# Patient Record
Sex: FEMALE | Race: WHITE | NOT HISPANIC OR LATINO | Employment: OTHER | URBAN - METROPOLITAN AREA
[De-identification: names, ages, dates, MRNs, and addresses within clinical notes are randomized per-mention and may not be internally consistent; named-entity substitution may affect disease eponyms.]

---

## 2017-01-11 ENCOUNTER — HOSPITAL ENCOUNTER (OUTPATIENT)
Dept: PREADMISSION TESTING | Facility: HOSPITAL | Age: 52
Discharge: HOME | End: 2017-01-11

## 2017-11-28 ENCOUNTER — HOSPITAL ENCOUNTER (OUTPATIENT)
Dept: OTHER | Facility: HOSPITAL | Age: 52
Discharge: HOME | End: 2017-11-28
Attending: INTERNAL MEDICINE | Admitting: INTERNAL MEDICINE

## 2017-11-28 LAB — CREAT BLDA-MCNC: 1 MG/DL (ref 0.6–1.3)

## 2020-07-20 ENCOUNTER — TELEPHONE (OUTPATIENT)
Dept: ONCOLOGY | Facility: CLINIC | Age: 55
End: 2020-07-20

## 2020-07-20 NOTE — TELEPHONE ENCOUNTER
CALLED PT WITH HER APPT ON 8/10/20 AT THE [place] OFFICE - SENDING MESS TO DR GRIFFIN ABOUT HER MEDICAL RECORDS.

## 2020-07-20 NOTE — TELEPHONE ENCOUNTER
----- Message from Mathew Collins Jr., MD sent at 7/17/2020  4:57 PM EDT -----  Regarding: New Patient  Spoke to patient's aunt who is my patient. She would like to see me for a second opinion visit. Having CT guided biopsy lung in [place] on 7/31. Please schedule new oncology visit Dr Collins week of 8/10. Patient's number is [phone]

## 2020-08-10 ENCOUNTER — LAB (OUTPATIENT)
Dept: OTHER | Facility: HOSPITAL | Age: 55
End: 2020-08-10

## 2020-08-10 ENCOUNTER — CONSULT (OUTPATIENT)
Dept: ONCOLOGY | Facility: CLINIC | Age: 55
End: 2020-08-10

## 2020-08-10 VITALS
HEIGHT: 66 IN | BODY MASS INDEX: 41.06 KG/M2 | TEMPERATURE: 98 F | DIASTOLIC BLOOD PRESSURE: 85 MMHG | HEART RATE: 85 BPM | OXYGEN SATURATION: 96 % | RESPIRATION RATE: 12 BRPM | WEIGHT: 255.5 LBS | SYSTOLIC BLOOD PRESSURE: 150 MMHG

## 2020-08-10 DIAGNOSIS — E66.01 MORBIDLY OBESE (HCC): ICD-10-CM

## 2020-08-10 DIAGNOSIS — C34.91 ADENOCARCINOMA OF RIGHT LUNG (HCC): Primary | ICD-10-CM

## 2020-08-10 DIAGNOSIS — C34.90 MALIGNANT NEOPLASM OF LUNG, UNSPECIFIED LATERALITY, UNSPECIFIED PART OF LUNG (HCC): Primary | ICD-10-CM

## 2020-08-10 PROBLEM — I10 ESSENTIAL HYPERTENSION: Status: ACTIVE | Noted: 2020-08-10

## 2020-08-10 PROBLEM — Z15.02 BRCA2 GENE MUTATION POSITIVE IN FEMALE: Status: ACTIVE | Noted: 2020-08-10

## 2020-08-10 PROBLEM — C34.92 ADENOCARCINOMA OF LEFT LUNG: Status: ACTIVE | Noted: 2020-08-10

## 2020-08-10 PROBLEM — D3A.090 CARCINOID TUMOR OF LEFT LUNG: Status: ACTIVE | Noted: 2020-08-10

## 2020-08-10 PROBLEM — C73 PAPILLARY THYROID CARCINOMA (HCC): Status: ACTIVE | Noted: 2020-08-10

## 2020-08-10 PROBLEM — Z86.000 HISTORY OF DUCTAL CARCINOMA IN SITU (DCIS) OF BREAST: Status: ACTIVE | Noted: 2020-08-10

## 2020-08-10 PROBLEM — Z15.01 BRCA2 GENE MUTATION POSITIVE IN FEMALE: Status: ACTIVE | Noted: 2020-08-10

## 2020-08-10 PROBLEM — C64.2 RENAL CELL CARCINOMA OF LEFT KIDNEY: Status: ACTIVE | Noted: 2020-08-10

## 2020-08-10 PROBLEM — E89.0 POSTOPERATIVE HYPOTHYROIDISM: Status: ACTIVE | Noted: 2020-08-10

## 2020-08-10 PROBLEM — Z15.09 BRCA2 GENE MUTATION POSITIVE IN FEMALE: Status: ACTIVE | Noted: 2020-08-10

## 2020-08-10 LAB
BASOPHILS # BLD AUTO: 0.04 10*3/MM3 (ref 0–0.2)
BASOPHILS NFR BLD AUTO: 0.4 % (ref 0–1.5)
DEPRECATED RDW RBC AUTO: 45.3 FL (ref 37–54)
EOSINOPHIL # BLD AUTO: 0.17 10*3/MM3 (ref 0–0.4)
EOSINOPHIL NFR BLD AUTO: 1.7 % (ref 0.3–6.2)
ERYTHROCYTE [DISTWIDTH] IN BLOOD BY AUTOMATED COUNT: 15.7 % (ref 12.3–15.4)
HCT VFR BLD AUTO: 36 % (ref 34–46.6)
HGB BLD-MCNC: 11.6 G/DL (ref 12–15.9)
IMM GRANULOCYTES # BLD AUTO: 0.05 10*3/MM3 (ref 0–0.05)
IMM GRANULOCYTES NFR BLD AUTO: 0.5 % (ref 0–0.5)
LYMPHOCYTES # BLD AUTO: 2.61 10*3/MM3 (ref 0.7–3.1)
LYMPHOCYTES NFR BLD AUTO: 25.7 % (ref 19.6–45.3)
MCH RBC QN AUTO: 25.8 PG (ref 26.6–33)
MCHC RBC AUTO-ENTMCNC: 32.2 G/DL (ref 31.5–35.7)
MCV RBC AUTO: 80.2 FL (ref 79–97)
MONOCYTES # BLD AUTO: 0.67 10*3/MM3 (ref 0.1–0.9)
MONOCYTES NFR BLD AUTO: 6.6 % (ref 5–12)
NEUTROPHILS NFR BLD AUTO: 6.6 10*3/MM3 (ref 1.7–7)
NEUTROPHILS NFR BLD AUTO: 65.1 % (ref 42.7–76)
NRBC BLD AUTO-RTO: 0 /100 WBC (ref 0–0.2)
PLATELET # BLD AUTO: 260 10*3/MM3 (ref 140–450)
PMV BLD AUTO: 9.9 FL (ref 6–12)
RBC # BLD AUTO: 4.49 10*6/MM3 (ref 3.77–5.28)
WBC # BLD AUTO: 10.14 10*3/MM3 (ref 3.4–10.8)

## 2020-08-10 PROCEDURE — 36415 COLL VENOUS BLD VENIPUNCTURE: CPT

## 2020-08-10 PROCEDURE — 85025 COMPLETE CBC W/AUTO DIFF WBC: CPT | Performed by: INTERNAL MEDICINE

## 2020-08-10 PROCEDURE — 99245 OFF/OP CONSLTJ NEW/EST HI 55: CPT | Performed by: INTERNAL MEDICINE

## 2020-08-10 RX ORDER — VALACYCLOVIR HYDROCHLORIDE 1 G/1
2000 TABLET, FILM COATED ORAL AS NEEDED
COMMUNITY
End: 2021-05-04 | Stop reason: SDUPTHER

## 2020-08-10 RX ORDER — HYDROCHLOROTHIAZIDE 12.5 MG/1
12.5 CAPSULE, GELATIN COATED ORAL AS NEEDED
COMMUNITY
End: 2021-08-03

## 2020-08-10 RX ORDER — LEVOTHYROXINE SODIUM 0.15 MG/1
150 TABLET ORAL DAILY
COMMUNITY

## 2020-08-10 RX ORDER — MELOXICAM 7.5 MG/1
7.5 TABLET ORAL DAILY PRN
COMMUNITY
End: 2021-02-18

## 2020-08-10 RX ORDER — KRILL/OM-3/DHA/EPA/PHOSPHO/AST 500-110 MG
500 CAPSULE ORAL DAILY
Status: ON HOLD | COMMUNITY
End: 2022-11-23

## 2020-08-10 RX ORDER — LISINOPRIL 20 MG/1
20 TABLET ORAL DAILY
COMMUNITY
End: 2021-05-04 | Stop reason: SDUPTHER

## 2020-08-10 RX ORDER — ATORVASTATIN CALCIUM 40 MG/1
40 TABLET, FILM COATED ORAL NIGHTLY
COMMUNITY
End: 2021-05-04 | Stop reason: SDUPTHER
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
## 2020-08-13 ENCOUNTER — TELEPHONE (OUTPATIENT)
Dept: ONCOLOGY | Facility: CLINIC | Age: 55
End: 2020-08-13

## 2020-08-13 NOTE — TELEPHONE ENCOUNTER
CSW left message for patient with contact information following up on distress score of 4.  Letter of introduction sent to patient's home as well.

## 2020-08-20 ENCOUNTER — LAB (OUTPATIENT)
Dept: LAB | Facility: HOSPITAL | Age: 55
End: 2020-08-20

## 2020-08-20 ENCOUNTER — OFFICE VISIT (OUTPATIENT)
Dept: ONCOLOGY | Facility: CLINIC | Age: 55
End: 2020-08-20

## 2020-08-20 VITALS
RESPIRATION RATE: 16 BRPM | HEIGHT: 66 IN | DIASTOLIC BLOOD PRESSURE: 88 MMHG | WEIGHT: 256.1 LBS | SYSTOLIC BLOOD PRESSURE: 155 MMHG | OXYGEN SATURATION: 96 % | HEART RATE: 79 BPM | TEMPERATURE: 97.8 F | BODY MASS INDEX: 41.16 KG/M2

## 2020-08-20 DIAGNOSIS — Z86.000 HISTORY OF DUCTAL CARCINOMA IN SITU (DCIS) OF BREAST: ICD-10-CM

## 2020-08-20 DIAGNOSIS — C34.91 ADENOCARCINOMA OF RIGHT LUNG (HCC): ICD-10-CM

## 2020-08-20 DIAGNOSIS — E89.0 POSTOPERATIVE HYPOTHYROIDISM: ICD-10-CM

## 2020-08-20 DIAGNOSIS — E66.01 MORBIDLY OBESE (HCC): ICD-10-CM

## 2020-08-20 DIAGNOSIS — Z15.09 BRCA2 GENE MUTATION POSITIVE IN FEMALE: ICD-10-CM

## 2020-08-20 DIAGNOSIS — C64.2 RENAL CELL CARCINOMA OF LEFT KIDNEY (HCC): ICD-10-CM

## 2020-08-20 DIAGNOSIS — Z15.01 BRCA2 GENE MUTATION POSITIVE IN FEMALE: ICD-10-CM

## 2020-08-20 DIAGNOSIS — D3A.090 CARCINOID TUMOR OF LEFT LUNG: ICD-10-CM

## 2020-08-20 DIAGNOSIS — Z15.02 BRCA2 GENE MUTATION POSITIVE IN FEMALE: ICD-10-CM

## 2020-08-20 DIAGNOSIS — C34.91 ADENOCARCINOMA OF RIGHT LUNG (HCC): Primary | ICD-10-CM

## 2020-08-20 DIAGNOSIS — C73 PAPILLARY THYROID CARCINOMA (HCC): ICD-10-CM

## 2020-08-20 DIAGNOSIS — C34.92 ADENOCARCINOMA OF LEFT LUNG (HCC): ICD-10-CM

## 2020-08-20 DIAGNOSIS — D64.9 NORMOCYTIC ANEMIA: ICD-10-CM

## 2020-08-20 LAB
ALBUMIN SERPL-MCNC: 4.3 G/DL (ref 3.5–5.2)
ALBUMIN/GLOB SERPL: 1.5 G/DL (ref 1.1–2.4)
ALP SERPL-CCNC: 126 U/L (ref 38–116)
ALT SERPL W P-5'-P-CCNC: 25 U/L (ref 0–33)
ANION GAP SERPL CALCULATED.3IONS-SCNC: 13.5 MMOL/L (ref 5–15)
AST SERPL-CCNC: 17 U/L (ref 0–32)
BASOPHILS # BLD AUTO: 0.05 10*3/MM3 (ref 0–0.2)
BASOPHILS NFR BLD AUTO: 0.5 % (ref 0–1.5)
BILIRUB SERPL-MCNC: 0.5 MG/DL (ref 0.2–1.2)
BUN SERPL-MCNC: 11 MG/DL (ref 6–20)
BUN/CREAT SERPL: 14.5 (ref 7.3–30)
CALCIUM SPEC-SCNC: 9.5 MG/DL (ref 8.5–10.2)
CHLORIDE SERPL-SCNC: 101 MMOL/L (ref 98–107)
CO2 SERPL-SCNC: 25.5 MMOL/L (ref 22–29)
CREAT SERPL-MCNC: 0.76 MG/DL (ref 0.6–1.1)
DEPRECATED RDW RBC AUTO: 47.8 FL (ref 37–54)
EOSINOPHIL # BLD AUTO: 0.18 10*3/MM3 (ref 0–0.4)
EOSINOPHIL NFR BLD AUTO: 2 % (ref 0.3–6.2)
ERYTHROCYTE [DISTWIDTH] IN BLOOD BY AUTOMATED COUNT: 16 % (ref 12.3–15.4)
FERRITIN SERPL-MCNC: 119.3 NG/ML (ref 11–207)
FOLATE SERPL-MCNC: 12.2 NG/ML (ref 4.78–24.2)
GFR SERPL CREATININE-BSD FRML MDRD: 79 ML/MIN/1.73
GLOBULIN UR ELPH-MCNC: 2.8 GM/DL (ref 1.8–3.5)
GLUCOSE SERPL-MCNC: 171 MG/DL (ref 74–124)
HCT VFR BLD AUTO: 33.8 % (ref 34–46.6)
HGB BLD-MCNC: 10.7 G/DL (ref 12–15.9)
IMM GRANULOCYTES # BLD AUTO: 0.07 10*3/MM3 (ref 0–0.05)
IMM GRANULOCYTES NFR BLD AUTO: 0.8 % (ref 0–0.5)
IRON 24H UR-MRATE: 44 MCG/DL (ref 37–145)
IRON SATN MFR SERPL: 13 % (ref 14–48)
LYMPHOCYTES # BLD AUTO: 2.55 10*3/MM3 (ref 0.7–3.1)
LYMPHOCYTES NFR BLD AUTO: 27.8 % (ref 19.6–45.3)
MCH RBC QN AUTO: 26.2 PG (ref 26.6–33)
MCHC RBC AUTO-ENTMCNC: 31.7 G/DL (ref 31.5–35.7)
MCV RBC AUTO: 82.6 FL (ref 79–97)
MONOCYTES # BLD AUTO: 0.69 10*3/MM3 (ref 0.1–0.9)
MONOCYTES NFR BLD AUTO: 7.5 % (ref 5–12)
NEUTROPHILS NFR BLD AUTO: 5.62 10*3/MM3 (ref 1.7–7)
NEUTROPHILS NFR BLD AUTO: 61.4 % (ref 42.7–76)
NRBC BLD AUTO-RTO: 0 /100 WBC (ref 0–0.2)
PLATELET # BLD AUTO: 245 10*3/MM3 (ref 140–450)
PMV BLD AUTO: 9 FL (ref 6–12)
POTASSIUM SERPL-SCNC: 4.2 MMOL/L (ref 3.5–4.7)
PROT SERPL-MCNC: 7.1 G/DL (ref 6.3–8)
RBC # BLD AUTO: 4.09 10*6/MM3 (ref 3.77–5.28)
SODIUM SERPL-SCNC: 140 MMOL/L (ref 134–145)
TIBC SERPL-MCNC: 351 MCG/DL (ref 249–505)
TRANSFERRIN SERPL-MCNC: 251 MG/DL (ref 200–360)
VIT B12 BLD-MCNC: 392 PG/ML (ref 211–946)
WBC # BLD AUTO: 9.16 10*3/MM3 (ref 3.4–10.8)

## 2020-08-20 PROCEDURE — 84466 ASSAY OF TRANSFERRIN: CPT | Performed by: INTERNAL MEDICINE

## 2020-08-20 PROCEDURE — 82607 VITAMIN B-12: CPT | Performed by: INTERNAL MEDICINE

## 2020-08-20 PROCEDURE — 83540 ASSAY OF IRON: CPT | Performed by: INTERNAL MEDICINE

## 2020-08-20 PROCEDURE — 82746 ASSAY OF FOLIC ACID SERUM: CPT | Performed by: INTERNAL MEDICINE

## 2020-08-20 PROCEDURE — 99215 OFFICE O/P EST HI 40 MIN: CPT | Performed by: INTERNAL MEDICINE

## 2020-08-20 PROCEDURE — 85025 COMPLETE CBC W/AUTO DIFF WBC: CPT

## 2020-08-20 PROCEDURE — 36415 COLL VENOUS BLD VENIPUNCTURE: CPT

## 2020-08-20 PROCEDURE — 82728 ASSAY OF FERRITIN: CPT | Performed by: INTERNAL MEDICINE

## 2020-08-20 PROCEDURE — 80053 COMPREHEN METABOLIC PANEL: CPT

## 2020-08-20 NOTE — PROGRESS NOTES
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  Renal function is normal.  Consider additional evaluation pending hemoglobin when she returns.    Plan:  1. Obtain results from additional molecular evaluation of biopsy 7/31/2020 including ALK and ROS1 rearrangement as well as BRAF V600 mutation.  2. I will present the patient and thoracic oncology conference next week  3. Obtain pulmonary function studies in 2 weeks  4. Referral to thoracic surgery to consider resection of new primary right lower lobe non-small cell lung cancer.  Patient currently undecided as to whether she will pursue surgery locally or in [place].  5. MD visit in 3 weeks with CBC, CMP

## 2020-08-28 ENCOUNTER — TRANSCRIBE ORDERS (OUTPATIENT)
Dept: ADMINISTRATIVE | Facility: HOSPITAL | Age: 55
End: 2020-08-28

## 2020-09-02 ENCOUNTER — OFFICE VISIT (OUTPATIENT)
Dept: OTHER | Facility: HOSPITAL | Age: 55
End: 2020-09-02

## 2020-09-02 VITALS
OXYGEN SATURATION: 98 % | BODY MASS INDEX: 41.09 KG/M2 | RESPIRATION RATE: 16 BRPM | SYSTOLIC BLOOD PRESSURE: 159 MMHG | HEART RATE: 84 BPM | HEIGHT: 66 IN | DIASTOLIC BLOOD PRESSURE: 93 MMHG | WEIGHT: 255.7 LBS

## 2020-09-02 DIAGNOSIS — Z15.09 BRCA2 GENE MUTATION POSITIVE IN FEMALE: ICD-10-CM

## 2020-09-02 DIAGNOSIS — C34.92 ADENOCARCINOMA OF LEFT LUNG (HCC): ICD-10-CM

## 2020-09-02 DIAGNOSIS — C34.91 ADENOCARCINOMA OF RIGHT LUNG (HCC): Primary | ICD-10-CM

## 2020-09-02 DIAGNOSIS — Z15.01 BRCA2 GENE MUTATION POSITIVE IN FEMALE: ICD-10-CM

## 2020-09-02 DIAGNOSIS — Z86.000 HISTORY OF DUCTAL CARCINOMA IN SITU (DCIS) OF BREAST: ICD-10-CM

## 2020-09-02 DIAGNOSIS — D3A.090 CARCINOID TUMOR OF LEFT LUNG: ICD-10-CM

## 2020-09-02 DIAGNOSIS — Z15.02 BRCA2 GENE MUTATION POSITIVE IN FEMALE: ICD-10-CM

## 2020-09-02 PROCEDURE — G0463 HOSPITAL OUTPT CLINIC VISIT: HCPCS

## 2020-09-02 PROCEDURE — 99204 OFFICE O/P NEW MOD 45 MIN: CPT | Performed by: THORACIC SURGERY (CARDIOTHORACIC VASCULAR SURGERY)

## 2020-09-02 RX ORDER — LETROZOLE 2.5 MG/1
2.5 TABLET, FILM COATED ORAL
COMMUNITY
Start: 2020-08-03 | End: 2020-10-02

## 2020-09-02 NOTE — PROGRESS NOTES
Subjective   Patient ID: Christie Avendaño is a 55 y.o. female is being seen for consultation today at the request of Mathew Collins Jr., MD    History of Present Illness  Dear Colleague,  Christie Avendaño was seen in our multidisciplinary thoracic oncology clinic today in consultation for newly diagnosed right lower lobe adenocarcinoma.  Ms. Avendaño is a very pleasant 55-year-old lady with a BRCA2 mutation who presents with a new right lower lobe lung nodule that is been biopsied and is proven to be an adenocarcinoma.  She has a complex cancer history including a left lower lobe adenocarcinoma in 2017 status post left lower lobectomy via thoracotomy.  She also had a carcinoid in her left lower lobe at that point which was a typical carcinoid.  She has had bilateral mastectomies with reconstruction in 2017, she has undergone a partial nephrectomy secondary to renal cell carcinoma in 2019.    From a pulmonary perspective, Ms. Avendaño is able to walk greater than a block and up a flight of stairs without any shortness of breath.  She continues to have significant pain in her left chest after her thoracotomy 2017.  She states that her pain is daily.    Ms. Avendaño is a never smoker.  She has a personal history of multiple cancers as above.  She also has a family history of multiple cancers including breast, colon, melanoma as well as hepatobiliary cancer.  The following portions of the patient's history were reviewed and updated as appropriate: allergies, current medications, past family history, past medical history, past social history, past surgical history and problem list.  Review of Systems   Constitution: Negative.   HENT: Negative.    Eyes: Negative.    Cardiovascular: Negative.    Respiratory: Negative.    Endocrine: Negative.    Hematologic/Lymphatic: Negative.    Skin: Negative.    Musculoskeletal: Positive for arthritis.   Gastrointestinal: Negative.    Genitourinary: Negative.    Neurological: Negative.     Psychiatric/Behavioral: Negative.    Allergic/Immunologic: Positive for environmental allergies.   All other systems reviewed and are negative.    Patient Active Problem List   Diagnosis   • Adenocarcinoma of right lung (CMS/HCC)   • Adenocarcinoma of left lung (CMS/HCC)   • Carcinoid tumor of left lung   • History of ductal carcinoma in situ (DCIS) of breast   • BRCA2 gene mutation positive in female   • Papillary thyroid carcinoma (CMS/HCC)   • Renal cell carcinoma of left kidney (CMS/HCC)   • Essential hypertension   • Postoperative hypothyroidism   • Morbidly obese (CMS/HCC)   • Normocytic anemia     Past Medical History:   Diagnosis Date   • Arthritis    • Asthma    • BRCA2 positive    • Breast cancer (CMS/HCC)     DCIS   • Diabetes mellitus (CMS/HCC)    • H/O Liver masses 2017    x3   • H/O Lung masses 2017    1 in right lower lobe and 1 in the left infrahilar location   • H/O Ovarian cyst    • H/O Right adrenal mass (CMS/HCC) 2017   • Lung cancer (CMS/HCC)    • Thyroid disease      Past Surgical History:   Procedure Laterality Date   • APPENDECTOMY     • BREAST IMPLANT SURGERY Bilateral    • CHOLECYSTECTOMY     • HYSTERECTOMY     • MASTECTOMY Bilateral    • OVARIAN CYST SURGERY     • TONSILLECTOMY       Family History   Problem Relation Age of Onset   • Breast cancer Mother    • Thyroid cancer Mother 73   • Hypertension Mother    • Cancer Father         cholangiocarcinoma   • Liver cancer Father    • Ovarian cancer Maternal Grandmother 80   • Leukemia Maternal Grandfather         60-80, unknown type   • Leukemia Paternal Grandfather         60-80, unknown type   • Colon cancer Maternal Aunt         over age of 50   • Breast cancer Maternal Aunt         80's   • Diabetes Other      Social History     Socioeconomic History   • Marital status:      Spouse name: Jayesh   • Number of children: 2   • Years of education: High School   • Highest education level: Not on file   Occupational History     Employer:  UNEMPLOYED   Tobacco Use   • Smoking status: Never Smoker   • Smokeless tobacco: Never Used   Substance and Sexual Activity   • Alcohol use: Never     Frequency: Never   • Drug use: Never       Current Outpatient Medications:   •  atorvastatin (LIPITOR) 40 MG tablet, Take 40 mg by mouth Every Night., Disp: , Rfl:   •  bimatoprost (LUMIGAN) 0.01 % ophthalmic drops, Administer 1 drop to both eyes Every Night., Disp: , Rfl:   •  CINNAMON PO, Take 1,800 mg by mouth Daily., Disp: , Rfl:   •  hydroCHLOROthiazide (MICROZIDE) 12.5 MG capsule, Take 12.5 mg by mouth As Needed., Disp: , Rfl:   •  Krill Oil (OMEGA-3) 500 MG capsule, Take 500 mg by mouth Daily., Disp: , Rfl:   •  letrozole (FEMARA) 2.5 MG tablet, , Disp: , Rfl:   •  levothyroxine (SYNTHROID, LEVOTHROID) 150 MCG tablet, Take 150 mcg by mouth Daily., Disp: , Rfl:   •  lisinopril (PRINIVIL,ZESTRIL) 20 MG tablet, Take 20 mg by mouth Daily., Disp: , Rfl:   •  meloxicam (MOBIC) 7.5 MG tablet, Take 7.5 mg by mouth Daily As Needed., Disp: , Rfl:   •  metFORMIN (GLUCOPHAGE) 500 MG tablet, Take 500 mg by mouth 2 (Two) Times a Day With Meals. Extended release, Disp: , Rfl:   •  TURMERIC CURCUMIN PO, Take 1,510 mg by mouth Daily. Turmeric, Curcumin w/black pepper, Disp: , Rfl:   •  valACYclovir (VALTREX) 1000 MG tablet, Take 2,000 mg by mouth 2 (Two) Times a Day. 2-1000 mg twice a day, Disp: , Rfl:   Allergies   Allergen Reactions   • Morphine Hives     Hives and throat swelling   • Penicillins Hemorrhagic stroke     Hives and swelling of throat        Objective   Vitals:    09/02/20 1123   BP: 159/93   Pulse: 84   Resp: 16   SpO2: 98%     Physical Exam   Constitutional: She is oriented to person, place, and time. She appears well-developed and well-nourished.   HENT:   Head: Normocephalic and atraumatic.   Nose: Nose normal.   Mouth/Throat: Oropharynx is clear and moist.   Eyes: Pupils are equal, round, and reactive to light. Conjunctivae and EOM are normal.   Neck: Normal  range of motion. Neck supple.   Cardiovascular: Normal rate, regular rhythm, normal heart sounds and intact distal pulses.   Pulmonary/Chest: Effort normal and breath sounds normal.   Well-healed left thoracotomy incisions, well-healed mastectomy incisions   Abdominal: Soft. Bowel sounds are normal.   Well-healed nephrectomy incision   Musculoskeletal: Normal range of motion.   Neurological: She is alert and oriented to person, place, and time.   Skin: Skin is warm and dry. Capillary refill takes less than 2 seconds.   Psychiatric: She has a normal mood and affect. Her behavior is normal. Judgment and thought content normal.   Nursing note and vitals reviewed.    Independent Review of Radiographic Studies:    I have independently reviewed the PET CT scan performed on 8/12/2020 which demonstrates a 1.2 cm right lower lobe lung mass in the superior segment.  There is no other areas of hypermetabolism.  There is no mediastinal or hilar lymphadenopathy.  There is no distant metastases.  Assessment/Plan   Ms. Avendaño is a very pleasant 55-year-old lady with a 1.2 cm superior segment of the right lower lobe adenocarcinoma.  This appears to be a second primary given the lack of other evidence of disease as well as the associated mutations.  This would be a T1b N0 M0.  I would recommend a surgical resection with superior segmentectomy via VATS.  This was discussed at length with Ms. Avendaño today.  She will need to obtain pulmonary function studies to better risk stratify her.  I will plan to have a telephone visit with her after she obtains her pulmonary function studies and we will plan a resection after.  She would like to wait until after September 16 for these studies as she is in the process of moving.  She will also be presented at tumor board.    Diagnoses and all orders for this visit:    Adenocarcinoma of right lung (CMS/HCC)    Adenocarcinoma of left lung (CMS/HCC)    Carcinoid tumor of left lung    History of ductal  carcinoma in situ (DCIS) of breast    BRCA2 gene mutation positive in female    Other orders  -     letrozole (FEMARA) 2.5 MG tablet

## 2020-09-03 ENCOUNTER — APPOINTMENT (OUTPATIENT)
Dept: RESPIRATORY THERAPY | Facility: HOSPITAL | Age: 55
End: 2020-09-03

## 2020-09-08 NOTE — PROGRESS NOTES
Subjective .     REASONS FOR FOLLOWUP:    1. BRCA2 mutation ([id number].[id number]):  · Strong family history of malignancy with a mother who had thyroid cancer and also had breast cancer at age 73 with subsequent liver metastases.  She was found to be BRCA2 positive and prompted the patient's own testing.  The patient's maternal grandmother had ovarian cancer in her mid 80s, maternal grandfather and paternal grandfather both had leukemia of unknown type at an older age.  Her maternal aunt had colon cancer over the age of 50, maternal aunt had breast cancer in her 80s, and her father had cholangiocarcinoma.    · The patient underwent testing on 7/27/2016 showing positive BRCA2 mutation ([id number].[id number])   · The patient did undergo KAVYA/BSO in 1992 secondary to hemorrhage.  · The patient did undergo bilateral mastectomies 1/26/2017 with findings of bilateral DCIS (discussed below).  · Patient has undergone previous colonoscopy on 11/28/2017.  · Given the unusual nature of the patient's malignancies, referral to genetics clinic for panel testing with possibility of additional underlying mutation.  2. Stage I ([id number]) papillary thyroid cancer:  · Status post total thyroidectomy with Dr. Maher in [place] on 10/15/2010.  Pathology showed papillary thyroid cancer involving the left thyroid and isthmus, multifocal with 2 areas measuring 0.9 and 1.2 cm.  No evidence of capsular invasion, no extrathyroidal extension, no lymphovascular invasion, negative margins.  · Postsurgical scan showed uptake in the thyroid bed and the patient underwent treatment with I-131.    · She has had no evidence of recurrent disease.    · Her subsequent hypothyroidism has been managed by endocrinology (Dr. Subhash Michael) in [place], currently receiving levothyroxine 150 mcg daily.  3. Bilateral DCIS  · As above, patient has underlying BRCA2 mutation  · 8/5/2016 was found to have a right breast intraductal papilloma with fibrocystic change and  microcalcifications with usual ductal hyperplasia and columnar cell change in 3 separate locations on biopsy.  · On 8/24/2016 she underwent excisional biopsy of an intraductal papilloma from the right breast.    · She subsequently underwent on 1/26/2017 bilateral mastectomy.  On the right there was a large intraductal papilloma with usual ductal hyperplasia, atypical hyperplasia, DCIS measuring 3 mm which was low-grade, ER positive (98%), [place] positive (85%).  On the left there were multiple intraductal papillomas with atypical ductal hyperplasia.  There was DCIS measuring 6 mm, low-grade, ER positive (99%), [place] positive (98%).  4. Stage I ([id number]) clear-cell renal cell carcinoma:  · Incidental finding on CT scan 1/15/2020 of enhancing left renal lesions x2, largest 2.4 cm  · Resection with Dr. Pool ([facility]) on 5/15/2020 with left partial nephrectomy.  Pathology showed clear cell renal cell carcinoma, Jeanne grade 2, 2 foci measuring 1.5 and 2.1 cm.  The renal parenchymal margin was focally positive.  · Patient reports that Dr. Pool felt that he required additional margin tissue in the area of focal positivity and did not recommend re-resection.  · Most recent CT 7/1/2020 showed evidence of resection of left renal lesion.  5. 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    · CT-guided biopsy on 1/9/2018 revealed a left lower lobe carcinoid with spindle cell features, no necrosis, no mitoses.  · Notation of normal chromogranin A 1/23/2018 of 35  · Follow-up CT on 1/24/2018 showed multiple bilateral pulmonary nodules including a 2.1 cm left lower lobe nodule (previously 2.6), 7 mm left lower lobe nodule, 1 cm right lower lobe nodule, right adrenal 1.5 cm nodule consistent with adenoma, and hepatic cysts.    · On 2/28/2018, the patient underwent a left lower lobectomy.  Pathology revealed a 2.3 cm left  upper lobe (hilar) carcinoid, typical with spindle cell features, Ki-67 of 3.68%.  There were 2 of 8 peribronchial lymph nodes involved with carcinoid with lymphatic invasion, stage IIB ([id number]).  There was also evidence of adenocarcinoma (see below).  6. Stage IA1 ([id number])  left lower lobe non-small cell lung cancer (adenocarcinoma with lipidic features):  · The patient is a never smoker  · PET scan 12/13/2017 with hypermetabolic 2.5 cm left lower lobe nodule with SUV 5.7.    · CT-guided biopsy on 1/9/2018 revealed a left lower lobe carcinoid with spindle cell features, no necrosis, no mitoses.    · Follow-up CT on 1/24/2018 showed multiple bilateral pulmonary nodules including a 2.1 cm left lower lobe nodule (previously 2.6), 7 mm left lower lobe nodule, 1 cm right lower lobe nodule, right adrenal 1.5 cm nodule consistent with adenoma, and hepatic cysts.    · On 2/28/2018, the patient underwent a left lower lobectomy.  Pathology revealed a 2.3 cm left upper lobe (hilar) carcinoid, typical with spindle cell features, Ki-67 of 3.68%.  There were 2 of 8 peribronchial lymph nodes involved with carcinoid with lymphatic invasion, stage IIB ([id number]).  There was a 0.9 cm adenocarcinoma, well differentiated with lipidic features, no visceral pleural invasion, bronchoalveolar atypical adenomatous hyperplasia at the parenchymal margin and 1 microscopic focus (less than 1 mm).  0/8 lymph nodes involved with adenocarcinoma. PDL1 <1% TPS, positive EGF receptor mutation (no details available), negative ALK/ROS1 rearrangement, negative BRAF, ER negative (2%), [place] 0, HER-2/laura 1+, Ki-67 3%. Stage IA1 ([id number]).  · Patient was placed on Femara following surgery by Dr. Juana Pelayo.  Femara subsequently discontinued in early August 2020.  · Subsequent follow-up scans with stable findings until scan on 1/15/2020 noted 2 enhancing left renal lesions, largest 2.4 cm and there was also an enlarging right lower lobe nodule up to  1.0 x 1.2 cm.  In the interval, patient did undergo resection of renal cell carcinoma (discussed above).    · CT scan on 7/1/2020 showed slow increase in right lower lobe nodule up to 1.2 cm.  Felt to represent new primary lung cancer (see below).   7. Clinical stage IA2 ([id number]) right lower lobe non-small cell lung cancer (adenocarcinoma with lipidic growth pattern):   · CT scan on 7/1/2020 showed slow increase in right lower lobe nodule up to 1.2 cm.  · CT-guided biopsy of the right lower lobe nodule on 7/31/2020 with adenocarcinoma with bland lipidic growth pattern of pulmonary origin (TTF-1 and CK7 positive). PDL1 TPS <1%, EGFR mutated, exon 20 insertion (possibly indicating lack of response to TKI's). ALK/ROS1 rearrangement negative and BRAF V600 mutation negative.  · Staging MRI brain 8/12/2020 with no evidence of metastatic disease  · Staging PET scan 8/12/2020 with no significant activity in the biopsied lesion 1.2 cm right lower lobe (SUV 1.5), no evidence of hilar, mediastinal uptake nor distant metastatic disease.  · Given the difference in EGFR mutation between the patient's 2 lung cancers, it is felt that she has 2 separate primary lesions.  Recommended to pursue pulmonary function testing and subsequent resection.  Patient seen by Dr. Crisostomo and thoracic surgery with plans for segmentectomy via VATS in early October 2020.  7. Anemia:  · Hemoglobin in high 10 to low 11 range with low normal MCV  · Anemia evaluation 8/20/2020 with iron 44, ferritin 119.3, iron saturation 13%, TIBC 351, folate 12.2, B12 392.  · Unclear whether patient may have anemia secondary to chronic disease.  · With low iron saturation, initiated oral iron daily on 9/15/2020    HISTORY OF PRESENT ILLNESS:  The patient is a 55 y.o. year old female who is here for follow-up with the above-mentioned history.    History of Present Illness the patient returns today in follow-up to further discuss management of her non-small cell lung  cancer.  I did present the patient at thoracic oncology conference on 9/8/2020 and the patient has seen Dr. Crisostomo here at [facility] in regards to surgical resection of her disease.  Dr. Crisostomo has recommended pulmonary function testing which is scheduled for tomorrow and potential VATS with segmentectomy and lymph node sampling.  Patient would like to delay this until the first week and over 2020.  She has just moved and is recovering from that currently.  She has multiple stressors with her mother who has metastatic breast cancer and is not doing well overall.  She has been only local caregiver.  She is quite stressed with work, family issues, and her recent move.  She is tearful in the office today.  She has no significant physical complaints.  She would like to try to lose weight and is requesting information from a nutrition standpoint.    Past Medical History:   Diagnosis Date   • Arthritis    • Asthma    • BRCA2 positive    • Breast cancer (CMS/HCC)     DCIS   • Diabetes mellitus (CMS/HCC)    • H/O Liver masses 2017    x3   • H/O Lung masses 2017    1 in right lower lobe and 1 in the left infrahilar location   • H/O Ovarian cyst    • H/O Right adrenal mass (CMS/HCC) 2017   • Lung cancer (CMS/HCC)    • Thyroid disease      Past Surgical History:   Procedure Laterality Date   • APPENDECTOMY     • BREAST IMPLANT SURGERY Bilateral    • CHOLECYSTECTOMY     • HYSTERECTOMY     • MASTECTOMY Bilateral    • OVARIAN CYST SURGERY     • TONSILLECTOMY           ONCOLOGIC HISTORY:  (History from previous dates can be found in the separate document.)    Current Outpatient Medications on File Prior to Visit   Medication Sig Dispense Refill   • atorvastatin (LIPITOR) 40 MG tablet Take 40 mg by mouth Every Night.     • bimatoprost (LUMIGAN) 0.01 % ophthalmic drops Administer 1 drop to both eyes Every Night.     • CINNAMON PO Take 1,800 mg by mouth Daily.     • hydroCHLOROthiazide (MICROZIDE) 12.5 MG capsule Take 12.5 mg by mouth As  Needed.     • Krill Oil (OMEGA-3) 500 MG capsule Take 500 mg by mouth Daily.     • letrozole (FEMARA) 2.5 MG tablet      • levothyroxine (SYNTHROID, LEVOTHROID) 150 MCG tablet Take 150 mcg by mouth Daily.     • lisinopril (PRINIVIL,ZESTRIL) 20 MG tablet Take 20 mg by mouth Daily.     • meloxicam (MOBIC) 7.5 MG tablet Take 7.5 mg by mouth Daily As Needed.     • metFORMIN (GLUCOPHAGE) 500 MG tablet Take 500 mg by mouth 2 (Two) Times a Day With Meals. Extended release     • TURMERIC CURCUMIN PO Take 1,510 mg by mouth Daily. Turmeric, Curcumin w/black pepper     • valACYclovir (VALTREX) 1000 MG tablet Take 2,000 mg by mouth 2 (Two) Times a Day. 2-1000 mg twice a day       No current facility-administered medications on file prior to visit.        ALLERGIES:     Allergies   Allergen Reactions   • Morphine Hives     Hives and throat swelling   • Penicillins Hemorrhagic stroke     Hives and swelling of throat       Social History     Socioeconomic History   • Marital status:      Spouse name: Jayesh   • Number of children: 2   • Years of education: High School   • Highest education level: Not on file   Occupational History     Employer: UNEMPLOYED   Tobacco Use   • Smoking status: Never Smoker   • Smokeless tobacco: Never Used   Substance and Sexual Activity   • Alcohol use: Never     Frequency: Never   • Drug use: Never     Family History   Problem Relation Age of Onset   • Breast cancer Mother    • Thyroid cancer Mother 73   • Hypertension Mother    • Cancer Father         cholangiocarcinoma   • Liver cancer Father    • Ovarian cancer Maternal Grandmother 80   • Leukemia Maternal Grandfather         60-80, unknown type   • Leukemia Paternal Grandfather         60-80, unknown type   • Colon cancer Maternal Aunt         over age of 50   • Breast cancer Maternal Aunt         80's   • Diabetes Other               Review of Systems   Constitutional: Positive for fatigue (seems to be getting better). Negative for activity  change, appetite change, fever and unexpected weight change.   HENT: Negative for congestion, mouth sores, nosebleeds, sore throat and voice change.    Respiratory: Negative for cough, shortness of breath and wheezing.    Cardiovascular: Negative for chest pain, palpitations and leg swelling.   Gastrointestinal: Negative for abdominal distention, abdominal pain, blood in stool, constipation, diarrhea, nausea and vomiting.   Endocrine: Negative for cold intolerance and heat intolerance.   Genitourinary: Negative for difficulty urinating, dysuria, frequency and hematuria.   Musculoskeletal: Positive for arthralgias (seems to be getting better) and back pain. Negative for joint swelling and myalgias.   Skin: Negative for rash.   Neurological: Negative for dizziness, syncope, weakness, light-headedness, numbness and headaches.   Hematological: Negative for adenopathy. Does not bruise/bleed easily.   Psychiatric/Behavioral: Negative for confusion and sleep disturbance. The patient is not nervous/anxious.          Objective      Vitals:    09/15/20 0926   BP: 154/85   Pulse: 83   Temp: 98 °F (36.7 °C)   SpO2: 97%      Current Status 9/15/2020   ECOG score 1   Pain 0/10    Physical Exam   Constitutional: She is oriented to person, place, and time. She appears well-developed and well-nourished.   HENT:   Mouth/Throat: Oropharynx is clear and moist.   Eyes: Conjunctivae are normal.   Neck: No thyromegaly present.   Cardiovascular: Normal rate and regular rhythm. Exam reveals no gallop and no friction rub.   No murmur heard.  Pulmonary/Chest: Breath sounds normal. No respiratory distress.   Abdominal: Soft. Bowel sounds are normal. She exhibits no distension. There is no tenderness.   Musculoskeletal: She exhibits no edema.   Lymphadenopathy:        Head (right side): No submandibular adenopathy present.     She has no cervical adenopathy.     She has no axillary adenopathy.        Right: No inguinal and no supraclavicular  adenopathy present.        Left: No inguinal and no supraclavicular adenopathy present.   Neurological: She is alert and oriented to person, place, and time. She displays normal reflexes. No cranial nerve deficit. She exhibits normal muscle tone.   Skin: Skin is warm and dry. No rash noted.   Psychiatric: She has a normal mood and affect. Her behavior is normal.   The patient was examined today, unchanged from above.    RECENT LABS:  Hematology WBC   Date Value Ref Range Status   09/15/2020 9.08 3.40 - 10.80 10*3/mm3 Final   11/01/2018 3.17 (L) 4.5 - 11.0 10*3/uL Final     RBC   Date Value Ref Range Status   09/15/2020 4.36 3.77 - 5.28 10*6/mm3 Final   11/01/2018 3.99 (L) 4.0 - 5.2 10*6/uL Final     Hemoglobin   Date Value Ref Range Status   09/15/2020 11.2 (L) 12.0 - 15.9 g/dL Final   11/01/2018 10.0 (L) 12.0 - 16.0 g/dL Final     Hematocrit   Date Value Ref Range Status   09/15/2020 36.4 34.0 - 46.6 % Final   11/01/2018 30.9 (L) 36.0 - 46.0 % Final     Platelets   Date Value Ref Range Status   09/15/2020 249 140 - 450 10*3/mm3 Final   11/01/2018 98 (L) 140 - 440 10*3/uL Final        Lab Results   Component Value Date    GLUCOSE 210 (H) 09/15/2020    BUN 14 09/15/2020    CREATININE 0.77 09/15/2020    EGFRIFNONA 78 09/15/2020    EGFRIFAFRI >60 11/28/2017    BCR 18.2 09/15/2020    K 4.4 09/15/2020    CO2 25.2 09/15/2020    CALCIUM 9.2 09/15/2020    ALBUMIN 4.40 09/15/2020    LABIL2 1.7 07/10/2020    AST 12 09/15/2020    ALT 16 09/15/2020     Reviewed records from recent thoracic surgery evaluation as outlined above and below.  Reviewed additional path likely findings as outlined above and below.    Assessment/Plan     1. BRCA2 mutation ([id number].[id number]):  · Strong family history of malignancy with a mother who had thyroid cancer and also had breast cancer at age 73 with subsequent liver metastases.  She was found to be BRCA2 positive and prompted the patient's own testing.  The patient's maternal grandmother had ovarian  cancer in her mid 80s, maternal grandfather and paternal grandfather both had leukemia of unknown type at an older age.  Her maternal aunt had colon cancer over the age of 50, maternal aunt had breast cancer in her 80s, and her father had cholangiocarcinoma.    · The patient underwent testing on 7/27/2016 showing positive BRCA2 mutation ([id number].[id number])   · The patient did undergo KAVYA/BSO in 1992 secondary to hemorrhage.  · The patient did undergo bilateral mastectomies 1/26/2017 with findings of bilateral DCIS (discussed below).  · Patient has undergone previous colonoscopy on 11/28/2017.  · Given the unusual nature of the patient's malignancies, referral to genetics clinic for panel testing with possibility of additional underlying mutation.  · As above, we did discuss the unusual nature of her malignancy which are not necessarily traditional BRCA associated diseases.  We discussed referral to the genetics clinic to pursue extended genetic panel testing with the possibility she may harbor another mutation.  She was in agreement.  This can be certainly delayed until possibly November after she recovers from her upcoming surgery.  2. Stage I ([id number]) papillary thyroid cancer:  · Status post total thyroidectomy with Dr. Maher in [place] on 10/15/2010.  Pathology showed papillary thyroid cancer involving the left thyroid and isthmus, multifocal with 2 areas measuring 0.9 and 1.2 cm.  No evidence of capsular invasion, no extrathyroidal extension, no lymphovascular invasion, negative margins.  · Postsurgical scan showed uptake in the thyroid bed and the patient underwent treatment with I-131.    · She has had no evidence of recurrent disease.    · Her subsequent hypothyroidism has been managed by endocrinology (Dr. Subhash Michael) in [place], currently receiving levothyroxine 150 mcg daily.  3. Bilateral DCIS  · As above, patient has underlying BRCA2 mutation  · 8/5/2016 was found to have a right breast  intraductal papilloma with fibrocystic change and microcalcifications with usual ductal hyperplasia and columnar cell change in 3 separate locations on biopsy.  · On 8/24/2016 she underwent excisional biopsy of an intraductal papilloma from the right breast.    · She subsequently underwent on 1/26/2017 bilateral mastectomy.  On the right there was a large intraductal papilloma with usual ductal hyperplasia, atypical hyperplasia, DCIS measuring 3 mm which was low-grade, ER positive (98%), MI positive (85%).  On the left there were multiple intraductal papillomas with atypical ductal hyperplasia.  There was DCIS measuring 6 mm, low-grade, ER positive (99%), MI positive (98%).  4. Stage I ([id number]) clear-cell renal cell carcinoma:  · Incidental finding on CT scan 1/15/2020 of enhancing left renal lesions x2, largest 2.4 cm  · Resection with Dr. Pool ([facility] [place]) on 5/15/2020 with left partial nephrectomy.  Pathology showed clear cell renal cell carcinoma, Jeanne grade 2, 2 foci measuring 1.5 and 2.1 cm.  The renal parenchymal margin was focally positive.  · Patient reports that Dr. Pool felt that he required additional margin tissue in the area of focal positivity and did not recommend re-resection.  · Most recent CT 7/1/2020 showed evidence of resection of left renal lesion.  · We will plan ongoing surveillance for renal cell carcinoma with serial 6-month interval scans following resection of patient's lung cancer.  5. 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    · CT-guided biopsy on 1/9/2018 revealed a left lower lobe carcinoid with spindle cell features, no necrosis, no mitoses.  · Notation of normal chromogranin A 1/23/2018 of 35  · Follow-up CT on 1/24/2018 showed multiple bilateral pulmonary nodules including a 2.1 cm left lower lobe nodule (previously 2.6), 7 mm left lower lobe nodule, 1 cm  right lower lobe nodule, right adrenal 1.5 cm nodule consistent with adenoma, and hepatic cysts.    · On 2/28/2018, the patient underwent a left lower lobectomy.  Pathology revealed a 2.3 cm left upper lobe (hilar) carcinoid, typical with spindle cell features, Ki-67 of 3.68%.  There were 2 of 8 peribronchial lymph nodes involved with carcinoid with lymphatic invasion, stage IIB ([id number]).  There was also evidence of adenocarcinoma (see below).  6. Stage IA1 ([id number])  left lower lobe non-small cell lung cancer (adenocarcinoma with lipidic features):  · The patient is a never smoker  · PET scan 12/13/2017 with hypermetabolic 2.5 cm left lower lobe nodule with SUV 5.7.    · CT-guided biopsy on 1/9/2018 revealed a left lower lobe carcinoid with spindle cell features, no necrosis, no mitoses.    · Follow-up CT on 1/24/2018 showed multiple bilateral pulmonary nodules including a 2.1 cm left lower lobe nodule (previously 2.6), 7 mm left lower lobe nodule, 1 cm right lower lobe nodule, right adrenal 1.5 cm nodule consistent with adenoma, and hepatic cysts.    · On 2/28/2018, the patient underwent a left lower lobectomy.  Pathology revealed a 2.3 cm left upper lobe (hilar) carcinoid, typical with spindle cell features, Ki-67 of 3.68%.  There were 2 of 8 peribronchial lymph nodes involved with carcinoid with lymphatic invasion, stage IIB ([id number]).  There was a 0.9 cm adenocarcinoma, well differentiated with lipidic features, no visceral pleural invasion, bronchoalveolar atypical adenomatous hyperplasia at the parenchymal margin and 1 microscopic focus (less than 1 mm).  0/8 lymph nodes involved with adenocarcinoma. PDL1 <1% TPS, positive EGF receptor mutation (exon 19 deletion, [id number]), negative ALK/ROS1 rearrangement, negative BRAF, ER negative (2%), [place] 0, HER-2/laura 1+, Ki-67 3%. Stage IA1 ([id number]).  · Patient was placed on Femara following surgery by Dr. Juana Pelayo.  Femara subsequently discontinued in  early August 2020.  · Subsequent follow-up scans with stable findings until scan on 1/15/2020 noted 2 enhancing left renal lesions, largest 2.4 cm and there was also an enlarging right lower lobe nodule up to 1.0 x 1.2 cm.  In the interval, patient did undergo resection of renal cell carcinoma (discussed above).    · CT scan on 7/1/2020 showed slow increase in right lower lobe nodule up to 1.2 cm.  Felt to represent new primary lung cancer (see below).   7. Clinical stage IA2 ([id number]) right lower lobe non-small cell lung cancer (adenocarcinoma with lipidic growth pattern):   · CT scan on 7/1/2020 showed slow increase in right lower lobe nodule up to 1.2 cm.  · CT-guided biopsy of the right lower lobe nodule on 7/31/2020 with adenocarcinoma with bland lipidic growth pattern of pulmonary origin (TTF-1 and CK7 positive). PDL1 TPS <1%, EGFR mutated, exon 20 insertion (possibly indicating lack of response to TKI's). ALK/ROS1 rearrangement negative and BRAF V600 mutation negative.  · Staging MRI brain 8/12/2020 with no evidence of metastatic disease  · Staging PET scan 8/12/2020 with no significant activity in the biopsied lesion 1.2 cm right lower lobe (SUV 1.5), no evidence of hilar, mediastinal uptake nor distant metastatic disease.  · Given the difference in EGFR mutation between the patient's 2 lung cancers, it is felt that she has 2 separate primary lesions.  Recommended to pursue pulmonary function testing and subsequent resection.  Patient seen by Dr. Crisostomo and thoracic surgery with plans for segmentectomy via VATS in early October 2020.  · We had a lengthy discussion today regarding patient's plans for definitive treatment of her lung cancer.  She has decided to proceed with surgery at [facility] with Dr. Crisostomo.  She has pulmonary function study scheduled for tomorrow and was seen her back on 9/25/2020.  The patient would like to proceed with surgery in early October.  She has multiple ongoing issues.  Her mother  with metastatic breast cancer and that they are recently.  I discussed that we should move back around 3 weeks postoperatively noted to review her pathologic results and ensure that she does not require any adjuvant therapy.  Patient is in agreement.  8. Anemia:  · Hemoglobin in high 10 to low 11 range with low normal MCV  · Anemia evaluation 8/20/2020 with iron 44, ferritin 119.3, iron saturation 13%, TIBC 351, folate 12.2, B12 392.  · Unclear whether patient may have anemia secondary to chronic disease.  · With low iron saturation, initiated oral iron daily on 9/15/2020  · Hemoglobin today slightly improved at 11.2.  9. Depression  · Patient has had difficulty recently coping with her multiple stressors.  She has recently moved and is the primary caregiver for her mother with metastatic breast cancer not doing well.  We feels overwhelmed and stressed and tearful in the office today.  We discussed referral to the supportive oncology clinic and she would like to proceed.  10. Obesity  · The patient brought up the issue of her weight today.  She is motivated to lose weight but is unsure of how to go about this.  She is aware that this has some impact on her risk for malignancy in the future.  I have referred her to oncology nutrition.    Plan:  1. Pulmonary function studies scheduled for 9/16/2020  2. Anticipate that the patient will be proceeding with surgery the week of 10/5/2020 with Dr. Crisostomo with right VATS and segmentectomy with lymph node sampling.  3. Referral to oncology nutrition  4. Referral to supportive oncology clinic  5. Referral to genetics clinic for extended panel testing (could be delayed until November)  6. Begin oral iron sulfate 325 mg once daily.  Patient cautioned regarding constipation  7. MD visit in 5 weeks with CBC, CMP, iron panel, ferritin.  We will review her operative pathology and discuss the potential need for adjuvant therapy.

## 2020-09-08 NOTE — PATIENT INSTRUCTIONS
"Pt seen by Dr. Crisostomo and will be presented to thoracic conference. Pt given VATS brochure, \"What to Expect\" VATS/Thoracotomy handout with discharge instructions along with ERAS pamphlet.  Pt instructed to call nurse navigator with any questions or concerns. Pt given contact cards for Dr. Crisostomo and nurse navigator.  "

## 2020-09-14 ENCOUNTER — LAB (OUTPATIENT)
Dept: LAB | Facility: HOSPITAL | Age: 55
End: 2020-09-14

## 2020-09-14 DIAGNOSIS — Z01.818 PREOP TESTING: Primary | ICD-10-CM

## 2020-09-14 PROCEDURE — C9803 HOPD COVID-19 SPEC COLLECT: HCPCS

## 2020-09-14 PROCEDURE — U0004 COV-19 TEST NON-CDC HGH THRU: HCPCS

## 2020-09-15 ENCOUNTER — OFFICE VISIT (OUTPATIENT)
Dept: ONCOLOGY | Facility: CLINIC | Age: 55
End: 2020-09-15

## 2020-09-15 ENCOUNTER — LAB (OUTPATIENT)
Dept: LAB | Facility: HOSPITAL | Age: 55
End: 2020-09-15

## 2020-09-15 VITALS
BODY MASS INDEX: 41.15 KG/M2 | WEIGHT: 254.5 LBS | SYSTOLIC BLOOD PRESSURE: 154 MMHG | DIASTOLIC BLOOD PRESSURE: 85 MMHG | HEART RATE: 83 BPM | TEMPERATURE: 98 F | OXYGEN SATURATION: 97 %

## 2020-09-15 DIAGNOSIS — C73 PAPILLARY THYROID CARCINOMA (HCC): ICD-10-CM

## 2020-09-15 DIAGNOSIS — C34.92 ADENOCARCINOMA OF LEFT LUNG (HCC): ICD-10-CM

## 2020-09-15 DIAGNOSIS — C34.91 ADENOCARCINOMA OF RIGHT LUNG (HCC): Primary | ICD-10-CM

## 2020-09-15 DIAGNOSIS — Z15.01 BRCA2 GENE MUTATION POSITIVE IN FEMALE: ICD-10-CM

## 2020-09-15 DIAGNOSIS — C64.2 RENAL CELL CARCINOMA OF LEFT KIDNEY (HCC): ICD-10-CM

## 2020-09-15 DIAGNOSIS — Z15.09 BRCA2 GENE MUTATION POSITIVE IN FEMALE: ICD-10-CM

## 2020-09-15 DIAGNOSIS — D3A.090 CARCINOID TUMOR OF LEFT LUNG: ICD-10-CM

## 2020-09-15 DIAGNOSIS — Z15.02 BRCA2 GENE MUTATION POSITIVE IN FEMALE: ICD-10-CM

## 2020-09-15 DIAGNOSIS — Z86.000 HISTORY OF DUCTAL CARCINOMA IN SITU (DCIS) OF BREAST: ICD-10-CM

## 2020-09-15 DIAGNOSIS — E66.01 MORBIDLY OBESE (HCC): ICD-10-CM

## 2020-09-15 DIAGNOSIS — C34.91 ADENOCARCINOMA OF RIGHT LUNG (HCC): ICD-10-CM

## 2020-09-15 DIAGNOSIS — D64.9 NORMOCYTIC ANEMIA: Primary | ICD-10-CM

## 2020-09-15 LAB
ALBUMIN SERPL-MCNC: 4.4 G/DL (ref 3.5–5.2)
ALBUMIN/GLOB SERPL: 1.6 G/DL (ref 1.1–2.4)
ALP SERPL-CCNC: 151 U/L (ref 38–116)
ALT SERPL W P-5'-P-CCNC: 16 U/L (ref 0–33)
ANION GAP SERPL CALCULATED.3IONS-SCNC: 10.8 MMOL/L (ref 5–15)
AST SERPL-CCNC: 12 U/L (ref 0–32)
BASOPHILS # BLD AUTO: 0.06 10*3/MM3 (ref 0–0.2)
BASOPHILS NFR BLD AUTO: 0.7 % (ref 0–1.5)
BILIRUB SERPL-MCNC: 0.5 MG/DL (ref 0.2–1.2)
BUN SERPL-MCNC: 14 MG/DL (ref 6–20)
BUN/CREAT SERPL: 18.2 (ref 7.3–30)
CALCIUM SPEC-SCNC: 9.2 MG/DL (ref 8.5–10.2)
CHLORIDE SERPL-SCNC: 101 MMOL/L (ref 98–107)
CO2 SERPL-SCNC: 25.2 MMOL/L (ref 22–29)
CREAT SERPL-MCNC: 0.77 MG/DL (ref 0.6–1.1)
DEPRECATED RDW RBC AUTO: 48.9 FL (ref 37–54)
EOSINOPHIL # BLD AUTO: 0.14 10*3/MM3 (ref 0–0.4)
EOSINOPHIL NFR BLD AUTO: 1.5 % (ref 0.3–6.2)
ERYTHROCYTE [DISTWIDTH] IN BLOOD BY AUTOMATED COUNT: 16.1 % (ref 12.3–15.4)
GFR SERPL CREATININE-BSD FRML MDRD: 78 ML/MIN/1.73
GLOBULIN UR ELPH-MCNC: 2.7 GM/DL (ref 1.8–3.5)
GLUCOSE SERPL-MCNC: 210 MG/DL (ref 74–124)
HCT VFR BLD AUTO: 36.4 % (ref 34–46.6)
HGB BLD-MCNC: 11.2 G/DL (ref 12–15.9)
IMM GRANULOCYTES # BLD AUTO: 0.05 10*3/MM3 (ref 0–0.05)
IMM GRANULOCYTES NFR BLD AUTO: 0.6 % (ref 0–0.5)
LYMPHOCYTES # BLD AUTO: 2.72 10*3/MM3 (ref 0.7–3.1)
LYMPHOCYTES NFR BLD AUTO: 30 % (ref 19.6–45.3)
MCH RBC QN AUTO: 25.7 PG (ref 26.6–33)
MCHC RBC AUTO-ENTMCNC: 30.8 G/DL (ref 31.5–35.7)
MCV RBC AUTO: 83.5 FL (ref 79–97)
MONOCYTES # BLD AUTO: 0.59 10*3/MM3 (ref 0.1–0.9)
MONOCYTES NFR BLD AUTO: 6.5 % (ref 5–12)
NEUTROPHILS NFR BLD AUTO: 5.52 10*3/MM3 (ref 1.7–7)
NEUTROPHILS NFR BLD AUTO: 60.7 % (ref 42.7–76)
NRBC BLD AUTO-RTO: 0 /100 WBC (ref 0–0.2)
PLATELET # BLD AUTO: 249 10*3/MM3 (ref 140–450)
PMV BLD AUTO: 9.4 FL (ref 6–12)
POTASSIUM SERPL-SCNC: 4.4 MMOL/L (ref 3.5–4.7)
PROT SERPL-MCNC: 7.1 G/DL (ref 6.3–8)
RBC # BLD AUTO: 4.36 10*6/MM3 (ref 3.77–5.28)
SARS-COV-2 RNA NOSE QL NAA+PROBE: NOT DETECTED
SODIUM SERPL-SCNC: 137 MMOL/L (ref 134–145)
WBC # BLD AUTO: 9.08 10*3/MM3 (ref 3.4–10.8)

## 2020-09-15 PROCEDURE — 85025 COMPLETE CBC W/AUTO DIFF WBC: CPT

## 2020-09-15 PROCEDURE — 80053 COMPREHEN METABOLIC PANEL: CPT

## 2020-09-15 PROCEDURE — 99215 OFFICE O/P EST HI 40 MIN: CPT | Performed by: INTERNAL MEDICINE

## 2020-09-15 PROCEDURE — 36415 COLL VENOUS BLD VENIPUNCTURE: CPT

## 2020-09-16 ENCOUNTER — HOSPITAL ENCOUNTER (OUTPATIENT)
Dept: RESPIRATORY THERAPY | Facility: HOSPITAL | Age: 55
Discharge: HOME | End: 2020-09-16
Admitting: INTERNAL MEDICINE

## 2020-09-16 DIAGNOSIS — E89.0 POSTOPERATIVE HYPOTHYROIDISM: ICD-10-CM

## 2020-09-16 DIAGNOSIS — D64.9 NORMOCYTIC ANEMIA: ICD-10-CM

## 2020-09-16 DIAGNOSIS — C64.2 RENAL CELL CARCINOMA OF LEFT KIDNEY (HCC): ICD-10-CM

## 2020-09-16 DIAGNOSIS — Z15.01 BRCA2 GENE MUTATION POSITIVE IN FEMALE: ICD-10-CM

## 2020-09-16 DIAGNOSIS — E66.01 MORBIDLY OBESE (HCC): ICD-10-CM

## 2020-09-16 DIAGNOSIS — C73 PAPILLARY THYROID CARCINOMA (HCC): ICD-10-CM

## 2020-09-16 DIAGNOSIS — Z15.02 BRCA2 GENE MUTATION POSITIVE IN FEMALE: ICD-10-CM

## 2020-09-16 DIAGNOSIS — Z15.09 BRCA2 GENE MUTATION POSITIVE IN FEMALE: ICD-10-CM

## 2020-09-16 DIAGNOSIS — C34.91 ADENOCARCINOMA OF RIGHT LUNG (HCC): ICD-10-CM

## 2020-09-16 DIAGNOSIS — C34.92 ADENOCARCINOMA OF LEFT LUNG (HCC): ICD-10-CM

## 2020-09-16 DIAGNOSIS — D3A.090 CARCINOID TUMOR OF LEFT LUNG: ICD-10-CM

## 2020-09-16 DIAGNOSIS — Z86.000 HISTORY OF DUCTAL CARCINOMA IN SITU (DCIS) OF BREAST: ICD-10-CM

## 2020-09-16 PROCEDURE — 94726 PLETHYSMOGRAPHY LUNG VOLUMES: CPT

## 2020-09-16 PROCEDURE — 94729 DIFFUSING CAPACITY: CPT

## 2020-09-16 PROCEDURE — 94010 BREATHING CAPACITY TEST: CPT

## 2020-09-25 ENCOUNTER — PREP FOR SURGERY (OUTPATIENT)
Dept: OTHER | Facility: HOSPITAL | Age: 55
End: 2020-09-25

## 2020-09-25 ENCOUNTER — OFFICE VISIT (OUTPATIENT)
Dept: SURGERY | Facility: CLINIC | Age: 55
End: 2020-09-25

## 2020-09-25 DIAGNOSIS — C34.91 ADENOCARCINOMA OF RIGHT LUNG (HCC): Primary | ICD-10-CM

## 2020-09-25 DIAGNOSIS — R79.1 ABNORMAL COAGULATION PROFILE: ICD-10-CM

## 2020-09-25 PROBLEM — C34.90 LUNG CANCER: Status: ACTIVE | Noted: 2020-09-25

## 2020-09-25 PROCEDURE — 99442 PR PHYS/QHP TELEPHONE EVALUATION 11-20 MIN: CPT | Performed by: THORACIC SURGERY (CARDIOTHORACIC VASCULAR SURGERY)

## 2020-09-25 RX ORDER — GABAPENTIN 300 MG/1
600 CAPSULE ORAL ONCE
Status: CANCELLED | OUTPATIENT
Start: 2020-10-09 | End: 2020-09-25

## 2020-09-25 RX ORDER — HEPARIN SODIUM 5000 [USP'U]/ML
5000 INJECTION, SOLUTION INTRAVENOUS; SUBCUTANEOUS ONCE
Status: CANCELLED | OUTPATIENT
Start: 2020-10-09 | End: 2020-09-25

## 2020-09-25 RX ORDER — CELECOXIB 200 MG/1
200 CAPSULE ORAL ONCE
Status: CANCELLED | OUTPATIENT
Start: 2020-10-09 | End: 2020-09-25

## 2020-09-25 RX ORDER — SODIUM CHLORIDE 0.9 % (FLUSH) 0.9 %
3-10 SYRINGE (ML) INJECTION AS NEEDED
Status: CANCELLED | OUTPATIENT
Start: 2020-10-09

## 2020-09-25 RX ORDER — CEFAZOLIN SODIUM 2 G/100ML
2 INJECTION, SOLUTION INTRAVENOUS ONCE
Status: CANCELLED | OUTPATIENT
Start: 2020-10-09 | End: 2020-09-25

## 2020-09-25 RX ORDER — SODIUM CHLORIDE 0.9 % (FLUSH) 0.9 %
3 SYRINGE (ML) INJECTION EVERY 12 HOURS SCHEDULED
Status: CANCELLED | OUTPATIENT
Start: 2020-10-09

## 2020-09-25 RX ORDER — ACETAMINOPHEN 500 MG
1000 TABLET ORAL ONCE
Status: CANCELLED | OUTPATIENT
Start: 2020-10-09 | End: 2020-09-25

## 2020-09-25 NOTE — PROGRESS NOTES
Subjective   Patient ID: Christie Avendaño is a 55 y.o. female is here today for follow-up.    History of Present Illness  Dear Colleague,  Christie Avendaño was evaluated via telephone today today for continued follow up  for a newly diagnosed right lower lobe non-small cell lung cancer.   Ms. Avendaño is a very pleasant 55-year-old lady with a BRCA2 mutation who presents with a new right lower lobe lung nodule that is been biopsied and is proven to be an adenocarcinoma.  She has a complex cancer history including a left lower lobe adenocarcinoma in 2017 status post left lower lobectomy via thoracotomy.  She also had a carcinoid in her left lower lobe at that point which was a typical carcinoid.  She has had bilateral mastectomies with reconstruction in 2017, she has undergone a partial nephrectomy secondary to renal cell carcinoma in 2019.     From a pulmonary perspective, Ms. Avendaño is able to walk greater than a block and up a flight of stairs without any shortness of breath.  She continues to have significant pain in her left chest after her thoracotomy 2017.  She states that her pain is daily.     Ms. Avendaño is a never smoker.  She has a personal history of multiple cancers as above.  She also has a family history of multiple cancers including breast, colon, melanoma as well as hepatobiliary cancer.    Ms. Avendaño has no new complaints today.  Her pulmonary function studies were performed in we are discussing them today.  The following portions of the patient's history were reviewed and updated as appropriate: allergies, current medications, past family history, past medical history, past social history, past surgical history and problem list.  Review of Systems   Constitution: Negative.   HENT: Negative.    Eyes: Negative.    Cardiovascular: Positive for chest pain.   Respiratory: Positive for shortness of breath.    Endocrine: Negative.    Hematologic/Lymphatic: Negative.    Skin: Negative.    Musculoskeletal: Negative.     Gastrointestinal: Negative.    Genitourinary: Negative.    Neurological: Negative.    Psychiatric/Behavioral: Negative.    Allergic/Immunologic: Negative.      Patient Active Problem List   Diagnosis   • Adenocarcinoma of right lung (CMS/HCC)   • Adenocarcinoma of left lung (CMS/HCC)   • Carcinoid tumor of left lung   • History of ductal carcinoma in situ (DCIS) of breast   • BRCA2 gene mutation positive in female   • Papillary thyroid carcinoma (CMS/HCC)   • Renal cell carcinoma of left kidney (CMS/HCC)   • Essential hypertension   • Postoperative hypothyroidism   • Morbidly obese (CMS/HCC)   • Normocytic anemia   • Lung cancer (CMS/HCC)     Past Medical History:   Diagnosis Date   • Arthritis    • Asthma    • BRCA2 positive    • Breast cancer (CMS/HCC)     DCIS   • Diabetes mellitus (CMS/HCC)    • H/O Liver masses 2017    x3   • H/O Lung masses 2017    1 in right lower lobe and 1 in the left infrahilar location   • H/O Ovarian cyst    • H/O Right adrenal mass (CMS/HCC) 2017   • Lung cancer (CMS/HCC)    • Thyroid disease      Past Surgical History:   Procedure Laterality Date   • APPENDECTOMY     • BREAST IMPLANT SURGERY Bilateral    • CHOLECYSTECTOMY     • HYSTERECTOMY     • MASTECTOMY Bilateral    • OVARIAN CYST SURGERY     • TONSILLECTOMY       Family History   Problem Relation Age of Onset   • Breast cancer Mother    • Thyroid cancer Mother 73   • Hypertension Mother    • Cancer Father         cholangiocarcinoma   • Liver cancer Father    • Ovarian cancer Maternal Grandmother 80   • Leukemia Maternal Grandfather         60-80, unknown type   • Leukemia Paternal Grandfather         60-80, unknown type   • Colon cancer Maternal Aunt         over age of 50   • Breast cancer Maternal Aunt         80's   • Diabetes Other      Social History     Socioeconomic History   • Marital status:      Spouse name: Jayesh   • Number of children: 2   • Years of education: High School   • Highest education level: Not on  file   Occupational History     Employer: UNEMPLOYED   Tobacco Use   • Smoking status: Never Smoker   • Smokeless tobacco: Never Used   Substance and Sexual Activity   • Alcohol use: Never     Frequency: Never   • Drug use: Never       Current Outpatient Medications:   •  atorvastatin (LIPITOR) 40 MG tablet, Take 40 mg by mouth Every Night., Disp: , Rfl:   •  bimatoprost (LUMIGAN) 0.01 % ophthalmic drops, Administer 1 drop to both eyes Every Night., Disp: , Rfl:   •  CINNAMON PO, Take 1,800 mg by mouth Daily., Disp: , Rfl:   •  hydroCHLOROthiazide (MICROZIDE) 12.5 MG capsule, Take 12.5 mg by mouth As Needed., Disp: , Rfl:   •  Krill Oil (OMEGA-3) 500 MG capsule, Take 500 mg by mouth Daily., Disp: , Rfl:   •  letrozole (FEMARA) 2.5 MG tablet, , Disp: , Rfl:   •  levothyroxine (SYNTHROID, LEVOTHROID) 150 MCG tablet, Take 150 mcg by mouth Daily., Disp: , Rfl:   •  lisinopril (PRINIVIL,ZESTRIL) 20 MG tablet, Take 20 mg by mouth Daily., Disp: , Rfl:   •  meloxicam (MOBIC) 7.5 MG tablet, Take 7.5 mg by mouth Daily As Needed., Disp: , Rfl:   •  metFORMIN (GLUCOPHAGE) 500 MG tablet, Take 500 mg by mouth 2 (Two) Times a Day With Meals. Extended release, Disp: , Rfl:   •  TURMERIC CURCUMIN PO, Take 1,510 mg by mouth Daily. Turmeric, Curcumin w/black pepper, Disp: , Rfl:   •  valACYclovir (VALTREX) 1000 MG tablet, Take 2,000 mg by mouth 2 (Two) Times a Day. 2-1000 mg twice a day, Disp: , Rfl:   Allergies   Allergen Reactions   • Morphine Hives     Hives and throat swelling   • Penicillins Hemorrhagic stroke     Hives and swelling of throat        Objective   There were no vitals filed for this visit.  Physical Exam   No physical exam secondary to telephone visit  Independent Review of Radiographic Studies:    No new imaging  Pulmonary function studies: FEV1 2.3 or 85% predicted, DLCO 19.1 or 90% predicted  Assessment/Plan   Ms. Avendaño is a very pleasant 55-year-old lady with a right superior segment non-small cell lung cancer,  T1b N0 M0.  She will be a candidate for a right video-assisted superior segmentectomy with lymph node dissection.  This was discussed at length today with Ms. Avendaño and her .  Risks and benefits of this procedure were discussed with the patient today including pneumonia, prolonged air leak and atrial fibrillaiton.  We will plan to proceed with a VATS superior segmentectomy on 10/9/2020.  All of the patient's questions were answered today.      Spent approximate 12 minutes on the phone with Ms. Avendaño today discussing her pulmonary function studies as well as our plan for a surgical resection.    Diagnoses and all orders for this visit:    Adenocarcinoma of right lung (CMS/HCC)

## 2020-09-29 ENCOUNTER — TRANSCRIBE ORDERS (OUTPATIENT)
Dept: PREADMISSION TESTING | Facility: HOSPITAL | Age: 55
End: 2020-09-29

## 2020-09-29 ENCOUNTER — APPOINTMENT (OUTPATIENT)
Dept: LAB | Facility: HOSPITAL | Age: 55
End: 2020-09-29

## 2020-09-29 DIAGNOSIS — Z01.818 OTHER SPECIFIED PRE-OPERATIVE EXAMINATION: Primary | ICD-10-CM

## 2020-10-02 ENCOUNTER — APPOINTMENT (OUTPATIENT)
Dept: PREADMISSION TESTING | Facility: HOSPITAL | Age: 55
End: 2020-10-02

## 2020-10-02 DIAGNOSIS — R79.1 ABNORMAL COAGULATION PROFILE: ICD-10-CM

## 2020-10-02 DIAGNOSIS — C34.91 ADENOCARCINOMA OF RIGHT LUNG (HCC): ICD-10-CM

## 2020-10-02 LAB
ABO GROUP BLD: NORMAL
APTT PPP: 27.9 SECONDS (ref 22.7–35.4)
BLD GP AB SCN SERPL QL: NEGATIVE
INR PPP: 0.89 (ref 0.9–1.1)
PROTHROMBIN TIME: 12 SECONDS (ref 11.7–14.2)
RH BLD: POSITIVE
T&S EXPIRATION DATE: NORMAL

## 2020-10-02 PROCEDURE — 85610 PROTHROMBIN TIME: CPT | Performed by: THORACIC SURGERY (CARDIOTHORACIC VASCULAR SURGERY)

## 2020-10-02 PROCEDURE — 86901 BLOOD TYPING SEROLOGIC RH(D): CPT | Performed by: THORACIC SURGERY (CARDIOTHORACIC VASCULAR SURGERY)

## 2020-10-02 PROCEDURE — 86850 RBC ANTIBODY SCREEN: CPT | Performed by: THORACIC SURGERY (CARDIOTHORACIC VASCULAR SURGERY)

## 2020-10-02 PROCEDURE — 86900 BLOOD TYPING SEROLOGIC ABO: CPT | Performed by: THORACIC SURGERY (CARDIOTHORACIC VASCULAR SURGERY)

## 2020-10-02 PROCEDURE — 93005 ELECTROCARDIOGRAM TRACING: CPT

## 2020-10-02 PROCEDURE — 36415 COLL VENOUS BLD VENIPUNCTURE: CPT

## 2020-10-02 PROCEDURE — 93010 ELECTROCARDIOGRAM REPORT: CPT | Performed by: INTERNAL MEDICINE

## 2020-10-02 PROCEDURE — 85730 THROMBOPLASTIN TIME PARTIAL: CPT | Performed by: THORACIC SURGERY (CARDIOTHORACIC VASCULAR SURGERY)

## 2020-10-02 NOTE — DISCHARGE INSTRUCTIONS
Take the following medications the morning of surgery:  LEVOTHYROXINE    PLEASE ARRIVE AT THE HOSPITAL AT 7:30 AM ON October 9, 2020    General Instructions:  • Do not eat solid food after midnight the night before surgery.  • You may drink clear liquids day of surgery but must stop at least one hour before your hospital arrival time.  • NOTHING TO DRINK AFTER 6:30 AM  • It is beneficial for you to have a clear drink that contains carbohydrates the day of surgery.  We suggest a 12 to 20 ounce bottle of Gatorade or Powerade for non-diabetic patients or a 12 to 20 ounce bottle of G2 or Powerade Zero for diabetic patients. (Pediatric patients, are not advised to drink a 12 to 20 ounce carbohydrate drink)    Clear liquids are liquids you can see through.  Nothing red in color.     Plain water                               Sports drinks  Sodas                                   Gelatin (Jell-O)  Fruit juices without pulp such as white grape juice and apple juice  Popsicles that contain no fruit or yogurt  Tea or coffee (no cream or milk added)  Gatorade / Powerade  G2 / Powerade Zero    • Infants may have breast milk up to four hours before surgery.  • Infants drinking formula may drink formula up to six hours before surgery.   • Patients who avoid smoking, chewing tobacco and alcohol for 4 weeks prior to surgery have a reduced risk of post-operative complications.  Quit smoking as many days before surgery as you can.  • Do not smoke, use chewing tobacco or drink alcohol the day of surgery.   • If applicable bring your C-PAP/ BI-PAP machine.  • Bring any papers given to you in the doctor’s office.  • Wear clean comfortable clothes.  • Do not wear contact lenses, false eyelashes or make-up.  Bring a case for your glasses.   • Bring crutches or walker if applicable.  • Remove all piercings.  Leave jewelry and any other valuables at home.  • Hair extensions with metal clips must be removed prior to surgery.  • The  Pre-Admission Testing nurse will instruct you to bring medications if unable to obtain an accurate list in Pre-Admission Testing.        If you were given a blood bank ID arm band remember to bring it with you the day of surgery.    Preventing a Surgical Site Infection:  • For 2 to 3 days before surgery, avoid shaving with a razor because the razor can irritate skin and make it easier to develop an infection.    • Any areas of open skin can increase the risk of a post-operative wound infection by allowing bacteria to enter and travel throughout the body.  Notify your surgeon if you have any skin wounds / rashes even if it is not near the expected surgical site.  The area will need assessed to determine if surgery should be delayed until it is healed.  • The night prior to surgery shower using a fresh bar of anti-bacterial soap (such as Dial) and clean washcloth.  Sleep in a clean bed with clean clothing.  Do not allow pets to sleep with you.  • Ask your surgeon if you will be receiving antibiotics prior to surgery.  • Make sure you, your family, and all healthcare providers clean their hands with soap and water or an alcohol based hand  before caring for you or your wound.    CHLORHEXIDINE CLOTH INSTRUCTIONS  The morning of surgery follow these instructions using the Chlorhexidine cloths you've been given.  These steps reduce bacteria on the body.  Do not use the cloths near your eyes, ears mouth, genitalia or on open wounds.  Throw the cloths away after use but do not try to flush them down a toilet.      • Open and remove one cloth at a time from the package.    • Leave the cloth unfolded and begin the bathing.  • Massage the skin with the cloths using gentle pressure to remove bacteria.  Do not scrub harshly.   • Follow the steps below with one 2% CHG cloth per area (6 total cloths).  • One cloth for neck, shoulders and chest.  • One cloth for both arms, hands, fingers and underarms (do underarms  last).  • One cloth for the abdomen followed by groin.  • One cloth for right leg and foot including between the toes.  • One cloth for left leg and foot including between the toes.  • The last cloth is to be used for the back of the neck, back and buttocks.    Allow the CHG to air dry 3 minutes on the skin which will give it time to work and decrease the chance of irritation.  The skin may feel sticky until it is dry.  Do not rinse with water or any other liquid or you will lose the beneficial effects of the CHG.  If mild skin irritation occurs, do rinse the skin to remove the CHG.  Report this to the nurse at time of admission.  Do not apply lotions, creams, ointments, deodorants or perfumes after using the clothes. Dress in clean clothes before coming to the hospital.    Day of surgery:  Your arrival time is approximately two hours before your scheduled surgery time.  Upon arrival, a Pre-op nurse and Anesthesiologist will review your health history, obtain vital signs, and answer questions you may have.  The only belongings needed at this time will be a list of your home medications and if applicable your C-PAP/BI-PAP machine.  If you are staying overnight your family can leave the rest of your belongings in the car and bring them to your room later.  A Pre-op nurse will start an IV and you may receive medication in preparation for surgery, including something to help you relax.  Your family will be able to see you in the Pre-op area.  Two visitors at a time will be allowed in the Pre-op room.  While you are in surgery your family should notify the waiting room  if they leave the waiting room area and provide a contact phone number.    Please be aware that surgery does come with discomfort.  We want to make every effort to control your discomfort so please discuss any uncontrolled symptoms with your nurse.   Your doctor will most likely have prescribed pain medications.      If you are going home  after surgery you will receive individualized written care instructions before being discharged.  A responsible adult must drive you to and from the hospital on the day of your surgery and stay with you for 24 hours.    If you are staying overnight following surgery, you will be transported to your hospital room following the recovery period.   has all private rooms.    If you have any questions please call Pre-Admission Testing at [phone].  Deductibles and co-payments are collected on the day of service. Please be prepared to pay the required co-pay, deductible or deposit on the day of service as defined by your plan.    Patient Education for Self-Quarantine Process    Following your COVID testing, we strongly recommend that you do not leave your home after you have been tested for COVID except to get medical care. This includes not going to work, school or to public areas.  If this is not possible for you to do please limit your activities to only required outings.  Be sure to wear a mask when you are with other people, practice social distancing and wash your hands frequently.      The following items provide additional details to keep you safe.  • Wash your hands with soap and water frequently for at least 20 seconds.   • Avoid touching your eyes, nose and mouth with unwashed hands.  • Do not share anything - utensils, towels, food from the same bowl.   • Have your own utensils, drinking glass, dishes, towels and bedding.   • Do not have visitors.   • Do use FaceTime to stay in touch with family and friends.  • You should stay in a specific room away from others if possible.   • Stay at least 6 feet away from others in the home if you cannot have a dedicated room to yourself.   • Do not snuggle with your pet. While the CDC says there is no evidence that pets can spread COVID-19 or be infected from humans, it is probably best to avoid “petting, snuggling, being kissed or licked and  sharing food (during self-quarantine)”, according to the CDC.   • Sanitize household surfaces daily. Include all high touch areas (door handles, light switches, phones, countertops, etc.)  • Do not share a bathroom with others, if possible.   • Wear a mask around others in your home if you are unable to stay in a separate room or 6 feet apart. If  you are unable to wear a mask, have your family member wear a mask if they must be within 6 feet of you.   Call your surgeon immediately if you experience any of the following symptoms:  • Sore Throat  • Shortness of Breath or difficulty breathing  • Cough  • Chills  • Body soreness or muscle pain  • Headache  • Fever  • New loss of taste or smell  • Do not arrive for your surgery ill.  Your procedure will need to be rescheduled to another time.  You will need to call your physician before the day of surgery to avoid any unnecessary exposure to hospital staff as well as other patients.

## 2020-10-07 ENCOUNTER — LAB (OUTPATIENT)
Dept: LAB | Facility: HOSPITAL | Age: 55
End: 2020-10-07

## 2020-10-07 DIAGNOSIS — Z01.818 OTHER SPECIFIED PRE-OPERATIVE EXAMINATION: ICD-10-CM

## 2020-10-07 PROCEDURE — U0004 COV-19 TEST NON-CDC HGH THRU: HCPCS

## 2020-10-07 PROCEDURE — C9803 HOPD COVID-19 SPEC COLLECT: HCPCS

## 2020-10-08 LAB — SARS-COV-2 RNA RESP QL NAA+PROBE: NOT DETECTED

## 2020-10-09 ENCOUNTER — ANESTHESIA EVENT (OUTPATIENT)
Dept: PERIOP | Facility: HOSPITAL | Age: 55
End: 2020-10-09

## 2020-10-09 ENCOUNTER — APPOINTMENT (OUTPATIENT)
Dept: GENERAL RADIOLOGY | Facility: HOSPITAL | Age: 55
End: 2020-10-09

## 2020-10-09 ENCOUNTER — HOSPITAL ENCOUNTER (INPATIENT)
Facility: HOSPITAL | Age: 55
LOS: 3 days | Discharge: HOME | End: 2020-10-12
Attending: THORACIC SURGERY (CARDIOTHORACIC VASCULAR SURGERY) | Admitting: THORACIC SURGERY (CARDIOTHORACIC VASCULAR SURGERY)

## 2020-10-09 ENCOUNTER — ANESTHESIA (OUTPATIENT)
Dept: PERIOP | Facility: HOSPITAL | Age: 55
End: 2020-10-09

## 2020-10-09 DIAGNOSIS — C34.91 ADENOCARCINOMA OF RIGHT LUNG (HCC): ICD-10-CM

## 2020-10-09 LAB
GLUCOSE BLDC GLUCOMTR-MCNC: 186 MG/DL (ref 70–130)
GLUCOSE BLDC GLUCOMTR-MCNC: 186 MG/DL (ref 70–130)

## 2020-10-09 PROCEDURE — 25010000002 ONDANSETRON PER 1 MG: Performed by: NURSE ANESTHETIST, CERTIFIED REGISTERED

## 2020-10-09 PROCEDURE — 25010000002 HYDROMORPHONE PER 4 MG: Performed by: THORACIC SURGERY (CARDIOTHORACIC VASCULAR SURGERY)

## 2020-10-09 PROCEDURE — 0BTF4ZZ RESECTION OF RIGHT LOWER LUNG LOBE, PERCUTANEOUS ENDOSCOPIC APPROACH: ICD-10-PCS | Performed by: THORACIC SURGERY (CARDIOTHORACIC VASCULAR SURGERY)

## 2020-10-09 PROCEDURE — 25010000002 FENTANYL CITRATE (PF) 100 MCG/2ML SOLUTION: Performed by: ANESTHESIOLOGY

## 2020-10-09 PROCEDURE — C9290 INJ, BUPIVACAINE LIPOSOME: HCPCS | Performed by: THORACIC SURGERY (CARDIOTHORACIC VASCULAR SURGERY)

## 2020-10-09 PROCEDURE — 25010000003 CEFAZOLIN IN DEXTROSE 2-4 GM/100ML-% SOLUTION: Performed by: THORACIC SURGERY (CARDIOTHORACIC VASCULAR SURGERY)

## 2020-10-09 PROCEDURE — C9290 INJ, BUPIVACAINE LIPOSOME: HCPCS | Performed by: ANESTHESIOLOGY

## 2020-10-09 PROCEDURE — 32674 THORACOSCOPY LYMPH NODE EXC: CPT | Performed by: THORACIC SURGERY (CARDIOTHORACIC VASCULAR SURGERY)

## 2020-10-09 PROCEDURE — 25010000002 HYDROMORPHONE PER 4 MG: Performed by: ANESTHESIOLOGY

## 2020-10-09 PROCEDURE — 88309 TISSUE EXAM BY PATHOLOGIST: CPT | Performed by: THORACIC SURGERY (CARDIOTHORACIC VASCULAR SURGERY)

## 2020-10-09 PROCEDURE — 88305 TISSUE EXAM BY PATHOLOGIST: CPT | Performed by: THORACIC SURGERY (CARDIOTHORACIC VASCULAR SURGERY)

## 2020-10-09 PROCEDURE — 07B74ZX EXCISION OF THORAX LYMPHATIC, PERCUTANEOUS ENDOSCOPIC APPROACH, DIAGNOSTIC: ICD-10-PCS | Performed by: THORACIC SURGERY (CARDIOTHORACIC VASCULAR SURGERY)

## 2020-10-09 PROCEDURE — 82962 GLUCOSE BLOOD TEST: CPT

## 2020-10-09 PROCEDURE — 25010000002 DEXAMETHASONE PER 1 MG: Performed by: NURSE ANESTHETIST, CERTIFIED REGISTERED

## 2020-10-09 PROCEDURE — 76942 ECHO GUIDE FOR BIOPSY: CPT | Performed by: THORACIC SURGERY (CARDIOTHORACIC VASCULAR SURGERY)

## 2020-10-09 PROCEDURE — 25010000003 BUPIVACAINE LIPOSOME 1.3 % SUSPENSION: Performed by: ANESTHESIOLOGY

## 2020-10-09 PROCEDURE — 3E0T3BZ INTRODUCTION OF ANESTHETIC AGENT INTO PERIPHERAL NERVES AND PLEXI, PERCUTANEOUS APPROACH: ICD-10-PCS | Performed by: THORACIC SURGERY (CARDIOTHORACIC VASCULAR SURGERY)

## 2020-10-09 PROCEDURE — 71045 X-RAY EXAM CHEST 1 VIEW: CPT

## 2020-10-09 PROCEDURE — 25010000002 PROPOFOL 10 MG/ML EMULSION: Performed by: NURSE ANESTHETIST, CERTIFIED REGISTERED

## 2020-10-09 PROCEDURE — 25010000002 MIDAZOLAM PER 1 MG: Performed by: ANESTHESIOLOGY

## 2020-10-09 PROCEDURE — 32669 THORACOSCOPY REMOVE SEGMENT: CPT | Performed by: THORACIC SURGERY (CARDIOTHORACIC VASCULAR SURGERY)

## 2020-10-09 PROCEDURE — 25010000002 NEOSTIGMINE PER 0.5 MG: Performed by: NURSE ANESTHETIST, CERTIFIED REGISTERED

## 2020-10-09 PROCEDURE — 88331 PATH CONSLTJ SURG 1 BLK 1SPC: CPT | Performed by: THORACIC SURGERY (CARDIOTHORACIC VASCULAR SURGERY)

## 2020-10-09 PROCEDURE — 25010000002 HEPARIN (PORCINE) PER 1000 UNITS: Performed by: THORACIC SURGERY (CARDIOTHORACIC VASCULAR SURGERY)

## 2020-10-09 PROCEDURE — 25010000002 FENTANYL CITRATE (PF) 100 MCG/2ML SOLUTION: Performed by: NURSE ANESTHETIST, CERTIFIED REGISTERED

## 2020-10-09 PROCEDURE — 25010000003 BUPIVACAINE LIPOSOME 1.3 % SUSPENSION 20 ML VIAL: Performed by: THORACIC SURGERY (CARDIOTHORACIC VASCULAR SURGERY)

## 2020-10-09 PROCEDURE — 0BJ08ZZ INSPECTION OF TRACHEOBRONCHIAL TREE, VIA NATURAL OR ARTIFICIAL OPENING ENDOSCOPIC: ICD-10-PCS | Performed by: THORACIC SURGERY (CARDIOTHORACIC VASCULAR SURGERY)

## 2020-10-09 DEVICE — ARTICULATION RELOAD WITH TRI-STAPLE TECHNOLOGY
Type: IMPLANTABLE DEVICE | Site: CHEST | Status: FUNCTIONAL
Brand: ENDO GIA

## 2020-10-09 DEVICE — CURVED TIP INTELLIGENT RELOAD AND INTRODUCER
Type: IMPLANTABLE DEVICE | Site: CHEST | Status: FUNCTIONAL
Brand: TRI-STAPLE 2.0

## 2020-10-09 RX ORDER — METOCLOPRAMIDE HYDROCHLORIDE 5 MG/ML
10 INJECTION INTRAMUSCULAR; INTRAVENOUS ONCE AS NEEDED
Status: DISCONTINUED | OUTPATIENT
Start: 2020-10-09 | End: 2020-10-09 | Stop reason: HOSPADM

## 2020-10-09 RX ORDER — ACETAMINOPHEN 500 MG
1000 TABLET ORAL 3 TIMES DAILY
Status: DISCONTINUED | OUTPATIENT
Start: 2020-10-09 | End: 2020-10-12 | Stop reason: HOSPADM

## 2020-10-09 RX ORDER — OXYCODONE HYDROCHLORIDE 5 MG/1
5 TABLET ORAL EVERY 4 HOURS PRN
Status: DISCONTINUED | OUTPATIENT
Start: 2020-10-09 | End: 2020-10-12 | Stop reason: HOSPADM

## 2020-10-09 RX ORDER — NALOXONE HCL 0.4 MG/ML
0.4 VIAL (ML) INJECTION
Status: DISCONTINUED | OUTPATIENT
Start: 2020-10-09 | End: 2020-10-09 | Stop reason: HOSPADM

## 2020-10-09 RX ORDER — GABAPENTIN 300 MG/1
600 CAPSULE ORAL ONCE
Status: COMPLETED | OUTPATIENT
Start: 2020-10-09 | End: 2020-10-09

## 2020-10-09 RX ORDER — AMOXICILLIN 250 MG
2 CAPSULE ORAL NIGHTLY
Status: DISCONTINUED | OUTPATIENT
Start: 2020-10-09 | End: 2020-10-12 | Stop reason: HOSPADM

## 2020-10-09 RX ORDER — CHLORHEXIDINE GLUCONATE 500 MG/1
CLOTH TOPICAL TAKE AS DIRECTED
COMMUNITY
End: 2020-11-12

## 2020-10-09 RX ORDER — HYDROMORPHONE HYDROCHLORIDE 1 MG/ML
0.5 INJECTION, SOLUTION INTRAMUSCULAR; INTRAVENOUS; SUBCUTANEOUS
Status: DISCONTINUED | OUTPATIENT
Start: 2020-10-09 | End: 2020-10-12 | Stop reason: HOSPADM

## 2020-10-09 RX ORDER — LIDOCAINE HYDROCHLORIDE 10 MG/ML
0.5 INJECTION, SOLUTION EPIDURAL; INFILTRATION; INTRACAUDAL; PERINEURAL ONCE AS NEEDED
Status: DISCONTINUED | OUTPATIENT
Start: 2020-10-09 | End: 2020-10-09 | Stop reason: HOSPADM

## 2020-10-09 RX ORDER — ONDANSETRON 2 MG/ML
4 INJECTION INTRAMUSCULAR; INTRAVENOUS ONCE AS NEEDED
Status: DISCONTINUED | OUTPATIENT
Start: 2020-10-09 | End: 2020-10-09 | Stop reason: HOSPADM

## 2020-10-09 RX ORDER — NITROGLYCERIN 0.4 MG/1
0.4 TABLET SUBLINGUAL
Status: DISCONTINUED | OUTPATIENT
Start: 2020-10-09 | End: 2020-10-12 | Stop reason: HOSPADM

## 2020-10-09 RX ORDER — CEFAZOLIN SODIUM 2 G/100ML
2 INJECTION, SOLUTION INTRAVENOUS ONCE
Status: COMPLETED | OUTPATIENT
Start: 2020-10-09 | End: 2020-10-09

## 2020-10-09 RX ORDER — SODIUM CHLORIDE 0.9 % (FLUSH) 0.9 %
3 SYRINGE (ML) INJECTION EVERY 12 HOURS SCHEDULED
Status: DISCONTINUED | OUTPATIENT
Start: 2020-10-09 | End: 2020-10-09 | Stop reason: HOSPADM

## 2020-10-09 RX ORDER — HYDROCHLOROTHIAZIDE 12.5 MG/1
12.5 CAPSULE, GELATIN COATED ORAL DAILY
Status: DISCONTINUED | OUTPATIENT
Start: 2020-10-10 | End: 2020-10-12 | Stop reason: HOSPADM

## 2020-10-09 RX ORDER — BISACODYL 10 MG
10 SUPPOSITORY, RECTAL RECTAL DAILY PRN
Status: DISCONTINUED | OUTPATIENT
Start: 2020-10-09 | End: 2020-10-12 | Stop reason: HOSPADM

## 2020-10-09 RX ORDER — LEVOTHYROXINE SODIUM 0.15 MG/1
150 TABLET ORAL
Status: DISCONTINUED | OUTPATIENT
Start: 2020-10-10 | End: 2020-10-12 | Stop reason: HOSPADM

## 2020-10-09 RX ORDER — FAMOTIDINE 10 MG/ML
20 INJECTION, SOLUTION INTRAVENOUS ONCE
Status: COMPLETED | OUTPATIENT
Start: 2020-10-09 | End: 2020-10-09

## 2020-10-09 RX ORDER — GLYCOPYRROLATE 0.2 MG/ML
INJECTION INTRAMUSCULAR; INTRAVENOUS AS NEEDED
Status: DISCONTINUED | OUTPATIENT
Start: 2020-10-09 | End: 2020-10-09 | Stop reason: SURG

## 2020-10-09 RX ORDER — ROCURONIUM BROMIDE 10 MG/ML
INJECTION, SOLUTION INTRAVENOUS AS NEEDED
Status: DISCONTINUED | OUTPATIENT
Start: 2020-10-09 | End: 2020-10-09 | Stop reason: SURG

## 2020-10-09 RX ORDER — MAGNESIUM HYDROXIDE 1200 MG/15ML
LIQUID ORAL AS NEEDED
Status: DISCONTINUED | OUTPATIENT
Start: 2020-10-09 | End: 2020-10-09 | Stop reason: HOSPADM

## 2020-10-09 RX ORDER — HEPARIN SODIUM 5000 [USP'U]/ML
5000 INJECTION, SOLUTION INTRAVENOUS; SUBCUTANEOUS ONCE
Status: COMPLETED | OUTPATIENT
Start: 2020-10-09 | End: 2020-10-09

## 2020-10-09 RX ORDER — IPRATROPIUM BROMIDE AND ALBUTEROL SULFATE 2.5; .5 MG/3ML; MG/3ML
3 SOLUTION RESPIRATORY (INHALATION)
Status: DISPENSED | OUTPATIENT
Start: 2020-10-09 | End: 2020-10-11

## 2020-10-09 RX ORDER — HEPARIN SODIUM 5000 [USP'U]/ML
5000 INJECTION, SOLUTION INTRAVENOUS; SUBCUTANEOUS EVERY 8 HOURS SCHEDULED
Status: DISCONTINUED | OUTPATIENT
Start: 2020-10-10 | End: 2020-10-12 | Stop reason: HOSPADM

## 2020-10-09 RX ORDER — DEXTROSE, SODIUM CHLORIDE, AND POTASSIUM CHLORIDE 5; .45; .15 G/100ML; G/100ML; G/100ML
75 INJECTION INTRAVENOUS CONTINUOUS
Status: DISCONTINUED | OUTPATIENT
Start: 2020-10-09 | End: 2020-10-10

## 2020-10-09 RX ORDER — SODIUM CHLORIDE 0.9 % (FLUSH) 0.9 %
3-10 SYRINGE (ML) INJECTION AS NEEDED
Status: DISCONTINUED | OUTPATIENT
Start: 2020-10-09 | End: 2020-10-09 | Stop reason: HOSPADM

## 2020-10-09 RX ORDER — HYDROMORPHONE HYDROCHLORIDE 1 MG/ML
0.5 INJECTION, SOLUTION INTRAMUSCULAR; INTRAVENOUS; SUBCUTANEOUS
Status: DISCONTINUED | OUTPATIENT
Start: 2020-10-09 | End: 2020-10-09 | Stop reason: HOSPADM

## 2020-10-09 RX ORDER — PROPOFOL 10 MG/ML
VIAL (ML) INTRAVENOUS AS NEEDED
Status: DISCONTINUED | OUTPATIENT
Start: 2020-10-09 | End: 2020-10-09 | Stop reason: SURG

## 2020-10-09 RX ORDER — BUPIVACAINE HYDROCHLORIDE 2.5 MG/ML
INJECTION, SOLUTION EPIDURAL; INFILTRATION; INTRACAUDAL
Status: COMPLETED | OUTPATIENT
Start: 2020-10-09 | End: 2020-10-09

## 2020-10-09 RX ORDER — DEXAMETHASONE SODIUM PHOSPHATE 10 MG/ML
INJECTION INTRAMUSCULAR; INTRAVENOUS AS NEEDED
Status: DISCONTINUED | OUTPATIENT
Start: 2020-10-09 | End: 2020-10-09 | Stop reason: SURG

## 2020-10-09 RX ORDER — CELECOXIB 200 MG/1
200 CAPSULE ORAL ONCE
Status: COMPLETED | OUTPATIENT
Start: 2020-10-09 | End: 2020-10-09

## 2020-10-09 RX ORDER — KETAMINE HYDROCHLORIDE 10 MG/ML
INJECTION INTRAMUSCULAR; INTRAVENOUS AS NEEDED
Status: DISCONTINUED | OUTPATIENT
Start: 2020-10-09 | End: 2020-10-09 | Stop reason: SURG

## 2020-10-09 RX ORDER — GABAPENTIN 300 MG/1
300 CAPSULE ORAL EVERY 8 HOURS SCHEDULED
Status: DISCONTINUED | OUTPATIENT
Start: 2020-10-09 | End: 2020-10-12 | Stop reason: HOSPADM

## 2020-10-09 RX ORDER — LIDOCAINE HYDROCHLORIDE 20 MG/ML
INJECTION, SOLUTION INFILTRATION; PERINEURAL AS NEEDED
Status: DISCONTINUED | OUTPATIENT
Start: 2020-10-09 | End: 2020-10-09 | Stop reason: SURG

## 2020-10-09 RX ORDER — ACETAMINOPHEN 500 MG
1000 TABLET ORAL ONCE
Status: COMPLETED | OUTPATIENT
Start: 2020-10-09 | End: 2020-10-09

## 2020-10-09 RX ORDER — MIDAZOLAM HYDROCHLORIDE 1 MG/ML
1 INJECTION INTRAMUSCULAR; INTRAVENOUS
Status: DISCONTINUED | OUTPATIENT
Start: 2020-10-09 | End: 2020-10-09 | Stop reason: HOSPADM

## 2020-10-09 RX ORDER — CELECOXIB 100 MG/1
100 CAPSULE ORAL EVERY 12 HOURS SCHEDULED
Status: DISCONTINUED | OUTPATIENT
Start: 2020-10-09 | End: 2020-10-12 | Stop reason: HOSPADM

## 2020-10-09 RX ORDER — FENTANYL CITRATE 50 UG/ML
50 INJECTION, SOLUTION INTRAMUSCULAR; INTRAVENOUS
Status: DISCONTINUED | OUTPATIENT
Start: 2020-10-09 | End: 2020-10-09 | Stop reason: HOSPADM

## 2020-10-09 RX ORDER — SODIUM CHLORIDE, SODIUM LACTATE, POTASSIUM CHLORIDE, CALCIUM CHLORIDE 600; 310; 30; 20 MG/100ML; MG/100ML; MG/100ML; MG/100ML
9 INJECTION, SOLUTION INTRAVENOUS CONTINUOUS
Status: DISCONTINUED | OUTPATIENT
Start: 2020-10-09 | End: 2020-10-09

## 2020-10-09 RX ORDER — ONDANSETRON 2 MG/ML
INJECTION INTRAMUSCULAR; INTRAVENOUS AS NEEDED
Status: DISCONTINUED | OUTPATIENT
Start: 2020-10-09 | End: 2020-10-09 | Stop reason: SURG

## 2020-10-09 RX ORDER — EPHEDRINE SULFATE 50 MG/ML
INJECTION, SOLUTION INTRAVENOUS AS NEEDED
Status: DISCONTINUED | OUTPATIENT
Start: 2020-10-09 | End: 2020-10-09 | Stop reason: SURG

## 2020-10-09 RX ADMIN — FENTANYL CITRATE 50 MCG: 50 INJECTION, SOLUTION INTRAMUSCULAR; INTRAVENOUS at 18:24

## 2020-10-09 RX ADMIN — CEFAZOLIN SODIUM 2 G: 2 INJECTION, SOLUTION INTRAVENOUS at 13:34

## 2020-10-09 RX ADMIN — PROPOFOL 200 MG: 10 INJECTION, EMULSION INTRAVENOUS at 13:15

## 2020-10-09 RX ADMIN — FENTANYL CITRATE 50 MCG: 50 INJECTION, SOLUTION INTRAMUSCULAR; INTRAVENOUS at 17:58

## 2020-10-09 RX ADMIN — HYDROMORPHONE HYDROCHLORIDE 0.5 MG: 1 INJECTION, SOLUTION INTRAMUSCULAR; INTRAVENOUS; SUBCUTANEOUS at 20:31

## 2020-10-09 RX ADMIN — HYDROMORPHONE HYDROCHLORIDE 0.5 MG: 1 INJECTION, SOLUTION INTRAMUSCULAR; INTRAVENOUS; SUBCUTANEOUS at 18:43

## 2020-10-09 RX ADMIN — POTASSIUM CHLORIDE, DEXTROSE MONOHYDRATE AND SODIUM CHLORIDE 75 ML/HR: 150; 5; 450 INJECTION, SOLUTION INTRAVENOUS at 20:31

## 2020-10-09 RX ADMIN — FAMOTIDINE 20 MG: 10 INJECTION INTRAVENOUS at 09:00

## 2020-10-09 RX ADMIN — FENTANYL CITRATE 50 MCG: 50 INJECTION INTRAMUSCULAR; INTRAVENOUS at 09:42

## 2020-10-09 RX ADMIN — FENTANYL CITRATE 100 MCG: 50 INJECTION INTRAMUSCULAR; INTRAVENOUS at 13:13

## 2020-10-09 RX ADMIN — FENTANYL CITRATE 50 MCG: 50 INJECTION INTRAMUSCULAR; INTRAVENOUS at 09:50

## 2020-10-09 RX ADMIN — BUPIVACAINE 10 ML: 13.3 INJECTION, SUSPENSION, LIPOSOMAL INFILTRATION at 10:04

## 2020-10-09 RX ADMIN — OXYCODONE 5 MG: 5 TABLET ORAL at 21:23

## 2020-10-09 RX ADMIN — GABAPENTIN 300 MG: 300 CAPSULE ORAL at 21:23

## 2020-10-09 RX ADMIN — ONDANSETRON HYDROCHLORIDE 4 MG: 2 SOLUTION INTRAMUSCULAR; INTRAVENOUS at 14:17

## 2020-10-09 RX ADMIN — CELECOXIB 100 MG: 100 CAPSULE ORAL at 21:23

## 2020-10-09 RX ADMIN — SODIUM CHLORIDE, POTASSIUM CHLORIDE, SODIUM LACTATE AND CALCIUM CHLORIDE 9 ML/HR: 600; 310; 30; 20 INJECTION, SOLUTION INTRAVENOUS at 08:33

## 2020-10-09 RX ADMIN — ACETAMINOPHEN 1000 MG: 500 TABLET, FILM COATED ORAL at 21:23

## 2020-10-09 RX ADMIN — HEPARIN SODIUM 5000 UNITS: 5000 INJECTION INTRAVENOUS; SUBCUTANEOUS at 08:30

## 2020-10-09 RX ADMIN — ACETAMINOPHEN 1000 MG: 500 TABLET ORAL at 08:27

## 2020-10-09 RX ADMIN — DEXAMETHASONE SODIUM PHOSPHATE 12 MG: 10 INJECTION INTRAMUSCULAR; INTRAVENOUS at 14:17

## 2020-10-09 RX ADMIN — KETAMINE HYDROCHLORIDE 10 MG: 10 INJECTION INTRAMUSCULAR; INTRAVENOUS at 14:59

## 2020-10-09 RX ADMIN — NEOSTIGMINE METHYLSULFATE 3 MG: 1 INJECTION INTRAMUSCULAR; INTRAVENOUS; SUBCUTANEOUS at 17:17

## 2020-10-09 RX ADMIN — KETAMINE HYDROCHLORIDE 5 MG: 10 INJECTION INTRAMUSCULAR; INTRAVENOUS at 16:01

## 2020-10-09 RX ADMIN — ROCURONIUM BROMIDE 50 MG: 10 INJECTION INTRAVENOUS at 13:15

## 2020-10-09 RX ADMIN — GABAPENTIN 600 MG: 300 CAPSULE ORAL at 08:27

## 2020-10-09 RX ADMIN — LIDOCAINE HYDROCHLORIDE 60 MG: 20 INJECTION, SOLUTION INFILTRATION; PERINEURAL at 13:15

## 2020-10-09 RX ADMIN — MIDAZOLAM 2 MG: 1 INJECTION INTRAMUSCULAR; INTRAVENOUS at 09:42

## 2020-10-09 RX ADMIN — CELECOXIB 200 MG: 200 CAPSULE ORAL at 08:27

## 2020-10-09 RX ADMIN — EPHEDRINE SULFATE 10 MG: 50 INJECTION INTRAVENOUS at 14:19

## 2020-10-09 RX ADMIN — POTASSIUM CHLORIDE, DEXTROSE MONOHYDRATE AND SODIUM CHLORIDE: 150; 5; 450 INJECTION, SOLUTION INTRAVENOUS at 21:27

## 2020-10-09 RX ADMIN — BUPIVACAINE HYDROCHLORIDE 25 ML: 2.5 INJECTION, SOLUTION EPIDURAL; INFILTRATION; INTRACAUDAL; PERINEURAL at 10:04

## 2020-10-09 RX ADMIN — SODIUM CHLORIDE, POTASSIUM CHLORIDE, SODIUM LACTATE AND CALCIUM CHLORIDE: 600; 310; 30; 20 INJECTION, SOLUTION INTRAVENOUS at 17:39

## 2020-10-09 RX ADMIN — KETAMINE HYDROCHLORIDE 35 MG: 10 INJECTION INTRAMUSCULAR; INTRAVENOUS at 13:33

## 2020-10-09 RX ADMIN — GLYCOPYRROLATE 0.4 MG: 0.2 INJECTION INTRAMUSCULAR; INTRAVENOUS at 17:17

## 2020-10-09 NOTE — ANESTHESIA PROCEDURE NOTES
Arterial Line      Patient location during procedure: holding area  Start time: 10/9/2020 9:54 AM  Stop Time:10/9/2020 10:04 AM       Line placed for hemodynamic monitoring.  Performed By   Anesthesiologist: Radha Amaro MD  Preanesthetic Checklist  Completed: patient identified, site marked, pre-op evaluation, IV checked, risks and benefits discussed and monitors and equipment checked  Arterial Line Prep   Sterile Tech: gloves  Prep: ChloraPrep  Arterial Line Procedure   Laterality:left  Location:  radial artery  Catheter size: 20 G   Guidance: ultrasound guided  PROCEDURE NOTE/ULTRASOUND INTERPRETATION.  Using ultrasound guidance the potential vascular sites for insertion of the catheter were visualized to determine the patency of the vessel to be used for vascular access.  After selecting the appropriate site for insertion, the needle was visualized under ultrasound being inserted into the radial artery, followed by ultrasound confirmation of wire and catheter placement. There were no abnormalities seen on ultrasound; an image was taken; and the patient tolerated the procedure with no complications.   Number of attempts: 1  Successful placement: yes  Post Assessment   Dressing Type: occlusive dressing applied.   Complications no  Circ/Move/Sens Assessment: normal.   Patient Tolerance: patient tolerated the procedure well with no apparent complications

## 2020-10-09 NOTE — ANESTHESIA PROCEDURE NOTES
Airway  Urgency: elective    Date/Time: 10/9/2020 1:30 PM  Difficult airway (CMAC, difficult because of double lumen)    General Information and Staff    Patient location during procedure: OR  CRNA: Alycia Tang CRNA    Indications and Patient Condition  Indications for airway management: airway protection    Preoxygenated: yes  Mask difficulty assessment: 1 - vent by mask    Final Airway Details  Final airway type: endotracheal airway      Successful airway: ETT - double lumen left  Cuffed: yes   Successful intubation technique: direct laryngoscopy and video laryngoscopy  Facilitating devices/methods: cricoid pressure and Bougie  Blade: CMAC  Blade size: D  Cormack-Lehane Classification: grade IIa - partial view of glottis  Placement verified by: chest auscultation, bronchoscopy and capnometry   Measured from: lips  ETT/EBT  to lips (cm): 30  Number of attempts at approach: 2  Assessment: lips, teeth, and gum same as pre-op and atraumatic intubation    Additional Comments  Smooth IV induction. Easy mask. DL MAC 3, double lumen passed, esoph, removed pt ventilated, second look with CMAC, not enough room in mouth for CMAC and double lumen. Pt ventilated, pt intubated with single lumen ETT over bronchoscope with surgeon, pt ventilated, tube exchanger passed thru single lumen, ETT removed, double lumen passed over tube exchanger, visualized with CMAC, double lumen thru VC, cuff up, +ETCO2. ETT secured, dentition intact without trauma.

## 2020-10-09 NOTE — ANESTHESIA PREPROCEDURE EVALUATION
Anesthesia Evaluation     history of anesthetic complications: PONV               Airway   Mallampati: I  TM distance: >3 FB  Neck ROM: full  No difficulty expected  Dental - normal exam     Pulmonary - normal exam   (+) lung cancer,   Cardiovascular - normal exam    (+) hypertension,       Neuro/Psych  GI/Hepatic/Renal/Endo    (+) obesity, morbid obesity,  renal disease, diabetes mellitus,     Musculoskeletal     Abdominal    Substance History      OB/GYN          Other   arthritis,                      Anesthesia Plan    ASA 3     general with block     intravenous induction     Anesthetic plan, all risks, benefits, and alternatives have been provided, discussed and informed consent has been obtained with: patient.

## 2020-10-09 NOTE — ANESTHESIA POSTPROCEDURE EVALUATION
"Patient: Christie Avendaño    Procedure Summary     Date: 10/09/20 Room / Location: [facility] OR  /  [facility] OR    Anesthesia Start: 1308 Anesthesia Stop: 1741    Procedure: BRONCHOSCOPY, THORACOSCOPY VIDEO ASSISTED WITH ANTERIOR BASAL SEGMENTECTOMY, INTERCOSTAL NERVE BLOCK (Right Chest) Diagnosis:       Adenocarcinoma of right lung (CMS/HCC)      (Adenocarcinoma of right lung (CMS/HCC) [C34.91])    Surgeon: Flaca Crisostomo MD Provider: Portia Bhandari MD    Anesthesia Type: general with block ASA Status: 3          Anesthesia Type: general with block    Vitals  Vitals Value Taken Time   /73 10/09/20 1915   Temp 37.3 °C (99.2 °F) 10/09/20 1738   Pulse 82 10/09/20 1921   Resp 16 10/09/20 1900   SpO2 95 % 10/09/20 1921   Vitals shown include unvalidated device data.        Post Anesthesia Care and Evaluation    Pain management: adequate  Airway patency: patent  Anesthetic complications: No anesthetic complications    Cardiovascular status: acceptable  Respiratory status: acceptable  Hydration status: acceptable    Comments: /78 (BP Location: Right arm, Patient Position: Lying)   Pulse 88   Temp 37.3 °C (99.2 °F) (Oral)   Resp 16   Ht 165.1 cm (65\")   Wt 116 kg (256 lb 9.6 oz)   SpO2 93%   BMI 42.70 kg/m²         "

## 2020-10-09 NOTE — ANESTHESIA PROCEDURE NOTES
Peripheral Block      Patient location during procedure: holding area  Start time: 10/9/2020 9:35 AM  Stop time: 10/9/2020 9:45 AM  Reason for block: at surgeon's request and post-op pain management  Performed by  Anesthesiologist: Radha Amaro MD  Preanesthetic Checklist  Completed: patient identified, site marked, surgical consent, pre-op evaluation, timeout performed, IV checked, risks and benefits discussed and monitors and equipment checked  Prep:  Sterile barriers:gloves  Prep: ChloraPrep  Patient monitoring: continuous pulse oximetry, blood pressure monitoring and EKG  Procedure  Sedation:yes  Performed under: PNB  Laterality:right  Block Type:erector spinae block  Needle Gauge:20 G      Medications Used: bupivacaine liposome (EXPAREL) 1.3 % injection, 10 mL  bupivacaine PF (MARCAINE) 0.25 % injection, 25 mL      Post Assessment  Injection Assessment: negative aspiration for heme, no paresthesia on injection and incremental injection  Patient Tolerance:comfortable throughout block  Complications:no

## 2020-10-10 ENCOUNTER — APPOINTMENT (OUTPATIENT)
Dept: GENERAL RADIOLOGY | Facility: HOSPITAL | Age: 55
End: 2020-10-10

## 2020-10-10 LAB
ANION GAP SERPL CALCULATED.3IONS-SCNC: 11.3 MMOL/L (ref 5–15)
BASOPHILS # BLD AUTO: 0.03 10*3/MM3 (ref 0–0.2)
BASOPHILS NFR BLD AUTO: 0.2 % (ref 0–1.5)
BUN SERPL-MCNC: 12 MG/DL (ref 6–20)
BUN/CREAT SERPL: 15.2 (ref 7–25)
CALCIUM SPEC-SCNC: 8.4 MG/DL (ref 8.6–10.5)
CHLORIDE SERPL-SCNC: 101 MMOL/L (ref 98–107)
CO2 SERPL-SCNC: 23.7 MMOL/L (ref 22–29)
CREAT SERPL-MCNC: 0.79 MG/DL (ref 0.57–1)
DEPRECATED RDW RBC AUTO: 44.8 FL (ref 37–54)
EOSINOPHIL # BLD AUTO: 0 10*3/MM3 (ref 0–0.4)
EOSINOPHIL NFR BLD AUTO: 0 % (ref 0.3–6.2)
ERYTHROCYTE [DISTWIDTH] IN BLOOD BY AUTOMATED COUNT: 15.7 % (ref 12.3–15.4)
GFR SERPL CREATININE-BSD FRML MDRD: 75 ML/MIN/1.73
GLUCOSE BLDC GLUCOMTR-MCNC: 197 MG/DL (ref 70–130)
GLUCOSE BLDC GLUCOMTR-MCNC: 231 MG/DL (ref 70–130)
GLUCOSE BLDC GLUCOMTR-MCNC: 247 MG/DL (ref 70–130)
GLUCOSE SERPL-MCNC: 221 MG/DL (ref 65–99)
HCT VFR BLD AUTO: 30.5 % (ref 34–46.6)
HGB BLD-MCNC: 10.2 G/DL (ref 12–15.9)
IMM GRANULOCYTES # BLD AUTO: 0.09 10*3/MM3 (ref 0–0.05)
IMM GRANULOCYTES NFR BLD AUTO: 0.6 % (ref 0–0.5)
LYMPHOCYTES # BLD AUTO: 1.3 10*3/MM3 (ref 0.7–3.1)
LYMPHOCYTES NFR BLD AUTO: 9.2 % (ref 19.6–45.3)
MCH RBC QN AUTO: 26.5 PG (ref 26.6–33)
MCHC RBC AUTO-ENTMCNC: 33.4 G/DL (ref 31.5–35.7)
MCV RBC AUTO: 79.2 FL (ref 79–97)
MONOCYTES # BLD AUTO: 1.22 10*3/MM3 (ref 0.1–0.9)
MONOCYTES NFR BLD AUTO: 8.6 % (ref 5–12)
NEUTROPHILS NFR BLD AUTO: 11.48 10*3/MM3 (ref 1.7–7)
NEUTROPHILS NFR BLD AUTO: 81.4 % (ref 42.7–76)
NRBC BLD AUTO-RTO: 0 /100 WBC (ref 0–0.2)
PLATELET # BLD AUTO: 279 10*3/MM3 (ref 140–450)
PMV BLD AUTO: 9.8 FL (ref 6–12)
POTASSIUM SERPL-SCNC: 4.5 MMOL/L (ref 3.5–5.2)
RBC # BLD AUTO: 3.85 10*6/MM3 (ref 3.77–5.28)
SODIUM SERPL-SCNC: 136 MMOL/L (ref 136–145)
WBC # BLD AUTO: 14.12 10*3/MM3 (ref 3.4–10.8)

## 2020-10-10 PROCEDURE — 80048 BASIC METABOLIC PNL TOTAL CA: CPT | Performed by: THORACIC SURGERY (CARDIOTHORACIC VASCULAR SURGERY)

## 2020-10-10 PROCEDURE — 71045 X-RAY EXAM CHEST 1 VIEW: CPT

## 2020-10-10 PROCEDURE — 99024 POSTOP FOLLOW-UP VISIT: CPT | Performed by: THORACIC SURGERY (CARDIOTHORACIC VASCULAR SURGERY)

## 2020-10-10 PROCEDURE — 63710000001 INSULIN LISPRO (HUMAN) PER 5 UNITS: Performed by: THORACIC SURGERY (CARDIOTHORACIC VASCULAR SURGERY)

## 2020-10-10 PROCEDURE — 25010000002 HYDROMORPHONE PER 4 MG: Performed by: THORACIC SURGERY (CARDIOTHORACIC VASCULAR SURGERY)

## 2020-10-10 PROCEDURE — 25010000002 HEPARIN (PORCINE) PER 1000 UNITS: Performed by: THORACIC SURGERY (CARDIOTHORACIC VASCULAR SURGERY)

## 2020-10-10 PROCEDURE — 94799 UNLISTED PULMONARY SVC/PX: CPT

## 2020-10-10 PROCEDURE — 85025 COMPLETE CBC W/AUTO DIFF WBC: CPT | Performed by: THORACIC SURGERY (CARDIOTHORACIC VASCULAR SURGERY)

## 2020-10-10 PROCEDURE — 82962 GLUCOSE BLOOD TEST: CPT

## 2020-10-10 PROCEDURE — 94640 AIRWAY INHALATION TREATMENT: CPT

## 2020-10-10 RX ORDER — DEXTROSE MONOHYDRATE 25 G/50ML
25 INJECTION, SOLUTION INTRAVENOUS
Status: DISCONTINUED | OUTPATIENT
Start: 2020-10-10 | End: 2020-10-12 | Stop reason: HOSPADM

## 2020-10-10 RX ORDER — BISACODYL 5 MG/1
10 TABLET, DELAYED RELEASE ORAL DAILY PRN
Status: DISCONTINUED | OUTPATIENT
Start: 2020-10-10 | End: 2020-10-12 | Stop reason: HOSPADM

## 2020-10-10 RX ORDER — NICOTINE POLACRILEX 4 MG
15 LOZENGE BUCCAL
Status: DISCONTINUED | OUTPATIENT
Start: 2020-10-10 | End: 2020-10-12 | Stop reason: HOSPADM

## 2020-10-10 RX ADMIN — LEVOTHYROXINE SODIUM 150 MCG: 150 TABLET ORAL at 06:45

## 2020-10-10 RX ADMIN — ACETAMINOPHEN 1000 MG: 500 TABLET, FILM COATED ORAL at 08:48

## 2020-10-10 RX ADMIN — HEPARIN SODIUM 5000 UNITS: 5000 INJECTION INTRAVENOUS; SUBCUTANEOUS at 21:45

## 2020-10-10 RX ADMIN — IPRATROPIUM BROMIDE AND ALBUTEROL SULFATE 3 ML: 2.5; .5 SOLUTION RESPIRATORY (INHALATION) at 16:11

## 2020-10-10 RX ADMIN — GABAPENTIN 300 MG: 300 CAPSULE ORAL at 14:32

## 2020-10-10 RX ADMIN — ACETAMINOPHEN 1000 MG: 500 TABLET, FILM COATED ORAL at 21:44

## 2020-10-10 RX ADMIN — INSULIN LISPRO 5 UNITS: 100 INJECTION, SOLUTION INTRAVENOUS; SUBCUTANEOUS at 17:22

## 2020-10-10 RX ADMIN — OXYCODONE 5 MG: 5 TABLET ORAL at 04:51

## 2020-10-10 RX ADMIN — HEPARIN SODIUM 5000 UNITS: 5000 INJECTION INTRAVENOUS; SUBCUTANEOUS at 14:31

## 2020-10-10 RX ADMIN — INSULIN LISPRO 5 UNITS: 100 INJECTION, SOLUTION INTRAVENOUS; SUBCUTANEOUS at 12:22

## 2020-10-10 RX ADMIN — IPRATROPIUM BROMIDE AND ALBUTEROL SULFATE 3 ML: 2.5; .5 SOLUTION RESPIRATORY (INHALATION) at 07:11

## 2020-10-10 RX ADMIN — OXYCODONE 5 MG: 5 TABLET ORAL at 12:26

## 2020-10-10 RX ADMIN — IPRATROPIUM BROMIDE AND ALBUTEROL SULFATE 3 ML: 2.5; .5 SOLUTION RESPIRATORY (INHALATION) at 23:18

## 2020-10-10 RX ADMIN — OXYCODONE 5 MG: 5 TABLET ORAL at 21:44

## 2020-10-10 RX ADMIN — HYDROMORPHONE HYDROCHLORIDE 0.5 MG: 1 INJECTION, SOLUTION INTRAMUSCULAR; INTRAVENOUS; SUBCUTANEOUS at 14:31

## 2020-10-10 RX ADMIN — ACETAMINOPHEN 1000 MG: 500 TABLET, FILM COATED ORAL at 16:26

## 2020-10-10 RX ADMIN — HYDROMORPHONE HYDROCHLORIDE 0.5 MG: 1 INJECTION, SOLUTION INTRAMUSCULAR; INTRAVENOUS; SUBCUTANEOUS at 02:32

## 2020-10-10 RX ADMIN — HYDROMORPHONE HYDROCHLORIDE 0.5 MG: 1 INJECTION, SOLUTION INTRAMUSCULAR; INTRAVENOUS; SUBCUTANEOUS at 08:57

## 2020-10-10 RX ADMIN — POTASSIUM CHLORIDE, DEXTROSE MONOHYDRATE AND SODIUM CHLORIDE 75 ML/HR: 150; 5; 450 INJECTION, SOLUTION INTRAVENOUS at 08:57

## 2020-10-10 RX ADMIN — HEPARIN SODIUM 5000 UNITS: 5000 INJECTION INTRAVENOUS; SUBCUTANEOUS at 06:37

## 2020-10-10 RX ADMIN — CELECOXIB 100 MG: 100 CAPSULE ORAL at 08:48

## 2020-10-10 RX ADMIN — DOCUSATE SODIUM 50MG AND SENNOSIDES 8.6MG 2 TABLET: 8.6; 5 TABLET, FILM COATED ORAL at 21:44

## 2020-10-10 RX ADMIN — HYDROCHLOROTHIAZIDE 12.5 MG: 12.5 CAPSULE ORAL at 08:48

## 2020-10-10 RX ADMIN — GABAPENTIN 300 MG: 300 CAPSULE ORAL at 21:48

## 2020-10-10 RX ADMIN — HYDROMORPHONE HYDROCHLORIDE 0.5 MG: 1 INJECTION, SOLUTION INTRAMUSCULAR; INTRAVENOUS; SUBCUTANEOUS at 04:54

## 2020-10-10 RX ADMIN — GABAPENTIN 300 MG: 300 CAPSULE ORAL at 06:37

## 2020-10-10 RX ADMIN — CELECOXIB 100 MG: 100 CAPSULE ORAL at 21:44

## 2020-10-10 RX ADMIN — OXYCODONE 5 MG: 5 TABLET ORAL at 17:22

## 2020-10-11 ENCOUNTER — APPOINTMENT (OUTPATIENT)
Dept: GENERAL RADIOLOGY | Facility: HOSPITAL | Age: 55
End: 2020-10-11

## 2020-10-11 LAB
GLUCOSE BLDC GLUCOMTR-MCNC: 183 MG/DL (ref 70–130)
GLUCOSE BLDC GLUCOMTR-MCNC: 190 MG/DL (ref 70–130)
GLUCOSE BLDC GLUCOMTR-MCNC: 218 MG/DL (ref 70–130)

## 2020-10-11 PROCEDURE — 25010000002 HEPARIN (PORCINE) PER 1000 UNITS: Performed by: THORACIC SURGERY (CARDIOTHORACIC VASCULAR SURGERY)

## 2020-10-11 PROCEDURE — 94799 UNLISTED PULMONARY SVC/PX: CPT

## 2020-10-11 PROCEDURE — 63710000001 INSULIN LISPRO (HUMAN) PER 5 UNITS: Performed by: THORACIC SURGERY (CARDIOTHORACIC VASCULAR SURGERY)

## 2020-10-11 PROCEDURE — 25010000002 FUROSEMIDE PER 20 MG: Performed by: THORACIC SURGERY (CARDIOTHORACIC VASCULAR SURGERY)

## 2020-10-11 PROCEDURE — 71045 X-RAY EXAM CHEST 1 VIEW: CPT

## 2020-10-11 PROCEDURE — 99024 POSTOP FOLLOW-UP VISIT: CPT | Performed by: THORACIC SURGERY (CARDIOTHORACIC VASCULAR SURGERY)

## 2020-10-11 PROCEDURE — 82962 GLUCOSE BLOOD TEST: CPT

## 2020-10-11 RX ORDER — CELECOXIB 100 MG/1
100 CAPSULE ORAL EVERY 12 HOURS SCHEDULED
Qty: 60 CAPSULE | Refills: 0 | Status: SHIPPED | OUTPATIENT
Start: 2020-10-11 | End: 2020-11-23 | Stop reason: ALTCHOICE

## 2020-10-11 RX ORDER — ACETAMINOPHEN 500 MG
1000 TABLET ORAL 3 TIMES DAILY
Qty: 80 TABLET | Refills: 0 | Status: SHIPPED | OUTPATIENT
Start: 2020-10-11 | End: 2020-10-25

## 2020-10-11 RX ORDER — FUROSEMIDE 10 MG/ML
20 INJECTION INTRAMUSCULAR; INTRAVENOUS ONCE
Status: COMPLETED | OUTPATIENT
Start: 2020-10-11 | End: 2020-10-11

## 2020-10-11 RX ORDER — OXYCODONE HYDROCHLORIDE 5 MG/1
5 TABLET ORAL EVERY 4 HOURS PRN
Qty: 45 TABLET | Refills: 0 | Status: SHIPPED | OUTPATIENT
Start: 2020-10-11 | End: 2020-10-16

## 2020-10-11 RX ORDER — GABAPENTIN 300 MG/1
300 CAPSULE ORAL EVERY 8 HOURS SCHEDULED
Qty: 90 CAPSULE | Refills: 0 | Status: SHIPPED | OUTPATIENT
Start: 2020-10-11 | End: 2020-11-23 | Stop reason: ALTCHOICE

## 2020-10-11 RX ADMIN — GABAPENTIN 300 MG: 300 CAPSULE ORAL at 22:52

## 2020-10-11 RX ADMIN — IPRATROPIUM BROMIDE AND ALBUTEROL SULFATE 3 ML: 2.5; .5 SOLUTION RESPIRATORY (INHALATION) at 08:16

## 2020-10-11 RX ADMIN — OXYCODONE 5 MG: 5 TABLET ORAL at 18:27

## 2020-10-11 RX ADMIN — HYDROCHLOROTHIAZIDE 12.5 MG: 12.5 CAPSULE ORAL at 08:28

## 2020-10-11 RX ADMIN — INSULIN LISPRO 2 UNITS: 100 INJECTION, SOLUTION INTRAVENOUS; SUBCUTANEOUS at 11:35

## 2020-10-11 RX ADMIN — ACETAMINOPHEN 1000 MG: 500 TABLET, FILM COATED ORAL at 22:52

## 2020-10-11 RX ADMIN — GABAPENTIN 300 MG: 300 CAPSULE ORAL at 05:54

## 2020-10-11 RX ADMIN — GABAPENTIN 300 MG: 300 CAPSULE ORAL at 13:06

## 2020-10-11 RX ADMIN — HEPARIN SODIUM 5000 UNITS: 5000 INJECTION INTRAVENOUS; SUBCUTANEOUS at 05:54

## 2020-10-11 RX ADMIN — LEVOTHYROXINE SODIUM 150 MCG: 150 TABLET ORAL at 05:54

## 2020-10-11 RX ADMIN — INSULIN LISPRO 3 UNITS: 100 INJECTION, SOLUTION INTRAVENOUS; SUBCUTANEOUS at 17:00

## 2020-10-11 RX ADMIN — BISACODYL 10 MG: 5 TABLET ORAL at 11:38

## 2020-10-11 RX ADMIN — IPRATROPIUM BROMIDE AND ALBUTEROL SULFATE 3 ML: 2.5; .5 SOLUTION RESPIRATORY (INHALATION) at 16:33

## 2020-10-11 RX ADMIN — OXYCODONE 5 MG: 5 TABLET ORAL at 08:28

## 2020-10-11 RX ADMIN — FUROSEMIDE 20 MG: 20 INJECTION, SOLUTION INTRAMUSCULAR; INTRAVENOUS at 15:04

## 2020-10-11 RX ADMIN — OXYCODONE 5 MG: 5 TABLET ORAL at 13:06

## 2020-10-11 RX ADMIN — DOCUSATE SODIUM 50MG AND SENNOSIDES 8.6MG 2 TABLET: 8.6; 5 TABLET, FILM COATED ORAL at 22:51

## 2020-10-11 RX ADMIN — INSULIN LISPRO 2 UNITS: 100 INJECTION, SOLUTION INTRAVENOUS; SUBCUTANEOUS at 08:29

## 2020-10-11 RX ADMIN — CELECOXIB 100 MG: 100 CAPSULE ORAL at 22:52

## 2020-10-11 RX ADMIN — ACETAMINOPHEN 1000 MG: 500 TABLET, FILM COATED ORAL at 15:10

## 2020-10-11 RX ADMIN — HEPARIN SODIUM 5000 UNITS: 5000 INJECTION INTRAVENOUS; SUBCUTANEOUS at 13:06

## 2020-10-11 RX ADMIN — CELECOXIB 100 MG: 100 CAPSULE ORAL at 08:28

## 2020-10-11 RX ADMIN — ACETAMINOPHEN 1000 MG: 500 TABLET, FILM COATED ORAL at 08:28

## 2020-10-11 RX ADMIN — HEPARIN SODIUM 5000 UNITS: 5000 INJECTION INTRAVENOUS; SUBCUTANEOUS at 22:52

## 2020-10-12 ENCOUNTER — APPOINTMENT (OUTPATIENT)
Dept: GENERAL RADIOLOGY | Facility: HOSPITAL | Age: 55
End: 2020-10-12

## 2020-10-12 VITALS
BODY MASS INDEX: 42.75 KG/M2 | RESPIRATION RATE: 16 BRPM | HEART RATE: 87 BPM | WEIGHT: 256.6 LBS | TEMPERATURE: 98.5 F | SYSTOLIC BLOOD PRESSURE: 154 MMHG | DIASTOLIC BLOOD PRESSURE: 84 MMHG | HEIGHT: 65 IN | OXYGEN SATURATION: 94 %

## 2020-10-12 LAB
GLUCOSE BLDC GLUCOMTR-MCNC: 130 MG/DL (ref 70–130)
GLUCOSE BLDC GLUCOMTR-MCNC: 160 MG/DL (ref 70–130)
GLUCOSE BLDC GLUCOMTR-MCNC: 178 MG/DL (ref 70–130)

## 2020-10-12 PROCEDURE — 71045 X-RAY EXAM CHEST 1 VIEW: CPT

## 2020-10-12 PROCEDURE — 25010000002 HEPARIN (PORCINE) PER 1000 UNITS: Performed by: THORACIC SURGERY (CARDIOTHORACIC VASCULAR SURGERY)

## 2020-10-12 PROCEDURE — 25010000002 FUROSEMIDE PER 20 MG: Performed by: NURSE PRACTITIONER

## 2020-10-12 PROCEDURE — 99024 POSTOP FOLLOW-UP VISIT: CPT | Performed by: NURSE PRACTITIONER

## 2020-10-12 PROCEDURE — 63710000001 INSULIN LISPRO (HUMAN) PER 5 UNITS: Performed by: THORACIC SURGERY (CARDIOTHORACIC VASCULAR SURGERY)

## 2020-10-12 PROCEDURE — 94618 PULMONARY STRESS TESTING: CPT

## 2020-10-12 PROCEDURE — 82962 GLUCOSE BLOOD TEST: CPT

## 2020-10-12 RX ORDER — FUROSEMIDE 10 MG/ML
20 INJECTION INTRAMUSCULAR; INTRAVENOUS ONCE
Status: COMPLETED | OUTPATIENT
Start: 2020-10-12 | End: 2020-10-12

## 2020-10-12 RX ADMIN — INSULIN LISPRO 2 UNITS: 100 INJECTION, SOLUTION INTRAVENOUS; SUBCUTANEOUS at 12:13

## 2020-10-12 RX ADMIN — HEPARIN SODIUM 5000 UNITS: 5000 INJECTION INTRAVENOUS; SUBCUTANEOUS at 16:29

## 2020-10-12 RX ADMIN — HEPARIN SODIUM 5000 UNITS: 5000 INJECTION INTRAVENOUS; SUBCUTANEOUS at 09:04

## 2020-10-12 RX ADMIN — ACETAMINOPHEN 1000 MG: 500 TABLET, FILM COATED ORAL at 09:05

## 2020-10-12 RX ADMIN — LEVOTHYROXINE SODIUM 150 MCG: 150 TABLET ORAL at 09:08

## 2020-10-12 RX ADMIN — GABAPENTIN 300 MG: 300 CAPSULE ORAL at 09:05

## 2020-10-12 RX ADMIN — GABAPENTIN 300 MG: 300 CAPSULE ORAL at 16:29

## 2020-10-12 RX ADMIN — INSULIN LISPRO 2 UNITS: 100 INJECTION, SOLUTION INTRAVENOUS; SUBCUTANEOUS at 09:05

## 2020-10-12 RX ADMIN — FUROSEMIDE 20 MG: 20 INJECTION, SOLUTION INTRAMUSCULAR; INTRAVENOUS at 09:17

## 2020-10-12 RX ADMIN — ACETAMINOPHEN 1000 MG: 500 TABLET, FILM COATED ORAL at 16:29

## 2020-10-12 RX ADMIN — HYDROCHLOROTHIAZIDE 12.5 MG: 12.5 CAPSULE ORAL at 09:08

## 2020-10-12 RX ADMIN — CELECOXIB 100 MG: 100 CAPSULE ORAL at 09:05

## 2020-10-13 ENCOUNTER — READMISSION MANAGEMENT (OUTPATIENT)
Dept: CALL CENTER | Facility: HOSPITAL | Age: 55
End: 2020-10-13

## 2020-10-13 NOTE — OUTREACH NOTE
Prep Survey      Responses   [facility] patient discharged from?  [place]   Is LACE score < 7 ?  No   Eligibility  Readm Mgmt   Discharge diagnosis  Adenocarcinoma of the lung   Does the patient have one of the following disease processes/diagnoses(primary or secondary)?  Cardiothoracic surgery   Does the patient have Home health ordered?  No   Is there a DME ordered?  No   Prep survey completed?  Yes          Jodi Leung RN

## 2020-10-15 ENCOUNTER — READMISSION MANAGEMENT (OUTPATIENT)
Dept: CALL CENTER | Facility: HOSPITAL | Age: 55
End: 2020-10-15

## 2020-10-15 NOTE — OUTREACH NOTE
CT Surgery Week 1 Survey      Responses   [facility] patient discharged from?  [place]   Does the patient have one of the following disease processes/diagnoses(primary or secondary)?  Cardiothoracic surgery   Week 1 attempt successful?  Yes   Call start time  1749   Call end time  1753   Medication alerts for this patient  has gotten medications   Meds reviewed with patient/caregiver?  Yes   Is the patient having any side effects they believe may be caused by any medication additions or changes?  No   Does the patient have all medications related to this admission filled (includes all antibiotics, pain medications, cardiac medications, etc.)  Yes   Comments regarding appointments  aoppointments are scheduled   Does the patient have a primary care provider?   Yes   Has the patient kept scheduled appointments due by today?  N/A   Has home health visited the patient within 72 hours of discharge?  N/A   Did the patient receive a copy of their discharge instructions?  Yes   Nursing interventions  Reviewed instructions with patient   What is the patient's perception of their health status since discharge?  Improving [feels she is doing well]   Is the patient /caregiver able to teach back basic post-op care?  Continue use of incentive spirometry at least 1 week post discharge, Practice 'cough and deep breath', Take showers only when approved by MD-sponge bathe until then, No tub bath, swimming, or hot tub until instructed by MD, Keep incision areas clean, dry and protected, Lifting as instructed by MD in discharge instructions [reviewed with the patient]   Is the patient/caregiver able to teach back signs and symptoms of incisional infection?  Increased redness, swelling or pain at the incisonal site, Increased drainage or bleeding, Incisional warmth, Pus or odor from incision, Fever [reviewed  with the patient]   Week 1 call completed?  Yes   Wrap up additional comments  improving            Key Jackson RN

## 2020-10-26 ENCOUNTER — OFFICE VISIT (OUTPATIENT)
Dept: SURGERY | Facility: CLINIC | Age: 55
End: 2020-10-26

## 2020-10-26 ENCOUNTER — HOSPITAL ENCOUNTER (OUTPATIENT)
Dept: GENERAL RADIOLOGY | Facility: HOSPITAL | Age: 55
Discharge: HOME | End: 2020-10-26
Admitting: NURSE PRACTITIONER

## 2020-10-26 VITALS
WEIGHT: 256 LBS | HEIGHT: 65 IN | HEART RATE: 79 BPM | BODY MASS INDEX: 42.65 KG/M2 | DIASTOLIC BLOOD PRESSURE: 88 MMHG | OXYGEN SATURATION: 99 % | SYSTOLIC BLOOD PRESSURE: 152 MMHG

## 2020-10-26 DIAGNOSIS — C34.91 ADENOCARCINOMA OF RIGHT LUNG (HCC): Primary | ICD-10-CM

## 2020-10-26 DIAGNOSIS — C34.91 ADENOCARCINOMA OF RIGHT LUNG (HCC): ICD-10-CM

## 2020-10-26 PROCEDURE — 99024 POSTOP FOLLOW-UP VISIT: CPT | Performed by: THORACIC SURGERY (CARDIOTHORACIC VASCULAR SURGERY)

## 2020-10-26 PROCEDURE — 71046 X-RAY EXAM CHEST 2 VIEWS: CPT

## 2020-10-26 NOTE — PROGRESS NOTES
Subjective   Patient ID: Christie Avendaño is a 55 y.o. female is here today for follow-up.    History of Present Illness  Dear Colleagues,   Christie Avendaño is here today for their first postoperative visit after their surgery for right lower lobe T1BN0 adenocarcinoma.  She denies any complaints of fever, chills, cough, hemoptysis, pleuritic chest pain, shortness of air, dyspnea with exertion, night sweats, hoarseness, or unintentional weight loss.  She has no other somatic complaints.    The following portions of the patient's history were reviewed and updated as appropriate: allergies, current medications, past family history, past medical history, past social history, past surgical history and problem list.  Review of Systems   Constitution: Negative.   HENT: Negative.    Eyes: Negative.    Cardiovascular: Negative.    Respiratory: Positive for cough, shortness of breath and sputum production.    Endocrine: Negative.    Hematologic/Lymphatic: Negative.    Skin: Negative.    Musculoskeletal: Negative.    Gastrointestinal: Negative.    Genitourinary: Negative.    Neurological: Negative.    Psychiatric/Behavioral: Negative.    Allergic/Immunologic: Negative.         Objective   Physical Exam  Vitals signs and nursing note reviewed.   Constitutional:       Appearance: She is well-developed.   HENT:      Head: Normocephalic and atraumatic.      Nose: Nose normal.   Eyes:      Conjunctiva/sclera: Conjunctivae normal.      Pupils: Pupils are equal, round, and reactive to light.   Neck:      Musculoskeletal: Normal range of motion and neck supple.   Cardiovascular:      Rate and Rhythm: Normal rate and regular rhythm.      Heart sounds: Normal heart sounds.   Pulmonary:      Effort: Pulmonary effort is normal.      Breath sounds: Normal breath sounds.   Abdominal:      General: Bowel sounds are normal.      Palpations: Abdomen is soft.   Musculoskeletal: Normal range of motion.   Skin:     General: Skin is warm and dry.       Capillary Refill: Capillary refill takes less than 2 seconds.   Neurological:      Mental Status: She is alert and oriented to person, place, and time.   Psychiatric:         Behavior: Behavior normal.         Thought Content: Thought content normal.         Judgment: Judgment normal.         Assessment/Plan   Independent Review of Radiographic Studies:    I have independently reviewed the chest x-ray performed today which demonstrates good expansion bilateral lungs with some atelectasis in both lung bases, improved since her hospitalization.  There is a density in the right lower lobe consistent with her surgical procedure.  Assessment/Plan:  Ms. 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  She is recovering very well.  We will plan to see her in 4 months with a CT of the chest to begin her lung cancer surveillance.  Diagnoses and all orders for this visit:    Adenocarcinoma of right lung (CMS/HCC)  -     CT Chest Without Contrast; Future

## 2020-11-10 ENCOUNTER — CLINICAL SUPPORT (OUTPATIENT)
Dept: OTHER | Facility: HOSPITAL | Age: 55
End: 2020-11-10

## 2020-11-10 ENCOUNTER — OFFICE VISIT (OUTPATIENT)
Dept: PSYCHIATRY | Facility: HOSPITAL | Age: 55
End: 2020-11-10

## 2020-11-10 VITALS — BODY MASS INDEX: 42.65 KG/M2 | HEIGHT: 65 IN | WEIGHT: 256 LBS

## 2020-11-10 DIAGNOSIS — C64.2 RENAL CELL CARCINOMA OF LEFT KIDNEY (HCC): ICD-10-CM

## 2020-11-10 DIAGNOSIS — Z15.09 BRCA2 GENE MUTATION POSITIVE: ICD-10-CM

## 2020-11-10 DIAGNOSIS — C34.30 MALIGNANT NEOPLASM OF LOWER LOBE OF LUNG, UNSPECIFIED LATERALITY (HCC): ICD-10-CM

## 2020-11-10 DIAGNOSIS — Z15.01 BRCA2 GENE MUTATION POSITIVE: ICD-10-CM

## 2020-11-10 DIAGNOSIS — C43.9 MALIGNANT MELANOMA, UNSPECIFIED SITE (HCC): ICD-10-CM

## 2020-11-10 DIAGNOSIS — F43.23 ADJUSTMENT DISORDER WITH MIXED ANXIETY AND DEPRESSED MOOD: Primary | ICD-10-CM

## 2020-11-10 DIAGNOSIS — C50.911 BILATERAL MALIGNANT NEOPLASM OF BREAST IN FEMALE, ESTROGEN RECEPTOR POSITIVE, UNSPECIFIED SITE OF BREAST (HCC): Primary | ICD-10-CM

## 2020-11-10 DIAGNOSIS — Z17.0 BILATERAL MALIGNANT NEOPLASM OF BREAST IN FEMALE, ESTROGEN RECEPTOR POSITIVE, UNSPECIFIED SITE OF BREAST (HCC): Primary | ICD-10-CM

## 2020-11-10 DIAGNOSIS — C50.912 BILATERAL MALIGNANT NEOPLASM OF BREAST IN FEMALE, ESTROGEN RECEPTOR POSITIVE, UNSPECIFIED SITE OF BREAST (HCC): Primary | ICD-10-CM

## 2020-11-10 PROCEDURE — 99204 OFFICE O/P NEW MOD 45 MIN: CPT | Performed by: MEDICAL GENETICS

## 2020-11-10 PROCEDURE — 90792 PSYCH DIAG EVAL W/MED SRVCS: CPT | Performed by: NURSE PRACTITIONER
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
## 2020-11-10 NOTE — PATIENT INSTRUCTIONS
Pt seen by Dr. Lovelace for genetic counseling and testing. Labs will be drawn in [facility] office on 11/11. Sent to [employer]. Pt informed she would receive a phone call when test results.

## 2020-11-11 ENCOUNTER — OFFICE VISIT (OUTPATIENT)
Dept: ONCOLOGY | Facility: CLINIC | Age: 55
End: 2020-11-11

## 2020-11-11 ENCOUNTER — LAB (OUTPATIENT)
Dept: LAB | Facility: HOSPITAL | Age: 55
End: 2020-11-11

## 2020-11-11 VITALS
SYSTOLIC BLOOD PRESSURE: 162 MMHG | WEIGHT: 254.9 LBS | TEMPERATURE: 97.5 F | DIASTOLIC BLOOD PRESSURE: 92 MMHG | BODY MASS INDEX: 42.42 KG/M2 | HEART RATE: 80 BPM | OXYGEN SATURATION: 99 % | RESPIRATION RATE: 16 BRPM

## 2020-11-11 DIAGNOSIS — Z15.09 BRCA2 GENE MUTATION POSITIVE IN FEMALE: ICD-10-CM

## 2020-11-11 DIAGNOSIS — D3A.090 CARCINOID TUMOR OF LEFT LUNG: ICD-10-CM

## 2020-11-11 DIAGNOSIS — I10 ESSENTIAL HYPERTENSION: ICD-10-CM

## 2020-11-11 DIAGNOSIS — C64.2 RENAL CELL CARCINOMA OF LEFT KIDNEY (HCC): ICD-10-CM

## 2020-11-11 DIAGNOSIS — C34.92 ADENOCARCINOMA OF LEFT LUNG (HCC): ICD-10-CM

## 2020-11-11 DIAGNOSIS — D64.9 NORMOCYTIC ANEMIA: ICD-10-CM

## 2020-11-11 DIAGNOSIS — C34.91 ADENOCARCINOMA OF RIGHT LUNG (HCC): Primary | ICD-10-CM

## 2020-11-11 DIAGNOSIS — Z15.02 BRCA2 GENE MUTATION POSITIVE IN FEMALE: ICD-10-CM

## 2020-11-11 DIAGNOSIS — C73 PAPILLARY THYROID CARCINOMA (HCC): ICD-10-CM

## 2020-11-11 DIAGNOSIS — E66.01 MORBIDLY OBESE (HCC): ICD-10-CM

## 2020-11-11 DIAGNOSIS — C34.91 ADENOCARCINOMA OF RIGHT LUNG (HCC): ICD-10-CM

## 2020-11-11 DIAGNOSIS — Z15.01 BRCA2 GENE MUTATION POSITIVE IN FEMALE: ICD-10-CM

## 2020-11-11 DIAGNOSIS — Z86.000 HISTORY OF DUCTAL CARCINOMA IN SITU (DCIS) OF BREAST: ICD-10-CM

## 2020-11-11 LAB
ALBUMIN SERPL-MCNC: 4.7 G/DL (ref 3.5–5.2)
ALBUMIN/GLOB SERPL: 1.7 G/DL (ref 1.1–2.4)
ALP SERPL-CCNC: 141 U/L (ref 38–116)
ALT SERPL W P-5'-P-CCNC: 33 U/L (ref 0–33)
ANION GAP SERPL CALCULATED.3IONS-SCNC: 13.1 MMOL/L (ref 5–15)
AST SERPL-CCNC: 23 U/L (ref 0–32)
BASOPHILS # BLD AUTO: 0.04 10*3/MM3 (ref 0–0.2)
BASOPHILS NFR BLD AUTO: 0.5 % (ref 0–1.5)
BILIRUB SERPL-MCNC: 0.6 MG/DL (ref 0.2–1.2)
BUN SERPL-MCNC: 11 MG/DL (ref 6–20)
BUN/CREAT SERPL: 14.3 (ref 7.3–30)
CALCIUM SPEC-SCNC: 9.4 MG/DL (ref 8.5–10.2)
CHLORIDE SERPL-SCNC: 101 MMOL/L (ref 98–107)
CO2 SERPL-SCNC: 25.9 MMOL/L (ref 22–29)
CREAT SERPL-MCNC: 0.77 MG/DL (ref 0.6–1.1)
DEPRECATED RDW RBC AUTO: 50.5 FL (ref 37–54)
EOSINOPHIL # BLD AUTO: 0.26 10*3/MM3 (ref 0–0.4)
EOSINOPHIL NFR BLD AUTO: 3.3 % (ref 0.3–6.2)
ERYTHROCYTE [DISTWIDTH] IN BLOOD BY AUTOMATED COUNT: 16.2 % (ref 12.3–15.4)
FERRITIN SERPL-MCNC: 112.1 NG/ML (ref 11–207)
GFR SERPL CREATININE-BSD FRML MDRD: 78 ML/MIN/1.73
GLOBULIN UR ELPH-MCNC: 2.7 GM/DL (ref 1.8–3.5)
GLUCOSE SERPL-MCNC: 139 MG/DL (ref 74–124)
HCT VFR BLD AUTO: 36.9 % (ref 34–46.6)
HGB BLD-MCNC: 11.4 G/DL (ref 12–15.9)
IMM GRANULOCYTES # BLD AUTO: 0.02 10*3/MM3 (ref 0–0.05)
IMM GRANULOCYTES NFR BLD AUTO: 0.3 % (ref 0–0.5)
IRON 24H UR-MRATE: 54 MCG/DL (ref 37–145)
IRON SATN MFR SERPL: 14 % (ref 14–48)
LYMPHOCYTES # BLD AUTO: 2.64 10*3/MM3 (ref 0.7–3.1)
LYMPHOCYTES NFR BLD AUTO: 33 % (ref 19.6–45.3)
MCH RBC QN AUTO: 26.3 PG (ref 26.6–33)
MCHC RBC AUTO-ENTMCNC: 30.9 G/DL (ref 31.5–35.7)
MCV RBC AUTO: 85.2 FL (ref 79–97)
MONOCYTES # BLD AUTO: 0.56 10*3/MM3 (ref 0.1–0.9)
MONOCYTES NFR BLD AUTO: 7 % (ref 5–12)
NEUTROPHILS NFR BLD AUTO: 4.48 10*3/MM3 (ref 1.7–7)
NEUTROPHILS NFR BLD AUTO: 55.9 % (ref 42.7–76)
NRBC BLD AUTO-RTO: 0 /100 WBC (ref 0–0.2)
PLATELET # BLD AUTO: 199 10*3/MM3 (ref 140–450)
PMV BLD AUTO: 9.4 FL (ref 6–12)
POTASSIUM SERPL-SCNC: 4.4 MMOL/L (ref 3.5–4.7)
PROT SERPL-MCNC: 7.4 G/DL (ref 6.3–8)
RBC # BLD AUTO: 4.33 10*6/MM3 (ref 3.77–5.28)
SODIUM SERPL-SCNC: 140 MMOL/L (ref 134–145)
TIBC SERPL-MCNC: 395 MCG/DL (ref 249–505)
TRANSFERRIN SERPL-MCNC: 282 MG/DL (ref 200–360)
WBC # BLD AUTO: 8 10*3/MM3 (ref 3.4–10.8)

## 2020-11-11 PROCEDURE — 80053 COMPREHEN METABOLIC PANEL: CPT

## 2020-11-11 PROCEDURE — 99214 OFFICE O/P EST MOD 30 MIN: CPT | Performed by: INTERNAL MEDICINE

## 2020-11-11 PROCEDURE — 83540 ASSAY OF IRON: CPT

## 2020-11-11 PROCEDURE — 84466 ASSAY OF TRANSFERRIN: CPT

## 2020-11-11 PROCEDURE — 82728 ASSAY OF FERRITIN: CPT

## 2020-11-11 PROCEDURE — 85025 COMPLETE CBC W/AUTO DIFF WBC: CPT

## 2020-11-11 PROCEDURE — 36415 COLL VENOUS BLD VENIPUNCTURE: CPT

## 2020-11-11 NOTE — PROGRESS NOTES
Subjective .     REASONS FOR FOLLOWUP:    1. BRCA2 mutation ([id number]):  · Strong family history of malignancy with a mother who had thyroid cancer and also had breast cancer at age 73 with subsequent liver metastases.  She was found to be BRCA2 positive and prompted the patient's own testing.  The patient's maternal grandmother had ovarian cancer in her mid 80s, maternal grandfather and paternal grandfather both had leukemia of unknown type at an older age.  Her maternal aunt had colon cancer over the age of 50, maternal aunt had breast cancer in her 80s, and her father had cholangiocarcinoma.    · The patient underwent testing on 7/27/2016 showing positive BRCA2 mutation ([id number])   · The patient did undergo KAVYA/BSO in 1992 secondary to hemorrhage.  · The patient did undergo bilateral mastectomies 1/26/2017 with findings of bilateral DCIS (discussed below).  · Patient has undergone previous colonoscopy on 11/28/2017.  · Given the unusual nature of the patient's malignancies, referral to genetics clinic for panel testing with possibility of additional underlying mutation.  2. Stage I ([id number]) papillary thyroid cancer:  · Status post total thyroidectomy with Dr. Maher in [place] on 10/15/2010.  Pathology showed papillary thyroid cancer involving the left thyroid and isthmus, multifocal with 2 areas measuring 0.9 and 1.2 cm.  No evidence of capsular invasion, no extrathyroidal extension, no lymphovascular invasion, negative margins.  · Postsurgical scan showed uptake in the thyroid bed and the patient underwent treatment with I-131.    · She has had no evidence of recurrent disease.    · Her subsequent hypothyroidism has been managed by endocrinology (Dr. Subhash Michael) in [place], currently receiving levothyroxine 150 mcg daily.  3. Bilateral DCIS  · As above, patient has underlying BRCA2 mutation  · 8/5/2016 was found to have a right breast intraductal papilloma with fibrocystic change and  microcalcifications with usual ductal hyperplasia and columnar cell change in 3 separate locations on biopsy.  · On 8/24/2016 she underwent excisional biopsy of an intraductal papilloma from the right breast.    · She subsequently underwent on 1/26/2017 bilateral mastectomy.  On the right there was a large intraductal papilloma with usual ductal hyperplasia, atypical hyperplasia, DCIS measuring 3 mm which was low-grade, ER positive (98%), MN positive (85%).  On the left there were multiple intraductal papillomas with atypical ductal hyperplasia.  There was DCIS measuring 6 mm, low-grade, ER positive (99%), MN positive (98%).  4. Stage I ([id number]) clear-cell renal cell carcinoma:  · Incidental finding on CT scan 1/15/2020 of enhancing left renal lesions x2, largest 2.4 cm  · Resection with Dr. Pool ([facility]) on 5/15/2020 with left partial nephrectomy.  Pathology showed clear cell renal cell carcinoma, Jeanne grade 2, 2 foci measuring 1.5 and 2.1 cm.  The renal parenchymal margin was focally positive.  · Patient reports that Dr. Pool felt that he required additional margin tissue in the area of focal positivity and did not recommend re-resection.  · Most recent CT 7/1/2020 showed evidence of resection of left renal lesion.  5. 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    · CT-guided biopsy on 1/9/2018 revealed a left lower lobe carcinoid with spindle cell features, no necrosis, no mitoses.  · Notation of normal chromogranin A 1/23/2018 of 35  · Follow-up CT on 1/24/2018 showed multiple bilateral pulmonary nodules including a 2.1 cm left lower lobe nodule (previously 2.6), 7 mm left lower lobe nodule, 1 cm right lower lobe nodule, right adrenal 1.5 cm nodule consistent with adenoma, and hepatic cysts.    · On 2/28/2018, the patient underwent a left lower lobectomy.  Pathology revealed a 2.3 cm left  upper lobe (hilar) carcinoid, typical with spindle cell features, Ki-67 of 3.68%.  There were 2 of 8 peribronchial lymph nodes involved with carcinoid with lymphatic invasion, stage IIB ([id number]).  There was also evidence of adenocarcinoma (see below).  6. Stage IA1 ([id number])  left lower lobe non-small cell lung cancer (adenocarcinoma with lipidic features):  · The patient is a never smoker  · PET scan 12/13/2017 with hypermetabolic 2.5 cm left lower lobe nodule with SUV 5.7.    · CT-guided biopsy on 1/9/2018 revealed a left lower lobe carcinoid with spindle cell features, no necrosis, no mitoses.    · Follow-up CT on 1/24/2018 showed multiple bilateral pulmonary nodules including a 2.1 cm left lower lobe nodule (previously 2.6), 7 mm left lower lobe nodule, 1 cm right lower lobe nodule, right adrenal 1.5 cm nodule consistent with adenoma, and hepatic cysts.    · On 2/28/2018, the patient underwent a left lower lobectomy.  Pathology revealed a 2.3 cm left upper lobe (hilar) carcinoid, typical with spindle cell features, Ki-67 of 3.68%.  There were 2 of 8 peribronchial lymph nodes involved with carcinoid with lymphatic invasion, stage IIB ([id number]).  There was a 0.9 cm adenocarcinoma, well differentiated with lipidic features, no visceral pleural invasion, bronchoalveolar atypical adenomatous hyperplasia at the parenchymal margin and 1 microscopic focus (less than 1 mm).  0/8 lymph nodes involved with adenocarcinoma. PDL1 <1% TPS, positive EGF receptor mutation (no details available), negative ALK/ROS1 rearrangement, negative BRAF, ER negative (2%), MO 0, HER-2/laura 1+, Ki-67 3%. Stage IA1 ([id number]).  · Patient was placed on Femara following surgery by Dr. Juana Pelayo.  Femara subsequently discontinued in early August 2020.  · Subsequent follow-up scans with stable findings until scan on 1/15/2020 noted 2 enhancing left renal lesions, largest 2.4 cm and there was also an enlarging right lower lobe nodule up to  1.0 x 1.2 cm.  In the interval, patient did undergo resection of renal cell carcinoma (discussed above).    · CT scan on 7/1/2020 showed slow increase in right lower lobe nodule up to 1.2 cm.  Felt to represent new primary lung cancer (see below).   7. Stage IA2 ([id number]) right lower lobe non-small cell lung cancer (adenocarcinoma with lipidic growth pattern):   · CT scan on 7/1/2020 showed slow increase in right lower lobe nodule up to 1.2 cm.  · CT-guided biopsy of the right lower lobe nodule on 7/31/2020 with adenocarcinoma with bland lipidic growth pattern of pulmonary origin (TTF-1 and CK7 positive). PDL1 TPS <1%, EGFR mutated, exon 20 insertion (possibly indicating lack of response to TKI's). ALK/ROS1 rearrangement negative and BRAF V600 mutation negative.  · Staging MRI brain 8/12/2020 with no evidence of metastatic disease  · Staging PET scan 8/12/2020 with no significant activity in the biopsied lesion 1.2 cm right lower lobe (SUV 1.5), no evidence of hilar, mediastinal uptake nor distant metastatic disease.  · Given the difference in EGFR mutation between the patient's 2 lung cancers, it is felt that she has 2 separate primary lesions.   · Right VATS with anterior basal segmentectomy on 10/9/2020 with pathology showing invasive well to moderately differentiated adenocarcinoma with acinar predominant growth measuring 1.4 cm, negative margins, no pleural or lymphovascular invasion, negative lymph nodes x5.  7. Anemia:  · Hemoglobin in high 10 to low 11 range with low normal MCV  · Anemia evaluation 8/20/2020 with iron 44, ferritin 119.3, iron saturation 13%, TIBC 351, folate 12.2, B12 392.  · Unclear whether patient may have anemia secondary to chronic disease.  · With low iron saturation, initiated oral iron daily on 9/15/2020 (patient reports she did not begin oral iron until 11/11/2020).    HISTORY OF PRESENT ILLNESS:  The patient is a 55 y.o. year old female who is here for follow-up with the  above-mentioned history.    History of Present Illness patient returns today in follow-up following right VATS with segmentectomy for non-small cell lung cancer on 10/9/2020.  She has done very well postoperatively with minimal pain.  She has not experienced any significant respiratory issues.  She is getting back to a fairly normal level of activity.  Yesterday she had multiple visits in the multidisciplinary clinic having seen supportive oncology, oncology nutrition, and genetics.  Apparently genetic panel testing was sent and results will be available presumably in a few weeks.  Patient notes that she has not actually started taking oral iron due to the proximity to her surgery and has now started oral iron as of today.  Patient is requesting appointment with new primary care physician in the [facility] system today.    Past Medical History:   Diagnosis Date   • Arthritis    • Asthma    • BRCA2 positive    • Breast cancer (CMS/HCC)     DCIS   • Diabetes mellitus (CMS/HCC)    • H/O Liver masses 2017    x3   • H/O Lung masses 2017    1 in right lower lobe and 1 in the left infrahilar location   • H/O Ovarian cyst    • H/O Right adrenal mass (CMS/HCC) 2017   • Lung cancer (CMS/HCC)    • PONV (postoperative nausea and vomiting)    • Thyroid disease      Past Surgical History:   Procedure Laterality Date   • APPENDECTOMY     • BREAST IMPLANT SURGERY Bilateral    • CHOLECYSTECTOMY     • HYSTERECTOMY     • MASTECTOMY Bilateral    • OVARIAN CYST SURGERY     • THORACOSCOPY VIDEO ASSISTED WITH LOBECTOMY Right 10/9/2020    Procedure: BRONCHOSCOPY, THORACOSCOPY VIDEO ASSISTED WITH ANTERIOR BASAL SEGMENTECTOMY, INTERCOSTAL NERVE BLOCK;  Surgeon: Flaca Crisostomo MD;  Location: [facility];  Service: Thoracic;  Laterality: Right;   • TONSILLECTOMY           ONCOLOGIC HISTORY:  (History from previous dates can be found in the separate document.)    Current Outpatient Medications on File Prior to Visit   Medication Sig Dispense  Refill   • atorvastatin (LIPITOR) 40 MG tablet Take 40 mg by mouth Every Night.     • bimatoprost (LUMIGAN) 0.01 % ophthalmic drops Administer 1 drop to both eyes Every Night.     • CINNAMON PO Take 1,800 mg by mouth Daily. HOLD FOR SURGERY     • hydroCHLOROthiazide (MICROZIDE) 12.5 MG capsule Take 12.5 mg by mouth As Needed.     • Krill Oil (OMEGA-3) 500 MG capsule Take 500 mg by mouth Daily. HOLDS FOR SURGERY     • levothyroxine (SYNTHROID, LEVOTHROID) 150 MCG tablet Take 150 mcg by mouth Daily.     • TURMERIC CURCUMIN PO Take 3 tablets by mouth Daily. HOLD FOR SURGERY     • valACYclovir (VALTREX) 1000 MG tablet Take 2,000 mg by mouth As Needed. 2-1000 mg twice a day      • celecoxib (CeleBREX) 100 MG capsule Take 1 capsule by mouth Every 12 (Twelve) Hours. 60 capsule 0   • Chlorhexidine Gluconate Cloth 2 % pads Apply  topically Take As Directed.     • gabapentin (NEURONTIN) 300 MG capsule Take 1 capsule by mouth Every 8 (Eight) Hours. 90 capsule 0   • lisinopril (PRINIVIL,ZESTRIL) 20 MG tablet Take 20 mg by mouth Daily.     • meloxicam (MOBIC) 7.5 MG tablet Take 7.5 mg by mouth Daily As Needed. HOLD FOR SURGERY     • metFORMIN (GLUCOPHAGE) 500 MG tablet Take 500 mg by mouth Daily With Breakfast. Extended release        No current facility-administered medications on file prior to visit.        ALLERGIES:     Allergies   Allergen Reactions   • Morphine Anaphylaxis     Hives and throat swelling   • Peach [Prunus Persica] Anaphylaxis   • Penicillins Anaphylaxis       Social History     Socioeconomic History   • Marital status:      Spouse name: Jayesh   • Number of children: 2   • Years of education: High School   • Highest education level: Not on file   Occupational History     Employer: UNEMPLOYED   Tobacco Use   • Smoking status: Never Smoker   • Smokeless tobacco: Never Used   Substance and Sexual Activity   • Alcohol use: Never     Frequency: Never   • Drug use: Never   • Sexual activity: Defer     Family  History   Problem Relation Age of Onset   • Breast cancer Mother    • Thyroid cancer Mother 73   • Hypertension Mother    • Cancer Father         cholangiocarcinoma   • Liver cancer Father    • Ovarian cancer Maternal Grandmother 80   • Leukemia Maternal Grandfather         60-80, unknown type   • Leukemia Paternal Grandfather         60-80, unknown type   • Colon cancer Maternal Aunt         over age of 50   • Breast cancer Maternal Aunt         80's   • Diabetes Other    • Malig Hyperthermia Neg Hx               Review of Systems   Constitutional: Positive for fatigue. Negative for activity change, appetite change, fever and unexpected weight change.   HENT: Negative for congestion, mouth sores, nosebleeds, sore throat and voice change.    Respiratory: Negative for cough, shortness of breath and wheezing.    Cardiovascular: Negative for chest pain, palpitations and leg swelling.   Gastrointestinal: Negative for abdominal distention, abdominal pain, blood in stool, constipation, diarrhea, nausea and vomiting.   Endocrine: Negative for cold intolerance and heat intolerance.   Genitourinary: Negative for difficulty urinating, dysuria, frequency and hematuria.   Musculoskeletal: Positive for arthralgias and back pain. Negative for joint swelling and myalgias.   Skin: Negative for rash.   Neurological: Negative for dizziness, syncope, weakness, light-headedness, numbness and headaches.   Hematological: Negative for adenopathy. Does not bruise/bleed easily.   Psychiatric/Behavioral: Negative for confusion and sleep disturbance. The patient is not nervous/anxious.          Objective      Vitals:    11/11/20 1130   BP: 162/92   Pulse: 80   Resp: 16   Temp: 97.5 °F (36.4 °C)   SpO2: 99%      Current Status 11/11/2020   ECOG score 1   Pain 0/10    Physical Exam   Constitutional: She is oriented to person, place, and time. She appears well-developed.   Eyes: Conjunctivae are normal.   Neck: No thyromegaly present.    Cardiovascular: Normal rate and regular rhythm. Exam reveals no gallop and no friction rub.   No murmur heard.  Pulmonary/Chest: Breath sounds normal. No respiratory distress.   Right VATS incisions healed   Abdominal: Soft. Bowel sounds are normal. She exhibits no distension. There is no abdominal tenderness.   Lymphadenopathy:        Head (right side): No submandibular adenopathy present.     She has no cervical adenopathy. No inguinal adenopathy noted on the right or left side.        Right: No supraclavicular adenopathy present.        Left: No supraclavicular adenopathy present.   Neurological: She is alert and oriented to person, place, and time. She displays normal reflexes. No cranial nerve deficit. She exhibits normal muscle tone.   Skin: Skin is warm and dry. No rash noted.   Psychiatric: Her behavior is normal.   Patient was examined today, unchanged from above    RECENT LABS:  Hematology WBC   Date Value Ref Range Status   11/11/2020 8.00 3.40 - 10.80 10*3/mm3 Final   11/01/2018 3.17 (L) 4.5 - 11.0 10*3/uL Final     RBC   Date Value Ref Range Status   11/11/2020 4.33 3.77 - 5.28 10*6/mm3 Final   11/01/2018 3.99 (L) 4.0 - 5.2 10*6/uL Final     Hemoglobin   Date Value Ref Range Status   11/11/2020 11.4 (L) 12.0 - 15.9 g/dL Final   11/01/2018 10.0 (L) 12.0 - 16.0 g/dL Final     Hematocrit   Date Value Ref Range Status   11/11/2020 36.9 34.0 - 46.6 % Final   11/01/2018 30.9 (L) 36.0 - 46.0 % Final     Platelets   Date Value Ref Range Status   11/11/2020 199 140 - 450 10*3/mm3 Final   11/01/2018 98 (L) 140 - 440 10*3/uL Final        Lab Results   Component Value Date    GLUCOSE 139 (H) 11/11/2020    BUN 11 11/11/2020    CREATININE 0.77 11/11/2020    EGFRIFNONA 78 11/11/2020    EGFRIFAFRI >60 11/28/2017    BCR 14.3 11/11/2020    K 4.4 11/11/2020    CO2 25.9 11/11/2020    CALCIUM 9.4 11/11/2020    ALBUMIN 4.70 11/11/2020    LABIL2 1.7 07/10/2020    AST 23 11/11/2020    ALT 33 11/11/2020     Reviewed records  from recent hospitalization including progress notes, operative report, discharge summary, pathologic results.  Reviewed notes from oncology nutrition and supportive oncology clinics.    Assessment/Plan     1. BRCA2 mutation ([id number].[id number]):  · Strong family history of malignancy with a mother who had thyroid cancer and also had breast cancer at age 73 with subsequent liver metastases.  She was found to be BRCA2 positive and prompted the patient's own testing.  The patient's maternal grandmother had ovarian cancer in her mid 80s, maternal grandfather and paternal grandfather both had leukemia of unknown type at an older age.  Her maternal aunt had colon cancer over the age of 50, maternal aunt had breast cancer in her 80s, and her father had cholangiocarcinoma.    · The patient underwent testing on 7/27/2016 showing positive BRCA2 mutation ([id number].[id number])   · The patient did undergo KAVYA/BSO in 1992 secondary to hemorrhage.  · The patient did undergo bilateral mastectomies 1/26/2017 with findings of bilateral DCIS (discussed below).  · Patient has undergone previous colonoscopy on 11/28/2017.  · Given the unusual nature of the patient's malignancies, referral to genetics clinic for panel testing with possibility of additional underlying mutation.  Patient seen on 11/10/2020 with test results pending.  2. Stage I ([id number]) papillary thyroid cancer:  · Status post total thyroidectomy with Dr. Maher in [place] on 10/15/2010.  Pathology showed papillary thyroid cancer involving the left thyroid and isthmus, multifocal with 2 areas measuring 0.9 and 1.2 cm.  No evidence of capsular invasion, no extrathyroidal extension, no lymphovascular invasion, negative margins.  · Postsurgical scan showed uptake in the thyroid bed and the patient underwent treatment with I-131.    · She has had no evidence of recurrent disease.    · Her subsequent hypothyroidism has been managed by endocrinology (Dr. Subhash Michael) in [place],  currently receiving levothyroxine 150 mcg daily.  3. Bilateral DCIS  · As above, patient has underlying BRCA2 mutation  · 8/5/2016 was found to have a right breast intraductal papilloma with fibrocystic change and microcalcifications with usual ductal hyperplasia and columnar cell change in 3 separate locations on biopsy.  · On 8/24/2016 she underwent excisional biopsy of an intraductal papilloma from the right breast.    · She subsequently underwent on 1/26/2017 bilateral mastectomy.  On the right there was a large intraductal papilloma with usual ductal hyperplasia, atypical hyperplasia, DCIS measuring 3 mm which was low-grade, ER positive (98%), KS positive (85%).  On the left there were multiple intraductal papillomas with atypical ductal hyperplasia.  There was DCIS measuring 6 mm, low-grade, ER positive (99%), KS positive (98%).  4. Stage I ([id number]) clear-cell renal cell carcinoma:  · Incidental finding on CT scan 1/15/2020 of enhancing left renal lesions x2, largest 2.4 cm  · Resection with Dr. Pool ([facility] [place]) on 5/15/2020 with left partial nephrectomy.  Pathology showed clear cell renal cell carcinoma, Jeanne grade 2, 2 foci measuring 1.5 and 2.1 cm.  The renal parenchymal margin was focally positive.  · Patient reports that Dr. Pool felt that he required additional margin tissue in the area of focal positivity and did not recommend re-resection.  · Most recent CT 7/1/2020 showed evidence of resection of left renal lesion.  · We will plan ongoing surveillance for renal cell carcinoma.  We will schedule follow-up CT abdomen and pelvis prior to patient's return visit in 2 months to be compared with outside scans.  5. 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    · CT-guided biopsy on 1/9/2018 revealed a left lower lobe carcinoid with spindle cell features, no necrosis, no mitoses.  · Notation  of normal chromogranin A 1/23/2018 of 35  · Follow-up CT on 1/24/2018 showed multiple bilateral pulmonary nodules including a 2.1 cm left lower lobe nodule (previously 2.6), 7 mm left lower lobe nodule, 1 cm right lower lobe nodule, right adrenal 1.5 cm nodule consistent with adenoma, and hepatic cysts.    · On 2/28/2018, the patient underwent a left lower lobectomy.  Pathology revealed a 2.3 cm left upper lobe (hilar) carcinoid, typical with spindle cell features, Ki-67 of 3.68%.  There were 2 of 8 peribronchial lymph nodes involved with carcinoid with lymphatic invasion, stage IIB ([id number]).  There was also evidence of adenocarcinoma (see below).  6. Stage IA1 ([id number])  left lower lobe non-small cell lung cancer (adenocarcinoma with lipidic features):  · The patient is a never smoker  · PET scan 12/13/2017 with hypermetabolic 2.5 cm left lower lobe nodule with SUV 5.7.    · CT-guided biopsy on 1/9/2018 revealed a left lower lobe carcinoid with spindle cell features, no necrosis, no mitoses.    · Follow-up CT on 1/24/2018 showed multiple bilateral pulmonary nodules including a 2.1 cm left lower lobe nodule (previously 2.6), 7 mm left lower lobe nodule, 1 cm right lower lobe nodule, right adrenal 1.5 cm nodule consistent with adenoma, and hepatic cysts.    · On 2/28/2018, the patient underwent a left lower lobectomy.  Pathology revealed a 2.3 cm left upper lobe (hilar) carcinoid, typical with spindle cell features, Ki-67 of 3.68%.  There were 2 of 8 peribronchial lymph nodes involved with carcinoid with lymphatic invasion, stage IIB ([id number]).  There was a 0.9 cm adenocarcinoma, well differentiated with lipidic features, no visceral pleural invasion, bronchoalveolar atypical adenomatous hyperplasia at the parenchymal margin and 1 microscopic focus (less than 1 mm).  0/8 lymph nodes involved with adenocarcinoma. PDL1 <1% TPS, positive EGF receptor mutation (exon 19 deletion, [id number]), negative ALK/ROS1  rearrangement, negative BRAF, ER negative (2%), TX 0, HER-2/laura 1+, Ki-67 3%. Stage IA1 ([id number]).  · Patient was placed on Femara following surgery by Dr. Juana Pelayo.  Femara subsequently discontinued in early August 2020.  · Subsequent follow-up scans with stable findings until scan on 1/15/2020 noted 2 enhancing left renal lesions, largest 2.4 cm and there was also an enlarging right lower lobe nodule up to 1.0 x 1.2 cm.  In the interval, patient did undergo resection of renal cell carcinoma (discussed above).    · CT scan on 7/1/2020 showed slow increase in right lower lobe nodule up to 1.2 cm.  Felt to represent new primary lung cancer (see below).   7. Stage IA2 ([id number]) right lower lobe non-small cell lung cancer (adenocarcinoma with lipidic growth pattern):   · CT scan on 7/1/2020 showed slow increase in right lower lobe nodule up to 1.2 cm.  · CT-guided biopsy of the right lower lobe nodule on 7/31/2020 with adenocarcinoma with bland lipidic growth pattern of pulmonary origin (TTF-1 and CK7 positive). PDL1 TPS <1%, EGFR mutated, exon 20 insertion (possibly indicating lack of response to TKI's). ALK/ROS1 rearrangement negative and BRAF V600 mutation negative.  · Staging MRI brain 8/12/2020 with no evidence of metastatic disease  · Staging PET scan 8/12/2020 with no significant activity in the biopsied lesion 1.2 cm right lower lobe (SUV 1.5), no evidence of hilar, mediastinal uptake nor distant metastatic disease.  · Given the difference in EGFR mutation between the patient's 2 lung cancers, it is felt that she has 2 separate primary lesions.   · Right VATS with anterior basal segmentectomy on 10/9/2020 with pathology showing invasive well to moderately differentiated adenocarcinoma with acinar predominant growth measuring 1.4 cm, negative margins, no pleural or lymphovascular invasion, negative lymph nodes x5.  · Patient returns today in follow-up to review pathologic results from her recent lung  resection 10/9/2020.  Fortunately, this did not show any surprising findings with a 1.4 cm lesion which was a moderately differentiated adenocarcinoma with acinar predominant growth pattern, negative margins and negative nodes.  There is no indication for adjuvant therapy.  We discussed the need to continue on a course of observation.  She is being scheduled for follow-up CT chest at a 4-month interval by thoracic surgery.  She is recovering well from surgery at this point.  8. Normocytic anemia:  · Hemoglobin in high 10 to low 11 range with low normal MCV  · Anemia evaluation 8/20/2020 with iron 44, ferritin 119.3, iron saturation 13%, TIBC 351, folate 12.2, B12 392.  · Unclear whether patient may have anemia secondary to chronic disease.  · With low iron saturation, initiated oral iron daily on 9/15/2020.  Patient however did not begin oral iron until 11/11/2020.  · Today, hemoglobin 11.4 with iron studies showing iron 54, ferritin 112, iron saturation 14%, TIBC 395.  As noted above, patient is beginning oral iron supplementation today.  We will monitor her tolerance.  We will recheck iron studies when she returns in a few months.  9. Depression  · Patient has had difficulty coping with multiple stressors.  She has recently moved and is the primary caregiver for her mother with metastatic breast cancer who was not doing well.    · Patient seen in supportive oncology clinic 11/10/2020  10. Obesity  · Patient motivated to lose weight, was referred to oncology nutrition and was seen on 11/10/2020    Plan:  1. Patient initiated oral iron sulfate daily today  2. Await results from genetic panel test sent on 11/10/2020  3. Patient will have additional follow-up in supportive oncology clinic  4. Patient request referral to new primary care physician in the [facility] which will facilitate today.  5. There is no indication for adjuvant therapy for her recently resected lung cancer and she will continue on a course of  observation/surveillance.  CT chest planned by thoracic surgery at 4-month interval.  6. MD visit in 2 months with CBC, CMP, iron panel, ferritin.  1 week prior she will undergo surveillance CT abdomen and pelvis (in relation to previous renal cell carcinoma) which will need to be compared to previous outside study from 7/1/2020.

## 2020-11-12 ENCOUNTER — TELEPHONE (OUTPATIENT)
Dept: INTERNAL MEDICINE | Facility: CLINIC | Age: 55
End: 2020-11-12

## 2020-11-12 NOTE — TELEPHONE ENCOUNTER
PER DR. GRIFFIN CALLED [facility] TO GET PT APPT.  THE OFFICE STATED BOOKING OUT UNTIL NEXT SUMMER - DR GRIFFIN ASKED THAT THEY CHECK WITH THEIR DOCTORS TO SEE IF THEY WOULD BE WILLING TO SEE PT SOONER - THEY WILL CALL ME BACK

## 2020-11-13 ENCOUNTER — TELEPHONE (OUTPATIENT)
Dept: ONCOLOGY | Facility: CLINIC | Age: 55
End: 2020-11-13

## 2020-11-14 NOTE — PROGRESS NOTES
Lateral salpingo-oophorectomy:Christie Avendaño            YOB: 1965    MRN:[id number]    DATE SEEN:11/10/2020    COUNSELOR:    : Ridge Lovelace M.D.      Christie Avendaño was seen for genetic counseling at the [facility].  She referral was initiated by Mathew Collins Jr., MD because she had a lateral ductal carcinoma in situ and she underwent bilateral mastectomies in 2017.  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 Avendaño had a total hysterectomy and bilateral salpingo-oophorectomy for endometriosis in .  As mentioned above she has had thyroid cancer, melanoma on the left hand, bilateral lung cancer and bilateral breast cancer.  She also has a pathogenic variant in BRCA2.    Review of the family history and pedigree revealed that Ms. Avendaño is 55 years of age.  She has 2 daughters 32 and 29.  Genetic testing has been performed on her younger daughter with and she to carries the BRCA2 pathogenic variant.  Her mother was diagnosed with breast cancer at 73 and she has the BRCA2 pathogenic variant.  Ms. Avendaño's maternal grandmother had ovarian cancer.  She had 2 deliveries by a different male partner.  1 carries the BRCA2 pathogenic variant of colon cancer Ms. Avendaño's father had breast cancer and he  in 63.  Both her grandfather and maternal grandmother leukemia.  There are also 2 maternal great aunts who had  breast cancer several maternal cousins as well as an aunt with breast cancer there was no [employer]azi [employer] descent..    The rest of the genetic counseling session focused on the possibility of a hereditary basis for her breast cancer diagnosis.  She was told that approximately 70% of breast cancer is sporadic in nature, usually occurring later in life and without a significant family history of breast cancer.  In about 20% to 25% of cases, familial clustering occurs suggesting an increased genetic predisposition, although a specific abnormality within a gene is not detected.  In about 5% to 10% of cases, a hereditary basis represents the cause for the cancer and a single gene is found to have a pathogenic variant that significantly increases the risk for breast cancer, may increase the risk for a second breast cancer and other malignancies.  In addition, the pathogenic variant places first degree relatives at 50% risk for inheriting the hereditary condition and, if not inherited, the risk for cancer would be the same as the risk for that cancer in the general population.    The most common cause of hereditary breast cancer is hereditary breast and ovarian cancer syndrome.  About 50% of hereditary breast cancer either results from mutation in the BRCA1 gene (a tumor suppressor gene on chromosome 17) or the BRCA2 gene(a tumor suppressor gene on chromosome 13). The presence of a mutation either of these genes increases the lifetime risk for breast cancer from 12% to 13% in the general population potentially to greater than 80%.  It also increases the risk for a second primary breast cancer to as high as 40% with a BRCA1 pathogenic variant in 26% with a BRCA2 pathogenic variant. There is also an increased risk for ovarian cancer. In the case of BRCA1, the ovarian cancer risk can be as high as 44% and in the case of BRCA2 as high as 27%, compared to the general population risk of 1.6%.  An increased risk for prostate  cancer (at least 20% with a BRCA2 pathogenic), male breast cancer (7 to 8% with a BRCA2 pathogenic variant), melanoma (3 to 5% with a BRCA2 pathogenic) and pancreatic cancer (7% with a BRCA2 pathogenic variant) also exists.I am not aware of an association with renal, thyroid and lung cancer with a BRCA2 mutation.  Characteristics of hereditary breast and ovarian cancer syndrome frequently include but are not limited to an early age of diagnosis of breast cancer, multiple breast cancer primaries, the presence of both breast and ovarian cancer in the same woman or ovarian cancer alone, male breast cancer, family history of breast and ovarian cancer, ovarian cancer or multiple cases of breast cancer,triple negative breast cancer and Ashkenazi Christian descent.    Since the original discovery of the BRCA1 and BRCA2 genes in the mid 1990s several moderate risk genes have been identified that may increase the risk fro breast cancer in the range of 20% to as high as 60%.  These moderate breast risk genes in general are not associated with an increased risk for ovarian cancer but may be associated with an increased risk for other malignancies.    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  In view of the parent unrelated malignancies,  further genetic testing will be pursued..  A blood sample will be sent today to [facility] to perform there 84 gene multi cancer panel.  In addition, additional genes on the multi cancer panel that are on the breast and gynecologic cancer panel, urinary tract and kidney cancer panel melanoma panel, colorectal cancer panel, and myelodysplastic syndrome and anemia panel will be pursued.  Should the out-of-pocket cost to her not exceed $100 [facility] will proceed with genetic testing and results will be communicated to her in 2 to 3 weeks.  Should insurance coverage be denied the maximum out-of-pocket cost to proceed with genetic testing would be  $250.  Results are reported either as negative in which no pathogenic variant has been found, positive him which a pathogenic variant has been detected, and/or identification of a variant of uncertain significance, in which a change in the gene has been identified but currently data is insufficient to classified either is benign or pathogenic.  However in most instances eventually when a variant of uncertain significance is reclassified it is considered to be benign although this is not always the case and pathogenicity cannot be ruled out at this time.  In the interim period of time if Christie Avendaño has any additional questions I can be reached either at [phone] or [phone].      Total time of the encounter was 40 minutes and 40 minutes was spent counseling.      Ridge Lovelace MD  11/10/2020  Clinical     Addendum: [facility] analyzed a total of 120 genes including their 84 gene multi cancer panel and additional genes not on that panel including the breast and gynecologic cancers panel, colorectal cancer panel, renal/urinary tract cancers panel, melanoma panel, and myelodysplastic syndrome/leukemia panel. Two pathogenic variants were found, one again in a BRCA2 gene(c.5073dup) and another likely pathogenic variant in a single GEORGES gene(c.4236+1del). There were five variants of uncertain significance identified including one ALK gene(c.4573_4575del), one BLM gene(c.3044C>T), one CTC1 gene(c.3568G>A), one POT1 gene(c.76T>C), and a single XRCC2 gene(c.283A>G).    A single BRCA2 pathogenic variant again was detected which results in hereditary breast and ovarian cancer syndrome, which is associated with a lifetime risk for breast cancer as high as 85%, ovarian cancer as high as 27%, prostate cancer as high as 20 to 30%, male breast cancer of 7 to 8%, and a 3 to 5% risk for both pancreatic cancer and melanoma..  Although not the case in Ms. Avendaño, a BRCA2 mutation in both the maternally and paternally  derived BRCA2 genes results in Fanconi anemia.  The likely pathogenic variant in a single GEORGES gene is associated with at least a 17 to 33% lifetime risk for breast cancer, and reports as high as a 52% risk have been reported in the literature.  There is also an increased risk for pancreatic and prostate cancer and possibly an increased risk for other malignancies including ovarian cancer, bladder cancer and colon cancer, although available evidence is insufficient to make a definitive determination regarding these three relationships.  A pathogenic variant in both GEORGES genes results in ataxia-telangiectasia.  A single ALK pathogenic variant is associated with a predisposition to neuroblastoma.  BLM is associated with autosomal recessive Bloom syndrome. Preliminary evidence suggests a possible predisposition to Colorectal Cancer with a single BLM nutation although data is insufficient to make a definitive determination regarding this relationship.  CTC1 is associated with autosomal recessive cerebral retinal microangiopathy calcifications and cysts type I also known as coats plus syndrome when both the maternally and paternally derived genes have been mutated.  The presence of a pathogenic variant one POT1 gene,is associated with melanoma and also possibly glioma.  Finally, XRCC2 has no well-established disease association although there is preliminary evidence supporting a correlation with susceptibility to breast cancer with a mutation in one XRCC2 gene, and autosomal recessive Fanconi mutation with a mutation in both XRCC2 genes.  In most instances, eventually when a variant of uncertain significance is reclassified, it is considered to be benign, although this is not always the case, and pathogenicity cannot be ruled out at this time.  However the likelihood is greatest that the variant of uncertain significance in the 5 genes ultimately will be reclassified as benign although this cannot be said with certainty at  this time.  [facility] will contact us when reclassification is established.  Regarding the pathogenic variant in BRCA2 and the likely pathogenic in GEORGES, the latter that was not tested for, previously, testing in the family to look for both pathogenic variants would be available.  First-degree relatives face a 50% risk for carrying both the BRCA2 pathogenic variant and GEORGES likely pathogenic variant.  We are available to facilitate testing and contact can be made and [phone].

## 2020-11-23 ENCOUNTER — OFFICE VISIT (OUTPATIENT)
Dept: INTERNAL MEDICINE | Facility: CLINIC | Age: 55
End: 2020-11-23

## 2020-11-23 VITALS — BODY MASS INDEX: 42.82 KG/M2 | WEIGHT: 257 LBS | TEMPERATURE: 97.2 F | HEIGHT: 65 IN

## 2020-11-23 DIAGNOSIS — E11.9 DIABETES MELLITUS TYPE 2, DIET-CONTROLLED (HCC): Primary | ICD-10-CM

## 2020-11-23 DIAGNOSIS — I10 ESSENTIAL HYPERTENSION: ICD-10-CM

## 2020-11-23 DIAGNOSIS — E78.49 OTHER HYPERLIPIDEMIA: ICD-10-CM

## 2020-11-23 PROBLEM — N28.89 RENAL MASS, RIGHT: Status: ACTIVE | Noted: 2020-05-15

## 2020-11-23 PROBLEM — R65.10 SIRS (SYSTEMIC INFLAMMATORY RESPONSE SYNDROME): Status: ACTIVE | Noted: 2018-11-01

## 2020-11-23 PROBLEM — D05.11 NEOPLASM OF RIGHT BREAST, PRIMARY TUMOR STAGING CATEGORY TIS: DUCTAL CARCINOMA IN SITU (DCIS): Status: ACTIVE | Noted: 2017-02-02

## 2020-11-23 PROBLEM — E78.5 HYPERLIPIDEMIA: Status: ACTIVE | Noted: 2017-08-03

## 2020-11-23 PROBLEM — C34.91 NON-SMALL CELL LUNG CANCER, RIGHT (HCC): Status: ACTIVE | Noted: 2020-08-04

## 2020-11-23 PROCEDURE — 99214 OFFICE O/P EST MOD 30 MIN: CPT | Performed by: INTERNAL MEDICINE

## 2020-11-23 NOTE — PROGRESS NOTES
Assessment and Plan  Diagnoses and all orders for this visit:    1. Diabetes mellitus type 2, diet-controlled (CMS/HCC) (Primary)  Comments:  recent improvement in A1C, will recheck at f/u and maybe wean off the metformin due to SE.    Orders:  -     Comprehensive Metabolic Panel; Future  -     Hemoglobin A1c; Future  -     Lipid Panel With / Chol / HDL Ratio; Future  -     Microalbumin / Creatinine Urine Ratio - Urine, Clean Catch; Future    2. Essential hypertension  Comments:  a little elevated today.  Encouraged her to start checking at home again with goal < 130/80    3. Other hyperlipidemia  Comments:  check labs at [facility]    chart reviewed, extensive oncology history managed by Dr. Collins- we reviewed a lot, including family history.  May see new GI for [facility] connection and because she would like more than q5 years.   Refuses influenza vacc today- understands risk.  #2 minute visit today with ove 50% in counseling about the above and revewing history,  F/U and Patient Instructions    Return in about 2 months (around 1/23/2021) for Annual physical, Lab Before FUP.  There are no Patient Instructions on file for this visit.    Subjective    Christie Avendaño is a 55 y.o. female being seen in our office today for Hypertension and Diabetes     History of the Present Illness  HPI  Very pleasant 56 yo woman with a complicated PMH  Has had diabetes for   Years.  Went on metformin years ago, able to make changes and came off.  Weight back, stress, etc so she went back 1 year ago.  Causes multiple loose stools/day.  She is meeting with a nutritionist and getting things under control. She is walking a lot more, eating better.  Checking BS in am 140, was often over 200 before the above changes.   Took Femara but didn't meet criteria and cause significant arthritis issues.        Patient History        Significant Past History  The following portions of the patient's history were reviewed and updated as appropriate:PMHroutine:  Social history , Allergies, Current Medications, Active Problem List and Health Maintenance              Social History  She  reports that she has never smoked. She has never used smokeless tobacco. She reports that she does not drink alcohol or use drugs.                         Review of Symptoms  Review of Systems   Constitution: Negative for weight loss.   HENT: Negative.    Cardiovascular: Negative.    Respiratory: Negative.    Endocrine: Negative.    Gastrointestinal: Negative.    Genitourinary: Negative.    Neurological: Negative.    Psychiatric/Behavioral: Negative.      Objective  Vital Signs         BP Readings from Last 1 Encounters:   11/11/20 162/92     Wt Readings from Last 3 Encounters:   11/23/20 117 kg (257 lb)   11/11/20 116 kg (254 lb 14.4 oz)   11/10/20 116 kg (256 lb)   Body mass index is 42.77 kg/m².          Physical Exam   Physical Exam  Constitutional:       Appearance: Normal appearance.   Cardiovascular:      Rate and Rhythm: Normal rate.       Data Reviewed    Recent Results (from the past 2016 hour(s))   COVID-19,[facility], NP SWAB IN [facility] VIRAL TRANSPORT MEDIA 24-30 HR TAT - Swab, Nasopharynx    Collection Time: 09/14/20 12:04 PM    Specimen: Nasopharynx; Swab   Result Value Ref Range    SARS-CoV-2 YAMILA Not Detected Not Detected   Comprehensive Metabolic Panel    Collection Time: 09/15/20  9:13 AM    Specimen: Blood   Result Value Ref Range    Glucose 210 (H) 74 - 124 mg/dL    BUN 14 6 - 20 mg/dL    Creatinine 0.77 0.60 - 1.10 mg/dL    Sodium 137 134 - 145 mmol/L    Potassium 4.4 3.5 - 4.7 mmol/L    Chloride 101 98 - 107 mmol/L    CO2 25.2 22.0 - 29.0 mmol/L    Calcium 9.2 8.5 - 10.2 mg/dL    Total Protein 7.1 6.3 - 8.0 g/dL    Albumin 4.40 3.50 - 5.20 g/dL    ALT (SGPT) 16 0 - 33 U/L    AST (SGOT) 12 0 - 32 U/L    Alkaline Phosphatase 151 (H) 38 - 116 U/L    Total Bilirubin 0.5 0.2 - 1.2 mg/dL    eGFR Non African Amer 78 >60 mL/min/1.73    Globulin 2.7 1.8 - 3.5 gm/dL    A/G Ratio 1.6  1.1 - 2.4 g/dL    BUN/Creatinine Ratio 18.2 7.3 - 30.0    Anion Gap 10.8 5.0 - 15.0 mmol/L   CBC Auto Differential    Collection Time: 09/15/20  9:13 AM    Specimen: Blood   Result Value Ref Range    WBC 9.08 3.40 - 10.80 10*3/mm3    RBC 4.36 3.77 - 5.28 10*6/mm3    Hemoglobin 11.2 (L) 12.0 - 15.9 g/dL    Hematocrit 36.4 34.0 - 46.6 %    MCV 83.5 79.0 - 97.0 fL    MCH 25.7 (L) 26.6 - 33.0 pg    MCHC 30.8 (L) 31.5 - 35.7 g/dL    RDW 16.1 (H) 12.3 - 15.4 %    RDW-SD 48.9 37.0 - 54.0 fl    MPV 9.4 6.0 - 12.0 fL    Platelets 249 140 - 450 10*3/mm3    Neutrophil % 60.7 42.7 - 76.0 %    Lymphocyte % 30.0 19.6 - 45.3 %    Monocyte % 6.5 5.0 - 12.0 %    Eosinophil % 1.5 0.3 - 6.2 %    Basophil % 0.7 0.0 - 1.5 %    Immature Grans % 0.6 (H) 0.0 - 0.5 %    Neutrophils, Absolute 5.52 1.70 - 7.00 10*3/mm3    Lymphocytes, Absolute 2.72 0.70 - 3.10 10*3/mm3    Monocytes, Absolute 0.59 0.10 - 0.90 10*3/mm3    Eosinophils, Absolute 0.14 0.00 - 0.40 10*3/mm3    Basophils, Absolute 0.06 0.00 - 0.20 10*3/mm3    Immature Grans, Absolute 0.05 0.00 - 0.05 10*3/mm3    nRBC 0.0 0.0 - 0.2 /100 WBC   APTT    Collection Time: 10/02/20  9:49 AM    Specimen: Blood   Result Value Ref Range    PTT 27.9 22.7 - 35.4 seconds   Protime-INR    Collection Time: 10/02/20  9:49 AM    Specimen: Blood   Result Value Ref Range    Protime 12.0 11.7 - 14.2 Seconds    INR 0.89 (L) 0.90 - 1.10   Type and screen    Collection Time: 10/02/20  9:49 AM    Specimen: Blood   Result Value Ref Range    ABO Type O     RH type Positive     Antibody Screen Negative     T&S Expiration Date 10/16/2020 11:59:00 PM    COVID-19,[facility], NP/OP SWAB IN TRANSPORT MEDIA OR [facility] 24-36 HR TAT - Swab, Nasopharynx    Collection Time: 10/07/20 12:54 PM    Specimen: Nasopharynx; Swab   Result Value Ref Range    SARS-CoV-2 PCR Not Detected Not Detected   POC Glucose Once    Collection Time: 10/09/20  7:52 AM    Specimen: Blood   Result Value Ref Range    Glucose 186 (H) 70 - 130 mg/dL    POC Glucose Once    Collection Time: 10/09/20  8:49 AM    Specimen: Blood   Result Value Ref Range    Glucose 186 (A) 70 - 130 mg/dL   Tissue Pathology Exam    Collection Time: 10/09/20  3:02 PM    Specimen: A: Lymph Node    B: Lymph Node    C: Lymph Node    D: Lymph Node    E: Lung, Right Lower Lobe; Tissue    F: Lymph Node    G: Lymph Node   Result Value Ref Range    Case Report       Surgical Pathology Report                         Case: [id number]                                  Authorizing Provider:  Flaca Crisostomo MD        Collected:           10/09/2020 04:30 PM          Ordering Location:     [facility]  Received:            10/09/2020 04:42 PM                                 MAIN OR                                                                      Pathologist:           Jemma Biggs MD                                                    Specimens:   1) - Lung, Right Lower Lobe, RIGHT LOWER LOBE 8TH SEGMENT; PLEASE CALL OR 12 WITH                   RESULTS [id number]; SHORT STITCH PARENCHYMEL MARGIN, LONG STITCH VASCULAR MARGIN. Received                fresh for frozen diagnosis. Placed in formalin after @1659 by .                                   2) - Lymph Node, lymph node level 10 #1 fresh for permanent. Received and placed in                 formalin @ 1711 by .                                                                               3) - Lymph Node, lymph node level 10 #2 fresh for permanent. Received and placed in                 formalin @ 1711 by .                                                                              4) - Lymph Node, lymph node level 10 #3 fresh for permanent. Received and placed in                 formalin @ 1711 by .                                                                              5) - Lymph Node, lymph node level 11 #1 fresh for permanent. Received and placed in                 formalin @ 1711 by .                                                                               6) - Lymph Node, LEVEL 7;  fresh for permanent. Received and placed in formalin @                   1711 by .                                                                                         7) - Lymph Node, RIGHT LEVEL 4;  fresh for permanent. Received and placed in formalin               @ 1711 by .                                                                              Final Diagnosis       1.  Lung, Right Lower Lobe 8th Segment, Segmentectomy (23 grams): INVASIVE WELL TO MODERATELY DIFFERENTIATED ADENOCARCINOMA, ACINAR PREDOMINANT.              A. All margins clear.              B. No pleural nor lymphovascular invasion identified.              C. Pathologic Stage: pT1b, N0.    2.  Lymph Node, Level 10, #1, Excision:    A.  Scant fragment of benign lymphoid tissue (0/1).     3.  Lymph Node Level 10, #2, Excision:     A.  Benign lymph node with hyalinized granuloma and dystrophic calcification (0/1).    4.  Lymph Node, Level 10, #3, Excision:    A.  Benign alveolated and congested lung parenchyma.     B.  No lymph node identified.     5.  Lymph Node, Level 11, #1, Excision:    A.  Scant fragment of benign alveolated lung parenchyma.     B.  No lymph node identified.    6.  Lymph Node, Level 7, Excision:    A.  Benign lung parenchyma with hyalinized scar.   B.  No lymph node identified.    7.  Lymph Node, Right Level 4, Excision:    A.  Three benign anthracotic lymph nodes (0/3).          swm/brb      Preliminary Diagnosis       1.  Lung, Right Lower Lobe 8th Segment, Segmentectomy (23 grams): At least adenocarcinoma in situ with predominant lepidic growth pattern, pending expert consultation.  A. Margins clear.    2.  Lymph Node, Level 10, #1, Excision:    A.  Scant fragment of benign lymphoid tissue (0/1).     3.  Lymph Node Level 10, #2, Excision:     A.  Benign lymph node with hyalinized granuloma and dystrophic calcification (0/1).    4.   Lymph Node, Level 10, #3, Excision:    A.  Benign alveolated and congested lung parenchyma.     B.  No lymph node identified.     5.  Lymph Node, Level 11, #1, Excision:    A.  Scant fragment of benign alveolated lung parenchyma.     B.  No lymph node identified.    6.  Lymph Node, Level 7, Excision:    A.  Benign lung parenchyma with hyalinized scar.   B.  No lymph node identified.    7.  Lymph Node, Right Level 4, Excision:    A.  Three benign anthracotic lymph nodes (0/3).            Synoptic Checklist       LUNG  (LUNG: RESECTION - All Specimens)      8th Edition - Protocol posted: 2/26/2020      SPECIMEN      Procedure:    Segmentectomy       Specimen Laterality:    Right       TUMOR      Tumor Site:    Lower lobe of lung       Histologic Type:    Invasive adenocarcinoma, acinar predominant       Histologic Grade:    G2: Moderately differentiated       Tumor Size:            Total Tumor Size (size of entire tumor):    Greatest Dimension (Centimeters): 1.4 cm      Tumor Focality:    Single focus       Visceral Pleura Invasion:    Not identified       Direct Invasion of Adjacent Structures:    No adjacent structures present       Treatment Effect:    No known presurgical therapy       Lymphovascular Invasion:    Not identified       MARGINS      Margins:    All margins are uninvolved by tumor         Margins Examined:    Vascular         Margins Examined:    Parenchymal         Distance of Invasive Carcinoma from Closest Margin (Centimeters):    0.4 cm          Closest Margin:    Parenchymal       LYMPH NODES      Number of Lymph Nodes Involved:    0       Number of Lymph Nodes Examined:    5         Som Stations Examined:    4R: Lower paratracheal         Som Stations Examined:    10R: Hilar         Som Stations Examined:    11R: Interlobar         Som Stations Examined:    7: Subcarinal       PATHOLOGIC STAGE CLASSIFICATION (pTNM, AJCC 8th Edition)      Primary Tumor (pT):    pT1b       Regional Lymph  "Nodes (pN):    pN0       Comment       Multiple representative slides from this case are shared internally with Dr. Ashton, who concurs with the preliminary.  Due to the lepidic growth pattern, expert consultation was requested then final diagnosis rendered (see attached report [id number]).    swm/brb       Intraoperative Consultation       #1FS Dx: Predominant lepidic pattern, at least adenocarcinoma in situ (AIS).  Margin free by at least 4.0 mm.   Results are called to Dr. Crisostomo by Dr. Biggs at 4:59 p.m. on 10/9/20.   anthony/skylar         Gross Description       1. Received fresh for frozen section labeled \"right lower lobe 8th segment; please call OR 12 with results [id number]; short stitch parenchymal margin, long stitch vascular margin\" is a 23 gram wedge of lung measuring 6.5 x 4.0 x 3.0 cm.  The pleural surface is purple-pink and predominantly smooth.  The specimen is oriented with a short suture designating the parenchymal margin and a long suture designating the vascular margin.  The parenchymal margin is inked blue, and the vascular margin is inked green.  Upon sectioning, a poorly circumscribed tan-gray, homogeneous solid nodule is identified paracentrally measuring 1.4 x 1.0 x 1.0 cm.  The nodule comes to within 0.5 cm from the closest blue inked parenchymal margin and is 1.7 cm from the green inked vascular margin.  The pleural surface overlying the nodule is inked black and the nodule comes to within 0.6 cm from the closest pleural surface.  The remaining cut surfaces reveal pink-red spongy lung parenchyma.  No additional discrete lesions are identified.  A representative section of the nodule is submitted for frozen section and resubmitted as received in cassette FS1A.  Additional representative sections are submitted in cassettes 1B-D as follows:  1B-C - remainder of the nodule (1B includes the closest blue inked parenchymal margin and pleural margin, 1C includes the vascular margin)  1D - remainder of the " "vascular margin and representative sections of unremarkable lung parenchyma    2. Received fresh and subsequently placed into formalin labeled \"lymph node level 10 #1, fresh for permanent\" is a 0.4 cm gray-black lymph node.  Entirely submitted in cassette 2A.    3. Received fresh and subsequently placed into formalin labeled \"lymph node level 10 #2, fresh for permanent\" and consist of a pink-gray lymph node with attached adipose tissue measuring 0.8 x 0.6 x 0.5 cm.  The specimen is bisected and entirely submitted in cassette 3A.    4. Received fresh and subsequently placed into formalin labeled \"lymph node level 10 #3, fresh for permanent\" and consists of a single 0.5 cm gray-black lymph node.  Entirely submitted in cassette 4A.    5. Received fresh and subsequently placed into formalin labeled \"lymph node level 11 #1, fresh for permanent\" is a 0.3 cm tan-gray soft tissue fragment.  Entirely submitted in cassette 5A.    6. Received fresh and subsequently placed into formalin labeled \"level 7; fresh for permanent\" are two tan-gray possible lymph nodes measuring 0.5 and 0.7 cm in greatest dimension.  Entirely submitted in cassette 6A.    7. Received fresh and subsequently placed into formalin labeled \"right level 4; fresh for permanent\" and consists of multiple pink-gray possible lymph nodes with attached adipose tissue ranging in size from 0.4 cm up to 0.6 cm in greatest dimension.  Entirely submitted in cassette 7A.    milton/uso/swm/skylar      Basic Metabolic Panel    Collection Time: 10/10/20  8:16 AM    Specimen: Blood   Result Value Ref Range    Glucose 221 (H) 65 - 99 mg/dL    BUN 12 6 - 20 mg/dL    Creatinine 0.79 0.57 - 1.00 mg/dL    Sodium 136 136 - 145 mmol/L    Potassium 4.5 3.5 - 5.2 mmol/L    Chloride 101 98 - 107 mmol/L    CO2 23.7 22.0 - 29.0 mmol/L    Calcium 8.4 (L) 8.6 - 10.5 mg/dL    eGFR Non African Amer 75 >60 mL/min/1.73    BUN/Creatinine Ratio 15.2 7.0 - 25.0    Anion Gap 11.3 5.0 - 15.0 mmol/L   CBC " Auto Differential    Collection Time: 10/10/20  8:17 AM    Specimen: Blood   Result Value Ref Range    WBC 14.12 (H) 3.40 - 10.80 10*3/mm3    RBC 3.85 3.77 - 5.28 10*6/mm3    Hemoglobin 10.2 (L) 12.0 - 15.9 g/dL    Hematocrit 30.5 (L) 34.0 - 46.6 %    MCV 79.2 79.0 - 97.0 fL    MCH 26.5 (L) 26.6 - 33.0 pg    MCHC 33.4 31.5 - 35.7 g/dL    RDW 15.7 (H) 12.3 - 15.4 %    RDW-SD 44.8 37.0 - 54.0 fl    MPV 9.8 6.0 - 12.0 fL    Platelets 279 140 - 450 10*3/mm3    Neutrophil % 81.4 (H) 42.7 - 76.0 %    Lymphocyte % 9.2 (L) 19.6 - 45.3 %    Monocyte % 8.6 5.0 - 12.0 %    Eosinophil % 0.0 (L) 0.3 - 6.2 %    Basophil % 0.2 0.0 - 1.5 %    Immature Grans % 0.6 (H) 0.0 - 0.5 %    Neutrophils, Absolute 11.48 (H) 1.70 - 7.00 10*3/mm3    Lymphocytes, Absolute 1.30 0.70 - 3.10 10*3/mm3    Monocytes, Absolute 1.22 (H) 0.10 - 0.90 10*3/mm3    Eosinophils, Absolute 0.00 0.00 - 0.40 10*3/mm3    Basophils, Absolute 0.03 0.00 - 0.20 10*3/mm3    Immature Grans, Absolute 0.09 (H) 0.00 - 0.05 10*3/mm3    nRBC 0.0 0.0 - 0.2 /100 WBC   POC Glucose Once    Collection Time: 10/10/20 11:44 AM    Specimen: Blood   Result Value Ref Range    Glucose 247 (H) 70 - 130 mg/dL   POC Glucose Once    Collection Time: 10/10/20  4:30 PM    Specimen: Blood   Result Value Ref Range    Glucose 197 (H) 70 - 130 mg/dL   POC Glucose Once    Collection Time: 10/10/20  8:40 PM    Specimen: Blood   Result Value Ref Range    Glucose 231 (H) 70 - 130 mg/dL   POC Glucose Once    Collection Time: 10/11/20  6:47 AM    Specimen: Blood   Result Value Ref Range    Glucose 183 (H) 70 - 130 mg/dL   POC Glucose Once    Collection Time: 10/11/20 11:24 AM    Specimen: Blood   Result Value Ref Range    Glucose 190 (H) 70 - 130 mg/dL   POC Glucose Once    Collection Time: 10/11/20  4:45 PM    Specimen: Blood   Result Value Ref Range    Glucose 218 (H) 70 - 130 mg/dL   POC Glucose Once    Collection Time: 10/12/20  7:52 AM    Specimen: Blood   Result Value Ref Range    Glucose 160  (H) 70 - 130 mg/dL   POC Glucose Once    Collection Time: 10/12/20 11:36 AM    Specimen: Blood   Result Value Ref Range    Glucose 178 (H) 70 - 130 mg/dL   POC Glucose Once    Collection Time: 10/12/20  4:22 PM    Specimen: Blood   Result Value Ref Range    Glucose 130 70 - 130 mg/dL   Comprehensive Metabolic Panel    Collection Time: 11/11/20 10:50 AM    Specimen: Blood   Result Value Ref Range    Glucose 139 (H) 74 - 124 mg/dL    BUN 11 6 - 20 mg/dL    Creatinine 0.77 0.60 - 1.10 mg/dL    Sodium 140 134 - 145 mmol/L    Potassium 4.4 3.5 - 4.7 mmol/L    Chloride 101 98 - 107 mmol/L    CO2 25.9 22.0 - 29.0 mmol/L    Calcium 9.4 8.5 - 10.2 mg/dL    Total Protein 7.4 6.3 - 8.0 g/dL    Albumin 4.70 3.50 - 5.20 g/dL    ALT (SGPT) 33 0 - 33 U/L    AST (SGOT) 23 0 - 32 U/L    Alkaline Phosphatase 141 (H) 38 - 116 U/L    Total Bilirubin 0.6 0.2 - 1.2 mg/dL    eGFR Non African Amer 78 >60 mL/min/1.73    Globulin 2.7 1.8 - 3.5 gm/dL    A/G Ratio 1.7 1.1 - 2.4 g/dL    BUN/Creatinine Ratio 14.3 7.3 - 30.0    Anion Gap 13.1 5.0 - 15.0 mmol/L   Ferritin    Collection Time: 11/11/20 10:50 AM    Specimen: Blood   Result Value Ref Range    Ferritin 112.10 11.00 - 207.00 ng/mL   Iron Profile    Collection Time: 11/11/20 10:50 AM    Specimen: Blood   Result Value Ref Range    Iron 54 37 - 145 mcg/dL    Iron Saturation 14 14 - 48 %    Transferrin 282 200 - 360 mg/dL    TIBC 395 249 - 505 mcg/dL   CBC Auto Differential    Collection Time: 11/11/20 10:50 AM    Specimen: Blood   Result Value Ref Range    WBC 8.00 3.40 - 10.80 10*3/mm3    RBC 4.33 3.77 - 5.28 10*6/mm3    Hemoglobin 11.4 (L) 12.0 - 15.9 g/dL    Hematocrit 36.9 34.0 - 46.6 %    MCV 85.2 79.0 - 97.0 fL    MCH 26.3 (L) 26.6 - 33.0 pg    MCHC 30.9 (L) 31.5 - 35.7 g/dL    RDW 16.2 (H) 12.3 - 15.4 %    RDW-SD 50.5 37.0 - 54.0 fl    MPV 9.4 6.0 - 12.0 fL    Platelets 199 140 - 450 10*3/mm3    Neutrophil % 55.9 42.7 - 76.0 %    Lymphocyte % 33.0 19.6 - 45.3 %    Monocyte % 7.0 5.0  - 12.0 %    Eosinophil % 3.3 0.3 - 6.2 %    Basophil % 0.5 0.0 - 1.5 %    Immature Grans % 0.3 0.0 - 0.5 %    Neutrophils, Absolute 4.48 1.70 - 7.00 10*3/mm3    Lymphocytes, Absolute 2.64 0.70 - 3.10 10*3/mm3    Monocytes, Absolute 0.56 0.10 - 0.90 10*3/mm3    Eosinophils, Absolute 0.26 0.00 - 0.40 10*3/mm3    Basophils, Absolute 0.04 0.00 - 0.20 10*3/mm3    Immature Grans, Absolute 0.02 0.00 - 0.05 10*3/mm3    nRBC 0.0 0.0 - 0.2 /100 WBC

## 2020-11-24 ENCOUNTER — OFFICE VISIT (OUTPATIENT)
Dept: PSYCHIATRY | Facility: HOSPITAL | Age: 55
End: 2020-11-24

## 2020-11-24 DIAGNOSIS — F33.1 MAJOR DEPRESSIVE DISORDER, RECURRENT EPISODE, MODERATE (HCC): Primary | ICD-10-CM

## 2020-11-24 PROCEDURE — 99214 OFFICE O/P EST MOD 30 MIN: CPT | Performed by: NURSE PRACTITIONER

## 2020-11-24 RX ORDER — GABAPENTIN 300 MG/1
300 CAPSULE ORAL 3 TIMES DAILY PRN
Qty: 90 CAPSULE | Refills: 2 | Status: SHIPPED | OUTPATIENT
Start: 2020-11-24 | End: 2021-02-18

## 2020-11-24 RX ORDER — ESCITALOPRAM OXALATE 10 MG/1
10 TABLET ORAL DAILY
Qty: 30 TABLET | Refills: 2 | Status: SHIPPED | OUTPATIENT
Start: 2020-11-24 | End: 2021-02-08 | Stop reason: SDUPTHER

## 2020-11-24 NOTE — PROGRESS NOTES
[facility]  In Person Session    Subjective  Patient ID: Christie Avendaño is a 55 y.o. female is seen face to face in the [facility].    Pt noted to be alert, oriented, and engaged in conversation. Affect and mood demoralized, flat, tearful.  Patient reviews recent appointment with Dr. Collins, appreciating lack of need for additional treatment.  Despite this, denies significant feeling of resolution related to mood and anxiety symptoms. Anxiety described as remaining somewhat variable; mood remains greatly impacted by relationships with others.  Sleep described as being difficult to initiate appx once weekly. Finds she occasionally will not sleep at all for a night, contributing to need to sleep later into the day. Pt does report impact of restless legs being intrusive from time to time.  Reviewed with patient past medication; has been on gabapentin recently alongside surgery; no longer taking.  Patient does feel this was helpful to restless legs, as she did not find this to be intrusive during course of treatment.  Sleep apnea again assessed; patient does report snoring at times, although denies interest in sleep medicine referral today.  Mental health history revisited; pt reviews hx of anxiety and panic, assisted by Lexapro. Pt states she always had to take this at night, due to experience of sedation.  Other than this, patient felt it was helpful and eventually discontinued due to complete response. Pt is agreeable to restarting this medication.  PHQ 9 symptoms remain subthreshold, although patient agrees reduced interest and pleasure, as well as feelings of depression, are impacting quality of life.    Medications Reviewed:  No psychotropic medications    Diagnoses and all orders for this visit:    1. Major depressive disorder, recurrent episode, moderate (CMS/HCC) (Primary)  -     gabapentin (Neurontin) 300 MG capsule; Take 1 capsule by mouth 3 (Three) Times a Day  As Needed (anxiety, ruminative worry).  Dispense: 90 capsule; Refill: 2    Other orders  -     escitalopram (Lexapro) 10 MG tablet; Take 1 tablet by mouth Daily.  Dispense: 30 tablet; Refill: 2    Plan of Care  Patient with notable symptoms of depression, underreported in PHQ-9.  Explored history of benefit to Lexapro, and will restart.  Explored risks and benefits of treatment including expectations and potential side effects.  Patient advised to initiate lexapro 5 mg daily, followed by increase to 10 mg daily if well-tolerated.  Additionally explored symptoms of insomnia, ruminative worry.  History of response to gabapentin reviewed; will add gabapentin 300 mg up to 3 times daily with weighted at bedtime dosing.  Finally discussed with patient benefits of peer support, specifically given anxiety related to chronic cancer history and fears of recurrence/new diagnosis.  Follow-up arranged via Zoom group in 4 weeks.    Total time spent  face to face with patient: 34 minutes.   28 minutes spent in supportive counseling surrounding issues of balancing avoidance with approach , discussion of sleep apnea, benefits of peer support , benefits of exercise, behavioral activation, medication education, anticipatory anxiety, fears of recurrence, risks of controlled substances, specifically regarding risk management and safe use, as well as expectations of prescribing, refills, storage, and diversion. , exploration of QOL goals, effective communication strategies and positive and negative coping strategies.

## 2021-01-07 ENCOUNTER — APPOINTMENT (OUTPATIENT)
Dept: OTHER | Facility: HOSPITAL | Age: 56
End: 2021-01-07

## 2021-01-07 ENCOUNTER — LAB (OUTPATIENT)
Dept: LAB | Facility: HOSPITAL | Age: 56
End: 2021-01-07

## 2021-01-07 ENCOUNTER — HOSPITAL ENCOUNTER (OUTPATIENT)
Dept: PET IMAGING | Facility: HOSPITAL | Age: 56
Discharge: HOME | End: 2021-01-07

## 2021-01-07 DIAGNOSIS — Z09 FOLLOW UP: ICD-10-CM

## 2021-01-07 DIAGNOSIS — C64.2 RENAL CELL CARCINOMA OF LEFT KIDNEY (HCC): ICD-10-CM

## 2021-01-07 DIAGNOSIS — D64.9 NORMOCYTIC ANEMIA: ICD-10-CM

## 2021-01-07 DIAGNOSIS — C34.91 ADENOCARCINOMA OF RIGHT LUNG (HCC): ICD-10-CM

## 2021-01-07 LAB
ALBUMIN SERPL-MCNC: 4.6 G/DL (ref 3.5–5.2)
ALBUMIN/GLOB SERPL: 1.5 G/DL (ref 1.1–2.4)
ALP SERPL-CCNC: 163 U/L (ref 38–116)
ALT SERPL W P-5'-P-CCNC: 20 U/L (ref 0–33)
ANION GAP SERPL CALCULATED.3IONS-SCNC: 13.3 MMOL/L (ref 5–15)
AST SERPL-CCNC: 17 U/L (ref 0–32)
BASOPHILS # BLD AUTO: 0.05 10*3/MM3 (ref 0–0.2)
BASOPHILS NFR BLD AUTO: 0.5 % (ref 0–1.5)
BILIRUB SERPL-MCNC: 0.5 MG/DL (ref 0.2–1.2)
BUN SERPL-MCNC: 12 MG/DL (ref 6–20)
BUN/CREAT SERPL: 17.1 (ref 7.3–30)
CALCIUM SPEC-SCNC: 9.6 MG/DL (ref 8.5–10.2)
CHLORIDE SERPL-SCNC: 99 MMOL/L (ref 98–107)
CO2 SERPL-SCNC: 26.7 MMOL/L (ref 22–29)
CREAT BLDA-MCNC: 0.8 MG/DL (ref 0.6–1.3)
CREAT SERPL-MCNC: 0.7 MG/DL (ref 0.6–1.1)
DEPRECATED RDW RBC AUTO: 46.8 FL (ref 37–54)
EOSINOPHIL # BLD AUTO: 0.16 10*3/MM3 (ref 0–0.4)
EOSINOPHIL NFR BLD AUTO: 1.7 % (ref 0.3–6.2)
ERYTHROCYTE [DISTWIDTH] IN BLOOD BY AUTOMATED COUNT: 15.1 % (ref 12.3–15.4)
FERRITIN SERPL-MCNC: 109.5 NG/ML (ref 11–207)
GFR SERPL CREATININE-BSD FRML MDRD: 87 ML/MIN/1.73
GLOBULIN UR ELPH-MCNC: 3 GM/DL (ref 1.8–3.5)
GLUCOSE SERPL-MCNC: 250 MG/DL (ref 74–124)
HCT VFR BLD AUTO: 38 % (ref 34–46.6)
HGB BLD-MCNC: 11.9 G/DL (ref 12–15.9)
IMM GRANULOCYTES # BLD AUTO: 0.07 10*3/MM3 (ref 0–0.05)
IMM GRANULOCYTES NFR BLD AUTO: 0.7 % (ref 0–0.5)
IRON 24H UR-MRATE: 50 MCG/DL (ref 37–145)
IRON SATN MFR SERPL: 12 % (ref 14–48)
LYMPHOCYTES # BLD AUTO: 2.68 10*3/MM3 (ref 0.7–3.1)
LYMPHOCYTES NFR BLD AUTO: 28.3 % (ref 19.6–45.3)
MCH RBC QN AUTO: 26.6 PG (ref 26.6–33)
MCHC RBC AUTO-ENTMCNC: 31.3 G/DL (ref 31.5–35.7)
MCV RBC AUTO: 85 FL (ref 79–97)
MONOCYTES # BLD AUTO: 0.65 10*3/MM3 (ref 0.1–0.9)
MONOCYTES NFR BLD AUTO: 6.9 % (ref 5–12)
NEUTROPHILS NFR BLD AUTO: 5.87 10*3/MM3 (ref 1.7–7)
NEUTROPHILS NFR BLD AUTO: 61.9 % (ref 42.7–76)
NRBC BLD AUTO-RTO: 0 /100 WBC (ref 0–0.2)
PLATELET # BLD AUTO: 277 10*3/MM3 (ref 140–450)
PMV BLD AUTO: 10.4 FL (ref 6–12)
POTASSIUM SERPL-SCNC: 3.8 MMOL/L (ref 3.5–4.7)
PROT SERPL-MCNC: 7.6 G/DL (ref 6.3–8)
RBC # BLD AUTO: 4.47 10*6/MM3 (ref 3.77–5.28)
SODIUM SERPL-SCNC: 139 MMOL/L (ref 134–145)
TIBC SERPL-MCNC: 420 MCG/DL (ref 249–505)
TRANSFERRIN SERPL-MCNC: 300 MG/DL (ref 200–360)
WBC # BLD AUTO: 9.48 10*3/MM3 (ref 3.4–10.8)

## 2021-01-07 PROCEDURE — 85025 COMPLETE CBC W/AUTO DIFF WBC: CPT

## 2021-01-07 PROCEDURE — 36415 COLL VENOUS BLD VENIPUNCTURE: CPT

## 2021-01-07 PROCEDURE — 82728 ASSAY OF FERRITIN: CPT

## 2021-01-07 PROCEDURE — 83540 ASSAY OF IRON: CPT

## 2021-01-07 PROCEDURE — 74177 CT ABD & PELVIS W/CONTRAST: CPT

## 2021-01-07 PROCEDURE — 82565 ASSAY OF CREATININE: CPT

## 2021-01-07 PROCEDURE — 0 DIATRIZOATE MEGLUMINE & SODIUM PER 1 ML: Performed by: INTERNAL MEDICINE

## 2021-01-07 PROCEDURE — 80053 COMPREHEN METABOLIC PANEL: CPT

## 2021-01-07 PROCEDURE — 84466 ASSAY OF TRANSFERRIN: CPT

## 2021-01-07 PROCEDURE — 25010000002 IOPAMIDOL 61 % SOLUTION: Performed by: INTERNAL MEDICINE

## 2021-01-07 RX ADMIN — IOPAMIDOL 85 ML: 612 INJECTION, SOLUTION INTRAVENOUS at 14:02

## 2021-01-07 RX ADMIN — DIATRIZOATE MEGLUMINE AND DIATRIZOATE SODIUM 30 ML: 660; 100 LIQUID ORAL; RECTAL at 12:45

## 2021-01-11 NOTE — PROGRESS NOTES
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  In the interval she has been continuing on oral iron daily however developed diarrhea and has been off the iron now for approximately 2 to 3 weeks with resolution of her symptoms.  She does have some mild sinus congestion and an occasional cough in the morning.  Otherwise she has been doing fairly well.  She does continue follow-up in the supportive oncology clinic, last seen on 11/24/2020 and is continuing on Neurontin 300 mg 3 times daily as well as Lexapro 10 mg daily.      Objective   Vital Signs:   /89   Pulse 85   Temp 97.5 °F (36.4 °C) (Temporal)   Resp 20   Ht 165.1 cm (65\")   Wt 116 kg (255 lb 8 oz)   SpO2 96%   BMI 42.52 kg/m²     Physical Exam  Constitutional:       Appearance: She is well-developed.   Eyes:      Conjunctiva/sclera: Conjunctivae normal.   Neck:      Thyroid: No thyromegaly.   Cardiovascular:      Rate and Rhythm: Normal rate and regular rhythm.      Heart sounds: No murmur. No friction rub. No gallop.    Pulmonary:      Effort: No respiratory distress.      Breath sounds: Normal breath sounds.      Comments: Status post bilateral mastectomies with reconstruction.  No masses or abnormalities palpated bilaterally  Abdominal:      General: Bowel sounds are normal. There is no distension.      Palpations: Abdomen is soft.      Tenderness: There is no abdominal " tenderness.   Lymphadenopathy:      Head:      Right side of head: No submandibular adenopathy.      Cervical: No cervical adenopathy.      Upper Body:      Right upper body: No supraclavicular adenopathy.      Left upper body: No supraclavicular adenopathy.   Skin:     General: Skin is warm and dry.      Findings: No rash.   Neurological:      Mental Status: She is alert and oriented to person, place, and time.      Cranial Nerves: No cranial nerve deficit.      Motor: No abnormal muscle tone.      Deep Tendon Reflexes: Reflexes normal.   Psychiatric:         Behavior: Behavior normal.        Result Review : Reviewed CBC, CMP, iron panel, ferritin from 1/7/2021.  Reviewed CT abdomen and pelvis from 1/7/2021.  I did personally review CT images today with interpretation as outlined below in the assessment.  Reviewed office notes from genetics visit, supportive oncology visit, PCP visit, oncology nutrition.       Assessment and Plan  1.  BRCA2 mutation (c.5073dupA) and GEORGES mutation (c.5073dup):  · Strong family history of malignancy with a mother who had thyroid cancer and also had breast cancer at age 73 with subsequent liver metastases.  She was found to be BRCA2 positive and prompted the patient's own testing.  The patient's maternal grandmother had ovarian cancer in her mid 80s, maternal grandfather and paternal grandfather both had leukemia of unknown type at an older age.  Her maternal aunt had colon cancer over the age of 50, maternal aunt had breast cancer in her 80s, and her father had cholangiocarcinoma.    · The patient underwent testing on 7/27/2016 showing positive BRCA2 mutation (c.5073dupA)   · The patient did undergo KAVYA/BSO in 1992 secondary to hemorrhage.  · The patient did undergo bilateral mastectomies 1/26/2017 with findings of bilateral DCIS (discussed below).  · Patient has undergone previous colonoscopy on 11/28/2017.  · Given the unusual nature of the patient's malignancies, referral to  genetics clinic for panel testing performed 11/10/2020 with re-identification of BRCA2 mutation (c.5073dupA) and new identification of GEORGES mutation (c.5073dup).  2. Stage I (rA1sVeVy) papillary thyroid cancer:  · Status post total thyroidectomy with Dr. Maher in [place] on 10/15/2010.  Pathology showed papillary thyroid cancer involving the left thyroid and isthmus, multifocal with 2 areas measuring 0.9 and 1.2 cm.  No evidence of capsular invasion, no extrathyroidal extension, no lymphovascular invasion, negative margins.  · Postsurgical scan showed uptake in the thyroid bed and the patient underwent treatment with I-131.    · She has had no evidence of recurrent disease.    · Her subsequent hypothyroidism has been managed by endocrinology (Dr. Subhash Michael) in [place], currently receiving levothyroxine 150 mcg daily.  3. Bilateral DCIS  · As above, patient has underlying BRCA2 mutation  · 8/5/2016 was found to have a right breast intraductal papilloma with fibrocystic change and microcalcifications with usual ductal hyperplasia and columnar cell change in 3 separate locations on biopsy.  · On 8/24/2016 she underwent excisional biopsy of an intraductal papilloma from the right breast.    · She subsequently underwent on 1/26/2017 bilateral mastectomy.  On the right there was a large intraductal papilloma with usual ductal hyperplasia, atypical hyperplasia, DCIS measuring 3 mm which was low-grade, ER positive (98%), AR positive (85%).  On the left there were multiple intraductal papillomas with atypical ductal hyperplasia.  There was DCIS measuring 6 mm, low-grade, ER positive (99%), AR positive (98%).  · Exam today negative  4. Stage I (tT4eOqP9) clear-cell renal cell carcinoma:  · Incidental finding on CT scan 1/15/2020 of enhancing left renal lesions x2, largest 2.4 cm  · Resection with Dr. Pool ([facility]) on 5/15/2020 with left partial nephrectomy.  Pathology showed clear cell renal cell  carcinoma, Jeanne grade 2, 2 foci measuring 1.5 and 2.1 cm.  The renal parenchymal margin was focally positive.  · Patient reports that Dr. Pool felt that he required additional margin tissue in the area of focal positivity and did not recommend re-resection.  · Most recent CT 7/1/2020 showed evidence of resection of left renal lesion.  · We are continuing with every 6-month monitoring of CT scans for surveillance currently.  We reviewed CT scan from 1/7/2021 which showed no evidence of recurrent malignancy.  With the next scan we may try to synchronize this with ongoing every 6-month chest CT monitoring for lung cancer.  5. 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    · CT-guided biopsy on 1/9/2018 revealed a left lower lobe carcinoid with spindle cell features, no necrosis, no mitoses.  · Notation of normal chromogranin A 1/23/2018 of 35  · Follow-up CT on 1/24/2018 showed multiple bilateral pulmonary nodules including a 2.1 cm left lower lobe nodule (previously 2.6), 7 mm left lower lobe nodule, 1 cm right lower lobe nodule, right adrenal 1.5 cm nodule consistent with adenoma, and hepatic cysts.    · On 2/28/2018, the patient underwent a left lower lobectomy.  Pathology revealed a 2.3 cm left upper lobe (hilar) carcinoid, typical with spindle cell features, Ki-67 of 3.68%.  There were 2 of 8 peribronchial lymph nodes involved with carcinoid with lymphatic invasion, stage IIB ([id number]).  There was also evidence of adenocarcinoma (see below).  6. Stage IA1 (xL3hG6Tb)  left lower lobe non-small cell lung cancer (adenocarcinoma with lipidic features):  · The patient is a never smoker  · PET scan 12/13/2017 with hypermetabolic 2.5 cm left lower lobe nodule with SUV 5.7.    · CT-guided biopsy on 1/9/2018 revealed a left lower lobe carcinoid with spindle cell features, no necrosis, no mitoses.    · Follow-up CT on  1/24/2018 showed multiple bilateral pulmonary nodules including a 2.1 cm left lower lobe nodule (previously 2.6), 7 mm left lower lobe nodule, 1 cm right lower lobe nodule, right adrenal 1.5 cm nodule consistent with adenoma, and hepatic cysts.    · On 2/28/2018, the patient underwent a left lower lobectomy.  Pathology revealed a 2.3 cm left upper lobe (hilar) carcinoid, typical with spindle cell features, Ki-67 of 3.68%.  There were 2 of 8 peribronchial lymph nodes involved with carcinoid with lymphatic invasion, stage IIB ([id number]).  There was a 0.9 cm adenocarcinoma, well differentiated with lipidic features, no visceral pleural invasion, bronchoalveolar atypical adenomatous hyperplasia at the parenchymal margin and 1 microscopic focus (less than 1 mm).  0/8 lymph nodes involved with adenocarcinoma. PDL1 <1% TPS, positive EGF receptor mutation (exon 19 deletion, [id number]), negative ALK/ROS1 rearrangement, negative BRAF, ER negative (2%), ME 0, HER-2/laura 1+, Ki-67 3%. Stage IA1 ([id number]).  · Patient was placed on Femara following surgery by Dr. Juana Pelayo.  Femara subsequently discontinued in early August 2020.  · Subsequent follow-up scans with stable findings until scan on 1/15/2020 noted 2 enhancing left renal lesions, largest 2.4 cm and there was also an enlarging right lower lobe nodule up to 1.0 x 1.2 cm.  In the interval, patient did undergo resection of renal cell carcinoma (discussed above).    · CT scan on 7/1/2020 showed slow increase in right lower lobe nodule up to 1.2 cm.  Felt to represent new primary lung cancer (see below).   7. Stage IA2 (yH6xiA9F8) right lower lobe non-small cell lung cancer (adenocarcinoma with lipidic growth pattern):   · CT scan on 7/1/2020 showed slow increase in right lower lobe nodule up to 1.2 cm.  · CT-guided biopsy of the right lower lobe nodule on 7/31/2020 with adenocarcinoma with bland lipidic growth pattern of pulmonary origin (TTF-1 and CK7 positive).  PDL1 TPS <1%, EGFR mutated, exon 20 insertion (possibly indicating lack of response to TKI's). ALK/ROS1 rearrangement negative and BRAF V600 mutation negative.  · Staging MRI brain 8/12/2020 with no evidence of metastatic disease  · Staging PET scan 8/12/2020 with no significant activity in the biopsied lesion 1.2 cm right lower lobe (SUV 1.5), no evidence of hilar, mediastinal uptake nor distant metastatic disease.  · Given the difference in EGFR mutation between the patient's 2 lung cancers, it is felt that she has 2 separate primary lesions.    · Right VATS with anterior basal segmentectomy on 10/9/2020 with pathology showing invasive well to moderately differentiated adenocarcinoma with acinar predominant growth measuring 1.4 cm, negative margins, no pleural or lymphovascular invasion, negative lymph nodes x5.  · The patient has recovered well from her lung surgery.  Current CT abdomen and pelvis shows postoperative change in the right lower lobe region.  She is scheduled for follow-up CT chest on 3/8/2020 and I will see her shortly thereafter.  8. Anemia:  · Hemoglobin in high 10 to low 11 range with low normal MCV  · Anemia evaluation 8/20/2020 with iron 44, ferritin 119.3, iron saturation 13%, TIBC 351, folate 12.2, B12 392.  · Unclear whether patient may have anemia secondary to chronic disease.  · With low iron saturation, initiated oral iron daily on 9/15/2020.  Patient however did not begin oral iron until 11/11/2020.  · Labs on 11/11/2020 with hemoglobin 11.4, iron studies showing iron 54, ferritin 112, iron saturation 14%, TIBC 395.    · Patient returns today in follow-up noting that she was intolerant of oral iron due to diarrhea and stopped the oral iron 2 to 3 weeks ago with resolution of her diarrhea.  Labs today with hemoglobin improved to 11.9, iron studies with iron 50, ferritin 109.5, iron saturation 12%, TIBC 420.  We will plan for now to remain off of oral iron.  Recheck iron studies in 4 to 6  months pending hemoglobin.  Recheck hemoglobin in 2 months at return visit.  9. Depression  · Patient has had difficulty coping with multiple stressors.  She has recently moved and is the primary caregiver for her mother with metastatic breast cancer who was not doing well.    · Patient seen in supportive oncology clinic 11/10/2020  · Patient is currently continuing on gabapentin 300 mg 3 times daily and Lexapro 10 mg daily.  10. Obesity  · Patient motivated to lose weight, was referred to oncology nutrition and was seen on 11/10/2020     Plan:  1. The patient will remain off of oral iron, intolerant due to diarrhea  2. Patient will follow up in supportive oncology clinic  3. CT chest as scheduled 3/8/2020 by thoracic surgery  4. MD visit in 2 months with CBC, CMP

## 2021-01-12 ENCOUNTER — OFFICE VISIT (OUTPATIENT)
Dept: ONCOLOGY | Facility: CLINIC | Age: 56
End: 2021-01-12

## 2021-01-12 ENCOUNTER — APPOINTMENT (OUTPATIENT)
Dept: LAB | Facility: HOSPITAL | Age: 56
End: 2021-01-12

## 2021-01-12 VITALS
BODY MASS INDEX: 42.57 KG/M2 | HEIGHT: 65 IN | SYSTOLIC BLOOD PRESSURE: 152 MMHG | OXYGEN SATURATION: 96 % | RESPIRATION RATE: 20 BRPM | HEART RATE: 85 BPM | DIASTOLIC BLOOD PRESSURE: 89 MMHG | TEMPERATURE: 97.5 F | WEIGHT: 255.5 LBS

## 2021-01-12 DIAGNOSIS — C64.2 RENAL CELL CARCINOMA OF LEFT KIDNEY (HCC): ICD-10-CM

## 2021-01-12 DIAGNOSIS — Z15.09 BRCA2 GENE MUTATION POSITIVE IN FEMALE: ICD-10-CM

## 2021-01-12 DIAGNOSIS — C34.91 ADENOCARCINOMA OF RIGHT LUNG (HCC): Primary | ICD-10-CM

## 2021-01-12 DIAGNOSIS — C34.91 NON-SMALL CELL LUNG CANCER, RIGHT (HCC): ICD-10-CM

## 2021-01-12 DIAGNOSIS — Z15.02 BRCA2 GENE MUTATION POSITIVE IN FEMALE: ICD-10-CM

## 2021-01-12 DIAGNOSIS — Z15.01 BRCA2 GENE MUTATION POSITIVE IN FEMALE: ICD-10-CM

## 2021-01-12 PROCEDURE — 99214 OFFICE O/P EST MOD 30 MIN: CPT | Performed by: INTERNAL MEDICINE

## 2021-01-27 ENCOUNTER — OFFICE VISIT (OUTPATIENT)
Dept: INTERNAL MEDICINE | Facility: CLINIC | Age: 56
End: 2021-01-27

## 2021-01-27 VITALS
BODY MASS INDEX: 42.15 KG/M2 | HEIGHT: 65 IN | TEMPERATURE: 97.5 F | WEIGHT: 253 LBS | DIASTOLIC BLOOD PRESSURE: 86 MMHG | HEART RATE: 88 BPM | SYSTOLIC BLOOD PRESSURE: 128 MMHG

## 2021-01-27 DIAGNOSIS — Z01.411 ENCOUNTER FOR GYNECOLOGICAL EXAMINATION WITH ABNORMAL FINDING: ICD-10-CM

## 2021-01-27 DIAGNOSIS — N84.2 VAGINAL POLYP: ICD-10-CM

## 2021-01-27 DIAGNOSIS — I10 ESSENTIAL HYPERTENSION: ICD-10-CM

## 2021-01-27 DIAGNOSIS — Z00.00 PHYSICAL EXAM, ANNUAL: ICD-10-CM

## 2021-01-27 DIAGNOSIS — E11.9 DIABETES MELLITUS TYPE 2, DIET-CONTROLLED (HCC): Primary | ICD-10-CM

## 2021-01-27 PROCEDURE — 99396 PREV VISIT EST AGE 40-64: CPT | Performed by: INTERNAL MEDICINE

## 2021-01-27 NOTE — PROGRESS NOTES
Subjective   Christie Avendaño is a 55 y.o. female and is here for a comprehensive physical exam. The patient reports no problems.  Hasn't been doing as well with her diet- says she goes through spirts of not being able to do well.   Only taking metformin on occ, causes diarrhea.  Did get a treadmill so she can walk inside.    Pt is UTD with annual gyn exam and mammo           Social History     Socioeconomic History   • Marital status:      Spouse name: Jayesh   • Number of children: 2   • Years of education: High School   • Highest education level: Not on file   Occupational History     Employer: UNEMPLOYED   Tobacco Use   • Smoking status: Never Smoker   • Smokeless tobacco: Never Used   Substance and Sexual Activity   • Alcohol use: Never     Frequency: Never   • Drug use: Never   • Sexual activity: Yes     Partners: Male       Family History   Problem Relation Age of Onset   • Breast cancer Mother    • Thyroid cancer Mother 73   • Hypertension Mother    • Cancer Father         cholangiocarcinoma   • Liver cancer Father    • Ovarian cancer Maternal Grandmother 80   • Leukemia Maternal Grandfather         60-80, unknown type   • Leukemia Paternal Grandfather         60-80, unknown type   • Colon cancer Maternal Aunt         over age of 50   • Breast cancer Maternal Aunt         80's   • Colon cancer Maternal Aunt         colon   • Diabetes Other    • Malig Hyperthermia Neg Hx           Past Medical History:   Diagnosis Date   • Arthritis    • Asthma    • BRCA2 positive    • Breast cancer (CMS/HCC)     DCIS   • Diabetes mellitus (CMS/HCC)    • H/O Liver masses 2017    x3   • H/O Lung masses 2017    1 in right lower lobe and 1 in the left infrahilar location   • H/O Ovarian cyst    • H/O Right adrenal mass (CMS/HCC) 2017   • Lung cancer (CMS/HCC)    • PONV (postoperative nausea and vomiting)    • Thyroid disease           Current Outpatient Medications:   •  atorvastatin (LIPITOR) 40 MG tablet, Take 40 mg by mouth  "Every Night., Disp: , Rfl:   •  bimatoprost (LUMIGAN) 0.01 % ophthalmic drops, Administer 1 drop to both eyes Every Night., Disp: , Rfl:   •  CINNAMON PO, Take 1,800 mg by mouth Daily. HOLD FOR SURGERY, Disp: , Rfl:   •  escitalopram (Lexapro) 10 MG tablet, Take 1 tablet by mouth Daily., Disp: 30 tablet, Rfl: 2  •  hydroCHLOROthiazide (MICROZIDE) 12.5 MG capsule, Take 12.5 mg by mouth As Needed., Disp: , Rfl:   •  Krill Oil (OMEGA-3) 500 MG capsule, Take 500 mg by mouth Daily. HOLDS FOR SURGERY, Disp: , Rfl:   •  levothyroxine (SYNTHROID, LEVOTHROID) 150 MCG tablet, Take 150 mcg by mouth Daily., Disp: , Rfl:   •  lisinopril (PRINIVIL,ZESTRIL) 20 MG tablet, Take 20 mg by mouth Daily., Disp: , Rfl:   •  meloxicam (MOBIC) 7.5 MG tablet, Take 7.5 mg by mouth Daily As Needed. HOLD FOR SURGERY, Disp: , Rfl:   •  TURMERIC CURCUMIN PO, Take 3 tablets by mouth Daily. HOLD FOR SURGERY, Disp: , Rfl:   •  valACYclovir (VALTREX) 1000 MG tablet, Take 2,000 mg by mouth As Needed. 2-1000 mg twice a day , Disp: , Rfl:   •  gabapentin (Neurontin) 300 MG capsule, Take 1 capsule by mouth 3 (Three) Times a Day As Needed (anxiety, ruminative worry)., Disp: 90 capsule, Rfl: 2    Review of Systems    Review of Systems     There were no vitals filed for this visit.    Vitals:    01/27/21 1121   BP: 128/86   Pulse: 88   Temp: 97.5 °F (36.4 °C)   Weight: 115 kg (253 lb)   Height: 165.1 cm (65\")     Body mass index is 42.1 kg/m².  Wt Readings from Last 3 Encounters:   01/27/21 115 kg (253 lb)   01/12/21 116 kg (255 lb 8 oz)   11/23/20 117 kg (257 lb)      Objective     Physical Exam  Constitutional:       Appearance: Normal appearance. She is obese.   HENT:      Mouth/Throat:      Mouth: Mucous membranes are moist.   Eyes:      Conjunctiva/sclera: Conjunctivae normal.      Pupils: Pupils are equal, round, and reactive to light.   Cardiovascular:      Rate and Rhythm: Normal rate and regular rhythm.      Pulses: Normal pulses.   Pulmonary:      " Effort: Pulmonary effort is normal.      Breath sounds: Normal breath sounds.   Chest:      Chest wall: No mass or tenderness.      Breasts:         Right: No skin change.         Left: No skin change.      Comments: B saline implants  Genitourinary:     Comments: Large pedunculated lesion in vaginal canal, no bleeding, vaginal atrophy  Lymphadenopathy:      Upper Body:      Right upper body: No supraclavicular, axillary or pectoral adenopathy.      Left upper body: No supraclavicular, axillary or pectoral adenopathy.         Procedures       Assessment/Plan         Diagnoses and all orders for this visit:    1. Diabetes mellitus type 2, diet-controlled (CMS/[facility]) (Primary)  Comments:  hold metformin- wants to see how she does with treadmill, diet, again..  Recheck in 3 months.  Orders:  -     Hemoglobin A1c; Future  -     Comprehensive Metabolic Panel; Future    2. Vaginal polyp  Comments:  needs further eval given her BRCA gene mutation- referral made to gyn  Orders:  -     Ambulatory Referral to Gynecology    3. Essential hypertension  Comments:  controlled    4. Physical exam, annual  Comments:  The above addressed- discussed need for weight reduction- refuses further immunizations, discussed need.  Recheck 3 months.         Return in about 3 months (around 4/27/2021) for Recheck, Lab Before FUP.

## 2021-02-01 LAB
CONV .: ABNORMAL
CYTOLOGIST CVX/VAG CYTO: ABNORMAL
CYTOLOGY CVX/VAG DOC CYTO: ABNORMAL
CYTOLOGY CVX/VAG DOC THIN PREP: ABNORMAL
DX ICD CODE: ABNORMAL
DX ICD CODE: ABNORMAL
HIV 1 & 2 AB SER-IMP: ABNORMAL
HPV I/H RISK 4 DNA CVX QL PROBE+SIG AMP: NEGATIVE
OTHER STN SPEC: ABNORMAL
PATHOLOGIST CVX/VAG CYTO: ABNORMAL
RECOM F/U CVX/VAG CYTO: ABNORMAL
STAT OF ADQ CVX/VAG CYTO-IMP: ABNORMAL

## 2021-02-08 RX ORDER — ESCITALOPRAM OXALATE 10 MG/1
10 TABLET ORAL DAILY
Qty: 30 TABLET | Refills: 2 | Status: SHIPPED | OUTPATIENT
Start: 2021-02-08 | End: 2021-02-23 | Stop reason: SDUPTHER

## 2021-02-15 ENCOUNTER — TELEPHONE (OUTPATIENT)
Dept: PSYCHIATRY | Facility: HOSPITAL | Age: 56
End: 2021-02-15

## 2021-02-15 NOTE — TELEPHONE ENCOUNTER
Supportive Oncology Services    Call placed to pt regarding missed apt in clinic; provided number to  to reschedule.

## 2021-02-18 ENCOUNTER — OFFICE VISIT (OUTPATIENT)
Dept: OBSTETRICS AND GYNECOLOGY | Age: 56
End: 2021-02-18

## 2021-02-18 VITALS
HEIGHT: 65 IN | BODY MASS INDEX: 41.82 KG/M2 | DIASTOLIC BLOOD PRESSURE: 74 MMHG | SYSTOLIC BLOOD PRESSURE: 128 MMHG | WEIGHT: 251 LBS

## 2021-02-18 DIAGNOSIS — Z15.01 BRCA2 GENE MUTATION POSITIVE IN FEMALE: ICD-10-CM

## 2021-02-18 DIAGNOSIS — Z15.09 BRCA2 GENE MUTATION POSITIVE IN FEMALE: ICD-10-CM

## 2021-02-18 DIAGNOSIS — N84.2 VAGINAL POLYP: Primary | ICD-10-CM

## 2021-02-18 DIAGNOSIS — R87.610 ASCUS OF CERVIX WITH NEGATIVE HIGH RISK HPV: ICD-10-CM

## 2021-02-18 DIAGNOSIS — Z15.02 BRCA2 GENE MUTATION POSITIVE IN FEMALE: ICD-10-CM

## 2021-02-18 PROCEDURE — 57135 EXCISION VAGINAL CYST/TUMOR: CPT | Performed by: PHYSICIAN ASSISTANT

## 2021-02-18 PROCEDURE — 99203 OFFICE O/P NEW LOW 30 MIN: CPT | Performed by: PHYSICIAN ASSISTANT

## 2021-02-18 NOTE — PROGRESS NOTES
"Subjective     Chief Complaint   Patient presents with   • Gynecologic Exam     new pt referred for vaginal polyp       Christie Avendaño is a 55 y.o.  whose LMP is No LMP recorded. Patient has had a hysterectomy. presents for f/u of vaginal polyp found by PCP    Previous pt of Dr Chelsey Khanna and BSO d/t fibroids, done 28 ya    Had not had a vaginal exam since that surgery  She recently established with Dr Holloway and a vaginal exam was done d/t pt's extensive history  A vaginal polyp was noted during that exam and she was sent her for f/u  Of note, a pap was done as well  Yeast noted but pt not sxatic.   ASCUS finding, neg hpv    She is BRCA +  Had B mastectomy     She is   Is SA and notes some discomfort with IC  Denies Vag bleeding  No Additional Complaints Reported    The following portions of the patient's history were reviewed and updated as appropriate:vital signs, allergies, current medications, past family history, past medical history, past social history, past surgical history and problem list      Review of Systems   Genitourinary:positive for vaginal polyp     Objective      /74   Ht 165.1 cm (65\")   Wt 114 kg (251 lb)   Breastfeeding No   BMI 41.77 kg/m²     Physical Exam    General:   alert, comfortable and no distress   Heart: Not performed today   Lungs: Not performed today.   Breast: Not performed today   Neck: na   Abdomen: {Not performed today   CVA: Not performed today   Pelvis: External genitalia: normal general appearance  Vaginal: polyp noted on right side of vaginal wall, around 10 o'clock, removed using ring forceps, scant bleeding noted and pt tolerated well  Cervix: absent   Extremities: Not performed today   Neurologic: negative   Psychiatric: Normal affect, judgement, and mood       Lab Review   Labs: No data reviewed     Imaging   No data reviewed    Assessment/Plan     ASSESSMENT  1. Vaginal polyp    2. BRCA2 gene mutation positive in female    3. ASCUS of cervix " with negative high risk HPV        PLAN  1. Vaginal polyp removed and sent to pathology. Suspect benign finding. Will call with results.    2. Enc rpt pap in 1 year, can be done with PCP or f/u here depending on pt's comfort level.    Call for any problems    Follow up: 1 year(s)    MICK Tan  2/18/2021

## 2021-02-22 LAB
DX ICD CODE: NORMAL
PATH REPORT.FINAL DX SPEC: NORMAL
PATH REPORT.GROSS SPEC: NORMAL
PATH REPORT.RELEVANT HX SPEC: NORMAL
PATH REPORT.SITE OF ORIGIN SPEC: NORMAL
PATHOLOGIST NAME: NORMAL
PAYMENT PROCEDURE: NORMAL

## 2021-02-23 ENCOUNTER — OFFICE VISIT (OUTPATIENT)
Dept: PSYCHIATRY | Facility: HOSPITAL | Age: 56
End: 2021-02-23

## 2021-02-23 DIAGNOSIS — F33.1 MAJOR DEPRESSIVE DISORDER, RECURRENT EPISODE, MODERATE (HCC): Primary | ICD-10-CM

## 2021-02-23 PROCEDURE — 99213 OFFICE O/P EST LOW 20 MIN: CPT | Performed by: NURSE PRACTITIONER

## 2021-02-23 RX ORDER — ESCITALOPRAM OXALATE 10 MG/1
10 TABLET ORAL DAILY
Qty: 90 TABLET | Refills: 0 | Status: SHIPPED | OUTPATIENT
Start: 2021-02-23 | End: 2021-05-04 | Stop reason: SDUPTHER

## 2021-02-23 NOTE — PROGRESS NOTES
Supportive Oncology Services  In Person Session    Subjective  Patient ID: Christie Avendaño is a 55 y.o. female is seen face to face in the [facility] ([facility]) Clinic.    CC:  Management of anxiety and depressive symptoms    HPI:  Patient reports decrease in anxiety and depressive symptoms since starting Lexapro.  She acknowledges intermittent irritability; however, feels there has been an overall improvement in her mood.  She feels less overwhelmed and more even-keeled.  She is still taking 5mg of the Lexapro because she was fearful the 10mg dose would be too much and she would be overly sedated.  She reports being very sensitive to medications; therefore, slightly worried about increasing the dose as instructed.  Although she feels the medication has been beneficial for her mood, she still feels there is room for improvement.      Sleep is reportedly stable with good nighttime sleep and an occasional daytime nap.    She discusses her excitement over her new grandson, who she has not had the opportunity to meet yet due to the pandemic.  She is looking forward to warmer weather so she can get outside and walk.          Review of Systems   Constitutional: Positive for fatigue.   Psychiatric/Behavioral: Negative for sleep disturbance. The patient is nervous/anxious.        Medications Reviewed:  Lexapro 5mg daily  Gabapentin- patient discontinued due to GI upset and a reported increase in restless leg symptoms      Diagnoses and all orders for this visit:    1. Major depressive disorder, recurrent episode, moderate (CMS/HCC) (Primary)    Other orders  -     escitalopram (Lexapro) 10 MG tablet; Take 1 tablet by mouth Daily.  Dispense: 90 tablet; Refill: 0        Plan of Care  Patient with improvement in symptoms of major depressive disorder after starting 5mg of Lexapro.  Discussed the possibility of following up in a group setting; however, the patient prefers individual follow-up due to being able to come  to the clinic.  Explored potential benefits of increasing Lexapro to 10mg to target her irritability and occasional anxiety and sadness.  Patient agrees with plan and is instructed to reduce back down to 5mg if she is unable to tolerate the increased dose.  Follow-up scheduled in 6 weeks.    Total time spent in patient encounter: 25 minutes

## 2021-03-08 ENCOUNTER — HOSPITAL ENCOUNTER (OUTPATIENT)
Dept: CT IMAGING | Facility: HOSPITAL | Age: 56
Discharge: HOME | End: 2021-03-08
Admitting: THORACIC SURGERY (CARDIOTHORACIC VASCULAR SURGERY)

## 2021-03-08 DIAGNOSIS — C34.91 ADENOCARCINOMA OF RIGHT LUNG (HCC): ICD-10-CM

## 2021-03-08 PROCEDURE — 71250 CT THORAX DX C-: CPT

## 2021-03-11 NOTE — PROGRESS NOTES
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  She has remained off of oral iron and reports normal bowel function.  She has done quite well since her last visit.  She has continued on Lexapro, did increase her dose after recent visit to supportive oncology clinic up to 10 mg daily however she developed profound fatigue and decreased back to 5 mg daily with improvement.  She feels that her depression is well controlled currently.      Objective   Vital Signs:   /83   Pulse 84   Temp 97.7 °F (36.5 °C) (Temporal)   Resp 18   Ht 165.1 cm (65\")   Wt 113 kg (250 lb 1.6 oz)   SpO2 98%   BMI 41.62 kg/m²     Physical Exam  Constitutional:       Appearance: She is well-developed.   Eyes:      Conjunctiva/sclera: Conjunctivae normal.   Neck:      Thyroid: No thyromegaly.   Cardiovascular:      Rate and Rhythm: Normal rate and regular rhythm.      Heart sounds: No murmur. No friction rub. No gallop.    Pulmonary:      Effort: No respiratory distress.      Breath sounds: Normal breath sounds.   Abdominal:      General: Bowel sounds are normal. There is no distension.      Palpations: Abdomen is soft.      Tenderness: There is no abdominal tenderness.   Lymphadenopathy:      Head:      Right side of head: No submandibular adenopathy.      Cervical: No cervical adenopathy.      Upper Body:      Right upper body: No supraclavicular adenopathy.      Left upper body: No supraclavicular adenopathy.   Skin:   "   General: Skin is warm and dry.      Findings: No rash.   Neurological:      Mental Status: She is alert and oriented to person, place, and time.      Cranial Nerves: No cranial nerve deficit.      Motor: No abnormal muscle tone.      Deep Tendon Reflexes: Reflexes normal.   Psychiatric:         Behavior: Behavior normal.        Patient was examined today, unchanged from above    Result Review : Reviewed CBC, CMP from today.  Reviewed CT chest 3/8/2021.  I did personally review CT images with interpretation as outlined below.       Assessment and Plan  1.  BRCA2 mutation (c.5073dupA) and GEORGES mutation (c.5073dup):  · Strong family history of malignancy with a mother who had thyroid cancer and also had breast cancer at age 73 with subsequent liver metastases.  She was found to be BRCA2 positive and prompted the patient's own testing.  The patient's maternal grandmother had ovarian cancer in her mid 80s, maternal grandfather and paternal grandfather both had leukemia of unknown type at an older age.  Her maternal aunt had colon cancer over the age of 50, maternal aunt had breast cancer in her 80s, and her father had cholangiocarcinoma.    · The patient underwent testing on 7/27/2016 showing positive BRCA2 mutation (c.5073dupA)   · The patient did undergo KAVYA/BSO in 1992 secondary to hemorrhage.  · The patient did undergo bilateral mastectomies 1/26/2017 with findings of bilateral DCIS (discussed below).  · Patient has undergone previous colonoscopy on 11/28/2017.  · Given the unusual nature of the patient's malignancies, referral to genetics clinic for panel testing performed 11/10/2020 with re-identification of BRCA2 mutation (c.5073dupA) and new identification of GEORGES mutation (c.5073dup).  2. Stage I (qM3bWoXt) papillary thyroid cancer:  · Status post total thyroidectomy with Dr. Maher in [place] on 10/15/2010.  Pathology showed papillary thyroid cancer involving the left thyroid and isthmus, multifocal with 2  areas measuring 0.9 and 1.2 cm.  No evidence of capsular invasion, no extrathyroidal extension, no lymphovascular invasion, negative margins.  · Postsurgical scan showed uptake in the thyroid bed and the patient underwent treatment with I-131.    · She has had no evidence of recurrent disease.    · Her subsequent hypothyroidism has been managed by endocrinology (Dr. Subhash Michael) in [place], currently receiving levothyroxine 150 mcg daily.  3. Bilateral DCIS  · As above, patient has underlying BRCA2 mutation  · 8/5/2016 was found to have a right breast intraductal papilloma with fibrocystic change and microcalcifications with usual ductal hyperplasia and columnar cell change in 3 separate locations on biopsy.  · On 8/24/2016 she underwent excisional biopsy of an intraductal papilloma from the right breast.    · She subsequently underwent on 1/26/2017 bilateral mastectomy.  On the right there was a large intraductal papilloma with usual ductal hyperplasia, atypical hyperplasia, DCIS measuring 3 mm which was low-grade, ER positive (98%), UT positive (85%).  On the left there were multiple intraductal papillomas with atypical ductal hyperplasia.  There was DCIS measuring 6 mm, low-grade, ER positive (99%), UT positive (98%).  · Exam on 1/12/2021 was negative  4. Stage I (uR5wBeA8) clear-cell renal cell carcinoma:  · Incidental finding on CT scan 1/15/2020 of enhancing left renal lesions x2, largest 2.4 cm  · Resection with Dr. Pool ([facility]) on 5/15/2020 with left partial nephrectomy.  Pathology showed clear cell renal cell carcinoma, Jeanne grade 2, 2 foci measuring 1.5 and 2.1 cm.  The renal parenchymal margin was focally positive.  · Patient reports that Dr. Pool felt that he required additional margin tissue in the area of focal positivity and did not recommend re-resection.  · Most recent CT 7/1/2020 showed evidence of resection of left renal lesion.  · We are continuing with every  6-month monitoring of CT scans for surveillance currently.  Most recent CT scan abdomen and pelvis 1/7/2021 showed no evidence of recurrent malignancy.    · We did discuss delaying her next scan by 2 months in order to coincide with her 6-month interval CT chest patient was in agreement.  5. 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    · CT-guided biopsy on 1/9/2018 revealed a left lower lobe carcinoid with spindle cell features, no necrosis, no mitoses.  · Notation of normal chromogranin A 1/23/2018 of 35  · Follow-up CT on 1/24/2018 showed multiple bilateral pulmonary nodules including a 2.1 cm left lower lobe nodule (previously 2.6), 7 mm left lower lobe nodule, 1 cm right lower lobe nodule, right adrenal 1.5 cm nodule consistent with adenoma, and hepatic cysts.    · On 2/28/2018, the patient underwent a left lower lobectomy.  Pathology revealed a 2.3 cm left upper lobe (hilar) carcinoid, typical with spindle cell features, Ki-67 of 3.68%.  There were 2 of 8 peribronchial lymph nodes involved with carcinoid with lymphatic invasion, stage IIB ([id number]).  There was also evidence of adenocarcinoma (see below).  6. Stage IA1 (jM1qF6Ps)  left lower lobe non-small cell lung cancer (adenocarcinoma with lipidic features):  · The patient is a never smoker  · PET scan 12/13/2017 with hypermetabolic 2.5 cm left lower lobe nodule with SUV 5.7.    · CT-guided biopsy on 1/9/2018 revealed a left lower lobe carcinoid with spindle cell features, no necrosis, no mitoses.    · Follow-up CT on 1/24/2018 showed multiple bilateral pulmonary nodules including a 2.1 cm left lower lobe nodule (previously 2.6), 7 mm left lower lobe nodule, 1 cm right lower lobe nodule, right adrenal 1.5 cm nodule consistent with adenoma, and hepatic cysts.    · On 2/28/2018, the patient underwent a left lower lobectomy.  Pathology revealed a 2.3 cm left  upper lobe (hilar) carcinoid, typical with spindle cell features, Ki-67 of 3.68%.  There were 2 of 8 peribronchial lymph nodes involved with carcinoid with lymphatic invasion, stage IIB ([id number]).  There was a 0.9 cm adenocarcinoma, well differentiated with lipidic features, no visceral pleural invasion, bronchoalveolar atypical adenomatous hyperplasia at the parenchymal margin and 1 microscopic focus (less than 1 mm).  0/8 lymph nodes involved with adenocarcinoma. PDL1 <1% TPS, positive EGF receptor mutation (exon 19 deletion, [id number]), negative ALK/ROS1 rearrangement, negative BRAF, ER negative (2%), NM 0, HER-2/laura 1+, Ki-67 3%. Stage IA1 ([id number]).  · Patient was placed on Femara following surgery by Dr. Juana Pelayo.  Femara subsequently discontinued in early August 2020.  · Subsequent follow-up scans with stable findings until scan on 1/15/2020 noted 2 enhancing left renal lesions, largest 2.4 cm and there was also an enlarging right lower lobe nodule up to 1.0 x 1.2 cm.  In the interval, patient did undergo resection of renal cell carcinoma (discussed above).    · CT scan on 7/1/2020 showed slow increase in right lower lobe nodule up to 1.2 cm.  Felt to represent new primary lung cancer (see below).   7. Stage IA2 (gV4ayG4B5) right lower lobe non-small cell lung cancer (adenocarcinoma with lipidic growth pattern):   · CT scan on 7/1/2020 showed slow increase in right lower lobe nodule up to 1.2 cm.  · CT-guided biopsy of the right lower lobe nodule on 7/31/2020 with adenocarcinoma with bland lipidic growth pattern of pulmonary origin (TTF-1 and CK7 positive). PDL1 TPS <1%, EGFR mutated, exon 20 insertion (possibly indicating lack of response to TKI's). ALK/ROS1 rearrangement negative and BRAF V600 mutation negative.  · Staging MRI brain 8/12/2020 with no evidence of metastatic disease  · Staging PET scan 8/12/2020 with no significant activity in the biopsied lesion 1.2 cm right lower lobe (SUV  1.5), no evidence of hilar, mediastinal uptake nor distant metastatic disease.  · Given the difference in EGFR mutation between the patient's 2 lung cancers, it is felt that she has 2 separate primary lesions.    · Right VATS with anterior basal segmentectomy on 10/9/2020 with pathology showing invasive well to moderately differentiated adenocarcinoma with acinar predominant growth measuring 1.4 cm, negative margins, no pleural or lymphovascular invasion, negative lymph nodes x5.  · Follow-up CT chest 3/8/2021 showed postoperative change, no evidence of disease recurrence.  As above, we discussed obtaining a 6-month interval follow-up CT chest and we will go ahead and order this so it coincides with her abdominal and pelvic surveillance CT in regards to her history of renal cell carcinoma.  8. Anemia:  · Hemoglobin in high 10 to low 11 range with low normal MCV  · Anemia evaluation 8/20/2020 with iron 44, ferritin 119.3, iron saturation 13%, TIBC 351, folate 12.2, B12 392.  · Unclear whether patient may have anemia secondary to chronic disease.  · With low iron saturation, initiated oral iron daily on 9/15/2020.  Patient however did not begin oral iron until 11/11/2020.  · Labs on 11/11/2020 with hemoglobin 11.4, iron studies showing iron 54, ferritin 112, iron saturation 14%, TIBC 395.    · Labs on 1/7/2021 with hemoglobin 11.9, iron studies with iron 50, ferritin 109.5, iron saturation 12%, TIBC 420.  Patient with significant GI side effects from oral iron with diarrhea, iron discontinued.  · Patient returns today with hemoglobin 12.6.  We will plan to repeat CBC as well as iron panel and ferritin when patient returns in 6 months.  9. Depression  · Patient has had difficulty coping with multiple stressors.  She has recently moved and is the primary caregiver for her mother with metastatic breast cancer who was not doing well.    · Patient seen in supportive oncology clinic 11/10/2020  · Patient was previously  treated with gabapentin 300 mg 3 times daily and Lexapro with dose titrated from 5 up to 10 mg daily.  She developed significant fatigue and has been off of gabapentin and has decreased Lexapro back to 5 mg daily with improvement in symptoms.  She feels that depression is under good control currently.  10. Obesity  · Patient motivated to lose weight, was referred to oncology nutrition and was seen on 11/10/2020     Plan:  1. MD visit in 6 months with CBC, CMP, iron panel, ferritin.  1 week prior she will undergo CT chest abdomen and pelvis.

## 2021-03-12 ENCOUNTER — OFFICE VISIT (OUTPATIENT)
Dept: ONCOLOGY | Facility: CLINIC | Age: 56
End: 2021-03-12

## 2021-03-12 ENCOUNTER — LAB (OUTPATIENT)
Dept: LAB | Facility: HOSPITAL | Age: 56
End: 2021-03-12

## 2021-03-12 VITALS
BODY MASS INDEX: 41.67 KG/M2 | WEIGHT: 250.1 LBS | HEIGHT: 65 IN | DIASTOLIC BLOOD PRESSURE: 83 MMHG | TEMPERATURE: 97.7 F | HEART RATE: 84 BPM | RESPIRATION RATE: 18 BRPM | SYSTOLIC BLOOD PRESSURE: 129 MMHG | OXYGEN SATURATION: 98 %

## 2021-03-12 DIAGNOSIS — D05.11 NEOPLASM OF RIGHT BREAST, PRIMARY TUMOR STAGING CATEGORY TIS: DUCTAL CARCINOMA IN SITU (DCIS): ICD-10-CM

## 2021-03-12 DIAGNOSIS — C34.91 NON-SMALL CELL LUNG CANCER, RIGHT (HCC): ICD-10-CM

## 2021-03-12 DIAGNOSIS — C34.91 ADENOCARCINOMA OF RIGHT LUNG (HCC): ICD-10-CM

## 2021-03-12 DIAGNOSIS — Z15.09 BRCA2 GENE MUTATION POSITIVE IN FEMALE: ICD-10-CM

## 2021-03-12 DIAGNOSIS — Z15.02 BRCA2 GENE MUTATION POSITIVE IN FEMALE: ICD-10-CM

## 2021-03-12 DIAGNOSIS — Z15.01 BRCA2 GENE MUTATION POSITIVE IN FEMALE: ICD-10-CM

## 2021-03-12 DIAGNOSIS — C73 PAPILLARY THYROID CARCINOMA (HCC): Primary | ICD-10-CM

## 2021-03-12 DIAGNOSIS — C64.2 RENAL CELL CARCINOMA OF LEFT KIDNEY (HCC): ICD-10-CM

## 2021-03-12 DIAGNOSIS — D3A.090 CARCINOID TUMOR OF LEFT LUNG: ICD-10-CM

## 2021-03-12 LAB
ALBUMIN SERPL-MCNC: 4.5 G/DL (ref 3.5–5.2)
ALBUMIN/GLOB SERPL: 1.6 G/DL (ref 1.1–2.4)
ALP SERPL-CCNC: 163 U/L (ref 38–116)
ALT SERPL W P-5'-P-CCNC: 23 U/L (ref 0–33)
ANION GAP SERPL CALCULATED.3IONS-SCNC: 12.1 MMOL/L (ref 5–15)
AST SERPL-CCNC: 20 U/L (ref 0–32)
BASOPHILS # BLD AUTO: 0.04 10*3/MM3 (ref 0–0.2)
BASOPHILS NFR BLD AUTO: 0.4 % (ref 0–1.5)
BILIRUB SERPL-MCNC: 0.5 MG/DL (ref 0.2–1.2)
BUN SERPL-MCNC: 13 MG/DL (ref 6–20)
BUN/CREAT SERPL: 16.3 (ref 7.3–30)
CALCIUM SPEC-SCNC: 9.8 MG/DL (ref 8.5–10.2)
CHLORIDE SERPL-SCNC: 100 MMOL/L (ref 98–107)
CO2 SERPL-SCNC: 26.9 MMOL/L (ref 22–29)
CREAT SERPL-MCNC: 0.8 MG/DL (ref 0.6–1.1)
DEPRECATED RDW RBC AUTO: 45.9 FL (ref 37–54)
EOSINOPHIL # BLD AUTO: 0.16 10*3/MM3 (ref 0–0.4)
EOSINOPHIL NFR BLD AUTO: 1.6 % (ref 0.3–6.2)
ERYTHROCYTE [DISTWIDTH] IN BLOOD BY AUTOMATED COUNT: 15.1 % (ref 12.3–15.4)
GFR SERPL CREATININE-BSD FRML MDRD: 74 ML/MIN/1.73
GLOBULIN UR ELPH-MCNC: 2.8 GM/DL (ref 1.8–3.5)
GLUCOSE SERPL-MCNC: 225 MG/DL (ref 74–124)
HCT VFR BLD AUTO: 38.8 % (ref 34–46.6)
HGB BLD-MCNC: 12.6 G/DL (ref 12–15.9)
IMM GRANULOCYTES # BLD AUTO: 0.03 10*3/MM3 (ref 0–0.05)
IMM GRANULOCYTES NFR BLD AUTO: 0.3 % (ref 0–0.5)
LYMPHOCYTES # BLD AUTO: 2.75 10*3/MM3 (ref 0.7–3.1)
LYMPHOCYTES NFR BLD AUTO: 28 % (ref 19.6–45.3)
MCH RBC QN AUTO: 27.3 PG (ref 26.6–33)
MCHC RBC AUTO-ENTMCNC: 32.5 G/DL (ref 31.5–35.7)
MCV RBC AUTO: 84 FL (ref 79–97)
MONOCYTES # BLD AUTO: 0.61 10*3/MM3 (ref 0.1–0.9)
MONOCYTES NFR BLD AUTO: 6.2 % (ref 5–12)
NEUTROPHILS NFR BLD AUTO: 6.24 10*3/MM3 (ref 1.7–7)
NEUTROPHILS NFR BLD AUTO: 63.5 % (ref 42.7–76)
NRBC BLD AUTO-RTO: 0 /100 WBC (ref 0–0.2)
PLATELET # BLD AUTO: 266 10*3/MM3 (ref 140–450)
PMV BLD AUTO: 9.5 FL (ref 6–12)
POTASSIUM SERPL-SCNC: 4.7 MMOL/L (ref 3.5–4.7)
PROT SERPL-MCNC: 7.3 G/DL (ref 6.3–8)
RBC # BLD AUTO: 4.62 10*6/MM3 (ref 3.77–5.28)
SODIUM SERPL-SCNC: 139 MMOL/L (ref 134–145)
WBC # BLD AUTO: 9.83 10*3/MM3 (ref 3.4–10.8)

## 2021-03-12 PROCEDURE — 99214 OFFICE O/P EST MOD 30 MIN: CPT | Performed by: INTERNAL MEDICINE

## 2021-03-12 PROCEDURE — 80053 COMPREHEN METABOLIC PANEL: CPT

## 2021-03-12 PROCEDURE — 36415 COLL VENOUS BLD VENIPUNCTURE: CPT

## 2021-03-12 PROCEDURE — 85025 COMPLETE CBC W/AUTO DIFF WBC: CPT

## 2021-03-15 ENCOUNTER — OFFICE VISIT (OUTPATIENT)
Dept: SURGERY | Facility: CLINIC | Age: 56
End: 2021-03-15

## 2021-03-15 VITALS
WEIGHT: 252 LBS | BODY MASS INDEX: 41.99 KG/M2 | OXYGEN SATURATION: 99 % | HEART RATE: 72 BPM | DIASTOLIC BLOOD PRESSURE: 82 MMHG | SYSTOLIC BLOOD PRESSURE: 138 MMHG | HEIGHT: 65 IN

## 2021-03-15 DIAGNOSIS — C34.91 ADENOCARCINOMA OF RIGHT LUNG (HCC): Primary | ICD-10-CM

## 2021-03-15 PROCEDURE — 99213 OFFICE O/P EST LOW 20 MIN: CPT | Performed by: THORACIC SURGERY (CARDIOTHORACIC VASCULAR SURGERY)

## 2021-03-15 NOTE — PROGRESS NOTES
Chief Complaint  Non-small cell lung cancer  Subjective          Christie Avendaño presents to [facility] in follow-up for non-small cell lung cancer.  History of Present Illness  Ms. Avendaño is a very pleasant 55-year-old lady with BRCA2 and multiple cancers.  Most recently she underwent a right lower lobe segmentectomy on 10/9/2020 for a T1BN0 non-small cell lung cancer.  She has recovered very well from this procedure and has no complaints today.  She presents to discuss her recent CT scan.  Objective   Vital Signs:   There were no vitals taken for this visit.    Physical Exam  Vitals and nursing note reviewed.   Constitutional:       Appearance: She is well-developed.   HENT:      Head: Normocephalic and atraumatic.      Nose: Nose normal.   Eyes:      Conjunctiva/sclera: Conjunctivae normal.      Pupils: Pupils are equal, round, and reactive to light.   Cardiovascular:      Rate and Rhythm: Normal rate and regular rhythm.      Heart sounds: Normal heart sounds.   Pulmonary:      Effort: Pulmonary effort is normal.      Breath sounds: Normal breath sounds.   Abdominal:      General: Bowel sounds are normal.      Palpations: Abdomen is soft.   Musculoskeletal:         General: Normal range of motion.      Cervical back: Normal range of motion and neck supple.   Skin:     General: Skin is warm and dry.      Capillary Refill: Capillary refill takes less than 2 seconds.   Neurological:      Mental Status: She is alert and oriented to person, place, and time.   Psychiatric:         Behavior: Behavior normal.         Thought Content: Thought content normal.         Judgment: Judgment normal.        Result Review :       Data reviewed: Radiologic studies :     I have independently reviewed the CT of the chest performed on 3/8/2021 which demonstrates scarring consistent with a left lower lobectomy as well as a right lower lobe segmentectomy.  There is a stable 2 mm nodule in the right lower  lobe, stable 3 mm nodule in the left lower lobe.  There is calcification of the right hilar lymph nodes.  There is no new nodules.  There are no mediastinal or hilar lymphadenopathy.  There is no pleural pericardial effusion.     Assessment and Plan    Ms. Avendaño is a very pleasant 55-year-old lady status post VATS segmentectomy in October 2020 for a T1BN0, stage I adenocarcinoma.  We will plan to see her again in 6 months with a CT of the chest to coincide with the abdomen pelvis that was ordered by Dr. Collins.  This was discussed with her at length and I will plan to see her after her CT of the chest.  There are no diagnoses linked to this encounter.    Follow Up   No follow-ups on file.  Patient was given instructions and counseling regarding her condition or for health maintenance advice. Please see specific information pulled into the AVS if appropriate.

## 2021-04-06 ENCOUNTER — OFFICE VISIT (OUTPATIENT)
Dept: PSYCHIATRY | Facility: HOSPITAL | Age: 56
End: 2021-04-06

## 2021-04-06 DIAGNOSIS — F43.23 ADJUSTMENT DISORDER WITH MIXED ANXIETY AND DEPRESSED MOOD: Primary | ICD-10-CM

## 2021-04-06 PROCEDURE — 99214 OFFICE O/P EST MOD 30 MIN: CPT | Performed by: NURSE PRACTITIONER

## 2021-04-06 NOTE — PROGRESS NOTES
Supportive Oncology Services  In Person Session    Subjective  Patient ID: Christie Avendaño is a 55 y.o. female is seen face to face in the [facility] ([facility]) Clinic.    CC: Doing well; radical acceptance of challenging situations    HPI: Pt noted to be alert, oriented, and engaged in conversation. Affect and mood euthymic.  PHQ 9 and TYESHA 7 remain low and stable.  Pt continues to review benefits of asserted personal boundaries and limitations.  Explores limitations placed on relationship with mother, which continues to serve her well.  Reviews upcoming celebration for [place], although with hesitancy.  Continues to work toward radical acceptance of relationships and personal preferences in family.  Patient continues to endorse fatigue as most intrusive symptom.  Recognizes she generally feels better when active, although sometimes having to urge self to get going.  At present, remains engaged in remodeling home and enjoys this. Patient does report poor adherence to Lexapro; states increase to 10 mg was too sedating, disabling her participation in daily activities.  Currently takes 5 mg on as needed basis at bedtime for sleep, which she believes is very helpful.  Medications reviewed; patient has tried gabapetnin in the past with limited benefit. Has not taken melatonin due to fears related to how it will effect her.  Sleep and appetite described as being appropriate.     Review of Systems   Constitutional: Positive for fatigue. Negative for activity change.   Psychiatric/Behavioral: Negative for dysphoric mood and sleep disturbance. The patient is not nervous/anxious.      Medications Reviewed:  Lexapro 5 mg; patient states she is only taking this on a as needed basis at bedtime for sleep (appx twice monthly)    Diagnoses and all orders for this visit:    1. Adjustment disorder with mixed anxiety and depressed mood (Primary)    Plan of Care  Patient with generally well managed symptoms of mood and  anxiety disorders, off psychotropic medications.  PHQ 9 and TYESHA 7 scores remained subtherapeutic.  Explored with patient data to suggest need for continued adherence to Lexapro for any benefit.  Explored potential benefit of melatonin to assist with sleep, and have encouraged patient to use 1 to 3 mg in place of Lexapro for difficult to initiate sleep.  Continued counseling provided regarding patient quality of life goals, self care strategies, and dedication to personal boundaries and limitations.  Follow-up arranged in clinic in 4 weeks in group setting.    Total time spent in patient encounter: 31 minutes

## 2021-04-28 ENCOUNTER — OFFICE VISIT (OUTPATIENT)
Dept: INTERNAL MEDICINE | Facility: CLINIC | Age: 56
End: 2021-04-28

## 2021-04-28 VITALS
SYSTOLIC BLOOD PRESSURE: 134 MMHG | TEMPERATURE: 97.1 F | WEIGHT: 254 LBS | DIASTOLIC BLOOD PRESSURE: 76 MMHG | HEART RATE: 80 BPM | BODY MASS INDEX: 42.32 KG/M2 | HEIGHT: 65 IN

## 2021-04-28 DIAGNOSIS — I10 ESSENTIAL HYPERTENSION: ICD-10-CM

## 2021-04-28 DIAGNOSIS — E11.9 DIABETES MELLITUS TYPE 2, DIET-CONTROLLED (HCC): ICD-10-CM

## 2021-04-28 DIAGNOSIS — L81.9 CHANGE IN PIGMENTED SKIN LESION: Primary | ICD-10-CM

## 2021-04-28 PROCEDURE — 99214 OFFICE O/P EST MOD 30 MIN: CPT | Performed by: INTERNAL MEDICINE

## 2021-04-28 NOTE — PROGRESS NOTES
"Chief Complaint  Diabetes    Subjective          Christie Avendaño presents to [facility]  History of Present Illness  Here for reg diabetes f/u- saw her labs last week and has been very anxious. Since then, she has really been working on exercise, dietary changes.  She has been watching more carbs than actual sugar.  Started checking her BS again more regularly over the last week- readings have been 200-300.  She and her  go out a lot to eat, huber bar food.    She took metformin in the past for diabetes but caused diarrhea.    Has a mole on her R heel that she says is changing but has been there for several years.     Objective   Vital Signs:   /76   Pulse 80   Temp 97.1 °F (36.2 °C)   Ht 165.1 cm (65\")   Wt 115 kg (254 lb)   BMI 42.27 kg/m²     Physical Exam  Constitutional:       Appearance: Normal appearance. She is obese.   Cardiovascular:      Rate and Rhythm: Normal rate and regular rhythm.   Musculoskeletal:      Right lower leg: No edema.      Left lower leg: No edema.   Skin:     Comments: 2 inch long hyperpigmented  Lesion on L heel, varying darkness, central area of ulceration   Psychiatric:         Mood and Affect: Mood normal.        Result Review :   The following data was reviewed by: Tiffany Holloway MD on 04/28/2021:  CMP    CMP 1/20/21 3/12/21 4/21/21   Glucose  225 (A)    Glucose 178 (A)  328 (A)   BUN 18 13 16   Creatinine 0.80 0.80 0.82   eGFR Non  Am 74 74 72   eGFR African Am 90  87   Sodium 139 139 134 (A)   Potassium 4.7 4.7 5.0   Chloride 101 100 96 (A)   Calcium 9.3 9.8 10.0   Total Protein 7.0  6.9   Albumin 4.70 4.50 4.60   Globulin 2.3  2.3   Total Bilirubin 0.5 0.5 0.5   Alkaline Phosphatase 156 (A) 163 (A) 168 (A)   AST (SGOT) 13 20 11   ALT (SGPT) 19 23 18   (A) Abnormal value       Comments are available for some flowsheets but are not being displayed.           A1C Last 3 Results    HGBA1C Last 3 Results 7/10/20 1/20/21 4/21/21 "   Hemoglobin A1C 7.3 (A) 8.20 (A) 9.20 (A)   (A) Abnormal value       Comments are available for some flowsheets but are not being displayed.                     Assessment and Plan    Diagnoses and all orders for this visit:    1. Change in pigmented skin lesion (Primary)  Comments:  urgent referral sent to derm of her choice  Orders:  -     Ambulatory Referral to Dermatology    2. Diabetes mellitus type 2, diet-controlled (CMS/[facility])  Comments:  discussed dietary changes and will add Trulicity 0.75 weekly x 4 doses- will send message with BS readings and will titrate up as tolerated educated how to admi  Orders:  -     Dulaglutide 0.75 MG/0.5ML solution pen-injector; Inject 0.75 mg under the skin into the appropriate area as directed 1 (One) Time Per Week.  Dispense: 4 pen; Refill: 1  -     Comprehensive Metabolic Panel  -     Hemoglobin A1c    3. Essential hypertension  Comments:  controlled, no change        Follow Up   Return in about 3 months (around 7/28/2021) for Recheck, Lab Before FUP.  Patient was given instructions and counseling regarding her condition or for health maintenance advice. Please see specific information pulled into the AVS if appropriate.

## 2021-05-04 ENCOUNTER — OFFICE VISIT (OUTPATIENT)
Dept: PSYCHIATRY | Facility: HOSPITAL | Age: 56
End: 2021-05-04

## 2021-05-04 DIAGNOSIS — F33.1 MAJOR DEPRESSIVE DISORDER, RECURRENT EPISODE, MODERATE (HCC): Primary | ICD-10-CM

## 2021-05-04 PROCEDURE — 99212 OFFICE O/P EST SF 10 MIN: CPT | Performed by: NURSE PRACTITIONER

## 2021-05-04 PROCEDURE — 90853 GROUP PSYCHOTHERAPY: CPT | Performed by: NURSE PRACTITIONER

## 2021-05-04 RX ORDER — VALACYCLOVIR HYDROCHLORIDE 1 G/1
2000 TABLET, FILM COATED ORAL 2 TIMES DAILY
Qty: 180 TABLET | Refills: 1 | Status: SHIPPED | OUTPATIENT
Start: 2021-05-04 | End: 2022-02-04

## 2021-05-04 RX ORDER — LISINOPRIL 20 MG/1
20 TABLET ORAL DAILY
Qty: 90 TABLET | Refills: 1 | Status: SHIPPED | OUTPATIENT
Start: 2021-05-04 | End: 2021-11-01

## 2021-05-04 RX ORDER — ESCITALOPRAM OXALATE 10 MG/1
10 TABLET ORAL DAILY
Qty: 90 TABLET | Refills: 0 | Status: SHIPPED | OUTPATIENT
Start: 2021-05-04 | End: 2021-08-18

## 2021-05-04 RX ORDER — ESCITALOPRAM OXALATE 10 MG/1
10 TABLET ORAL DAILY
Qty: 90 TABLET | Refills: 0 | Status: SHIPPED | OUTPATIENT
Start: 2021-05-04 | End: 2021-05-04 | Stop reason: SDUPTHER

## 2021-05-04 RX ORDER — ATORVASTATIN CALCIUM 40 MG/1
40 TABLET, FILM COATED ORAL NIGHTLY
Qty: 90 TABLET | Refills: 1 | Status: SHIPPED | OUTPATIENT
Start: 2021-05-04 | End: 2021-10-26

## 2021-05-04 NOTE — PROGRESS NOTES
Subjective  Patient ID: Christie Avendaño is a 55 y.o.. female seen in regularly scheduled group session.  Group participants have consented to group conducted via video through Zoom.    Total Group Time, Face to Face via Zoom: 50 minutes  Total Group Participants: 6, plus facilitation per MERRY Jesus    Group Therapy  Group topics explored including continued cancer experience; patients share openly regarding physical as well as emotional symptoms.  Explored sense of grief and loss related to new normal development, and lack of understanding of others regarding continued impacts of cancer.  Review hormonal therapies, persistence of pain, disrupted quality of life, impact of fatigue on ability to participate in desired tasks, and feelings of needing to support everyone as they have always done.  Group participants supported each other in their ability to allow themselves debbie and self-care.     Counseling provided regarding various pharmacological strategies for symptom management, and explored nonpharmacological interventions such as physical activity, rest, fatigue management, and effective communication with medical team.  Reviewed medical decision making, psychological and total impacts of pain, and development of realistic goals regarding current symptoms.  Discussed anticipatory anxiety regarding scans, fears of recurrence.  Explored current strengths, reward and mastery in various areas, and continued benefits of group for support of each other.    Patient response to group: Pt shares openly and empowers others with experience and questions to ask their providers.    Medications Management  MERRY Jesus present for medication discussion and management.  Pt continues in survivorship of BRCA 2 positivity, with prophylactic bilateral mastectomies; pt is s/p various cancer dx including lung and kidney cancers.    Constitutional Exam: Pt is alert, oriented, and engaged in conversation; affect and  mood euthymic.  CC: Improved anxiety on Lexapro  HPI: Patient continues to report improvement in symptoms of mood and anxiety disruption with Lexapro, 10 mg daily.  Pt reports feeling better off femara, and has appreciated being able to remain off this agent. Finds joint pain persists, although significantly improved.  Remains appreciative of current care.  Psychological ROS: negative for - anxiety or depression  Current medication regimen, inclusive of Lexapro reviewed and renewed as appropriate.     Diagnoses and all orders for this visit:    1. Major depressive disorder, recurrent episode, moderate (CMS/HCC) (Primary)    Other orders  -     escitalopram (Lexapro) 10 MG tablet; Take 1 tablet by mouth Daily.  Dispense: 90 tablet; Refill: 0

## 2021-05-10 RX ORDER — DOXYCYCLINE HYCLATE 100 MG
100 TABLET ORAL 2 TIMES DAILY
Qty: 20 TABLET | Refills: 0 | Status: SHIPPED | OUTPATIENT
Start: 2021-05-10 | End: 2021-08-03

## 2021-05-24 DIAGNOSIS — E11.9 DIABETES MELLITUS TYPE 2, DIET-CONTROLLED (HCC): ICD-10-CM

## 2021-05-24 RX ORDER — DULAGLUTIDE 1.5 MG/.5ML
1.5 INJECTION, SOLUTION SUBCUTANEOUS WEEKLY
Qty: 4 PEN | Refills: 5 | Status: SHIPPED | OUTPATIENT
Start: 2021-05-24 | End: 2021-06-14 | Stop reason: DRUGHIGH

## 2021-06-14 RX ORDER — DULAGLUTIDE 3 MG/.5ML
3 INJECTION, SOLUTION SUBCUTANEOUS WEEKLY
Qty: 4 PEN | Refills: 1 | Status: SHIPPED | OUTPATIENT
Start: 2021-06-14 | End: 2021-08-03

## 2021-07-29 DIAGNOSIS — E11.9 DIABETES MELLITUS TYPE 2, DIET-CONTROLLED (HCC): ICD-10-CM

## 2021-07-29 LAB
ALBUMIN SERPL-MCNC: 4.7 G/DL (ref 3.5–5.2)
ALBUMIN/GLOB SERPL: 2 G/DL
ALP SERPL-CCNC: 131 U/L (ref 39–117)
ALT SERPL-CCNC: 47 U/L (ref 1–33)
AST SERPL-CCNC: 28 U/L (ref 1–32)
BILIRUB SERPL-MCNC: 0.5 MG/DL (ref 0–1.2)
BUN SERPL-MCNC: 14 MG/DL (ref 6–20)
BUN/CREAT SERPL: 17.5 (ref 7–25)
CALCIUM SERPL-MCNC: 9.5 MG/DL (ref 8.6–10.5)
CHLORIDE SERPL-SCNC: 102 MMOL/L (ref 98–107)
CO2 SERPL-SCNC: 22.7 MMOL/L (ref 22–29)
CREAT SERPL-MCNC: 0.8 MG/DL (ref 0.57–1)
GLOBULIN SER CALC-MCNC: 2.4 GM/DL
GLUCOSE SERPL-MCNC: 108 MG/DL (ref 65–99)
HBA1C MFR BLD: 7.1 % (ref 4.8–5.6)
POTASSIUM SERPL-SCNC: 4.5 MMOL/L (ref 3.5–5.2)
PROT SERPL-MCNC: 7.1 G/DL (ref 6–8.5)
SODIUM SERPL-SCNC: 142 MMOL/L (ref 136–145)

## 2021-08-03 ENCOUNTER — OFFICE VISIT (OUTPATIENT)
Dept: INTERNAL MEDICINE | Facility: CLINIC | Age: 56
End: 2021-08-03

## 2021-08-03 VITALS
WEIGHT: 245 LBS | DIASTOLIC BLOOD PRESSURE: 84 MMHG | HEART RATE: 88 BPM | BODY MASS INDEX: 40.82 KG/M2 | HEIGHT: 65 IN | TEMPERATURE: 97.3 F | SYSTOLIC BLOOD PRESSURE: 132 MMHG

## 2021-08-03 DIAGNOSIS — I10 ESSENTIAL HYPERTENSION: ICD-10-CM

## 2021-08-03 DIAGNOSIS — C43.71 MALIGNANT MELANOMA OF RIGHT LOWER EXTREMITY INCLUDING HIP (HCC): ICD-10-CM

## 2021-08-03 DIAGNOSIS — E11.9 DIABETES MELLITUS TYPE 2, DIET-CONTROLLED (HCC): Primary | ICD-10-CM

## 2021-08-03 PROCEDURE — 99213 OFFICE O/P EST LOW 20 MIN: CPT | Performed by: INTERNAL MEDICINE

## 2021-08-03 RX ORDER — DULAGLUTIDE 3 MG/.5ML
INJECTION, SOLUTION SUBCUTANEOUS
Qty: 2 ML | Refills: 5 | Status: SHIPPED | OUTPATIENT
Start: 2021-08-03 | End: 2021-09-01 | Stop reason: SDUPTHER

## 2021-08-03 NOTE — PROGRESS NOTES
"Chief Complaint  Diabetes    Subjective          Christie Avendaño presents to [facility]  History of Present Illness  Had extensive surgery R heel, removal of melanoma and 3 grafts she is finally feeling better.  No weight bearing for at least another week.  She has really been working on her diabetes- doing great with diet, veggies, etc.  AM blood sugar logs reviewed- no side effects from Trulicity.        Objective   Vital Signs:   /84   Pulse 88   Temp 97.3 °F (36.3 °C)   Ht 165.1 cm (65\")   Wt 111 kg (245 lb) Comment: unable to stand  BMI 40.77 kg/m²     Physical Exam  Constitutional:       Appearance: Normal appearance.   Cardiovascular:      Rate and Rhythm: Normal rate and regular rhythm.   Musculoskeletal:      Right lower leg: No edema.      Left lower leg: No edema.        Result Review :   The following data was reviewed by: Tiffany Holloway MD on 08/03/2021:  CMP    CMP 3/12/21 4/21/21 7/29/21   Glucose 225 (A)     Glucose  328 (A) 108 (A)   BUN 13 16 14   Creatinine 0.80 0.82 0.80   eGFR Non  Am 74 72 74   eGFR African Am  87 90   Sodium 139 134 (A) 142   Potassium 4.7 5.0 4.5   Chloride 100 96 (A) 102   Calcium 9.8 10.0 9.5   Total Protein  6.9 7.1   Albumin 4.50 4.60 4.70   Globulin  2.3 2.4   Total Bilirubin 0.5 0.5 0.5   Alkaline Phosphatase 163 (A) 168 (A) 131 (A)   AST (SGOT) 20 11 28   ALT (SGPT) 23 18 47 (A)   (A) Abnormal value       Comments are available for some flowsheets but are not being displayed.           A1C Last 3 Results    HGBA1C Last 3 Results 1/20/21 4/21/21 7/29/21   Hemoglobin A1C 8.20 (A) 9.20 (A) 7.10 (A)   (A) Abnormal value       Comments are available for some flowsheets but are not being displayed.           Data reviewed: Consultant notes derm          Assessment and Plan    Diagnoses and all orders for this visit:    1. Diabetes mellitus type 2, diet-controlled (CMS/[facility]) (Primary)  Comments:  much better control with dietary " changes and Trulicity.  No change for now.     2. Essential hypertension  Comments:  controlled.     3. Malignant melanoma of right lower extremity including hip (CMS/HCC)  Comments:  doing well with surgery- good results so far.  Will follow.         Follow Up   Return in about 4 months (around 12/3/2021) for Recheck, Lab Before FUP.  Patient was given instructions and counseling regarding her condition or for health maintenance advice. Please see specific information pulled into the AVS if appropriate.

## 2021-08-18 RX ORDER — ESCITALOPRAM OXALATE 10 MG/1
TABLET ORAL
Qty: 90 TABLET | Refills: 3 | Status: SHIPPED | OUTPATIENT
Start: 2021-08-18 | End: 2022-09-07 | Stop reason: SDUPTHER

## 2021-09-01 RX ORDER — DULAGLUTIDE 3 MG/.5ML
3 INJECTION, SOLUTION SUBCUTANEOUS WEEKLY
Qty: 12 PEN | Refills: 1 | Status: SHIPPED | OUTPATIENT
Start: 2021-09-01 | End: 2022-01-18 | Stop reason: SDUPTHER

## 2021-09-10 ENCOUNTER — HOSPITAL ENCOUNTER (OUTPATIENT)
Dept: PET IMAGING | Facility: HOSPITAL | Age: 56
Discharge: HOME | End: 2021-09-10
Admitting: INTERNAL MEDICINE

## 2021-09-10 DIAGNOSIS — C73 PAPILLARY THYROID CARCINOMA (HCC): ICD-10-CM

## 2021-09-10 DIAGNOSIS — C64.2 RENAL CELL CARCINOMA OF LEFT KIDNEY (HCC): ICD-10-CM

## 2021-09-10 DIAGNOSIS — D3A.090 CARCINOID TUMOR OF LEFT LUNG: ICD-10-CM

## 2021-09-10 DIAGNOSIS — C34.91 NON-SMALL CELL LUNG CANCER, RIGHT (HCC): ICD-10-CM

## 2021-09-10 LAB — CREAT BLDA-MCNC: 0.8 MG/DL (ref 0.6–1.3)

## 2021-09-10 PROCEDURE — 71260 CT THORAX DX C+: CPT

## 2021-09-10 PROCEDURE — 74177 CT ABD & PELVIS W/CONTRAST: CPT

## 2021-09-10 PROCEDURE — 82565 ASSAY OF CREATININE: CPT

## 2021-09-10 PROCEDURE — 0 DIATRIZOATE MEGLUMINE & SODIUM PER 1 ML: Performed by: INTERNAL MEDICINE

## 2021-09-10 PROCEDURE — 25010000002 IOPAMIDOL 61 % SOLUTION: Performed by: INTERNAL MEDICINE

## 2021-09-10 RX ADMIN — DIATRIZOATE MEGLUMINE AND DIATRIZOATE SODIUM 30 ML: 660; 100 LIQUID ORAL; RECTAL at 09:30

## 2021-09-10 RX ADMIN — IOPAMIDOL 85 ML: 612 INJECTION, SOLUTION INTRAVENOUS at 10:07

## 2021-09-13 NOTE — PROGRESS NOTES
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  In the interval, she has been diagnosed with melanoma involving her right heel.  She had initial biopsy on 6/4/2021 showing a 0.6 mm superficial spreading melanoma.  There was melanoma in situ noted at the margin.  She underwent wide local excision on 6/30/2021 with no residual melanoma identified.  She has required multiple skin grafts, having received her third skin graft around 4 weeks ago now with some healing of the wound.  She continues close follow-up with dermatology.  She reports that there is a area on her right leg that may require biopsy in the near future.  She otherwise has felt well physically.  She does have some chronic intermittent constipation which is unchanged.  Her appetite is normal.  She is coping with situation with her mother who is on hospice care for metastatic breast cancer at home.  She feels that she is managing things fairly well.  She has not been seen back in the supportive oncology clinic since May.      Objective   Vital Signs:   /86   Pulse 92   Temp 98.7 °F (37.1 °C)   Resp 19   Wt 113 kg (248 lb 1.6 oz)   SpO2 96%   BMI 41.29 kg/m²     Physical Exam  Constitutional:       Appearance: She is well-developed.   Eyes:      Conjunctiva/sclera: Conjunctivae normal.   Neck:      Thyroid: No thyromegaly.   Cardiovascular:      Rate and Rhythm: Normal rate and regular rhythm.      Heart sounds: No  murmur heard.   No friction rub. No gallop.    Pulmonary:      Effort: No respiratory distress.      Breath sounds: Normal breath sounds.   Abdominal:      General: Bowel sounds are normal. There is no distension.      Palpations: Abdomen is soft.      Tenderness: There is no abdominal tenderness.   Lymphadenopathy:      Head:      Right side of head: No submandibular adenopathy.      Cervical: No cervical adenopathy.      Upper Body:      Right upper body: No supraclavicular adenopathy.      Left upper body: No supraclavicular adenopathy.   Skin:     General: Skin is warm and dry.      Findings: No rash.   Neurological:      Mental Status: She is alert and oriented to person, place, and time.      Cranial Nerves: No cranial nerve deficit.      Motor: No abnormal muscle tone.      Deep Tendon Reflexes: Reflexes normal.   Psychiatric:         Behavior: Behavior normal.        Patient was examined today, unchanged from above    Result Review : Reviewed CBC, CMP, iron panel, ferritin from today.  Reviewed CT chest abdomen pelvis 9/10/2021.       Assessment and Plan  1.  BRCA2 mutation (c.5073dupA) and GEORGES mutation (c.5073dup):  · Strong family history of malignancy with a mother who had thyroid cancer and also had breast cancer at age 73 with subsequent liver metastases.  She was found to be BRCA2 positive and prompted the patient's own testing.  The patient's maternal grandmother had ovarian cancer in her mid 80s, maternal grandfather and paternal grandfather both had leukemia of unknown type at an older age.  Her maternal aunt had colon cancer over the age of 50, maternal aunt had breast cancer in her 80s, and her father had cholangiocarcinoma.    · The patient underwent testing on 7/27/2016 showing positive BRCA2 mutation (c.5073dupA)   · The patient did undergo KAVYA/BSO in 1992 secondary to hemorrhage.  · The patient did undergo bilateral mastectomies 1/26/2017 with findings of bilateral DCIS (discussed  below).  · Patient has undergone previous colonoscopy on 11/28/2017.  · Given the unusual nature of the patient's malignancies, referral to genetics clinic for panel testing performed 11/10/2020 with re-identification of BRCA2 mutation (c.5073dupA) and new identification of GEORGES mutation (c.5073dup).  2. Stage I ([id number]) papillary thyroid cancer:  · Status post total thyroidectomy with Dr. Maher in [place] on 10/15/2010.  Pathology showed papillary thyroid cancer involving the left thyroid and isthmus, multifocal with 2 areas measuring 0.9 and 1.2 cm.  No evidence of capsular invasion, no extrathyroidal extension, no lymphovascular invasion, negative margins.  · Postsurgical scan showed uptake in the thyroid bed and the patient underwent treatment with I-131.    · She has had no evidence of recurrent disease.    · Her subsequent hypothyroidism has been managed by endocrinology (Dr. Subhash Michael) in [place], currently receiving levothyroxine 150 mcg daily.  3. Bilateral DCIS  · As above, patient has underlying BRCA2 mutation  · 8/5/2016 was found to have a right breast intraductal papilloma with fibrocystic change and microcalcifications with usual ductal hyperplasia and columnar cell change in 3 separate locations on biopsy.  · On 8/24/2016 she underwent excisional biopsy of an intraductal papilloma from the right breast.    · She subsequently underwent on 1/26/2017 bilateral mastectomy.  On the right there was a large intraductal papilloma with usual ductal hyperplasia, atypical hyperplasia, DCIS measuring 3 mm which was low-grade, ER positive (98%), ID positive (85%).  On the left there were multiple intraductal papillomas with atypical ductal hyperplasia.  There was DCIS measuring 6 mm, low-grade, ER positive (99%), ID positive (98%).  · Exam on 1/12/2021 was negative  4. Stage I ([id number]) clear-cell renal cell carcinoma:  · Incidental finding on CT scan 1/15/2020 of enhancing left renal lesions x2,  largest 2.4 cm  · Resection with Dr. Pool ([facility] [place]) on 5/15/2020 with left partial nephrectomy.  Pathology showed clear cell renal cell carcinoma, Jeanne grade 2, 2 foci measuring 1.5 and 2.1 cm.  The renal parenchymal margin was focally positive.  · Patient reports that Dr. Pool felt that he required additional margin tissue in the area of focal positivity and did not recommend re-resection.  · Most recent CT 7/1/2020 showed evidence of resection of left renal lesion.  · We are continuing with every 6-month monitoring of CT scans for surveillance currently.  Most recent CT scan abdomen and pelvis 9/10/2021 showed no evidence of recurrent malignancy.  We will repeat CT prior to return visit in 6 months.  5. 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    · CT-guided biopsy on 1/9/2018 revealed a left lower lobe carcinoid with spindle cell features, no necrosis, no mitoses.  · Notation of normal chromogranin A 1/23/2018 of 35  · Follow-up CT on 1/24/2018 showed multiple bilateral pulmonary nodules including a 2.1 cm left lower lobe nodule (previously 2.6), 7 mm left lower lobe nodule, 1 cm right lower lobe nodule, right adrenal 1.5 cm nodule consistent with adenoma, and hepatic cysts.    · On 2/28/2018, the patient underwent a left lower lobectomy.  Pathology revealed a 2.3 cm left upper lobe (hilar) carcinoid, typical with spindle cell features, Ki-67 of 3.68%.  There were 2 of 8 peribronchial lymph nodes involved with carcinoid with lymphatic invasion, stage IIB ([id number]).  There was also evidence of adenocarcinoma (see below).  6. Stage IA1 (yZ9nP7We)  left lower lobe non-small cell lung cancer (adenocarcinoma with lipidic features):  · The patient is a never smoker  · PET scan 12/13/2017 with hypermetabolic 2.5 cm left lower lobe nodule with SUV 5.7.    · CT-guided biopsy on 1/9/2018 revealed a  left lower lobe carcinoid with spindle cell features, no necrosis, no mitoses.    · Follow-up CT on 1/24/2018 showed multiple bilateral pulmonary nodules including a 2.1 cm left lower lobe nodule (previously 2.6), 7 mm left lower lobe nodule, 1 cm right lower lobe nodule, right adrenal 1.5 cm nodule consistent with adenoma, and hepatic cysts.    · On 2/28/2018, the patient underwent a left lower lobectomy.  Pathology revealed a 2.3 cm left upper lobe (hilar) carcinoid, typical with spindle cell features, Ki-67 of 3.68%.  There were 2 of 8 peribronchial lymph nodes involved with carcinoid with lymphatic invasion, stage IIB ([id number]).  There was a 0.9 cm adenocarcinoma, well differentiated with lipidic features, no visceral pleural invasion, bronchoalveolar atypical adenomatous hyperplasia at the parenchymal margin and 1 microscopic focus (less than 1 mm).  0/8 lymph nodes involved with adenocarcinoma. PDL1 <1% TPS, positive EGF receptor mutation (exon 19 deletion, [id number]), negative ALK/ROS1 rearrangement, negative BRAF, ER negative (2%), IN 0, HER-2/laura 1+, Ki-67 3%. Stage IA1 (sH3kyM2X0).  · Patient was placed on Femara following surgery by Dr. Juana Pelayo.  Femara subsequently discontinued in early August 2020.  · Subsequent follow-up scans with stable findings until scan on 1/15/2020 noted 2 enhancing left renal lesions, largest 2.4 cm and there was also an enlarging right lower lobe nodule up to 1.0 x 1.2 cm.  In the interval, patient did undergo resection of renal cell carcinoma (discussed above).    · CT scan on 7/1/2020 showed slow increase in right lower lobe nodule up to 1.2 cm.  Felt to represent new primary lung cancer (see below).   7. Stage IA2 (gI8hiK1F9) right lower lobe non-small cell lung cancer (adenocarcinoma with lipidic growth pattern):   · CT scan on 7/1/2020 showed slow increase in right lower lobe nodule up to 1.2 cm.  · CT-guided biopsy of the right lower lobe nodule on 7/31/2020  with adenocarcinoma with bland lipidic growth pattern of pulmonary origin (TTF-1 and CK7 positive). PDL1 TPS <1%, EGFR mutated, exon 20 insertion (possibly indicating lack of response to TKI's). ALK/ROS1 rearrangement negative and BRAF V600 mutation negative.  · Staging MRI brain 8/12/2020 with no evidence of metastatic disease  · Staging PET scan 8/12/2020 with no significant activity in the biopsied lesion 1.2 cm right lower lobe (SUV 1.5), no evidence of hilar, mediastinal uptake nor distant metastatic disease.  · Given the difference in EGFR mutation between the patient's 2 lung cancers, it is felt that she has 2 separate primary lesions.    · Right VATS with anterior basal segmentectomy on 10/9/2020 with pathology showing invasive well to moderately differentiated adenocarcinoma with acinar predominant growth measuring 1.4 cm, negative margins, no pleural or lymphovascular invasion, negative lymph nodes x5.  · Follow-up CT chest 3/8/2021 showed postoperative change, no evidence of disease recurrence.    · Patient returns today in follow-up with CT chest abdomen pelvis from 9/10/2021 that shows no evidence of recurrent disease.  We will continue with every 6-month monitoring with CT scan prior to her return visit.  8. Melanoma right heel stage IA (sX8vCyE2)  · Patient underwent excision of right heel melanoma 6/4/2021, 0.6 mm superficial spreading melanoma with margin positive for melanoma in situ.  · Wide local excision 6/30/2021 with no residual melanoma identified  · Patient has had difficulty with healing of the wound and has required 3 separate skin grafts, the most recent performed 4 weeks ago with ongoing slow healing.  · Patient continues close interval follow-up with dermatology.  There is apparently an additional lesion in her lower leg that may require biopsy in the near future.  9. Anemia:  · Hemoglobin in high 10 to low 11 range with low normal MCV  · Anemia evaluation 8/20/2020 with iron 44,  ferritin 119.3, iron saturation 13%, TIBC 351, folate 12.2, B12 392.  · Unclear whether patient may have anemia secondary to chronic disease.  · With low iron saturation, initiated oral iron daily on 9/15/2020.  Patient however did not begin oral iron until 11/11/2020.  · Labs on 11/11/2020 with hemoglobin 11.4, iron studies showing iron 54, ferritin 112, iron saturation 14%, TIBC 395.    · Labs on 1/7/2021 with hemoglobin 11.9, iron studies with iron 50, ferritin 109.5, iron saturation 12%, TIBC 420.  Patient with significant GI side effects from oral iron with diarrhea, iron discontinued.  · Patient today with hemoglobin slightly lower than before at 11.4 of unclear etiology.  We did check additional labs today with iron 44, ferritin 137.6, iron saturation 13 %, TIBC 346, folate 11.3, B12 394.  Recheck hemoglobin in 3 months with RN review and again in 6 months at MD visit and consider more extensive evaluation if hemoglobin declines further.  9. Depression  · Patient has had difficulty coping with multiple stressors.  She has recently moved and is the primary caregiver for her mother with metastatic breast cancer who was not doing well.    · Patient seen in supportive oncology clinic 11/10/2020  · Patient was previously treated with gabapentin 300 mg 3 times daily and Lexapro with dose titrated from 5 up to 10 mg daily.  She developed significant fatigue and discontinued gabapentin  · Patient is continuing on Lexapro 10 mg daily.  She has been seen previously in supportive oncology clinic, last on 5/4/2021.  She is currently coping with her mother situation with metastatic breast cancer on hospice care, currently living with the patient.  She is having some difficulty coping with things and I did encourage her to reconnect with supportive oncology clinic and she agreed.  10. Obesity  · Patient motivated to lose weight, was referred to oncology nutrition and was seen on 11/10/2020     Plan:  1. Patient will  continue close follow-up with dermatology regarding healing of the right heel wound and potential biopsy of an additional site on the right leg.  She will notify us if any additional malignancy is identified.  2. In 3 months CBC with RN review  3. MD visit in 6 months with CBC, CMP, iron panel, ferritin.  1 week prior she will undergo CT chest abdomen and pelvis.    I did spend 40 min in total time caring for the patient today, time spent in review of records, face-to-face consultation, coordination of care, placement of orders, completion of documentation.

## 2021-09-14 ENCOUNTER — LAB (OUTPATIENT)
Dept: LAB | Facility: HOSPITAL | Age: 56
End: 2021-09-14

## 2021-09-14 ENCOUNTER — OFFICE VISIT (OUTPATIENT)
Dept: ONCOLOGY | Facility: CLINIC | Age: 56
End: 2021-09-14

## 2021-09-14 VITALS
HEART RATE: 92 BPM | OXYGEN SATURATION: 96 % | WEIGHT: 248.1 LBS | DIASTOLIC BLOOD PRESSURE: 86 MMHG | SYSTOLIC BLOOD PRESSURE: 144 MMHG | BODY MASS INDEX: 41.29 KG/M2 | TEMPERATURE: 98.7 F | RESPIRATION RATE: 19 BRPM

## 2021-09-14 DIAGNOSIS — C34.91 NON-SMALL CELL LUNG CANCER, RIGHT (HCC): ICD-10-CM

## 2021-09-14 DIAGNOSIS — C64.2 RENAL CELL CARCINOMA OF LEFT KIDNEY (HCC): Primary | ICD-10-CM

## 2021-09-14 DIAGNOSIS — D64.9 NORMOCYTIC ANEMIA: ICD-10-CM

## 2021-09-14 DIAGNOSIS — D3A.090 CARCINOID TUMOR OF LEFT LUNG: ICD-10-CM

## 2021-09-14 DIAGNOSIS — C64.2 RENAL CELL CARCINOMA OF LEFT KIDNEY (HCC): ICD-10-CM

## 2021-09-14 DIAGNOSIS — C73 PAPILLARY THYROID CARCINOMA (HCC): ICD-10-CM

## 2021-09-14 LAB
ALBUMIN SERPL-MCNC: 4.3 G/DL (ref 3.5–5.2)
ALBUMIN/GLOB SERPL: 1.7 G/DL (ref 1.1–2.4)
ALP SERPL-CCNC: 127 U/L (ref 38–116)
ALT SERPL W P-5'-P-CCNC: 18 U/L (ref 0–33)
ANION GAP SERPL CALCULATED.3IONS-SCNC: 9.3 MMOL/L (ref 5–15)
AST SERPL-CCNC: 13 U/L (ref 0–32)
BASOPHILS # BLD AUTO: 0.04 10*3/MM3 (ref 0–0.2)
BASOPHILS NFR BLD AUTO: 0.5 % (ref 0–1.5)
BILIRUB SERPL-MCNC: 0.5 MG/DL (ref 0.2–1.2)
BUN SERPL-MCNC: 15 MG/DL (ref 6–20)
BUN/CREAT SERPL: 20 (ref 7.3–30)
CALCIUM SPEC-SCNC: 9.7 MG/DL (ref 8.5–10.2)
CHLORIDE SERPL-SCNC: 106 MMOL/L (ref 98–107)
CO2 SERPL-SCNC: 26.7 MMOL/L (ref 22–29)
CREAT SERPL-MCNC: 0.75 MG/DL (ref 0.6–1.1)
DEPRECATED RDW RBC AUTO: 47.5 FL (ref 37–54)
EOSINOPHIL # BLD AUTO: 0.11 10*3/MM3 (ref 0–0.4)
EOSINOPHIL NFR BLD AUTO: 1.3 % (ref 0.3–6.2)
ERYTHROCYTE [DISTWIDTH] IN BLOOD BY AUTOMATED COUNT: 15.4 % (ref 12.3–15.4)
FERRITIN SERPL-MCNC: 137.6 NG/ML (ref 11–207)
FOLATE SERPL-MCNC: 11.3 NG/ML (ref 4.78–24.2)
GFR SERPL CREATININE-BSD FRML MDRD: 80 ML/MIN/1.73
GLOBULIN UR ELPH-MCNC: 2.6 GM/DL (ref 1.8–3.5)
GLUCOSE SERPL-MCNC: 124 MG/DL (ref 74–124)
HCT VFR BLD AUTO: 36.9 % (ref 34–46.6)
HGB BLD-MCNC: 11.4 G/DL (ref 12–15.9)
IMM GRANULOCYTES # BLD AUTO: 0.03 10*3/MM3 (ref 0–0.05)
IMM GRANULOCYTES NFR BLD AUTO: 0.4 % (ref 0–0.5)
IRON 24H UR-MRATE: 44 MCG/DL (ref 37–145)
IRON SATN MFR SERPL: 13 % (ref 14–48)
LYMPHOCYTES # BLD AUTO: 2.74 10*3/MM3 (ref 0.7–3.1)
LYMPHOCYTES NFR BLD AUTO: 32.3 % (ref 19.6–45.3)
MCH RBC QN AUTO: 26.1 PG (ref 26.6–33)
MCHC RBC AUTO-ENTMCNC: 30.9 G/DL (ref 31.5–35.7)
MCV RBC AUTO: 84.6 FL (ref 79–97)
MONOCYTES # BLD AUTO: 0.72 10*3/MM3 (ref 0.1–0.9)
MONOCYTES NFR BLD AUTO: 8.5 % (ref 5–12)
NEUTROPHILS NFR BLD AUTO: 4.83 10*3/MM3 (ref 1.7–7)
NEUTROPHILS NFR BLD AUTO: 57 % (ref 42.7–76)
NRBC BLD AUTO-RTO: 0 /100 WBC (ref 0–0.2)
PLATELET # BLD AUTO: 255 10*3/MM3 (ref 140–450)
PMV BLD AUTO: 9.1 FL (ref 6–12)
POTASSIUM SERPL-SCNC: 4.8 MMOL/L (ref 3.5–4.7)
PROT SERPL-MCNC: 6.9 G/DL (ref 6.3–8)
RBC # BLD AUTO: 4.36 10*6/MM3 (ref 3.77–5.28)
SODIUM SERPL-SCNC: 142 MMOL/L (ref 134–145)
TIBC SERPL-MCNC: 346 MCG/DL (ref 249–505)
TRANSFERRIN SERPL-MCNC: 247 MG/DL (ref 200–360)
VIT B12 BLD-MCNC: 394 PG/ML (ref 211–946)
WBC # BLD AUTO: 8.47 10*3/MM3 (ref 3.4–10.8)

## 2021-09-14 PROCEDURE — 80053 COMPREHEN METABOLIC PANEL: CPT

## 2021-09-14 PROCEDURE — 84466 ASSAY OF TRANSFERRIN: CPT

## 2021-09-14 PROCEDURE — 82746 ASSAY OF FOLIC ACID SERUM: CPT | Performed by: INTERNAL MEDICINE

## 2021-09-14 PROCEDURE — 99215 OFFICE O/P EST HI 40 MIN: CPT | Performed by: INTERNAL MEDICINE

## 2021-09-14 PROCEDURE — 82728 ASSAY OF FERRITIN: CPT

## 2021-09-14 PROCEDURE — 83540 ASSAY OF IRON: CPT

## 2021-09-14 PROCEDURE — 85025 COMPLETE CBC W/AUTO DIFF WBC: CPT

## 2021-09-14 PROCEDURE — 82607 VITAMIN B-12: CPT | Performed by: INTERNAL MEDICINE

## 2021-09-14 PROCEDURE — 36415 COLL VENOUS BLD VENIPUNCTURE: CPT

## 2021-09-20 ENCOUNTER — OFFICE VISIT (OUTPATIENT)
Dept: SURGERY | Facility: CLINIC | Age: 56
End: 2021-09-20

## 2021-09-20 VITALS
OXYGEN SATURATION: 97 % | RESPIRATION RATE: 16 BRPM | DIASTOLIC BLOOD PRESSURE: 72 MMHG | HEART RATE: 81 BPM | SYSTOLIC BLOOD PRESSURE: 133 MMHG | TEMPERATURE: 97.9 F

## 2021-09-20 DIAGNOSIS — C34.91 ADENOCARCINOMA OF RIGHT LUNG (HCC): Primary | ICD-10-CM

## 2021-09-20 PROCEDURE — 99214 OFFICE O/P EST MOD 30 MIN: CPT | Performed by: THORACIC SURGERY (CARDIOTHORACIC VASCULAR SURGERY)

## 2021-09-20 NOTE — PROGRESS NOTES
Chief Complaint  Non-small cell lung cancer  Subjective          Christie THAD Avendaño presents to [facility] in follow-up.  History of Present Illness  Ms. Avendaño is a very pleasant 56-year-old lady status post right lower lobe anterior basal segmentectomy with mediastinal lymph node dissection in October 2020 for a stage I non-small cell lung cancer.  She has the BRCA gene and has had multiple cancers.  She is doing reasonably well.  She was recently diagnosed with malignant melanoma of the heel and has undergone resection.    She has not gotten her Covid vaccination and we had a long discussion today regarding vaccination.  Objective   Vital Signs:   /72 (BP Location: Right arm, Patient Position: Sitting, Cuff Size: Adult)   Pulse 81   Temp 97.9 °F (36.6 °C) (Temporal)   Resp 16   SpO2 97%     Physical Exam  Vitals and nursing note reviewed.   Constitutional:       Appearance: She is well-developed.   HENT:      Head: Normocephalic and atraumatic.      Nose: Nose normal.   Eyes:      Conjunctiva/sclera: Conjunctivae normal.   Cardiovascular:      Rate and Rhythm: Normal rate.   Pulmonary:      Effort: Pulmonary effort is normal.   Abdominal:      Palpations: Abdomen is soft.   Musculoskeletal:      Cervical back: Neck supple.   Skin:     General: Skin is warm and dry.   Neurological:      Mental Status: She is alert and oriented to person, place, and time.   Psychiatric:         Behavior: Behavior normal.         Thought Content: Thought content normal.         Judgment: Judgment normal.        Result Review :       Data reviewed: Radiologic studies :     I have independently reviewed the CT of the chest performed on 9/10/2021 which demonstrates scarring associated with the staple line in the right lower lobe.  There are no new nodules bilaterally.  There is no mediastinal or hilar lymphadenopathy.  There is no pleural pericardial effusion.     Assessment and Plan      Ms.  Jarocho is a very pleasant 56-year-old lady status post right lower lobe segmentectomy for stage I non-small cell lung cancer.  She has no evidence of recurrent disease on her most recent CT of the chest.  She will need continued surveillance with a CT of the chest in 4 months.  We will plan to have her follow-up in the [place] office.    We had a long discussion regarding vaccination today and I have encouraged her to get her COVID-19 vaccination as she is high risk given her multiple lung surgeries, obesity, diabetes and BRCA gene.  Diagnoses and all orders for this visit:    1. Adenocarcinoma of right lung (CMS/HCC) (Primary)      I spent 30 minutes caring for Christie on this date of service. This time includes time spent by me in the following activities:preparing for the visit, reviewing tests, obtaining and/or reviewing a separately obtained history, performing a medically appropriate examination and/or evaluation , counseling and educating the patient/family/caregiver, ordering medications, tests, or procedures, referring and communicating with other health care professionals , documenting information in the medical record and independently interpreting results and communicating that information with the patient/family/caregiver  Follow Up   No follow-ups on file.  Patient was given instructions and counseling regarding her condition or for health maintenance advice. Please see specific information pulled into the AVS if appropriate.

## 2021-10-04 RX ORDER — DOXYCYCLINE HYCLATE 100 MG/1
100 CAPSULE ORAL 2 TIMES DAILY
Qty: 20 CAPSULE | Refills: 0 | Status: SHIPPED | OUTPATIENT
Start: 2021-10-04 | End: 2022-04-13

## 2021-10-07 ENCOUNTER — CLINICAL SUPPORT (OUTPATIENT)
Dept: INTERNAL MEDICINE | Facility: CLINIC | Age: 56
End: 2021-10-07

## 2021-10-07 DIAGNOSIS — J06.9 UPPER RESPIRATORY TRACT INFECTION, UNSPECIFIED TYPE: Primary | ICD-10-CM

## 2021-10-08 ENCOUNTER — TELEPHONE (OUTPATIENT)
Dept: SURGERY | Facility: CLINIC | Age: 56
End: 2021-10-08

## 2021-10-08 LAB
LABCORP SARS-COV-2, NAA 2 DAY TAT: NORMAL
SARS-COV-2 RNA RESP QL NAA+PROBE: NOT DETECTED

## 2021-10-08 NOTE — TELEPHONE ENCOUNTER
----- Message from Christie Avendaño sent at 10/7/2021  4:19 PM EDT -----  Regarding: Non-Urgent Medical Question  Contact: [phone]  Dr Crisostomo,   I took your advice and started the vaccine process. I had my first Maderna shot on  (day after I seen you).   Once I have the second Maderna shot on , I'll send attachment of my vaccine card for my chart.     I do currently have some allergy/sinus infection going on with some coughing, low grade fever the first couple of days but no fever now. A couple days ago I lost my taste and can't smell anything, I lay claim to stuffy sinuses and lots of cough drops)   Dr Tiffany Holloway has prescribed an antibiotic - Doxycycline HYC 100mg caps to help clear this up.   Also I've been taking mucinex sinus to help with congestion (chest and nasal). I also had a Covid test done today to be on the safe side since I was around a few hundred people at my mothers  this last weekend.     Just wanted to keep you posted on what's happening. I'm sure you can see it in my chart but also wanted to let you know I'm in the process of getting the vaccine completed. My  started the vaccine process yesterday.     Thank you for your advice and everything you do!!   Your  Awesome!!

## 2021-10-26 RX ORDER — ATORVASTATIN CALCIUM 40 MG/1
TABLET, FILM COATED ORAL
Qty: 90 TABLET | Refills: 3 | Status: SHIPPED | OUTPATIENT
Start: 2021-10-26 | End: 2022-08-26 | Stop reason: SDUPTHER

## 2021-11-01 RX ORDER — LISINOPRIL 20 MG/1
TABLET ORAL
Qty: 90 TABLET | Refills: 3 | Status: SHIPPED | OUTPATIENT
Start: 2021-11-01 | End: 2022-08-22

## 2021-11-12 ENCOUNTER — OFFICE VISIT (OUTPATIENT)
Dept: PSYCHIATRY | Facility: HOSPITAL | Age: 56
End: 2021-11-12

## 2021-11-12 DIAGNOSIS — F41.1 GENERALIZED ANXIETY DISORDER: ICD-10-CM

## 2021-11-12 DIAGNOSIS — F33.1 MAJOR DEPRESSIVE DISORDER, RECURRENT EPISODE, MODERATE (HCC): Primary | ICD-10-CM

## 2021-11-12 PROCEDURE — 99214 OFFICE O/P EST MOD 30 MIN: CPT | Performed by: NURSE PRACTITIONER

## 2021-11-12 NOTE — PROGRESS NOTES
Supportive Oncology Services  In Person Session    Subjective  Patient ID: Christie Avendaño is a 56 y.o. female is seen face to face in the [facility] ([facility].    CC: Significant grief, sleeping all the    HPI: Pt noted to be alert, oriented, and engaged in conversation. Full range of affect noted throughout conversation; mood reportedly down over recent months, exacerbated by loss of mom.  Notable increase in PHQ 9 and TYESHA 7.  Interval history reviewed;  Mother passed away in September, patient describes feeling this was her reason for living.  Has had difficulty getting up and going, spending significant amount of time in a reclining chair during the day.  Patient reviews complicated relationship with mother, wishing she could have done more, and not really knowing how to move forward. Pt has increased lexapro from 5 to 10 mg, with questionable experience of over relaxation. Is taking at bedtime. Usually going to bed appx 11 PM; difficult to initiate, wakes at 7, back to sleep until appx 10 AM. Up for restroom, taking medications, etc. Appetite is significantly diminished with lack of interest, although patient continues to go out to eat approximately twice daily with .  Does report getting out with sister yesterday, and enjoyed this. Pt continues to brittany, picking this up a little more although significantly reduced since mother's passing.  Reviews general disinterest in this, which is upsetting.  Pt reviews goal of walking, pending improvement in healing from removal of melanoma on foot.  Patient continues to identify demoralization in pandemic specifically surrounding various cancer diagnoses; did decide to get vaccinated, and continues to strive to remain healthy.    Review of Systems   Constitutional: Positive for activity change, appetite change and fatigue.   Psychiatric/Behavioral: Positive for dysphoric mood and sleep disturbance. The patient is nervous/anxious.       Medications Reviewed:  Lexapro 5 to 10 mg daily    Diagnoses and all orders for this visit:    1. Major depressive disorder, recurrent episode, moderate (HCC) (Primary)    2. Generalized anxiety disorder    Plan of Care  Patient with worsening symptoms of depression and anxiety, complicated grief alongside loss of mother.  Explored current antidepressant therapy, contributing to more relaxation and sedation.  Advised patient reduce lexapro to 1/2 tab. Considered wellbutrin, although patient maintains negative attributions toward medications and prefers to focus on nonpharmacological strategies at this time.  Patient has set goals as follows:   Goal of getting up at 9. 30 minutes on treadmill a day with foot as able. Cook one meal a day at home.   Counseling provided surrounding complicated grief and loss, reflection upon the love she showed on her mother throughout her life, current goals for personal health and wellness.  Follow-up arranged in 4 weeks to assess progress toward goals.    I spent 36 minutes caring for Christie on this date of service. This time includes time spent by me in the following activities: preparing for the visit, obtaining and/or reviewing a separately obtained history, performing a medically appropriate examination and/or evaluation, counseling and educating the patient/family/caregiver, ordering medications, tests, or procedures and documenting information in the medical record.

## 2021-12-01 DIAGNOSIS — E78.49 OTHER HYPERLIPIDEMIA: ICD-10-CM

## 2021-12-01 DIAGNOSIS — I10 ESSENTIAL HYPERTENSION: ICD-10-CM

## 2021-12-01 DIAGNOSIS — E11.9 DIABETES MELLITUS TYPE 2, DIET-CONTROLLED (HCC): Primary | ICD-10-CM

## 2021-12-02 LAB
ALBUMIN SERPL-MCNC: 4.3 G/DL (ref 3.8–4.9)
ALBUMIN/GLOB SERPL: 1.8 {RATIO} (ref 1.2–2.2)
ALP SERPL-CCNC: 150 IU/L (ref 44–121)
ALT SERPL-CCNC: 22 IU/L (ref 0–32)
AST SERPL-CCNC: 11 IU/L (ref 0–40)
BILIRUB SERPL-MCNC: 0.4 MG/DL (ref 0–1.2)
BUN SERPL-MCNC: 11 MG/DL (ref 6–24)
BUN/CREAT SERPL: 15 (ref 9–23)
CALCIUM SERPL-MCNC: 9.2 MG/DL (ref 8.7–10.2)
CHLORIDE SERPL-SCNC: 103 MMOL/L (ref 96–106)
CHOLEST SERPL-MCNC: 112 MG/DL (ref 100–199)
CO2 SERPL-SCNC: 22 MMOL/L (ref 20–29)
CREAT SERPL-MCNC: 0.73 MG/DL (ref 0.57–1)
GLOBULIN SER CALC-MCNC: 2.4 G/DL (ref 1.5–4.5)
GLUCOSE SERPL-MCNC: 146 MG/DL (ref 65–99)
HBA1C MFR BLD: 6.3 % (ref 4.8–5.6)
HDLC SERPL-MCNC: 30 MG/DL
LDLC SERPL CALC-MCNC: 55 MG/DL (ref 0–99)
POTASSIUM SERPL-SCNC: 4.5 MMOL/L (ref 3.5–5.2)
PROT SERPL-MCNC: 6.7 G/DL (ref 6–8.5)
SODIUM SERPL-SCNC: 140 MMOL/L (ref 134–144)
TRIGL SERPL-MCNC: 158 MG/DL (ref 0–149)
VLDLC SERPL CALC-MCNC: 27 MG/DL (ref 5–40)

## 2021-12-07 ENCOUNTER — CLINICAL SUPPORT (OUTPATIENT)
Dept: ONCOLOGY | Facility: HOSPITAL | Age: 56
End: 2021-12-07

## 2021-12-07 ENCOUNTER — LAB (OUTPATIENT)
Dept: LAB | Facility: HOSPITAL | Age: 56
End: 2021-12-07

## 2021-12-07 DIAGNOSIS — D64.9 NORMOCYTIC ANEMIA: ICD-10-CM

## 2021-12-07 DIAGNOSIS — C64.2 RENAL CELL CARCINOMA OF LEFT KIDNEY (HCC): ICD-10-CM

## 2021-12-07 DIAGNOSIS — C34.91 NON-SMALL CELL LUNG CANCER, RIGHT (HCC): ICD-10-CM

## 2021-12-07 LAB
BASOPHILS # BLD AUTO: 0.05 10*3/MM3 (ref 0–0.2)
BASOPHILS NFR BLD AUTO: 0.5 % (ref 0–1.5)
DEPRECATED RDW RBC AUTO: 43.8 FL (ref 37–54)
EOSINOPHIL # BLD AUTO: 0.25 10*3/MM3 (ref 0–0.4)
EOSINOPHIL NFR BLD AUTO: 2.4 % (ref 0.3–6.2)
ERYTHROCYTE [DISTWIDTH] IN BLOOD BY AUTOMATED COUNT: 15.4 % (ref 12.3–15.4)
HCT VFR BLD AUTO: 38.3 % (ref 34–46.6)
HGB BLD-MCNC: 12.2 G/DL (ref 12–15.9)
IMM GRANULOCYTES # BLD AUTO: 0.03 10*3/MM3 (ref 0–0.05)
IMM GRANULOCYTES NFR BLD AUTO: 0.3 % (ref 0–0.5)
LYMPHOCYTES # BLD AUTO: 3.46 10*3/MM3 (ref 0.7–3.1)
LYMPHOCYTES NFR BLD AUTO: 32.9 % (ref 19.6–45.3)
MCH RBC QN AUTO: 25.2 PG (ref 26.6–33)
MCHC RBC AUTO-ENTMCNC: 31.9 G/DL (ref 31.5–35.7)
MCV RBC AUTO: 79.1 FL (ref 79–97)
MONOCYTES # BLD AUTO: 0.68 10*3/MM3 (ref 0.1–0.9)
MONOCYTES NFR BLD AUTO: 6.5 % (ref 5–12)
NEUTROPHILS NFR BLD AUTO: 57.4 % (ref 42.7–76)
NEUTROPHILS NFR BLD AUTO: 6.06 10*3/MM3 (ref 1.7–7)
NRBC BLD AUTO-RTO: 0 /100 WBC (ref 0–0.2)
PLATELET # BLD AUTO: 228 10*3/MM3 (ref 140–450)
PMV BLD AUTO: 9.7 FL (ref 6–12)
RBC # BLD AUTO: 4.84 10*6/MM3 (ref 3.77–5.28)
WBC NRBC COR # BLD: 10.53 10*3/MM3 (ref 3.4–10.8)

## 2021-12-07 PROCEDURE — 36415 COLL VENOUS BLD VENIPUNCTURE: CPT

## 2021-12-07 PROCEDURE — 85025 COMPLETE CBC W/AUTO DIFF WBC: CPT

## 2021-12-07 PROCEDURE — G0463 HOSPITAL OUTPT CLINIC VISIT: HCPCS

## 2021-12-07 NOTE — NURSING NOTE
Pt here for CBC and RN review. CBC reviewed with pt. Counts are stable for this pt. Pt has no c/o at this time. Copy of labs given to pt and f/u appt reviewed. Pt v/u to call with any new concerns.       Lab Results   Component Value Date    WBC 10.53 12/07/2021    HGB 12.2 12/07/2021    HCT 38.3 12/07/2021    MCV 79.1 12/07/2021     12/07/2021

## 2021-12-08 ENCOUNTER — OFFICE VISIT (OUTPATIENT)
Dept: INTERNAL MEDICINE | Facility: CLINIC | Age: 56
End: 2021-12-08

## 2021-12-08 VITALS — TEMPERATURE: 97.1 F | WEIGHT: 236 LBS | HEIGHT: 65 IN | BODY MASS INDEX: 39.32 KG/M2

## 2021-12-08 DIAGNOSIS — I10 ESSENTIAL HYPERTENSION: ICD-10-CM

## 2021-12-08 DIAGNOSIS — E78.49 OTHER HYPERLIPIDEMIA: ICD-10-CM

## 2021-12-08 DIAGNOSIS — E11.9 TYPE 2 DIABETES MELLITUS WITHOUT COMPLICATION, WITHOUT LONG-TERM CURRENT USE OF INSULIN (HCC): Primary | ICD-10-CM

## 2021-12-08 PROCEDURE — 99213 OFFICE O/P EST LOW 20 MIN: CPT | Performed by: INTERNAL MEDICINE

## 2021-12-08 NOTE — PROGRESS NOTES
"Chief Complaint  Diabetes    Subjective          Christie Avendaño presents to [facility]  History of Present Illness  Here for diabetes f/u- she has been doing great, weight down 12 more lbs.  She is having no trouble with Trulicity.   She is feeling much better.   Good reports about her foot, healing well.     Objective   Vital Signs:   Temp 97.1 °F (36.2 °C)   Ht 165.1 cm (65\")   Wt 107 kg (236 lb)   BMI 39.27 kg/m²     Physical Exam  Constitutional:       Appearance: Normal appearance.   Cardiovascular:      Rate and Rhythm: Normal rate and regular rhythm.      Pulses:           Dorsalis pedis pulses are 1+ on the right side and 1+ on the left side.        Posterior tibial pulses are 1+ on the right side and 1+ on the left side.   Pulmonary:      Effort: Pulmonary effort is normal.   Feet:      Right foot:      Skin integrity: Skin integrity normal.      Left foot:      Skin integrity: Skin integrity normal.      Comments: Monofilament Test Performed  Diabetic Foot Exam Performed         Result Review :                 Assessment and Plan    Diagnoses and all orders for this visit:    1. Type 2 diabetes mellitus without complication, without long-term current use of insulin (HCC) (Primary)  Comments:  so much better!  No changes made today, continue same.     2. Essential hypertension  Comments:  well controlled, no change.     3. Other hyperlipidemia  Comments:  at goal        Follow Up   Return in about 4 months (around 4/8/2022) for Annual physical, Lab Before FUP.  Patient was given instructions and counseling regarding her condition or for health maintenance advice. Please see specific information pulled into the AVS if appropriate.       "

## 2021-12-13 ENCOUNTER — OFFICE VISIT (OUTPATIENT)
Dept: PSYCHIATRY | Facility: HOSPITAL | Age: 56
End: 2021-12-13

## 2021-12-13 DIAGNOSIS — F41.1 GENERALIZED ANXIETY DISORDER: ICD-10-CM

## 2021-12-13 DIAGNOSIS — F33.1 MAJOR DEPRESSIVE DISORDER, RECURRENT EPISODE, MODERATE (HCC): Primary | ICD-10-CM

## 2021-12-13 PROCEDURE — 99214 OFFICE O/P EST MOD 30 MIN: CPT | Performed by: NURSE PRACTITIONER

## 2022-01-06 DIAGNOSIS — E11.9 TYPE 2 DIABETES MELLITUS WITHOUT COMPLICATION, WITHOUT LONG-TERM CURRENT USE OF INSULIN: Primary | ICD-10-CM

## 2022-01-06 RX ORDER — BLOOD-GLUCOSE METER
1 EACH MISCELLANEOUS DAILY
Qty: 1 KIT | Refills: 0 | Status: SHIPPED | OUTPATIENT
Start: 2022-01-06

## 2022-01-06 RX ORDER — BLOOD SUGAR DIAGNOSTIC
STRIP MISCELLANEOUS
Qty: 200 EACH | Refills: 12 | Status: SHIPPED | OUTPATIENT
Start: 2022-01-06

## 2022-01-14 ENCOUNTER — CLINICAL SUPPORT (OUTPATIENT)
Dept: INTERNAL MEDICINE | Facility: CLINIC | Age: 57
End: 2022-01-14

## 2022-01-14 DIAGNOSIS — R05.9 COUGH: Primary | ICD-10-CM

## 2022-01-18 LAB — SARS-COV-2 RNA RESP QL NAA+PROBE: DETECTED

## 2022-01-18 RX ORDER — DULAGLUTIDE 3 MG/.5ML
3 INJECTION, SOLUTION SUBCUTANEOUS WEEKLY
Qty: 12 PEN | Refills: 1 | Status: SHIPPED | OUTPATIENT
Start: 2022-01-18 | End: 2022-01-25

## 2022-01-25 RX ORDER — DULAGLUTIDE 3 MG/.5ML
INJECTION, SOLUTION SUBCUTANEOUS
Qty: 6 ML | Refills: 3 | Status: SHIPPED | OUTPATIENT
Start: 2022-01-25 | End: 2022-05-02

## 2022-02-04 RX ORDER — VALACYCLOVIR HYDROCHLORIDE 1 G/1
TABLET, FILM COATED ORAL
Qty: 90 TABLET | Refills: 1 | Status: SHIPPED | OUTPATIENT
Start: 2022-02-04 | End: 2022-09-16

## 2022-02-07 ENCOUNTER — OFFICE VISIT (OUTPATIENT)
Dept: PSYCHIATRY | Facility: HOSPITAL | Age: 57
End: 2022-02-07

## 2022-02-07 DIAGNOSIS — F43.21 GRIEF: Primary | ICD-10-CM

## 2022-02-07 DIAGNOSIS — F33.1 MAJOR DEPRESSIVE DISORDER, RECURRENT EPISODE, MODERATE: ICD-10-CM

## 2022-02-07 DIAGNOSIS — F41.1 GENERALIZED ANXIETY DISORDER: ICD-10-CM

## 2022-02-07 PROCEDURE — 99214 OFFICE O/P EST MOD 30 MIN: CPT | Performed by: NURSE PRACTITIONER

## 2022-02-07 NOTE — PROGRESS NOTES
[facility] Oncology Services  In Person Session    Subjective  Patient ID: Christie Avendaño is a 56 y.o. female is seen face to face in the [facility] ([facility].    PHQ 9 = 1  TYESHA 7 = 1    CC: Grief, fatigue, recent Covid infection    HPI: Pt noted to be alert, oriented, and engaged in conversation. Full range of affect noted; mood reportedly stable to improved.   Interval history reviewed; continues to report stability of mood and anxiety symptoms on current regimen of Lexapro 5 mg daily. Endorses residual fatigue with recent Covid infection; has not yet been boosted, although plans to when able. Patient does report hair loss, and feels this is related to Covid infection.  Pt reports continued dedication to weight loss with stabilization of blood sugars since recovering from virus. Pt continues to brittany, excitedly showing me some of her completed work.Sleep remains disrupted at times; describes this as being fragmented, continually impacted by complicated grief in mother's death. Pt identifies continued fragmentation at times with ruminative worry. Despite this, pt reports getting up most mornings and engaging in day. Identifies routine of having coffee, crocheting or watching TV with contemplation of goals of day.  continues to work third shift, and patient reviews appreciation of ability to adjust plans if needed. Is cooking more with dedication to healthy meals and meal planning. Continues to deny physical activity; does have treadmill. Pt reports enjoyment of walking on treadmill, and endorses feelings of accomplishment once completed. Finds getting on treadmill is the most difficult step, and acknowledges some ambivalence toward integrating this into daily routine.    Fatigue Severity Scale Conducted with score of 19, with scores less than 36 considered subthreshhold.    Review of Systems   Constitutional: Positive for fatigue.   Psychiatric/Behavioral: Positive for sleep  disturbance. Negative for dysphoric mood. The patient is nervous/anxious.        Medications Reviewed:  Lexapro 5 mg daily    Diagnoses and all orders for this visit:    1. Grief (Primary)    2. Major depressive disorder, recurrent episode, moderate (HCC)    3. Generalized anxiety disorder    Plan of Care  Patient with stable to improved symptoms of mood disorder on current regimen of Lexapro 5 mg daily. PHQ 9 and TYESHA 7 both total scores of 1. Supported patient in continuing as written. Reviewed symptoms of grief, with allowance of feelings, connection to available support, and continued progress toward meaningful, joyful, and productive activities. Supported patient in current progress with motivational interviewing used is toward desire to increasing physical activity. Supported patient in getting booster to vaccine when able. Follow-up arranged in clinic in 3 months.    I spent 36 minutes caring for Christie on this date of service. This time includes time spent by me in the following activities: preparing for the visit, obtaining and/or reviewing a separately obtained history, performing a medically appropriate examination and/or evaluation, counseling and educating the patient/family/caregiver, referring and communicating with other health care professionals and documenting information in the medical record.

## 2022-02-22 ENCOUNTER — HOSPITAL ENCOUNTER (OUTPATIENT)
Dept: PET IMAGING | Facility: HOSPITAL | Age: 57
Discharge: HOME | End: 2022-02-22
Admitting: INTERNAL MEDICINE

## 2022-02-22 DIAGNOSIS — C64.2 RENAL CELL CARCINOMA OF LEFT KIDNEY: ICD-10-CM

## 2022-02-22 DIAGNOSIS — C34.91 NON-SMALL CELL LUNG CANCER, RIGHT: ICD-10-CM

## 2022-02-22 DIAGNOSIS — D64.9 NORMOCYTIC ANEMIA: ICD-10-CM

## 2022-02-22 LAB — CREAT BLDA-MCNC: 0.7 MG/DL (ref 0.6–1.3)

## 2022-02-22 PROCEDURE — 82565 ASSAY OF CREATININE: CPT

## 2022-02-22 PROCEDURE — 25010000002 IOPAMIDOL 61 % SOLUTION: Performed by: INTERNAL MEDICINE

## 2022-02-22 PROCEDURE — 74177 CT ABD & PELVIS W/CONTRAST: CPT

## 2022-02-22 PROCEDURE — 0 DIATRIZOATE MEGLUMINE & SODIUM PER 1 ML: Performed by: INTERNAL MEDICINE

## 2022-02-22 PROCEDURE — 71260 CT THORAX DX C+: CPT

## 2022-02-22 RX ADMIN — IOPAMIDOL 85 ML: 612 INJECTION, SOLUTION INTRAVENOUS at 10:55

## 2022-02-22 RX ADMIN — DIATRIZOATE MEGLUMINE AND DIATRIZOATE SODIUM 30 ML: 660; 100 LIQUID ORAL; RECTAL at 09:55

## 2022-02-28 DIAGNOSIS — C73 PAPILLARY THYROID CARCINOMA: Primary | ICD-10-CM

## 2022-02-28 DIAGNOSIS — C64.2 RENAL CELL CARCINOMA OF LEFT KIDNEY: ICD-10-CM

## 2022-02-28 DIAGNOSIS — C34.91 NON-SMALL CELL LUNG CANCER, RIGHT: ICD-10-CM

## 2022-03-01 ENCOUNTER — LAB (OUTPATIENT)
Dept: LAB | Facility: HOSPITAL | Age: 57
End: 2022-03-01

## 2022-03-01 ENCOUNTER — OFFICE VISIT (OUTPATIENT)
Dept: ONCOLOGY | Facility: CLINIC | Age: 57
End: 2022-03-01

## 2022-03-01 VITALS
DIASTOLIC BLOOD PRESSURE: 84 MMHG | HEART RATE: 101 BPM | RESPIRATION RATE: 16 BRPM | OXYGEN SATURATION: 95 % | WEIGHT: 235.2 LBS | SYSTOLIC BLOOD PRESSURE: 119 MMHG | HEIGHT: 65 IN | BODY MASS INDEX: 39.18 KG/M2 | TEMPERATURE: 97.3 F

## 2022-03-01 DIAGNOSIS — Z15.01 BRCA2 GENE MUTATION POSITIVE IN FEMALE: ICD-10-CM

## 2022-03-01 DIAGNOSIS — C73 PAPILLARY THYROID CARCINOMA: ICD-10-CM

## 2022-03-01 DIAGNOSIS — D64.9 NORMOCYTIC ANEMIA: ICD-10-CM

## 2022-03-01 DIAGNOSIS — Z15.09 BRCA2 GENE MUTATION POSITIVE IN FEMALE: ICD-10-CM

## 2022-03-01 DIAGNOSIS — C34.91 NON-SMALL CELL LUNG CANCER, RIGHT: ICD-10-CM

## 2022-03-01 DIAGNOSIS — C73 PAPILLARY THYROID CARCINOMA: Primary | ICD-10-CM

## 2022-03-01 DIAGNOSIS — C64.2 RENAL CELL CARCINOMA OF LEFT KIDNEY: ICD-10-CM

## 2022-03-01 DIAGNOSIS — D3A.090 CARCINOID TUMOR OF LEFT LUNG: ICD-10-CM

## 2022-03-01 DIAGNOSIS — C43.71 MALIGNANT MELANOMA OF RIGHT LOWER EXTREMITY INCLUDING HIP: ICD-10-CM

## 2022-03-01 DIAGNOSIS — Z15.02 BRCA2 GENE MUTATION POSITIVE IN FEMALE: ICD-10-CM

## 2022-03-01 LAB
ALBUMIN SERPL-MCNC: 4.3 G/DL (ref 3.5–5.2)
ALBUMIN/GLOB SERPL: 1.5 G/DL (ref 1.1–2.4)
ALP SERPL-CCNC: 116 U/L (ref 38–116)
ALT SERPL W P-5'-P-CCNC: 26 U/L (ref 0–33)
ANION GAP SERPL CALCULATED.3IONS-SCNC: 10 MMOL/L (ref 5–15)
AST SERPL-CCNC: 15 U/L (ref 0–32)
BASOPHILS # BLD AUTO: 0.07 10*3/MM3 (ref 0–0.2)
BASOPHILS NFR BLD AUTO: 0.6 % (ref 0–1.5)
BILIRUB SERPL-MCNC: 0.8 MG/DL (ref 0.2–1.2)
BUN SERPL-MCNC: 10 MG/DL (ref 6–20)
BUN/CREAT SERPL: 13 (ref 7.3–30)
CALCIUM SPEC-SCNC: 8.9 MG/DL (ref 8.5–10.2)
CHLORIDE SERPL-SCNC: 101 MMOL/L (ref 98–107)
CO2 SERPL-SCNC: 28 MMOL/L (ref 22–29)
CREAT SERPL-MCNC: 0.77 MG/DL (ref 0.6–1.1)
DEPRECATED RDW RBC AUTO: 48.7 FL (ref 37–54)
EGFRCR SERPLBLD CKD-EPI 2021: 90.1 ML/MIN/1.73
EOSINOPHIL # BLD AUTO: 0.09 10*3/MM3 (ref 0–0.4)
EOSINOPHIL NFR BLD AUTO: 0.8 % (ref 0.3–6.2)
ERYTHROCYTE [DISTWIDTH] IN BLOOD BY AUTOMATED COUNT: 16.5 % (ref 12.3–15.4)
FERRITIN SERPL-MCNC: 251.9 NG/ML (ref 11–207)
GLOBULIN UR ELPH-MCNC: 2.9 GM/DL (ref 1.8–3.5)
GLUCOSE SERPL-MCNC: 141 MG/DL (ref 74–124)
HCT VFR BLD AUTO: 41 % (ref 34–46.6)
HGB BLD-MCNC: 12.9 G/DL (ref 12–15.9)
IMM GRANULOCYTES # BLD AUTO: 0.02 10*3/MM3 (ref 0–0.05)
IMM GRANULOCYTES NFR BLD AUTO: 0.2 % (ref 0–0.5)
IRON 24H UR-MRATE: 25 MCG/DL (ref 37–145)
IRON SATN MFR SERPL: 8 % (ref 14–48)
LYMPHOCYTES # BLD AUTO: 2.09 10*3/MM3 (ref 0.7–3.1)
LYMPHOCYTES NFR BLD AUTO: 18.6 % (ref 19.6–45.3)
MCH RBC QN AUTO: 25.5 PG (ref 26.6–33)
MCHC RBC AUTO-ENTMCNC: 31.5 G/DL (ref 31.5–35.7)
MCV RBC AUTO: 81 FL (ref 79–97)
MONOCYTES # BLD AUTO: 0.97 10*3/MM3 (ref 0.1–0.9)
MONOCYTES NFR BLD AUTO: 8.6 % (ref 5–12)
NEUTROPHILS NFR BLD AUTO: 71.2 % (ref 42.7–76)
NEUTROPHILS NFR BLD AUTO: 8.01 10*3/MM3 (ref 1.7–7)
NRBC BLD AUTO-RTO: 0 /100 WBC (ref 0–0.2)
PLATELET # BLD AUTO: 249 10*3/MM3 (ref 140–450)
PMV BLD AUTO: 9.2 FL (ref 6–12)
POTASSIUM SERPL-SCNC: 3.7 MMOL/L (ref 3.5–4.7)
PROT SERPL-MCNC: 7.2 G/DL (ref 6.3–8)
RBC # BLD AUTO: 5.06 10*6/MM3 (ref 3.77–5.28)
SODIUM SERPL-SCNC: 139 MMOL/L (ref 134–145)
TIBC SERPL-MCNC: 330 MCG/DL (ref 249–505)
TRANSFERRIN SERPL-MCNC: 236 MG/DL (ref 200–360)
WBC NRBC COR # BLD: 11.25 10*3/MM3 (ref 3.4–10.8)

## 2022-03-01 PROCEDURE — 82728 ASSAY OF FERRITIN: CPT

## 2022-03-01 PROCEDURE — 99214 OFFICE O/P EST MOD 30 MIN: CPT | Performed by: INTERNAL MEDICINE

## 2022-03-01 PROCEDURE — 84466 ASSAY OF TRANSFERRIN: CPT

## 2022-03-01 PROCEDURE — 80053 COMPREHEN METABOLIC PANEL: CPT

## 2022-03-01 PROCEDURE — 85025 COMPLETE CBC W/AUTO DIFF WBC: CPT

## 2022-03-01 PROCEDURE — 83540 ASSAY OF IRON: CPT

## 2022-03-01 PROCEDURE — 36415 COLL VENOUS BLD VENIPUNCTURE: CPT

## 2022-03-03 RX ORDER — DOXYCYCLINE HYCLATE 100 MG/1
100 CAPSULE ORAL 2 TIMES DAILY
Qty: 14 CAPSULE | Refills: 0 | Status: SHIPPED | OUTPATIENT
Start: 2022-03-03 | End: 2022-04-13

## 2022-04-13 ENCOUNTER — OFFICE VISIT (OUTPATIENT)
Dept: INTERNAL MEDICINE | Facility: CLINIC | Age: 57
End: 2022-04-13

## 2022-04-13 VITALS
HEART RATE: 78 BPM | SYSTOLIC BLOOD PRESSURE: 122 MMHG | TEMPERATURE: 97.3 F | HEIGHT: 65 IN | WEIGHT: 242 LBS | DIASTOLIC BLOOD PRESSURE: 68 MMHG | BODY MASS INDEX: 40.32 KG/M2

## 2022-04-13 DIAGNOSIS — Z01.411 ENCOUNTER FOR GYNECOLOGICAL EXAMINATION WITH ABNORMAL FINDING: Primary | ICD-10-CM

## 2022-04-13 DIAGNOSIS — L98.9 SKIN LESION OF RIGHT LOWER EXTREMITY: ICD-10-CM

## 2022-04-13 DIAGNOSIS — E89.0 POSTOPERATIVE HYPOTHYROIDISM: ICD-10-CM

## 2022-04-13 DIAGNOSIS — Z23 NEED FOR PNEUMOCOCCAL VACCINATION: ICD-10-CM

## 2022-04-13 DIAGNOSIS — E11.9 TYPE 2 DIABETES MELLITUS WITHOUT COMPLICATION, WITHOUT LONG-TERM CURRENT USE OF INSULIN: ICD-10-CM

## 2022-04-13 DIAGNOSIS — Z00.00 PHYSICAL EXAM, ANNUAL: ICD-10-CM

## 2022-04-13 DIAGNOSIS — I10 ESSENTIAL HYPERTENSION: ICD-10-CM

## 2022-04-13 PROCEDURE — 90732 PPSV23 VACC 2 YRS+ SUBQ/IM: CPT | Performed by: INTERNAL MEDICINE

## 2022-04-13 PROCEDURE — G0009 ADMIN PNEUMOCOCCAL VACCINE: HCPCS | Performed by: INTERNAL MEDICINE

## 2022-04-13 PROCEDURE — 99396 PREV VISIT EST AGE 40-64: CPT | Performed by: INTERNAL MEDICINE

## 2022-04-13 NOTE — PROGRESS NOTES
Subjective   Christie Avendaño is a 56 y.o. female and is here for a comprehensive physical exam. The patient reports no problems.  Continuing to work on weight loss.   Pt is here for annual gyn exam and mammo   Blood sugars continue to do better- AM @ 120.            Social History     Socioeconomic History   • Marital status:      Spouse name: Jayesh   • Number of children: 2   • Years of education: High School   Tobacco Use   • Smoking status: Never Smoker   • Smokeless tobacco: Never Used   Vaping Use   • Vaping Use: Never used   Substance and Sexual Activity   • Alcohol use: Never   • Drug use: Never   • Sexual activity: Yes     Partners: Male       Family History   Problem Relation Age of Onset   • Breast cancer Mother    • Thyroid cancer Mother 73   • Hypertension Mother    • Cancer Father         cholangiocarcinoma   • Liver cancer Father    • Ovarian cancer Maternal Grandmother 80   • Leukemia Maternal Grandfather         60-80, unknown type   • Leukemia Paternal Grandfather         60-80, unknown type   • Colon cancer Maternal Aunt         over age of 50   • Breast cancer Maternal Aunt         80's   • Colon cancer Maternal Aunt         colon   • Diabetes Other    • Breast cancer Paternal Cousin    • Malig Hyperthermia Neg Hx           Past Medical History:   Diagnosis Date   • Arthritis    • Asthma    • BRCA2 positive    • Breast cancer (HCC)     DCIS   • Diabetes mellitus (HCC)    • H/O Liver masses 2017    x3   • H/O Lung masses 2017    1 in right lower lobe and 1 in the left infrahilar location   • H/O Ovarian cyst    • H/O Right adrenal mass (CMS/HCC) 2017   • Lung cancer (HCC)    • PONV (postoperative nausea and vomiting)    • Thyroid disease           Current Outpatient Medications:   •  atorvastatin (LIPITOR) 40 MG tablet, TAKE 1 TABLET EVERY NIGHT, Disp: 90 tablet, Rfl: 3  •  bimatoprost (LUMIGAN) 0.01 % ophthalmic drops, Administer 1 drop to both eyes Every Night., Disp: , Rfl:   •  Blood Glucose  "Monitoring Suppl (Accu-Chek Guide Me) w/Device kit, 1 Device Daily., Disp: 1 kit, Rfl: 0  •  escitalopram (LEXAPRO) 10 MG tablet, TAKE 1 TABLET DAILY, Disp: 90 tablet, Rfl: 3  •  glucose blood (Accu-Chek Guide) test strip, Use 1-2 daily to check blood sugars Dx diabetes E11.9, Disp: 200 each, Rfl: 12  •  glucose blood test strip, Use as instructed, Disp: 100 each, Rfl: 12  •  Krill Oil (OMEGA-3) 500 MG capsule, Take 500 mg by mouth Daily. HOLDS FOR SURGERY, Disp: , Rfl:   •  levothyroxine (SYNTHROID, LEVOTHROID) 150 MCG tablet, Take 150 mcg by mouth Daily., Disp: , Rfl:   •  lisinopril (PRINIVIL,ZESTRIL) 20 MG tablet, TAKE 1 TABLET DAILY, Disp: 90 tablet, Rfl: 3  •  Trulicity 3 MG/0.5ML solution pen-injector, INJECT 3 MG UNDER THE SKIN INTO THE APPROPRIATE AREA AS DIRECTED ONCE A WEEK, Disp: 6 mL, Rfl: 3  •  valACYclovir (VALTREX) 1000 MG tablet, TAKE 2 TABLETS BY MOUTH  TWICE A DAY, Disp: 90 tablet, Rfl: 1    Review of Systems    Review of Systems   Constitutional: Negative for fatigue and unexpected weight loss.   HENT: Negative for dental problem and trouble swallowing.    Eyes: Negative for visual disturbance.   Respiratory: Negative for shortness of breath.    Cardiovascular: Negative for chest pain and palpitations.   Gastrointestinal: Negative for abdominal pain and blood in stool.   Endocrine: Negative for cold intolerance.   Genitourinary: Negative for decreased libido.   Musculoskeletal: Negative for arthralgias and gait problem.   Neurological: Negative for memory problem.   Psychiatric/Behavioral: Negative for depressed mood.        There were no vitals filed for this visit.    Vitals:    04/13/22 1323   BP: 122/68   Pulse: 78   Temp: 97.3 °F (36.3 °C)   Weight: 110 kg (242 lb)   Height: 165.1 cm (65\")     Body mass index is 40.27 kg/m².  Wt Readings from Last 3 Encounters:   04/13/22 110 kg (242 lb)   03/01/22 107 kg (235 lb 3.2 oz)   12/08/21 107 kg (236 lb)      Objective     Physical " Exam  Constitutional:       General: She is not in acute distress.  Eyes:      Conjunctiva/sclera: Conjunctivae normal.   Neck:      Thyroid: No thyromegaly.   Cardiovascular:      Rate and Rhythm: Normal rate and regular rhythm.      Heart sounds: Normal heart sounds.   Pulmonary:      Effort: Pulmonary effort is normal.      Breath sounds: Normal breath sounds.   Chest:      Comments: S/p B mastectomy  B breast implants, no new masses or changes  Abdominal:      General: Bowel sounds are normal.      Palpations: Abdomen is soft.   Genitourinary:     General: Normal vulva.      Vagina: Normal.   Musculoskeletal:      Right lower leg: No edema.      Left lower leg: No edema.   Lymphadenopathy:      Cervical: No cervical adenopathy.   Skin:     General: Skin is warm and dry.      Findings: Lesion: R posterior thigh, scaly, brown lesion.   Neurological:      Mental Status: She is alert and oriented to person, place, and time.   Psychiatric:         Behavior: Behavior normal.         Thought Content: Thought content normal.         Judgment: Judgment normal.         Procedures       Assessment/Plan         Diagnoses and all orders for this visit:    1. Encounter for gynecological examination with abnormal finding (Primary)  Comments:  exam benign- pap smear taken of vaginal cuff  Orders:  -     IGP,rfx Aptima HPV All Pth    2. Need for pneumococcal vaccination  -     Pneumococcal Polysaccharide Vaccine 23-Valent (PPSV23) Greater Than or Equal To 3yo Subcutaneous / IM    3. Type 2 diabetes mellitus without complication, without long-term current use of insulin (HCC)  Comments:  A1C up just a bit but still < 7%- continue efforts at watching diet and increasing physical activity.     4. Postoperative hypothyroidism  Comments:  follows with endo in [place]    5. Essential hypertension  Comments:  Well controlled    6. Skin lesion of right lower extremity  Comments:  suspcious huber given her history- she is going to contact   Jean-[facility].     7. Physical exam, annual  Comments:  Pneumovax given today, to have eye exam.  Needs Shingrix- Continue working on getting weight down.         Return in about 4 months (around 8/13/2022) for Recheck, Lab Before FUP.

## 2022-04-27 ENCOUNTER — TELEPHONE (OUTPATIENT)
Dept: INTERNAL MEDICINE | Facility: CLINIC | Age: 57
End: 2022-04-27

## 2022-04-27 DIAGNOSIS — R87.610 ASCUS OF CERVIX WITH NEGATIVE HIGH RISK HPV: Primary | ICD-10-CM

## 2022-04-27 NOTE — TELEPHONE ENCOUNTER
Hub staff attempted to follow warm transfer process and was unsuccessful     Caller: Christie Avendaño    Relationship to patient: Self    Best call back number: [phone]      Patient is needing: PATIENT STATES SHE WAS TRYING TO RETURN A CALL TO [facility].

## 2022-05-02 RX ORDER — DULAGLUTIDE 3 MG/.5ML
INJECTION, SOLUTION SUBCUTANEOUS
Qty: 6 ML | Refills: 3 | Status: ON HOLD | OUTPATIENT
Start: 2022-05-02 | End: 2022-12-12

## 2022-05-09 ENCOUNTER — OFFICE VISIT (OUTPATIENT)
Dept: PSYCHIATRY | Facility: HOSPITAL | Age: 57
End: 2022-05-09

## 2022-05-09 DIAGNOSIS — F33.1 MAJOR DEPRESSIVE DISORDER, RECURRENT EPISODE, MODERATE: ICD-10-CM

## 2022-05-09 DIAGNOSIS — F41.1 GENERALIZED ANXIETY DISORDER: ICD-10-CM

## 2022-05-09 DIAGNOSIS — F43.21 GRIEF: Primary | ICD-10-CM

## 2022-05-09 PROCEDURE — 99214 OFFICE O/P EST MOD 30 MIN: CPT | Performed by: NURSE PRACTITIONER

## 2022-05-09 NOTE — PROGRESS NOTES
[facility]ive Oncology Services  In Person Session    Subjective  Patient ID: Christie Avendaño is a 56 y.o. female is seen face to face in the [facility].    CC: Grief    HPI:   Pt shares persistence of complicated grief, exacerbated by mother's day and father eleting to move, and current feelings of not wanting to part with her belongings. Continues to support father although acknolwedges tremendous challenges grieving her own loss.  Pt continues to find mood, anxiety, and sleep to be at her baseline. Continues to deny motivation toward desired behavior change.  Is not taking lexapro daily, but rather on as needed basis.   General wellbeing is reportedly improving over time, although fatigue remains apparent, specifically regarding emotional impact of current situation.    Objective   Mental Status Exam  Appearance:  clean and casually dressed, appropriate  Attitude toward clinician:  cooperative and agreeable   Speech:    Rate:  regular rate and rhythm   Volume:  normal  Motor:  no abnormal movements present  Mood: Sad, grieving  Affect:  mood congruent  Thought Processes:  linear, logical, and goal directed  Thought Content:  normal  Suicidal Thoughts:  absent  Homicidal Thoughts:  absent  Perceptual Disturbance: no perceptual disturbance  Attention and Concentration:  good  Insight and Judgement:  good  Memory:  memory appears to be intact    Review of Systems   Constitutional: Positive for fatigue.   Psychiatric/Behavioral: Negative for sleep disturbance. The patient is nervous/anxious.      Medications Reviewed:  Lexapro 5 mg daily; not taking consistently    Diagnoses and all orders for this visit:    1. Grief (Primary)    2. Major depressive disorder, recurrent episode, moderate (HCC)    3. Generalized anxiety disorder    Plan of Care  Restart lexapro 5 mg daily; education provided regarding importance of daily adherence to this medication vs complete discontinuation.  Encouraged  patient to take with meds she is already taking.  Counseling continued regarding grief and loss, permission to grieve, exploration of complicated relationship, complicated grieving process.  Follow up in three months; patient aware group follow-up is available on more regular basis.    I spent 36 minutes caring for Christie on this date of service. This time includes time spent by me in the following activities: preparing for the visit, obtaining and/or reviewing a separately obtained history, performing a medically appropriate examination and/or evaluation, counseling and educating the patient/family/caregiver, ordering medications, tests, or procedures and documenting information in the medical record.

## 2022-05-11 ENCOUNTER — OFFICE VISIT (OUTPATIENT)
Dept: OBSTETRICS AND GYNECOLOGY | Age: 57
End: 2022-05-11

## 2022-05-11 VITALS
DIASTOLIC BLOOD PRESSURE: 80 MMHG | SYSTOLIC BLOOD PRESSURE: 132 MMHG | BODY MASS INDEX: 40.05 KG/M2 | HEIGHT: 65 IN | WEIGHT: 240.4 LBS

## 2022-05-11 DIAGNOSIS — R87.610 ASCUS OF CERVIX WITH NEGATIVE HIGH RISK HPV: ICD-10-CM

## 2022-05-11 DIAGNOSIS — Z12.11 SCREENING FOR COLON CANCER: ICD-10-CM

## 2022-05-11 DIAGNOSIS — Z01.419 WELL WOMAN EXAM WITH ROUTINE GYNECOLOGICAL EXAM: Primary | ICD-10-CM

## 2022-05-11 PROCEDURE — 99396 PREV VISIT EST AGE 40-64: CPT | Performed by: STUDENT IN AN ORGANIZED HEALTH CARE EDUCATION/TRAINING PROGRAM

## 2022-05-11 NOTE — PROGRESS NOTES
[facility]   Obstetrics and Gynecology   Routine Annual Visit    2022    Patient: Christie Avendaño          MR#:[id number]    History of Present Illness    Chief Complaint   Patient presents with   • Follow-up     New GYN, Referral from Tiffany Holloway for (ASC-US) of cervix and negative high risk HPV, Pt has no complaints today        56 y.o. female  who presents for annual exam.  S/p hyst-BSO for benign indications.  H/o vaginal polyp that was removed in office in .  Was seeing PCP for gyn annual but PCP prefers she see gyn now due to strong personal and fhx cancers.    Unclear why patient was getting paps.  Denies h/o HSIL prior to hyst.  Regardless has had two ASCUS/HPV neg paps in last two years.    -BRCA positive, s/p bilateral mastectomy with implants so does not get mammos, strong fhx breast/ovarian cancer  -Personal h/o lung, kidney, and thyroid cancer  -Sees Dr. Mathew Collins (Oncologist) for monitoring, gets q6mo full body CT scan for cancer screening    Studies reviewed:  IGP,rfx Aptima HPV All Pth (2022 13:51)  HPV DNA Probe, Direct - , (2022 13:51) - ASCUS, HPV neg, same result last year  Colonoscopy 5yrs ago with benign polyps,   Obstetric History:  OB History        3    Para   2    Term   2            AB   1    Living   2       SAB   1    IAB        Ectopic        Molar        Multiple        Live Births   2               Menstrual History:     No LMP recorded (lmp unknown). Patient has had a hysterectomy.       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)   • Non-small cell lung cancer, right (HCC)   • Renal mass, right   • SIRS (systemic inflammatory response syndrome) (HCC)   • Hyperlipidemia   • Malignant melanoma of right lower extremity including hip (HCC)     Past Medical History:   Diagnosis Date   • Arthritis    • Asthma    • BRCA2 positive    • Breast cancer (HCC)     DCIS   • Diabetes mellitus (HCC)    • H/O Liver masses 2017    x3   • H/O Lung masses 2017    1 in right lower lobe and 1 in the left infrahilar location   • H/O Ovarian cyst    • H/O Right adrenal mass (CMS/HCC) 2017   • Lung cancer (HCC)    • PONV (postoperative nausea and vomiting)    • Thyroid disease      Past Surgical History:   Procedure Laterality Date   • APPENDECTOMY     • BREAST IMPLANT SURGERY Bilateral    • CHOLECYSTECTOMY     • HYSTERECTOMY     • MASTECTOMY Bilateral    • OVARIAN CYST SURGERY     • THORACOSCOPY VIDEO ASSISTED WITH LOBECTOMY Right 10/9/2020    Procedure: BRONCHOSCOPY, THORACOSCOPY VIDEO ASSISTED WITH ANTERIOR BASAL SEGMENTECTOMY, INTERCOSTAL NERVE BLOCK;  Surgeon: Flaca Crisostomo MD;  Location: [facility];  Service: Thoracic;  Laterality: Right;   • TONSILLECTOMY       Social History     Tobacco Use   Smoking Status Never Smoker   Smokeless Tobacco Never Used     Family History   Problem Relation Age of Onset   • Cancer Father         cholangiocarcinoma   • Liver cancer Father    • Breast cancer Mother    • Thyroid cancer Mother 73   • Hypertension Mother    • Leukemia Paternal Grandfather         60-80, unknown type   • Uterine cancer Maternal Grandmother    • Ovarian cancer Maternal Grandmother 80   • Leukemia Maternal Grandfather         60-80, unknown type   • Colon cancer Maternal Aunt         over age of 50   • Breast cancer Maternal Aunt         80's   • Colon cancer Maternal Aunt         colon   • Breast cancer  Paternal Cousin    • Diabetes Other    • Malig Hyperthermia Neg Hx      Prior to Admission medications    Medication Sig Start Date End Date Taking? Authorizing Provider   atorvastatin (LIPITOR) 40 MG tablet TAKE 1 TABLET EVERY NIGHT 10/26/21  Yes Tiffany Holloway MD   bimatoprost (LUMIGAN) 0.01 % ophthalmic drops Administer 1 drop to both eyes Every Night.   Yes Shonna De Jesus MD   Blood Glucose Monitoring Suppl (Accu-Chek Guide Me) w/Device kit 1 Device Daily. 1/6/22  Yes Tiffany Holloway MD   escitalopram (LEXAPRO) 10 MG tablet TAKE 1 TABLET DAILY 8/18/21  Yes Honey Nesbitt APRN   glucose blood (Accu-Chek Guide) test strip Use 1-2 daily to check blood sugars Dx diabetes E11.9 1/6/22  Yes Tiffany Holloway MD   glucose blood test strip Use as instructed 1/11/22  Yes Tiffany Holloway MD   levothyroxine (SYNTHROID, LEVOTHROID) 150 MCG tablet Take 150 mcg by mouth Daily.   Yes Shonna De Jesus MD   lisinopril (PRINIVIL,ZESTRIL) 20 MG tablet TAKE 1 TABLET DAILY 11/1/21  Yes Tiffany Holloway MD   Trulicity 3 MG/0.5ML solution pen-injector INJECT THE CONTENTS OF ONE  PEN SUBCUTANEOUSLY WEEKLY  AS DIRECTED 5/2/22  Yes Tiffany Holloway MD   valACYclovir (VALTREX) 1000 MG tablet TAKE 2 TABLETS BY MOUTH  TWICE A DAY 2/4/22  Yes Tiffany Holloway MD   Krill Oil (OMEGA-3) 500 MG capsule Take 500 mg by mouth Daily. HOLDS FOR SURGERY    Shonna De Jesus MD     ________________________________________    Current contraception: status post hysterectomy  History of abnormal Pap smear: yes - see above  Family history of uterine or ovarian cancer: yes - see above  Family History of colon cancer/colon polyps: yes - see above  History of abnormal mammogram: no    The following portions of the patient's history were reviewed and updated as appropriate: allergies, current medications, past family history, past medical history, past social history, past surgical history and problem list.    Review of Systems   All other  "systems reviewed and are negative.           Objective     /80   Ht 165.1 cm (65\")   Wt 109 kg (240 lb 6.4 oz)   LMP  (LMP Unknown)   Breastfeeding No   BMI 40.00 kg/m²    BP Readings from Last 3 Encounters:   05/11/22 132/80   04/13/22 122/68   03/01/22 119/84      Wt Readings from Last 3 Encounters:   05/11/22 109 kg (240 lb 6.4 oz)   04/13/22 110 kg (242 lb)   03/01/22 107 kg (235 lb 3.2 oz)        BMI: Estimated body mass index is 40 kg/m² as calculated from the following:    Height as of this encounter: 165.1 cm (65\").    Weight as of this encounter: 109 kg (240 lb 6.4 oz).    Physical Exam  Vitals and nursing note reviewed.   Constitutional:       General: She is not in acute distress.     Appearance: Normal appearance.   HENT:      Head: Normocephalic and atraumatic.   Eyes:      Extraocular Movements: Extraocular movements intact.   Cardiovascular:      Rate and Rhythm: Normal rate and regular rhythm.      Pulses: Normal pulses.      Heart sounds: No murmur heard.  Pulmonary:      Effort: Pulmonary effort is normal. No respiratory distress.      Breath sounds: Normal breath sounds.   Chest:   Breasts:      Right: Normal. No mass, nipple discharge, skin change, tenderness or axillary adenopathy.      Left: Normal. No mass, nipple discharge, skin change, tenderness or axillary adenopathy.       Abdominal:      General: There is no distension.      Palpations: Abdomen is soft. There is no mass.      Tenderness: There is no abdominal tenderness.   Genitourinary:     General: Normal vulva.      Labia:         Right: No rash or lesion.         Left: No rash or lesion.       Urethra: No prolapse, urethral swelling or urethral lesion.      Vagina: Normal.      Comments: Bladder: no masses or tenderness  Perineum/Anus: no masses, lesions, or skin changes    Surgically absent cervix/uterus/oophorectomy  Musculoskeletal:         General: No swelling. Normal range of motion.      Cervical back: Normal range " of motion.   Lymphadenopathy:      Upper Body:      Right upper body: No axillary adenopathy.      Left upper body: No axillary adenopathy.   Skin:     General: Skin is warm and dry.   Neurological:      General: No focal deficit present.      Mental Status: She is alert and oriented to person, place, and time.   Psychiatric:         Mood and Affect: Mood normal.         Behavior: Behavior normal.         As part of wellness and prevention, the following topics were discussed with the patient:  Encouraged self breast exam  Physical activity and regular exercised encouraged.   Injury prevention discussed.  Healthy weight discussed.  Nutrition discussed.  Substance abuse/misuse discussed.  Sexual behavior/safe practices discussed.   Sexual transmitted disease prevention   Mental health discussed.           Assessment:  Diagnoses and all orders for this visit:    1. Well woman exam with routine gynecological exam (Primary)  -     Ambulatory Referral For Screening Colonoscopy    2. Screening for colon cancer  -     Ambulatory Referral For Screening Colonoscopy    3. ASCUS of cervix with negative high risk HPV    -Breast and pelvic exam normal  - Declined STD screen  - Discussed we do not need to do further Pap smears considering this low-grade abnormality and no prior history of high-grade abnormalities.  We should however do annual gynecologic exams.  - Referred for colonoscopy  - Discussed that she could still get mammograms but patient prefers to do every 6 month CT with her oncologist instead      Plan:  Return in about 1 year (around 5/11/2023) for Annual.      Hali Rolle MD  5/11/2022 12:30 EDT

## 2022-05-12 ENCOUNTER — PATIENT ROUNDING (BHMG ONLY) (OUTPATIENT)
Dept: OBSTETRICS AND GYNECOLOGY | Age: 57
End: 2022-05-12

## 2022-05-12 NOTE — PROGRESS NOTES
A MY CHART MESSAGE HAS BEEN SENT TO THE PATIENT FOR [facility] [place] [facility].

## 2022-08-10 DIAGNOSIS — I10 ESSENTIAL HYPERTENSION: ICD-10-CM

## 2022-08-10 DIAGNOSIS — E78.49 OTHER HYPERLIPIDEMIA: ICD-10-CM

## 2022-08-10 DIAGNOSIS — E11.9 TYPE 2 DIABETES MELLITUS WITHOUT COMPLICATION, WITHOUT LONG-TERM CURRENT USE OF INSULIN: Primary | ICD-10-CM

## 2022-08-11 LAB
ALBUMIN SERPL-MCNC: 4.4 G/DL (ref 3.8–4.9)
ALBUMIN/GLOB SERPL: 2 {RATIO} (ref 1.2–2.2)
ALP SERPL-CCNC: 142 IU/L (ref 44–121)
ALT SERPL-CCNC: 25 IU/L (ref 0–32)
AST SERPL-CCNC: 14 IU/L (ref 0–40)
BILIRUB SERPL-MCNC: 0.5 MG/DL (ref 0–1.2)
BUN SERPL-MCNC: 15 MG/DL (ref 6–24)
BUN/CREAT SERPL: 17 (ref 9–23)
CALCIUM SERPL-MCNC: 9 MG/DL (ref 8.7–10.2)
CHLORIDE SERPL-SCNC: 103 MMOL/L (ref 96–106)
CHOLEST SERPL-MCNC: 109 MG/DL (ref 100–199)
CO2 SERPL-SCNC: 25 MMOL/L (ref 20–29)
CREAT SERPL-MCNC: 0.88 MG/DL (ref 0.57–1)
EGFRCR SERPLBLD CKD-EPI 2021: 76 ML/MIN/1.73
GLOBULIN SER CALC-MCNC: 2.2 G/DL (ref 1.5–4.5)
GLUCOSE SERPL-MCNC: 135 MG/DL (ref 65–99)
HBA1C MFR BLD: 6.9 % (ref 4.8–5.6)
HDLC SERPL-MCNC: 31 MG/DL
LDLC SERPL CALC-MCNC: 53 MG/DL (ref 0–99)
POTASSIUM SERPL-SCNC: 4.4 MMOL/L (ref 3.5–5.2)
PROT SERPL-MCNC: 6.6 G/DL (ref 6–8.5)
SODIUM SERPL-SCNC: 141 MMOL/L (ref 134–144)
TRIGL SERPL-MCNC: 146 MG/DL (ref 0–149)
VLDLC SERPL CALC-MCNC: 25 MG/DL (ref 5–40)

## 2022-08-17 ENCOUNTER — OFFICE VISIT (OUTPATIENT)
Dept: INTERNAL MEDICINE | Facility: CLINIC | Age: 57
End: 2022-08-17

## 2022-08-17 ENCOUNTER — HOSPITAL ENCOUNTER (OUTPATIENT)
Dept: CT IMAGING | Facility: HOSPITAL | Age: 57
Discharge: HOME | End: 2022-08-17
Admitting: INTERNAL MEDICINE

## 2022-08-17 ENCOUNTER — APPOINTMENT (OUTPATIENT)
Dept: PET IMAGING | Facility: HOSPITAL | Age: 57
End: 2022-08-17

## 2022-08-17 VITALS
WEIGHT: 241 LBS | SYSTOLIC BLOOD PRESSURE: 122 MMHG | HEIGHT: 65 IN | HEART RATE: 80 BPM | BODY MASS INDEX: 40.15 KG/M2 | TEMPERATURE: 97.3 F | DIASTOLIC BLOOD PRESSURE: 74 MMHG

## 2022-08-17 DIAGNOSIS — C34.91 NON-SMALL CELL LUNG CANCER, RIGHT: ICD-10-CM

## 2022-08-17 DIAGNOSIS — Z15.09 BRCA2 GENE MUTATION POSITIVE IN FEMALE: ICD-10-CM

## 2022-08-17 DIAGNOSIS — E11.9 TYPE 2 DIABETES MELLITUS WITHOUT COMPLICATION, WITHOUT LONG-TERM CURRENT USE OF INSULIN: Primary | ICD-10-CM

## 2022-08-17 DIAGNOSIS — N94.19 DYSPAREUNIA DUE TO MEDICAL CONDITION IN FEMALE: ICD-10-CM

## 2022-08-17 DIAGNOSIS — E78.49 OTHER HYPERLIPIDEMIA: ICD-10-CM

## 2022-08-17 DIAGNOSIS — C64.2 RENAL CELL CARCINOMA OF LEFT KIDNEY: ICD-10-CM

## 2022-08-17 DIAGNOSIS — C43.71 MALIGNANT MELANOMA OF RIGHT LOWER EXTREMITY INCLUDING HIP: ICD-10-CM

## 2022-08-17 DIAGNOSIS — Z15.02 BRCA2 GENE MUTATION POSITIVE IN FEMALE: ICD-10-CM

## 2022-08-17 DIAGNOSIS — Z15.01 BRCA2 GENE MUTATION POSITIVE IN FEMALE: ICD-10-CM

## 2022-08-17 DIAGNOSIS — D3A.090 CARCINOID TUMOR OF LEFT LUNG: ICD-10-CM

## 2022-08-17 DIAGNOSIS — C73 PAPILLARY THYROID CARCINOMA: ICD-10-CM

## 2022-08-17 PROCEDURE — 74177 CT ABD & PELVIS W/CONTRAST: CPT

## 2022-08-17 PROCEDURE — 99213 OFFICE O/P EST LOW 20 MIN: CPT | Performed by: INTERNAL MEDICINE

## 2022-08-17 PROCEDURE — 71260 CT THORAX DX C+: CPT

## 2022-08-17 PROCEDURE — 25010000002 IOPAMIDOL 61 % SOLUTION: Performed by: INTERNAL MEDICINE

## 2022-08-17 PROCEDURE — 0 DIATRIZOATE MEGLUMINE & SODIUM PER 1 ML: Performed by: INTERNAL MEDICINE

## 2022-08-17 RX ADMIN — DIATRIZOATE MEGLUMINE AND DIATRIZOATE SODIUM 30 ML: 660; 100 LIQUID ORAL; RECTAL at 14:14

## 2022-08-17 RX ADMIN — IOPAMIDOL 95 ML: 612 INJECTION, SOLUTION INTRAVENOUS at 15:26

## 2022-08-17 NOTE — PROGRESS NOTES
"Chief Complaint  Diabetes and Hypertension    Subjective        Christie Avendaño presents to [facility]  History of Present Illness doing well, labs look stable. She has been watching diet, etc.  Up to date with gyn f/u and Dr. Collins.   She is getting a little exercise but not enough.   Some OA, stiffness in her hands, loves to brittany but sometimes she can't do it.    Painful intercourse, not a dryness problem, feels tight.      Objective   Vital Signs:  /74   Pulse 80   Temp 97.3 °F (36.3 °C)   Ht 165.1 cm (65\")   Wt 109 kg (241 lb)   BMI 40.10 kg/m²   Estimated body mass index is 40.1 kg/m² as calculated from the following:    Height as of this encounter: 165.1 cm (65\").    Weight as of this encounter: 109 kg (241 lb).          Physical Exam  Constitutional:       Appearance: Normal appearance.   Cardiovascular:      Rate and Rhythm: Normal rate and regular rhythm.   Musculoskeletal:      Right lower leg: No edema.      Left lower leg: No edema.        Result Review :  The following data was reviewed by: Tiffany Holloway MD on 08/17/2022:  Common labs    Common Labsle 3/1/22 3/1/22 4/6/22 4/6/22 8/10/22 8/10/22 8/10/22    1035 1035 1033 1033 0942 0942 0942   Glucose  141 (A) 116 (A)  135 (A)     BUN  10 13  15     Creatinine  0.77 0.80  0.88     Sodium  139 139  141     Potassium  3.7 4.5  4.4     Chloride  101 100  103     Calcium  8.9 9.5  9.0     Total Protein   6.8  6.6     Albumin  4.30 4.5  4.4     Total Bilirubin  0.8 0.6  0.5     Alkaline Phosphatase  116 123 (A)  142 (A)     AST (SGOT)  15 15  14     ALT (SGPT)  26 32  25     WBC 11.25 (A)         Hemoglobin 12.9         Hematocrit 41.0         Platelets 249         Total Cholesterol       109   Triglycerides       146   HDL Cholesterol       31 (A)   LDL Cholesterol        53   Hemoglobin A1C    6.7 (A)  6.9 (A)    (A) Abnormal value       Comments are available for some flowsheets but are not being displayed.       "               Assessment and Plan   Diagnoses and all orders for this visit:    1. Type 2 diabetes mellitus without complication, without long-term current use of insulin (HCC) (Primary)  Comments:  stable, continue to work on diet.   Orders:  -     Comprehensive Metabolic Panel; Future  -     Hemoglobin A1c; Future  -     Lipid Panel With / Chol / HDL Ratio; Future  -     Microalbumin / Creatinine Urine Ratio - Urine, Clean Catch; Future    2. Other hyperlipidemia  Comments:  at goal on atorvastatin    3. Dyspareunia due to medical condition in female  Comments:  offered PT, discussed how to work on things herself, will call if wants more help.             Follow Up   Return in about 6 months (around 2/17/2023) for Medicare Wellness, Lab Before FUP.  Patient was given instructions and counseling regarding her condition or for health maintenance advice. Please see specific information pulled into the AVS if appropriate.

## 2022-08-22 RX ORDER — LISINOPRIL 20 MG/1
TABLET ORAL
Qty: 90 TABLET | Refills: 3 | Status: ON HOLD | OUTPATIENT
Start: 2022-08-22 | End: 2022-12-12

## 2022-08-23 NOTE — PROGRESS NOTES
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  In the interval since her last visit, the patient has continued follow-up with multiple other physicians on a routine basis.  She was seen by gynecology on 5/11/2022 and continues routine follow-up with Dr. Holloway her PCP.  She reports continuing follow-up with dermatology every 3 months and is due back in September.  She has routine follow-up with endocrinology, last seen on 8/8/2022.  She has felt relatively well over the past 6 months, has no significant new complaints today.  She fortunately has not had any recent medical issues.  She also continues follow-up in the supportive oncology clinic in continues on Lexapro 10 mg daily, depression adequately controlled      Objective   Vital Signs:   /85   Pulse 90   Temp 98 °F (36.7 °C) (Temporal)   Resp 18   Ht 165.1 cm (65\")   Wt 110 kg (243 lb 8 oz)   SpO2 96%   BMI 40.52 kg/m²     Physical Exam  Constitutional:       Appearance: She is well-developed.   Eyes:      Conjunctiva/sclera: Conjunctivae normal.   Neck:      Thyroid: No thyromegaly.   Cardiovascular:      Rate and Rhythm: Normal rate and regular rhythm.      Heart sounds: No murmur heard.    No friction rub. No gallop.   Pulmonary:      Effort: No respiratory distress.      Breath sounds: Normal breath sounds.   Chest:   Breasts:      Right: No supraclavicular adenopathy.    "   Left: No supraclavicular adenopathy.       Abdominal:      General: Bowel sounds are normal. There is no distension.      Palpations: Abdomen is soft.      Tenderness: There is no abdominal tenderness.   Lymphadenopathy:      Head:      Right side of head: No submandibular adenopathy.      Cervical: No cervical adenopathy.      Upper Body:      Right upper body: No supraclavicular adenopathy.      Left upper body: No supraclavicular adenopathy.   Skin:     General: Skin is warm and dry.      Findings: No rash.   Neurological:      Mental Status: She is alert and oriented to person, place, and time.      Cranial Nerves: No cranial nerve deficit.      Motor: No abnormal muscle tone.      Deep Tendon Reflexes: Reflexes normal.   Psychiatric:         Behavior: Behavior normal.        Patient was examined today, unchanged from above    Result Review : Reviewed CBC, CMP, iron panel, ferritin from today.  Reviewed CT chest abdomen pelvis from 8/17/2022.  Reviewed notes from gynecology, PCP, supportive oncology       Assessment and Plan  1.  BRCA2 mutation (c.5073dupA) and GEORGES mutation (c.5073dup):  · Strong family history of malignancy with a mother who had thyroid cancer and also had breast cancer at age 73 with subsequent liver metastases.  She was found to be BRCA2 positive and prompted the patient's own testing.  The patient's maternal grandmother had ovarian cancer in her mid 80s, maternal grandfather and paternal grandfather both had leukemia of unknown type at an older age.  Her maternal aunt had colon cancer over the age of 50, maternal aunt had breast cancer in her 80s, and her father had cholangiocarcinoma.    · The patient underwent testing on 7/27/2016 showing positive BRCA2 mutation (c.5073dupA)   · The patient did undergo KAVYA/BSO in 1992 secondary to hemorrhage.  · The patient did undergo bilateral mastectomies 1/26/2017 with findings of bilateral DCIS (discussed below).  · Patient has undergone previous  colonoscopy on 11/28/2017.  · Given the unusual nature of the patient's malignancies, referral to genetics clinic for panel testing performed 11/10/2020 with re-identification of BRCA2 mutation (c.5073dupA) and new identification of GEORGES mutation (c.5073dup).  · We will continue surveillance with repeat colonoscopy at a 5-year interval due next in late 2022 and we will refer the patient back to her gastroenterologist Dr. Sabillon.  2. Stage I (sK3xZaXc) papillary thyroid cancer:  · Status post total thyroidectomy with Dr. Maher in [place] on 10/15/2010.  Pathology showed papillary thyroid cancer involving the left thyroid and isthmus, multifocal with 2 areas measuring 0.9 and 1.2 cm.  No evidence of capsular invasion, no extrathyroidal extension, no lymphovascular invasion, negative margins.  · Postsurgical scan showed uptake in the thyroid bed and the patient underwent treatment with I-131.    · She has had no evidence of recurrent disease.    · Her subsequent hypothyroidism has been managed by endocrinology (Dr. Subhash Michael) in [place], currently receiving levothyroxine 150 mcg daily.  · The patient continues routine follow-up with endocrinology, was seen last on 8/8/2022  3. Bilateral DCIS  · As above, patient has underlying BRCA2 mutation  · 8/5/2016 was found to have a right breast intraductal papilloma with fibrocystic change and microcalcifications with usual ductal hyperplasia and columnar cell change in 3 separate locations on biopsy.  · On 8/24/2016 she underwent excisional biopsy of an intraductal papilloma from the right breast.    · She subsequently underwent on 1/26/2017 bilateral mastectomy.  On the right there was a large intraductal papilloma with usual ductal hyperplasia, atypical hyperplasia, DCIS measuring 3 mm which was low-grade, ER positive (98%), CO positive (85%).  On the left there were multiple intraductal papillomas with atypical ductal hyperplasia.  There was DCIS measuring 6  mm, low-grade, ER positive (99%), VA positive (98%).  4. Stage I (hM5qAmF2) clear-cell renal cell carcinoma:  · Incidental finding on CT scan 1/15/2020 of enhancing left renal lesions x2, largest 2.4 cm  · Resection with [facility]. [facility] ([facility]) on 5/15/2020 with left partial nephrectomy.  Pathology showed clear cell renal cell carcinoma, Jeanne grade 2, 2 foci measuring 1.5 and 2.1 cm.  The renal parenchymal margin was focally positive.  · Patient reports that Dr. Pool felt that he required additional margin tissue in the area of focal positivity and did not recommend re-resection.  · Most recent CT 8/17/2022 showed no evidence of recurrent disease  · We are continuing with every 6-month monitoring of CT scans for surveillance currently.  We reviewed today her most recent CT scan from 8/17/2022 which shows no evidence of disease recurrence.  Repeat CT in 6 months prior to her return visit.  5. 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    · CT-guided biopsy on 1/9/2018 revealed a left lower lobe carcinoid with spindle cell features, no necrosis, no mitoses.  · Notation of normal chromogranin A 1/23/2018 of 35  · Follow-up CT on 1/24/2018 showed multiple bilateral pulmonary nodules including a 2.1 cm left lower lobe nodule (previously 2.6), 7 mm left lower lobe nodule, 1 cm right lower lobe nodule, right adrenal 1.5 cm nodule consistent with adenoma, and hepatic cysts.    · On 2/28/2018, the patient underwent a left lower lobectomy.  Pathology revealed a 2.3 cm left upper lobe (hilar) carcinoid, typical with spindle cell features, Ki-67 of 3.68%.  There were 2 of 8 peribronchial lymph nodes involved with carcinoid with lymphatic invasion, stage IIB ([id number]).  There was also evidence of adenocarcinoma (see below).  6. Stage IA1 (dJ3sA3Tr)  left lower lobe non-small cell lung cancer (adenocarcinoma with  lipidic features):  · The patient is a never smoker  · PET scan 12/13/2017 with hypermetabolic 2.5 cm left lower lobe nodule with SUV 5.7.    · CT-guided biopsy on 1/9/2018 revealed a left lower lobe carcinoid with spindle cell features, no necrosis, no mitoses.    · Follow-up CT on 1/24/2018 showed multiple bilateral pulmonary nodules including a 2.1 cm left lower lobe nodule (previously 2.6), 7 mm left lower lobe nodule, 1 cm right lower lobe nodule, right adrenal 1.5 cm nodule consistent with adenoma, and hepatic cysts.    · On 2/28/2018, the patient underwent a left lower lobectomy.  Pathology revealed a 2.3 cm left upper lobe (hilar) carcinoid, typical with spindle cell features, Ki-67 of 3.68%.  There were 2 of 8 peribronchial lymph nodes involved with carcinoid with lymphatic invasion, stage IIB (fW1qrY7X6).  There was a 0.9 cm adenocarcinoma, well differentiated with lipidic features, no visceral pleural invasion, bronchoalveolar atypical adenomatous hyperplasia at the parenchymal margin and 1 microscopic focus (less than 1 mm).  0/8 lymph nodes involved with adenocarcinoma. PDL1 <1% TPS, positive EGF receptor mutation (exon 19 deletion, [id number]), negative ALK/ROS1 rearrangement, negative BRAF, ER negative (2%), AL 0, HER-2/laura 1+, Ki-67 3%. Stage IA1 (cX4ncH2Y8).  · Patient was placed on Femara following surgery by Dr. Juana Pelayo.  Femara subsequently discontinued in early August 2020.  · Subsequent follow-up scans with stable findings until scan on 1/15/2020 noted 2 enhancing left renal lesions, largest 2.4 cm and there was also an enlarging right lower lobe nodule up to 1.0 x 1.2 cm.  In the interval, patient did undergo resection of renal cell carcinoma (discussed above).    · CT scan on 7/1/2020 showed slow increase in right lower lobe nodule up to 1.2 cm.  Felt to represent new primary lung cancer (see below).   7. Stage IA2 (oC9nwV2R2) right lower lobe non-small cell lung cancer  (adenocarcinoma with lipidic growth pattern):   · CT scan on 7/1/2020 showed slow increase in right lower lobe nodule up to 1.2 cm.  · CT-guided biopsy of the right lower lobe nodule on 7/31/2020 with adenocarcinoma with bland lipidic growth pattern of pulmonary origin (TTF-1 and CK7 positive). PDL1 TPS <1%, EGFR mutated, exon 20 insertion (possibly indicating lack of response to TKI's). ALK/ROS1 rearrangement negative and BRAF V600 mutation negative.  · Staging MRI brain 8/12/2020 with no evidence of metastatic disease  · Staging PET scan 8/12/2020 with no significant activity in the biopsied lesion 1.2 cm right lower lobe (SUV 1.5), no evidence of hilar, mediastinal uptake nor distant metastatic disease.  · Given the difference in EGFR mutation between the patient's 2 lung cancers, it is felt that she has 2 separate primary lesions.    · Right VATS with anterior basal segmentectomy on 10/9/2020 with pathology showing invasive well to moderately differentiated adenocarcinoma with acinar predominant growth measuring 1.4 cm, negative margins, no pleural or lymphovascular invasion, negative lymph nodes x5.  · Follow-up CT chest 3/8/2021 showed postoperative change, no evidence of disease recurrence.    · We are continuing with every 6-month monitoring with CT chest (in addition to CT abdomen and pelvis in regards to her renal cell carcinoma).  We reviewed her most recent CT scan from 8/17/2022 which continues to show no evidence of recurrent disease.  We did discuss today that after 5 years of every 6-month CT scans we will at least consider whether we could substitute a scan that has less radiation exposure such as a low-dose CT in combination with abdominal MRI.  For now we will continue on 6-month CT scans with neck study prior to return visit.  8. Melanoma right heel stage IA (cD4eXaA0)  · Patient underwent excision of right heel melanoma 6/4/2021, 0.6 mm superficial spreading melanoma with margin positive for  melanoma in situ.  · Wide local excision 6/30/2021 with no residual melanoma identified  · Patient has had difficulty with healing of the wound and has required 3 separate skin grafts, the most recent performed 4 weeks ago with ongoing slow healing.  · The patient's wound in the right heel did finally heal in late 2021.    · Patient continues routine follow-up with dermatology every 3 months, due to be seen in September 2022.  9. Incidental CT findings  · Notation of a number of incidental CT findings on scan from 8/17/2022 including 1.6 cm right adrenal nodule, C7 focal osteolysis, both unchanged compared to 3/8/2021 and apparent benign findings.  We will continue to monitor on subsequent scans.  · Notation on CT scan 8/17/2022 of short segment narrowing of the proximal left internal carotid artery resulting in moderate stenosis.  Findings were not well evaluated.  We will therefore proceed with carotid ultrasound and I will refer the patient to vascular surgery for further evaluation  10. Anemia:  · Patient has undergone previous colonoscopy on 11/28/2017.  · Hemoglobin in high 10 to low 11 range with low normal MCV  · Anemia evaluation 8/20/2020 with iron 44, ferritin 119.3, iron saturation 13%, TIBC 351, folate 12.2, B12 392.  · Unclear whether patient may have anemia secondary to chronic disease.  · With low iron saturation, initiated oral iron daily on 9/15/2020.  Patient however did not begin oral iron until 11/11/2020.  · Labs on 11/11/2020 with hemoglobin 11.4, iron studies showing iron 54, ferritin 112, iron saturation 14%, TIBC 395.  · Labs on 1/7/2021 with hemoglobin 11.9, iron studies with iron 50, ferritin 109.5, iron saturation 12%, TIBC 420.  Patient with significant GI side effects from oral iron with diarrhea, iron discontinued.  · Labs on 9/14/2021 showed hemoglobin that declined to 11.4 of unclear etiology.  Additional labs showed iron 44, ferritin 137.6, iron saturation 13 %, TIBC 346, folate  11.3, B12 394.   · Today, hemoglobin remains normal at 12.0.  Labs show iron 46, ferritin 106, iron saturation 13%, TIBC 351.  We will recheck again in 6 months.  9. Depression  · The patient was experiencing significant exacerbation of depressive symptoms in fall 2021 as she was coping with her mother's illness with metastatic breast cancer.  Patient's mother was living with her on hospice care for a period of time and did pass away in late 2021.  · Patient was referred back to supportive oncology clinic.  She reports that this has been very helpful for her.  She is continuing currently on Lexapro 10 mg daily.  10. Obesity and diabetes mellitus  · Patient motivated to lose weight, was referred to oncology nutrition and was seen on 11/10/2020  · Patient has been successful in weight loss with alteration in her diet and improvement in her activity level.  Improvement in hemoglobin A1c down to 6.3 on 12/1/2022.  · Slight increase in hemoglobin A1c on last check 8/10/2022 at 6.9.  11. COVID-19 vaccination and infection  · Patient did finally agreed to COVID-19 vaccination, received Moderna vaccine in September and October 2021.  · Patient did contract COVID-19 infection testing positive on 1/14/2022.  Fortunately symptoms were minimal and patient recovered without difficulty.  · We did discuss pursuit of COVID-19 booster injection     Plan:  1. We will order bilateral carotid Doppler in the next 1 to 2 weeks  2. Referral to vascular surgery regarding apparent right carotid stenosis seen on recent CT 8/17/2022  3. Referral back to Dr. Sabillon and GI at [facility] for colonoscopy (last performed November 2017)  4. Patient continues every 3-month follow-up with dermatology  5. Patient continues routine follow-up with endocrinology, last seen on 8/8/2022 (Dr. Michael in the [facility] system).  6. Patient continues follow-up in supportive oncology clinic  7. Patient continues routine follow-up with gynecology, last seen on  5/11/2022 (Dr. Rolle)  8. Return visit in 6 months with CBC, CMP, stat iron panel/ferritin.  1 week prior CT chest abdomen pelvis.    I did spend 40 minutes in total time caring for the patient today, time spent in review of records, face-to-face consultation, coordination of care, placement of orders, completion of documentation.

## 2022-08-24 ENCOUNTER — LAB (OUTPATIENT)
Dept: LAB | Facility: HOSPITAL | Age: 57
End: 2022-08-24

## 2022-08-24 ENCOUNTER — OFFICE VISIT (OUTPATIENT)
Dept: ONCOLOGY | Facility: CLINIC | Age: 57
End: 2022-08-24

## 2022-08-24 VITALS
TEMPERATURE: 98 F | BODY MASS INDEX: 40.57 KG/M2 | DIASTOLIC BLOOD PRESSURE: 85 MMHG | HEIGHT: 65 IN | WEIGHT: 243.5 LBS | HEART RATE: 90 BPM | SYSTOLIC BLOOD PRESSURE: 153 MMHG | RESPIRATION RATE: 18 BRPM | OXYGEN SATURATION: 96 %

## 2022-08-24 DIAGNOSIS — D64.9 NORMOCYTIC ANEMIA: ICD-10-CM

## 2022-08-24 DIAGNOSIS — Z15.02 BRCA2 GENE MUTATION POSITIVE IN FEMALE: ICD-10-CM

## 2022-08-24 DIAGNOSIS — C64.2 RENAL CELL CARCINOMA OF LEFT KIDNEY: ICD-10-CM

## 2022-08-24 DIAGNOSIS — D3A.090 CARCINOID TUMOR OF LEFT LUNG: ICD-10-CM

## 2022-08-24 DIAGNOSIS — C34.91 NON-SMALL CELL LUNG CANCER, RIGHT: Primary | ICD-10-CM

## 2022-08-24 DIAGNOSIS — Z15.01 BRCA2 GENE MUTATION POSITIVE IN FEMALE: ICD-10-CM

## 2022-08-24 DIAGNOSIS — Z15.09 BRCA2 GENE MUTATION POSITIVE IN FEMALE: ICD-10-CM

## 2022-08-24 DIAGNOSIS — C34.91 NON-SMALL CELL LUNG CANCER, RIGHT: ICD-10-CM

## 2022-08-24 DIAGNOSIS — C43.71 MALIGNANT MELANOMA OF RIGHT LOWER EXTREMITY INCLUDING HIP: ICD-10-CM

## 2022-08-24 DIAGNOSIS — C73 PAPILLARY THYROID CARCINOMA: ICD-10-CM

## 2022-08-24 DIAGNOSIS — I79.8 OTHER DISORDERS OF ARTERIES, ARTERIOLES AND CAPILLARIES IN DISEASES CLASSIFIED ELSEWHERE: ICD-10-CM

## 2022-08-24 LAB
ALBUMIN SERPL-MCNC: 4.3 G/DL (ref 3.5–5.2)
ALBUMIN/GLOB SERPL: 1.7 G/DL (ref 1.1–2.4)
ALP SERPL-CCNC: 143 U/L (ref 38–116)
ALT SERPL W P-5'-P-CCNC: 33 U/L (ref 0–33)
ANION GAP SERPL CALCULATED.3IONS-SCNC: 13.7 MMOL/L (ref 5–15)
AST SERPL-CCNC: 18 U/L (ref 0–32)
BASOPHILS # BLD AUTO: 0.07 10*3/MM3 (ref 0–0.2)
BASOPHILS NFR BLD AUTO: 0.8 % (ref 0–1.5)
BILIRUB SERPL-MCNC: 0.4 MG/DL (ref 0.2–1.2)
BUN SERPL-MCNC: 15 MG/DL (ref 6–20)
BUN/CREAT SERPL: 18.5 (ref 7.3–30)
CALCIUM SPEC-SCNC: 9.2 MG/DL (ref 8.5–10.2)
CHLORIDE SERPL-SCNC: 103 MMOL/L (ref 98–107)
CO2 SERPL-SCNC: 24.3 MMOL/L (ref 22–29)
CREAT SERPL-MCNC: 0.81 MG/DL (ref 0.6–1.1)
DEPRECATED RDW RBC AUTO: 44.6 FL (ref 37–54)
EGFRCR SERPLBLD CKD-EPI 2021: 84.3 ML/MIN/1.73
EOSINOPHIL # BLD AUTO: 0.12 10*3/MM3 (ref 0–0.4)
EOSINOPHIL NFR BLD AUTO: 1.4 % (ref 0.3–6.2)
ERYTHROCYTE [DISTWIDTH] IN BLOOD BY AUTOMATED COUNT: 14.7 % (ref 12.3–15.4)
FERRITIN SERPL-MCNC: 106.3 NG/ML (ref 11–207)
GLOBULIN UR ELPH-MCNC: 2.6 GM/DL (ref 1.8–3.5)
GLUCOSE SERPL-MCNC: 257 MG/DL (ref 74–124)
HCT VFR BLD AUTO: 37.5 % (ref 34–46.6)
HGB BLD-MCNC: 12 G/DL (ref 12–15.9)
IMM GRANULOCYTES # BLD AUTO: 0.03 10*3/MM3 (ref 0–0.05)
IMM GRANULOCYTES NFR BLD AUTO: 0.4 % (ref 0–0.5)
IRON 24H UR-MRATE: 46 MCG/DL (ref 37–145)
IRON SATN MFR SERPL: 13 % (ref 14–48)
LYMPHOCYTES # BLD AUTO: 2.5 10*3/MM3 (ref 0.7–3.1)
LYMPHOCYTES NFR BLD AUTO: 29.7 % (ref 19.6–45.3)
MCH RBC QN AUTO: 26.7 PG (ref 26.6–33)
MCHC RBC AUTO-ENTMCNC: 32 G/DL (ref 31.5–35.7)
MCV RBC AUTO: 83.5 FL (ref 79–97)
MONOCYTES # BLD AUTO: 0.55 10*3/MM3 (ref 0.1–0.9)
MONOCYTES NFR BLD AUTO: 6.5 % (ref 5–12)
NEUTROPHILS NFR BLD AUTO: 5.14 10*3/MM3 (ref 1.7–7)
NEUTROPHILS NFR BLD AUTO: 61.2 % (ref 42.7–76)
NRBC BLD AUTO-RTO: 0 /100 WBC (ref 0–0.2)
PLATELET # BLD AUTO: 229 10*3/MM3 (ref 140–450)
PMV BLD AUTO: 9.2 FL (ref 6–12)
POTASSIUM SERPL-SCNC: 4.4 MMOL/L (ref 3.5–4.7)
PROT SERPL-MCNC: 6.9 G/DL (ref 6.3–8)
RBC # BLD AUTO: 4.49 10*6/MM3 (ref 3.77–5.28)
SODIUM SERPL-SCNC: 141 MMOL/L (ref 134–145)
TIBC SERPL-MCNC: 351 MCG/DL (ref 249–505)
TRANSFERRIN SERPL-MCNC: 251 MG/DL (ref 200–360)
WBC NRBC COR # BLD: 8.41 10*3/MM3 (ref 3.4–10.8)

## 2022-08-24 PROCEDURE — 85025 COMPLETE CBC W/AUTO DIFF WBC: CPT

## 2022-08-24 PROCEDURE — 82728 ASSAY OF FERRITIN: CPT

## 2022-08-24 PROCEDURE — 83540 ASSAY OF IRON: CPT

## 2022-08-24 PROCEDURE — 84466 ASSAY OF TRANSFERRIN: CPT

## 2022-08-24 PROCEDURE — 36415 COLL VENOUS BLD VENIPUNCTURE: CPT

## 2022-08-24 PROCEDURE — 99215 OFFICE O/P EST HI 40 MIN: CPT | Performed by: INTERNAL MEDICINE

## 2022-08-24 PROCEDURE — 80053 COMPREHEN METABOLIC PANEL: CPT

## 2022-08-26 RX ORDER — ATORVASTATIN CALCIUM 40 MG/1
40 TABLET, FILM COATED ORAL NIGHTLY
Qty: 30 TABLET | Refills: 0 | Status: SHIPPED | OUTPATIENT
Start: 2022-08-26 | End: 2022-09-23

## 2022-08-30 ENCOUNTER — OFFICE VISIT (OUTPATIENT)
Dept: PSYCHIATRY | Facility: HOSPITAL | Age: 57
End: 2022-08-30

## 2022-08-30 DIAGNOSIS — F41.1 GENERALIZED ANXIETY DISORDER: ICD-10-CM

## 2022-08-30 DIAGNOSIS — F43.21 GRIEF: Primary | ICD-10-CM

## 2022-08-30 DIAGNOSIS — F33.1 MAJOR DEPRESSIVE DISORDER, RECURRENT EPISODE, MODERATE: ICD-10-CM

## 2022-08-30 PROCEDURE — 99214 OFFICE O/P EST MOD 30 MIN: CPT | Performed by: NURSE PRACTITIONER

## 2022-09-01 ENCOUNTER — HOSPITAL ENCOUNTER (OUTPATIENT)
Dept: CARDIOLOGY | Facility: HOSPITAL | Age: 57
Discharge: HOME | End: 2022-09-01
Admitting: INTERNAL MEDICINE

## 2022-09-01 DIAGNOSIS — I79.8 OTHER DISORDERS OF ARTERIES, ARTERIOLES AND CAPILLARIES IN DISEASES CLASSIFIED ELSEWHERE: ICD-10-CM

## 2022-09-01 DIAGNOSIS — Z15.01 BRCA2 GENE MUTATION POSITIVE IN FEMALE: ICD-10-CM

## 2022-09-01 DIAGNOSIS — Z15.09 BRCA2 GENE MUTATION POSITIVE IN FEMALE: ICD-10-CM

## 2022-09-01 DIAGNOSIS — Z15.02 BRCA2 GENE MUTATION POSITIVE IN FEMALE: ICD-10-CM

## 2022-09-01 DIAGNOSIS — D64.9 NORMOCYTIC ANEMIA: ICD-10-CM

## 2022-09-01 DIAGNOSIS — C34.91 NON-SMALL CELL LUNG CANCER, RIGHT: ICD-10-CM

## 2022-09-01 LAB
BH CV XLRA MEAS LEFT DIST CCA EDV: -28 CM/SEC
BH CV XLRA MEAS LEFT DIST CCA PSV: -96.9 CM/SEC
BH CV XLRA MEAS LEFT DIST ICA EDV: -43.4 CM/SEC
BH CV XLRA MEAS LEFT DIST ICA PSV: -103 CM/SEC
BH CV XLRA MEAS LEFT ICA/CCA RATIO: 0.77
BH CV XLRA MEAS LEFT PROX CCA EDV: 46.3 CM/SEC
BH CV XLRA MEAS LEFT PROX CCA PSV: 133 CM/SEC
BH CV XLRA MEAS LEFT PROX ECA PSV: -116 CM/SEC
BH CV XLRA MEAS LEFT PROX ICA EDV: -30.8 CM/SEC
BH CV XLRA MEAS LEFT PROX ICA PSV: -87.6 CM/SEC
BH CV XLRA MEAS LEFT PROX SCLA PSV: 148 CM/SEC
BH CV XLRA MEAS LEFT VERTEBRAL A PSV: -61.4 CM/SEC
BH CV XLRA MEAS RIGHT DIST CCA EDV: -31.5 CM/SEC
BH CV XLRA MEAS RIGHT DIST CCA PSV: -96.7 CM/SEC
BH CV XLRA MEAS RIGHT DIST ICA EDV: -34.5 CM/SEC
BH CV XLRA MEAS RIGHT DIST ICA PSV: -75.3 CM/SEC
BH CV XLRA MEAS RIGHT ICA/CCA RATIO: -1.02
BH CV XLRA MEAS RIGHT PROX CCA EDV: 32.9 CM/SEC
BH CV XLRA MEAS RIGHT PROX CCA PSV: 120 CM/SEC
BH CV XLRA MEAS RIGHT PROX ECA EDV: -35 CM/SEC
BH CV XLRA MEAS RIGHT PROX ECA PSV: -123 CM/SEC
BH CV XLRA MEAS RIGHT PROX ICA EDV: -37.3 CM/SEC
BH CV XLRA MEAS RIGHT PROX ICA PSV: -122 CM/SEC
BH CV XLRA MEAS RIGHT PROX SCLA PSV: 177 CM/SEC
BH CV XLRA MEAS RIGHT VERTEBRAL A PSV: 49.9 CM/SEC
MAXIMAL PREDICTED HEART RATE: 162 BPM
STRESS TARGET HR: 138 BPM

## 2022-09-01 PROCEDURE — 93880 EXTRACRANIAL BILAT STUDY: CPT

## 2022-09-02 ENCOUNTER — TELEPHONE (OUTPATIENT)
Dept: ONCOLOGY | Facility: CLINIC | Age: 57
End: 2022-09-02

## 2022-09-07 RX ORDER — ESCITALOPRAM OXALATE 10 MG/1
10 TABLET ORAL DAILY
Qty: 90 TABLET | Refills: 1 | Status: SHIPPED | OUTPATIENT
Start: 2022-09-07 | End: 2022-12-23 | Stop reason: HOSPADM

## 2022-09-16 RX ORDER — VALACYCLOVIR HYDROCHLORIDE 1 G/1
TABLET, FILM COATED ORAL
Qty: 180 TABLET | Refills: 6 | Status: SHIPPED | OUTPATIENT
Start: 2022-09-16 | End: 2022-09-16

## 2022-09-16 RX ORDER — VALACYCLOVIR HYDROCHLORIDE 1 G/1
TABLET, FILM COATED ORAL
Qty: 180 TABLET | Refills: 6 | Status: ON HOLD | OUTPATIENT
Start: 2022-09-16 | End: 2022-12-12

## 2022-09-23 RX ORDER — ATORVASTATIN CALCIUM 40 MG/1
40 TABLET, FILM COATED ORAL NIGHTLY
Qty: 30 TABLET | Refills: 0 | Status: SHIPPED | OUTPATIENT
Start: 2022-09-23 | End: 2022-10-17

## 2022-10-17 RX ORDER — ATORVASTATIN CALCIUM 40 MG/1
40 TABLET, FILM COATED ORAL NIGHTLY
Qty: 90 TABLET | Refills: 3 | Status: SHIPPED | OUTPATIENT
Start: 2022-10-17 | End: 2022-12-23 | Stop reason: HOSPADM

## 2022-11-15 ENCOUNTER — OFFICE VISIT (OUTPATIENT)
Dept: INTERNAL MEDICINE | Facility: CLINIC | Age: 57
End: 2022-11-15

## 2022-11-15 VITALS — HEIGHT: 65 IN | TEMPERATURE: 98.2 F | BODY MASS INDEX: 40.48 KG/M2 | WEIGHT: 243 LBS

## 2022-11-15 DIAGNOSIS — U07.1 COVID-19 VIRUS INFECTION: Primary | ICD-10-CM

## 2022-11-15 LAB
EXPIRATION DATE: ABNORMAL
FLUAV AG UPPER RESP QL IA.RAPID: NOT DETECTED
FLUBV AG UPPER RESP QL IA.RAPID: NOT DETECTED
INTERNAL CONTROL: ABNORMAL
Lab: ABNORMAL
SARS-COV-2 AG UPPER RESP QL IA.RAPID: DETECTED

## 2022-11-15 PROCEDURE — 99214 OFFICE O/P EST MOD 30 MIN: CPT | Performed by: INTERNAL MEDICINE

## 2022-11-15 PROCEDURE — 87428 SARSCOV & INF VIR A&B AG IA: CPT | Performed by: INTERNAL MEDICINE

## 2022-11-15 NOTE — PROGRESS NOTES
"Chief Complaint  Cough, Fever, and Earache    Subjective        Christie Avendaño presents to [facility]  History of Present Illness3-4 days of fever, cough and congestion.  with similar but he is feeling better.  She denies any CP or SOB.  Cough is not productive.  No diarrhea.  She is eating and drinking normally.   Covid test + today here.  Has not tested prior  She is vaccinated but not boosted.     Objective   Vital Signs:  Temp 98.2 °F (36.8 °C)   Ht 165.1 cm (65\")   Wt 110 kg (243 lb)   BMI 40.44 kg/m²   Estimated body mass index is 40.44 kg/m² as calculated from the following:    Height as of this encounter: 165.1 cm (65\").    Weight as of this encounter: 110 kg (243 lb).          Physical Exam  Constitutional:       General: She is not in acute distress.     Appearance: She is ill-appearing.   HENT:      Right Ear: Tympanic membrane and ear canal normal.      Left Ear: Tympanic membrane and ear canal normal.      Mouth/Throat:      Mouth: Mucous membranes are moist.   Cardiovascular:      Rate and Rhythm: Normal rate and regular rhythm.   Pulmonary:      Effort: Pulmonary effort is normal.      Breath sounds: Normal breath sounds. No wheezing.   Musculoskeletal:      Right lower leg: No edema.      Left lower leg: No edema.   Skin:     General: Skin is warm and dry.        Result Review :                Assessment and Plan   Diagnoses and all orders for this visit:    1. COVID-19 virus infection (Primary)  Comments:  pt is at high risk for progression of disease and with ongoing sx including fever, opted to treat with Paxlovid.  Understands how to take/risk of rebound.  Call  Orders:  -     POCT SARS-CoV-2 Antigen GRICEL + Flu    hold Lipitor for 10 days  Discussed isolation        Follow Up   No follow-ups on file.  Patient was given instructions and counseling regarding her condition or for health maintenance advice. Please see specific information pulled into the AVS if " appropriate.

## 2022-11-22 ENCOUNTER — HOSPITAL ENCOUNTER (OUTPATIENT)
Dept: MRI IMAGING | Facility: HOSPITAL | Age: 57
Discharge: HOME | End: 2022-11-22
Admitting: INTERNAL MEDICINE

## 2022-11-22 ENCOUNTER — HOSPITAL ENCOUNTER (INPATIENT)
Facility: HOSPITAL | Age: 57
LOS: 15 days | End: 2022-12-07
Attending: EMERGENCY MEDICINE

## 2022-11-22 ENCOUNTER — APPOINTMENT (OUTPATIENT)
Dept: GENERAL RADIOLOGY | Facility: HOSPITAL | Age: 57
End: 2022-11-22

## 2022-11-22 DIAGNOSIS — G93.89 BRAIN MASS: Primary | ICD-10-CM

## 2022-11-22 DIAGNOSIS — Z15.01 BRCA2 GENE MUTATION POSITIVE IN FEMALE: ICD-10-CM

## 2022-11-22 DIAGNOSIS — R41.0 CONFUSION: ICD-10-CM
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
## 2022-11-22 DIAGNOSIS — C34.91 NON-SMALL CELL LUNG CANCER, RIGHT: ICD-10-CM

## 2022-11-22 DIAGNOSIS — C73 PAPILLARY THYROID CARCINOMA: ICD-10-CM

## 2022-11-22 DIAGNOSIS — C64.2 RENAL CELL CARCINOMA OF LEFT KIDNEY: ICD-10-CM

## 2022-11-22 DIAGNOSIS — E11.9 TYPE 2 DIABETES MELLITUS WITHOUT COMPLICATION, WITHOUT LONG-TERM CURRENT USE OF INSULIN: ICD-10-CM
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
## 2022-11-22 DIAGNOSIS — Z15.02 BRCA2 GENE MUTATION POSITIVE IN FEMALE: ICD-10-CM

## 2022-11-22 DIAGNOSIS — Z15.09 BRCA2 GENE MUTATION POSITIVE IN FEMALE: ICD-10-CM

## 2022-11-22 DIAGNOSIS — R00.0 SINUS TACHYCARDIA: ICD-10-CM

## 2022-11-22 LAB
ALBUMIN SERPL-MCNC: 4.4 G/DL (ref 3.5–5.2)
ALBUMIN/GLOB SERPL: 1.5 G/DL
ALP SERPL-CCNC: 142 U/L (ref 39–117)
ALT SERPL W P-5'-P-CCNC: 39 U/L (ref 1–33)
ANION GAP SERPL CALCULATED.3IONS-SCNC: 16 MMOL/L (ref 5–15)
AST SERPL-CCNC: 16 U/L (ref 1–32)
BASOPHILS # BLD AUTO: 0.06 10*3/MM3 (ref 0–0.2)
BASOPHILS NFR BLD AUTO: 0.6 % (ref 0–1.5)
BILIRUB SERPL-MCNC: 0.3 MG/DL (ref 0–1.2)
BUN SERPL-MCNC: 10 MG/DL (ref 6–20)
BUN/CREAT SERPL: 11.8 (ref 7–25)
CALCIUM SPEC-SCNC: 9.5 MG/DL (ref 8.6–10.5)
CHLORIDE SERPL-SCNC: 101 MMOL/L (ref 98–107)
CO2 SERPL-SCNC: 23 MMOL/L (ref 22–29)
CREAT BLDA-MCNC: 0.8 MG/DL (ref 0.6–1.3)
CREAT SERPL-MCNC: 0.85 MG/DL (ref 0.57–1)
DEPRECATED RDW RBC AUTO: 41.9 FL (ref 37–54)
EGFRCR SERPLBLD CKD-EPI 2021: 79.5 ML/MIN/1.73
EGFRCR SERPLBLD CKD-EPI 2021: 85.5 ML/MIN/1.73
EOSINOPHIL # BLD AUTO: 0.11 10*3/MM3 (ref 0–0.4)
EOSINOPHIL NFR BLD AUTO: 1.1 % (ref 0.3–6.2)
ERYTHROCYTE [DISTWIDTH] IN BLOOD BY AUTOMATED COUNT: 14.3 % (ref 12.3–15.4)
GLOBULIN UR ELPH-MCNC: 3 GM/DL
GLUCOSE SERPL-MCNC: 224 MG/DL (ref 65–99)
HCT VFR BLD AUTO: 41.1 % (ref 34–46.6)
HGB BLD-MCNC: 13.4 G/DL (ref 12–15.9)
IMM GRANULOCYTES # BLD AUTO: 0.04 10*3/MM3 (ref 0–0.05)
IMM GRANULOCYTES NFR BLD AUTO: 0.4 % (ref 0–0.5)
INR PPP: 0.89 (ref 0.9–1.1)
LYMPHOCYTES # BLD AUTO: 2.55 10*3/MM3 (ref 0.7–3.1)
LYMPHOCYTES NFR BLD AUTO: 25.1 % (ref 19.6–45.3)
MAGNESIUM SERPL-MCNC: 1.8 MG/DL (ref 1.6–2.6)
MCH RBC QN AUTO: 26.8 PG (ref 26.6–33)
MCHC RBC AUTO-ENTMCNC: 32.6 G/DL (ref 31.5–35.7)
MCV RBC AUTO: 82.2 FL (ref 79–97)
MONOCYTES # BLD AUTO: 0.7 10*3/MM3 (ref 0.1–0.9)
MONOCYTES NFR BLD AUTO: 6.9 % (ref 5–12)
NEUTROPHILS NFR BLD AUTO: 6.68 10*3/MM3 (ref 1.7–7)
NEUTROPHILS NFR BLD AUTO: 65.9 % (ref 42.7–76)
NRBC BLD AUTO-RTO: 0 /100 WBC (ref 0–0.2)
PLATELET # BLD AUTO: 263 10*3/MM3 (ref 140–450)
PMV BLD AUTO: 9.4 FL (ref 6–12)
POTASSIUM SERPL-SCNC: 3.8 MMOL/L (ref 3.5–5.2)
PROT SERPL-MCNC: 7.4 G/DL (ref 6–8.5)
PROTHROMBIN TIME: 12.1 SECONDS (ref 11.7–14.2)
RBC # BLD AUTO: 5 10*6/MM3 (ref 3.77–5.28)
SARS-COV-2 RNA RESP QL NAA+PROBE: NOT DETECTED
SODIUM SERPL-SCNC: 140 MMOL/L (ref 136–145)
T4 FREE SERPL-MCNC: 1.31 NG/DL (ref 0.93–1.7)
TROPONIN T SERPL-MCNC: <0.01 NG/ML (ref 0–0.03)
TSH SERPL DL<=0.05 MIU/L-ACNC: 3.54 UIU/ML (ref 0.27–4.2)
WBC NRBC COR # BLD: 10.14 10*3/MM3 (ref 3.4–10.8)

## 2022-11-22 PROCEDURE — 85610 PROTHROMBIN TIME: CPT | Performed by: EMERGENCY MEDICINE

## 2022-11-22 PROCEDURE — A9579 GAD-BASE MR CONTRAST NOS,1ML: HCPCS | Performed by: INTERNAL MEDICINE

## 2022-11-22 PROCEDURE — 84484 ASSAY OF TROPONIN QUANT: CPT | Performed by: EMERGENCY MEDICINE

## 2022-11-22 PROCEDURE — 93010 ELECTROCARDIOGRAM REPORT: CPT | Performed by: INTERNAL MEDICINE

## 2022-11-22 PROCEDURE — 83735 ASSAY OF MAGNESIUM: CPT | Performed by: EMERGENCY MEDICINE

## 2022-11-22 PROCEDURE — U0003 INFECTIOUS AGENT DETECTION BY NUCLEIC ACID (DNA OR RNA); SEVERE ACUTE RESPIRATORY SYNDROME CORONAVIRUS 2 (SARS-COV-2) (CORONAVIRUS DISEASE [COVID-19]), AMPLIFIED PROBE TECHNIQUE, MAKING USE OF HIGH THROUGHPUT TECHNOLOGIES AS DESCRIBED BY CMS-2020-01-R: HCPCS | Performed by: EMERGENCY MEDICINE

## 2022-11-22 PROCEDURE — 25010000002 LEVETRIRACETAM PER 10 MG: Performed by: EMERGENCY MEDICINE

## 2022-11-22 PROCEDURE — 25010000002 DEXAMETHASONE PER 1 MG: Performed by: EMERGENCY MEDICINE

## 2022-11-22 PROCEDURE — 84439 ASSAY OF FREE THYROXINE: CPT | Performed by: EMERGENCY MEDICINE

## 2022-11-22 PROCEDURE — 70553 MRI BRAIN STEM W/O & W/DYE: CPT

## 2022-11-22 PROCEDURE — 82565 ASSAY OF CREATININE: CPT

## 2022-11-22 PROCEDURE — 93005 ELECTROCARDIOGRAM TRACING: CPT | Performed by: EMERGENCY MEDICINE

## 2022-11-22 PROCEDURE — 80050 GENERAL HEALTH PANEL: CPT | Performed by: EMERGENCY MEDICINE

## 2022-11-22 PROCEDURE — 99284 EMERGENCY DEPT VISIT MOD MDM: CPT

## 2022-11-22 PROCEDURE — 25010000002 GADOTERIDOL PER 1 ML: Performed by: INTERNAL MEDICINE

## 2022-11-22 PROCEDURE — 71045 X-RAY EXAM CHEST 1 VIEW: CPT

## 2022-11-22 RX ORDER — ONDANSETRON 4 MG/1
4 TABLET, FILM COATED ORAL EVERY 6 HOURS PRN
Status: DISCONTINUED | OUTPATIENT
Start: 2022-11-22 | End: 2022-12-07 | Stop reason: HOSPADM

## 2022-11-22 RX ORDER — LEVETIRACETAM 500 MG/5ML
1000 INJECTION, SOLUTION, CONCENTRATE INTRAVENOUS ONCE
Status: COMPLETED | OUTPATIENT
Start: 2022-11-22 | End: 2022-11-22

## 2022-11-22 RX ORDER — ACETAMINOPHEN 650 MG/1
650 SUPPOSITORY RECTAL EVERY 4 HOURS PRN
Status: DISCONTINUED | OUTPATIENT
Start: 2022-11-22 | End: 2022-12-02

## 2022-11-22 RX ORDER — SODIUM CHLORIDE 0.9 % (FLUSH) 0.9 %
10 SYRINGE (ML) INJECTION EVERY 12 HOURS SCHEDULED
Status: DISCONTINUED | OUTPATIENT
Start: 2022-11-22 | End: 2022-12-07 | Stop reason: HOSPADM

## 2022-11-22 RX ORDER — LEVETIRACETAM 500 MG/5ML
500 INJECTION, SOLUTION, CONCENTRATE INTRAVENOUS EVERY 12 HOURS SCHEDULED
Status: DISCONTINUED | OUTPATIENT
Start: 2022-11-23 | End: 2022-11-29

## 2022-11-22 RX ORDER — SODIUM CHLORIDE 0.9 % (FLUSH) 0.9 %
10 SYRINGE (ML) INJECTION AS NEEDED
Status: DISCONTINUED | OUTPATIENT
Start: 2022-11-22 | End: 2022-11-23

## 2022-11-22 RX ORDER — ONDANSETRON 2 MG/ML
4 INJECTION INTRAMUSCULAR; INTRAVENOUS EVERY 6 HOURS PRN
Status: DISCONTINUED | OUTPATIENT
Start: 2022-11-22 | End: 2022-12-07 | Stop reason: HOSPADM

## 2022-11-22 RX ORDER — ACETAMINOPHEN 160 MG/5ML
650 SOLUTION ORAL EVERY 4 HOURS PRN
Status: DISCONTINUED | OUTPATIENT
Start: 2022-11-22 | End: 2022-12-02

## 2022-11-22 RX ORDER — ACETAMINOPHEN 325 MG/1
650 TABLET ORAL EVERY 4 HOURS PRN
Status: DISCONTINUED | OUTPATIENT
Start: 2022-11-22 | End: 2022-12-02

## 2022-11-22 RX ORDER — NITROGLYCERIN 0.4 MG/1
0.4 TABLET SUBLINGUAL
Status: DISCONTINUED | OUTPATIENT
Start: 2022-11-22 | End: 2022-12-07 | Stop reason: HOSPADM

## 2022-11-22 RX ORDER — DEXAMETHASONE SODIUM PHOSPHATE 10 MG/ML
8 INJECTION INTRAMUSCULAR; INTRAVENOUS ONCE
Status: COMPLETED | OUTPATIENT
Start: 2022-11-22 | End: 2022-11-22

## 2022-11-22 RX ORDER — ALUMINA, MAGNESIA, AND SIMETHICONE 2400; 2400; 240 MG/30ML; MG/30ML; MG/30ML
15 SUSPENSION ORAL EVERY 6 HOURS PRN
Status: DISCONTINUED | OUTPATIENT
Start: 2022-11-22 | End: 2022-11-25

## 2022-11-22 RX ORDER — DEXAMETHASONE SODIUM PHOSPHATE 10 MG/ML
6 INJECTION INTRAMUSCULAR; INTRAVENOUS EVERY 6 HOURS
Status: DISCONTINUED | OUTPATIENT
Start: 2022-11-23 | End: 2022-11-23

## 2022-11-22 RX ADMIN — LEVETIRACETAM 1000 MG: 100 INJECTION INTRAVENOUS at 21:20

## 2022-11-22 RX ADMIN — DEXAMETHASONE SODIUM PHOSPHATE 8 MG: 10 INJECTION INTRAMUSCULAR; INTRAVENOUS at 21:20

## 2022-11-22 RX ADMIN — GADOTERIDOL 20 ML: 279.3 INJECTION, SOLUTION INTRAVENOUS at 17:25

## 2022-11-23 ENCOUNTER — APPOINTMENT (OUTPATIENT)
Dept: LAB | Facility: HOSPITAL | Age: 57
End: 2022-11-23

## 2022-11-23 ENCOUNTER — PREP FOR SURGERY (OUTPATIENT)
Dept: OTHER | Facility: HOSPITAL | Age: 57
End: 2022-11-23

## 2022-11-23 ENCOUNTER — TELEPHONE (OUTPATIENT)
Dept: ONCOLOGY | Facility: CLINIC | Age: 57
End: 2022-11-23

## 2022-11-23 DIAGNOSIS — G93.89 BRAIN MASS: Primary | ICD-10-CM

## 2022-11-23 PROBLEM — C79.31 METASTASIS TO BRAIN: Status: ACTIVE | Noted: 2022-11-23

## 2022-11-23 PROBLEM — E11.65 TYPE 2 DIABETES MELLITUS WITH HYPERGLYCEMIA, WITHOUT LONG-TERM CURRENT USE OF INSULIN: Status: ACTIVE | Noted: 2019-08-25

## 2022-11-23 PROBLEM — G93.5 MIDLINE SHIFT OF BRAIN WITH BRAIN COMPRESSION: Status: ACTIVE | Noted: 2022-11-23

## 2022-11-23 LAB
ALBUMIN SERPL-MCNC: 4.5 G/DL (ref 3.5–5.2)
ALBUMIN/GLOB SERPL: 1.7 G/DL
ALP SERPL-CCNC: 120 U/L (ref 39–117)
ALT SERPL W P-5'-P-CCNC: 36 U/L (ref 1–33)
ANION GAP SERPL CALCULATED.3IONS-SCNC: 15.1 MMOL/L (ref 5–15)
AST SERPL-CCNC: 19 U/L (ref 1–32)
BILIRUB SERPL-MCNC: 0.3 MG/DL (ref 0–1.2)
BUN SERPL-MCNC: 13 MG/DL (ref 6–20)
BUN/CREAT SERPL: 14.6 (ref 7–25)
CALCIUM SPEC-SCNC: 10 MG/DL (ref 8.6–10.5)
CHLORIDE SERPL-SCNC: 101 MMOL/L (ref 98–107)
CO2 SERPL-SCNC: 21.9 MMOL/L (ref 22–29)
CREAT SERPL-MCNC: 0.89 MG/DL (ref 0.57–1)
DEPRECATED RDW RBC AUTO: 41.9 FL (ref 37–54)
EGFRCR SERPLBLD CKD-EPI 2021: 75.3 ML/MIN/1.73
ERYTHROCYTE [DISTWIDTH] IN BLOOD BY AUTOMATED COUNT: 14.4 % (ref 12.3–15.4)
GLOBULIN UR ELPH-MCNC: 2.7 GM/DL
GLUCOSE BLDC GLUCOMTR-MCNC: 246 MG/DL (ref 70–130)
GLUCOSE BLDC GLUCOMTR-MCNC: 287 MG/DL (ref 70–130)
GLUCOSE BLDC GLUCOMTR-MCNC: 345 MG/DL (ref 70–130)
GLUCOSE SERPL-MCNC: 222 MG/DL (ref 65–99)
HCT VFR BLD AUTO: 38.9 % (ref 34–46.6)
HGB BLD-MCNC: 12.6 G/DL (ref 12–15.9)
MCH RBC QN AUTO: 26.2 PG (ref 26.6–33)
MCHC RBC AUTO-ENTMCNC: 32.4 G/DL (ref 31.5–35.7)
MCV RBC AUTO: 80.9 FL (ref 79–97)
PLATELET # BLD AUTO: 290 10*3/MM3 (ref 140–450)
PMV BLD AUTO: 9.9 FL (ref 6–12)
POTASSIUM SERPL-SCNC: 4.3 MMOL/L (ref 3.5–5.2)
PROT SERPL-MCNC: 7.2 G/DL (ref 6–8.5)
QT INTERVAL: 347 MS
RBC # BLD AUTO: 4.81 10*6/MM3 (ref 3.77–5.28)
SODIUM SERPL-SCNC: 138 MMOL/L (ref 136–145)
WBC NRBC COR # BLD: 8.62 10*3/MM3 (ref 3.4–10.8)

## 2022-11-23 PROCEDURE — 80053 COMPREHEN METABOLIC PANEL: CPT | Performed by: NURSE PRACTITIONER

## 2022-11-23 PROCEDURE — 99222 1ST HOSP IP/OBS MODERATE 55: CPT | Performed by: RADIOLOGY

## 2022-11-23 PROCEDURE — 99223 1ST HOSP IP/OBS HIGH 75: CPT | Performed by: NEUROLOGICAL SURGERY

## 2022-11-23 PROCEDURE — 85027 COMPLETE CBC AUTOMATED: CPT | Performed by: NURSE PRACTITIONER

## 2022-11-23 PROCEDURE — 63710000001 INSULIN LISPRO (HUMAN) PER 5 UNITS: Performed by: HOSPITALIST

## 2022-11-23 PROCEDURE — 97116 GAIT TRAINING THERAPY: CPT

## 2022-11-23 PROCEDURE — 25010000002 DEXAMETHASONE PER 1 MG: Performed by: INTERNAL MEDICINE

## 2022-11-23 PROCEDURE — 82962 GLUCOSE BLOOD TEST: CPT

## 2022-11-23 PROCEDURE — 25010000002 DEXAMETHASONE PER 1 MG: Performed by: NURSE PRACTITIONER

## 2022-11-23 PROCEDURE — 25010000002 LEVETRIRACETAM PER 10 MG: Performed by: NURSE PRACTITIONER

## 2022-11-23 PROCEDURE — 97161 PT EVAL LOW COMPLEX 20 MIN: CPT

## 2022-11-23 PROCEDURE — 99223 1ST HOSP IP/OBS HIGH 75: CPT | Performed by: INTERNAL MEDICINE

## 2022-11-23 RX ORDER — VALACYCLOVIR HYDROCHLORIDE 500 MG/1
2000 TABLET, FILM COATED ORAL 2 TIMES DAILY
Status: DISCONTINUED | OUTPATIENT
Start: 2022-11-23 | End: 2022-12-07 | Stop reason: HOSPADM

## 2022-11-23 RX ORDER — POLYETHYLENE GLYCOL 3350 17 G/17G
17 POWDER, FOR SOLUTION ORAL DAILY
Status: DISCONTINUED | OUTPATIENT
Start: 2022-11-23 | End: 2022-12-07 | Stop reason: HOSPADM

## 2022-11-23 RX ORDER — LEVOTHYROXINE SODIUM 0.15 MG/1
150 TABLET ORAL DAILY
Status: DISCONTINUED | OUTPATIENT
Start: 2022-11-23 | End: 2022-12-07 | Stop reason: HOSPADM

## 2022-11-23 RX ORDER — ESCITALOPRAM OXALATE 10 MG/1
10 TABLET ORAL DAILY
Status: DISCONTINUED | OUTPATIENT
Start: 2022-11-23 | End: 2022-12-07 | Stop reason: HOSPADM

## 2022-11-23 RX ORDER — ATORVASTATIN CALCIUM 20 MG/1
40 TABLET, FILM COATED ORAL NIGHTLY
Status: DISCONTINUED | OUTPATIENT
Start: 2022-11-23 | End: 2022-12-07 | Stop reason: HOSPADM

## 2022-11-23 RX ORDER — LISINOPRIL 20 MG/1
20 TABLET ORAL DAILY
Status: DISCONTINUED | OUTPATIENT
Start: 2022-11-23 | End: 2022-12-07 | Stop reason: HOSPADM

## 2022-11-23 RX ORDER — LATANOPROST 50 UG/ML
1 SOLUTION/ DROPS OPHTHALMIC NIGHTLY
Status: DISCONTINUED | OUTPATIENT
Start: 2022-11-23 | End: 2022-12-07 | Stop reason: HOSPADM

## 2022-11-23 RX ORDER — INSULIN LISPRO 100 [IU]/ML
0-14 INJECTION, SOLUTION INTRAVENOUS; SUBCUTANEOUS
Status: DISCONTINUED | OUTPATIENT
Start: 2022-11-23 | End: 2022-11-24

## 2022-11-23 RX ORDER — DEXAMETHASONE SODIUM PHOSPHATE 4 MG/ML
4 INJECTION, SOLUTION INTRA-ARTICULAR; INTRALESIONAL; INTRAMUSCULAR; INTRAVENOUS; SOFT TISSUE EVERY 6 HOURS
Status: DISCONTINUED | OUTPATIENT
Start: 2022-11-23 | End: 2022-11-26

## 2022-11-23 RX ORDER — DEXTROSE MONOHYDRATE 25 G/50ML
25 INJECTION, SOLUTION INTRAVENOUS
Status: DISCONTINUED | OUTPATIENT
Start: 2022-11-23 | End: 2022-11-26

## 2022-11-23 RX ORDER — NICOTINE POLACRILEX 4 MG
15 LOZENGE BUCCAL
Status: DISCONTINUED | OUTPATIENT
Start: 2022-11-23 | End: 2022-11-26

## 2022-11-23 RX ADMIN — VALACYCLOVIR HYDROCHLORIDE 2000 MG: 500 TABLET, FILM COATED ORAL at 12:27

## 2022-11-23 RX ADMIN — POLYETHYLENE GLYCOL 3350 17 G: 17 POWDER, FOR SOLUTION ORAL at 18:38

## 2022-11-23 RX ADMIN — Medication 10 ML: at 22:00

## 2022-11-23 RX ADMIN — LISINOPRIL 20 MG: 20 TABLET ORAL at 12:28

## 2022-11-23 RX ADMIN — INSULIN LISPRO 10 UNITS: 100 INJECTION, SOLUTION INTRAVENOUS; SUBCUTANEOUS at 17:37

## 2022-11-23 RX ADMIN — Medication 10 ML: at 00:15

## 2022-11-23 RX ADMIN — DEXAMETHASONE SODIUM PHOSPHATE 4 MG: 4 INJECTION, SOLUTION INTRA-ARTICULAR; INTRALESIONAL; INTRAMUSCULAR; INTRAVENOUS; SOFT TISSUE at 16:11

## 2022-11-23 RX ADMIN — DEXAMETHASONE SODIUM PHOSPHATE 6 MG: 10 INJECTION INTRAMUSCULAR; INTRAVENOUS at 05:09

## 2022-11-23 RX ADMIN — DEXAMETHASONE SODIUM PHOSPHATE 4 MG: 4 INJECTION, SOLUTION INTRA-ARTICULAR; INTRALESIONAL; INTRAMUSCULAR; INTRAVENOUS; SOFT TISSUE at 21:53

## 2022-11-23 RX ADMIN — LEVETIRACETAM 500 MG: 100 INJECTION, SOLUTION INTRAVENOUS at 21:59

## 2022-11-23 RX ADMIN — ACETAMINOPHEN 650 MG: 325 TABLET, FILM COATED ORAL at 17:21

## 2022-11-23 RX ADMIN — ATORVASTATIN CALCIUM 40 MG: 20 TABLET, FILM COATED ORAL at 21:52

## 2022-11-23 RX ADMIN — DEXAMETHASONE SODIUM PHOSPHATE 6 MG: 10 INJECTION INTRAMUSCULAR; INTRAVENOUS at 08:08

## 2022-11-23 RX ADMIN — VALACYCLOVIR HYDROCHLORIDE 2000 MG: 500 TABLET, FILM COATED ORAL at 21:52

## 2022-11-23 RX ADMIN — LEVOTHYROXINE SODIUM 150 MCG: 0.15 TABLET ORAL at 12:28

## 2022-11-23 RX ADMIN — LEVETIRACETAM 500 MG: 100 INJECTION, SOLUTION INTRAVENOUS at 08:08

## 2022-11-23 RX ADMIN — Medication 10 ML: at 08:33

## 2022-11-23 RX ADMIN — LATANOPROST 1 DROP: 50 SOLUTION OPHTHALMIC at 21:52

## 2022-11-23 RX ADMIN — ACETAMINOPHEN 650 MG: 325 TABLET, FILM COATED ORAL at 08:08

## 2022-11-23 RX ADMIN — INSULIN LISPRO 8 UNITS: 100 INJECTION, SOLUTION INTRAVENOUS; SUBCUTANEOUS at 21:52

## 2022-11-23 RX ADMIN — ACETAMINOPHEN 325 MG: 325 TABLET, FILM COATED ORAL at 00:14

## 2022-11-23 RX ADMIN — ESCITALOPRAM 10 MG: 10 TABLET, FILM COATED ORAL at 12:28

## 2022-11-23 RX ADMIN — INSULIN LISPRO 5 UNITS: 100 INJECTION, SOLUTION INTRAVENOUS; SUBCUTANEOUS at 12:33

## 2022-11-23 NOTE — TELEPHONE ENCOUNTER
Returned call to patient, who is currently hospitalized. Informed patient that Dr. Romo would be seeing her in the hospital this week, and that he and Dr. Collins have discussed her case. Explained that her hospital course and subsequent plan would be closely coordinated between rounding physician and Dr. Collins. Patient v/u.

## 2022-11-23 NOTE — TELEPHONE ENCOUNTER
Caller: Christie Avendaño    Relationship: Self    Best call back number: [phone]    What is the best time to reach you: ASAP    Who are you requesting to speak with (clinical staff, provider,  specific staff member): DR. GRIFFIN'S NURSE    Do you know the name of the person who called: CHRISTIE    What was the call regarding: PATIENT HAS HAD A LOT GOING ON THE LAST TWO DAYS & IS HOPING THAT SHE WILL SEE DR. GRIFFIN TODAY AT THE \[facility]". TO DISCUSS HER PROGNOSIS, SHE SAW THE NEUROLOGIST ALREADY & WANTS TO SEE WHAT DR. GRIFFIN THINKS.   CAN YOU LET HER KNOW IF HE WILL BE IN TO SEE HER.    Do you require a callback:  YES, PLEASE

## 2022-11-24 LAB
ANION GAP SERPL CALCULATED.3IONS-SCNC: 12.8 MMOL/L (ref 5–15)
BUN SERPL-MCNC: 16 MG/DL (ref 6–20)
BUN/CREAT SERPL: 18.6 (ref 7–25)
CALCIUM SPEC-SCNC: 10 MG/DL (ref 8.6–10.5)
CHLORIDE SERPL-SCNC: 100 MMOL/L (ref 98–107)
CO2 SERPL-SCNC: 21.2 MMOL/L (ref 22–29)
CREAT SERPL-MCNC: 0.86 MG/DL (ref 0.57–1)
EGFRCR SERPLBLD CKD-EPI 2021: 78.4 ML/MIN/1.73
GLUCOSE BLDC GLUCOMTR-MCNC: 234 MG/DL (ref 70–130)
GLUCOSE BLDC GLUCOMTR-MCNC: 264 MG/DL (ref 70–130)
GLUCOSE BLDC GLUCOMTR-MCNC: 362 MG/DL (ref 70–130)
GLUCOSE BLDC GLUCOMTR-MCNC: 363 MG/DL (ref 70–130)
GLUCOSE BLDC GLUCOMTR-MCNC: 408 MG/DL (ref 70–130)
GLUCOSE BLDC GLUCOMTR-MCNC: 426 MG/DL (ref 70–130)
GLUCOSE SERPL-MCNC: 266 MG/DL (ref 65–99)
HBA1C MFR BLD: 6.7 % (ref 4.8–5.6)
POTASSIUM SERPL-SCNC: 4.3 MMOL/L (ref 3.5–5.2)
SODIUM SERPL-SCNC: 134 MMOL/L (ref 136–145)

## 2022-11-24 PROCEDURE — 63710000001 INSULIN LISPRO (HUMAN) PER 5 UNITS: Performed by: HOSPITALIST

## 2022-11-24 PROCEDURE — 80048 BASIC METABOLIC PNL TOTAL CA: CPT | Performed by: HOSPITALIST

## 2022-11-24 PROCEDURE — 82962 GLUCOSE BLOOD TEST: CPT

## 2022-11-24 PROCEDURE — 25010000002 DEXAMETHASONE PER 1 MG: Performed by: INTERNAL MEDICINE

## 2022-11-24 PROCEDURE — 83036 HEMOGLOBIN GLYCOSYLATED A1C: CPT | Performed by: HOSPITALIST

## 2022-11-24 PROCEDURE — 25010000002 LEVETRIRACETAM PER 10 MG: Performed by: NURSE PRACTITIONER

## 2022-11-24 PROCEDURE — 63710000001 ONDANSETRON PER 8 MG: Performed by: NURSE PRACTITIONER

## 2022-11-24 PROCEDURE — 99232 SBSQ HOSP IP/OBS MODERATE 35: CPT | Performed by: INTERNAL MEDICINE

## 2022-11-24 RX ORDER — INSULIN LISPRO 100 [IU]/ML
0-14 INJECTION, SOLUTION INTRAVENOUS; SUBCUTANEOUS
Status: DISCONTINUED | OUTPATIENT
Start: 2022-11-25 | End: 2022-11-26

## 2022-11-24 RX ADMIN — POLYETHYLENE GLYCOL 3350 17 G: 17 POWDER, FOR SOLUTION ORAL at 11:10

## 2022-11-24 RX ADMIN — INSULIN LISPRO 12 UNITS: 100 INJECTION, SOLUTION INTRAVENOUS; SUBCUTANEOUS at 11:11

## 2022-11-24 RX ADMIN — LEVOTHYROXINE SODIUM 150 MCG: 0.15 TABLET ORAL at 11:10

## 2022-11-24 RX ADMIN — ESCITALOPRAM 10 MG: 10 TABLET, FILM COATED ORAL at 11:10

## 2022-11-24 RX ADMIN — ACETAMINOPHEN 325 MG: 325 TABLET, FILM COATED ORAL at 11:10

## 2022-11-24 RX ADMIN — DEXAMETHASONE SODIUM PHOSPHATE 4 MG: 4 INJECTION, SOLUTION INTRA-ARTICULAR; INTRALESIONAL; INTRAMUSCULAR; INTRAVENOUS; SOFT TISSUE at 04:21

## 2022-11-24 RX ADMIN — LEVETIRACETAM 500 MG: 100 INJECTION, SOLUTION INTRAVENOUS at 11:10

## 2022-11-24 RX ADMIN — LISINOPRIL 20 MG: 20 TABLET ORAL at 11:10

## 2022-11-24 RX ADMIN — LATANOPROST 1 DROP: 50 SOLUTION OPHTHALMIC at 21:36

## 2022-11-24 RX ADMIN — ACETAMINOPHEN 325 MG: 325 TABLET, FILM COATED ORAL at 16:30

## 2022-11-24 RX ADMIN — ONDANSETRON HYDROCHLORIDE 4 MG: 4 TABLET, FILM COATED ORAL at 11:10

## 2022-11-24 RX ADMIN — VALACYCLOVIR HYDROCHLORIDE 2000 MG: 500 TABLET, FILM COATED ORAL at 21:36

## 2022-11-24 RX ADMIN — Medication 10 ML: at 11:21

## 2022-11-24 RX ADMIN — DEXAMETHASONE SODIUM PHOSPHATE 4 MG: 4 INJECTION, SOLUTION INTRA-ARTICULAR; INTRALESIONAL; INTRAMUSCULAR; INTRAVENOUS; SOFT TISSUE at 19:13

## 2022-11-24 RX ADMIN — INSULIN LISPRO 12 UNITS: 100 INJECTION, SOLUTION INTRAVENOUS; SUBCUTANEOUS at 19:13

## 2022-11-24 RX ADMIN — LEVETIRACETAM 500 MG: 100 INJECTION, SOLUTION INTRAVENOUS at 21:36

## 2022-11-24 RX ADMIN — ATORVASTATIN CALCIUM 40 MG: 20 TABLET, FILM COATED ORAL at 21:36

## 2022-11-24 RX ADMIN — DEXAMETHASONE SODIUM PHOSPHATE 4 MG: 4 INJECTION, SOLUTION INTRA-ARTICULAR; INTRALESIONAL; INTRAMUSCULAR; INTRAVENOUS; SOFT TISSUE at 11:13

## 2022-11-25 ENCOUNTER — APPOINTMENT (OUTPATIENT)
Dept: CT IMAGING | Facility: HOSPITAL | Age: 57
End: 2022-11-25

## 2022-11-25 LAB
ANION GAP SERPL CALCULATED.3IONS-SCNC: 13.1 MMOL/L (ref 5–15)
BUN SERPL-MCNC: 17 MG/DL (ref 6–20)
BUN/CREAT SERPL: 20.2 (ref 7–25)
CALCIUM SPEC-SCNC: 9.4 MG/DL (ref 8.6–10.5)
CHLORIDE SERPL-SCNC: 103 MMOL/L (ref 98–107)
CO2 SERPL-SCNC: 23.9 MMOL/L (ref 22–29)
CREAT SERPL-MCNC: 0.84 MG/DL (ref 0.57–1)
EGFRCR SERPLBLD CKD-EPI 2021: 80.7 ML/MIN/1.73
GLUCOSE BLDC GLUCOMTR-MCNC: 177 MG/DL (ref 70–130)
GLUCOSE BLDC GLUCOMTR-MCNC: 213 MG/DL (ref 70–130)
GLUCOSE BLDC GLUCOMTR-MCNC: 244 MG/DL (ref 70–130)
GLUCOSE BLDC GLUCOMTR-MCNC: 332 MG/DL (ref 70–130)
GLUCOSE SERPL-MCNC: 215 MG/DL (ref 65–99)
POTASSIUM SERPL-SCNC: 4.2 MMOL/L (ref 3.5–5.2)
SODIUM SERPL-SCNC: 140 MMOL/L (ref 136–145)

## 2022-11-25 PROCEDURE — 25010000002 IOPAMIDOL 61 % SOLUTION: Performed by: INTERNAL MEDICINE

## 2022-11-25 PROCEDURE — 99231 SBSQ HOSP IP/OBS SF/LOW 25: CPT | Performed by: NEUROLOGICAL SURGERY

## 2022-11-25 PROCEDURE — 25010000002 DEXAMETHASONE PER 1 MG: Performed by: INTERNAL MEDICINE

## 2022-11-25 PROCEDURE — 0 DIATRIZOATE MEGLUMINE & SODIUM PER 1 ML: Performed by: INTERNAL MEDICINE

## 2022-11-25 PROCEDURE — 25010000002 LEVETRIRACETAM PER 10 MG: Performed by: NURSE PRACTITIONER

## 2022-11-25 PROCEDURE — 71260 CT THORAX DX C+: CPT

## 2022-11-25 PROCEDURE — 82962 GLUCOSE BLOOD TEST: CPT

## 2022-11-25 PROCEDURE — 74177 CT ABD & PELVIS W/CONTRAST: CPT

## 2022-11-25 PROCEDURE — 63710000001 INSULIN LISPRO (HUMAN) PER 5 UNITS: Performed by: NURSE PRACTITIONER

## 2022-11-25 PROCEDURE — 80048 BASIC METABOLIC PNL TOTAL CA: CPT | Performed by: INTERNAL MEDICINE

## 2022-11-25 RX ORDER — CALCIUM CARBONATE 200(500)MG
2 TABLET,CHEWABLE ORAL 3 TIMES DAILY PRN
Status: DISCONTINUED | OUTPATIENT
Start: 2022-11-25 | End: 2022-12-07 | Stop reason: HOSPADM

## 2022-11-25 RX ADMIN — Medication 10 ML: at 01:28

## 2022-11-25 RX ADMIN — Medication 10 ML: at 21:52

## 2022-11-25 RX ADMIN — Medication 10 ML: at 09:31

## 2022-11-25 RX ADMIN — DEXAMETHASONE SODIUM PHOSPHATE 4 MG: 4 INJECTION, SOLUTION INTRA-ARTICULAR; INTRALESIONAL; INTRAMUSCULAR; INTRAVENOUS; SOFT TISSUE at 12:55

## 2022-11-25 RX ADMIN — INSULIN GLARGINE-YFGN 10 UNITS: 100 INJECTION, SOLUTION SUBCUTANEOUS at 09:28

## 2022-11-25 RX ADMIN — IOPAMIDOL 85 ML: 612 INJECTION, SOLUTION INTRAVENOUS at 12:05

## 2022-11-25 RX ADMIN — INSULIN LISPRO 14 UNITS: 100 INJECTION, SOLUTION INTRAVENOUS; SUBCUTANEOUS at 00:16

## 2022-11-25 RX ADMIN — ATORVASTATIN CALCIUM 40 MG: 20 TABLET, FILM COATED ORAL at 21:45

## 2022-11-25 RX ADMIN — DIATRIZOATE MEGLUMINE AND DIATRIZOATE SODIUM 30 ML: 660; 100 LIQUID ORAL; RECTAL at 09:27

## 2022-11-25 RX ADMIN — INSULIN LISPRO 3 UNITS: 100 INJECTION, SOLUTION INTRAVENOUS; SUBCUTANEOUS at 17:56

## 2022-11-25 RX ADMIN — POLYETHYLENE GLYCOL 3350 17 G: 17 POWDER, FOR SOLUTION ORAL at 09:27

## 2022-11-25 RX ADMIN — ACETAMINOPHEN 325 MG: 325 TABLET, FILM COATED ORAL at 17:51

## 2022-11-25 RX ADMIN — ESCITALOPRAM 5 MG: 10 TABLET, FILM COATED ORAL at 09:30

## 2022-11-25 RX ADMIN — LISINOPRIL 20 MG: 20 TABLET ORAL at 09:25

## 2022-11-25 RX ADMIN — LEVETIRACETAM 500 MG: 100 INJECTION, SOLUTION INTRAVENOUS at 09:27

## 2022-11-25 RX ADMIN — INSULIN LISPRO 5 UNITS: 100 INJECTION, SOLUTION INTRAVENOUS; SUBCUTANEOUS at 09:29

## 2022-11-25 RX ADMIN — ACETAMINOPHEN 325 MG: 325 TABLET, FILM COATED ORAL at 09:24

## 2022-11-25 RX ADMIN — INSULIN LISPRO 5 UNITS: 100 INJECTION, SOLUTION INTRAVENOUS; SUBCUTANEOUS at 22:23

## 2022-11-25 RX ADMIN — ANTACID TABLETS 2 TABLET: 500 TABLET, CHEWABLE ORAL at 14:16

## 2022-11-25 RX ADMIN — LATANOPROST 1 DROP: 50 SOLUTION OPHTHALMIC at 21:44

## 2022-11-25 RX ADMIN — INSULIN LISPRO 10 UNITS: 100 INJECTION, SOLUTION INTRAVENOUS; SUBCUTANEOUS at 12:55

## 2022-11-25 RX ADMIN — DEXAMETHASONE SODIUM PHOSPHATE 4 MG: 4 INJECTION, SOLUTION INTRA-ARTICULAR; INTRALESIONAL; INTRAMUSCULAR; INTRAVENOUS; SOFT TISSUE at 17:53

## 2022-11-25 RX ADMIN — DEXAMETHASONE SODIUM PHOSPHATE 4 MG: 4 INJECTION, SOLUTION INTRA-ARTICULAR; INTRALESIONAL; INTRAMUSCULAR; INTRAVENOUS; SOFT TISSUE at 06:31

## 2022-11-25 RX ADMIN — DEXAMETHASONE SODIUM PHOSPHATE 4 MG: 4 INJECTION, SOLUTION INTRA-ARTICULAR; INTRALESIONAL; INTRAMUSCULAR; INTRAVENOUS; SOFT TISSUE at 01:16

## 2022-11-25 RX ADMIN — LEVOTHYROXINE SODIUM 150 MCG: 0.15 TABLET ORAL at 09:26

## 2022-11-25 RX ADMIN — LEVETIRACETAM 500 MG: 100 INJECTION, SOLUTION INTRAVENOUS at 21:44

## 2022-11-26 LAB
GLUCOSE BLDC GLUCOMTR-MCNC: 202 MG/DL (ref 70–130)
GLUCOSE BLDC GLUCOMTR-MCNC: 226 MG/DL (ref 70–130)
GLUCOSE BLDC GLUCOMTR-MCNC: 247 MG/DL (ref 70–130)
GLUCOSE BLDC GLUCOMTR-MCNC: 261 MG/DL (ref 70–130)

## 2022-11-26 PROCEDURE — 63710000001 INSULIN LISPRO (HUMAN) PER 5 UNITS: Performed by: NURSE PRACTITIONER

## 2022-11-26 PROCEDURE — 25010000002 LEVETRIRACETAM PER 10 MG: Performed by: NURSE PRACTITIONER

## 2022-11-26 PROCEDURE — 99232 SBSQ HOSP IP/OBS MODERATE 35: CPT | Performed by: INTERNAL MEDICINE

## 2022-11-26 PROCEDURE — 25010000002 DEXAMETHASONE PER 1 MG: Performed by: INTERNAL MEDICINE

## 2022-11-26 PROCEDURE — 63710000001 DEXAMETHASONE PER 0.25 MG: Performed by: INTERNAL MEDICINE

## 2022-11-26 PROCEDURE — 99232 SBSQ HOSP IP/OBS MODERATE 35: CPT | Performed by: NEUROLOGICAL SURGERY

## 2022-11-26 PROCEDURE — 82962 GLUCOSE BLOOD TEST: CPT

## 2022-11-26 PROCEDURE — 63710000001 INSULIN LISPRO (HUMAN) PER 5 UNITS: Performed by: STUDENT IN AN ORGANIZED HEALTH CARE EDUCATION/TRAINING PROGRAM

## 2022-11-26 RX ORDER — PANTOPRAZOLE SODIUM 40 MG/1
40 TABLET, DELAYED RELEASE ORAL
Status: DISCONTINUED | OUTPATIENT
Start: 2022-11-27 | End: 2022-12-07 | Stop reason: HOSPADM

## 2022-11-26 RX ORDER — NICOTINE POLACRILEX 4 MG
15 LOZENGE BUCCAL
Status: DISCONTINUED | OUTPATIENT
Start: 2022-11-26 | End: 2022-12-07 | Stop reason: HOSPADM

## 2022-11-26 RX ORDER — INSULIN LISPRO 100 [IU]/ML
0-24 INJECTION, SOLUTION INTRAVENOUS; SUBCUTANEOUS
Status: DISCONTINUED | OUTPATIENT
Start: 2022-11-26 | End: 2022-11-28

## 2022-11-26 RX ORDER — FAMOTIDINE 20 MG/1
20 TABLET, FILM COATED ORAL 2 TIMES DAILY PRN
Status: DISCONTINUED | OUTPATIENT
Start: 2022-11-26 | End: 2022-12-07 | Stop reason: HOSPADM

## 2022-11-26 RX ORDER — DEXTROSE MONOHYDRATE 25 G/50ML
25 INJECTION, SOLUTION INTRAVENOUS
Status: DISCONTINUED | OUTPATIENT
Start: 2022-11-26 | End: 2022-12-07 | Stop reason: HOSPADM

## 2022-11-26 RX ORDER — DEXAMETHASONE 4 MG/1
4 TABLET ORAL EVERY 8 HOURS SCHEDULED
Status: DISCONTINUED | OUTPATIENT
Start: 2022-11-26 | End: 2022-12-07 | Stop reason: HOSPADM

## 2022-11-26 RX ORDER — PANTOPRAZOLE SODIUM 40 MG/10ML
40 INJECTION, POWDER, LYOPHILIZED, FOR SOLUTION INTRAVENOUS ONCE
Status: COMPLETED | OUTPATIENT
Start: 2022-11-26 | End: 2022-11-26

## 2022-11-26 RX ADMIN — POLYETHYLENE GLYCOL 3350 17 G: 17 POWDER, FOR SOLUTION ORAL at 10:18

## 2022-11-26 RX ADMIN — LATANOPROST 1 DROP: 50 SOLUTION OPHTHALMIC at 20:28

## 2022-11-26 RX ADMIN — ANTACID TABLETS 2 TABLET: 500 TABLET, CHEWABLE ORAL at 20:19

## 2022-11-26 RX ADMIN — DEXAMETHASONE 4 MG: 4 TABLET ORAL at 14:17

## 2022-11-26 RX ADMIN — ACETAMINOPHEN 325 MG: 325 TABLET, FILM COATED ORAL at 14:16

## 2022-11-26 RX ADMIN — INSULIN LISPRO 8 UNITS: 100 INJECTION, SOLUTION INTRAVENOUS; SUBCUTANEOUS at 22:05

## 2022-11-26 RX ADMIN — DEXAMETHASONE SODIUM PHOSPHATE 4 MG: 4 INJECTION, SOLUTION INTRA-ARTICULAR; INTRALESIONAL; INTRAMUSCULAR; INTRAVENOUS; SOFT TISSUE at 06:39

## 2022-11-26 RX ADMIN — FAMOTIDINE 20 MG: 20 TABLET ORAL at 20:20

## 2022-11-26 RX ADMIN — ESCITALOPRAM 10 MG: 10 TABLET, FILM COATED ORAL at 10:14

## 2022-11-26 RX ADMIN — LEVOTHYROXINE SODIUM 150 MCG: 0.15 TABLET ORAL at 10:14

## 2022-11-26 RX ADMIN — VALACYCLOVIR HYDROCHLORIDE 2000 MG: 500 TABLET, FILM COATED ORAL at 20:20

## 2022-11-26 RX ADMIN — INSULIN LISPRO 8 UNITS: 100 INJECTION, SOLUTION INTRAVENOUS; SUBCUTANEOUS at 14:17

## 2022-11-26 RX ADMIN — DEXAMETHASONE SODIUM PHOSPHATE 4 MG: 4 INJECTION, SOLUTION INTRA-ARTICULAR; INTRALESIONAL; INTRAMUSCULAR; INTRAVENOUS; SOFT TISSUE at 01:44

## 2022-11-26 RX ADMIN — PANTOPRAZOLE SODIUM 40 MG: 40 INJECTION, POWDER, FOR SOLUTION INTRAVENOUS at 20:20

## 2022-11-26 RX ADMIN — INSULIN LISPRO 5 UNITS: 100 INJECTION, SOLUTION INTRAVENOUS; SUBCUTANEOUS at 10:19

## 2022-11-26 RX ADMIN — DEXAMETHASONE 4 MG: 4 TABLET ORAL at 22:05

## 2022-11-26 RX ADMIN — Medication 10 ML: at 20:21

## 2022-11-26 RX ADMIN — Medication 10 ML: at 10:22

## 2022-11-26 RX ADMIN — LEVETIRACETAM 500 MG: 100 INJECTION, SOLUTION INTRAVENOUS at 20:28

## 2022-11-26 RX ADMIN — ATORVASTATIN CALCIUM 40 MG: 20 TABLET, FILM COATED ORAL at 20:20

## 2022-11-26 RX ADMIN — LISINOPRIL 20 MG: 20 TABLET ORAL at 10:14

## 2022-11-26 RX ADMIN — INSULIN LISPRO 8 UNITS: 100 INJECTION, SOLUTION INTRAVENOUS; SUBCUTANEOUS at 18:33

## 2022-11-26 RX ADMIN — LEVETIRACETAM 500 MG: 100 INJECTION, SOLUTION INTRAVENOUS at 10:12

## 2022-11-26 RX ADMIN — ANTACID TABLETS 2 TABLET: 500 TABLET, CHEWABLE ORAL at 14:35

## 2022-11-26 RX ADMIN — ACETAMINOPHEN 325 MG: 325 TABLET, FILM COATED ORAL at 10:19

## 2022-11-26 RX ADMIN — INSULIN GLARGINE-YFGN 10 UNITS: 100 INJECTION, SOLUTION SUBCUTANEOUS at 10:15

## 2022-11-27 ENCOUNTER — APPOINTMENT (OUTPATIENT)
Dept: CT IMAGING | Facility: HOSPITAL | Age: 57
End: 2022-11-27

## 2022-11-27 ENCOUNTER — ANESTHESIA EVENT (OUTPATIENT)
Dept: PERIOP | Facility: HOSPITAL | Age: 57
End: 2022-11-27

## 2022-11-27 LAB
ANION GAP SERPL CALCULATED.3IONS-SCNC: 14 MMOL/L (ref 5–15)
BUN SERPL-MCNC: 21 MG/DL (ref 6–20)
BUN/CREAT SERPL: 26.3 (ref 7–25)
CALCIUM SPEC-SCNC: 9.3 MG/DL (ref 8.6–10.5)
CHLORIDE SERPL-SCNC: 96 MMOL/L (ref 98–107)
CO2 SERPL-SCNC: 25 MMOL/L (ref 22–29)
CREAT SERPL-MCNC: 0.8 MG/DL (ref 0.57–1)
EGFRCR SERPLBLD CKD-EPI 2021: 85.5 ML/MIN/1.73
GLUCOSE BLDC GLUCOMTR-MCNC: 191 MG/DL (ref 70–130)
GLUCOSE BLDC GLUCOMTR-MCNC: 193 MG/DL (ref 70–130)
GLUCOSE BLDC GLUCOMTR-MCNC: 227 MG/DL (ref 70–130)
GLUCOSE BLDC GLUCOMTR-MCNC: 235 MG/DL (ref 70–130)
GLUCOSE SERPL-MCNC: 263 MG/DL (ref 65–99)
POTASSIUM SERPL-SCNC: 4.3 MMOL/L (ref 3.5–5.2)
SODIUM SERPL-SCNC: 135 MMOL/L (ref 136–145)

## 2022-11-27 PROCEDURE — 80048 BASIC METABOLIC PNL TOTAL CA: CPT | Performed by: STUDENT IN AN ORGANIZED HEALTH CARE EDUCATION/TRAINING PROGRAM

## 2022-11-27 PROCEDURE — 70450 CT HEAD/BRAIN W/O DYE: CPT

## 2022-11-27 PROCEDURE — 63710000001 DEXAMETHASONE PER 0.25 MG: Performed by: INTERNAL MEDICINE

## 2022-11-27 PROCEDURE — 82962 GLUCOSE BLOOD TEST: CPT

## 2022-11-27 PROCEDURE — 25010000002 LEVETRIRACETAM PER 10 MG: Performed by: NURSE PRACTITIONER

## 2022-11-27 PROCEDURE — S0260 H&P FOR SURGERY: HCPCS | Performed by: NEUROLOGICAL SURGERY

## 2022-11-27 PROCEDURE — 63710000001 INSULIN LISPRO (HUMAN) PER 5 UNITS: Performed by: STUDENT IN AN ORGANIZED HEALTH CARE EDUCATION/TRAINING PROGRAM

## 2022-11-27 RX ADMIN — ATORVASTATIN CALCIUM 40 MG: 20 TABLET, FILM COATED ORAL at 21:01

## 2022-11-27 RX ADMIN — DEXAMETHASONE 4 MG: 4 TABLET ORAL at 06:03

## 2022-11-27 RX ADMIN — Medication 10 ML: at 08:49

## 2022-11-27 RX ADMIN — ANTACID TABLETS 2 TABLET: 500 TABLET, CHEWABLE ORAL at 21:02

## 2022-11-27 RX ADMIN — VALACYCLOVIR HYDROCHLORIDE 2000 MG: 500 TABLET, FILM COATED ORAL at 21:01

## 2022-11-27 RX ADMIN — POLYETHYLENE GLYCOL 3350 17 G: 17 POWDER, FOR SOLUTION ORAL at 08:36

## 2022-11-27 RX ADMIN — INSULIN LISPRO 4 UNITS: 100 INJECTION, SOLUTION INTRAVENOUS; SUBCUTANEOUS at 17:53

## 2022-11-27 RX ADMIN — LEVETIRACETAM 500 MG: 100 INJECTION, SOLUTION INTRAVENOUS at 21:01

## 2022-11-27 RX ADMIN — VALACYCLOVIR HYDROCHLORIDE 2000 MG: 500 TABLET, FILM COATED ORAL at 08:47

## 2022-11-27 RX ADMIN — INSULIN LISPRO 8 UNITS: 100 INJECTION, SOLUTION INTRAVENOUS; SUBCUTANEOUS at 08:35

## 2022-11-27 RX ADMIN — PANTOPRAZOLE SODIUM 40 MG: 40 TABLET, DELAYED RELEASE ORAL at 06:03

## 2022-11-27 RX ADMIN — ESCITALOPRAM 5 MG: 10 TABLET, FILM COATED ORAL at 08:47

## 2022-11-27 RX ADMIN — ACETAMINOPHEN 650 MG: 325 TABLET, FILM COATED ORAL at 12:17

## 2022-11-27 RX ADMIN — ACETAMINOPHEN 650 MG: 325 TABLET, FILM COATED ORAL at 16:10

## 2022-11-27 RX ADMIN — LEVOTHYROXINE SODIUM 150 MCG: 0.15 TABLET ORAL at 08:47

## 2022-11-27 RX ADMIN — Medication 10 ML: at 21:02

## 2022-11-27 RX ADMIN — INSULIN GLARGINE-YFGN 10 UNITS: 100 INJECTION, SOLUTION SUBCUTANEOUS at 06:03

## 2022-11-27 RX ADMIN — LATANOPROST 1 DROP: 50 SOLUTION OPHTHALMIC at 21:03

## 2022-11-27 RX ADMIN — DEXAMETHASONE 4 MG: 4 TABLET ORAL at 15:26

## 2022-11-27 RX ADMIN — FAMOTIDINE 20 MG: 20 TABLET ORAL at 21:03

## 2022-11-27 RX ADMIN — LISINOPRIL 20 MG: 20 TABLET ORAL at 08:47

## 2022-11-27 RX ADMIN — DEXAMETHASONE 4 MG: 4 TABLET ORAL at 21:01

## 2022-11-27 RX ADMIN — ACETAMINOPHEN 650 MG: 325 TABLET, FILM COATED ORAL at 07:35

## 2022-11-27 RX ADMIN — LEVETIRACETAM 500 MG: 100 INJECTION, SOLUTION INTRAVENOUS at 09:26

## 2022-11-27 RX ADMIN — INSULIN LISPRO 8 UNITS: 100 INJECTION, SOLUTION INTRAVENOUS; SUBCUTANEOUS at 12:17

## 2022-11-28 ENCOUNTER — ANESTHESIA (OUTPATIENT)
Dept: PERIOP | Facility: HOSPITAL | Age: 57
End: 2022-11-28

## 2022-11-28 LAB
ALBUMIN SERPL-MCNC: 3.8 G/DL (ref 3.5–5.2)
ALBUMIN/GLOB SERPL: 1.5 G/DL
ALP SERPL-CCNC: 96 U/L (ref 39–117)
ALT SERPL W P-5'-P-CCNC: 143 U/L (ref 1–33)
ANION GAP SERPL CALCULATED.3IONS-SCNC: 12 MMOL/L (ref 5–15)
AST SERPL-CCNC: 71 U/L (ref 1–32)
BASOPHILS # BLD AUTO: 0.01 10*3/MM3 (ref 0–0.2)
BASOPHILS NFR BLD AUTO: 0.1 % (ref 0–1.5)
BILIRUB SERPL-MCNC: 0.5 MG/DL (ref 0–1.2)
BUN SERPL-MCNC: 19 MG/DL (ref 6–20)
BUN/CREAT SERPL: 24.4 (ref 7–25)
CALCIUM SPEC-SCNC: 8.6 MG/DL (ref 8.6–10.5)
CHLORIDE SERPL-SCNC: 99 MMOL/L (ref 98–107)
CO2 SERPL-SCNC: 25 MMOL/L (ref 22–29)
CREAT SERPL-MCNC: 0.78 MG/DL (ref 0.57–1)
DEPRECATED RDW RBC AUTO: 39.1 FL (ref 37–54)
EGFRCR SERPLBLD CKD-EPI 2021: 88.2 ML/MIN/1.73
EOSINOPHIL # BLD AUTO: 0 10*3/MM3 (ref 0–0.4)
EOSINOPHIL NFR BLD AUTO: 0 % (ref 0.3–6.2)
ERYTHROCYTE [DISTWIDTH] IN BLOOD BY AUTOMATED COUNT: 14.3 % (ref 12.3–15.4)
GLOBULIN UR ELPH-MCNC: 2.5 GM/DL
GLUCOSE BLDC GLUCOMTR-MCNC: 123 MG/DL (ref 70–130)
GLUCOSE BLDC GLUCOMTR-MCNC: 161 MG/DL (ref 70–130)
GLUCOSE BLDC GLUCOMTR-MCNC: 163 MG/DL (ref 70–130)
GLUCOSE BLDC GLUCOMTR-MCNC: 183 MG/DL (ref 70–130)
GLUCOSE BLDC GLUCOMTR-MCNC: 187 MG/DL (ref 70–130)
GLUCOSE BLDC GLUCOMTR-MCNC: 195 MG/DL (ref 70–130)
GLUCOSE SERPL-MCNC: 202 MG/DL (ref 65–99)
HCT VFR BLD AUTO: 40.4 % (ref 34–46.6)
HGB BLD-MCNC: 14 G/DL (ref 12–15.9)
IMM GRANULOCYTES # BLD AUTO: 0.11 10*3/MM3 (ref 0–0.05)
IMM GRANULOCYTES NFR BLD AUTO: 0.9 % (ref 0–0.5)
LYMPHOCYTES # BLD AUTO: 1.66 10*3/MM3 (ref 0.7–3.1)
LYMPHOCYTES NFR BLD AUTO: 13 % (ref 19.6–45.3)
MCH RBC QN AUTO: 26.8 PG (ref 26.6–33)
MCHC RBC AUTO-ENTMCNC: 34.7 G/DL (ref 31.5–35.7)
MCV RBC AUTO: 77.4 FL (ref 79–97)
MONOCYTES # BLD AUTO: 0.75 10*3/MM3 (ref 0.1–0.9)
MONOCYTES NFR BLD AUTO: 5.9 % (ref 5–12)
NEUTROPHILS NFR BLD AUTO: 10.22 10*3/MM3 (ref 1.7–7)
NEUTROPHILS NFR BLD AUTO: 80.1 % (ref 42.7–76)
NRBC BLD AUTO-RTO: 0 /100 WBC (ref 0–0.2)
PLATELET # BLD AUTO: 299 10*3/MM3 (ref 140–450)
PMV BLD AUTO: 10 FL (ref 6–12)
POTASSIUM SERPL-SCNC: 4.5 MMOL/L (ref 3.5–5.2)
PROT SERPL-MCNC: 6.3 G/DL (ref 6–8.5)
RBC # BLD AUTO: 5.22 10*6/MM3 (ref 3.77–5.28)
SODIUM SERPL-SCNC: 136 MMOL/L (ref 136–145)
WBC NRBC COR # BLD: 12.75 10*3/MM3 (ref 3.4–10.8)

## 2022-11-28 PROCEDURE — 25010000002 FENTANYL CITRATE (PF) 50 MCG/ML SOLUTION

## 2022-11-28 PROCEDURE — 88342 IMHCHEM/IMCYTCHM 1ST ANTB: CPT | Performed by: NEUROLOGICAL SURGERY

## 2022-11-28 PROCEDURE — 82962 GLUCOSE BLOOD TEST: CPT

## 2022-11-28 PROCEDURE — 99232 SBSQ HOSP IP/OBS MODERATE 35: CPT | Performed by: INTERNAL MEDICINE

## 2022-11-28 PROCEDURE — 88360 TUMOR IMMUNOHISTOCHEM/MANUAL: CPT | Performed by: NEUROLOGICAL SURGERY

## 2022-11-28 PROCEDURE — 25010000002 ONDANSETRON PER 1 MG

## 2022-11-28 PROCEDURE — 25010000002 MIDAZOLAM PER 1 MG: Performed by: ANESTHESIOLOGY

## 2022-11-28 PROCEDURE — 88307 TISSUE EXAM BY PATHOLOGIST: CPT | Performed by: NEUROLOGICAL SURGERY

## 2022-11-28 PROCEDURE — 85025 COMPLETE CBC W/AUTO DIFF WBC: CPT | Performed by: INTERNAL MEDICINE

## 2022-11-28 PROCEDURE — 63710000001 INSULIN LISPRO (HUMAN) PER 5 UNITS: Performed by: INTERNAL MEDICINE

## 2022-11-28 PROCEDURE — 0 POTASSIUM CHLORIDE PER 2 MEQ: Performed by: NEUROLOGICAL SURGERY

## 2022-11-28 PROCEDURE — C1713 ANCHOR/SCREW BN/BN,TIS/BN: HCPCS | Performed by: NEUROLOGICAL SURGERY

## 2022-11-28 PROCEDURE — 63710000001 DEXAMETHASONE PER 0.25 MG: Performed by: NEUROLOGICAL SURGERY

## 2022-11-28 PROCEDURE — 00B93ZX EXCISION OF THALAMUS, PERCUTANEOUS APPROACH, DIAGNOSTIC: ICD-10-PCS | Performed by: NEUROLOGICAL SURGERY

## 2022-11-28 PROCEDURE — 25010000002 HYDROMORPHONE 1 MG/ML SOLUTION

## 2022-11-28 PROCEDURE — 88331 PATH CONSLTJ SURG 1 BLK 1SPC: CPT | Performed by: NEUROLOGICAL SURGERY

## 2022-11-28 PROCEDURE — 25010000002 DEXAMETHASONE SODIUM PHOSPHATE 20 MG/5ML SOLUTION

## 2022-11-28 PROCEDURE — 25010000002 LEVETRIRACETAM PER 10 MG: Performed by: NEUROLOGICAL SURGERY

## 2022-11-28 PROCEDURE — 25010000002 VANCOMYCIN 10 G RECONSTITUTED SOLUTION: Performed by: NEUROLOGICAL SURGERY

## 2022-11-28 PROCEDURE — 25010000002 FENTANYL CITRATE (PF) 50 MCG/ML SOLUTION: Performed by: ANESTHESIOLOGY

## 2022-11-28 PROCEDURE — 61751 BRAIN BIOPSY W/CT/MR GUIDE: CPT | Performed by: NEUROLOGICAL SURGERY

## 2022-11-28 PROCEDURE — 88341 IMHCHEM/IMCYTCHM EA ADD ANTB: CPT | Performed by: NEUROLOGICAL SURGERY

## 2022-11-28 PROCEDURE — 25010000002 KETOROLAC TROMETHAMINE PER 15 MG: Performed by: NEUROLOGICAL SURGERY

## 2022-11-28 PROCEDURE — 80053 COMPREHEN METABOLIC PANEL: CPT | Performed by: NEUROLOGICAL SURGERY

## 2022-11-28 PROCEDURE — 25010000002 PROPOFOL 10 MG/ML EMULSION

## 2022-11-28 DEVICE — FLOSEAL HEMOSTATIC MATRIX, 10ML
Type: IMPLANTABLE DEVICE | Site: CRANIAL | Status: FUNCTIONAL
Brand: FLOSEAL HEMOSTATIC MATRIX

## 2022-11-28 DEVICE — BONE WAX
Type: IMPLANTABLE DEVICE | Site: CRANIAL | Status: FUNCTIONAL
Brand: ETHICON

## 2022-11-28 RX ORDER — FLUMAZENIL 0.1 MG/ML
0.2 INJECTION INTRAVENOUS AS NEEDED
Status: DISCONTINUED | OUTPATIENT
Start: 2022-11-28 | End: 2022-11-28 | Stop reason: HOSPADM

## 2022-11-28 RX ORDER — BUPIVACAINE HYDROCHLORIDE AND EPINEPHRINE 5; 5 MG/ML; UG/ML
INJECTION, SOLUTION PERINEURAL AS NEEDED
Status: DISCONTINUED | OUTPATIENT
Start: 2022-11-28 | End: 2022-11-28 | Stop reason: HOSPADM

## 2022-11-28 RX ORDER — SODIUM CHLORIDE, SODIUM LACTATE, POTASSIUM CHLORIDE, CALCIUM CHLORIDE 600; 310; 30; 20 MG/100ML; MG/100ML; MG/100ML; MG/100ML
9 INJECTION, SOLUTION INTRAVENOUS CONTINUOUS
Status: DISCONTINUED | OUTPATIENT
Start: 2022-11-28 | End: 2022-12-02

## 2022-11-28 RX ORDER — FENTANYL CITRATE 50 UG/ML
50 INJECTION, SOLUTION INTRAMUSCULAR; INTRAVENOUS
Status: DISCONTINUED | OUTPATIENT
Start: 2022-11-28 | End: 2022-11-28

## 2022-11-28 RX ORDER — NALOXONE HCL 0.4 MG/ML
0.2 VIAL (ML) INJECTION AS NEEDED
Status: DISCONTINUED | OUTPATIENT
Start: 2022-11-28 | End: 2022-11-28 | Stop reason: HOSPADM

## 2022-11-28 RX ORDER — MAGNESIUM HYDROXIDE 1200 MG/15ML
LIQUID ORAL AS NEEDED
Status: DISCONTINUED | OUTPATIENT
Start: 2022-11-28 | End: 2022-11-28 | Stop reason: HOSPADM

## 2022-11-28 RX ORDER — DIPHENHYDRAMINE HYDROCHLORIDE 50 MG/ML
12.5 INJECTION INTRAMUSCULAR; INTRAVENOUS
Status: DISCONTINUED | OUTPATIENT
Start: 2022-11-28 | End: 2022-11-28

## 2022-11-28 RX ORDER — AMOXICILLIN 250 MG
1 CAPSULE ORAL NIGHTLY PRN
Status: DISCONTINUED | OUTPATIENT
Start: 2022-11-28 | End: 2022-12-07 | Stop reason: HOSPADM

## 2022-11-28 RX ORDER — SODIUM CHLORIDE 0.9 % (FLUSH) 0.9 %
3 SYRINGE (ML) INJECTION EVERY 12 HOURS SCHEDULED
Status: DISCONTINUED | OUTPATIENT
Start: 2022-11-28 | End: 2022-11-28 | Stop reason: HOSPADM

## 2022-11-28 RX ORDER — LIDOCAINE HYDROCHLORIDE 10 MG/ML
0.5 INJECTION, SOLUTION EPIDURAL; INFILTRATION; INTRACAUDAL; PERINEURAL ONCE AS NEEDED
Status: DISCONTINUED | OUTPATIENT
Start: 2022-11-28 | End: 2022-11-28 | Stop reason: HOSPADM

## 2022-11-28 RX ORDER — INSULIN LISPRO 100 [IU]/ML
0-24 INJECTION, SOLUTION INTRAVENOUS; SUBCUTANEOUS EVERY 6 HOURS
Status: DISCONTINUED | OUTPATIENT
Start: 2022-11-28 | End: 2022-12-07

## 2022-11-28 RX ORDER — SODIUM CHLORIDE, SODIUM ACETATE ANHYDROUS, SODIUM GLUCONATE, POTASSIUM CHLORIDE, AND MAGNESIUM CHLORIDE 526; 222; 502; 37; 30 MG/100ML; MG/100ML; MG/100ML; MG/100ML; MG/100ML
IRRIGANT IRRIGATION AS NEEDED
Status: DISCONTINUED | OUTPATIENT
Start: 2022-11-28 | End: 2022-11-28 | Stop reason: HOSPADM

## 2022-11-28 RX ORDER — ACETAMINOPHEN 650 MG/1
650 SUPPOSITORY RECTAL EVERY 4 HOURS PRN
Status: DISCONTINUED | OUTPATIENT
Start: 2022-11-28 | End: 2022-12-07 | Stop reason: HOSPADM

## 2022-11-28 RX ORDER — PROMETHAZINE HYDROCHLORIDE 25 MG/1
25 SUPPOSITORY RECTAL ONCE AS NEEDED
Status: DISCONTINUED | OUTPATIENT
Start: 2022-11-28 | End: 2022-11-28 | Stop reason: HOSPADM

## 2022-11-28 RX ORDER — FAMOTIDINE 10 MG/ML
20 INJECTION, SOLUTION INTRAVENOUS ONCE
Status: COMPLETED | OUTPATIENT
Start: 2022-11-28 | End: 2022-11-28

## 2022-11-28 RX ORDER — FENTANYL CITRATE 50 UG/ML
50 INJECTION, SOLUTION INTRAMUSCULAR; INTRAVENOUS
Status: DISCONTINUED | OUTPATIENT
Start: 2022-11-28 | End: 2022-11-28 | Stop reason: HOSPADM

## 2022-11-28 RX ORDER — ONDANSETRON 2 MG/ML
INJECTION INTRAMUSCULAR; INTRAVENOUS AS NEEDED
Status: DISCONTINUED | OUTPATIENT
Start: 2022-11-28 | End: 2022-11-28 | Stop reason: SURG

## 2022-11-28 RX ORDER — FENTANYL CITRATE 50 UG/ML
INJECTION, SOLUTION INTRAMUSCULAR; INTRAVENOUS AS NEEDED
Status: DISCONTINUED | OUTPATIENT
Start: 2022-11-28 | End: 2022-11-28 | Stop reason: SURG

## 2022-11-28 RX ORDER — ONDANSETRON 2 MG/ML
4 INJECTION INTRAMUSCULAR; INTRAVENOUS EVERY 6 HOURS PRN
Status: DISCONTINUED | OUTPATIENT
Start: 2022-11-28 | End: 2022-12-02

## 2022-11-28 RX ORDER — LABETALOL HYDROCHLORIDE 5 MG/ML
5 INJECTION, SOLUTION INTRAVENOUS
Status: DISCONTINUED | OUTPATIENT
Start: 2022-11-28 | End: 2022-11-28

## 2022-11-28 RX ORDER — LIDOCAINE HYDROCHLORIDE 20 MG/ML
INJECTION, SOLUTION INTRAVENOUS AS NEEDED
Status: DISCONTINUED | OUTPATIENT
Start: 2022-11-28 | End: 2022-11-28 | Stop reason: SURG

## 2022-11-28 RX ORDER — MIDAZOLAM HYDROCHLORIDE 1 MG/ML
1 INJECTION INTRAMUSCULAR; INTRAVENOUS
Status: DISCONTINUED | OUTPATIENT
Start: 2022-11-28 | End: 2022-11-28 | Stop reason: HOSPADM

## 2022-11-28 RX ORDER — ONDANSETRON 4 MG/1
4 TABLET, FILM COATED ORAL EVERY 6 HOURS PRN
Status: DISCONTINUED | OUTPATIENT
Start: 2022-11-28 | End: 2022-12-02

## 2022-11-28 RX ORDER — KETOROLAC TROMETHAMINE 30 MG/ML
15 INJECTION, SOLUTION INTRAMUSCULAR; INTRAVENOUS EVERY 6 HOURS PRN
Status: DISPENSED | OUTPATIENT
Start: 2022-11-28 | End: 2022-11-30

## 2022-11-28 RX ORDER — HYDRALAZINE HYDROCHLORIDE 20 MG/ML
5 INJECTION INTRAMUSCULAR; INTRAVENOUS
Status: DISCONTINUED | OUTPATIENT
Start: 2022-11-28 | End: 2022-11-28

## 2022-11-28 RX ORDER — PROPOFOL 10 MG/ML
VIAL (ML) INTRAVENOUS AS NEEDED
Status: DISCONTINUED | OUTPATIENT
Start: 2022-11-28 | End: 2022-11-28 | Stop reason: SURG

## 2022-11-28 RX ORDER — PROMETHAZINE HYDROCHLORIDE 25 MG/1
25 TABLET ORAL ONCE AS NEEDED
Status: DISCONTINUED | OUTPATIENT
Start: 2022-11-28 | End: 2022-11-28 | Stop reason: HOSPADM

## 2022-11-28 RX ORDER — ACETAMINOPHEN 325 MG/1
650 TABLET ORAL EVERY 4 HOURS PRN
Status: DISCONTINUED | OUTPATIENT
Start: 2022-11-28 | End: 2022-12-07 | Stop reason: HOSPADM

## 2022-11-28 RX ORDER — HYDROMORPHONE HYDROCHLORIDE 1 MG/ML
0.5 INJECTION, SOLUTION INTRAMUSCULAR; INTRAVENOUS; SUBCUTANEOUS
Status: DISCONTINUED | OUTPATIENT
Start: 2022-11-28 | End: 2022-11-28

## 2022-11-28 RX ORDER — SODIUM CHLORIDE 0.9 % (FLUSH) 0.9 %
3-10 SYRINGE (ML) INJECTION AS NEEDED
Status: DISCONTINUED | OUTPATIENT
Start: 2022-11-28 | End: 2022-11-28 | Stop reason: HOSPADM

## 2022-11-28 RX ORDER — EPHEDRINE SULFATE 50 MG/ML
5 INJECTION, SOLUTION INTRAVENOUS ONCE AS NEEDED
Status: DISCONTINUED | OUTPATIENT
Start: 2022-11-28 | End: 2022-11-28

## 2022-11-28 RX ORDER — ONDANSETRON 2 MG/ML
4 INJECTION INTRAMUSCULAR; INTRAVENOUS ONCE AS NEEDED
Status: DISCONTINUED | OUTPATIENT
Start: 2022-11-28 | End: 2022-11-28 | Stop reason: HOSPADM

## 2022-11-28 RX ORDER — DEXAMETHASONE SODIUM PHOSPHATE 4 MG/ML
INJECTION, SOLUTION INTRA-ARTICULAR; INTRALESIONAL; INTRAMUSCULAR; INTRAVENOUS; SOFT TISSUE AS NEEDED
Status: DISCONTINUED | OUTPATIENT
Start: 2022-11-28 | End: 2022-11-28 | Stop reason: SURG

## 2022-11-28 RX ORDER — SODIUM CHLORIDE AND POTASSIUM CHLORIDE 150; 450 MG/100ML; MG/100ML
75 INJECTION, SOLUTION INTRAVENOUS CONTINUOUS
Status: DISCONTINUED | OUTPATIENT
Start: 2022-11-28 | End: 2022-11-30

## 2022-11-28 RX ORDER — DIPHENHYDRAMINE HCL 25 MG
25 CAPSULE ORAL
Status: DISCONTINUED | OUTPATIENT
Start: 2022-11-28 | End: 2022-11-28 | Stop reason: HOSPADM

## 2022-11-28 RX ORDER — ROCURONIUM BROMIDE 10 MG/ML
INJECTION, SOLUTION INTRAVENOUS AS NEEDED
Status: DISCONTINUED | OUTPATIENT
Start: 2022-11-28 | End: 2022-11-28 | Stop reason: SURG

## 2022-11-28 RX ADMIN — POTASSIUM CHLORIDE AND SODIUM CHLORIDE 75 ML/HR: 450; 150 INJECTION, SOLUTION INTRAVENOUS at 13:46

## 2022-11-28 RX ADMIN — ONDANSETRON 4 MG: 2 INJECTION INTRAMUSCULAR; INTRAVENOUS at 09:53

## 2022-11-28 RX ADMIN — LEVETIRACETAM 500 MG: 100 INJECTION, SOLUTION INTRAVENOUS at 20:14

## 2022-11-28 RX ADMIN — KETOROLAC TROMETHAMINE 15 MG: 30 INJECTION, SOLUTION INTRAMUSCULAR; INTRAVENOUS at 19:48

## 2022-11-28 RX ADMIN — PROPOFOL 200 MG: 10 INJECTION, EMULSION INTRAVENOUS at 07:45

## 2022-11-28 RX ADMIN — SODIUM CHLORIDE, POTASSIUM CHLORIDE, SODIUM LACTATE AND CALCIUM CHLORIDE 9 ML/HR: 600; 310; 30; 20 INJECTION, SOLUTION INTRAVENOUS at 07:15

## 2022-11-28 RX ADMIN — ROCURONIUM BROMIDE 50 MG: 50 INJECTION INTRAVENOUS at 07:45

## 2022-11-28 RX ADMIN — ROCURONIUM BROMIDE 10 MG: 50 INJECTION INTRAVENOUS at 08:22

## 2022-11-28 RX ADMIN — VALACYCLOVIR HYDROCHLORIDE 2000 MG: 500 TABLET, FILM COATED ORAL at 20:14

## 2022-11-28 RX ADMIN — INSULIN GLARGINE-YFGN 15 UNITS: 100 INJECTION, SOLUTION SUBCUTANEOUS at 06:04

## 2022-11-28 RX ADMIN — INSULIN LISPRO 4 UNITS: 100 INJECTION, SOLUTION INTRAVENOUS; SUBCUTANEOUS at 18:09

## 2022-11-28 RX ADMIN — ACETAMINOPHEN 650 MG: 325 TABLET, FILM COATED ORAL at 12:44

## 2022-11-28 RX ADMIN — DEXAMETHASONE SODIUM PHOSPHATE 10 MG: 4 INJECTION, SOLUTION INTRAMUSCULAR; INTRAVENOUS at 07:49

## 2022-11-28 RX ADMIN — FAMOTIDINE 20 MG: 10 INJECTION INTRAVENOUS at 07:25

## 2022-11-28 RX ADMIN — SUGAMMADEX 200 MG: 100 INJECTION, SOLUTION INTRAVENOUS at 10:09

## 2022-11-28 RX ADMIN — LEVETIRACETAM 500 MG: 100 INJECTION, SOLUTION INTRAVENOUS at 12:54

## 2022-11-28 RX ADMIN — SUGAMMADEX 400 MG: 100 INJECTION, SOLUTION INTRAVENOUS at 10:03

## 2022-11-28 RX ADMIN — ROCURONIUM BROMIDE 10 MG: 50 INJECTION INTRAVENOUS at 08:59

## 2022-11-28 RX ADMIN — ROCURONIUM BROMIDE 10 MG: 50 INJECTION INTRAVENOUS at 09:28

## 2022-11-28 RX ADMIN — DEXAMETHASONE 4 MG: 4 TABLET ORAL at 14:17

## 2022-11-28 RX ADMIN — VANCOMYCIN HYDROCHLORIDE 1750 MG: 10 INJECTION, POWDER, LYOPHILIZED, FOR SOLUTION INTRAVENOUS at 07:28

## 2022-11-28 RX ADMIN — FENTANYL CITRATE 50 MCG: 0.05 INJECTION, SOLUTION INTRAMUSCULAR; INTRAVENOUS at 11:02

## 2022-11-28 RX ADMIN — LISINOPRIL 20 MG: 20 TABLET ORAL at 12:47

## 2022-11-28 RX ADMIN — DEXAMETHASONE 4 MG: 4 TABLET ORAL at 22:59

## 2022-11-28 RX ADMIN — Medication 10 ML: at 20:15

## 2022-11-28 RX ADMIN — HYDROMORPHONE HYDROCHLORIDE 1 MG: 1 INJECTION, SOLUTION INTRAMUSCULAR; INTRAVENOUS; SUBCUTANEOUS at 09:54

## 2022-11-28 RX ADMIN — PROPOFOL 25 MCG/KG/MIN: 10 INJECTION, EMULSION INTRAVENOUS at 07:55

## 2022-11-28 RX ADMIN — SODIUM CHLORIDE, POTASSIUM CHLORIDE, SODIUM LACTATE AND CALCIUM CHLORIDE 9 ML/HR: 600; 310; 30; 20 INJECTION, SOLUTION INTRAVENOUS at 11:02

## 2022-11-28 RX ADMIN — FENTANYL CITRATE 50 MCG: 50 INJECTION, SOLUTION INTRAMUSCULAR; INTRAVENOUS at 09:19

## 2022-11-28 RX ADMIN — PROPOFOL 50 MG: 10 INJECTION, EMULSION INTRAVENOUS at 08:10

## 2022-11-28 RX ADMIN — PROPOFOL 50 MG: 10 INJECTION, EMULSION INTRAVENOUS at 08:09

## 2022-11-28 RX ADMIN — ATORVASTATIN CALCIUM 40 MG: 20 TABLET, FILM COATED ORAL at 20:15

## 2022-11-28 RX ADMIN — SODIUM CHLORIDE 5 MG/HR: 9 INJECTION, SOLUTION INTRAVENOUS at 11:04

## 2022-11-28 RX ADMIN — FENTANYL CITRATE 100 MCG: 50 INJECTION, SOLUTION INTRAMUSCULAR; INTRAVENOUS at 07:45

## 2022-11-28 RX ADMIN — LIDOCAINE HYDROCHLORIDE 60 MG: 20 INJECTION, SOLUTION INTRAVENOUS at 07:45

## 2022-11-28 NOTE — ANESTHESIA PROCEDURE NOTES
Arterial Line      Patient reassessed immediately prior to procedure    Start time: 11/28/2022 7:21 AM  Stop Time:11/28/2022 7:21 AM       Line placed for hemodynamic monitoring and ABGs/Labs/ISTAT.  Performed By   Anesthesiologist: Shari Kendall MD   Preanesthetic Checklist  Completed: patient identified, IV checked, site marked, risks and benefits discussed, surgical consent, monitors and equipment checked, pre-op evaluation and timeout performed  Arterial Line Prep    Sterile Tech: gloves, mask and cap  Prep: ChloraPrep  Patient monitoring: blood pressure monitoring, continuous pulse oximetry and EKG  Arterial Line Procedure   Laterality:right  Location:  radial artery  Catheter size: 20 G   Guidance: palpation technique  Number of attempts: 1  Successful placement: yes   Post Assessment   Dressing Type: secured with tape and occlusive dressing applied.   Complications no  Circ/Move/Sens Assessment: normal and unchanged.   Patient Tolerance: patient tolerated the procedure well with no apparent complications

## 2022-11-28 NOTE — ANESTHESIA POSTPROCEDURE EVALUATION
Patient: Christie Avendaño    Procedure Summary     Date: 11/28/22 Room / Location: [facility] OR [address] [place] [address] MAIN OR    Anesthesia Start: 0731 Anesthesia Stop: 1027    Procedure: Right frontal stereotactic needle brain biopsy (Right: Head) Diagnosis:       Brain mass      (Brain mass [G93.89])    Surgeons: Manuel Thompson MD Provider: Shari Kendall MD    Anesthesia Type: generalBeth ASA Status: 3          Anesthesia Type: general, Beth    Vitals  Vitals Value Taken Time   /86 11/28/22 1226   Temp 36.4 °C (97.5 °F) 11/28/22 1025   Pulse 78 11/28/22 1242   Resp 16 11/28/22 1225   SpO2 93 % 11/28/22 1242   Vitals shown include unvalidated device data.        Post Anesthesia Care and Evaluation    Patient location during evaluation: bedside  Pain management: adequate    Airway patency: patent  Anesthetic complications: No anesthetic complications    Cardiovascular status: acceptable  Respiratory status: acceptable  Hydration status: acceptable

## 2022-11-28 NOTE — ANESTHESIA PREPROCEDURE EVALUATION
Anesthesia Evaluation     history of anesthetic complications (hx difficult JESUSITA intubation, appears to have had other intubations without difficulty in the past):  NPO Solid Status: > 8 hours  NPO Liquid Status: > 2 hours           Airway   Dental      Pulmonary    (+) lung cancer, asthma,  Cardiovascular   Exercise tolerance: poor (<4 METS)    (+) hypertension 2 medications or greater, hyperlipidemia,       Neuro/Psych    ROS Comment: Thalamic mass for biopsy, to go to ICU for observation after   GI/Hepatic/Renal/Endo    (+)   renal disease, diabetes mellitus type 2, thyroid problem     Musculoskeletal     Abdominal    Substance History      OB/GYN          Other   arthritis,    history of cancer                    Anesthesia Plan    ASA 3     general and [place]     intravenous induction     Anesthetic plan, risks, benefits, and alternatives have been provided, discussed and informed consent has been obtained with: patient.        CODE STATUS:    Code Status (Patient has no pulse and is not breathing): CPR (Attempt to Resuscitate)  Medical Interventions (Patient has pulse or is breathing): Full Support

## 2022-11-28 NOTE — ANESTHESIA PROCEDURE NOTES
Airway  Urgency: elective    Date/Time: 11/28/2022 7:47 AM  Airway not difficult    General Information and Staff    Patient location during procedure: OR  CRNA/CAA: Jordan Lira CRNA    Indications and Patient Condition  Indications for airway management: airway protection    Preoxygenated: yes  Mask difficulty assessment: 2 - vent by mask + OA or adjuvant +/- NMBA    Final Airway Details  Final airway type: endotracheal airway      Successful airway: ETT  Cuffed: yes   Successful intubation technique: direct laryngoscopy  Facilitating devices/methods: intubating stylet and cricoid pressure  Endotracheal tube insertion site: oral  Blade: Adalberto  Blade size: 3  ETT size (mm): 7.0  Cormack-Lehane Classification: grade IIa - partial view of glottis  Placement verified by: chest auscultation and capnometry   Measured from: teeth  ETT/EBT  to teeth (cm): 20  Number of attempts at approach: 1  Assessment: lips, teeth, and gum same as pre-op and atraumatic intubation

## 2022-11-29 ENCOUNTER — APPOINTMENT (OUTPATIENT)
Dept: CT IMAGING | Facility: HOSPITAL | Age: 57
End: 2022-11-29

## 2022-11-29 LAB
ANION GAP SERPL CALCULATED.3IONS-SCNC: 14 MMOL/L (ref 5–15)
APTT PPP: 25.4 SECONDS (ref 22.7–35.4)
BASOPHILS # BLD AUTO: 0.02 10*3/MM3 (ref 0–0.2)
BASOPHILS NFR BLD AUTO: 0.1 % (ref 0–1.5)
BUN SERPL-MCNC: 21 MG/DL (ref 6–20)
BUN/CREAT SERPL: 26.9 (ref 7–25)
CALCIUM SPEC-SCNC: 8.3 MG/DL (ref 8.6–10.5)
CHLORIDE SERPL-SCNC: 100 MMOL/L (ref 98–107)
CO2 SERPL-SCNC: 23 MMOL/L (ref 22–29)
CREAT SERPL-MCNC: 0.78 MG/DL (ref 0.57–1)
DEPRECATED RDW RBC AUTO: 42.1 FL (ref 37–54)
EGFRCR SERPLBLD CKD-EPI 2021: 88.2 ML/MIN/1.73
EOSINOPHIL # BLD AUTO: 0 10*3/MM3 (ref 0–0.4)
EOSINOPHIL NFR BLD AUTO: 0 % (ref 0.3–6.2)
ERYTHROCYTE [DISTWIDTH] IN BLOOD BY AUTOMATED COUNT: 14.5 % (ref 12.3–15.4)
GLUCOSE BLDC GLUCOMTR-MCNC: 160 MG/DL (ref 70–130)
GLUCOSE BLDC GLUCOMTR-MCNC: 210 MG/DL (ref 70–130)
GLUCOSE BLDC GLUCOMTR-MCNC: 212 MG/DL (ref 70–130)
GLUCOSE SERPL-MCNC: 136 MG/DL (ref 65–99)
HCT VFR BLD AUTO: 36.6 % (ref 34–46.6)
HGB BLD-MCNC: 12 G/DL (ref 12–15.9)
IMM GRANULOCYTES # BLD AUTO: 0.12 10*3/MM3 (ref 0–0.05)
IMM GRANULOCYTES NFR BLD AUTO: 0.8 % (ref 0–0.5)
INR PPP: 1.01 (ref 0.9–1.1)
LYMPHOCYTES # BLD AUTO: 1.62 10*3/MM3 (ref 0.7–3.1)
LYMPHOCYTES NFR BLD AUTO: 10.6 % (ref 19.6–45.3)
MCH RBC QN AUTO: 26.6 PG (ref 26.6–33)
MCHC RBC AUTO-ENTMCNC: 32.8 G/DL (ref 31.5–35.7)
MCV RBC AUTO: 81.2 FL (ref 79–97)
MONOCYTES # BLD AUTO: 1.25 10*3/MM3 (ref 0.1–0.9)
MONOCYTES NFR BLD AUTO: 8.2 % (ref 5–12)
NEUTROPHILS NFR BLD AUTO: 12.24 10*3/MM3 (ref 1.7–7)
NEUTROPHILS NFR BLD AUTO: 80.3 % (ref 42.7–76)
NRBC BLD AUTO-RTO: 0.1 /100 WBC (ref 0–0.2)
PLATELET # BLD AUTO: 262 10*3/MM3 (ref 140–450)
PMV BLD AUTO: 9.5 FL (ref 6–12)
POTASSIUM SERPL-SCNC: 4.4 MMOL/L (ref 3.5–5.2)
PROTHROMBIN TIME: 13.4 SECONDS (ref 11.7–14.2)
RBC # BLD AUTO: 4.51 10*6/MM3 (ref 3.77–5.28)
SODIUM SERPL-SCNC: 137 MMOL/L (ref 136–145)
WBC NRBC COR # BLD: 15.25 10*3/MM3 (ref 3.4–10.8)

## 2022-11-29 PROCEDURE — 82962 GLUCOSE BLOOD TEST: CPT

## 2022-11-29 PROCEDURE — 80048 BASIC METABOLIC PNL TOTAL CA: CPT | Performed by: NEUROLOGICAL SURGERY

## 2022-11-29 PROCEDURE — 85610 PROTHROMBIN TIME: CPT | Performed by: NEUROLOGICAL SURGERY

## 2022-11-29 PROCEDURE — 0 POTASSIUM CHLORIDE PER 2 MEQ: Performed by: NEUROLOGICAL SURGERY

## 2022-11-29 PROCEDURE — 25010000002 KETOROLAC TROMETHAMINE PER 15 MG: Performed by: NEUROLOGICAL SURGERY

## 2022-11-29 PROCEDURE — 85025 COMPLETE CBC W/AUTO DIFF WBC: CPT | Performed by: NEUROLOGICAL SURGERY

## 2022-11-29 PROCEDURE — 99232 SBSQ HOSP IP/OBS MODERATE 35: CPT | Performed by: INTERNAL MEDICINE

## 2022-11-29 PROCEDURE — 25010000002 LEVETRIRACETAM PER 10 MG: Performed by: NEUROLOGICAL SURGERY

## 2022-11-29 PROCEDURE — 63710000001 DEXAMETHASONE PER 0.25 MG: Performed by: NEUROLOGICAL SURGERY

## 2022-11-29 PROCEDURE — 99024 POSTOP FOLLOW-UP VISIT: CPT | Performed by: NURSE PRACTITIONER

## 2022-11-29 PROCEDURE — 70450 CT HEAD/BRAIN W/O DYE: CPT

## 2022-11-29 PROCEDURE — 85730 THROMBOPLASTIN TIME PARTIAL: CPT | Performed by: NEUROLOGICAL SURGERY

## 2022-11-29 PROCEDURE — 63710000001 INSULIN LISPRO (HUMAN) PER 5 UNITS: Performed by: INTERNAL MEDICINE

## 2022-11-29 RX ORDER — LEVETIRACETAM 500 MG/1
500 TABLET ORAL 2 TIMES DAILY
Status: DISCONTINUED | OUTPATIENT
Start: 2022-11-29 | End: 2022-12-07 | Stop reason: HOSPADM

## 2022-11-29 RX ADMIN — PANTOPRAZOLE SODIUM 40 MG: 40 TABLET, DELAYED RELEASE ORAL at 06:44

## 2022-11-29 RX ADMIN — DEXAMETHASONE 4 MG: 4 TABLET ORAL at 20:56

## 2022-11-29 RX ADMIN — DEXAMETHASONE 4 MG: 4 TABLET ORAL at 14:11

## 2022-11-29 RX ADMIN — ACETAMINOPHEN 650 MG: 325 TABLET, FILM COATED ORAL at 10:21

## 2022-11-29 RX ADMIN — ACETAMINOPHEN 650 MG: 325 TABLET, FILM COATED ORAL at 19:06

## 2022-11-29 RX ADMIN — INSULIN LISPRO 8 UNITS: 100 INJECTION, SOLUTION INTRAVENOUS; SUBCUTANEOUS at 12:09

## 2022-11-29 RX ADMIN — POTASSIUM CHLORIDE AND SODIUM CHLORIDE 75 ML/HR: 450; 150 INJECTION, SOLUTION INTRAVENOUS at 05:15

## 2022-11-29 RX ADMIN — LEVETIRACETAM 500 MG: 500 TABLET, FILM COATED ORAL at 23:29

## 2022-11-29 RX ADMIN — LATANOPROST 1 DROP: 50 SOLUTION OPHTHALMIC at 23:30

## 2022-11-29 RX ADMIN — KETOROLAC TROMETHAMINE 15 MG: 30 INJECTION, SOLUTION INTRAMUSCULAR; INTRAVENOUS at 04:45

## 2022-11-29 RX ADMIN — LEVOTHYROXINE SODIUM 150 MCG: 0.15 TABLET ORAL at 08:27

## 2022-11-29 RX ADMIN — DEXAMETHASONE 4 MG: 4 TABLET ORAL at 06:43

## 2022-11-29 RX ADMIN — POLYETHYLENE GLYCOL 3350 17 G: 17 POWDER, FOR SOLUTION ORAL at 08:27

## 2022-11-29 RX ADMIN — VALACYCLOVIR HYDROCHLORIDE 2000 MG: 500 TABLET, FILM COATED ORAL at 23:29

## 2022-11-29 RX ADMIN — ATORVASTATIN CALCIUM 40 MG: 20 TABLET, FILM COATED ORAL at 20:56

## 2022-11-29 RX ADMIN — INSULIN LISPRO 8 UNITS: 100 INJECTION, SOLUTION INTRAVENOUS; SUBCUTANEOUS at 17:16

## 2022-11-29 RX ADMIN — LISINOPRIL 20 MG: 20 TABLET ORAL at 08:27

## 2022-11-29 RX ADMIN — LEVETIRACETAM 500 MG: 100 INJECTION, SOLUTION INTRAVENOUS at 08:27

## 2022-11-29 RX ADMIN — ESCITALOPRAM 10 MG: 10 TABLET, FILM COATED ORAL at 08:27

## 2022-11-29 RX ADMIN — Medication 10 ML: at 08:28

## 2022-11-30 LAB
ANION GAP SERPL CALCULATED.3IONS-SCNC: 8 MMOL/L (ref 5–15)
BASOPHILS # BLD AUTO: 0.01 10*3/MM3 (ref 0–0.2)
BASOPHILS NFR BLD AUTO: 0.1 % (ref 0–1.5)
BUN SERPL-MCNC: 18 MG/DL (ref 6–20)
BUN/CREAT SERPL: 23.1 (ref 7–25)
CALCIUM SPEC-SCNC: 8.2 MG/DL (ref 8.6–10.5)
CHLORIDE SERPL-SCNC: 102 MMOL/L (ref 98–107)
CO2 SERPL-SCNC: 26 MMOL/L (ref 22–29)
CREAT SERPL-MCNC: 0.78 MG/DL (ref 0.57–1)
DEPRECATED RDW RBC AUTO: 43.3 FL (ref 37–54)
EGFRCR SERPLBLD CKD-EPI 2021: 88.2 ML/MIN/1.73
EOSINOPHIL # BLD AUTO: 0 10*3/MM3 (ref 0–0.4)
EOSINOPHIL NFR BLD AUTO: 0 % (ref 0.3–6.2)
ERYTHROCYTE [DISTWIDTH] IN BLOOD BY AUTOMATED COUNT: 14.6 % (ref 12.3–15.4)
GLUCOSE BLDC GLUCOMTR-MCNC: 138 MG/DL (ref 70–130)
GLUCOSE BLDC GLUCOMTR-MCNC: 158 MG/DL (ref 70–130)
GLUCOSE BLDC GLUCOMTR-MCNC: 177 MG/DL (ref 70–130)
GLUCOSE BLDC GLUCOMTR-MCNC: 235 MG/DL (ref 70–130)
GLUCOSE BLDC GLUCOMTR-MCNC: 241 MG/DL (ref 70–130)
GLUCOSE SERPL-MCNC: 153 MG/DL (ref 65–99)
HCT VFR BLD AUTO: 36.2 % (ref 34–46.6)
HGB BLD-MCNC: 11.9 G/DL (ref 12–15.9)
IMM GRANULOCYTES # BLD AUTO: 0.12 10*3/MM3 (ref 0–0.05)
IMM GRANULOCYTES NFR BLD AUTO: 0.9 % (ref 0–0.5)
LYMPHOCYTES # BLD AUTO: 1.57 10*3/MM3 (ref 0.7–3.1)
LYMPHOCYTES NFR BLD AUTO: 11.2 % (ref 19.6–45.3)
MCH RBC QN AUTO: 27 PG (ref 26.6–33)
MCHC RBC AUTO-ENTMCNC: 32.9 G/DL (ref 31.5–35.7)
MCV RBC AUTO: 82.3 FL (ref 79–97)
MONOCYTES # BLD AUTO: 1 10*3/MM3 (ref 0.1–0.9)
MONOCYTES NFR BLD AUTO: 7.2 % (ref 5–12)
NEUTROPHILS NFR BLD AUTO: 11.27 10*3/MM3 (ref 1.7–7)
NEUTROPHILS NFR BLD AUTO: 80.6 % (ref 42.7–76)
NRBC BLD AUTO-RTO: 0 /100 WBC (ref 0–0.2)
PLATELET # BLD AUTO: 248 10*3/MM3 (ref 140–450)
PMV BLD AUTO: 9.6 FL (ref 6–12)
POTASSIUM SERPL-SCNC: 4.3 MMOL/L (ref 3.5–5.2)
RBC # BLD AUTO: 4.4 10*6/MM3 (ref 3.77–5.28)
SODIUM SERPL-SCNC: 136 MMOL/L (ref 136–145)
WBC NRBC COR # BLD: 13.97 10*3/MM3 (ref 3.4–10.8)

## 2022-11-30 PROCEDURE — 63710000001 DEXAMETHASONE PER 0.25 MG: Performed by: NEUROLOGICAL SURGERY

## 2022-11-30 PROCEDURE — 25010000002 KETOROLAC TROMETHAMINE PER 15 MG: Performed by: NEUROLOGICAL SURGERY

## 2022-11-30 PROCEDURE — 99024 POSTOP FOLLOW-UP VISIT: CPT | Performed by: NURSE PRACTITIONER

## 2022-11-30 PROCEDURE — 85025 COMPLETE CBC W/AUTO DIFF WBC: CPT | Performed by: STUDENT IN AN ORGANIZED HEALTH CARE EDUCATION/TRAINING PROGRAM

## 2022-11-30 PROCEDURE — 82962 GLUCOSE BLOOD TEST: CPT

## 2022-11-30 PROCEDURE — 80048 BASIC METABOLIC PNL TOTAL CA: CPT | Performed by: STUDENT IN AN ORGANIZED HEALTH CARE EDUCATION/TRAINING PROGRAM

## 2022-11-30 PROCEDURE — 63710000001 INSULIN LISPRO (HUMAN) PER 5 UNITS: Performed by: INTERNAL MEDICINE

## 2022-11-30 PROCEDURE — 99232 SBSQ HOSP IP/OBS MODERATE 35: CPT | Performed by: INTERNAL MEDICINE

## 2022-11-30 RX ADMIN — DEXAMETHASONE 4 MG: 4 TABLET ORAL at 06:26

## 2022-11-30 RX ADMIN — POLYETHYLENE GLYCOL 3350 17 G: 17 POWDER, FOR SOLUTION ORAL at 09:32

## 2022-11-30 RX ADMIN — KETOROLAC TROMETHAMINE 15 MG: 30 INJECTION, SOLUTION INTRAMUSCULAR; INTRAVENOUS at 21:24

## 2022-11-30 RX ADMIN — Medication 10 ML: at 21:44

## 2022-11-30 RX ADMIN — ESCITALOPRAM 10 MG: 10 TABLET, FILM COATED ORAL at 09:30

## 2022-11-30 RX ADMIN — LISINOPRIL 20 MG: 20 TABLET ORAL at 09:30

## 2022-11-30 RX ADMIN — VALACYCLOVIR HYDROCHLORIDE 2000 MG: 500 TABLET, FILM COATED ORAL at 09:30

## 2022-11-30 RX ADMIN — Medication 10 ML: at 09:33

## 2022-11-30 RX ADMIN — DEXAMETHASONE 4 MG: 4 TABLET ORAL at 21:23

## 2022-11-30 RX ADMIN — ATORVASTATIN CALCIUM 40 MG: 20 TABLET, FILM COATED ORAL at 21:23

## 2022-11-30 RX ADMIN — VALACYCLOVIR HYDROCHLORIDE 2000 MG: 500 TABLET, FILM COATED ORAL at 21:23

## 2022-11-30 RX ADMIN — INSULIN LISPRO 8 UNITS: 100 INJECTION, SOLUTION INTRAVENOUS; SUBCUTANEOUS at 00:39

## 2022-11-30 RX ADMIN — INSULIN LISPRO 8 UNITS: 100 INJECTION, SOLUTION INTRAVENOUS; SUBCUTANEOUS at 12:30

## 2022-11-30 RX ADMIN — ACETAMINOPHEN 650 MG: 325 TABLET, FILM COATED ORAL at 21:24

## 2022-11-30 RX ADMIN — INSULIN GLARGINE-YFGN 15 UNITS: 100 INJECTION, SOLUTION SUBCUTANEOUS at 09:49

## 2022-11-30 RX ADMIN — DEXAMETHASONE 4 MG: 4 TABLET ORAL at 14:33

## 2022-11-30 RX ADMIN — ACETAMINOPHEN 650 MG: 325 TABLET, FILM COATED ORAL at 09:59

## 2022-11-30 RX ADMIN — FAMOTIDINE 20 MG: 20 TABLET ORAL at 21:23

## 2022-11-30 RX ADMIN — LATANOPROST 1 DROP: 50 SOLUTION OPHTHALMIC at 21:24

## 2022-11-30 RX ADMIN — LEVETIRACETAM 500 MG: 500 TABLET, FILM COATED ORAL at 21:23

## 2022-11-30 RX ADMIN — LEVOTHYROXINE SODIUM 150 MCG: 0.15 TABLET ORAL at 09:30

## 2022-11-30 RX ADMIN — PANTOPRAZOLE SODIUM 40 MG: 40 TABLET, DELAYED RELEASE ORAL at 06:26

## 2022-11-30 RX ADMIN — ACETAMINOPHEN 650 MG: 325 TABLET, FILM COATED ORAL at 17:25

## 2022-11-30 RX ADMIN — LEVETIRACETAM 500 MG: 500 TABLET, FILM COATED ORAL at 09:30

## 2022-12-01 ENCOUNTER — TELEPHONE (OUTPATIENT)
Dept: NEUROSURGERY | Facility: CLINIC | Age: 57
End: 2022-12-01

## 2022-12-01 LAB
ANION GAP SERPL CALCULATED.3IONS-SCNC: 11 MMOL/L (ref 5–15)
BUN SERPL-MCNC: 21 MG/DL (ref 6–20)
BUN/CREAT SERPL: 27.3 (ref 7–25)
CALCIUM SPEC-SCNC: 8.4 MG/DL (ref 8.6–10.5)
CHLORIDE SERPL-SCNC: 97 MMOL/L (ref 98–107)
CO2 SERPL-SCNC: 23 MMOL/L (ref 22–29)
CREAT SERPL-MCNC: 0.77 MG/DL (ref 0.57–1)
DEPRECATED RDW RBC AUTO: 39.9 FL (ref 37–54)
EGFRCR SERPLBLD CKD-EPI 2021: 89.5 ML/MIN/1.73
ERYTHROCYTE [DISTWIDTH] IN BLOOD BY AUTOMATED COUNT: 14.1 % (ref 12.3–15.4)
GLUCOSE BLDC GLUCOMTR-MCNC: 118 MG/DL (ref 70–130)
GLUCOSE BLDC GLUCOMTR-MCNC: 142 MG/DL (ref 70–130)
GLUCOSE BLDC GLUCOMTR-MCNC: 157 MG/DL (ref 70–130)
GLUCOSE BLDC GLUCOMTR-MCNC: 233 MG/DL (ref 70–130)
GLUCOSE BLDC GLUCOMTR-MCNC: 275 MG/DL (ref 70–130)
GLUCOSE SERPL-MCNC: 288 MG/DL (ref 65–99)
HCT VFR BLD AUTO: 35.1 % (ref 34–46.6)
HGB BLD-MCNC: 11.9 G/DL (ref 12–15.9)
MCH RBC QN AUTO: 26.6 PG (ref 26.6–33)
MCHC RBC AUTO-ENTMCNC: 33.9 G/DL (ref 31.5–35.7)
MCV RBC AUTO: 78.5 FL (ref 79–97)
PLATELET # BLD AUTO: 263 10*3/MM3 (ref 140–450)
PMV BLD AUTO: 9.8 FL (ref 6–12)
POTASSIUM SERPL-SCNC: 4.3 MMOL/L (ref 3.5–5.2)
RBC # BLD AUTO: 4.47 10*6/MM3 (ref 3.77–5.28)
SODIUM SERPL-SCNC: 131 MMOL/L (ref 136–145)
WBC NRBC COR # BLD: 14.32 10*3/MM3 (ref 3.4–10.8)

## 2022-12-01 PROCEDURE — 97164 PT RE-EVAL EST PLAN CARE: CPT

## 2022-12-01 PROCEDURE — 82962 GLUCOSE BLOOD TEST: CPT

## 2022-12-01 PROCEDURE — 97110 THERAPEUTIC EXERCISES: CPT

## 2022-12-01 PROCEDURE — 63710000001 INSULIN LISPRO (HUMAN) PER 5 UNITS: Performed by: INTERNAL MEDICINE

## 2022-12-01 PROCEDURE — 97535 SELF CARE MNGMENT TRAINING: CPT

## 2022-12-01 PROCEDURE — 85027 COMPLETE CBC AUTOMATED: CPT | Performed by: STUDENT IN AN ORGANIZED HEALTH CARE EDUCATION/TRAINING PROGRAM

## 2022-12-01 PROCEDURE — 63710000001 DEXAMETHASONE PER 0.25 MG: Performed by: NEUROLOGICAL SURGERY

## 2022-12-01 PROCEDURE — 99232 SBSQ HOSP IP/OBS MODERATE 35: CPT | Performed by: INTERNAL MEDICINE

## 2022-12-01 PROCEDURE — 97166 OT EVAL MOD COMPLEX 45 MIN: CPT

## 2022-12-01 PROCEDURE — 80048 BASIC METABOLIC PNL TOTAL CA: CPT | Performed by: STUDENT IN AN ORGANIZED HEALTH CARE EDUCATION/TRAINING PROGRAM

## 2022-12-01 PROCEDURE — 99232 SBSQ HOSP IP/OBS MODERATE 35: CPT | Performed by: RADIOLOGY

## 2022-12-01 RX ADMIN — LEVOTHYROXINE SODIUM 150 MCG: 0.15 TABLET ORAL at 09:03

## 2022-12-01 RX ADMIN — LEVETIRACETAM 500 MG: 500 TABLET, FILM COATED ORAL at 09:03

## 2022-12-01 RX ADMIN — POLYETHYLENE GLYCOL 3350 17 G: 17 POWDER, FOR SOLUTION ORAL at 09:08

## 2022-12-01 RX ADMIN — INSULIN LISPRO 4 UNITS: 100 INJECTION, SOLUTION INTRAVENOUS; SUBCUTANEOUS at 00:30

## 2022-12-01 RX ADMIN — INSULIN LISPRO 4 UNITS: 100 INJECTION, SOLUTION INTRAVENOUS; SUBCUTANEOUS at 06:09

## 2022-12-01 RX ADMIN — VALACYCLOVIR HYDROCHLORIDE 2000 MG: 500 TABLET, FILM COATED ORAL at 20:24

## 2022-12-01 RX ADMIN — INSULIN GLARGINE-YFGN 15 UNITS: 100 INJECTION, SOLUTION SUBCUTANEOUS at 06:09

## 2022-12-01 RX ADMIN — DEXAMETHASONE 4 MG: 4 TABLET ORAL at 06:10

## 2022-12-01 RX ADMIN — LATANOPROST 1 DROP: 50 SOLUTION OPHTHALMIC at 20:24

## 2022-12-01 RX ADMIN — INSULIN LISPRO 12 UNITS: 100 INJECTION, SOLUTION INTRAVENOUS; SUBCUTANEOUS at 12:24

## 2022-12-01 RX ADMIN — ATORVASTATIN CALCIUM 40 MG: 20 TABLET, FILM COATED ORAL at 20:24

## 2022-12-01 RX ADMIN — Medication 10 ML: at 20:24

## 2022-12-01 RX ADMIN — ACETAMINOPHEN 650 MG: 325 TABLET, FILM COATED ORAL at 20:24

## 2022-12-01 RX ADMIN — ESCITALOPRAM 10 MG: 10 TABLET, FILM COATED ORAL at 09:03

## 2022-12-01 RX ADMIN — Medication 10 ML: at 09:08

## 2022-12-01 RX ADMIN — ACETAMINOPHEN 650 MG: 325 TABLET, FILM COATED ORAL at 06:10

## 2022-12-01 RX ADMIN — PANTOPRAZOLE SODIUM 40 MG: 40 TABLET, DELAYED RELEASE ORAL at 06:09

## 2022-12-01 RX ADMIN — VALACYCLOVIR HYDROCHLORIDE 2000 MG: 500 TABLET, FILM COATED ORAL at 09:03

## 2022-12-01 RX ADMIN — DEXAMETHASONE 4 MG: 4 TABLET ORAL at 16:18

## 2022-12-01 RX ADMIN — ACETAMINOPHEN 650 MG: 325 TABLET, FILM COATED ORAL at 12:23

## 2022-12-01 RX ADMIN — LISINOPRIL 20 MG: 20 TABLET ORAL at 09:03

## 2022-12-01 RX ADMIN — LEVETIRACETAM 500 MG: 500 TABLET, FILM COATED ORAL at 20:24

## 2022-12-01 NOTE — PROGRESS NOTES
Continued Stay Note  [facility]     Patient Name: Christie Avendaño  MRN: [id number]  Today's Date: 12/1/2022    Admit Date: 11/22/2022    Plan: [facility] acute rehab eval pending   Discharge Plan     Row Name 12/01/22 1456       Plan    Plan [facility] acute rehab eval pending    Patient/Family in Agreement with Plan yes    Plan Comments [facility] acute rehab eval pending per PT/OT recommendations, CCP to follow for eval. Trinidad Romero LCSW               Discharge Codes    No documentation.               Expected Discharge Date and Time     Expected Discharge Date Expected Discharge Time    Dec 2, 2022             Keily Romero LCSW

## 2022-12-01 NOTE — PROGRESS NOTES
Name: Christie Avendaño ADMIT: 2022   : 1965  PCP: Tiffany Holloway MD    MRN: [id number] LOS: 9 days   AGE/SEX: 57 y.o. female  ROOM: [facility]     Subjective   Subjective     Patient sitting comfortably in bed.  States that she has a really good appetite.  Currently eating breakfast.  Discussed possible need for rehab which patient and family are open to if needed.    ROS: Patient denies any chest pain, dyspnea, fevers, chills, nausea, vomiting.     Objective   Objective   Vital Signs  Temp:  [97.8 °F (36.6 °C)-98.6 °F (37 °C)] 98 °F (36.7 °C)  Heart Rate:  [59-64] 61  Resp:  [16-18] 18  BP: (123-161)/(69-94) 161/94  SpO2:  [94 %-96 %] 96 %  on   ;   Device (Oxygen Therapy): room air  Body mass index is 41.93 kg/m².  Physical Exam  Constitutional:       General: She is not in acute distress.     Appearance: She is not toxic-appearing.   HENT:      Head:      Comments: Biopsy site right forehead, cdi  Cardiovascular:      Rate and Rhythm: Normal rate and regular rhythm.      Heart sounds: Normal heart sounds.   Pulmonary:      Effort: Pulmonary effort is normal.      Breath sounds: Normal breath sounds.   Abdominal:      General: Bowel sounds are normal.      Palpations: Abdomen is soft.   Musculoskeletal:         General: No tenderness.      Right lower leg: No edema.      Left lower leg: No edema.   Neurological:      Mental Status: She is alert.   Psychiatric:         Mood and Affect: Mood normal.         Behavior: Behavior normal.         Results Review     I reviewed the patient's new clinical results.  Results from last 7 days   Lab Units 22  0705 22  0401 22  0612   WBC 10*3/mm3 13.97* 15.25* 12.75*   HEMOGLOBIN g/dL 11.9* 12.0 14.0   PLATELETS 10*3/mm3 248 262 299     Results from last 7 days   Lab Units 22  0705 22  0401 22  1208 22  0558   SODIUM mmol/L 136 137 136 135*   POTASSIUM mmol/L 4.3 4.4 4.5 4.3   CHLORIDE mmol/L 102 100 99 96*   CO2 mmol/L 26.0  23.0 25.0 25.0   BUN mg/dL 18 21* 19 21*   CREATININE mg/dL 0.78 0.78 0.78 0.80   GLUCOSE mg/dL 153* 136* 202* 263*   Estimated Creatinine Clearance: 99 mL/min (by C-G formula based on SCr of 0.78 mg/dL).  Results from last 7 days   Lab Units 11/28/22  1208   ALBUMIN g/dL 3.80   BILIRUBIN mg/dL 0.5   ALK PHOS U/L 96   AST (SGOT) U/L 71*   ALT (SGPT) U/L 143*     Results from last 7 days   Lab Units 11/30/22  0705 11/29/22  0401 11/28/22  1208 11/27/22  0558   CALCIUM mg/dL 8.2* 8.3* 8.6 9.3   ALBUMIN g/dL  --   --  3.80  --        COVID19   Date Value Ref Range Status   11/22/2022 Not Detected Not Detected - Ref. Range Final     SARS-CoV-2, YAMILA   Date Value Ref Range Status   01/14/2022 Detected (A) Not Detected Final     Comment:     Patients who have a positive COVID-19 test result may now have  treatment options. Treatment options are available for patients  with mild to moderate symptoms and for hospitalized patients.  Visit our website at [email or web address] for  resources and information.  This nucleic acid amplification test was developed and its performance  characteristics determined by [employer]. Nucleic acid  amplification tests include RT-PCR and TMA. This test has not been  FDA cleared or approved. This test has been authorized by FDA under  an Emergency Use Authorization (EUA). This test is only authorized  for the duration of time the declaration that circumstances exist  justifying the authorization of the emergency use of in vitro  diagnostic tests for detection of SARS-CoV-2 virus and/or diagnosis  of COVID-19 infection under section 564(b)(1) of the Act, 21 U.S.C.  360bbb-3(b) (1), unless the authorization is terminated or revoked  sooner.  When diagnostic testing is negative, the possibility of a false  negative result should be considered in the context of a patient's  recent exposures and the presence of clinical signs and symptoms  consistent with COVID-19. An individual  without symptoms of COVID-19  and who is not shedding SARS-CoV-2 virus would expect to have a  negative (not detected) result in this assay.     10/07/2021 Not Detected Not Detected Final     Comment:     This nucleic acid amplification test was developed and its performance  characteristics determined by [employer]. Nucleic acid  amplification tests include RT-PCR and TMA. This test has not been  FDA cleared or approved. This test has been authorized by FDA under  an Emergency Use Authorization (EUA). This test is only authorized  for the duration of time the declaration that circumstances exist  justifying the authorization of the emergency use of in vitro  diagnostic tests for detection of SARS-CoV-2 virus and/or diagnosis  of COVID-19 infection under section 564(b)(1) of the Act, 21 U.S.C.  360bbb-3(b) (1), unless the authorization is terminated or revoked  sooner.  When diagnostic testing is negative, the possibility of a false  negative result should be considered in the context of a patient's  recent exposures and the presence of clinical signs and symptoms  consistent with COVID-19. An individual without symptoms of COVID-19  and who is not shedding SARS-CoV-2 virus would expect to have a  negative (not detected) result in this assay.       SARS-CoV-2 PCR   Date Value Ref Range Status   10/07/2020 Not Detected Not Detected Final     Comment:     Nucleic acid specific to SARS-CoV-2 (COVID-19) virus was not detected inthis sample by the TaqPath (TM) COVID-19 Combo Kit.SARS-CoV-2 (COVID-19) nucleic acid testing performed using [employer] Aptima (R) SARS-CoV-2 Assay or Your Image by [employer] TaqPath   (TM)COVID-19 Combo Kit as indicated above under Emergency Use Authorization (EUA) from the FDA. Aptima (R) and TaqPath (TM) test performancecharacteristics verified by [employer] in accordance with the FDAs Guidance Document (Policy for Diagnostic   Tests for Coronavirus Disease-2019during the Public  Health Emergency) issued on March 16, 2020. The laboratory is regulated under CLIA as qualified to perform high-complexity testingUnless otherwise noted, all testing was performed at [facility],   CLIA No. [id number], [place] State Licensee No. [id number]     SARS-CoV-2 YAMILA   Date Value Ref Range Status   09/14/2020 Not Detected Not Detected Final     Glucose   Date/Time Value Ref Range Status   12/01/2022 1125 275 (H) 70 - 130 mg/dL Final     Comment:     Meter: [id number] : [id number] Moshe Montez CNA   12/01/2022 0748 118 70 - 130 mg/dL Final     Comment:     Meter: [id number] : [id number] Moshe Montez CNA   12/01/2022 0546 157 (H) 70 - 130 mg/dL Final     Comment:     Meter: [id number] : [id number] Tumey Huy NA   11/30/2022 2353 177 (H) 70 - 130 mg/dL Final     Comment:     Meter: [id number] : [id number] Tumey Huy NA   11/30/2022 1621 138 (H) 70 - 130 mg/dL Final     Comment:     Meter: [id number] : [id number] José Miguel Quintero NA   11/30/2022 1106 241 (H) 70 - 130 mg/dL Final     Comment:     Meter: [id number] : [id number] Hatchett Sherrish NA   11/30/2022 0713 158 (H) 70 - 130 mg/dL Final     Comment:     Meter: [id number] : [id number] Tumey Huy NA       CT Head Without Contrast  Narrative: CT HEAD WITHOUT CONTRAST     HISTORY: Brain mass     COMPARISON: 11/27/2022     TECHNIQUE: Axial CT imaging was obtained through the brain. No IV  contrast was administered.     FINDINGS:  The patient is status post right frontal tyrone hole with biopsy. There is  some decreased attenuation which is seen along the anterior margin of  the mass, which likely represents the biopsy site. Trace amount of  hemorrhage is noted within this area, although no large intraparenchymal  hematoma is seen. Mass itself is stable in size. Tiny focus of  pneumocephalus is seen adjacent to the tract of the tyrone hole. The mass  itself is resulting in mass effect on the right lateral ventricle, as  well as midline  shift to the left of 9 mm. This is stable when compared  to the preoperative study.     Impression: Postoperative findings, as noted above.     Radiation dose reduction techniques were utilized, including automated  exposure control and exposure modulation based on body size.     This report was finalized on 11/29/2022 4:39 AM by Dr. Melissa Hickey M.D.       Scheduled Medications  atorvastatin, 40 mg, Oral, Nightly  dexamethasone, 4 mg, Oral, Q8H  escitalopram, 10 mg, Oral, Daily  insulin glargine, 15 Units, Subcutaneous, QAM  insulin lispro, 0-24 Units, Subcutaneous, Q6H  latanoprost, 1 drop, Both Eyes, Nightly  levETIRAcetam, 500 mg, Oral, BID  levothyroxine, 150 mcg, Oral, Daily  lisinopril, 20 mg, Oral, Daily  pantoprazole, 40 mg, Oral, Q AM  polyethylene glycol, 17 g, Oral, Daily  sodium chloride, 10 mL, Intravenous, Q12H  valACYclovir, 2,000 mg, Oral, BID    Infusions  lactated ringers, 9 mL/hr, Last Rate: 9 mL/hr (11/28/22 1102)    Diet  Diet: Regular/House Diet; Texture: Regular Texture (IDDSI 7); Fluid Consistency: Thin (IDDSI 0)       Assessment/Plan     Active Hospital Problems    Diagnosis  POA   • **Brain mass, rim-enhancing [G93.89]  Yes   • 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) [G93.5]  Yes   • Metastasis to brain (HCC) [C79.31]  Yes   • Malignant melanoma of right lower extremity including hip (HCC) [C43.71]  Yes   • BRCA2 gene mutation positive in female [Z15.01, Z15.02, Z15.09]  Yes   • Essential hypertension [I10]  Yes   • Renal cell carcinoma of left kidney (HCC) [C64.2]  Yes   • Papillary thyroid carcinoma (HCC) [C73]  Yes   • Non-small cell lung cancer, right (HCC) [C34.91]  Yes   • Type 2 diabetes mellitus with hyperglycemia, without long-term current use of insulin (HCC) [E11.65]  Yes      Resolved Hospital Problems   No resolved problems to display.       57 y.o. female admitted with Brain mass.    · New brain mass status post biopsy-on keppra and dexamethasone. CT  chest/abd/pelvis reviewed. No new metastatic disease. S/p brain biopsy on 11/28/22. 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 Official pathology pending.  Oncology consulted appreciate recommendations.  Will likely need radiation as well.  Neurosurgery consulted and plan for follow-up in clinic in 10-14 days for staple removal. Steroid induced heartburn-daily ppi and prn famotidine  · Type 2 diabetes with hyperglycemia-a1c is 6.7. hyperglycemia is being caused by steroids. She is on lantus and sliding scale. 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  · Full code.  · Discussed with patient and family.  · Anticipate discharge home with  vs SNU facility when cleared by consultants.  PT consult pending    Yassine Jay MD  [facility]  12/01/22  12:39 EST

## 2022-12-01 NOTE — PROGRESS NOTES
"Patient Name: Christie Avendaño Date: 2022       : 1965        MRN #: [id number] Diagnosis: Brain malignancy, High grade Glioma          RADIATION ONCOLOGY INPATIENT PROGRESS NOTE  - BRAIN      SOAP: Patient reports today that she feels she is improving in terms of her mobility.  She was able to work with physical therapy, awaiting evaluation to determine whether she would be a candidate for acute rehab at [facility].  She denies any headache.  She continues with left-sided weakness which is affecting her balance.  She is also having difficulty with coordination left hand.        General:           Review of Systems      [] No new complaints [] Headache   []AM [] PM [] Seizure activity  [] Memory loss [] Gait disturbance [] Nausea  [] Vomiting [] Speech changes [] Visual changes  [x] Weakness [] Skin itching [] Hair loss  [] Skin soreness with pain  [x] Fatigue,  severity: 0 None  [x] Pain,  severity: 0, location:   Steroid regimen: Decadron as below.      Comments/Notes:  Was going to attempt CT simulation today for Brain chemo/XRT planning but Radiation Oncology CT scanner is down.    KPS: 90%       Vital Signs: /91 (BP Location: Left arm, Patient Position: Sitting)   Pulse 74   Temp 98 °F (36.7 °C) (Oral)   Resp 18   Ht 165.1 cm (65\")   Wt 114 kg (251 lb 15.8 oz)   LMP  (LMP Unknown)   SpO2 97%   BMI 41.93 kg/m²     Weight:   Wt Readings from Last 3 Encounters:   22 114 kg (251 lb 15.8 oz)   11/15/22 110 kg (243 lb)   22 110 kg (243 lb 8 oz)       Medication:   Current Facility-Administered Medications:   •  acetaminophen (TYLENOL) tablet 650 mg, 650 mg, Oral, Q4H PRN, 650 mg at 22 0610 **OR** acetaminophen (TYLENOL) 160 MG/5ML solution 650 mg, 650 mg, Oral, Q4H PRN **OR** acetaminophen (TYLENOL) suppository 650 mg, 650 mg, Rectal, Q4H PRN, Manuel Thompson MD  •  acetaminophen (TYLENOL) tablet 650 mg, 650 mg, Oral, Q4H PRN, 650 mg at 22 1223 **OR** " acetaminophen (TYLENOL) suppository 650 mg, 650 mg, Rectal, Q4H PRN, Manuel Thompson MD  •  atorvastatin (LIPITOR) tablet 40 mg, 40 mg, Oral, Nightly, Manuel Thompson MD, 40 mg at 11/30/22 2123  •  calcium carbonate (TUMS) chewable tablet 500 mg (200 mg elemental), 2 tablet, Oral, TID PRN, Manuel Thompson MD, 2 tablet at 11/27/22 2102  •  dexamethasone (DECADRON) tablet 4 mg, 4 mg, Oral, Q8H, Manuel Thompson MD, 4 mg at 12/01/22 0610  •  dextrose (D50W) (25 g/50 mL) IV injection 25 g, 25 g, Intravenous, Q15 Min PRN, Manuel Thompson MD  •  dextrose (GLUTOSE) oral gel 15 g, 15 g, Oral, Q15 Min PRN, Manuel Thompson MD  •  escitalopram (LEXAPRO) tablet 10 mg, 10 mg, Oral, Daily, Manuel Thompson MD, 10 mg at 12/01/22 0903  •  famotidine (PEPCID) tablet 20 mg, 20 mg, Oral, BID PRN, Manuel Thompson MD, 20 mg at 11/30/22 2123  •  glucagon (human recombinant) (GLUCAGEN DIAGNOSTIC) injection 1 mg, 1 mg, Intramuscular, Q15 Min PRN, Manuel Thompson MD  •  insulin glargine (LANTUS, SEMGLEE) injection 15 Units, 15 Units, Subcutaneous, QAM, Manuel Thompson MD, 15 Units at 12/01/22 0609  •  insulin lispro (ADMELOG) injection 0-24 Units, 0-24 Units, Subcutaneous, Q6H, Lamine Juan MD, 12 Units at 12/01/22 1224  •  lactated ringers infusion, 9 mL/hr, Intravenous, Continuous, Manuel Thompson MD, Last Rate: 9 mL/hr at 11/28/22 1102, 9 mL/hr at 11/28/22 1102  •  latanoprost (XALATAN) 0.005 % ophthalmic solution 1 drop, 1 drop, Both Eyes, Nightly, Manuel Thompson MD, 1 drop at 11/30/22 2124  •  levETIRAcetam (KEPPRA) tablet 500 mg, 500 mg, Oral, BID, Manuel Thompson MD, 500 mg at 12/01/22 0903  •  levothyroxine (SYNTHROID, LEVOTHROID) tablet 150 mcg, 150 mcg, Oral, Daily, Manuel Thompson MD, 150 mcg at 12/01/22 0903  •  lisinopril (PRINIVIL,ZESTRIL) tablet 20 mg, 20 mg, Oral, Daily, Manuel Thompson,  MD, 20 mg at 12/01/22 0903  •  nitroglycerin (NITROSTAT) SL tablet 0.4 mg, 0.4 mg, Sublingual, Q5 Min PRN, Manuel Thompson MD  •  ondansetron (ZOFRAN) tablet 4 mg, 4 mg, Oral, Q6H PRN, 4 mg at 11/24/22 1110 **OR** ondansetron (ZOFRAN) injection 4 mg, 4 mg, Intravenous, Q6H PRN, Manuel Thompson MD  •  ondansetron (ZOFRAN) tablet 4 mg, 4 mg, Oral, Q6H PRN **OR** ondansetron (ZOFRAN) injection 4 mg, 4 mg, Intravenous, Q6H PRN, Manuel Thompson MD  •  [COMPLETED] pantoprazole (PROTONIX) injection 40 mg, 40 mg, Intravenous, Once, 40 mg at 11/26/22 2020 **FOLLOWED BY** pantoprazole (PROTONIX) EC tablet 40 mg, 40 mg, Oral, Q AM, Manuel Thompson MD, 40 mg at 12/01/22 0609  •  polyethylene glycol (MIRALAX) packet 17 g, 17 g, Oral, Daily, Manuel Thompson MD, 17 g at 12/01/22 0908  •  sennosides-docusate (PERICOLACE) 8.6-50 MG per tablet 1 tablet, 1 tablet, Oral, Nightly PRN, Manuel Thompson MD  •  sodium chloride 0.9 % flush 10 mL, 10 mL, Intravenous, Q12H, Manuel Thompson MD, 10 mL at 12/01/22 0908  •  valACYclovir (VALTREX) tablet 2,000 mg, 2,000 mg, Oral, BID, Manuel Thompson MD, 2,000 mg at 12/01/22 0903       LABS (Reviewed):  Hematology WBC   Date Value Ref Range Status   12/01/2022 14.32 (H) 3.40 - 10.80 10*3/mm3 Final     RBC   Date Value Ref Range Status   12/01/2022 4.47 3.77 - 5.28 10*6/mm3 Final     Hemoglobin   Date Value Ref Range Status   12/01/2022 11.9 (L) 12.0 - 15.9 g/dL Final     Hematocrit   Date Value Ref Range Status   12/01/2022 35.1 34.0 - 46.6 % Final     Platelets   Date Value Ref Range Status   12/01/2022 263 140 - 450 10*3/mm3 Final      Chemistry   Lab Results   Component Value Date    GLUCOSE 288 (H) 12/01/2022    BUN 21 (H) 12/01/2022    CREATININE 0.77 12/01/2022    EGFRIFNONA 92 12/01/2021    EGFRIFAFRI 106 12/01/2021    BCR 27.3 (H) 12/01/2022    K 4.3 12/01/2022    CO2 23.0 12/01/2022    CALCIUM 8.4 (L) 12/01/2022     PROTENTOTREF 6.6 08/10/2022    ALBUMIN 3.80 11/28/2022    LABIL2 2.0 08/10/2022    AST 71 (H) 11/28/2022     (H) 11/28/2022         Physical Exam:         Neurology: [x] CNII-XII grossly intact    [] Strength: Decreased L    [] Reflexes:     [] Encephalopathy:     [] Memory impairment:     [] Neuropathy: motor/sensory:   Auditory/Ear: [] Otitis, external ear (non-infectious):   Ocular/Visual: [] PERRLA/EOMI:   Skin:  stGstrstastdstest:st st1st Comments/Notes:     [x] Review of labs, images, dosimetry, dose delivered, & treatment parameters.    Comments:     [x] Patient treatment setup reviewed.    Comments:     Recommendations: Will schedule CT sim for 12/2/2022  Informed consent obtained  Awaiting final pathology  Coordination of care with med/onc.    [] Continue RT  [] Change RT Plan [] Hold RT, length:        Approved by:     Mathew Sam MD

## 2022-12-01 NOTE — TELEPHONE ENCOUNTER
I called and spoke with Ms. Avendaño's nurse Paulette and advised her of scheduled post op appointment. She voiced understand.

## 2022-12-01 NOTE — PLAN OF CARE
Goal Outcome Evaluation:  Plan of Care Reviewed With: patient        Progress: improving  Outcome Evaluation: Pt seen for PT re-eval this am. She is now s/p brain biopsy for R thalamic mass. She has complex hx including lung cancer, renal cell cancer, and thyroid. SHe has new brain mass consistent w glioma. At baseline, she lives at home w her  and uses a walker. She is normally independent w mobility and ADLs.  Today, pt doing well. She is requiring CGA for bed mobility and to stand w walker. She ambualted approx 20 ft in room using RWx w CGA/min A. She does exhibit left lean at times, usually when distracted with another task. Also has some decreased coordination noted w JANNIEE. No overt LOB noted. Pt up in chair at end of session. She is interested in acute rehab at [facility] prior to returning home w her . She will continue to benefit from skilled PT to maximize safety, strength, balance, and independence w mobility.

## 2022-12-01 NOTE — PLAN OF CARE
The pt was admitted to [facility] 2/2 to weakness and impaired balance. She was found to have a brain mass and a biopsy is pending. Pmhx very complex with multiple cancers and BRCA +. She lives with her  and is independent with ADL's and transfers. Today, the pt completed bed mobility with SBA. Once sitting at the EOB, Min A provided for LBD. CGA to stand with a walker and pivot to the BSC. Mod A toileting - Min A for standing balance due to L lateral lean. She mobilized to the bathroom with CGA/Min A, L lateral lean with dynamic activity. She has impaired coordination in the LUE. She is highly motivated. Acute rehab recommended.     Patient was wearing a face mask during this therapy encounter. Therapist used appropriate personal protective equipment including mask and gloves. Mask used was standard procedure mask. Appropriate PPE was worn during the entire therapy session. Hand hygiene was completed before and after therapy session. Patient is not in enhanced droplet precautions.

## 2022-12-01 NOTE — TELEPHONE ENCOUNTER
----- Message from MERRY Julien sent at 11/30/2022  3:09 PM EST -----  Regarding: Post-op  Please schedule patient an appointment for staple removal in 10-14 days.  Can be with [facility].  Patient underwent right frontal stereotactic needle brain biopsy for right thalamic brain mass on 11/28 with Dr. Thompson

## 2022-12-01 NOTE — PLAN OF CARE
Problem: Diabetes Comorbidity  Goal: Blood Glucose Level Within Targeted Range  12/1/2022 0352 by Ingrid Hernandez RN  Outcome: Ongoing, Progressing  12/1/2022 0350 by Ingrid Hernandez RN  Outcome: Ongoing, Progressing  Intervention: Monitor and Manage Glycemia  Recent Flowsheet Documentation  Taken 11/30/2022 1952 by Ingrid Hernandez RN  Glycemic Management: blood glucose monitored     Problem: Fall Injury Risk  Goal: Absence of Fall and Fall-Related Injury  12/1/2022 0352 by Ingrid Hernandez RN  Outcome: Ongoing, Progressing  12/1/2022 0350 by Ingrid Hernandez RN  Outcome: Ongoing, Progressing  Intervention: Identify and Manage Contributors  Recent Flowsheet Documentation  Taken 11/30/2022 1952 by Ingrid Hernandez RN  Medication Review/Management: medications reviewed  Intervention: Promote Injury-Free Environment  Recent Flowsheet Documentation  Taken 11/30/2022 1952 by Ingrid Hernandez RN  Safety Promotion/Fall Prevention:   activity supervised   safety round/check completed   room organization consistent   clutter free environment maintained   assistive device/personal items within reach     Problem: Fall Injury Risk  Goal: Absence of Fall and Fall-Related Injury  Intervention: Identify and Manage Contributors  Recent Flowsheet Documentation  Taken 11/30/2022 1952 by Ingrid Hernandez RN  Medication Review/Management: medications reviewed     Problem: Fall Injury Risk  Goal: Absence of Fall and Fall-Related Injury  Intervention: Identify and Manage Contributors  Recent Flowsheet Documentation  Taken 11/30/2022 1952 by Ingrid Hernandez RN  Medication Review/Management: medications reviewed     Problem: Fall Injury Risk  Goal: Absence of Fall and Fall-Related Injury  Intervention: Promote Injury-Free Environment  Recent Flowsheet Documentation  Taken 11/30/2022 1952 by Ingrid Hernandez RN  Safety Promotion/Fall Prevention:   activity supervised   safety round/check completed   room organization  consistent   clutter free environment maintained   assistive device/personal items within reach     Problem: Skin Injury Risk Increased  Goal: Skin Health and Integrity  Intervention: Optimize Skin Protection  Recent Flowsheet Documentation  Taken 11/30/2022 1952 by Ingrid Hernandez RN  Skin Protection:   adhesive use limited   tubing/devices free from skin contact   transparent dressing maintained   incontinence pads utilized     Problem: Adult Inpatient Plan of Care  Goal: Absence of Hospital-Acquired Illness or Injury  Intervention: Identify and Manage Fall Risk  Recent Flowsheet Documentation  Taken 11/30/2022 1952 by Ingrid Hernandez, RN  Safety Promotion/Fall Prevention:   activity supervised   safety round/check completed   room organization consistent   clutter free environment maintained   assistive device/personal items within reach   Goal Outcome Evaluation:

## 2022-12-01 NOTE — THERAPY RE-EVALUATION
Patient Name: Christie Avendaño  : 1965    MRN: [id number]                              Today's Date: 2022       Admit Date: 2022    Visit Dx:     ICD-10-CM ICD-9-CM   1. Brain mass  G93.89 348.89   2. BRCA2 gene mutation positive in female  Z15.01 V84.01    Z15.02 V84.02    Z15.09 V84.09   3. Type 2 diabetes mellitus without complication, without long-term current use of insulin (HCC)  E11.9 250.00   4. 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)   • Non-small cell lung cancer, right (HCC)   • Renal mass, right   • SIRS (systemic inflammatory response syndrome) (HCC)   • Hyperlipidemia   • Malignant melanoma of right lower extremity including hip (HCC)   • Brain mass, rim-enhancing   • 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)   • Metastasis to brain (HCC)     Past Medical History:   Diagnosis Date   • Arthritis    • Asthma    • BRCA2 positive    • Diabetes mellitus (HCC)    • H/O Liver masses 2017    x3   • H/O Lung masses 2017    1 in right lower lobe and 1 in the left infrahilar location   • H/O Ovarian cyst    • H/O Right adrenal mass (CMS/HCC) 2017   • Lung cancer (HCC)    • Melanoma (HCC)     right foot   • PONV (postoperative nausea and vomiting)    • Thyroid disease      Past Surgical History:   Procedure Laterality Date   • APPENDECTOMY     • BRAIN BIOPSY Right 2022    Procedure: Right frontal stereotactic needle brain biopsy;  Surgeon: Manuel Thompson MD;  Location: [facility];  Service: Neurosurgery;   Laterality: Right;   • BREAST IMPLANT SURGERY Bilateral    • CHOLECYSTECTOMY     • HYSTERECTOMY     • MASTECTOMY Bilateral    • OVARIAN CYST SURGERY     • THORACOSCOPY VIDEO ASSISTED WITH LOBECTOMY Right 10/9/2020    Procedure: BRONCHOSCOPY, THORACOSCOPY VIDEO ASSISTED WITH ANTERIOR BASAL SEGMENTECTOMY, INTERCOSTAL NERVE BLOCK;  Surgeon: Flaca Crisostomo MD;  Location: [facility] OR;  Service: Thoracic;  Laterality: Right;   • TONSILLECTOMY        General Information     Row Name 12/01/22 0959          Physical Therapy Time and Intention    Document Type re-evaluation  -EJ     Mode of Treatment co-treatment;physical therapy;occupational therapy  -EJ     Row Name 12/01/22 0959          General Information    Existing Precautions/Restrictions fall  -EJ     Row Name 12/01/22 0959          Cognition    Orientation Status (Cognition) oriented x 4  -EJ     Row Name 12/01/22 0959          Safety Issues, Functional Mobility    Impairments Affecting Function (Mobility) balance;strength;coordination;endurance/activity tolerance  -EJ           User Key  (r) = Recorded By, (t) = Taken By, (c) = Cosigned By    Initials Name Provider Type    EJ Yarely Hull, PT Physical Therapist               Mobility     Row Name 12/01/22 1000          Bed Mobility    Supine-Sit [place] (Bed Mobility) contact guard  -EJ     Assistive Device (Bed Mobility) head of bed elevated  -EJ     Row Name 12/01/22 1000          Sit-Stand Transfer    Sit-Stand [place] (Transfers) contact guard  -EJ     Assistive Device (Sit-Stand Transfers) walker, front-wheeled  -EJ     Comment, (Sit-Stand Transfer) some cues for proper  w L hand on walker  -EJ     Row Name 12/01/22 1000          Gait/Stairs (Locomotion)    [place] Level (Gait) verbal cues;contact guard;minimum assist (75% patient effort)  -EJ     Assistive Device (Gait) walker, front-wheeled  -EJ     Distance in Feet (Gait) 20  -EJ     Deviations/Abnormal Patterns (Gait) fabrizio  decreased;stride length decreased  -EJ     Comment, (Gait/Stairs) left lean at times, cues to correct, some decreased attention to task when distracted  -EJ           User Key  (r) = Recorded By, (t) = Taken By, (c) = Cosigned By    Initials Name Provider Type    Yarely Troy, PT Physical Therapist               Obj/Interventions     Row Name 12/01/22 1248          Range of Motion Comprehensive    General Range of Motion no range of motion deficits identified  -[facility] Name 12/01/22 1248          Strength Comprehensive (MMT)    Comment, General Manual Muscle Testing (MMT) Assessment generalized weakness  -     Row Name 12/01/22 1248          Balance    Balance Assessment sitting static balance;standing static balance;standing dynamic balance  -EJ     Static Sitting Balance independent  -EJ     Static Standing Balance contact guard  -EJ     Dynamic Standing Balance minimal assist  -EJ     Position/Device Used, Standing Balance walker, rolling  -EJ     Comment, Balance left lean at times in standing  -EJ           User Key  (r) = Recorded By, (t) = Taken By, (c) = Cosigned By    Initials Name Provider Type    Yarely Troy PT Physical Therapist               Goals/Plan     Row Name 12/01/22 1307          Bed Mobility Goal 1 (PT)    Activity/Assistive Device (Bed Mobility Goal 1, PT) bed mobility activities, all  -EJ     [place] Level/Cues Needed (Bed Mobility Goal 1, PT) standby assist  -EJ     Time Frame (Bed Mobility Goal 1, PT) 1 week  -     Row Name 12/01/22 1307          Transfer Goal 1 (PT)    Activity/Assistive Device (Transfer Goal 1, PT) transfers, all;walker, rolling  -EJ     [place] Level/Cues Needed (Transfer Goal 1, PT) standby assist  -EJ     Time Frame (Transfer Goal 1, PT) 1 week  -     Row Name 12/01/22 1307          Gait Training Goal 1 (PT)    Activity/Assistive Device (Gait Training Goal 1, PT) gait (walking locomotion);walker, rolling  -EJ     [place]  Level (Gait Training Goal 1, PT) contact guard required  -EJ     Distance (Gait Training Goal 1, PT) 100  -EJ     Time Frame (Gait Training Goal 1, PT) 1 week  -EJ     Row Name 12/01/22 1307          Therapy Assessment/Plan (PT)    Planned Therapy Interventions (PT) balance training;bed mobility training;gait training;home exercise program;patient/family education;stretching;strengthening;stair training;ROM (range of motion);transfer training  -EJ           User Key  (r) = Recorded By, (t) = Taken By, (c) = Cosigned By    Initials Name Provider Type    EJ Yarely Hull, PT Physical Therapist               Clinical Impression     Row Name 12/01/22 1249          Pain    Pretreatment Pain Rating 0/10 - no pain  -EJ     Row Name 12/01/22 1249          Plan of Care Review    Plan of Care Reviewed With patient  -EJ     Progress improving  -EJ     Outcome Evaluation Pt seen for PT re-eval this am. She is now s/p brain biopsy for R thalamic mass. She has complex hx including lung cancer, renal cell cancer, and thyroid. SHe has new brain mass consistent w glioma. At baseline, she lives at home w her  and uses a walker. She is normally independent w mobility and ADLs.  Today, pt doing well. She is requiring CGA for bed mobility and to stand w walker. She ambualted approx 20 ft in room using RWx w CGA/min A. She does exhibit left lean at times, usually when distracted with another task. Also has some decreased coordination noted w LUE. No overt LOB noted. Pt up in chair at end of session. She is interested in acute rehab at [facility] prior to returning home w her . She will continue to benefit from skilled PT to maximize safety, strength, balance, and independence w mobility.  -EJ     Row Name 12/01/22 1249          Therapy Assessment/Plan (PT)    Rehab Potential (PT) good, to achieve stated therapy goals  -EJ     Criteria for Skilled Interventions Met (PT) yes  -EJ     Therapy Frequency (PT) 6 times/wk  -EJ      Row Name 12/01/22 1249          Positioning and Restraints    Pre-Treatment Position in bed  -EJ     Post Treatment Position chair  -EJ     In Chair notified nsg;reclined;call light within reach;encouraged to call for assist;exit alarm on;with family/caregiver  -PHYLLIS           User Key  (r) = Recorded By, (t) = Taken By, (c) = Cosigned By    Initials Name Provider Type    Yarely Troy, PT Physical Therapist               Outcome Measures     Row Name 12/01/22 1308 12/01/22 1100       How much help from another person do you currently need...    Turning from your back to your side while in flat bed without using bedrails? 4  -EJ 4  -MS    Moving from lying on back to sitting on the side of a flat bed without bedrails? 3  -EJ 3  -MS    Moving to and from a bed to a chair (including a wheelchair)? 3  -EJ 3  -MS    Standing up from a chair using your arms (e.g., wheelchair, bedside chair)? 3  -EJ 3  -MS    Climbing 3-5 steps with a railing? 2  -EJ 2  -MS    To walk in hospital room? 3  -EJ 3  -MS    AM-PAC 6 Clicks Score (PT) 18  -EJ 18  -MS    Highest level of mobility 6 --> Walked 10 steps or more  -EJ 6 --> Walked 10 steps or more  -MS          User Key  (r) = Recorded By, (t) = Taken By, (c) = Cosigned By    Initials Name Provider Type    Yarely Troy, PT Physical Therapist    Odalys Valdez, RN Registered Nurse                             Physical Therapy Education     Title: PT OT SLP Therapies (Done)     Topic: Physical Therapy (Done)     Point: Mobility training (Done)     Learning Progress Summary           Patient Acceptance, E,TB,D, VU,NR by PHYLLIS at 12/1/2022 1308    Acceptance, E,TB, VU,NR by AE at 11/23/2022 1422   Family Acceptance, E,TB, VU,NR by AE at 11/23/2022 1422   Significant Other Acceptance, E,TB, VU,NR by AE at 11/23/2022 1422                   Point: Home exercise program (Done)     Learning Progress Summary           Patient Acceptance, E,TB, VU,NR by AE at 11/23/2022 1422    Family Acceptance, E,TB, VU,NR by AE at 11/23/2022 1422   Significant Other Acceptance, E,TB, VU,NR by AE at 11/23/2022 1422                   Point: Body mechanics (Done)     Learning Progress Summary           Patient Acceptance, E,TB, VU,NR by AE at 11/23/2022 1422   Family Acceptance, E,TB, VU,NR by AE at 11/23/2022 1422   Significant Other Acceptance, E,TB, VU,NR by AE at 11/23/2022 1422                   Point: Precautions (Done)     Learning Progress Summary           Patient Acceptance, E,TB, VU,NR by AE at 11/23/2022 1422   Family Acceptance, E,TB, VU,NR by AE at 11/23/2022 1422   Significant Other Acceptance, E,TB, VU,NR by AE at 11/23/2022 1422                               User Key     Initials Effective Dates Name Provider Type Discipline    EJ 06/16/21 -  Yarely Hull PT Physical Therapist PT    AE 06/16/21 -  Meredith Jones PT Physical Therapist PT              PT Recommendation and Plan  Planned Therapy Interventions (PT): balance training, bed mobility training, gait training, home exercise program, patient/family education, stretching, strengthening, stair training, ROM (range of motion), transfer training  Plan of Care Reviewed With: patient  Progress: improving  Outcome Evaluation: Pt seen for PT re-eval this am. She is now s/p brain biopsy for R thalamic mass. She has complex hx including lung cancer, renal cell cancer, and thyroid. SHe has new brain mass consistent w glioma. At baseline, she lives at home w her  and uses a walker. She is normally independent w mobility and ADLs.  Today, pt doing well. She is requiring CGA for bed mobility and to stand w walker. She ambualted approx 20 ft in room using RWx w CGA/min A. She does exhibit left lean at times, usually when distracted with another task. Also has some decreased coordination noted w LUE. No overt LOB noted. Pt up in chair at end of session. She is interested in acute rehab at [facility] prior to returning home w her .  She will continue to benefit from skilled PT to maximize safety, strength, balance, and independence w mobility.     Time Calculation:    PT Charges     Row Name 12/01/22 1309             Time Calculation    Start Time 0847  -EJ      Stop Time 0908  -EJ      Time Calculation (min) 21 min  -EJ      PT Received On 12/01/22  -EJ      PT - Next Appointment 12/02/22  -EJ      PT Goal Re-Cert Due Date 12/08/22  -EJ         Time Calculation- PT    Total Timed Code Minutes- PT 15 minute(s)  -EJ            User Key  (r) = Recorded By, (t) = Taken By, (c) = Cosigned By    Initials Name Provider Type    Yarely Troy, PT Physical Therapist              Therapy Charges for Today     Code Description Service Date Service Provider Modifiers Qty    31237298615 HC PT RE-EVAL ESTABLISHED PLAN 2 12/1/2022 Yarely Hull, PT GP 1    61735635980 HC PT THER PROC EA 15 MIN 12/1/2022 Yarely Hull, PT GP 1          PT G-Codes  Outcome Measure Options: AM-PAC 6 Clicks Basic Mobility (PT)  AM-PAC 6 Clicks Score (PT): 18  PT Discharge Summary  Anticipated Discharge Disposition (PT): inpatient rehabilitation facility    Yarely Hull PT  12/1/2022

## 2022-12-01 NOTE — PROGRESS NOTES
[facility] Acute Rehab  Referral received. Therapies noted. Will see pt tomorrow to discuss dc plan and also to follow for chemo/ radiation plan of start.   If appropriate, will need precert with Frances Malcolm RN  Acute Rehab Admission Nurse

## 2022-12-01 NOTE — PLAN OF CARE
Problem: Adult Inpatient Plan of Care  Goal: Patient-Specific Goal (Individualized)  Outcome: Ongoing, Progressing     Problem: Adult Inpatient Plan of Care  Goal: Absence of Hospital-Acquired Illness or Injury  Intervention: Identify and Manage Fall Risk  Recent Flowsheet Documentation  Taken 11/30/2022 1952 by Ingrid Hernandez RN  Safety Promotion/Fall Prevention:   activity supervised   safety round/check completed   room organization consistent   clutter free environment maintained   assistive device/personal items within reach     Problem: Adult Inpatient Plan of Care  Goal: Readiness for Transition of Care  Outcome: Ongoing, Progressing     Problem: Fall Injury Risk  Goal: Absence of Fall and Fall-Related Injury  Intervention: Promote Injury-Free Environment  Recent Flowsheet Documentation  Taken 11/30/2022 1952 by Ingrid Hernandez RN  Safety Promotion/Fall Prevention:   activity supervised   safety round/check completed   room organization consistent   clutter free environment maintained   assistive device/personal items within reach     Problem: Skin Injury Risk Increased  Goal: Skin Health and Integrity  Intervention: Optimize Skin Protection  Recent Flowsheet Documentation  Taken 11/30/2022 1952 by Ingrid Hernandez, RN  Skin Protection:   adhesive use limited   tubing/devices free from skin contact   transparent dressing maintained   incontinence pads utilized   Goal Outcome Evaluation:

## 2022-12-01 NOTE — THERAPY EVALUATION
Patient Name: Christie Avendaño  : 1965    MRN: [id number]                              Today's Date: 2022       Admit Date: 2022    Visit Dx:     ICD-10-CM ICD-9-CM   1. Brain mass  G93.89 348.89   2. BRCA2 gene mutation positive in female  Z15.01 V84.01    Z15.02 V84.02    Z15.09 V84.09   3. Type 2 diabetes mellitus without complication, without long-term current use of insulin (HCC)  E11.9 250.00   4. 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)   • Non-small cell lung cancer, right (HCC)   • Renal mass, right   • SIRS (systemic inflammatory response syndrome) (HCC)   • Hyperlipidemia   • Malignant melanoma of right lower extremity including hip (HCC)   • Brain mass, rim-enhancing   • 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)   • Metastasis to brain (HCC)     Past Medical History:   Diagnosis Date   • Arthritis    • Asthma    • BRCA2 positive    • Diabetes mellitus (HCC)    • H/O Liver masses 2017    x3   • H/O Lung masses 2017    1 in right lower lobe and 1 in the left infrahilar location   • H/O Ovarian cyst    • H/O Right adrenal mass (CMS/HCC) 2017   • Lung cancer (HCC)    • Melanoma (HCC)     right foot   • PONV (postoperative nausea and vomiting)    • Thyroid disease      Past Surgical History:   Procedure Laterality Date   • APPENDECTOMY     • BRAIN BIOPSY Right 2022    Procedure: Right frontal stereotactic needle brain biopsy;  Surgeon: Manuel Thompson MD;  Location: [facility];  Service: Neurosurgery;   Laterality: Right;   • BREAST IMPLANT SURGERY Bilateral    • CHOLECYSTECTOMY     • HYSTERECTOMY     • MASTECTOMY Bilateral    • OVARIAN CYST SURGERY     • THORACOSCOPY VIDEO ASSISTED WITH LOBECTOMY Right 10/9/2020    Procedure: BRONCHOSCOPY, THORACOSCOPY VIDEO ASSISTED WITH ANTERIOR BASAL SEGMENTECTOMY, INTERCOSTAL NERVE BLOCK;  Surgeon: Flaca Crisostomo MD;  [facility];  Service: Thoracic;  Laterality: Right;   • TONSILLECTOMY        General Information     Row Name 12/01/22 1529          OT Time and Intention    Document Type evaluation  -RB     Mode of Treatment individual therapy;occupational therapy  -RB     Row Name 12/01/22 1529          General Information    Patient Profile Reviewed yes  -RB     Prior Level of Function independent:;ADL's;transfer  -RB     Existing Precautions/Restrictions fall  -RB     Barriers to Rehab medically complex  -RB     Row Name 12/01/22 1529          Living Environment    People in Home spouse  -RB     Row Name 12/01/22 1529          Home Main Entrance    Number of Stairs, Main Entrance one  -RB     Row Name 12/01/22 1529          Cognition    Orientation Status (Cognition) oriented x 4  -RB     Row Name 12/01/22 1529          Safety Issues, Functional Mobility    Impairments Affecting Function (Mobility) balance;endurance/activity tolerance;strength;coordination;postural/trunk control  -RB           User Key  (r) = Recorded By, (t) = Taken By, (c) = Cosigned By    Initials Name Provider Type    RB Jodi Crowder OT Occupational Therapist                 Mobility/ADL's     Row Name 12/01/22 1531          Bed Mobility    Bed Mobility supine-sit  -RB     Supine-Sit [place] (Bed Mobility) contact guard  -RB     Assistive Device (Bed Mobility) bed rails  -RB     Row Name 12/01/22 1531          Transfers    Transfers sit-stand transfer;stand-sit transfer;toilet transfer;bed-chair transfer  -RB     Row Name 12/01/22 1531          Bed-Chair Transfer    Bed-Chair  [place] (Transfers) minimum assist (75% patient effort);1 person assist  -RB     Assistive Device (Bed-Chair Transfers) walker, front-wheeled  -RB     Row Name 12/01/22 1531          Sit-Stand Transfer    Sit-Stand [place] (Transfers) contact guard;1 person assist  -RB     Assistive Device (Sit-Stand Transfers) walker, front-wheeled  -RB     Comment, (Sit-Stand Transfer) cues for grasping rwx correctly  -RB     Row Name 12/01/22 1531          Stand-Sit Transfer    Stand-Sit [place] (Transfers) contact guard  -RB     Assistive Device (Stand-Sit Transfers) walker, front-wheeled  -RB     Row Name 12/01/22 1531          Toilet Transfer    Type (Toilet Transfer) sit-stand;stand-sit  -RB     [place] Level (Toilet Transfer) minimum assist (75% patient effort)  -RB     Assistive Device (Toilet Transfer) commode, 3-in-1;commode;grab bars/safety frame  -RB     Comment, (Toilet Transfer) L lateral lean  -RB     Row Name 12/01/22 1531          Functional Mobility    Functional Mobility- Ind. Level minimum assist (75% patient effort);1 person;verbal cues required  -RB     Functional Mobility- Device walker, front-wheeled  -RB     Functional Mobility- Safety Issues balance decreased during turns  -RB     Row Name 12/01/22 1531          Activities of Daily Living    BADL Assessment/Intervention lower body dressing;toileting;grooming  -RB     Row Name 12/01/22 1531          Lower Body Dressing Assessment/Training    [place] Level (Lower Body Dressing) lower body dressing skills;minimum assist (75% patient effort)  -RB     Position (Lower Body Dressing) supported sitting  -RB     Row Name 12/01/22 1531          Toileting Assessment/Training    [place] Level (Toileting) toileting skills;moderate assist (50% patient effort)  -RB     Position (Toileting) supported sitting;supported standing  -RB     Row Name 12/01/22 1531          Grooming Assessment/Training    [place] Level (Grooming) grooming  skills;set up  -RB     Position (Grooming) sink side;supported standing  -RB           User Key  (r) = Recorded By, (t) = Taken By, (c) = Cosigned By    Initials Name Provider Type    Jodi Perla OT Occupational Therapist               Obj/Interventions     Row Name 12/01/22 1533          Sensory Assessment (Somatosensory)    Sensory Assessment (Somatosensory) sensation intact  -RB     Row Name 12/01/22 1533          Vision Assessment/Intervention    Visual Impairment/Limitations WFL  -RB     Row Name 12/01/22 1533          Range of Motion Comprehensive    General Range of Motion no range of motion deficits identified  -RB     Row Name 12/01/22 1533          Strength Comprehensive (MMT)    Comment, General Manual Muscle Testing (MMT) Assessment L side weakness > RUE, grossly 3+/5 LUE 4/5 RUE  -RB     Row Name 12/01/22 1533          Motor Skills    Motor Skills coordination  -RB     Coordination left;upper extremity;minimal impairment  -RB     Row Name 12/01/22 1533          Balance    Comment, Balance CGA for standing balance. Min A + left lateral lean when mobilizing + dynamic balance using hands in a task  -RB           User Key  (r) = Recorded By, (t) = Taken By, (c) = Cosigned By    Initials Name Provider Type    Jodi Perla OT Occupational Therapist               Goals/Plan     Row Name 12/01/22 1535          Bed Mobility Goal 1 (OT)    Activity/Assistive Device (Bed Mobility Goal 1, OT) bed mobility activities, all  -RB     [place] Level/Cues Needed (Bed Mobility Goal 1, OT) supervision required  -RB     Time Frame (Bed Mobility Goal 1, OT) short term goal (STG);2 weeks  -RB     Progress/Outcomes (Bed Mobility Goal 1, OT) continuing progress toward goal  -RB     Row Name 12/01/22 1535          Transfer Goal 1 (OT)    Activity/Assistive Device (Transfer Goal 1, OT) transfers, all  -RB     [place] Level/Cues Needed (Transfer Goal 1, OT) supervision required  -RB     Time Frame  (Transfer Goal 1, OT) short term goal (STG);2 weeks  -RB     Progress/Outcome (Transfer Goal 1, OT) continuing progress toward goal  -RB     Row Name 12/01/22 1535          Dressing Goal 1 (OT)    Activity/Device (Dressing Goal 1, OT) dressing skills, all  -RB     [place]/Cues Needed (Dressing Goal 1, OT) supervision required  -RB     Time Frame (Dressing Goal 1, OT) short term goal (STG);2 weeks  -RB     Progress/Outcome (Dressing Goal 1, OT) continuing progress toward goal  -RB     Row Name 12/01/22 1535          Toileting Goal 1 (OT)    Activity/Device (Toileting Goal 1, OT) toileting skills, all  -RB     [place] Level/Cues Needed (Toileting Goal 1, OT) supervision required  -RB     Time Frame (Toileting Goal 1, OT) short term goal (STG);2 weeks  -RB     Progress/Outcome (Toileting Goal 1, OT) continuing progress toward goal  -RB     Row Name 12/01/22 1535          Grooming Goal 1 (OT)    Activity/Device (Grooming Goal 1, OT) grooming skills, all  -RB     [place] (Grooming Goal 1, OT) supervision required  -RB     Time Frame (Grooming Goal 1, OT) short term goal (STG);2 weeks  -RB     Progress/Outcome (Grooming Goal 1, OT) continuing progress toward goal  -RB     Row Name 12/01/22 1535          Therapy Assessment/Plan (OT)    Planned Therapy Interventions (OT) transfer/mobility retraining;strengthening exercise;ROM/therapeutic exercise;activity tolerance training;adaptive equipment training;BADL retraining;functional balance retraining;occupation/activity based interventions;patient/caregiver education/training  -RB           User Key  (r) = Recorded By, (t) = Taken By, (c) = Cosigned By    Initials Name Provider Type    RB Jodi Crowder, OT Occupational Therapist               Clinical Impression     Row Name 12/01/22 1534          Pain Assessment    Pretreatment Pain Rating 0/10 - no pain  -RB     Posttreatment Pain Rating 0/10 - no pain  -RB     Row Name 12/01/22 1534          Plan of Care  Review    Plan of Care Reviewed With patient  -RB     Progress no change  -RB     Row Name 12/01/22 1534          Therapy Assessment/Plan (OT)    Rehab Potential (OT) good, to achieve stated therapy goals  -RB     Criteria for Skilled Therapeutic Interventions Met (OT) yes;skilled treatment is necessary  -RB     Therapy Frequency (OT) 5 times/wk  -RB     Row Name 12/01/22 1534          Therapy Plan Review/Discharge Plan (OT)    Anticipated Discharge Disposition (OT) inpatient rehabilitation facility  -RB     Row Name 12/01/22 1534          Vital Signs    O2 Delivery Pre Treatment room air  -RB     O2 Delivery Intra Treatment room air  -RB     O2 Delivery Post Treatment room air  -RB     Pre Patient Position Supine  -RB     Intra Patient Position Standing  -RB     Post Patient Position Sitting  -RB     Row Name 12/01/22 1534          Positioning and Restraints    Pre-Treatment Position in bed  -RB     Post Treatment Position chair  -RB     In Chair notified nsg;reclined;sitting;call light within reach;encouraged to call for assist;exit alarm on;legs elevated  -RB           User Key  (r) = Recorded By, (t) = Taken By, (c) = Cosigned By    Initials Name Provider Type    RB Jodi Crowder, OT Occupational Therapist               Outcome Measures     Row Name 12/01/22 1535          How much help from another is currently needed...    Putting on and taking off regular lower body clothing? 2  -RB     Bathing (including washing, rinsing, and drying) 2  -RB     Toileting (which includes using toilet bed pan or urinal) 2  -RB     Putting on and taking off regular upper body clothing 3  -RB     Taking care of personal grooming (such as brushing teeth) 3  -RB     Eating meals 3  -RB     AM-PAC 6 Clicks Score (OT) 15  -RB     Row Name 12/01/22 1308 12/01/22 1100       How much help from another person do you currently need...    Turning from your back to your side while in flat bed without using bedrails? 4  -EJ 4  -MS     Moving from lying on back to sitting on the side of a flat bed without bedrails? 3  -EJ 3  -MS    Moving to and from a bed to a chair (including a wheelchair)? 3  -EJ 3  -MS    Standing up from a chair using your arms (e.g., wheelchair, bedside chair)? 3  -EJ 3  -MS    Climbing 3-5 steps with a railing? 2  -EJ 2  -MS    To walk in hospital room? 3  -EJ 3  -MS    AM-PAC 6 Clicks Score (PT) 18  -EJ 18  -MS    Highest level of mobility 6 --> Walked 10 steps or more  -EJ 6 --> Walked 10 steps or more  -MS    Row Name 12/01/22 1535          Functional Assessment    Outcome Measure Options AM-PAC 6 Clicks Daily Activity (OT);Modified [place]  -RB           User Key  (r) = Recorded By, (t) = Taken By, (c) = Cosigned By    Initials Name Provider Type    Yarely Troy, PT Physical Therapist    Jodi Perla, OT Occupational Therapist    Odalys Valdez, RN Registered Nurse                Occupational Therapy Education     Title: PT OT SLP Therapies (In Progress)     Topic: Occupational Therapy (Not Started)     Point: ADL training (Not Started)     Description:   Instruct learner(s) on proper safety adaptation and remediation techniques during self care or transfers.   Instruct in proper use of assistive devices.              Learner Progress:  Not documented in this visit.          Point: Home exercise program (Not Started)     Description:   Instruct learner(s) on appropriate technique for monitoring, assisting and/or progressing therapeutic exercises/activities.              Learner Progress:  Not documented in this visit.          Point: Precautions (Not Started)     Description:   Instruct learner(s) on prescribed precautions during self-care and functional transfers.              Learner Progress:  Not documented in this visit.          Point: Body mechanics (Not Started)     Description:   Instruct learner(s) on proper positioning and spine alignment during self-care, functional mobility activities  and/or exercises.              Learner Progress:  Not documented in this visit.                          OT Recommendation and Plan  Planned Therapy Interventions (OT): transfer/mobility retraining, strengthening exercise, ROM/therapeutic exercise, activity tolerance training, adaptive equipment training, BADL retraining, functional balance retraining, occupation/activity based interventions, patient/caregiver education/training  Therapy Frequency (OT): 5 times/wk  Plan of Care Review  Plan of Care Reviewed With: patient  Progress: no change     Time Calculation:    Time Calculation- OT     Row Name 12/01/22 1528             Time Calculation- OT    OT Start Time 0830  -RB      OT Stop Time 0900  -RB      OT Time Calculation (min) 30 min  -RB      Total Timed Code Minutes- OT 10 minute(s)  -RB      OT Received On 12/01/22  -RB      OT - Next Appointment 12/02/22  -RB      OT Goal Re-Cert Due Date 12/15/22  -RB         Timed Charges    02158 - OT Self Care/Mgmt Minutes 10  -RB         Untimed Charges    OT Eval/Re-eval Minutes 20  -RB         Total Minutes    Timed Charges Total Minutes 10  -RB      Untimed Charges Total Minutes 20  -RB       Total Minutes 30  -RB            User Key  (r) = Recorded By, (t) = Taken By, (c) = Cosigned By    Initials Name Provider Type    RB Jodi Crowder OT Occupational Therapist              Therapy Charges for Today     Code Description Service Date Service Provider Modifiers Qty    92427076443 HC OT EVAL MOD COMPLEXITY 3 12/1/2022 Jodi Crowder OT GO 1    54015422927 HC OT SELF CARE/MGMT/TRAIN EA 15 MIN 12/1/2022 Jodi Crowder OT GO 1               Jodi Crowder OT  12/1/2022

## 2022-12-01 NOTE — PROGRESS NOTES
[facility] INPATIENT PROGRESS NOTE    Length of Stay:  9 days    CHIEF COMPLAINT:  New brain mass, suspected metastatic disease  Germline BRCA2 and GEORGES mutations, stage I ([id number]) papillary thyroid cancer, bilateral DCIS, stage I left ([id number]) clear-cell renal cell carcinoma, stage IIB typical carcinoid of the left lung ([id number]), stage IA1 (kU0mV3Ez)  left lower lobe non-small cell lung cancer (adenocarcinoma with lipidic features), stage IA2 ([id number]) right lower lobe non-small cell lung cancer (adenocarcinoma with lipidic growth pattern), melanoma right heel stage IA ([id number]), anemia    SUBJECTIVE:  Patient reports today that she feels she is improving in terms of her mobility.  She was able to work with physical therapy, awaiting evaluation to determine whether she would be a candidate for acute rehab at [facility].  She denies any headache.  She continues with left-sided weakness which is affecting her balance.  She is also having difficulty with coordination left hand.    ROS:  Review of Systems   A comprehensive review of systems was obtained with pertinent positive findings as noted in the interval history above.  All other systems negative.    OBJECTIVE:  Vitals:    11/30/22 1952 11/30/22 2355 12/01/22 0500 12/01/22 0757   BP: 136/69 123/71 137/78 161/94   BP Location: Left arm Left arm Left arm Left arm   Patient Position: Lying Lying Lying Lying   Pulse: 64 64 59 61   Resp: 16 18 18 18   Temp: 98.4 °F (36.9 °C) 98.2 °F (36.8 °C) 98.6 °F (37 °C) 98 °F (36.7 °C)   TempSrc: Oral Oral Oral Oral   SpO2: 96% 94% 95% 96%   Weight:       Height:             PHYSICAL EXAMINATION:  General: Alert and oriented, no distress  Head: Right frontal incision appears normal  Chest/Lungs: Clear to auscultation bilaterally  Heart: Regular rate and rhythm no murmurs Rubs  Abdomen/GI: Soft nontender nondistended bowel sounds present  Extremities: No edema  Neuro: Mild weakness left upper and lower  extremities        DIAGNOSTIC DATA:  Results Review:     I reviewed the patient's new clinical results.    Results from last 7 days   Lab Units 11/30/22  0705 11/29/22  0401 11/28/22  0612   WBC 10*3/mm3 13.97* 15.25* 12.75*   HEMOGLOBIN g/dL 11.9* 12.0 14.0   HEMATOCRIT % 36.2 36.6 40.4   PLATELETS 10*3/mm3 248 262 299      Results from last 7 days   Lab Units 11/30/22  0705 11/29/22  0401 11/28/22  1208   SODIUM mmol/L 136 137 136   POTASSIUM mmol/L 4.3 4.4 4.5   CHLORIDE mmol/L 102 100 99   CO2 mmol/L 26.0 23.0 25.0   BUN mg/dL 18 21* 19   CREATININE mg/dL 0.78 0.78 0.78   CALCIUM mg/dL 8.2* 8.3* 8.6   BILIRUBIN mg/dL  --   --  0.5   ALK PHOS U/L  --   --  96   ALT (SGPT) U/L  --   --  143*   AST (SGOT) U/L  --   --  71*   GLUCOSE mg/dL 153* 136* 202*      Lab Results   Component Value Date    NEUTROABS 11.27 (H) 11/30/2022     Results from last 7 days   Lab Units 11/29/22  0401   INR  1.01   APTT seconds 25.4               Assessment & Plan   ASSESSMENT/PLAN:  This is a 57 y.o. female with:     *Primary CNS malignancy/high-grade glioma  • Patient presented with a couple of weeks of falls and confusion  • MRI 11/22/2022 with a 3.2 cm rim enhancing right supratentorial mass with mass effect and leftward midline shift in the right thalamus and internal capsule.   • She was started on dexamethasone and Keppra  • Dr. Thompson with neurosurgery has evaluated.  This mass is not resectable due to its location.  A biopsy carries some risk but is possible.  • Dr. Sam with radiation oncology has also evaluated the situation.   • CT chest abdomen and pelvis on 11/24/2022 without evidence of progressive metastatic disease otherwise  • Patient underwent stereotactic brain biopsy on 11/28/2022.  Preliminary pathology with high-grade glioma, sent to [facility] for outside review and molecular analysis.  • Patient currently continuing on dexamethasone 4 mg p.o. every 8 hours  • Patient continuing on seizure  prophylaxis with Keppra 500 mg twice daily  • We are awaiting final pathology from her biopsy, preliminary appears to represent high-grade glioma.  With that information we are beginning steps to prepare her for anticipated concurrent chemoradiation with low-dose Temodar (75 mg/m² daily).  I did communicate yesterday with Dr. Sam who is going to have her come over at some point for radiation planning.  We are also awaiting evaluation as to whether she is a candidate for acute rehab at [facility].  This would be beneficial in order to facilitate her upcoming treatment which we may be able to start as early as next week.     *Germline BRCA2 and GEORGES mutations     *History of stage I papillary thyroid cancer     *History of bilateral DCIS   • S/p bilateral mastectomies 1/26/2017     *Prior history of KAVYA/BSO in 1992     *History of stage I clear cell renal cell carcinoma  • Left partial nephrectomy 5/15/2020. 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  • LLL lobectomy 2/28/2018 with 2/8 peribronchial nodes involved along with evidence of adenocarcinoma     *History of stage Ia1 LLL NSCLCa, adenoca with lipidic features  • Never smoker  • Discovered on LLL lobectomy  • Positive EGFR mutation exon 19 deletion     *HIstory of stage IA2 RLL NSCLCa, adenoca with lipidic growth pattern  • Right VATS with anterior basal segmentectomy 10/9/2020, 1.4 cm tumor, 5 negative nodes  • Every 6 month CT imaging     *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  Would suggest possible acute rehab at [facility] during which time we could begin her concurrent chemoradiation.    PLAN:   1. Await final pathology from brain biopsy being sent to [facility] for review and molecular analysis  2. Patient continuing on dexamethasone 4 mg p.o. every 8 hours  3. Patient continuing on Keppra 500 mg p.o. every 12 hours.    4. Process initiated to obtain oral Temodar (75 mg/m²/day) anticipating concurrent chemoradiation once final pathology confirmed.  5. Radiation oncology will begin treatment planning  6. Await evaluation for potential acute rehab at [facility]    Discussed with patient at bedside               Mathew Collins MD

## 2022-12-02 ENCOUNTER — SPECIALTY PHARMACY (OUTPATIENT)
Dept: PHARMACY | Facility: HOSPITAL | Age: 57
End: 2022-12-02

## 2022-12-02 DIAGNOSIS — G93.89 BRAIN MASS: Primary | ICD-10-CM

## 2022-12-02 PROBLEM — C71.9: Status: ACTIVE | Noted: 2022-11-22

## 2022-12-02 LAB
ANION GAP SERPL CALCULATED.3IONS-SCNC: 12.5 MMOL/L (ref 5–15)
BUN SERPL-MCNC: 17 MG/DL (ref 6–20)
BUN/CREAT SERPL: 20.5 (ref 7–25)
CALCIUM SPEC-SCNC: 8.6 MG/DL (ref 8.6–10.5)
CHLORIDE SERPL-SCNC: 98 MMOL/L (ref 98–107)
CO2 SERPL-SCNC: 25.5 MMOL/L (ref 22–29)
CREAT SERPL-MCNC: 0.83 MG/DL (ref 0.57–1)
DEPRECATED RDW RBC AUTO: 44 FL (ref 37–54)
EGFRCR SERPLBLD CKD-EPI 2021: 81.8 ML/MIN/1.73
ERYTHROCYTE [DISTWIDTH] IN BLOOD BY AUTOMATED COUNT: 14.6 % (ref 12.3–15.4)
GLUCOSE BLDC GLUCOMTR-MCNC: 154 MG/DL (ref 70–130)
GLUCOSE BLDC GLUCOMTR-MCNC: 171 MG/DL (ref 70–130)
GLUCOSE BLDC GLUCOMTR-MCNC: 174 MG/DL (ref 70–130)
GLUCOSE BLDC GLUCOMTR-MCNC: 185 MG/DL (ref 70–130)
GLUCOSE BLDC GLUCOMTR-MCNC: 212 MG/DL (ref 70–130)
GLUCOSE BLDC GLUCOMTR-MCNC: 240 MG/DL (ref 70–130)
GLUCOSE SERPL-MCNC: 300 MG/DL (ref 65–99)
HCT VFR BLD AUTO: 38 % (ref 34–46.6)
HGB BLD-MCNC: 12.3 G/DL (ref 12–15.9)
MCH RBC QN AUTO: 26.9 PG (ref 26.6–33)
MCHC RBC AUTO-ENTMCNC: 32.4 G/DL (ref 31.5–35.7)
MCV RBC AUTO: 83.2 FL (ref 79–97)
PLATELET # BLD AUTO: 253 10*3/MM3 (ref 140–450)
PMV BLD AUTO: 9.7 FL (ref 6–12)
POTASSIUM SERPL-SCNC: 4.3 MMOL/L (ref 3.5–5.2)
RBC # BLD AUTO: 4.57 10*6/MM3 (ref 3.77–5.28)
SODIUM SERPL-SCNC: 136 MMOL/L (ref 136–145)
WBC NRBC COR # BLD: 13.91 10*3/MM3 (ref 3.4–10.8)

## 2022-12-02 PROCEDURE — 77263 THER RADIOLOGY TX PLNG CPLX: CPT | Performed by: RADIOLOGY

## 2022-12-02 PROCEDURE — 97535 SELF CARE MNGMENT TRAINING: CPT

## 2022-12-02 PROCEDURE — 82962 GLUCOSE BLOOD TEST: CPT

## 2022-12-02 PROCEDURE — 63710000001 INSULIN LISPRO (HUMAN) PER 5 UNITS: Performed by: INTERNAL MEDICINE

## 2022-12-02 PROCEDURE — 77334 RADIATION TREATMENT AID(S): CPT | Performed by: RADIOLOGY

## 2022-12-02 PROCEDURE — 97530 THERAPEUTIC ACTIVITIES: CPT

## 2022-12-02 PROCEDURE — 85027 COMPLETE CBC AUTOMATED: CPT | Performed by: STUDENT IN AN ORGANIZED HEALTH CARE EDUCATION/TRAINING PROGRAM

## 2022-12-02 PROCEDURE — 63710000001 DEXAMETHASONE PER 0.25 MG: Performed by: NEUROLOGICAL SURGERY

## 2022-12-02 PROCEDURE — 80048 BASIC METABOLIC PNL TOTAL CA: CPT | Performed by: STUDENT IN AN ORGANIZED HEALTH CARE EDUCATION/TRAINING PROGRAM

## 2022-12-02 PROCEDURE — 99232 SBSQ HOSP IP/OBS MODERATE 35: CPT | Performed by: INTERNAL MEDICINE

## 2022-12-02 RX ORDER — TEMOZOLOMIDE 20 MG/1
20 CAPSULE ORAL DAILY
Qty: 42 CAPSULE | Refills: 0 | Status: ON HOLD | OUTPATIENT
Start: 2022-12-04 | End: 2022-12-23 | Stop reason: SDUPTHER

## 2022-12-02 RX ORDER — TEMOZOLOMIDE 140 MG/1
140 CAPSULE ORAL DAILY
Qty: 42 CAPSULE | Refills: 0 | Status: ON HOLD | OUTPATIENT
Start: 2022-12-04 | End: 2022-12-23 | Stop reason: SDUPTHER

## 2022-12-02 RX ORDER — TEMOZOLOMIDE 5 MG/1
5 CAPSULE ORAL DAILY
Qty: 42 CAPSULE | Refills: 0 | Status: ON HOLD | OUTPATIENT
Start: 2022-12-04 | End: 2022-12-23 | Stop reason: SDUPTHER

## 2022-12-02 RX ADMIN — LISINOPRIL 20 MG: 20 TABLET ORAL at 08:09

## 2022-12-02 RX ADMIN — LATANOPROST 1 DROP: 50 SOLUTION OPHTHALMIC at 21:11

## 2022-12-02 RX ADMIN — LEVETIRACETAM 500 MG: 500 TABLET, FILM COATED ORAL at 21:09

## 2022-12-02 RX ADMIN — LEVETIRACETAM 500 MG: 500 TABLET, FILM COATED ORAL at 08:10

## 2022-12-02 RX ADMIN — INSULIN LISPRO 4 UNITS: 100 INJECTION, SOLUTION INTRAVENOUS; SUBCUTANEOUS at 05:59

## 2022-12-02 RX ADMIN — POLYETHYLENE GLYCOL 3350 17 G: 17 POWDER, FOR SOLUTION ORAL at 08:09

## 2022-12-02 RX ADMIN — INSULIN LISPRO 8 UNITS: 100 INJECTION, SOLUTION INTRAVENOUS; SUBCUTANEOUS at 12:25

## 2022-12-02 RX ADMIN — Medication 10 ML: at 08:11

## 2022-12-02 RX ADMIN — ATORVASTATIN CALCIUM 40 MG: 20 TABLET, FILM COATED ORAL at 21:09

## 2022-12-02 RX ADMIN — DEXAMETHASONE 4 MG: 4 TABLET ORAL at 21:09

## 2022-12-02 RX ADMIN — DEXAMETHASONE 4 MG: 4 TABLET ORAL at 00:06

## 2022-12-02 RX ADMIN — INSULIN LISPRO 4 UNITS: 100 INJECTION, SOLUTION INTRAVENOUS; SUBCUTANEOUS at 17:26

## 2022-12-02 RX ADMIN — ESCITALOPRAM 10 MG: 10 TABLET, FILM COATED ORAL at 08:09

## 2022-12-02 RX ADMIN — Medication 10 ML: at 21:10

## 2022-12-02 RX ADMIN — ACETAMINOPHEN 650 MG: 325 TABLET, FILM COATED ORAL at 21:09

## 2022-12-02 RX ADMIN — ACETAMINOPHEN 650 MG: 325 TABLET, FILM COATED ORAL at 15:46

## 2022-12-02 RX ADMIN — INSULIN LISPRO 8 UNITS: 100 INJECTION, SOLUTION INTRAVENOUS; SUBCUTANEOUS at 00:06

## 2022-12-02 RX ADMIN — DEXAMETHASONE 4 MG: 4 TABLET ORAL at 14:51

## 2022-12-02 RX ADMIN — DEXAMETHASONE 4 MG: 4 TABLET ORAL at 05:59

## 2022-12-02 RX ADMIN — VALACYCLOVIR HYDROCHLORIDE 2000 MG: 500 TABLET, FILM COATED ORAL at 08:09

## 2022-12-02 RX ADMIN — LEVOTHYROXINE SODIUM 150 MCG: 0.15 TABLET ORAL at 08:11

## 2022-12-02 RX ADMIN — INSULIN GLARGINE-YFGN 15 UNITS: 100 INJECTION, SOLUTION SUBCUTANEOUS at 06:00

## 2022-12-02 RX ADMIN — PANTOPRAZOLE SODIUM 40 MG: 40 TABLET, DELAYED RELEASE ORAL at 05:59

## 2022-12-02 NOTE — PROGRESS NOTES
[facility]    Eval initiated and chart reviewed. Final pathology and plan of treatment pending. Will meet with patient tomorrow to discuss rehab and confirm discharge plans. Patient would require insurance precert.    Sindy Feng RN  Rehab Admissions Coordinator  [phone]

## 2022-12-02 NOTE — PROGRESS NOTES
From: Mathew Collins Jr., MD   Sent: 11/30/2022   3:04 PM EST   To: Mee Leonardo RN, *   Subject: Temodar                                           We have a preliminary diagnosis of high-grade glioma on the patient's pathology from her brain biopsy.  Likely we will not have a final report until next week.  Can we begin the process to obtain oral low-dose Temodar 75 mg/m² daily with radiation?  We are looking at possible [facility] acute rehab following her hospital stay. Thanks!

## 2022-12-02 NOTE — PROGRESS NOTES
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  She was quite anxious about this but feels relieved afterwards.  She did develop some fatigue afterwards.  She continues with left-sided incoordination and this has been frustrating for her.    ROS:  Review of Systems   A comprehensive review of systems was obtained with pertinent positive findings as noted in the interval history above.  All other systems negative.    OBJECTIVE:  Vitals:    12/01/22 1949 12/01/22 2318 12/02/22 0536 12/02/22 0809   BP: 112/69 153/80 139/80 149/79   BP Location: Left arm Left arm Left arm Right arm   Patient Position: Lying Lying Lying Sitting   Pulse: 64 61 75 68   Resp: 18 18 18 16   Temp: 97.9 °F (36.6 °C) 98.4 °F (36.9 °C) 98 °F (36.7 °C) 98.3 °F (36.8 °C)   TempSrc: Oral Oral Oral Oral   SpO2: 96% 95% 98% 98%   Weight:       Height:             PHYSICAL EXAMINATION:  General: Alert and oriented, no distress  Head: Right frontal incision appears normal  Chest/Lungs: Clear to auscultation bilaterally  Heart: Regular rate and rhythm no murmurs Rubs  Abdomen/GI: Soft nontender nondistended bowel sounds present  Extremities: No edema  Neuro: Mild weakness left upper and lower extremities    Patient was examined today, unchanged from above    DIAGNOSTIC DATA:  Results Review:     I reviewed the patient's new  clinical results.    Results from last 7 days   Lab Units 12/01/22  1133 11/30/22  0705 11/29/22  0401   WBC 10*3/mm3 14.32* 13.97* 15.25*   HEMOGLOBIN g/dL 11.9* 11.9* 12.0   HEMATOCRIT % 35.1 36.2 36.6   PLATELETS 10*3/mm3 263 248 262      Results from last 7 days   Lab Units 12/01/22  1133 11/30/22  0705 11/29/22  0401 11/28/22  1208   SODIUM mmol/L 131* 136 137 136   POTASSIUM mmol/L 4.3 4.3 4.4 4.5   CHLORIDE mmol/L 97* 102 100 99   CO2 mmol/L 23.0 26.0 23.0 25.0   BUN mg/dL 21* 18 21* 19   CREATININE mg/dL 0.77 0.78 0.78 0.78   CALCIUM mg/dL 8.4* 8.2* 8.3* 8.6   BILIRUBIN mg/dL  --   --   --  0.5   ALK PHOS U/L  --   --   --  96   ALT (SGPT) U/L  --   --   --  143*   AST (SGOT) U/L  --   --   --  71*   GLUCOSE mg/dL 288* 153* 136* 202*      Lab Results   Component Value Date    NEUTROABS 11.27 (H) 11/30/2022     Results from last 7 days   Lab Units 11/29/22  0401   INR  1.01   APTT seconds 25.4               Assessment & Plan   ASSESSMENT/PLAN:  This is a 57 y.o. female with:     *Primary CNS malignancy/high-grade glioma  • Patient presented with a couple of weeks of falls and confusion  • MRI 11/22/2022 with a 3.2 cm rim enhancing right supratentorial mass with mass effect and leftward midline shift in the right thalamus and internal capsule.   • She was started on dexamethasone and Keppra  • Dr. Thompson with neurosurgery has evaluated.  This mass is not resectable due to its location.  A biopsy carries some risk but is possible.  • Dr. Sam with radiation oncology has also evaluated the situation.   • CT chest abdomen and pelvis on 11/24/2022 without evidence of progressive metastatic disease otherwise  • Patient underwent stereotactic brain biopsy on 11/28/2022.  Preliminary pathology with high-grade glioma, sent to [facility] for outside review and molecular analysis.  • Patient currently continuing on dexamethasone 4 mg p.o. every 8 hours  • Patient continuing on seizure prophylaxis with  Keppra 500 mg twice daily  • We are awaiting final pathology from her biopsy, preliminary appears to represent high-grade glioma.  With that information we are beginning steps to prepare her for anticipated concurrent chemoradiation with low-dose Temodar (75 mg/m² daily).  Patient did go for radiation simulation today and we anticipate obtaining Temodar by early next week.  Anticipate potentially starting treatment midweek next week once pathology final report is available.  In the interim we are awaiting approval for transfer to [facility].  I did review all of this with the patient and her  today.     *Germline BRCA2 and GEORGES mutations     *History of stage I papillary thyroid cancer     *History of bilateral DCIS   • S/p bilateral mastectomies 1/26/2017     *Prior history of KAVYA/BSO in 1992     *History of stage I clear cell renal cell carcinoma  • Left partial nephrectomy 5/15/2020. 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  • LLL lobectomy 2/28/2018 with 2/8 peribronchial nodes involved along with evidence of adenocarcinoma     *History of stage Ia1 LLL NSCLCa, adenoca with lipidic features  • Never smoker  • Discovered on LLL lobectomy  • Positive EGFR mutation exon 19 deletion     *HIstory of stage IA2 RLL NSCLCa, adenoca with lipidic growth pattern  • Right VATS with anterior basal segmentectomy 10/9/2020, 1.4 cm tumor, 5 negative nodes  • Every 6 month CT imaging     *Hisory of melanoma of the right heel, stage Ia  • Excision 6/4/21, 0.6 mm superficial spreading melanoma, margin positve, for in situ, side local excision 6/30/2021 with no residual melanoma  • Difficulty with wound healing     *History of anemia  • Not anemic at this time     *Diabetes     *HTN    *GI prophylaxis  · Protonix 40 mg daily    *Constipation  · Continue MiraLAX daily    *Leukocytosis  · Secondary to  steroids    *Disposition  · Awaiting approval for potential transfer to acute rehab at [facility].    PLAN:   1. Await final pathology from brain biopsy being sent to [facility] for review and molecular analysis  2. Patient continuing on dexamethasone 4 mg p.o. every 8 hours  3. Patient continuing on Keppra 500 mg p.o. every 12 hours.    4. Process initiated to obtain oral Temodar (75 mg/m²/day) anticipating concurrent chemoradiation once final pathology confirmed.  5. Patient did undergo radiation simulation on 12/2/2022  6. Await approval for acute rehab at [facility]    Discussed with patient and her  today.               Mathew Collins MD

## 2022-12-02 NOTE — PROGRESS NOTES
Received request for PA on Temodar.   The dosing came to 165 mg daily for 42 days.  I got PA approved until 11/30/23. PA- [id number].  When I ran a test claim, the information I received stated that insurance would only fill 3 prescriptions at the retail level.  Then, the pt would have to get all remaining fills at [facility] pharmacy.

## 2022-12-02 NOTE — THERAPY TREATMENT NOTE
Patient Name: Christie Avendaño  : 1965    MRN: [id number]                              Today's Date: 2022       Admit Date: 2022    Visit Dx:     ICD-10-CM ICD-9-CM   1. Brain mass  G93.89 348.89   2. BRCA2 gene mutation positive in female  Z15.01 V84.01    Z15.02 V84.02    Z15.09 V84.09   3. Type 2 diabetes mellitus without complication, without long-term current use of insulin (HCC)  E11.9 250.00   4. 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)   • Non-small cell lung cancer, right (HCC)   • Renal mass, right   • SIRS (systemic inflammatory response syndrome) (HCC)   • Hyperlipidemia   • Malignant melanoma of right lower extremity including hip (HCC)   • Brain mass, rim-enhancing   • 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)   • Metastasis to brain (HCC)     Past Medical History:   Diagnosis Date   • Arthritis    • Asthma    • BRCA2 positive    • Diabetes mellitus (HCC)    • H/O Liver masses 2017    x3   • H/O Lung masses 2017    1 in right lower lobe and 1 in the left infrahilar location   • H/O Ovarian cyst    • H/O Right adrenal mass (CMS/HCC) 2017   • Lung cancer (HCC)    • Melanoma (HCC)     right foot   • PONV (postoperative nausea and vomiting)    • Thyroid disease      Past Surgical History:   Procedure Laterality Date   • APPENDECTOMY     • BRAIN BIOPSY Right 2022    Procedure: Right frontal stereotactic needle brain biopsy;  Surgeon: Manuel Thompson MD;  Location: [facility];  Service: Neurosurgery;   Laterality: Right;   • BREAST IMPLANT SURGERY Bilateral    • CHOLECYSTECTOMY     • HYSTERECTOMY     • MASTECTOMY Bilateral    • OVARIAN CYST SURGERY     • THORACOSCOPY VIDEO ASSISTED WITH LOBECTOMY Right 10/9/2020    Procedure: BRONCHOSCOPY, THORACOSCOPY VIDEO ASSISTED WITH ANTERIOR BASAL SEGMENTECTOMY, INTERCOSTAL NERVE BLOCK;  Surgeon: Flaca Crisostomo MD;  Location: [facility];  Service: Thoracic;  Laterality: Right;   • TONSILLECTOMY        General Information     Row Name 12/02/22 1223          OT Time and Intention    Document Type therapy note (daily note)  -BS     Mode of Treatment individual therapy;occupational therapy  -BS     Row Name 12/02/22 1223          General Information    Patient Profile Reviewed yes  -BS     Existing Precautions/Restrictions fall  -BS     Barriers to Rehab medically complex  -BS     Row Name 12/02/22 1223          Cognition    Orientation Status (Cognition) oriented x 4  -BS     Row Name 12/02/22 1223          Safety Issues, Functional Mobility    Impairments Affecting Function (Mobility) balance;postural/trunk control;coordination  -BS     Comment, Safety Issues/Impairments (Mobility) L lateral lean  -BS           User Key  (r) = Recorded By, (t) = Taken By, (c) = Cosigned By    Initials Name Provider Type    BS Katherine Pettit OT Occupational Therapist                 Mobility/ADL's     Row Name 12/02/22 1226          Bed Mobility    Bed Mobility scooting/bridging;supine-sit;sit-supine  -BS     Scooting/Bridging [place] (Bed Mobility) minimum assist (75% patient effort);1 person assist  -BS     Supine-Sit [place] (Bed Mobility) minimum assist (75% patient effort);1 person assist  -BS     Sit-Supine [place] (Bed Mobility) minimum assist (75% patient effort);1 person assist  -BS     Bed Mobility, Safety Issues impaired trunk control for bed mobility  -BS     Assistive Device (Bed Mobility) bed rails;head of bed elevated  -BS     Row Name 12/02/22 1228           Transfers    Transfers sit-stand transfer;stand-sit transfer;toilet transfer  -BS     Row Name 12/02/22 1226          Sit-Stand Transfer    Sit-Stand [place] (Transfers) minimum assist (75% patient effort);1 person assist  -BS     Assistive Device (Sit-Stand Transfers) other (see comments)  -BS     Comment, (Sit-Stand Transfer) LUE support  -BS     Row Name 12/02/22 1226          Stand-Sit Transfer    Stand-Sit [place] (Transfers) minimum assist (75% patient effort)  -BS     Assistive Device (Stand-Sit Transfers) other (see comments)  -BS     Comment, (Stand-Sit Transfer) LUE support  -BS     Row Name 12/02/22 1226          Toilet Transfer    Type (Toilet Transfer) sit-stand;stand-sit  -BS     [place] Level (Toilet Transfer) contact guard;1 person assist  -BS     Assistive Device (Toilet Transfer) commode, bedside without drop arms  -BS     Comment, (Toilet Transfer) Bedside commode in bathrrom  -BS     Row Name 12/02/22 1226          Functional Mobility    Functional Mobility- Ind. Level minimum assist (75% patient effort)  Multiple cues for L lateral lean  -BS     Functional Mobility- Safety Issues balance decreased during turns;weight-shifting ability decreased  -BS     Functional Mobility- Comment Multiple cues for L lateral lean throughout session  -BS     Row Name 12/02/22 1226          Activities of Daily Living    BADL Assessment/Intervention grooming;toileting  -BS     Row Name 12/02/22 1226          Toileting Assessment/Training    [place] Level (Toileting) adjust/manage clothing;independent;perform perineal hygiene;moderate assist (50% patient effort)  -BS     Assistive Devices (Toileting) commode, bedside with drop arms  -BS     Position (Toileting) supported sitting  -BS     Comment, (Toileting) Mod A for posterior ed area  -BS     Row Name 12/02/22 1226          Grooming Assessment/Training    [place] Level (Grooming) wash face, hands;oral care regimen;set up  -BS      Position (Grooming) sink side;supported standing  -BS     Comment, (Grooming) Min A for balance with skinside grooming tasks,  -BS           User Key  (r) = Recorded By, (t) = Taken By, (c) = Cosigned By    Initials Name Provider Type    Katherine Singh OT Occupational Therapist               Obj/Interventions     Row Name 12/02/22 1231          Balance    Balance Assessment sitting static balance;standing static balance;standing dynamic balance;sitting dynamic balance  -BS     Static Sitting Balance contact guard;1-person assist  -BS     Dynamic Sitting Balance minimal assist;1-person assist  -BS     Position, Sitting Balance unsupported;sitting edge of bed  -BS     Static Standing Balance minimal assist;1-person assist  -BS     Dynamic Standing Balance minimal assist;1-person assist  -BS     Position/Device Used, Standing Balance supported;other (see comments)  LUE support  -BS     Balance Interventions sitting;standing;sit to stand;supported;dynamic;static;occupation based/functional task  -BS     Comment, Balance Pt aware of L lateral lean. Multiple cues to correct. Pt able to correct partially with sinkside groooming tasks in front of mirror.  -BS           User Key  (r) = Recorded By, (t) = Taken By, (c) = Cosigned By    Initials Name Provider Type    Katherine Singh OT Occupational Therapist               Goals/Plan    No documentation.                Clinical Impression     Row Name 12/02/22 1233          Pain Assessment    Pretreatment Pain Rating 3/10  -BS     Posttreatment Pain Rating 3/10  -BS     Pain Location generalized  -BS     Pain Location - head  -BS     Pre/Posttreatment Pain Comment Pt reports slight headache  -BS     Row Name 12/02/22 1233          Plan of Care Review    Plan of Care Reviewed With patient  -BS     Progress improving  -BS     Outcome Evaluation Pt participated in sinkside grooming tasks with Min A, toileting tasks with Mod A, and functional mobility with Min A this date. Pt  continues to present with L Lateral lean. Pt corrects with verbal and tactile cueing. Continue with skilled IPOT services per POC. Rec IRF at d/c.  -BS     Row Name 12/02/22 1233          Therapy Assessment/Plan (OT)    Rehab Potential (OT) good, to achieve stated therapy goals  -BS     Criteria for Skilled Therapeutic Interventions Met (OT) yes;meets criteria;skilled treatment is necessary  -BS     Therapy Frequency (OT) 5 times/wk  -BS     Row Name 12/02/22 1233          Therapy Plan Review/Discharge Plan (OT)    Anticipated Discharge Disposition (OT) inpatient rehabilitation facility  -BS     Row Name 12/02/22 1233          Vital Signs    Pre Systolic BP Rehab 149  -BS     Pre Treatment Diastolic BP 79  -BS     Pretreatment Heart Rate (beats/min) 69  -BS     Posttreatment Heart Rate (beats/min) 65  -BS     Pre SpO2 (%) 92  -BS     O2 Delivery Pre Treatment room air  -BS     O2 Delivery Intra Treatment room air  -BS     Post SpO2 (%) 94  -BS     O2 Delivery Post Treatment room air  -BS     Pre Patient Position Supine  -BS     Intra Patient Position Standing  -BS     Post Patient Position Supine  -BS     Row Name 12/02/22 1233          Positioning and Restraints    Pre-Treatment Position in bed  -BS     Post Treatment Position bed  -BS     In Bed notified nsg;supine;exit alarm on;call light within reach;encouraged to call for assist  -BS           User Key  (r) = Recorded By, (t) = Taken By, (c) = Cosigned By    Initials Name Provider Type    Katherine Singh, OT Occupational Therapist               Outcome Measures     Row Name 12/02/22 1238          How much help from another is currently needed...    Putting on and taking off regular lower body clothing? 2  -BS     Bathing (including washing, rinsing, and drying) 2  -BS     Toileting (which includes using toilet bed pan or urinal) 2  -BS     Putting on and taking off regular upper body clothing 3  -BS     Taking care of personal grooming (such as brushing teeth)  3  -BS     Eating meals 3  -BS     AM-PAC 6 Clicks Score (OT) 15  -BS     Row Name 12/02/22 1238          Functional Assessment    Outcome Measure Options AM-PAC 6 Clicks Daily Activity (OT)  -BS           User Key  (r) = Recorded By, (t) = Taken By, (c) = Cosigned By    Initials Name Provider Type    Katherine Singh OT Occupational Therapist                Occupational Therapy Education     Title: PT OT SLP Therapies (In Progress)     Topic: Occupational Therapy (Not Started)     Point: ADL training (Not Started)     Description:   Instruct learner(s) on proper safety adaptation and remediation techniques during self care or transfers.   Instruct in proper use of assistive devices.              Learner Progress:  Not documented in this visit.          Point: Home exercise program (Not Started)     Description:   Instruct learner(s) on appropriate technique for monitoring, assisting and/or progressing therapeutic exercises/activities.              Learner Progress:  Not documented in this visit.          Point: Precautions (Not Started)     Description:   Instruct learner(s) on prescribed precautions during self-care and functional transfers.              Learner Progress:  Not documented in this visit.          Point: Body mechanics (Not Started)     Description:   Instruct learner(s) on proper positioning and spine alignment during self-care, functional mobility activities and/or exercises.              Learner Progress:  Not documented in this visit.                          OT Recommendation and Plan  Therapy Frequency (OT): 5 times/wk  Plan of Care Review  Plan of Care Reviewed With: patient  Progress: improving  Outcome Evaluation: Pt participated in sinkside grooming tasks with Min A, toileting tasks with Mod A, and functional mobility with Min A this date. Pt continues to present with L Lateral lean. Pt corrects with verbal and tactile cueing. Continue with skilled IPOT services per POC. Rec IRF at  d/c.     Time Calculation:    Time Calculation- OT     Row Name 12/02/22 1239             Time Calculation- OT    OT Start Time 0950  -BS      OT Stop Time 1016  -BS      OT Time Calculation (min) 26 min  -BS      OT Received On 12/02/22  -BS      OT - Next Appointment 12/05/22  -BS         Timed Charges    43629 - OT Therapeutic Activity Minutes 7  -BS      70555 - OT Self Care/Mgmt Minutes 19  -BS         Total Minutes    Timed Charges Total Minutes 26  -BS       Total Minutes 26  -BS            User Key  (r) = Recorded By, (t) = Taken By, (c) = Cosigned By    Initials Name Provider Type    BS Katherine Pettit OT Occupational Therapist              Therapy Charges for Today     Code Description Service Date Service Provider Modifiers Qty    29291819747 HC OT THERAPEUTIC ACT EA 15 MIN 12/2/2022 Katherine Pettit OT GO 1    86374267623 HC OT SELF CARE/MGMT/TRAIN EA 15 MIN 12/2/2022 Katherine ePttit OT GO 1               Katherine Pettit OT  12/2/2022

## 2022-12-02 NOTE — PLAN OF CARE
Goal Outcome Evaluation:  Plan of Care Reviewed With: patient        Progress: improving  Outcome Evaluation: Pt participated in sinkside grooming tasks with Min A, toileting tasks with Mod A, and functional mobility with Min A this date. Pt continues to present with L Lateral lean. Pt corrects with verbal and tactile cueing. Continue with skilled IPOT services per POC. Rec IRF at d/c.

## 2022-12-02 NOTE — PLAN OF CARE
No neuro changes throughout shift. Patient continues to have left sided weakness. No further concerns.     Problem: Adult Inpatient Plan of Care  Goal: Absence of Hospital-Acquired Illness or Injury  Intervention: Identify and Manage Fall Risk  Recent Flowsheet Documentation  Taken 12/2/2022 0440 by Jodi Bowden RN  Safety Promotion/Fall Prevention: safety round/check completed  Taken 12/2/2022 0205 by Jodi Bowden RN  Safety Promotion/Fall Prevention: safety round/check completed  Taken 12/2/2022 0030 by Jodi Bowden RN  Safety Promotion/Fall Prevention: safety round/check completed  Taken 12/1/2022 2245 by Jodi Bowden RN  Safety Promotion/Fall Prevention: safety round/check completed  Taken 12/1/2022 2030 by Jodi Bowden RN  Safety Promotion/Fall Prevention:   safety round/check completed   fall prevention program maintained   assistive device/personal items within reach     Problem: Adult Inpatient Plan of Care  Goal: Absence of Hospital-Acquired Illness or Injury  Intervention: Prevent and Manage VTE (Venous Thromboembolism) Risk  Recent Flowsheet Documentation  Taken 12/1/2022 2232 by Jodi Bowden RN  Activity Management: up to bedside commode  Taken 12/1/2022 2030 by Jodi Bowden RN  Activity Management:   activity adjusted per tolerance   activity encouraged  VTE Prevention/Management:   bilateral   sequential compression devices on     Problem: Adult Inpatient Plan of Care  Goal: Optimal Comfort and Wellbeing  Outcome: Ongoing, Progressing  Intervention: Monitor Pain and Promote Comfort  Recent Flowsheet Documentation  Taken 12/1/2022 2030 by Jodi Bowden RN  Pain Management Interventions:   see MAR   quiet environment facilitated   position adjusted   care clustered  Intervention: Provide Person-Centered Care  Recent Flowsheet Documentation  Taken 12/1/2022 2030 by Jodi Bowden RN  Trust Relationship/Rapport:   care explained   choices provided   questions  answered   questions encouraged     Problem: Diabetes Comorbidity  Goal: Blood Glucose Level Within Targeted Range  Outcome: Ongoing, Progressing  Intervention: Monitor and Manage Glycemia  Recent Flowsheet Documentation  Taken 12/1/2022 2030 by Jdoi Bowden RN  Glycemic Management: blood glucose monitored     Problem: Fall Injury Risk  Goal: Absence of Fall and Fall-Related Injury  Outcome: Ongoing, Progressing  Intervention: Identify and Manage Contributors  Recent Flowsheet Documentation  Taken 12/1/2022 2030 by Jodi Bowden RN  Medication Review/Management: medications reviewed  Self-Care Promotion: independence encouraged  Intervention: Promote Injury-Free Environment  Recent Flowsheet Documentation  Taken 12/2/2022 0440 by Jodi Bowden RN  Safety Promotion/Fall Prevention: safety round/check completed  Taken 12/2/2022 0205 by Jodi Bowden RN  Safety Promotion/Fall Prevention: safety round/check completed  Taken 12/2/2022 0030 by Jodi Bowden RN  Safety Promotion/Fall Prevention: safety round/check completed  Taken 12/1/2022 2245 by Jodi Bowden RN  Safety Promotion/Fall Prevention: safety round/check completed  Taken 12/1/2022 2030 by Jodi Bowden RN  Safety Promotion/Fall Prevention:   safety round/check completed   fall prevention program maintained   assistive device/personal items within reach   Goal Outcome Evaluation:  Plan of Care Reviewed With: patient

## 2022-12-03 LAB
GLUCOSE BLDC GLUCOMTR-MCNC: 180 MG/DL (ref 70–130)
GLUCOSE BLDC GLUCOMTR-MCNC: 227 MG/DL (ref 70–130)
GLUCOSE BLDC GLUCOMTR-MCNC: 255 MG/DL (ref 70–130)
GLUCOSE BLDC GLUCOMTR-MCNC: 329 MG/DL (ref 70–130)

## 2022-12-03 PROCEDURE — 82962 GLUCOSE BLOOD TEST: CPT

## 2022-12-03 PROCEDURE — 99232 SBSQ HOSP IP/OBS MODERATE 35: CPT | Performed by: INTERNAL MEDICINE

## 2022-12-03 PROCEDURE — 63710000001 DEXAMETHASONE PER 0.25 MG: Performed by: NEUROLOGICAL SURGERY

## 2022-12-03 PROCEDURE — 97110 THERAPEUTIC EXERCISES: CPT

## 2022-12-03 PROCEDURE — 63710000001 INSULIN LISPRO (HUMAN) PER 5 UNITS: Performed by: HOSPITALIST

## 2022-12-03 PROCEDURE — 63710000001 INSULIN LISPRO (HUMAN) PER 5 UNITS: Performed by: INTERNAL MEDICINE

## 2022-12-03 RX ORDER — INSULIN LISPRO 100 [IU]/ML
5 INJECTION, SOLUTION INTRAVENOUS; SUBCUTANEOUS
Status: DISCONTINUED | OUTPATIENT
Start: 2022-12-03 | End: 2022-12-04

## 2022-12-03 RX ADMIN — PANTOPRAZOLE SODIUM 40 MG: 40 TABLET, DELAYED RELEASE ORAL at 06:12

## 2022-12-03 RX ADMIN — LEVETIRACETAM 500 MG: 500 TABLET, FILM COATED ORAL at 09:35

## 2022-12-03 RX ADMIN — DEXAMETHASONE 4 MG: 4 TABLET ORAL at 22:11

## 2022-12-03 RX ADMIN — DEXAMETHASONE 4 MG: 4 TABLET ORAL at 14:58

## 2022-12-03 RX ADMIN — LATANOPROST 1 DROP: 50 SOLUTION OPHTHALMIC at 22:16

## 2022-12-03 RX ADMIN — LISINOPRIL 20 MG: 20 TABLET ORAL at 09:35

## 2022-12-03 RX ADMIN — INSULIN LISPRO 4 UNITS: 100 INJECTION, SOLUTION INTRAVENOUS; SUBCUTANEOUS at 06:12

## 2022-12-03 RX ADMIN — DEXAMETHASONE 4 MG: 4 TABLET ORAL at 06:12

## 2022-12-03 RX ADMIN — INSULIN LISPRO 12 UNITS: 100 INJECTION, SOLUTION INTRAVENOUS; SUBCUTANEOUS at 00:25

## 2022-12-03 RX ADMIN — POLYETHYLENE GLYCOL 3350 17 G: 17 POWDER, FOR SOLUTION ORAL at 09:35

## 2022-12-03 RX ADMIN — VALACYCLOVIR HYDROCHLORIDE 2000 MG: 500 TABLET, FILM COATED ORAL at 22:11

## 2022-12-03 RX ADMIN — INSULIN GLARGINE-YFGN 15 UNITS: 100 INJECTION, SOLUTION SUBCUTANEOUS at 06:12

## 2022-12-03 RX ADMIN — Medication 10 ML: at 09:35

## 2022-12-03 RX ADMIN — ATORVASTATIN CALCIUM 40 MG: 20 TABLET, FILM COATED ORAL at 22:11

## 2022-12-03 RX ADMIN — LEVETIRACETAM 500 MG: 500 TABLET, FILM COATED ORAL at 22:11

## 2022-12-03 RX ADMIN — ACETAMINOPHEN 650 MG: 325 TABLET, FILM COATED ORAL at 09:35

## 2022-12-03 RX ADMIN — ACETAMINOPHEN 650 MG: 325 TABLET, FILM COATED ORAL at 22:11

## 2022-12-03 RX ADMIN — VALACYCLOVIR HYDROCHLORIDE 2000 MG: 500 TABLET, FILM COATED ORAL at 09:35

## 2022-12-03 RX ADMIN — Medication 10 ML: at 22:12

## 2022-12-03 RX ADMIN — INSULIN LISPRO 5 UNITS: 100 INJECTION, SOLUTION INTRAVENOUS; SUBCUTANEOUS at 17:37

## 2022-12-03 RX ADMIN — INSULIN LISPRO 16 UNITS: 100 INJECTION, SOLUTION INTRAVENOUS; SUBCUTANEOUS at 12:42

## 2022-12-03 RX ADMIN — ESCITALOPRAM 10 MG: 10 TABLET, FILM COATED ORAL at 09:35

## 2022-12-03 RX ADMIN — LEVOTHYROXINE SODIUM 150 MCG: 0.15 TABLET ORAL at 09:35

## 2022-12-03 RX ADMIN — INSULIN LISPRO 8 UNITS: 100 INJECTION, SOLUTION INTRAVENOUS; SUBCUTANEOUS at 17:36

## 2022-12-03 NOTE — PROGRESS NOTES
Name: Christie Avendaño ADMIT: 2022   : 1965  PCP: Tiffany Holloway MD    MRN: [id number] LOS: 10 days   AGE/SEX: 57 y.o. female  ROOM: [facility]     Subjective   Subjective   Doing relatively well although a little tired today.  No nausea or vomiting.       Objective   Objective   Vital Signs  Temp:  [97.7 °F (36.5 °C)-98.4 °F (36.9 °C)] 97.7 °F (36.5 °C)  Heart Rate:  [58-75] 58  Resp:  [16-18] 16  BP: (112-153)/(69-82) 137/81  SpO2:  [92 %-98 %] 92 %  on   ;   Device (Oxygen Therapy): room air  Body mass index is 41.93 kg/m².  Physical Exam  Constitutional:       General: She is not in acute distress.     Appearance: She is not toxic-appearing.   HENT:      Head:      Comments: Biopsy site right forehead, cdi + staples  Cardiovascular:      Rate and Rhythm: Normal rate and regular rhythm.      Heart sounds: Normal heart sounds.   Pulmonary:      Effort: Pulmonary effort is normal.      Breath sounds: Normal breath sounds.   Abdominal:      General: Bowel sounds are normal.      Palpations: Abdomen is soft.   Musculoskeletal:         General: No tenderness.      Right lower leg: No edema.      Left lower leg: No edema.   Neurological:      Mental Status: She is alert.   Psychiatric:         Mood and Affect: Mood normal.         Behavior: Behavior normal.       Results Review     I reviewed the patient's new clinical results.  Results from last 7 days   Lab Units 22  0913 22  1133 11/30/22  0705 22  0401   WBC 10*3/mm3 13.91* 14.32* 13.97* 15.25*   HEMOGLOBIN g/dL 12.3 11.9* 11.9* 12.0   PLATELETS 10*3/mm3 253 263 248 262     Results from last 7 days   Lab Units 22  0913 22  1133 22  0705 22  0401   SODIUM mmol/L 136 131* 136 137   POTASSIUM mmol/L 4.3 4.3 4.3 4.4   CHLORIDE mmol/L 98 97* 102 100   CO2 mmol/L 25.5 23.0 26.0 23.0   BUN mg/dL 17 21* 18 21*   CREATININE mg/dL 0.83 0.77 0.78 0.78   GLUCOSE mg/dL 300* 288* 153* 136*   Estimated Creatinine Clearance:  93.1 mL/min (by C-G formula based on SCr of 0.83 mg/dL).  Results from last 7 days   Lab Units 11/28/22  1208   ALBUMIN g/dL 3.80   BILIRUBIN mg/dL 0.5   ALK PHOS U/L 96   AST (SGOT) U/L 71*   ALT (SGPT) U/L 143*     Results from last 7 days   Lab Units 12/02/22  0913 12/01/22  1133 11/30/22  0705 11/29/22  0401 11/28/22  1208   CALCIUM mg/dL 8.6 8.4* 8.2* 8.3* 8.6   ALBUMIN g/dL  --   --   --   --  3.80         Glucose   Date/Time Value Ref Range Status   12/02/2022 1703 185 (H) 70 - 130 mg/dL Final     Comment:     Meter: [id number] : [id number] Teddy ACUÑA   12/02/2022 1156 240 (H) 70 - 130 mg/dL Final     Comment:     Meter: [id number] : [id number] Hatchett Sherrish NA   12/02/2022 0751 154 (H) 70 - 130 mg/dL Final     Comment:     Meter: [id number] : [id number] Hatchett Sherrish NA   12/02/2022 0553 174 (H) 70 - 130 mg/dL Final     Comment:     Meter: [id number] : [id number] Kal Zapata RN   12/02/2022 0002 212 (H) 70 - 130 mg/dL Final     Comment:     Meter: [id number] : [id number] Kal Zapata RN   12/01/2022 2013 233 (H) 70 - 130 mg/dL Final     Comment:     Meter: [id number] : [id number] Anuj Bullock CNA   12/01/2022 1705 142 (H) 70 - 130 mg/dL Final     Comment:     Meter: [id number] : [id number] Moshe CORTEZA       Scheduled Medications  atorvastatin, 40 mg, Oral, Nightly  dexamethasone, 4 mg, Oral, Q8H  escitalopram, 10 mg, Oral, Daily  insulin glargine, 15 Units, Subcutaneous, QAM  insulin lispro, 0-24 Units, Subcutaneous, Q6H  latanoprost, 1 drop, Both Eyes, Nightly  levETIRAcetam, 500 mg, Oral, BID  levothyroxine, 150 mcg, Oral, Daily  lisinopril, 20 mg, Oral, Daily  pantoprazole, 40 mg, Oral, Q AM  polyethylene glycol, 17 g, Oral, Daily  sodium chloride, 10 mL, Intravenous, Q12H  valACYclovir, 2,000 mg, Oral, BID    Infusions  lactated ringers, 9 mL/hr, Last Rate: 9 mL/hr (11/28/22 1102)    Diet  Diet: Regular/House Diet, Diabetic Diets, Vegetarian;  Lacto-Ovo Vegetarian (Allows dairy, eggs); Consistent Carbohydrate; Texture: Regular Texture (IDDSI 7); Fluid Consistency: Thin (IDDSI 0)       Assessment/Plan     Active Hospital Problems    Diagnosis  POA   • **High grade glioma  [C71.9]  Yes   • 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       57 y.o. female admitted with Brain mass.  She is s/p Right frontal stereotactic needle brain biopsy on 11/28.  Pathology consistent with high-grade glioma.  Awaiting additional report from [facility].    Patient expected to need chemotherapy and radiation.  Tumor not amenable to resection.  Awaiting final pathology results though pathology here consistent with high-grade glioma.  We are awaiting acceptance to [facility].  Blood sugars intermittently elevated due to steroids.  Continue current insulin regimen.      · SCDs for DVT prophylaxis.  · Full code.  · Discussed with patient and family.  · Anticipate discharge to acute rehab when cleared by consultants.        Shelton Padgett MD  [facility]  12/02/22  19:34 EST

## 2022-12-03 NOTE — PROGRESS NOTES
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  She continues with left-sided mild weakness and difficulty with coordination/balance.  She does feel that symptoms have improved over the past few days.    ROS:  Review of Systems   A comprehensive review of systems was obtained with pertinent positive findings as noted in the interval history above.  All other systems negative.    OBJECTIVE:  Vitals:    12/02/22 1704 12/02/22 1949 12/03/22 0000 12/03/22 0349   BP: 137/81 146/90  136/80   BP Location: Left arm Left arm  Left arm   Patient Position: Lying Lying  Lying   Pulse: 58 61 61    Resp: 16 18 18 18   Temp: 97.7 °F (36.5 °C) 98.1 °F (36.7 °C)  98.3 °F (36.8 °C)   TempSrc: Oral Oral  Oral   SpO2: 92%  91%    Weight:       Height:             PHYSICAL EXAMINATION:  General: Alert and oriented, no distress  Head: Right frontal incision appears normal  Chest/Lungs: Clear to auscultation bilaterally  Heart: Regular rate and rhythm no murmurs Rubs  Abdomen/GI: Soft nontender nondistended bowel sounds present  Extremities: No edema  Neuro: Mild weakness left upper and lower extremities    Patient was examined today, unchanged from above    DIAGNOSTIC DATA:  Results Review:     I reviewed the patient's new clinical results.    Results from last 7 days   Lab Units 12/02/22  0913 12/01/22  1133 11/30/22  0742    WBC 10*3/mm3 13.91* 14.32* 13.97*   HEMOGLOBIN g/dL 12.3 11.9* 11.9*   HEMATOCRIT % 38.0 35.1 36.2   PLATELETS 10*3/mm3 253 263 248      Results from last 7 days   Lab Units 12/02/22  0913 12/01/22  1133 11/30/22  0705 11/29/22  0401 11/28/22  1208   SODIUM mmol/L 136 131* 136   < > 136   POTASSIUM mmol/L 4.3 4.3 4.3   < > 4.5   CHLORIDE mmol/L 98 97* 102   < > 99   CO2 mmol/L 25.5 23.0 26.0   < > 25.0   BUN mg/dL 17 21* 18   < > 19   CREATININE mg/dL 0.83 0.77 0.78   < > 0.78   CALCIUM mg/dL 8.6 8.4* 8.2*   < > 8.6   BILIRUBIN mg/dL  --   --   --   --  0.5   ALK PHOS U/L  --   --   --   --  96   ALT (SGPT) U/L  --   --   --   --  143*   AST (SGOT) U/L  --   --   --   --  71*   GLUCOSE mg/dL 300* 288* 153*   < > 202*    < > = values in this interval not displayed.      Lab Results   Component Value Date    NEUTROABS 11.27 (H) 11/30/2022     Results from last 7 days   Lab Units 11/29/22  0401   INR  1.01   APTT seconds 25.4               Assessment & Plan   ASSESSMENT/PLAN:  This is a 57 y.o. female with:     *Primary CNS malignancy/high-grade glioma  • Patient presented with a couple of weeks of falls and confusion  • MRI 11/22/2022 with a 3.2 cm rim enhancing right supratentorial mass with mass effect and leftward midline shift in the right thalamus and internal capsule.   • She was started on dexamethasone and Keppra  • Dr. Thompson with neurosurgery has evaluated.  This mass is not resectable due to its location.  A biopsy carries some risk but is possible.  • Dr. Sam with radiation oncology has also evaluated the situation.   • CT chest abdomen and pelvis on 11/24/2022 without evidence of progressive metastatic disease otherwise  • Patient underwent stereotactic brain biopsy on 11/28/2022.  Preliminary pathology with high-grade glioma, sent to [facility] for outside review and molecular analysis.  • Patient currently continuing on dexamethasone 4 mg p.o. every 8 hours  • Patient continuing on  seizure prophylaxis with Keppra 500 mg twice daily  • We are awaiting final pathology from her biopsy, preliminary appears to represent high-grade glioma.  With that information we are preparing for anticipated concurrent chemoradiation with low-dose Temodar (75 mg/m² daily).  Patient did go for radiation simulation on 12/2/2022 and we anticipate obtaining Temodar by early next week.  Anticipate potentially starting treatment midweek next week once pathology final report is available.  In the interim we are awaiting approval for transfer to [facility].      *Germline BRCA2 and GOERGES mutations     *History of stage I papillary thyroid cancer     *History of bilateral DCIS   • S/p bilateral mastectomies 1/26/2017     *Prior history of KAVYA/BSO in 1992     *History of stage I clear cell renal cell carcinoma  • Left partial nephrectomy 5/15/2020. 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  • LLL lobectomy 2/28/2018 with 2/8 peribronchial nodes involved along with evidence of adenocarcinoma     *History of stage Ia1 LLL NSCLCa, adenoca with lipidic features  • Never smoker  • Discovered on LLL lobectomy  • Positive EGFR mutation exon 19 deletion     *HIstory of stage IA2 RLL NSCLCa, adenoca with lipidic growth pattern  • Right VATS with anterior basal segmentectomy 10/9/2020, 1.4 cm tumor, 5 negative nodes  • Every 6 month CT imaging     *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    PLAN:   1. Await final pathology from brain biopsy being sent to [facility] for review and molecular analysis  2. Patient continuing on dexamethasone 4 mg p.o. every 8 hours  3. Patient continuing on Keppra 500 mg p.o. every 12 hours.    4. Process initiated to obtain oral Temodar (75 mg/m²/day) anticipating concurrent chemoradiation once final pathology confirmed.  Anticipate potentially beginning treatment mid-to-late next week.  5. Patient did undergo radiation simulation on 12/2/2022  6. Await approval for acute rehab at [facility]    Discussed with patient and  at bedside.               Mathew Collins MD

## 2022-12-03 NOTE — PLAN OF CARE
No significant changes throughout shift. No concerns or complaints noted.     Problem: Adult Inpatient Plan of Care  Goal: Absence of Hospital-Acquired Illness or Injury  Outcome: Ongoing, Progressing  Intervention: Identify and Manage Fall Risk  Recent Flowsheet Documentation  Taken 12/3/2022 0515 by Jodi Bowden RN  Safety Promotion/Fall Prevention: safety round/check completed  Taken 12/3/2022 0330 by Jodi Bowden RN  Safety Promotion/Fall Prevention: safety round/check completed  Taken 12/3/2022 0125 by Jodi Bowden RN  Safety Promotion/Fall Prevention: safety round/check completed  Taken 12/2/2022 2315 by oJdi Bowden RN  Safety Promotion/Fall Prevention: safety round/check completed  Taken 12/2/2022 2100 by Jodi Bowden RN  Safety Promotion/Fall Prevention:   safety round/check completed   fall prevention program maintained   clutter free environment maintained  Intervention: Prevent Skin Injury  Recent Flowsheet Documentation  Taken 12/2/2022 2100 by Jodi Bowden RN  Body Position:   position changed independently   legs elevated   supine  Intervention: Prevent and Manage VTE (Venous Thromboembolism) Risk  Recent Flowsheet Documentation  Taken 12/2/2022 2100 by Jodi Bowden RN  Activity Management:   activity adjusted per tolerance   up to bedside commode  VTE Prevention/Management:   bilateral   sequential compression devices on  Intervention: Prevent Infection  Recent Flowsheet Documentation  Taken 12/2/2022 2100 by Jodi Bowden RN  Infection Prevention:   environmental surveillance performed   hand hygiene promoted   rest/sleep promoted   single patient room provided     Problem: Adult Inpatient Plan of Care  Goal: Optimal Comfort and Wellbeing  Outcome: Ongoing, Progressing  Intervention: Monitor Pain and Promote Comfort  Recent Flowsheet Documentation  Taken 12/2/2022 2100 by Jodi Bowden RN  Pain Management Interventions:   see MAR   quiet environment  facilitated   pillow support provided   position adjusted   care clustered  Intervention: Provide Person-Centered Care  Recent Flowsheet Documentation  Taken 12/2/2022 2100 by Jodi Bowden RN  Trust Relationship/Rapport:   care explained   choices provided   questions answered   questions encouraged     Problem: Diabetes Comorbidity  Goal: Blood Glucose Level Within Targeted Range  Outcome: Ongoing, Progressing     Problem: Fall Injury Risk  Goal: Absence of Fall and Fall-Related Injury  Outcome: Ongoing, Progressing  Intervention: Identify and Manage Contributors  Recent Flowsheet Documentation  Taken 12/2/2022 2100 by Jodi Bowden, RN  Medication Review/Management: medications reviewed  Self-Care Promotion: independence encouraged  Intervention: Promote Injury-Free Environment  Recent Flowsheet Documentation  Taken 12/3/2022 0515 by Jodi Bowden, RN  Safety Promotion/Fall Prevention: safety round/check completed  Taken 12/3/2022 0330 by Jodi Bowden RN  Safety Promotion/Fall Prevention: safety round/check completed  Taken 12/3/2022 0125 by Jodi Bowden, RN  Safety Promotion/Fall Prevention: safety round/check completed  Taken 12/2/2022 2315 by Jodi Bowden, RN  Safety Promotion/Fall Prevention: safety round/check completed  Taken 12/2/2022 2100 by Jodi Bowden, RN  Safety Promotion/Fall Prevention:   safety round/check completed   fall prevention program maintained   clutter free environment maintained   Goal Outcome Evaluation:  Plan of Care Reviewed With: patient

## 2022-12-03 NOTE — PROGRESS NOTES
Name: Christie Avendaño ADMIT: 2022   : 1965  PCP: Tiffany Holloway MD    MRN: [id number] LOS: 11 days   AGE/SEX: 57 y.o. female  ROOM: [facility]     Subjective   Subjective   No new complaints today.  Sitting up in chair.  Has been ambulating.  No nausea or vomiting.       Objective   Objective   Vital Signs  Temp:  [97.7 °F (36.5 °C)-98.3 °F (36.8 °C)] 98 °F (36.7 °C)  Heart Rate:  [58-66] 66  Resp:  [16-18] 16  BP: (136-152)/(80-90) 152/87  SpO2:  [91 %-96 %] 96 %  on   ;   Device (Oxygen Therapy): room air  Body mass index is 41.93 kg/m².  Physical Exam  Constitutional:       General: She is not in acute distress.     Appearance: She is not toxic-appearing.   HENT:      Head:      Comments: Biopsy site right forehead, cdi + staples  Cardiovascular:      Rate and Rhythm: Normal rate and regular rhythm.      Heart sounds: Normal heart sounds.   Pulmonary:      Effort: Pulmonary effort is normal.      Breath sounds: Normal breath sounds.   Abdominal:      General: Bowel sounds are normal.      Palpations: Abdomen is soft.   Musculoskeletal:         General: No tenderness.      Right lower leg: No edema.      Left lower leg: No edema.   Neurological:      Mental Status: She is alert.   Psychiatric:         Mood and Affect: Mood normal.         Behavior: Behavior normal.       Results Review     I reviewed the patient's new clinical results.  Results from last 7 days   Lab Units 22  0913 22  1133 11/30/22  0705 22  0401   WBC 10*3/mm3 13.91* 14.32* 13.97* 15.25*   HEMOGLOBIN g/dL 12.3 11.9* 11.9* 12.0   PLATELETS 10*3/mm3 253 263 248 262     Results from last 7 days   Lab Units 22  0913 22  1133 22  0705 22  0401   SODIUM mmol/L 136 131* 136 137   POTASSIUM mmol/L 4.3 4.3 4.3 4.4   CHLORIDE mmol/L 98 97* 102 100   CO2 mmol/L 25.5 23.0 26.0 23.0   BUN mg/dL 17 21* 18 21*   CREATININE mg/dL 0.83 0.77 0.78 0.78   GLUCOSE mg/dL 300* 288* 153* 136*   Estimated Creatinine  Clearance: 93.1 mL/min (by C-G formula based on SCr of 0.83 mg/dL).  Results from last 7 days   Lab Units 11/28/22  1208   ALBUMIN g/dL 3.80   BILIRUBIN mg/dL 0.5   ALK PHOS U/L 96   AST (SGOT) U/L 71*   ALT (SGPT) U/L 143*     Results from last 7 days   Lab Units 12/02/22  0913 12/01/22  1133 11/30/22  0705 11/29/22  0401 11/28/22  1208   CALCIUM mg/dL 8.6 8.4* 8.2* 8.3* 8.6   ALBUMIN g/dL  --   --   --   --  3.80         Glucose   Date/Time Value Ref Range Status   12/03/2022 1559 227 (H) 70 - 130 mg/dL Final     Comment:     RN Notified R and V Meter: [id number] : [id number] Madhu Namrata ARIANNE   12/03/2022 1133 329 (H) 70 - 130 mg/dL Final     Comment:     Meter: [id number] : [id number] Hatchett Sherrish NA   12/03/2022 0559 180 (H) 70 - 130 mg/dL Final     Comment:     Meter: [id number] : [id number] Blanchard Jodinhan ACUÑA   12/03/2022 0022 255 (H) 70 - 130 mg/dL Final     Comment:     Meter: [id number] : [id number] Blanchardhelen Collinsrajat ACUÑA   12/02/2022 2049 171 (H) 70 - 130 mg/dL Final     Comment:     Meter: [id number] : [id number] Santo Jodinhan ACUÑA   12/02/2022 1703 185 (H) 70 - 130 mg/dL Final     Comment:     Meter: [id number] : [id number] Teddy Anuradha ACUÑA   12/02/2022 1156 240 (H) 70 - 130 mg/dL Final     Comment:     Meter: [id number] : [id number] Hatchett Sherrish NA       Scheduled Medications  atorvastatin, 40 mg, Oral, Nightly  dexamethasone, 4 mg, Oral, Q8H  escitalopram, 10 mg, Oral, Daily  insulin glargine, 15 Units, Subcutaneous, QAM  insulin lispro, 0-24 Units, Subcutaneous, Q6H  latanoprost, 1 drop, Both Eyes, Nightly  levETIRAcetam, 500 mg, Oral, BID  levothyroxine, 150 mcg, Oral, Daily  lisinopril, 20 mg, Oral, Daily  pantoprazole, 40 mg, Oral, Q AM  polyethylene glycol, 17 g, Oral, Daily  sodium chloride, 10 mL, Intravenous, Q12H  valACYclovir, 2,000 mg, Oral, BID    Infusions   Diet  Diet: Regular/House Diet, Diabetic Diets, Vegetarian; Lacto-Ovo Vegetarian (Allows dairy, eggs);  Consistent Carbohydrate; Texture: Regular Texture (IDDSI 7); Fluid Consistency: Thin (IDDSI 0)       Assessment/Plan     Active Hospital Problems    Diagnosis  POA   • **High grade glioma  [C71.9]  Yes   • 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       57 y.o. female admitted with High grade glioma not classifiable by WHO criteria (HCC).  She is s/p Right frontal stereotactic needle brain biopsy on 11/28.  Pathology consistent with high-grade glioma.  Awaiting additional report from [facility].    Patient expected to need chemotherapy and radiation.  Tumor not amenable to resection.  Awaiting final pathology results though pathology here consistent with high-grade glioma.      Blood sugars intermittently elevated due to steroids.  Continue current basal insulin but will add scheduled AC insulin.      · SCDs for DVT prophylaxis.  · Full code.  · Discussed with patient.  · Anticipate discharge to [facility] acute rehab once arrangements have been made if okay with all      Shelton Padgett MD  [facility]  12/03/22  16:28 EST

## 2022-12-03 NOTE — PLAN OF CARE
Goal Outcome Evaluation:  Plan of Care Reviewed With: patient, daughter      Outcome Evaluation: Patient pleasant and agreeable to PT this afternoon. Patient able to achieve sitting EOB with CGA today and don her socks with CGA. Caro required for transfers and increased ambulation distance to 55' Caro with a RW. Patient requires constant cues for posture and COG as demos L lean. Patient also demos difficulty with dual task activities. Overall patient is progressing well and appears to be an appropriate candidate for inpatient rehab to address remaining functional deficits including balance, strength and coordination. PT will continue to follow.

## 2022-12-03 NOTE — THERAPY TREATMENT NOTE
Patient Name: Christie Avendaño  : 1965    MRN: [id number]                              Today's Date: 12/3/2022       Admit Date: 2022    Visit Dx:     ICD-10-CM ICD-9-CM   1. Brain mass  G93.89 348.89   2. BRCA2 gene mutation positive in female  Z15.01 V84.01    Z15.02 V84.02    Z15.09 V84.09   3. Type 2 diabetes mellitus without complication, without long-term current use of insulin (HCC)  E11.9 250.00   4. 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)   • Non-small cell lung cancer, right (HCC)   • Renal mass, right   • SIRS (systemic inflammatory response syndrome) (HCC)   • Hyperlipidemia   • Malignant melanoma of right lower extremity including hip (HCC)   • High grade glioma    • 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)     Past Medical History:   Diagnosis Date   • Arthritis    • Asthma    • BRCA2 positive    • Diabetes mellitus (HCC)    • H/O Liver masses 2017    x3   • H/O Lung masses 2017    1 in right lower lobe and 1 in the left infrahilar location   • H/O Ovarian cyst    • H/O Right adrenal mass (CMS/HCC) 2017   • Lung cancer (HCC)    • Melanoma (HCC)     right foot   • PONV (postoperative nausea and vomiting)    • Thyroid disease      Past Surgical History:   Procedure Laterality Date   • APPENDECTOMY     • BRAIN BIOPSY Right 2022    Procedure: Right frontal stereotactic needle brain biopsy;  Surgeon: Manuel Thompson MD;  Location: [facility];  Service: Neurosurgery;  Laterality: Right;   • BREAST IMPLANT  SURGERY Bilateral    • CHOLECYSTECTOMY     • HYSTERECTOMY     • MASTECTOMY Bilateral    • OVARIAN CYST SURGERY     • THORACOSCOPY VIDEO ASSISTED WITH LOBECTOMY Right 10/9/2020    Procedure: BRONCHOSCOPY, THORACOSCOPY VIDEO ASSISTED WITH ANTERIOR BASAL SEGMENTECTOMY, INTERCOSTAL NERVE BLOCK;  Surgeon: Flaca Crisostomo MD;  Location: [facility] OR;  Service: Thoracic;  Laterality: Right;   • TONSILLECTOMY        General Information     Row Name 12/03/22 1607          Physical Therapy Time and Intention    Document Type therapy note (daily note)  -MS     Mode of Treatment physical therapy  -MS     Row Name 12/03/22 1607          General Information    Existing Precautions/Restrictions fall  -MS     Row Name 12/03/22 1607          Cognition    Orientation Status (Cognition) oriented x 4  -MS     Row Name 12/03/22 1607          Safety Issues, Functional Mobility    Impairments Affecting Function (Mobility) balance;postural/trunk control;coordination;strength  -MS     Comment, Safety Issues/Impairments (Mobility) Gait belt and non skid socks donned.  -MS           User Key  (r) = Recorded By, (t) = Taken By, (c) = Cosigned By    Initials Name Provider Type    MS Cammy Rao, PT Physical Therapist               Mobility     Row Name 12/03/22 1607          Bed Mobility    Bed Mobility supine-sit;sit-supine  -MS     Supine-Sit [place] (Bed Mobility) contact guard;verbal cues  -MS     Assistive Device (Bed Mobility) bed rails;head of bed elevated  -MS     Comment, (Bed Mobility) L lean initially but corrects well with cues, able to scoot forward and don nonskid socks with CGA today.  -MS     Row Name 12/03/22 1607          Transfers    Comment, (Transfers) Sequencing and safety awareness cues, strong L lean when in standing initially requiring patient to need to sit due to LOB. Patient cued for wide HORTENCIA and R weightshifting- good carryover. Difficulty with dual task noted.  -MS     Row Name 12/03/22 1607           Sit-Stand Transfer    Sit-Stand [place] (Transfers) minimum assist (75% patient effort);1 person assist  -MS     Assistive Device (Sit-Stand Transfers) walker, front-wheeled  -MS     Comment, (Sit-Stand Transfer) 55'  -MS     Row Name 12/03/22 1607          Gait/Stairs (Locomotion)    [place] Level (Gait) minimum assist (75% patient effort);verbal cues;nonverbal cues (demo/gesture)  -MS     Assistive Device (Gait) walker, front-wheeled  -MS     Distance in Feet (Gait) 55'  -MS     Deviations/Abnormal Patterns (Gait) fabrizio decreased;stride length decreased;ataxic;base of support, narrow  -MS     Bilateral Gait Deviations forward flexed posture;heel strike decreased  -MS     Comment, (Gait/Stairs) Encouraged attention to COG and posture- does well with focusing on task but demo'd L veering when conversing. Agreeable to sitting [facility].  -MS           User Key  (r) = Recorded By, (t) = Taken By, (c) = Cosigned By    Initials Name Provider Type    Cammy Davis, PT Physical Therapist               Obj/Interventions     Row Name 12/03/22 1609          Balance    Static Sitting Balance standby assist  -MS     Dynamic Sitting Balance standby assist  -MS     Position, Sitting Balance unsupported;sitting edge of bed  -MS     Static Standing Balance minimal assist  -MS     Dynamic Standing Balance minimal assist  -MS     Position/Device Used, Standing Balance supported;walker, rolling  -MS           User Key  (r) = Recorded By, (t) = Taken By, (c) = Cosigned By    Initials Name Provider Type    Cammy Davis, PT Physical Therapist               Goals/Plan    No documentation.                Clinical Impression     Row Name 12/03/22 1610          Pain    Pretreatment Pain Rating 0/10 - no pain  -MS     Posttreatment Pain Rating 0/10 - no pain  -MS     Row Name 12/03/22 1610          Plan of Care Review    Plan of Care Reviewed With patient;daughter  -MS     Outcome Evaluation Patient pleasant and  agreeable to PT this afternoon. Patient able to achieve sitting EOB with CGA today and don her socks with CGA. Caro required for transfers and increased ambulation distance to 55' Caro with a RW. Patient requires constant cues for posture and COG as demos L lean. Patient also demos difficulty with dual task activities. Overall patient is progressing well and appears to be an appropriate candidate for inpatient rehab to address remaining functional deficits including balance, strength and coordination. PT will continue to follow.  -MS     Row Name 12/03/22 1610          Positioning and Restraints    Pre-Treatment Position in bed  -MS     Post Treatment Position chair  -MS     In Chair notified nsg;reclined;call light within reach;encouraged to call for assist;exit alarm on  -MS           User Key  (r) = Recorded By, (t) = Taken By, (c) = Cosigned By    Initials Name Provider Type    Cammy Davis PT Physical Therapist               Outcome Measures     Row Name 12/03/22 1613          How much help from another person do you currently need...    Turning from your back to your side while in flat bed without using bedrails? 4  -MS     Moving from lying on back to sitting on the side of a flat bed without bedrails? 4  -MS     Moving to and from a bed to a chair (including a wheelchair)? 3  -MS     Standing up from a chair using your arms (e.g., wheelchair, bedside chair)? 3  -MS     Climbing 3-5 steps with a railing? 2  -MS     To walk in hospital room? 3  -MS     AM-PAC 6 Clicks Score (PT) 19  -MS     Highest level of mobility 6 --> Walked 10 steps or more  -MS           User Key  (r) = Recorded By, (t) = Taken By, (c) = Cosigned By    Initials Name Provider Type    Cammy Davis PT Physical Therapist                             Physical Therapy Education     Title: PT OT SLP Therapies (In Progress)     Topic: Physical Therapy (Done)     Point: Mobility training (Done)     Learning Progress Summary            Patient Acceptance, E,TB,D, VU,NR by EJ at 12/1/2022 1308    Acceptance, E,TB, VU,NR by AE at 11/23/2022 1422   Family Acceptance, E,TB, VU,NR by AE at 11/23/2022 1422   Significant Other Acceptance, E,TB, VU,NR by AE at 11/23/2022 1422                   Point: Home exercise program (Done)     Learning Progress Summary           Patient Acceptance, E,TB, VU,NR by AE at 11/23/2022 1422   Family Acceptance, E,TB, VU,NR by AE at 11/23/2022 1422   Significant Other Acceptance, E,TB, VU,NR by AE at 11/23/2022 1422                   Point: Body mechanics (Done)     Learning Progress Summary           Patient Acceptance, E,TB, VU,NR by AE at 11/23/2022 1422   Family Acceptance, E,TB, VU,NR by AE at 11/23/2022 1422   Significant Other Acceptance, E,TB, VU,NR by AE at 11/23/2022 1422                   Point: Precautions (Done)     Learning Progress Summary           Patient Acceptance, E,TB, VU,NR by AE at 11/23/2022 1422   Family Acceptance, E,TB, VU,NR by AE at 11/23/2022 1422   Significant Other Acceptance, E,TB, VU,NR by AE at 11/23/2022 1422                               User Key     Initials Effective Dates Name Provider Type Discipline     06/16/21 -  Yarely Hull, PT Physical Therapist PT    AE 06/16/21 -  Meredith Jones PT Physical Therapist PT              PT Recommendation and Plan     Plan of Care Reviewed With: patient, daughter  Outcome Evaluation: Patient pleasant and agreeable to PT this afternoon. Patient able to achieve sitting EOB with CGA today and don her socks with CGA. Caro required for transfers and increased ambulation distance to 55' Caro with a RW. Patient requires constant cues for posture and COG as demos L lean. Patient also demos difficulty with dual task activities. Overall patient is progressing well and appears to be an appropriate candidate for inpatient rehab to address remaining functional deficits including balance, strength and coordination. PT will continue to  follow.     Time Calculation:    PT Charges     Row Name 12/03/22 1606             Time Calculation    Start Time 1536  -MS      Stop Time 1558  -MS      Time Calculation (min) 22 min  -MS      PT Received On 12/03/22  -MS      PT - Next Appointment 12/05/22  -MS            User Key  (r) = Recorded By, (t) = Taken By, (c) = Cosigned By    Initials Name Provider Type    Cammy Davis, PT Physical Therapist              Therapy Charges for Today     Code Description Service Date Service Provider Modifiers Qty    63635586520 HC PT THER PROC EA 15 MIN 12/3/2022 Cammy Rao, PT GP 1          PT G-Codes  Outcome Measure Options: AM-PAC 6 Clicks Daily Activity (OT)  AM-PAC 6 Clicks Score (PT): 19  AM-PAC 6 Clicks Score (OT): 15       Cammy Rao PT  12/3/2022

## 2022-12-04 LAB
GLUCOSE BLDC GLUCOMTR-MCNC: 175 MG/DL (ref 70–130)
GLUCOSE BLDC GLUCOMTR-MCNC: 222 MG/DL (ref 70–130)
GLUCOSE BLDC GLUCOMTR-MCNC: 247 MG/DL (ref 70–130)
GLUCOSE BLDC GLUCOMTR-MCNC: 298 MG/DL (ref 70–130)

## 2022-12-04 PROCEDURE — 63710000001 INSULIN LISPRO (HUMAN) PER 5 UNITS: Performed by: INTERNAL MEDICINE

## 2022-12-04 PROCEDURE — 82962 GLUCOSE BLOOD TEST: CPT

## 2022-12-04 PROCEDURE — 63710000001 INSULIN LISPRO (HUMAN) PER 5 UNITS: Performed by: HOSPITALIST

## 2022-12-04 PROCEDURE — 99232 SBSQ HOSP IP/OBS MODERATE 35: CPT | Performed by: INTERNAL MEDICINE

## 2022-12-04 PROCEDURE — 63710000001 DEXAMETHASONE PER 0.25 MG: Performed by: NEUROLOGICAL SURGERY

## 2022-12-04 RX ORDER — INSULIN LISPRO 100 [IU]/ML
8 INJECTION, SOLUTION INTRAVENOUS; SUBCUTANEOUS
Status: DISCONTINUED | OUTPATIENT
Start: 2022-12-04 | End: 2022-12-07 | Stop reason: HOSPADM

## 2022-12-04 RX ADMIN — VALACYCLOVIR HYDROCHLORIDE 2000 MG: 500 TABLET, FILM COATED ORAL at 21:04

## 2022-12-04 RX ADMIN — LEVOTHYROXINE SODIUM 150 MCG: 0.15 TABLET ORAL at 09:44

## 2022-12-04 RX ADMIN — LISINOPRIL 20 MG: 20 TABLET ORAL at 09:44

## 2022-12-04 RX ADMIN — ESCITALOPRAM 10 MG: 10 TABLET, FILM COATED ORAL at 09:44

## 2022-12-04 RX ADMIN — LATANOPROST 1 DROP: 50 SOLUTION OPHTHALMIC at 21:05

## 2022-12-04 RX ADMIN — ACETAMINOPHEN 650 MG: 325 TABLET, FILM COATED ORAL at 18:36

## 2022-12-04 RX ADMIN — Medication 10 ML: at 21:05

## 2022-12-04 RX ADMIN — INSULIN LISPRO 8 UNITS: 100 INJECTION, SOLUTION INTRAVENOUS; SUBCUTANEOUS at 18:28

## 2022-12-04 RX ADMIN — INSULIN LISPRO 12 UNITS: 100 INJECTION, SOLUTION INTRAVENOUS; SUBCUTANEOUS at 13:31

## 2022-12-04 RX ADMIN — INSULIN LISPRO 8 UNITS: 100 INJECTION, SOLUTION INTRAVENOUS; SUBCUTANEOUS at 00:39

## 2022-12-04 RX ADMIN — INSULIN LISPRO 5 UNITS: 100 INJECTION, SOLUTION INTRAVENOUS; SUBCUTANEOUS at 13:31

## 2022-12-04 RX ADMIN — VALACYCLOVIR HYDROCHLORIDE 2000 MG: 500 TABLET, FILM COATED ORAL at 09:44

## 2022-12-04 RX ADMIN — INSULIN LISPRO 5 UNITS: 100 INJECTION, SOLUTION INTRAVENOUS; SUBCUTANEOUS at 09:44

## 2022-12-04 RX ADMIN — DEXAMETHASONE 4 MG: 4 TABLET ORAL at 21:04

## 2022-12-04 RX ADMIN — DEXAMETHASONE 4 MG: 4 TABLET ORAL at 06:22

## 2022-12-04 RX ADMIN — LEVETIRACETAM 500 MG: 500 TABLET, FILM COATED ORAL at 21:04

## 2022-12-04 RX ADMIN — ATORVASTATIN CALCIUM 40 MG: 20 TABLET, FILM COATED ORAL at 21:04

## 2022-12-04 RX ADMIN — DEXAMETHASONE 4 MG: 4 TABLET ORAL at 13:27

## 2022-12-04 RX ADMIN — INSULIN LISPRO 4 UNITS: 100 INJECTION, SOLUTION INTRAVENOUS; SUBCUTANEOUS at 18:28

## 2022-12-04 RX ADMIN — POLYETHYLENE GLYCOL 3350 17 G: 17 POWDER, FOR SOLUTION ORAL at 09:44

## 2022-12-04 RX ADMIN — LEVETIRACETAM 500 MG: 500 TABLET, FILM COATED ORAL at 09:44

## 2022-12-04 RX ADMIN — Medication 10 ML: at 09:00

## 2022-12-04 RX ADMIN — INSULIN GLARGINE-YFGN 15 UNITS: 100 INJECTION, SOLUTION SUBCUTANEOUS at 06:22

## 2022-12-04 RX ADMIN — PANTOPRAZOLE SODIUM 40 MG: 40 TABLET, DELAYED RELEASE ORAL at 06:22

## 2022-12-04 NOTE — PROGRESS NOTES
[facility] INPATIENT PROGRESS NOTE    Length of Stay:  12 days    CHIEF COMPLAINT:  New brain mass, suspected metastatic disease  Germline BRCA2 and GEORGES mutations, stage I ([id number]) papillary thyroid cancer, bilateral DCIS, stage I left (pE9hAgT1) clear-cell renal cell carcinoma, stage IIB typical carcinoid of the left lung (hL6jN7P0), stage IA1 (tE5zI1Vt)  left lower lobe non-small cell lung cancer (adenocarcinoma with lipidic features), stage IA2 (hT4ffG6M3) right lower lobe non-small cell lung cancer (adenocarcinoma with lipidic growth pattern), melanoma right heel stage IA ([id number]), anemia    SUBJECTIVE:  Patient reports that she is fairly stable today in terms of her left-sided weakness and incoordination.  She was able to get up yesterday and walk with physical therapy in the hallway, did become fatigued afterwards.  No new complaints today.    ROS:  Review of Systems   A comprehensive review of systems was obtained with pertinent positive findings as noted in the interval history above.  All other systems negative.    OBJECTIVE:  Vitals:    12/03/22 1135 12/03/22 1653 12/03/22 2136 12/04/22 0520   BP: 152/87 145/79 135/79 131/77   BP Location: Left arm Left arm     Patient Position: Lying Sitting Lying Lying   Pulse: 66 68 69 65   Resp: 16 18 18 18   Temp: 98 °F (36.7 °C) 98.3 °F (36.8 °C) 97.8 °F (36.6 °C) 98.1 °F (36.7 °C)   TempSrc: Oral Oral Oral Oral   SpO2: 96% 97% 95% 96%   Weight:       Height:             PHYSICAL EXAMINATION:  General: Alert and oriented, no distress  Head: Right frontal incision appears normal  Chest/Lungs: Clear to auscultation bilaterally  Heart: Regular rate and rhythm no murmurs Rubs  Abdomen/GI: Soft nontender nondistended bowel sounds present  Extremities: No edema  Neuro: Mild weakness left upper and lower extremities    Patient was examined today, unchanged from above    DIAGNOSTIC DATA:  Results Review:     I reviewed the patient's new clinical  results.    Results from last 7 days   Lab Units 12/02/22  0913 12/01/22  1133 11/30/22  0705   WBC 10*3/mm3 13.91* 14.32* 13.97*   HEMOGLOBIN g/dL 12.3 11.9* 11.9*   HEMATOCRIT % 38.0 35.1 36.2   PLATELETS 10*3/mm3 253 263 248      Results from last 7 days   Lab Units 12/02/22  0913 12/01/22  1133 11/30/22  0705 11/29/22  0401 11/28/22  1208   SODIUM mmol/L 136 131* 136   < > 136   POTASSIUM mmol/L 4.3 4.3 4.3   < > 4.5   CHLORIDE mmol/L 98 97* 102   < > 99   CO2 mmol/L 25.5 23.0 26.0   < > 25.0   BUN mg/dL 17 21* 18   < > 19   CREATININE mg/dL 0.83 0.77 0.78   < > 0.78   CALCIUM mg/dL 8.6 8.4* 8.2*   < > 8.6   BILIRUBIN mg/dL  --   --   --   --  0.5   ALK PHOS U/L  --   --   --   --  96   ALT (SGPT) U/L  --   --   --   --  143*   AST (SGOT) U/L  --   --   --   --  71*   GLUCOSE mg/dL 300* 288* 153*   < > 202*    < > = values in this interval not displayed.      Lab Results   Component Value Date    NEUTROABS 11.27 (H) 11/30/2022     Results from last 7 days   Lab Units 11/29/22  0401   INR  1.01   APTT seconds 25.4               Assessment & Plan   ASSESSMENT/PLAN:  This is a 57 y.o. female with:     *Primary CNS malignancy/high-grade glioma  • Patient presented with a couple of weeks of falls and confusion  • MRI 11/22/2022 with a 3.2 cm rim enhancing right supratentorial mass with mass effect and leftward midline shift in the right thalamus and internal capsule.   • She was started on dexamethasone and Keppra  • Dr. Thompson with neurosurgery has evaluated.  This mass is not resectable due to its location.  A biopsy carries some risk but is possible.  • Dr. Sam with radiation oncology has also evaluated the situation.   • CT chest abdomen and pelvis on 11/24/2022 without evidence of progressive metastatic disease otherwise  • Patient underwent stereotactic brain biopsy on 11/28/2022.  Preliminary pathology with high-grade glioma, sent to [facility] for outside review and molecular  analysis.  • Patient currently continuing on dexamethasone 4 mg p.o. every 8 hours  • Patient continuing on seizure prophylaxis with Keppra 500 mg twice daily  • We are awaiting final pathology from her biopsy, preliminary appears to represent high-grade glioma.  With that information we are preparing for anticipated concurrent chemoradiation with low-dose Temodar (75 mg/m² daily).  Patient did go for radiation simulation on 12/2/2022 and we anticipate obtaining Temodar by early next week.  Anticipate potentially starting treatment midweek next week once pathology final report is available.  In the interim we are awaiting approval for transfer to [facility].      *Germline BRCA2 and GEORGES mutations     *History of stage I papillary thyroid cancer     *History of bilateral DCIS   • S/p bilateral mastectomies 1/26/2017     *Prior history of KAVYA/BSO in 1992     *History of stage I clear cell renal cell carcinoma  • Left partial nephrectomy 5/15/2020. 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  • LLL lobectomy 2/28/2018 with 2/8 peribronchial nodes involved along with evidence of adenocarcinoma     *History of stage Ia1 LLL NSCLCa, adenoca with lipidic features  • Never smoker  • Discovered on LLL lobectomy  • Positive EGFR mutation exon 19 deletion     *HIstory of stage IA2 RLL NSCLCa, adenoca with lipidic growth pattern  • Right VATS with anterior basal segmentectomy 10/9/2020, 1.4 cm tumor, 5 negative nodes  • Every 6 month CT imaging     *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    PLAN:   1. Await final pathology from brain biopsy being sent to [facility] for review and molecular analysis  2. Patient continuing on dexamethasone 4 mg p.o. every 8 hours  3. Patient continuing on Keppra 500 mg p.o. every 12 hours.    4. Process initiated to obtain oral Temodar (75 mg/m²/day) anticipating concurrent chemoradiation once final pathology confirmed.  Anticipate potentially beginning treatment mid-to-late next week.  5. Patient did undergo radiation simulation on 12/2/2022  6. Await approval for acute rehab at [facility]    Discussed with patient and daughter at bedside today.               Mathew Collins MD

## 2022-12-04 NOTE — PLAN OF CARE
Problem: Adult Inpatient Plan of Care  Goal: Patient-Specific Goal (Individualized)  Outcome: Ongoing, Progressing  Goal: Absence of Hospital-Acquired Illness or Injury  Outcome: Ongoing, Progressing  Intervention: Identify and Manage Fall Risk  Recent Flowsheet Documentation  Taken 12/4/2022 1800 by Fabrice Mondragon RN  Safety Promotion/Fall Prevention:   activity supervised   assistive device/personal items within reach   clutter free environment maintained   fall prevention program maintained   gait belt   lighting adjusted   mobility aid in reach   muscle strengthening facilitated   nonskid shoes/slippers when out of bed   room organization consistent   safety round/check completed  Taken 12/4/2022 1600 by Fabrice Mondragon RN  Safety Promotion/Fall Prevention:   activity supervised   assistive device/personal items within reach   clutter free environment maintained   gait belt   fall prevention program maintained   lighting adjusted   mobility aid in reach   nonskid shoes/slippers when out of bed   muscle strengthening facilitated   room organization consistent   safety round/check completed  Taken 12/4/2022 1400 by Fabrice Mondragon RN  Safety Promotion/Fall Prevention:   activity supervised   assistive device/personal items within reach   clutter free environment maintained   fall prevention program maintained   gait belt   lighting adjusted   mobility aid in reach   muscle strengthening facilitated   nonskid shoes/slippers when out of bed   room organization consistent   safety round/check completed  Taken 12/4/2022 1200 by Fabrice Mondragon RN  Safety Promotion/Fall Prevention:   activity supervised   assistive device/personal items within reach   clutter free environment maintained   fall prevention program maintained   gait belt   lighting adjusted   mobility aid in reach   muscle strengthening facilitated   nonskid shoes/slippers when out of bed   room organization consistent   safety round/check  completed  Taken 12/4/2022 1000 by Fabrice Mondragon RN  Safety Promotion/Fall Prevention:   activity supervised   assistive device/personal items within reach   clutter free environment maintained   fall prevention program maintained   gait belt   lighting adjusted   mobility aid in reach   muscle strengthening facilitated   nonskid shoes/slippers when out of bed   room organization consistent   safety round/check completed  Taken 12/4/2022 0800 by Fabrice Mondragon RN  Safety Promotion/Fall Prevention:   activity supervised   clutter free environment maintained   assistive device/personal items within reach   fall prevention program maintained   gait belt   lighting adjusted   mobility aid in reach   muscle strengthening facilitated   nonskid shoes/slippers when out of bed   safety round/check completed   room organization consistent  Intervention: Prevent Skin Injury  Recent Flowsheet Documentation  Taken 12/4/2022 0800 by Fabrice Mondragon RN  Body Position: supine  Goal: Optimal Comfort and Wellbeing  Outcome: Ongoing, Progressing  Intervention: Provide Person-Centered Care  Recent Flowsheet Documentation  Taken 12/4/2022 0800 by Fabrice Mondragon RN  Trust Relationship/Rapport:   care explained   choices provided  Goal: Readiness for Transition of Care  Outcome: Ongoing, Progressing     Problem: Diabetes Comorbidity  Goal: Blood Glucose Level Within Targeted Range  Outcome: Ongoing, Progressing  Intervention: Monitor and Manage Glycemia  Recent Flowsheet Documentation  Taken 12/4/2022 0800 by Fabrice Mondragon RN  Glycemic Management: blood glucose monitored     Problem: Fall Injury Risk  Goal: Absence of Fall and Fall-Related Injury  Outcome: Ongoing, Progressing  Intervention: Identify and Manage Contributors  Recent Flowsheet Documentation  Taken 12/4/2022 0800 by Fabrice Mondragon RN  Medication Review/Management: medications reviewed  Intervention: Promote Injury-Free Environment  Recent Flowsheet  Documentation  Taken 12/4/2022 1800 by Fabrice Mondragon RN  Safety Promotion/Fall Prevention:   activity supervised   assistive device/personal items within reach   clutter free environment maintained   fall prevention program maintained   gait belt   lighting adjusted   mobility aid in reach   muscle strengthening facilitated   nonskid shoes/slippers when out of bed   room organization consistent   safety round/check completed  Taken 12/4/2022 1600 by Fabrice Mondragon RN  Safety Promotion/Fall Prevention:   activity supervised   assistive device/personal items within reach   clutter free environment maintained   gait belt   fall prevention program maintained   lighting adjusted   mobility aid in reach   nonskid shoes/slippers when out of bed   muscle strengthening facilitated   room organization consistent   safety round/check completed  Taken 12/4/2022 1400 by Fabrice Mondragon RN  Safety Promotion/Fall Prevention:   activity supervised   assistive device/personal items within reach   clutter free environment maintained   fall prevention program maintained   gait belt   lighting adjusted   mobility aid in reach   muscle strengthening facilitated   nonskid shoes/slippers when out of bed   room organization consistent   safety round/check completed  Taken 12/4/2022 1200 by Fabrice Mondragon RN  Safety Promotion/Fall Prevention:   activity supervised   assistive device/personal items within reach   clutter free environment maintained   fall prevention program maintained   gait belt   lighting adjusted   mobility aid in reach   muscle strengthening facilitated   nonskid shoes/slippers when out of bed   room organization consistent   safety round/check completed  Taken 12/4/2022 1000 by Fabrice Mondragon RN  Safety Promotion/Fall Prevention:   activity supervised   assistive device/personal items within reach   clutter free environment maintained   fall prevention program maintained   gait belt   lighting adjusted    mobility aid in reach   muscle strengthening facilitated   nonskid shoes/slippers when out of bed   room organization consistent   safety round/check completed  Taken 12/4/2022 0800 by Fabrice Mondragon, RN  Safety Promotion/Fall Prevention:   activity supervised   clutter free environment maintained   assistive device/personal items within reach   fall prevention program maintained   gait belt   lighting adjusted   mobility aid in reach   muscle strengthening facilitated   nonskid shoes/slippers when out of bed   safety round/check completed   room organization consistent     Problem: Skin Injury Risk Increased  Goal: Skin Health and Integrity  Outcome: Ongoing, Progressing  Intervention: Optimize Skin Protection  Recent Flowsheet Documentation  Taken 12/4/2022 0800 by Fabrice Mondragon, RN  Head of Bed (HOB) Positioning: HOB lowered   Goal Outcome Evaluation:

## 2022-12-04 NOTE — PROGRESS NOTES
Name: Christie Avendaño ADMIT: 2022   : 1965  PCP: Tiffany Holloway MD    MRN: [id number] LOS: 12 days   AGE/SEX: 57 y.o. female  ROOM: [facility]     Subjective   Subjective   No new issues overnight.  Worked with physical therapy and despite a slight lean to the left feels like she is doing okay.       Objective   Objective   Vital Signs  Temp:  [97.8 °F (36.6 °C)-98.3 °F (36.8 °C)] 98.1 °F (36.7 °C)  Heart Rate:  [65-69] 65  Resp:  [16-18] 18  BP: (131-152)/(77-87) 131/77  SpO2:  [95 %-97 %] 96 %  on   ;   Device (Oxygen Therapy): room air  Body mass index is 41.93 kg/m².  Physical Exam  Constitutional:       General: She is not in acute distress.     Appearance: She is not toxic-appearing.   HENT:      Head:      Comments: Biopsy site right forehead, cdi + staples  Cardiovascular:      Rate and Rhythm: Normal rate and regular rhythm.      Heart sounds: Normal heart sounds.   Pulmonary:      Effort: Pulmonary effort is normal.      Breath sounds: Normal breath sounds.   Abdominal:      General: Bowel sounds are normal.      Palpations: Abdomen is soft.   Musculoskeletal:         General: No tenderness.      Right lower leg: No edema.      Left lower leg: No edema.   Neurological:      Mental Status: She is alert.   Psychiatric:         Mood and Affect: Mood normal.         Behavior: Behavior normal.       Results Review     I reviewed the patient's new clinical results.  Results from last 7 days   Lab Units 22  0913 22  1133 22  0705 22  0401   WBC 10*3/mm3 13.91* 14.32* 13.97* 15.25*   HEMOGLOBIN g/dL 12.3 11.9* 11.9* 12.0   PLATELETS 10*3/mm3 253 263 248 262     Results from last 7 days   Lab Units 22  0913 22  1133 22  0705 22  0401   SODIUM mmol/L 136 131* 136 137   POTASSIUM mmol/L 4.3 4.3 4.3 4.4   CHLORIDE mmol/L 98 97* 102 100   CO2 mmol/L 25.5 23.0 26.0 23.0   BUN mg/dL 17 21* 18 21*   CREATININE mg/dL 0.83 0.77 0.78 0.78   GLUCOSE mg/dL 300* 288*  153* 136*   Estimated Creatinine Clearance: 93.1 mL/min (by C-G formula based on SCr of 0.83 mg/dL).  Results from last 7 days   Lab Units 11/28/22  1208   ALBUMIN g/dL 3.80   BILIRUBIN mg/dL 0.5   ALK PHOS U/L 96   AST (SGOT) U/L 71*   ALT (SGPT) U/L 143*     Results from last 7 days   Lab Units 12/02/22  0913 12/01/22  1133 11/30/22  0705 11/29/22  0401 11/28/22  1208   CALCIUM mg/dL 8.6 8.4* 8.2* 8.3* 8.6   ALBUMIN g/dL  --   --   --   --  3.80         Glucose   Date/Time Value Ref Range Status   12/04/2022 0509 222 (H) 70 - 130 mg/dL Final     Comment:     Meter: [id number] : [id number] Patricia Moreau    12/04/2022 0010 247 (H) 70 - 130 mg/dL Final     Comment:     Meter: [id number] : [id number] Patricia Moreau    12/03/2022 1559 227 (H) 70 - 130 mg/dL Final     Comment:     RN Notified R and V Meter: [id number] : [id number] Madhu Elizaldeine    12/03/2022 1133 329 (H) 70 - 130 mg/dL Final     Comment:     Meter: [id number] : [id number] Hatchettpa Mota ARIANNE   12/03/2022 0559 180 (H) 70 - 130 mg/dL Final     Comment:     Meter: [id number] : [id number] Santo ACUÑA   12/03/2022 0022 255 (H) 70 - 130 mg/dL Final     Comment:     Meter: [id number] : [id number] Santo ACUÑA   12/02/2022 2049 171 (H) 70 - 130 mg/dL Final     Comment:     Meter: [id number] : [id number] Santo ACUÑA       Scheduled Medications  atorvastatin, 40 mg, Oral, Nightly  dexamethasone, 4 mg, Oral, Q8H  escitalopram, 10 mg, Oral, Daily  insulin glargine, 15 Units, Subcutaneous, QAM  insulin lispro, 0-24 Units, Subcutaneous, Q6H  insulin lispro, 5 Units, Subcutaneous, TID With Meals  latanoprost, 1 drop, Both Eyes, Nightly  levETIRAcetam, 500 mg, Oral, BID  levothyroxine, 150 mcg, Oral, Daily  lisinopril, 20 mg, Oral, Daily  pantoprazole, 40 mg, Oral, Q AM  polyethylene glycol, 17 g, Oral, Daily  sodium chloride, 10 mL, Intravenous, Q12H  valACYclovir, 2,000 mg, Oral, BID    Infusions   Diet  Diet:  Regular/House Diet, Diabetic Diets, Vegetarian; Lacto-Ovo Vegetarian (Allows dairy, eggs); Consistent Carbohydrate; Texture: Regular Texture (IDDSI 7); Fluid Consistency: Thin (IDDSI 0)       Assessment/Plan     Active Hospital Problems    Diagnosis  POA   • **High grade glioma  [C71.9]  Yes   • 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       57 y.o. female admitted with new brain tumor seen on outpatient MRI.  She is s/p right frontal stereotactic needle brain biopsy on 11/28.  Pathology consistent with high-grade glioma.  Awaiting additional report from [facility].    Continue current supportive efforts.  Continue physical therapy while awaiting hopeful transfer to [facility].  Medically seems stable.  On Keppra and steroids.  Oncology following.  Final pathology from [facility] pending though high-grade glioma initially identified here.  She is expected to need chemotherapy and radiation.  Tumor not amenable to resection.     AC insulin added yesterday but not until dinnertime.  Will see how he does today on this regimen and consider increasing both Lantus and mealtime insulin if blood sugars remain elevated throughout today      · SCDs for DVT prophylaxis.  · Full code.  · Discussed with patient.  · Anticipate discharge to [facility] once arrangements have been made if okay with all      Shelton Padgett MD  [facility]  12/04/22  09:00 EST      ADDENDUM  BS still quite elevated.  Will increase basal/bolus insulin.     Electronically signed by Shelton Padgett MD, 12/04/22, 3:23 PM EST.

## 2022-12-04 NOTE — PLAN OF CARE
No changes throughout shift. No concerns or complaints noted.     Problem: Adult Inpatient Plan of Care  Goal: Absence of Hospital-Acquired Illness or Injury  Outcome: Ongoing, Progressing  Intervention: Identify and Manage Fall Risk  Recent Flowsheet Documentation  Taken 12/4/2022 0615 by Jodi Bowden RN  Safety Promotion/Fall Prevention: safety round/check completed  Taken 12/4/2022 0430 by Jodi Bowden RN  Safety Promotion/Fall Prevention: safety round/check completed  Taken 12/4/2022 0210 by Jodi Bowden RN  Safety Promotion/Fall Prevention: safety round/check completed  Taken 12/4/2022 0035 by Jodi Bowden RN  Safety Promotion/Fall Prevention: safety round/check completed  Taken 12/3/2022 2215 by Jodi Bowden RN  Safety Promotion/Fall Prevention:   safety round/check completed   fall prevention program maintained   activity supervised   assistive device/personal items within reach  Intervention: Prevent Skin Injury  Recent Flowsheet Documentation  Taken 12/3/2022 2215 by Jodi Bowden RN  Body Position:   position changed independently   legs elevated   sitting up in bed   weight shifting  Intervention: Prevent and Manage VTE (Venous Thromboembolism) Risk  Recent Flowsheet Documentation  Taken 12/3/2022 2215 by Jodi Bowden RN  Activity Management:   activity adjusted per tolerance   activity encouraged  Range of Motion:   active ROM (range of motion) encouraged   ROM (range of motion) performed  Intervention: Prevent Infection  Recent Flowsheet Documentation  Taken 12/3/2022 2215 by Jodi Bowden RN  Infection Prevention:   environmental surveillance performed   hand hygiene promoted   rest/sleep promoted   single patient room provided     Problem: Adult Inpatient Plan of Care  Goal: Optimal Comfort and Wellbeing  Outcome: Ongoing, Progressing  Intervention: Monitor Pain and Promote Comfort  Recent Flowsheet Documentation  Taken 12/3/2022 2215 by Jodi Bowden RN  Pain  Management Interventions:   see MAR   quiet environment facilitated   position adjusted   pillow support provided   care clustered  Intervention: Provide Person-Centered Care  Recent Flowsheet Documentation  Taken 12/3/2022 2215 by Jodi Bowden RN  Trust Relationship/Rapport:   care explained   choices provided   questions answered   questions encouraged     Problem: Diabetes Comorbidity  Goal: Blood Glucose Level Within Targeted Range  Outcome: Ongoing, Progressing  Intervention: Monitor and Manage Glycemia  Recent Flowsheet Documentation  Taken 12/3/2022 2215 by Jodi Bowden RN  Glycemic Management:   blood glucose monitored   supplemental insulin given     Problem: Fall Injury Risk  Goal: Absence of Fall and Fall-Related Injury  Outcome: Ongoing, Progressing  Intervention: Identify and Manage Contributors  Recent Flowsheet Documentation  Taken 12/3/2022 2215 by Jodi Bowden RN  Medication Review/Management: medications reviewed  Self-Care Promotion: independence encouraged  Intervention: Promote Injury-Free Environment  Recent Flowsheet Documentation  Taken 12/4/2022 0615 by Jodi Bowden RN  Safety Promotion/Fall Prevention: safety round/check completed  Taken 12/4/2022 0430 by Jodi Bowden RN  Safety Promotion/Fall Prevention: safety round/check completed  Taken 12/4/2022 0210 by Jodi Bowden RN  Safety Promotion/Fall Prevention: safety round/check completed  Taken 12/4/2022 0035 by Jodi Bowden RN  Safety Promotion/Fall Prevention: safety round/check completed  Taken 12/3/2022 2215 by Jodi Bowden RN  Safety Promotion/Fall Prevention:   safety round/check completed   fall prevention program maintained   activity supervised   assistive device/personal items within reach   Goal Outcome Evaluation:  Plan of Care Reviewed With: patient

## 2022-12-05 ENCOUNTER — DOCUMENTATION (OUTPATIENT)
Dept: ONCOLOGY | Facility: CLINIC | Age: 57
End: 2022-12-05

## 2022-12-05 ENCOUNTER — SPECIALTY PHARMACY (OUTPATIENT)
Dept: PHARMACY | Facility: HOSPITAL | Age: 57
End: 2022-12-05

## 2022-12-05 LAB
GLUCOSE BLDC GLUCOMTR-MCNC: 107 MG/DL (ref 70–130)
GLUCOSE BLDC GLUCOMTR-MCNC: 139 MG/DL (ref 70–130)
GLUCOSE BLDC GLUCOMTR-MCNC: 140 MG/DL (ref 70–130)
GLUCOSE BLDC GLUCOMTR-MCNC: 172 MG/DL (ref 70–130)
GLUCOSE BLDC GLUCOMTR-MCNC: 195 MG/DL (ref 70–130)

## 2022-12-05 PROCEDURE — 63710000001 DEXAMETHASONE PER 0.25 MG: Performed by: NEUROLOGICAL SURGERY

## 2022-12-05 PROCEDURE — 97110 THERAPEUTIC EXERCISES: CPT | Performed by: PHYSICAL THERAPIST

## 2022-12-05 PROCEDURE — 99232 SBSQ HOSP IP/OBS MODERATE 35: CPT | Performed by: INTERNAL MEDICINE

## 2022-12-05 PROCEDURE — 63710000001 INSULIN LISPRO (HUMAN) PER 5 UNITS: Performed by: HOSPITALIST

## 2022-12-05 PROCEDURE — 63710000001 INSULIN LISPRO (HUMAN) PER 5 UNITS: Performed by: INTERNAL MEDICINE

## 2022-12-05 PROCEDURE — 82962 GLUCOSE BLOOD TEST: CPT

## 2022-12-05 RX ADMIN — DEXAMETHASONE 4 MG: 4 TABLET ORAL at 14:26

## 2022-12-05 RX ADMIN — INSULIN LISPRO 4 UNITS: 100 INJECTION, SOLUTION INTRAVENOUS; SUBCUTANEOUS at 12:16

## 2022-12-05 RX ADMIN — INSULIN LISPRO 8 UNITS: 100 INJECTION, SOLUTION INTRAVENOUS; SUBCUTANEOUS at 12:17

## 2022-12-05 RX ADMIN — LEVETIRACETAM 500 MG: 500 TABLET, FILM COATED ORAL at 21:16

## 2022-12-05 RX ADMIN — DEXAMETHASONE 4 MG: 4 TABLET ORAL at 21:16

## 2022-12-05 RX ADMIN — ATORVASTATIN CALCIUM 40 MG: 20 TABLET, FILM COATED ORAL at 21:16

## 2022-12-05 RX ADMIN — POLYETHYLENE GLYCOL 3350 17 G: 17 POWDER, FOR SOLUTION ORAL at 09:13

## 2022-12-05 RX ADMIN — INSULIN LISPRO 8 UNITS: 100 INJECTION, SOLUTION INTRAVENOUS; SUBCUTANEOUS at 09:13

## 2022-12-05 RX ADMIN — PANTOPRAZOLE SODIUM 40 MG: 40 TABLET, DELAYED RELEASE ORAL at 06:12

## 2022-12-05 RX ADMIN — LATANOPROST 1 DROP: 50 SOLUTION OPHTHALMIC at 21:16

## 2022-12-05 RX ADMIN — LISINOPRIL 20 MG: 20 TABLET ORAL at 09:12

## 2022-12-05 RX ADMIN — VALACYCLOVIR HYDROCHLORIDE 2000 MG: 500 TABLET, FILM COATED ORAL at 21:16

## 2022-12-05 RX ADMIN — Medication 10 ML: at 09:13

## 2022-12-05 RX ADMIN — ESCITALOPRAM 10 MG: 10 TABLET, FILM COATED ORAL at 09:12

## 2022-12-05 RX ADMIN — LEVETIRACETAM 500 MG: 500 TABLET, FILM COATED ORAL at 09:12

## 2022-12-05 RX ADMIN — Medication 10 ML: at 21:16

## 2022-12-05 RX ADMIN — INSULIN GLARGINE-YFGN 25 UNITS: 100 INJECTION, SOLUTION SUBCUTANEOUS at 09:12

## 2022-12-05 RX ADMIN — LEVOTHYROXINE SODIUM 150 MCG: 0.15 TABLET ORAL at 09:12

## 2022-12-05 RX ADMIN — INSULIN LISPRO 4 UNITS: 100 INJECTION, SOLUTION INTRAVENOUS; SUBCUTANEOUS at 06:47

## 2022-12-05 RX ADMIN — DEXAMETHASONE 4 MG: 4 TABLET ORAL at 06:11

## 2022-12-05 RX ADMIN — VALACYCLOVIR HYDROCHLORIDE 2000 MG: 500 TABLET, FILM COATED ORAL at 09:12

## 2022-12-05 NOTE — THERAPY TREATMENT NOTE
Patient Name: Christie Avendaño  : 1965    MRN: [id number]                              Today's Date: 2022       Admit Date: 2022    Visit Dx:     ICD-10-CM ICD-9-CM   1. Brain mass  G93.89 348.89   2. BRCA2 gene mutation positive in female  Z15.01 V84.01    Z15.02 V84.02    Z15.09 V84.09   3. Type 2 diabetes mellitus without complication, without long-term current use of insulin (HCC)  E11.9 250.00   4. 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)   • Non-small cell lung cancer, right (HCC)   • Renal mass, right   • SIRS (systemic inflammatory response syndrome) (HCC)   • Hyperlipidemia   • Malignant melanoma of right lower extremity including hip (HCC)   • High grade glioma    • 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)     Past Medical History:   Diagnosis Date   • Arthritis    • Asthma    • BRCA2 positive    • Diabetes mellitus (HCC)    • H/O Liver masses 2017    x3   • H/O Lung masses 2017    1 in right lower lobe and 1 in the left infrahilar location   • H/O Ovarian cyst    • H/O Right adrenal mass (CMS/HCC) 2017   • Lung cancer (HCC)    • Melanoma (HCC)     right foot   • PONV (postoperative nausea and vomiting)    • Thyroid disease      Past Surgical History:   Procedure Laterality Date   • APPENDECTOMY     • BRAIN BIOPSY Right 2022    Procedure: Right frontal stereotactic needle brain biopsy;  Surgeon: Manuel Thompson MD;  Location: [facility];  Service: Neurosurgery;  Laterality: Right;   • BREAST IMPLANT  SURGERY Bilateral    • CHOLECYSTECTOMY     • HYSTERECTOMY     • MASTECTOMY Bilateral    • OVARIAN CYST SURGERY     • THORACOSCOPY VIDEO ASSISTED WITH LOBECTOMY Right 10/9/2020    Procedure: BRONCHOSCOPY, THORACOSCOPY VIDEO ASSISTED WITH ANTERIOR BASAL SEGMENTECTOMY, INTERCOSTAL NERVE BLOCK;  Surgeon: Flaca Crisostomo MD;  Location: [facility];  Service: Thoracic;  Laterality: Right;   • TONSILLECTOMY        General Information     Row Name 12/05/22 1341          Physical Therapy Time and Intention    Document Type therapy note (daily note)  -     Mode of Treatment physical therapy  -           User Key  (r) = Recorded By, (t) = Taken By, (c) = Cosigned By    Initials Name Provider Type     iMriam Mendiola, PT Physical Therapist               Mobility     Row Name 12/05/22 1341          Bed Mobility    Bed Mobility supine-sit;sit-supine  -     Scooting/Bridging Shoshone (Bed Mobility) minimum assist (75% patient effort);1 person assist  -     Supine-Sit Shoshone (Bed Mobility) contact guard;verbal cues  -     Sit-Supine Shoshone (Bed Mobility) minimum assist (75% patient effort);1 person assist  -     Assistive Device (Bed Mobility) bed rails;head of bed elevated  -     Row Name 12/05/22 1341          Sit-Stand Transfer    Sit-Stand Shoshone (Transfers) minimum assist (75% patient effort);1 person assist  -     Assistive Device (Sit-Stand Transfers) walker, front-wheeled  -     Row Name 12/05/22 1341          Gait/Stairs (Locomotion)    Shoshone Level (Gait) minimum assist (75% patient effort);verbal cues;nonverbal cues (demo/gesture)  -     Assistive Device (Gait) walker, front-wheeled  -     Distance in Feet (Gait) 65 ft  -     Deviations/Abnormal Patterns (Gait) fabrizio decreased;stride length decreased;ataxic;base of support, narrow  -     Comment, (Gait/Stairs) Decreased step length and more noticeable L lean at pt fatigues, assist with walker when  turning  -           User Key  (r) = Recorded By, (t) = Taken By, (c) = Cosigned By    Initials Name Provider Type    Miriam Stanford PT Physical Therapist               Obj/Interventions     Row Name 12/05/22 1342          Motor Skills    Therapeutic Exercise --  BLE AP, LAQ, seated marching x 10 reps  -AVANI           User Key  (r) = Recorded By, (t) = Taken By, (c) = Cosigned By    Initials Name Provider Type    Miriam Stanford PT Physical Therapist               Goals/Plan    No documentation.                Clinical Impression     Row Name 12/05/22 1343          Pain    Pretreatment Pain Rating 0/10 - no pain  -AVANI     Posttreatment Pain Rating 0/10 - no pain  -AVANI     Row Name 12/05/22 1343          Plan of Care Review    Plan of Care Reviewed With patient  -     Outcome Evaluation Pt was in bed and agreeable to therapy. Pt ambualted 65ft with Rwx. Decreased step length and increased L lean as pt fatigued. Improved control of walker today, not verring to the Left. Assist with Rwx once when turning as pt fatigued.  -     Row Name 12/05/22 1343          Positioning and Restraints    Pre-Treatment Position in bed  -     Post Treatment Position bed  -     In Bed fowlers;call light within reach;encouraged to call for assist;exit alarm on;notified ns  -           User Key  (r) = Recorded By, (t) = Taken By, (c) = Cosigned By    Initials Name Provider Type    Mriiam Stanford, VERONIKA Physical Therapist               Outcome Measures     Row Name 12/05/22 1345 12/05/22 0912       How much help from another person do you currently need...    Turning from your back to your side while in flat bed without using bedrails? 3  -AVANI 3  -TF    Moving from lying on back to sitting on the side of a flat bed without bedrails? 3  -AVANI 3  -TF    Moving to and from a bed to a chair (including a wheelchair)? 3  -AVANI 3  -TF    Standing up from a chair using your arms (e.g., wheelchair, bedside  chair)? 3  -KH 3  -TF    Climbing 3-5 steps with a railing? 2  -KH 2  -TF    To walk in hospital room? 3  -KH 3  -TF    AM-PAC 6 Clicks Score (PT) 17  -KH 17  -TF    Highest level of mobility 5 --> Static standing  -KH 5 --> Static standing  -TF    Row Name 12/05/22 1345          Functional Assessment    Outcome Measure Options AM-PAC 6 Clicks Basic Mobility (PT)  -           User Key  (r) = Recorded By, (t) = Taken By, (c) = Cosigned By    Initials Name Provider Type    Miriam Stanford, PT Physical Therapist    Elidia Lozano RN Registered Nurse                             Physical Therapy Education     Title: PT OT SLP Therapies (In Progress)     Topic: Physical Therapy (Done)     Point: Mobility training (Done)     Learning Progress Summary           Patient Acceptance, E, VU by  at 12/5/2022 1345    Acceptance, E,TB,D, VU,NR by EJ at 12/1/2022 1308    Acceptance, E,TB, VU,NR by AE at 11/23/2022 1422   Family Acceptance, E,TB, VU,NR by AE at 11/23/2022 1422   Significant Other Acceptance, E,TB, VU,NR by AE at 11/23/2022 1422                   Point: Home exercise program (Done)     Learning Progress Summary           Patient Acceptance, E, VU by  at 12/5/2022 1345    Acceptance, E,TB, VU,NR by AE at 11/23/2022 1422   Family Acceptance, E,TB, VU,NR by AE at 11/23/2022 1422   Significant Other Acceptance, E,TB, VU,NR by AE at 11/23/2022 1422                   Point: Body mechanics (Done)     Learning Progress Summary           Patient Acceptance, E, VU by KH at 12/5/2022 1345    Acceptance, E,TB, VU,NR by AE at 11/23/2022 1422   Family Acceptance, E,TB, VU,NR by AE at 11/23/2022 1422   Significant Other Acceptance, E,TB, VU,NR by AE at 11/23/2022 1422                   Point: Precautions (Done)     Learning Progress Summary           Patient Acceptance, E, VU by  at 12/5/2022 1345    Acceptance, E,TB, VU,NR by AE at 11/23/2022 1422   Family Acceptance, E,TB, VU,NR by AE at 11/23/2022 1422    Significant Other Acceptance, E,TB, VU,NR by AE at 11/23/2022 1422                               User Key     Initials Effective Dates Name Provider Type Discipline    AVANI 06/16/21 -  Miriam Mendiola, PT Physical Therapist PT    EJ 06/16/21 -  Yarely Hull PT Physical Therapist PT    AE 06/16/21 -  Meredith Jones PT Physical Therapist PT              PT Recommendation and Plan     Plan of Care Reviewed With: patient  Outcome Evaluation: Pt was in bed and agreeable to therapy. Pt ambualted 65ft with Rwx. Decreased step length and increased L lean as pt fatigued. Improved control of walker today, not verring to the Left. Assist with Rwx once when turning as pt fatigued.     Time Calculation:    PT Charges     Row Name 12/05/22 1345             Time Calculation    Start Time 0935  -      Stop Time 0952  -      Time Calculation (min) 17 min  -      PT Received On 12/05/22  -      PT - Next Appointment 12/06/22  -         Time Calculation- PT    Total Timed Code Minutes- PT 17 minute(s)  -         Timed Charges    95652 - PT Therapeutic Exercise Minutes 17  -      52955 - Gait Training Minutes  --  -         Total Minutes    Timed Charges Total Minutes 17  -KH       Total Minutes 17  -KH            User Key  (r) = Recorded By, (t) = Taken By, (c) = Cosigned By    Initials Name Provider Type    Miriam Stanford, PT Physical Therapist              Therapy Charges for Today     Code Description Service Date Service Provider Modifiers Qty    46007734640 HC PT THER PROC EA 15 MIN 12/5/2022 Miriam Mendiola, PT GP 1          PT G-Codes  Outcome Measure Options: AM-PAC 6 Clicks Basic Mobility (PT)  AM-PAC 6 Clicks Score (PT): 17  AM-PAC 6 Clicks Score (OT): 15       Miriam Mendiola PT  12/5/2022

## 2022-12-05 NOTE — PROGRESS NOTES
[facility]    Met with patient and patient spouse to discuss acute rehab and d/c plans. Patient is appropriate and has assistance for 24/7 if needed. She is accepted for inpatient acute rehab pending review with medical director. Precert can be initiated Monday.    Sindy Feng RN  Rehab Admissions Coordinator  [phone]

## 2022-12-05 NOTE — PLAN OF CARE
Goal Outcome Evaluation:   Alert and oriented. Generalized weakness noted, but left upper ext weaker than right. Up to BR with assist x1, walker, and gait belt. Tolerated diet. Continent of bladder. No complaints.

## 2022-12-05 NOTE — PROGRESS NOTES
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  Tylenol is helping with her headaches.  No nausea or vomiting.  She reports improvement in the strength of the left hand and forearm.  However, she reports that she is leaning toward the left when she is standing and walking.    REVIEW OF SYSTEMS:  A comprehensive review of systems was obtained with pertinent positive findings as noted in the interval history above.  All other systems negative.    SCHEDULED MEDS:  atorvastatin, 40 mg, Oral, Nightly  dexamethasone, 4 mg, Oral, Q8H  escitalopram, 10 mg, Oral, Daily  insulin glargine, 25 Units, Subcutaneous, QAM  insulin lispro, 0-24 Units, Subcutaneous, Q6H  insulin lispro, 8 Units, Subcutaneous, TID With Meals  latanoprost, 1 drop, Both Eyes, Nightly  levETIRAcetam, 500 mg, Oral, BID  levothyroxine, 150 mcg, Oral, Daily  lisinopril, 20 mg, Oral, Daily  pantoprazole, 40 mg, Oral, Q AM  polyethylene glycol, 17 g, Oral, Daily  sodium chloride, 10 mL, Intravenous, Q12H  valACYclovir, 2,000 mg, Oral, BID      INFUSIONS:     PRN MEDS:  •  acetaminophen **OR** acetaminophen  •  calcium carbonate  •  dextrose  •  dextrose  •  famotidine  •  glucagon (human recombinant)  •  nitroglycerin  •  ondansetron **OR** ondansetron  •  senna-docusate sodium    Objective   VITAL SIGNS:  Temp:  [97.8 °F (36.6 °C)-98.7 °F (37.1 °C)] 98.7 °F (37.1 °C)  Heart Rate:  [56-76] 68  Resp:  [18] 18  BP: (135-151)/(80-93) 137/81     PHYSICAL EXAMINATION:  GENERAL:  The patient appears in fair general condition, not in acute distress.  SKIN: No skin rash. No ecchymosis.   HEAD:   Normocephalic.  EYES:  No Jaundice. No Pallor.   NECK:  Supple. No Masses.  LYMPHATICS:  No cervical or supraclavicular lymphadenopathy.  CHEST: Normal respiratory effort. Lungs clear to auscultation.   CARDIAC:  Normal S1 & S2. No murmur.   ABDOMEN:  Soft. No tenderness. No Hepatomegaly. No Splenomegaly.   EXTREMITIES:  No noted deformity.   PSYCH: Normal mood and affect.   NEURO: Strength 4/5 in the left upper and lower extremities.    RESULT REVIEW:   Results from last 7 days   Lab Units 12/02/22  0913 12/01/22 1133 11/30/22  0705 11/29/22  0401   WBC 10*3/mm3 13.91* 14.32* 13.97* 15.25*   NEUTROS ABS 10*3/mm3  --   --  11.27* 12.24*   HEMOGLOBIN g/dL 12.3 11.9* 11.9* 12.0   HEMATOCRIT % 38.0 35.1 36.2 36.6   PLATELETS 10*3/mm3 253 263 248 262     Results from last 7 days   Lab Units 12/02/22  0913 12/01/22 1133 11/30/22 0705 11/29/22  0401 11/28/22  1208   SODIUM mmol/L 136 131* 136 137 136   POTASSIUM mmol/L 4.3 4.3 4.3 4.4 4.5   CHLORIDE mmol/L 98 97* 102 100 99   CO2 mmol/L 25.5 23.0 26.0 23.0 25.0   BUN mg/dL 17 21* 18 21* 19   CREATININE mg/dL 0.83 0.77 0.78 0.78 0.78   CALCIUM mg/dL 8.6 8.4* 8.2* 8.3* 8.6   ALBUMIN g/dL  --   --   --   --  3.80   BILIRUBIN mg/dL  --   --   --   --  0.5   ALK PHOS U/L  --   --   --   --  96   ALT (SGPT) U/L  --   --   --   --  143*   AST (SGOT) U/L  --   --   --   --  71*     Results from last 7 days   Lab Units 11/29/22  0401   INR  1.01   APTT seconds 25.4     Assessment & Plan   *High grade glioma.   · Patient presented with recurrent falls and confusion.  · MRI on 11/22/2022 revealed a 3.2 cm ring-enhancing right supratentorial mass.  There was mass-effect and left midline shift.  · She was started on dexamethasone and Keppra.  · CT chest abdomen pelvis on 11/24/2022 revealed no evidence of progressive metastatic disease.  · S/p stereotactic brain biopsy on 11/28/2022.  · Preliminary pathology revealed high-grade glioma.  It was sent to [facility]  [place].  · Preliminary revealed high-grade glioma.  · Patient had radiation simulation on 12/2/2022.  · Plan is to start concurrent low-dose Temodar at 75 mg/m2 daily.  · She is currently on Decadron 4 mg p.o. every 8 hours.    *Germline BRCA2 and GEORGES mutations.  · Patient has history of Papillary thyroid cancer, Bilateral DCIS, Left clear cell renal cell carcinoma, Left lower lobe NSC Lung cancer (adenocarcinoma with lipidic growth pattern), Right lower lobe NSC lung (adenocarcinoma with lipidic growth pattern) and Melanoma of right heel.  · Please see the note from Dr. Collins, the patient's outpatient oncologist from 12/4/2022.    *Seizure prophylaxis.  · Patient is on Keppra 500 mg p.o. twice daily.    *Disposition  · Await approval for acute rehab stay.    PLAN:    1.  Continue physical therapy.  2.  Await transfer to acute rehab.  3.  Plan to start chemoradiation with Temodar on 12/7/2022 or 12/8/2022.    Discussed with the pharmacist.      Laura Flores MD  12/05/22

## 2022-12-05 NOTE — PROGRESS NOTES
Specialty Pharmacy Refill Coordination Note     Christie is a 57 y.o. female contacted today regarding refills of  Temodar specialty medication(s).    Reviewed and verified with patient:       Specialty medication(s) and dose(s) confirmed: yes        Delivery Questions    Flowsheet Row Most Recent Value   Delivery method Other (Comment)  [Beeline to [facility] 12/5]   Delivery address correct? Yes   Number of medications in delivery 3   Medication being filled and delivered Temodar 3 strengths   Doses left of specialty medications 0- new start   Is there any medication that is due not being filled? No   Supplies needed? No supplies needed   Cooler needed? No   Do any medications need mixed or dated? No   Copay form of payment Credit card on file   Questions or concerns for the pharmacist? No   Are any medications first time fills? Yes  [new start]                 Follow-up: 3 week(s)     Yvette Orr  Specialty Pharmacy Technician

## 2022-12-05 NOTE — PROGRESS NOTES
Continued Stay Note  [facility]     Patient Name: Christie Avendaño  MRN: [id number]  Today's Date: 12/5/2022    Admit Date: 11/22/2022    Plan: [facility] acute rehab pending [facility] pre-cert   Discharge Plan     Row Name 12/05/22 1426       Plan    Plan [facility] acute rehab pending [facility] pre-cert    Patient/Family in Agreement with Plan yes    Plan Comments Per [facility] acute rehab accepts pt pending [facility] pre-cert, initiated today. CCP to follow. Trinidad Romero LCSW               Discharge Codes    No documentation.               Expected Discharge Date and Time     Expected Discharge Date Expected Discharge Time    Dec 6, 2022             Keily Romero LCSW

## 2022-12-05 NOTE — PROGRESS NOTES
[facility] Acute Inpt Re[facility]    Discussed with Dr. Araujo, accepted to acute rehab.  Precert started with Parul Lopez, [facility]  Acute Rehab Admission Nurse

## 2022-12-05 NOTE — PLAN OF CARE
Goal Outcome Evaluation:  Plan of Care Reviewed With: patient           Outcome Evaluation: Pt was in bed and agreeable to therapy. Pt ambualted 65ft with Rwx. Decreased step length and increased L lean as pt fatigued. Improved control of walker today, not verring to the Left. Assist with Rwx once when turning as pt fatigued.

## 2022-12-05 NOTE — PLAN OF CARE
Problem: Adult Inpatient Plan of Care  Goal: Plan of Care Review  Outcome: Ongoing, Progressing  Flowsheets (Taken 12/5/2022 0514)  Progress: improving  Outcome Evaluation: A&Ox4, VSS, denies pain, up with assist of 1 to BSC, incision cdi and dc, noted weakness of left side, plan of care continues.

## 2022-12-05 NOTE — PROGRESS NOTES
Name: Christie Avendaño ADMIT: 2022   : 1965  PCP: Tiffany Holloway MD    MRN: [id number] LOS: 13 days   AGE/SEX: 57 y.o. female  ROOM: [facility]     Subjective   Subjective   No new complaints.  Doing well with therapy.       Objective   Objective   Vital Signs  Temp:  [97.8 °F (36.6 °C)-98.7 °F (37.1 °C)] 98.4 °F (36.9 °C)  Heart Rate:  [56-76] 60  Resp:  [18] 18  BP: (135-151)/(80-93) 146/81  SpO2:  [95 %-97 %] 97 %  on   ;   Device (Oxygen Therapy): room air  Body mass index is 41.93 kg/m².  Physical Exam  Constitutional:       General: She is not in acute distress.     Appearance: She is not toxic-appearing.   HENT:      Head:      Comments: Biopsy site right forehead, cdi + staples  Cardiovascular:      Rate and Rhythm: Normal rate and regular rhythm.      Heart sounds: Normal heart sounds.   Pulmonary:      Effort: Pulmonary effort is normal.      Breath sounds: Normal breath sounds.   Abdominal:      General: Bowel sounds are normal.      Palpations: Abdomen is soft.   Musculoskeletal:         General: No tenderness.      Right lower leg: No edema.      Left lower leg: No edema.   Neurological:      Mental Status: She is alert.   Psychiatric:         Mood and Affect: Mood normal.         Behavior: Behavior normal.       Results Review     I reviewed the patient's new clinical results.  Results from last 7 days   Lab Units 22  0913 22  1133 22  0705 22  0401   WBC 10*3/mm3 13.91* 14.32* 13.97* 15.25*   HEMOGLOBIN g/dL 12.3 11.9* 11.9* 12.0   PLATELETS 10*3/mm3 253 263 248 262     Results from last 7 days   Lab Units 22  0913 22  1133 22  0705 22  0401   SODIUM mmol/L 136 131* 136 137   POTASSIUM mmol/L 4.3 4.3 4.3 4.4   CHLORIDE mmol/L 98 97* 102 100   CO2 mmol/L 25.5 23.0 26.0 23.0   BUN mg/dL 17 21* 18 21*   CREATININE mg/dL 0.83 0.77 0.78 0.78   GLUCOSE mg/dL 300* 288* 153* 136*   Estimated Creatinine Clearance: 93.1 mL/min (by C-G formula based on  SCr of 0.83 mg/dL).      Results from last 7 days   Lab Units 12/02/22  0913 12/01/22  1133 11/30/22  0705 11/29/22  0401   CALCIUM mg/dL 8.6 8.4* 8.2* 8.3*         Glucose   Date/Time Value Ref Range Status   12/05/2022 1156 195 (H) 70 - 130 mg/dL Final     Comment:     Meter: [id number] : [id number] José Miguel Tike NA   12/05/2022 0644 172 (H) 70 - 130 mg/dL Final     Comment:     Meter: [id number] : [id number] Andrzej Manuel RN   12/05/2022 0009 140 (H) 70 - 130 mg/dL Final     Comment:     Meter: [id number] : [id number] Meganhelen Crumpin NA   12/04/2022 1720 175 (H) 70 - 130 mg/dL Final     Comment:     Meter: [id number] : [id number] José Miguel Tike NA   12/04/2022 1307 298 (H) 70 - 130 mg/dL Final     Comment:     Meter: [id number] : [id number] José Miguel Tike NA   12/04/2022 0509 222 (H) 70 - 130 mg/dL Final     Comment:     Meter: [id number] : [id number] Patricia ACUÑA   12/04/2022 0010 247 (H) 70 - 130 mg/dL Final     Comment:     Meter: [id number] : [id number] Patricia Zamoracorin ACUÑA       Scheduled Medications  atorvastatin, 40 mg, Oral, Nightly  dexamethasone, 4 mg, Oral, Q8H  escitalopram, 10 mg, Oral, Daily  insulin glargine, 25 Units, Subcutaneous, QAM  insulin lispro, 0-24 Units, Subcutaneous, Q6H  insulin lispro, 8 Units, Subcutaneous, TID With Meals  latanoprost, 1 drop, Both Eyes, Nightly  levETIRAcetam, 500 mg, Oral, BID  levothyroxine, 150 mcg, Oral, Daily  lisinopril, 20 mg, Oral, Daily  pantoprazole, 40 mg, Oral, Q AM  polyethylene glycol, 17 g, Oral, Daily  sodium chloride, 10 mL, Intravenous, Q12H  valACYclovir, 2,000 mg, Oral, BID    Infusions   Diet  Diet: Regular/House Diet, Diabetic Diets, Vegetarian; Lacto-Ovo Vegetarian (Allows dairy, eggs); Consistent Carbohydrate; Texture: Regular Texture (IDDSI 7); Fluid Consistency: Thin (IDDSI 0)       Assessment/Plan     Active Hospital Problems    Diagnosis  POA   • **High grade glioma  [C71.9]  Yes   • 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       57 y.o. female admitted with new brain tumor seen on outpatient MRI.  She is s/p right frontal stereotactic needle brain biopsy on 11/28.  Pathology consistent with high-grade glioma.  Awaiting additional report from [facility].    Continue current supportive care.  Continue physical therapy while awaiting evaluation and hopeful acceptance to [facility].  Remains on IV Keppra and dexamethasone.  Oncology following.  Blood sugars have been elevated due to steroids and insulin increased yesterday.  Blood sugars little better today.  Will see how she does on current regimen.    Final pathology from [facility] pending though high-grade glioma initially identified here.  She is expected to need chemotherapy and radiation.  Tumor not amenable to resection.       · SCDs for DVT prophylaxis.  · Full code.  · Discussed with patient.  · Anticipate discharge to [facility] once arrangements have been made if okay with all      Shelton Padgett MD  [facility]  12/05/22  12:27 EST

## 2022-12-05 NOTE — PROGRESS NOTES
I have received Temozolomide (140 mg, 20 mg, and 5 mg) from [facility] and placed it in the [employer] at the [facility] office.      Carrol Crowley  Specialty Pharmacy Technician

## 2022-12-06 LAB
GLUCOSE BLDC GLUCOMTR-MCNC: 133 MG/DL (ref 70–130)
GLUCOSE BLDC GLUCOMTR-MCNC: 140 MG/DL (ref 70–130)
GLUCOSE BLDC GLUCOMTR-MCNC: 160 MG/DL (ref 70–130)
GLUCOSE BLDC GLUCOMTR-MCNC: 180 MG/DL (ref 70–130)
GLUCOSE BLDC GLUCOMTR-MCNC: 215 MG/DL (ref 70–130)

## 2022-12-06 PROCEDURE — 82962 GLUCOSE BLOOD TEST: CPT

## 2022-12-06 PROCEDURE — 63710000001 INSULIN LISPRO (HUMAN) PER 5 UNITS: Performed by: INTERNAL MEDICINE

## 2022-12-06 PROCEDURE — 99231 SBSQ HOSP IP/OBS SF/LOW 25: CPT | Performed by: INTERNAL MEDICINE

## 2022-12-06 PROCEDURE — 63710000001 INSULIN LISPRO (HUMAN) PER 5 UNITS: Performed by: HOSPITALIST

## 2022-12-06 PROCEDURE — 63710000001 DEXAMETHASONE PER 0.25 MG: Performed by: NEUROLOGICAL SURGERY

## 2022-12-06 RX ADMIN — INSULIN LISPRO 8 UNITS: 100 INJECTION, SOLUTION INTRAVENOUS; SUBCUTANEOUS at 17:57

## 2022-12-06 RX ADMIN — LEVETIRACETAM 500 MG: 500 TABLET, FILM COATED ORAL at 21:41

## 2022-12-06 RX ADMIN — INSULIN GLARGINE-YFGN 25 UNITS: 100 INJECTION, SOLUTION SUBCUTANEOUS at 06:38

## 2022-12-06 RX ADMIN — LATANOPROST 1 DROP: 50 SOLUTION OPHTHALMIC at 21:42

## 2022-12-06 RX ADMIN — ESCITALOPRAM 10 MG: 10 TABLET, FILM COATED ORAL at 10:21

## 2022-12-06 RX ADMIN — LEVETIRACETAM 500 MG: 500 TABLET, FILM COATED ORAL at 10:21

## 2022-12-06 RX ADMIN — ACETAMINOPHEN 650 MG: 325 TABLET, FILM COATED ORAL at 10:21

## 2022-12-06 RX ADMIN — INSULIN LISPRO 8 UNITS: 100 INJECTION, SOLUTION INTRAVENOUS; SUBCUTANEOUS at 10:22

## 2022-12-06 RX ADMIN — POLYETHYLENE GLYCOL 3350 17 G: 17 POWDER, FOR SOLUTION ORAL at 10:22

## 2022-12-06 RX ADMIN — INSULIN LISPRO 8 UNITS: 100 INJECTION, SOLUTION INTRAVENOUS; SUBCUTANEOUS at 12:46

## 2022-12-06 RX ADMIN — DEXAMETHASONE 4 MG: 4 TABLET ORAL at 06:38

## 2022-12-06 RX ADMIN — DEXAMETHASONE 4 MG: 4 TABLET ORAL at 21:41

## 2022-12-06 RX ADMIN — DEXAMETHASONE 4 MG: 4 TABLET ORAL at 15:33

## 2022-12-06 RX ADMIN — LEVOTHYROXINE SODIUM 150 MCG: 0.15 TABLET ORAL at 10:21

## 2022-12-06 RX ADMIN — VALACYCLOVIR HYDROCHLORIDE 2000 MG: 500 TABLET, FILM COATED ORAL at 21:41

## 2022-12-06 RX ADMIN — VALACYCLOVIR HYDROCHLORIDE 2000 MG: 500 TABLET, FILM COATED ORAL at 10:21

## 2022-12-06 RX ADMIN — PANTOPRAZOLE SODIUM 40 MG: 40 TABLET, DELAYED RELEASE ORAL at 06:38

## 2022-12-06 RX ADMIN — ACETAMINOPHEN 650 MG: 325 TABLET, FILM COATED ORAL at 21:41

## 2022-12-06 RX ADMIN — INSULIN LISPRO 4 UNITS: 100 INJECTION, SOLUTION INTRAVENOUS; SUBCUTANEOUS at 12:46

## 2022-12-06 RX ADMIN — ATORVASTATIN CALCIUM 40 MG: 20 TABLET, FILM COATED ORAL at 21:41

## 2022-12-06 RX ADMIN — LISINOPRIL 20 MG: 20 TABLET ORAL at 10:21

## 2022-12-06 RX ADMIN — INSULIN LISPRO 4 UNITS: 100 INJECTION, SOLUTION INTRAVENOUS; SUBCUTANEOUS at 00:59

## 2022-12-06 NOTE — PROGRESS NOTES
Subjective     CHIEF COMPLAINT:     High grade glioma  Germline BRCA2 and GEORGES mutations  Papillary thyroid cancer  Bilateral DCIS  Left clear cell renal cell carcinoma   Left lower lobe NSC Lung cancer (adenocarcinoma with lipidic growth pattern)  Right lower lobe NSC lung (adenocarcinoma with lipidic growth pattern)  Melanoma of right heel    INTERVAL HISTORY:     Patient reports some improvement in the strength of the left side.  She worked with physical therapy.  No headaches today.    REVIEW OF SYSTEMS:  A comprehensive review of systems was obtained with pertinent positive findings as noted in the interval history above.  All other systems negative.    SCHEDULED MEDS:  atorvastatin, 40 mg, Oral, Nightly  dexamethasone, 4 mg, Oral, Q8H  escitalopram, 10 mg, Oral, Daily  insulin glargine, 25 Units, Subcutaneous, QAM  insulin lispro, 0-24 Units, Subcutaneous, Q6H  insulin lispro, 8 Units, Subcutaneous, TID With Meals  latanoprost, 1 drop, Both Eyes, Nightly  levETIRAcetam, 500 mg, Oral, BID  levothyroxine, 150 mcg, Oral, Daily  lisinopril, 20 mg, Oral, Daily  pantoprazole, 40 mg, Oral, Q AM  polyethylene glycol, 17 g, Oral, Daily  sodium chloride, 10 mL, Intravenous, Q12H  valACYclovir, 2,000 mg, Oral, BID      INFUSIONS:     PRN MEDS:  •  acetaminophen **OR** acetaminophen  •  calcium carbonate  •  dextrose  •  dextrose  •  famotidine  •  glucagon (human recombinant)  •  nitroglycerin  •  ondansetron **OR** ondansetron  •  senna-docusate sodium    Objective   VITAL SIGNS:  Temp:  [97.6 °F (36.4 °C)-98.5 °F (36.9 °C)] 97.6 °F (36.4 °C)  Heart Rate:  [57-67] 67  Resp:  [18] 18  BP: (116-159)/(69-94) 119/72     PHYSICAL EXAMINATION:   GENERAL:  The patient appears in fair general condition, not in acute distress.  SKIN: No skin rash. No ecchymosis.   HEAD:  Normocephalic.  Biopsy site is well-healed.  EYES:  No Jaundice. No Pallor.   NECK:  Supple. No Masses.  LYMPHATICS:  No cervical or supraclavicular  lymphadenopathy.  CHEST: Normal respiratory effort.  CARDIAC: No edema.  ABDOMEN: Nondistended  EXTREMITIES: No calf tenderness  PSYCH: Normal mood and affect.   NEURO: Strength +4/5 in the left upper and lower extremities.    RESULT REVIEW:   Results from last 7 days   Lab Units 12/02/22  0913 12/01/22  1133 11/30/22  0705   WBC 10*3/mm3 13.91* 14.32* 13.97*   NEUTROS ABS 10*3/mm3  --   --  11.27*   HEMOGLOBIN g/dL 12.3 11.9* 11.9*   HEMATOCRIT % 38.0 35.1 36.2   PLATELETS 10*3/mm3 253 263 248     Results from last 7 days   Lab Units 12/02/22  0913 12/01/22  1133 11/30/22  0705   SODIUM mmol/L 136 131* 136   POTASSIUM mmol/L 4.3 4.3 4.3   CHLORIDE mmol/L 98 97* 102   CO2 mmol/L 25.5 23.0 26.0   BUN mg/dL 17 21* 18   CREATININE mg/dL 0.83 0.77 0.78   CALCIUM mg/dL 8.6 8.4* 8.2*       Assessment & Plan   *High grade glioma.   · Patient presented with recurrent falls and confusion.  · MRI on 11/22/2022 revealed a 3.2 cm ring-enhancing right supratentorial mass.  There was mass-effect and left midline shift.  · She was started on dexamethasone and Keppra.  · CT chest abdomen pelvis on 11/24/2022 revealed no evidence of progressive metastatic disease.  · S/p stereotactic brain biopsy on 11/28/2022.  · Preliminary pathology revealed high-grade glioma.  It was sent to [facility].  · Preliminary revealed high-grade glioma.  · Patient had radiation simulation on 12/2/2022.  · Plan is to start concurrent low-dose Temodar at 75 mg/m2 daily.   · Recommended starting the Temodar at the same time as the radiation therapy-hoping for 12/7/2022 or 12/8/2022.  · She is on Decadron 4 mg p.o. every 8 hours.    *Germline BRCA2 and GEORGES mutations.  · Patient has history of Papillary thyroid cancer, Bilateral DCIS, Left clear cell renal cell carcinoma, Left lower lobe NSC Lung cancer (adenocarcinoma with lipidic growth pattern), Right lower lobe NSC lung (adenocarcinoma with lipidic growth pattern) and Melanoma of right  heel.  · Please see the note from Dr. Collins, the patient's outpatient oncologist from 12/4/2022.    *Seizure prophylaxis.  · Patient is on Keppra 500 mg p.o. twice daily.  · Patient is not having any problems seizures.    *Disposition  · Plan is for acute rehab stay.    PLAN:    1.   We will reevaluate the patient tomorrow to see if she has been approved for acute rehab stay.  2.  Plan to start Temodar along with radiation therapy in 1-2 days.      Laura Flores MD  12/06/22

## 2022-12-06 NOTE — PAYOR COMM NOTE
"Heron Avendaño (57 y.o. Female)     PLEASE SEE ATTACHED FOR CONTINUED INPT STAY DAYS    REF #   [id number]          PLEASE CALL HERON DE LA TORRE RN/ DEPT @ [phone]   OR [phone]    THANK YOU  HERON DE LA TORRE RN  [facility]       Date of Birth   1965    Social Security Number       [place]   [facility] [place] [address]    Home Phone   [phone]    MRN   [id number]       [employer]    Marital Status                               Admission Date   11/22/22    Admission Type   Emergency    Admitting Provider       Attending Provider   Mathew Dumont MD    Department, Room/Bed   [facility] [address] [place], P599/1       Discharge Date       Discharge Disposition       Discharge Destination                               Attending Provider: Mathew Dumont MD    Allergies: Morphine, Peach [Prunus Persica], Penicillins, Metformin    Isolation: None   Infection: COVID (History) (11/26/22)   Code Status: CPR    Ht: 165.1 cm (65\")   Wt: 114 kg (251 lb 15.8 oz)    Admission Cmt: None   Principal Problem: High grade glioma  [C71.9]                 Active Insurance as of 11/22/2022     Primary Coverage     Payor Plan Insurance Group Employer/Plan Group    [employer]HEM BLUE CROSS ANTHEM BLUE CROSS BLUE SHIELD PPO [id number]     Payor Plan Address Payor Plan Phone Number Payor Plan Fax Number Effective Dates    [address] [phone]  3/13/2016 - None Entered    [facility] [id number]       Subscriber Name Subscriber Birth Date Member ID       KYLER AVENDAÑO 11/16/1964 [id number]           Secondary Coverage     Payor Plan Insurance Group Employer/Plan Group    MEDICARE MEDICARE A & B      Payor Plan Address Payor Plan Phone Number Payor Plan Fax Number Effective Dates    [address] [phone]  7/1/2019 - None Entered    [facility] [id number]       Subscriber Name Subscriber Birth Date Member ID       HERON AVENDAÑO 1965 [id number]           "       Emergency Contacts      (Rel.) Home Phone Work Phone Mobile Phone    Jayesh Avendaño (Spouse) [phone] -- [phone]    Dina Lutz (Relative) [phone] -- [phone]            Parminder: DENNIS [id number]  Tax ID [id number]     Physician Progress Notes (last 72 hours)      Mathew Dumont MD at 22 0837              Name: Christie Avendaño ADMIT: 2022   : 1965  PCP: Tiffany Holloway MD    MRN: [id number] LOS: 14 days   AGE/SEX: 57 y.o. female  ROOM: [facility]     Subjective   Subjective   Feeling fine this AM. Slept well. Tolerating diet. Voiding well. Working well with therapies. No HA or vis changes. Getting stronger on left side.    Review of Systems  No N/V/D/abd pain/F/C/NS/SOA/CP/palp/LUTS    Objective   Objective   Vital Signs  Temp:  [97.6 °F (36.4 °C)-98.7 °F (37.1 °C)] 97.6 °F (36.4 °C)  Heart Rate:  [57-74] 57  Resp:  [18] 18  BP: (116-159)/(69-94) 159/94  SpO2:  [91 %-97 %] 95 %  on   ;   Device (Oxygen Therapy): room air  Body mass index is 41.93 kg/m².  Physical Exam  Vitals and nursing note reviewed. Exam conducted with a chaperone present ().   Constitutional:       General: She is not in acute distress.     Appearance: She is not ill-appearing, toxic-appearing or diaphoretic.   HENT:      Head: Normocephalic.      Comments: Surg site right forehead healing well, staples in place     Nose: Nose normal.      Mouth/Throat:      Mouth: Mucous membranes are moist.      Pharynx: Oropharynx is clear.   Eyes:      General: No scleral icterus.        Right eye: No discharge.         Left eye: No discharge.      Extraocular Movements: Extraocular movements intact.      Conjunctiva/sclera: Conjunctivae normal.   Cardiovascular:      Rate and Rhythm: Normal rate and regular rhythm.      Pulses: Normal pulses.   Pulmonary:      Effort: Pulmonary effort is normal. No respiratory distress.      Breath sounds: Normal breath sounds. No wheezing or rales.      Comments:  Anteriorly   Abdominal:      General: Bowel sounds are normal. There is no distension.      Palpations: Abdomen is soft.      Tenderness: There is no abdominal tenderness.   Musculoskeletal:         General: No swelling or deformity. Normal range of motion.      Cervical back: Neck supple. No rigidity.      Comments: SCDs in place BLEs   Lymphadenopathy:      Cervical: No cervical adenopathy.   Skin:     General: Skin is warm and dry.      Capillary Refill: Capillary refill takes less than 2 seconds.      Coloration: Skin is not jaundiced.      Findings: No rash.   Neurological:      Mental Status: She is alert and oriented to person, place, and time. Mental status is at baseline.      Cranial Nerves: No cranial nerve deficit.      Coordination: Coordination normal.      Comments: Strength in BUEs is almost symmetric this AM, maybe a little weaker on left   Psychiatric:         Mood and Affect: Mood normal.         Behavior: Behavior normal.         Thought Content: Thought content normal.       Results Review     I reviewed the patient's new clinical results.  Results from last 7 days   Lab Units 12/02/22  0913 12/01/22  1133 11/30/22  0705   WBC 10*3/mm3 13.91* 14.32* 13.97*   HEMOGLOBIN g/dL 12.3 11.9* 11.9*   PLATELETS 10*3/mm3 253 263 248     Results from last 7 days   Lab Units 12/02/22  0913 12/01/22  1133 11/30/22  0705   SODIUM mmol/L 136 131* 136   POTASSIUM mmol/L 4.3 4.3 4.3   CHLORIDE mmol/L 98 97* 102   CO2 mmol/L 25.5 23.0 26.0   BUN mg/dL 17 21* 18   CREATININE mg/dL 0.83 0.77 0.78   GLUCOSE mg/dL 300* 288* 153*   EGFR mL/min/1.73 81.8 89.5 88.2       Results from last 7 days   Lab Units 12/02/22  0913 12/01/22  1133 11/30/22  0705   CALCIUM mg/dL 8.6 8.4* 8.2*       Glucose   Date/Time Value Ref Range Status   12/06/2022 0616 133 (H) 70 - 130 mg/dL Final     Comment:     Meter: [id number] : [id number] Fahad Mesa KECIA   12/06/2022 0051 160 (H) 70 - 130 mg/dL Final     Comment:     Meter:  [id number] : [id number] Zia Zavala PCA   12/05/2022 2038 139 (H) 70 - 130 mg/dL Final     Comment:     Meter: [id number] : [id number] Zia Zavala PCA   12/05/2022 1714 107 70 - 130 mg/dL Final     Comment:     Meter: [id number] : [id number] José Miguel Tike NA   12/05/2022 1156 195 (H) 70 - 130 mg/dL Final     Comment:     Meter: [id number] : [id number] José Miguel Tike NA   12/05/2022 0644 172 (H) 70 - 130 mg/dL Final     Comment:     Meter: [id number] : [id number] Andrzej Manuel RN   12/05/2022 0009 140 (H) 70 - 130 mg/dL Final     Comment:     Meter: [id number] : [id number] Eduardo Son ARIANNE       No radiology results for the last day  Scheduled Medications  atorvastatin, 40 mg, Oral, Nightly  dexamethasone, 4 mg, Oral, Q8H  escitalopram, 10 mg, Oral, Daily  insulin glargine, 25 Units, Subcutaneous, QAM  insulin lispro, 0-24 Units, Subcutaneous, Q6H  insulin lispro, 8 Units, Subcutaneous, TID With Meals  latanoprost, 1 drop, Both Eyes, Nightly  levETIRAcetam, 500 mg, Oral, BID  levothyroxine, 150 mcg, Oral, Daily  lisinopril, 20 mg, Oral, Daily  pantoprazole, 40 mg, Oral, Q AM  polyethylene glycol, 17 g, Oral, Daily  sodium chloride, 10 mL, Intravenous, Q12H  valACYclovir, 2,000 mg, Oral, BID    Infusions   Diet  Diet: Regular/House Diet, Diabetic Diets, Vegetarian; Lacto-Ovo Vegetarian (Allows dairy, eggs); Consistent Carbohydrate; Texture: Regular Texture (IDDSI 7); Fluid Consistency: Thin (IDDSI 0)      Assessment/Plan     Active Hospital Problems    Diagnosis  POA   • **High grade glioma  [C71.9]  Yes   • 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       57 y.o. woman admitted with new brain tumor seen on outpatient MRI. She is s/p right frontal stereotactic needle brain biopsy on 11/28. Pathology consistent with high-grade glioma.     Continue current supportive care and therapies. IV Keppra and dexamethasone now changed to PO. Heme/Onc following.     Final pathology from [facility] pending though high-grade glioma initially identified here. She is expected to need chemotherapy (Temodar) and radiation. Tumor not amenable to resection.    Blood sugars have been elevated due to steroids and insulin has been increased. A1c 6.7 on admission. Blood sugars acceptable today on current regimen of AM long-acting insulin, mealtime short-acting, and high-dose SSI.     TFTs are fine here, continue thyroid replacement      • SCDs for DVT prophylaxis.  • Full code.  • Discussed with patient.  • Anticipate discharge to [facility] once arrangements have been made.       Mathew Dumont MD  [facility]  12/06/22  08:37 EST        Electronically signed by Mathew Dumont MD at 12/06/22 0819     Laura Flores MD at 12/05/22 0943          Subjective     CHIEF COMPLAINT:     High grade glioma  Germline BRCA2 and GEORGES mutations  Papillary thyroid cancer  Bilateral DCIS  Left clear cell renal cell carcinoma   Left lower lobe NSC Lung cancer (adenocarcinoma with lipidic growth pattern)  Right lower lobe NSC lung (adenocarcinoma with lipidic growth pattern)  Melanoma of right heel    INTERVAL HISTORY:     Patient reports intermittent headaches.  Tylenol is helping with her headaches.  No nausea or vomiting.  She reports improvement in the strength of the left hand and forearm.  However, she reports that she is leaning toward  the left when she is standing and walking.    REVIEW OF SYSTEMS:  A comprehensive review of systems was obtained with pertinent positive findings as noted in the interval history above.  All other systems negative.    SCHEDULED MEDS:  atorvastatin, 40 mg, Oral, Nightly  dexamethasone, 4 mg, Oral, Q8H  escitalopram, 10 mg, Oral, Daily  insulin glargine, 25 Units, Subcutaneous, QAM  insulin lispro, 0-24 Units, Subcutaneous, Q6H  insulin lispro, 8 Units, Subcutaneous, TID With Meals  latanoprost, 1 drop, Both Eyes, Nightly  levETIRAcetam, 500 mg, Oral, BID  levothyroxine, 150 mcg, Oral, Daily  lisinopril, 20 mg, Oral, Daily  pantoprazole, 40 mg, Oral, Q AM  polyethylene glycol, 17 g, Oral, Daily  sodium chloride, 10 mL, Intravenous, Q12H  valACYclovir, 2,000 mg, Oral, BID      INFUSIONS:     PRN MEDS:  •  acetaminophen **OR** acetaminophen  •  calcium carbonate  •  dextrose  •  dextrose  •  famotidine  •  glucagon (human recombinant)  •  nitroglycerin  •  ondansetron **OR** ondansetron  •  senna-docusate sodium    Objective   VITAL SIGNS:  Temp:  [97.8 °F (36.6 °C)-98.7 °F (37.1 °C)] 98.7 °F (37.1 °C)  Heart Rate:  [56-76] 68  Resp:  [18] 18  BP: (135-151)/(80-93) 137/81     PHYSICAL EXAMINATION:  GENERAL:  The patient appears in fair general condition, not in acute distress.  SKIN: No skin rash. No ecchymosis.   HEAD:  Normocephalic.  EYES:  No Jaundice. No Pallor.   NECK:  Supple. No Masses.  LYMPHATICS:  No cervical or supraclavicular lymphadenopathy.  CHEST: Normal respiratory effort. Lungs clear to auscultation.   CARDIAC:  Normal S1 & S2. No murmur.   ABDOMEN:  Soft. No tenderness. No Hepatomegaly. No Splenomegaly.   EXTREMITIES:  No noted deformity.   PSYCH: Normal mood and affect.   NEURO: Strength 4/5 in the left upper and lower extremities.    RESULT REVIEW:   Results from last 7 days   Lab Units 12/02/22  0913 12/01/22  1133 11/30/22  0705 11/29/22  0401   WBC 10*3/mm3 13.91* 14.32* 13.97* 15.25*   NEUTROS  ABS 10*3/mm3  --   --  11.27* 12.24*   HEMOGLOBIN g/dL 12.3 11.9* 11.9* 12.0   HEMATOCRIT % 38.0 35.1 36.2 36.6   PLATELETS 10*3/mm3 253 263 248 262     Results from last 7 days   Lab Units 12/02/22  0913 12/01/22  1133 11/30/22  0705 11/29/22  0401 11/28/22  1208   SODIUM mmol/L 136 131* 136 137 136   POTASSIUM mmol/L 4.3 4.3 4.3 4.4 4.5   CHLORIDE mmol/L 98 97* 102 100 99   CO2 mmol/L 25.5 23.0 26.0 23.0 25.0   BUN mg/dL 17 21* 18 21* 19   CREATININE mg/dL 0.83 0.77 0.78 0.78 0.78   CALCIUM mg/dL 8.6 8.4* 8.2* 8.3* 8.6   ALBUMIN g/dL  --   --   --   --  3.80   BILIRUBIN mg/dL  --   --   --   --  0.5   ALK PHOS U/L  --   --   --   --  96   ALT (SGPT) U/L  --   --   --   --  143*   AST (SGOT) U/L  --   --   --   --  71*     Results from last 7 days   Lab Units 11/29/22  0401   INR  1.01   APTT seconds 25.4     Assessment & Plan   *High grade glioma.   · Patient presented with recurrent falls and confusion.  · MRI on 11/22/2022 revealed a 3.2 cm ring-enhancing right supratentorial mass.  There was mass-effect and left midline shift.  · She was started on dexamethasone and Keppra.  · CT chest abdomen pelvis on 11/24/2022 revealed no evidence of progressive metastatic disease.  · S/p stereotactic brain biopsy on 11/28/2022.  · Preliminary pathology revealed high-grade glioma.  It was sent to [facility].  · Preliminary revealed high-grade glioma.  · Patient had radiation simulation on 12/2/2022.  · Plan is to start concurrent low-dose Temodar at 75 mg/m2 daily.  · She is currently on Decadron 4 mg p.o. every 8 hours.    *Germline BRCA2 and GEORGES mutations.  · Patient has history of Papillary thyroid cancer, Bilateral DCIS, Left clear cell renal cell carcinoma, Left lower lobe NSC Lung cancer (adenocarcinoma with lipidic growth pattern), Right lower lobe NSC lung (adenocarcinoma with lipidic growth pattern) and Melanoma of right heel.  · Please see the note from Dr. Collins, the patient's outpatient oncologist  from 2022.    *Seizure prophylaxis.  · Patient is on Keppra 500 mg p.o. twice daily.    *Disposition  · Await approval for acute rehab stay.    PLAN:    1.  Continue physical therapy.  2.  Await transfer to acute rehab.  3.  Plan to start chemoradiation with Temodar on 2022 or 2022.    Discussed with the pharmacist.      Laura Flores MD  22             Electronically signed by Laura Flores MD at 22 1600     Shelton Padgett MD at 22 0835              Name: Christie Avendaño ADMIT: 2022   : 1965  PCP: Tiffany Holloway MD    MRN: [id number] LOS: 13 days   AGE/SEX: 57 y.o. female  ROOM: [facility]     Subjective   Subjective   No new complaints.  Doing well with therapy.      Objective   Objective   Vital Signs  Temp:  [97.8 °F (36.6 °C)-98.7 °F (37.1 °C)] 98.4 °F (36.9 °C)  Heart Rate:  [56-76] 60  Resp:  [18] 18  BP: (135-151)/(80-93) 146/81  SpO2:  [95 %-97 %] 97 %  on   ;   Device (Oxygen Therapy): room air  Body mass index is 41.93 kg/m².  Physical Exam  Constitutional:       General: She is not in acute distress.     Appearance: She is not toxic-appearing.   HENT:      Head:      Comments: Biopsy site right forehead, cdi + staples  Cardiovascular:      Rate and Rhythm: Normal rate and regular rhythm.      Heart sounds: Normal heart sounds.   Pulmonary:      Effort: Pulmonary effort is normal.      Breath sounds: Normal breath sounds.   Abdominal:      General: Bowel sounds are normal.      Palpations: Abdomen is soft.   Musculoskeletal:         General: No tenderness.      Right lower leg: No edema.      Left lower leg: No edema.   Neurological:      Mental Status: She is alert.   Psychiatric:         Mood and Affect: Mood normal.         Behavior: Behavior normal.       Results Review     I reviewed the patient's new clinical results.  Results from last 7 days   Lab Units 22  0913 22  1133 22  0705 22  0401   WBC 10*3/mm3 13.91* 14.32*  13.97* 15.25*   HEMOGLOBIN g/dL 12.3 11.9* 11.9* 12.0   PLATELETS 10*3/mm3 253 263 248 262     Results from last 7 days   Lab Units 12/02/22  0913 12/01/22  1133 11/30/22  0705 11/29/22  0401   SODIUM mmol/L 136 131* 136 137   POTASSIUM mmol/L 4.3 4.3 4.3 4.4   CHLORIDE mmol/L 98 97* 102 100   CO2 mmol/L 25.5 23.0 26.0 23.0   BUN mg/dL 17 21* 18 21*   CREATININE mg/dL 0.83 0.77 0.78 0.78   GLUCOSE mg/dL 300* 288* 153* 136*   Estimated Creatinine Clearance: 93.1 mL/min (by C-G formula based on SCr of 0.83 mg/dL).      Results from last 7 days   Lab Units 12/02/22  0913 12/01/22  1133 11/30/22  0705 11/29/22  0401   CALCIUM mg/dL 8.6 8.4* 8.2* 8.3*         Glucose   Date/Time Value Ref Range Status   12/05/2022 1156 195 (H) 70 - 130 mg/dL Final     Comment:     Meter: [id number] : [id number] José Miguel Tike NA   12/05/2022 0644 172 (H) 70 - 130 mg/dL Final     Comment:     Meter: [id number] : [id number] Andrzej Manuel RN   12/05/2022 0009 140 (H) 70 - 130 mg/dL Final     Comment:     Meter: [id number] : [id number] Eduardo Son NA   12/04/2022 1720 175 (H) 70 - 130 mg/dL Final     Comment:     Meter: [id number] : [id number] José Miguel Tike NA   12/04/2022 1307 298 (H) 70 - 130 mg/dL Final     Comment:     Meter: [id number] : [id number] José Miguel Tike NA   12/04/2022 0509 222 (H) 70 - 130 mg/dL Final     Comment:     Meter: [id number] : [id number] Patricia Moreau NA   12/04/2022 0010 247 (H) 70 - 130 mg/dL Final     Comment:     Meter: [id number] : [id number] Patricia Moreau NA       Scheduled Medications  atorvastatin, 40 mg, Oral, Nightly  dexamethasone, 4 mg, Oral, Q8H  escitalopram, 10 mg, Oral, Daily  insulin glargine, 25 Units, Subcutaneous, QAM  insulin lispro, 0-24 Units, Subcutaneous, Q6H  insulin lispro, 8 Units, Subcutaneous, TID With Meals  latanoprost, 1 drop, Both Eyes, Nightly  levETIRAcetam, 500 mg, Oral, BID  levothyroxine, 150 mcg, Oral, Daily  lisinopril, 20 mg, Oral,  Daily  pantoprazole, 40 mg, Oral, Q AM  polyethylene glycol, 17 g, Oral, Daily  sodium chloride, 10 mL, Intravenous, Q12H  valACYclovir, 2,000 mg, Oral, BID    Infusions   Diet  Diet: Regular/House Diet, Diabetic Diets, Vegetarian; Lacto-Ovo Vegetarian (Allows dairy, eggs); Consistent Carbohydrate; Texture: Regular Texture (IDDSI 7); Fluid Consistency: Thin (IDDSI 0)      Assessment/Plan     Active Hospital Problems    Diagnosis  POA   • **High grade glioma  [C71.9]  Yes   • 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       57 y.o. female admitted with new brain tumor seen on outpatient MRI.  She is s/p right frontal stereotactic needle brain biopsy on 11/28.  Pathology consistent with high-grade glioma.  Awaiting additional report from [facility].    Continue current supportive care.  Continue physical therapy while awaiting evaluation and hopeful acceptance to [facility].  Remains on IV Keppra and dexamethasone.  Oncology following.  Blood sugars have been elevated due to steroids and insulin increased yesterday.  Blood sugars little better today.  Will see how she does on current regimen.    Final pathology from [facility] pending though high-grade glioma initially identified here.  She is expected to need chemotherapy and radiation.  Tumor not amenable to resection.       · SCDs for DVT prophylaxis.  · Full code.  · Discussed with  patient.  · Anticipate discharge to [facility] once arrangements have been made if okay with all      Shelton Padgett MD  [facility]  12/05/22  12:27 EST              Electronically signed by Shelton Padgett MD at 12/05/22 1229     Mathew Collins Jr., MD at 12/04/22 0967          [facility] INPATIENT PROGRESS NOTE    Length of Stay:  12 days    CHIEF COMPLAINT:  New brain mass, suspected metastatic disease  Germline BRCA2 and GEORGES mutations, stage I ([id number]) papillary thyroid cancer, bilateral DCIS, stage I left ([id number]) clear-cell renal cell carcinoma, 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  She was able to get up yesterday and walk with physical therapy in the hallway, did become fatigued afterwards.  No new complaints today.    ROS:  Review of Systems   A comprehensive review of systems was obtained with pertinent positive findings as noted in the interval history above.  All other systems negative.    OBJECTIVE:  Vitals:    12/03/22 1135 12/03/22 1653 12/03/22 2136 12/04/22 0520   BP: 152/87 145/79 135/79 131/77   BP Location: Left arm Left arm     Patient Position: Lying Sitting Lying Lying   Pulse: 66 68 69 65   Resp: 16 18 18 18   Temp: 98 °F (36.7 °C) 98.3 °F (36.8 °C) 97.8 °F (36.6 °C) 98.1 °F (36.7 °C)   TempSrc: Oral Oral Oral Oral   SpO2: 96% 97% 95% 96%   Weight:       Height:             PHYSICAL EXAMINATION:  General: Alert and oriented, no distress  Head: Right frontal incision appears normal  Chest/Lungs: Clear to auscultation bilaterally  Heart: Regular rate  and rhythm no murmurs Rubs  Abdomen/GI: Soft nontender nondistended bowel sounds present  Extremities: No edema  Neuro: Mild weakness left upper and lower extremities    Patient was examined today, unchanged from above    DIAGNOSTIC DATA:  Results Review:     I reviewed the patient's new clinical results.    Results from last 7 days   Lab Units 12/02/22  0913 12/01/22  1133 11/30/22  0705   WBC 10*3/mm3 13.91* 14.32* 13.97*   HEMOGLOBIN g/dL 12.3 11.9* 11.9*   HEMATOCRIT % 38.0 35.1 36.2   PLATELETS 10*3/mm3 253 263 248      Results from last 7 days   Lab Units 12/02/22  0913 12/01/22  1133 11/30/22  0705 11/29/22  0401 11/28/22  1208   SODIUM mmol/L 136 131* 136   < > 136   POTASSIUM mmol/L 4.3 4.3 4.3   < > 4.5   CHLORIDE mmol/L 98 97* 102   < > 99   CO2 mmol/L 25.5 23.0 26.0   < > 25.0   BUN mg/dL 17 21* 18   < > 19   CREATININE mg/dL 0.83 0.77 0.78   < > 0.78   CALCIUM mg/dL 8.6 8.4* 8.2*   < > 8.6   BILIRUBIN mg/dL  --   --   --   --  0.5   ALK PHOS U/L  --   --   --   --  96   ALT (SGPT) U/L  --   --   --   --  143*   AST (SGOT) U/L  --   --   --   --  71*   GLUCOSE mg/dL 300* 288* 153*   < > 202*    < > = values in this interval not displayed.      Lab Results   Component Value Date    NEUTROABS 11.27 (H) 11/30/2022     Results from last 7 days   Lab Units 11/29/22  0401   INR  1.01   APTT seconds 25.4               Assessment & Plan   ASSESSMENT/PLAN:  This is a 57 y.o. female with:     *Primary CNS malignancy/high-grade glioma  • Patient presented with a couple of weeks of falls and confusion  • MRI 11/22/2022 with a 3.2 cm rim enhancing right supratentorial mass with mass effect and leftward midline shift in the right thalamus and internal capsule.   • She was started on dexamethasone and Keppra  • Dr. Thompson with neurosurgery has evaluated.  This mass is not resectable due to its location.  A biopsy carries some risk but is possible.  • Dr. Sam with radiation oncology has also evaluated the situation.    • CT chest abdomen and pelvis on 11/24/2022 without evidence of progressive metastatic disease otherwise  • Patient underwent stereotactic brain biopsy on 11/28/2022.  Preliminary pathology with high-grade glioma, sent to [facility] for outside review and molecular analysis.  • Patient currently continuing on dexamethasone 4 mg p.o. every 8 hours  • Patient continuing on seizure prophylaxis with Keppra 500 mg twice daily  • We are awaiting final pathology from her biopsy, preliminary appears to represent high-grade glioma.  With that information we are preparing for anticipated concurrent chemoradiation with low-dose Temodar (75 mg/m² daily).  Patient did go for radiation simulation on 12/2/2022 and we anticipate obtaining Temodar by early next week.  Anticipate potentially starting treatment midweek next week once pathology final report is available.  In the interim we are awaiting approval for transfer to [facility].      *Germline BRCA2 and GEORGES mutations     *History of stage I papillary thyroid cancer     *History of bilateral DCIS   • S/p bilateral mastectomies 1/26/2017     *Prior history of KAVYA/BSO in 1992     *History of stage I clear cell renal cell carcinoma  • Left partial nephrectomy 5/15/2020. 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  • LLL lobectomy 2/28/2018 with 2/8 peribronchial nodes involved along with evidence of adenocarcinoma     *History of stage Ia1 LLL NSCLCa, adenoca with lipidic features  • Never smoker  • Discovered on LLL lobectomy  • Positive EGFR mutation exon 19 deletion     *HIstory of stage IA2 RLL NSCLCa, adenoca with lipidic growth pattern  • Right VATS with anterior basal segmentectomy 10/9/2020, 1.4 cm tumor, 5 negative nodes  • Every 6 month CT imaging     *Hisory of melanoma of the right heel, stage Ia  • Excision 6/4/21, 0.6 mm superficial spreading  melanoma, margin positve, for in situ, side local excision 2021 with no residual melanoma  • Difficulty with wound healing     *History of anemia  • Not anemic at this time     *Diabetes     *HTN    *GI prophylaxis  · Protonix 40 mg daily    *Constipation  · Continue MiraLAX daily    *Leukocytosis  · Secondary to steroids    *Disposition  · Awaiting approval for potential transfer to acute rehab at [facility].    PLAN:   1. Await final pathology from brain biopsy being sent to [facility] for review and molecular analysis  2. Patient continuing on dexamethasone 4 mg p.o. every 8 hours  3. Patient continuing on Keppra 500 mg p.o. every 12 hours.    4. Process initiated to obtain oral Temodar (75 mg/m²/day) anticipating concurrent chemoradiation once final pathology confirmed.  Anticipate potentially beginning treatment mid-to-late next week.  5. Patient did undergo radiation simulation on 2022  6. Await approval for acute rehab at [facility]    Discussed with patient and daughter at bedside today.              Mathew Collins MD    Electronically signed by Mathew Collins Jr., MD at 22 1405     Shelton Padgett MD at 22 0900              Name: Christie Avendaño ADMIT: 2022   : 1965  PCP: Tiffany Holloway MD    MRN: [id number] LOS: 12 days   AGE/SEX: 57 y.o. female  ROOM: [facility]     Subjective   Subjective   No new issues overnight.  Worked with physical therapy and despite a slight lean to the left feels like she is doing okay.      Objective   Objective   Vital Signs  Temp:  [97.8 °F (36.6 °C)-98.3 °F (36.8 °C)] 98.1 °F (36.7 °C)  Heart Rate:  [65-69] 65  Resp:  [16-18] 18  BP: (131-152)/(77-87) 131/77  SpO2:  [95 %-97 %] 96 %  on   ;   Device (Oxygen Therapy): room air  Body mass index is 41.93 kg/m².  Physical Exam  Constitutional:       General: She is not in acute distress.     Appearance: She is not toxic-appearing.   HENT:      Head:      Comments: Biopsy site right forehead, cdi  + staples  Cardiovascular:      Rate and Rhythm: Normal rate and regular rhythm.      Heart sounds: Normal heart sounds.   Pulmonary:      Effort: Pulmonary effort is normal.      Breath sounds: Normal breath sounds.   Abdominal:      General: Bowel sounds are normal.      Palpations: Abdomen is soft.   Musculoskeletal:         General: No tenderness.      Right lower leg: No edema.      Left lower leg: No edema.   Neurological:      Mental Status: She is alert.   Psychiatric:         Mood and Affect: Mood normal.         Behavior: Behavior normal.       Results Review     I reviewed the patient's new clinical results.  Results from last 7 days   Lab Units 12/02/22  0913 12/01/22 1133 11/30/22 0705 11/29/22  0401   WBC 10*3/mm3 13.91* 14.32* 13.97* 15.25*   HEMOGLOBIN g/dL 12.3 11.9* 11.9* 12.0   PLATELETS 10*3/mm3 253 263 248 262     Results from last 7 days   Lab Units 12/02/22  0913 12/01/22 1133 11/30/22 0705 11/29/22  0401   SODIUM mmol/L 136 131* 136 137   POTASSIUM mmol/L 4.3 4.3 4.3 4.4   CHLORIDE mmol/L 98 97* 102 100   CO2 mmol/L 25.5 23.0 26.0 23.0   BUN mg/dL 17 21* 18 21*   CREATININE mg/dL 0.83 0.77 0.78 0.78   GLUCOSE mg/dL 300* 288* 153* 136*   Estimated Creatinine Clearance: 93.1 mL/min (by C-G formula based on SCr of 0.83 mg/dL).  Results from last 7 days   Lab Units 11/28/22  1208   ALBUMIN g/dL 3.80   BILIRUBIN mg/dL 0.5   ALK PHOS U/L 96   AST (SGOT) U/L 71*   ALT (SGPT) U/L 143*     Results from last 7 days   Lab Units 12/02/22  0913 12/01/22 1133 11/30/22 0705 11/29/22  0401 11/28/22  1208   CALCIUM mg/dL 8.6 8.4* 8.2* 8.3* 8.6   ALBUMIN g/dL  --   --   --   --  3.80         Glucose   Date/Time Value Ref Range Status   12/04/2022 0509 222 (H) 70 - 130 mg/dL Final     Comment:     Meter: [id number] : [id number] Patricia ACUÑA   12/04/2022 0010 247 (H) 70 - 130 mg/dL Final     Comment:     Meter: [id number] : [id number] Patricia ACUÑA   12/03/2022 1559 227 (H) 70 - 130 mg/dL  Final     Comment:     RN Notified R and V Meter: [id number] : [id number] Madhu Ott NA   12/03/2022 1133 329 (H) 70 - 130 mg/dL Final     Comment:     Meter: [id number] : [id number] Hatchett Sherrish NA   12/03/2022 0559 180 (H) 70 - 130 mg/dL Final     Comment:     Meter: [id number] : [id number] Blanchard Jodi NA   12/03/2022 0022 255 (H) 70 - 130 mg/dL Final     Comment:     Meter: [id number] : [id number] Blanchard Jodi NA   12/02/2022 2049 171 (H) 70 - 130 mg/dL Final     Comment:     Meter: [id number] : [id number] Santo Zapata NA       Scheduled Medications  atorvastatin, 40 mg, Oral, Nightly  dexamethasone, 4 mg, Oral, Q8H  escitalopram, 10 mg, Oral, Daily  insulin glargine, 15 Units, Subcutaneous, QAM  insulin lispro, 0-24 Units, Subcutaneous, Q6H  insulin lispro, 5 Units, Subcutaneous, TID With Meals  latanoprost, 1 drop, Both Eyes, Nightly  levETIRAcetam, 500 mg, Oral, BID  levothyroxine, 150 mcg, Oral, Daily  lisinopril, 20 mg, Oral, Daily  pantoprazole, 40 mg, Oral, Q AM  polyethylene glycol, 17 g, Oral, Daily  sodium chloride, 10 mL, Intravenous, Q12H  valACYclovir, 2,000 mg, Oral, BID    Infusions   Diet  Diet: Regular/House Diet, Diabetic Diets, Vegetarian; Lacto-Ovo Vegetarian (Allows dairy, eggs); Consistent Carbohydrate; Texture: Regular Texture (IDDSI 7); Fluid Consistency: Thin (IDDSI 0)      Assessment/Plan     Active Hospital Problems    Diagnosis  POA   • **High grade glioma  [C71.9]  Yes   • 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) [G93.5]  Yes   • Malignant melanoma of right lower extremity including hip (HCC) [C43.71]  Yes   • BRCA2 gene mutation positive in female [Z15.01, Z15.02, Z15.09]  Yes   • Essential hypertension [I10]  Yes   • Renal cell carcinoma of left kidney (HCC) [C64.2]  Yes   • Papillary thyroid carcinoma (HCC) [C73]  Yes   • Non-small cell lung cancer, right (HCC) [C34.91]  Yes   • Type 2 diabetes mellitus with hyperglycemia, without long-term  current use of insulin (HCC) [E11.65]  Yes      Resolved Hospital Problems   No resolved problems to display.       57 y.o. female admitted with new brain tumor seen on outpatient MRI.  She is s/p right frontal stereotactic needle brain biopsy on 11/28.  Pathology consistent with high-grade glioma.  Awaiting additional report from [facility].    Continue current supportive efforts.  Continue physical therapy while awaiting hopeful transfer to [facility].  Medically seems stable.  On Keppra and steroids.  Oncology following.  Final pathology from [facility] pending though high-grade glioma initially identified here.  She is expected to need chemotherapy and radiation.  Tumor not amenable to resection.     AC insulin added yesterday but not until dinnertime.  Will see how he does today on this regimen and consider increasing both Lantus and mealtime insulin if blood sugars remain elevated throughout today      · SCDs for DVT prophylaxis.  · Full code.  · Discussed with patient.  · Anticipate discharge to [facility] once arrangements have been made if okay with all      Shelton Padgett MD  [facility]  12/04/22  09:00 EST      ADDENDUM  BS still quite elevated.  Will increase basal/bolus insulin.     Electronically signed by Shelton Padgett MD, 12/04/22, 3:23 PM EST.            Electronically signed by Shelton Padgett MD at 12/04/22 152

## 2022-12-06 NOTE — PROGRESS NOTES
Name: Christie Avendaño ADMIT: 2022   : 1965  PCP: Tiffany Holloway MD    MRN: [id number] LOS: 14 days   AGE/SEX: 57 y.o. female  ROOM: [facility]     Subjective   Subjective   Feeling fine this AM. Slept well. Tolerating diet. Voiding well. Working well with therapies. No HA or vis changes. Getting stronger on left side.    Review of Systems   No N/V/D/abd pain/F/C/NS/SOA/CP/palp/LUTS    Objective   Objective   Vital Signs  Temp:  [97.6 °F (36.4 °C)-98.7 °F (37.1 °C)] 97.6 °F (36.4 °C)  Heart Rate:  [57-74] 57  Resp:  [18] 18  BP: (116-159)/(69-94) 159/94  SpO2:  [91 %-97 %] 95 %  on   ;   Device (Oxygen Therapy): room air  Body mass index is 41.93 kg/m².  Physical Exam  Vitals and nursing note reviewed. Exam conducted with a chaperone present ().   Constitutional:       General: She is not in acute distress.     Appearance: She is not ill-appearing, toxic-appearing or diaphoretic.   HENT:      Head: Normocephalic.      Comments: Surg site right forehead healing well, staples in place     Nose: Nose normal.      Mouth/Throat:      Mouth: Mucous membranes are moist.      Pharynx: Oropharynx is clear.   Eyes:      General: No scleral icterus.        Right eye: No discharge.         Left eye: No discharge.      Extraocular Movements: Extraocular movements intact.      Conjunctiva/sclera: Conjunctivae normal.   Cardiovascular:      Rate and Rhythm: Normal rate and regular rhythm.      Pulses: Normal pulses.   Pulmonary:      Effort: Pulmonary effort is normal. No respiratory distress.      Breath sounds: Normal breath sounds. No wheezing or rales.      Comments: Anteriorly   Abdominal:      General: Bowel sounds are normal. There is no distension.      Palpations: Abdomen is soft.      Tenderness: There is no abdominal tenderness.   Musculoskeletal:         General: No swelling or deformity. Normal range of motion.      Cervical back: Neck supple. No rigidity.      Comments: SCDs in place BLEs    Lymphadenopathy:      Cervical: No cervical adenopathy.   Skin:     General: Skin is warm and dry.      Capillary Refill: Capillary refill takes less than 2 seconds.      Coloration: Skin is not jaundiced.      Findings: No rash.   Neurological:      Mental Status: She is alert and oriented to person, place, and time. Mental status is at baseline.      Cranial Nerves: No cranial nerve deficit.      Coordination: Coordination normal.      Comments: Strength in BUEs is almost symmetric this AM, maybe a little weaker on left   Psychiatric:         Mood and Affect: Mood normal.         Behavior: Behavior normal.         Thought Content: Thought content normal.       Results Review     I reviewed the patient's new clinical results.  Results from last 7 days   Lab Units 12/02/22  0913 12/01/22  1133 11/30/22  0705   WBC 10*3/mm3 13.91* 14.32* 13.97*   HEMOGLOBIN g/dL 12.3 11.9* 11.9*   PLATELETS 10*3/mm3 253 263 248     Results from last 7 days   Lab Units 12/02/22  0913 12/01/22  1133 11/30/22  0705   SODIUM mmol/L 136 131* 136   POTASSIUM mmol/L 4.3 4.3 4.3   CHLORIDE mmol/L 98 97* 102   CO2 mmol/L 25.5 23.0 26.0   BUN mg/dL 17 21* 18   CREATININE mg/dL 0.83 0.77 0.78   GLUCOSE mg/dL 300* 288* 153*   EGFR mL/min/1.73 81.8 89.5 88.2       Results from last 7 days   Lab Units 12/02/22  0913 12/01/22  1133 11/30/22  0705   CALCIUM mg/dL 8.6 8.4* 8.2*       Glucose   Date/Time Value Ref Range Status   12/06/2022 0616 133 (H) 70 - 130 mg/dL Final     Comment:     Meter: [id number] : [id number] Fahad Jean-Baptisteravindersaroj KECIA   12/06/2022 0051 160 (H) 70 - 130 mg/dL Final     Comment:     Meter: [id number] : [id number] [facility]   12/05/2022 2038 139 (H) 70 - 130 mg/dL Final     Comment:     Meter: [id number] : [id number] [facility]   12/05/2022 1714 107 70 - 130 mg/dL Final     Comment:     Meter: [id number] : [id number] José Miguel ACUÑA   12/05/2022 1156 195 (H) 70 - 130 mg/dL Final     Comment:      Meter: [id number] : [id number] José Miguel Quintero ARIANNE   12/05/2022 0644 172 (H) 70 - 130 mg/dL Final     Comment:     Meter: [id number] : [id number] Andrzej Manuel RN   12/05/2022 0009 140 (H) 70 - 130 mg/dL Final     Comment:     Meter: [id number] : [id number] Eduardo Son ARIANNE       No radiology results for the last day  Scheduled Medications  atorvastatin, 40 mg, Oral, Nightly  dexamethasone, 4 mg, Oral, Q8H  escitalopram, 10 mg, Oral, Daily  insulin glargine, 25 Units, Subcutaneous, QAM  insulin lispro, 0-24 Units, Subcutaneous, Q6H  insulin lispro, 8 Units, Subcutaneous, TID With Meals  latanoprost, 1 drop, Both Eyes, Nightly  levETIRAcetam, 500 mg, Oral, BID  levothyroxine, 150 mcg, Oral, Daily  lisinopril, 20 mg, Oral, Daily  pantoprazole, 40 mg, Oral, Q AM  polyethylene glycol, 17 g, Oral, Daily  sodium chloride, 10 mL, Intravenous, Q12H  valACYclovir, 2,000 mg, Oral, BID    Infusions   Diet  Diet: Regular/House Diet, Diabetic Diets, Vegetarian; Lacto-Ovo Vegetarian (Allows dairy, eggs); Consistent Carbohydrate; Texture: Regular Texture (IDDSI 7); Fluid Consistency: Thin (IDDSI 0)       Assessment/Plan     Active Hospital Problems    Diagnosis  POA   • **High grade glioma  [C71.9]  Yes   • 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       57 y.o. woman admitted with new brain tumor seen on outpatient MRI. She is s/p right frontal stereotactic needle brain  biopsy on 11/28. Pathology consistent with high-grade glioma.     Continue current supportive care and therapies. IV Keppra and dexamethasone now changed to PO. Heme/Onc following.     Final pathology from [facility] pending though high-grade glioma initially identified here. She is expected to need chemotherapy (Temodar) and radiation. Tumor not amenable to resection.    Blood sugars have been elevated due to steroids and insulin has been increased. A1c 6.7 on admission. Blood sugars acceptable today on current regimen of AM long-acting insulin, mealtime short-acting, and high-dose SSI.     TFTs are fine here, continue thyroid replacement      • SCDs for DVT prophylaxis.  • Full code.  • Discussed with patient.  • Anticipate discharge to [facility] once arrangements have been made.       Mathew Dumont MD  [facility]  12/06/22  08:37 EST

## 2022-12-07 ENCOUNTER — HOSPITAL ENCOUNTER (INPATIENT)
Facility: HOSPITAL | Age: 57
LOS: 16 days | Discharge: HOME HEALTH | End: 2022-12-23
Attending: PHYSICAL MEDICINE & REHABILITATION | Admitting: PHYSICAL MEDICINE & REHABILITATION

## 2022-12-07 VITALS
HEART RATE: 75 BPM | SYSTOLIC BLOOD PRESSURE: 110 MMHG | HEIGHT: 65 IN | TEMPERATURE: 97.9 F | BODY MASS INDEX: 41.98 KG/M2 | OXYGEN SATURATION: 95 % | DIASTOLIC BLOOD PRESSURE: 63 MMHG | RESPIRATION RATE: 18 BRPM | WEIGHT: 251.99 LBS

## 2022-12-07 DIAGNOSIS — Z74.09 IMPAIRED FUNCTIONAL MOBILITY, BALANCE, GAIT, AND ENDURANCE: Primary | ICD-10-CM

## 2022-12-07 DIAGNOSIS — C71.9 GLIOMA: ICD-10-CM

## 2022-12-07 DIAGNOSIS — C71.9 HIGH GRADE GLIOMA NOT CLASSIFIABLE BY WHO CRITERIA: ICD-10-CM

## 2022-12-07 LAB
ANION GAP SERPL CALCULATED.3IONS-SCNC: 9 MMOL/L (ref 5–15)
BUN SERPL-MCNC: 20 MG/DL (ref 6–20)
BUN/CREAT SERPL: 35.1 (ref 7–25)
CALCIUM SPEC-SCNC: 8.7 MG/DL (ref 8.6–10.5)
CHLORIDE SERPL-SCNC: 97 MMOL/L (ref 98–107)
CO2 SERPL-SCNC: 28 MMOL/L (ref 22–29)
CREAT SERPL-MCNC: 0.57 MG/DL (ref 0.57–1)
DEPRECATED RDW RBC AUTO: 43 FL (ref 37–54)
EGFRCR SERPLBLD CKD-EPI 2021: 105.5 ML/MIN/1.73
ERYTHROCYTE [DISTWIDTH] IN BLOOD BY AUTOMATED COUNT: 15.4 % (ref 12.3–15.4)
GLUCOSE BLDC GLUCOMTR-MCNC: 104 MG/DL (ref 70–130)
GLUCOSE BLDC GLUCOMTR-MCNC: 146 MG/DL (ref 70–130)
GLUCOSE BLDC GLUCOMTR-MCNC: 152 MG/DL (ref 70–130)
GLUCOSE BLDC GLUCOMTR-MCNC: 196 MG/DL (ref 70–130)
GLUCOSE BLDC GLUCOMTR-MCNC: 216 MG/DL (ref 70–130)
GLUCOSE SERPL-MCNC: 134 MG/DL (ref 65–99)
HCT VFR BLD AUTO: 37.9 % (ref 34–46.6)
HGB BLD-MCNC: 12.8 G/DL (ref 12–15.9)
MAGNESIUM SERPL-MCNC: 2.5 MG/DL (ref 1.6–2.6)
MCH RBC QN AUTO: 27.5 PG (ref 26.6–33)
MCHC RBC AUTO-ENTMCNC: 33.8 G/DL (ref 31.5–35.7)
MCV RBC AUTO: 81.3 FL (ref 79–97)
PLATELET # BLD AUTO: 261 10*3/MM3 (ref 140–450)
PMV BLD AUTO: 9.4 FL (ref 6–12)
POTASSIUM SERPL-SCNC: 4.3 MMOL/L (ref 3.5–5.2)
RBC # BLD AUTO: 4.66 10*6/MM3 (ref 3.77–5.28)
SODIUM SERPL-SCNC: 134 MMOL/L (ref 136–145)
WBC NRBC COR # BLD: 16.17 10*3/MM3 (ref 3.4–10.8)

## 2022-12-07 PROCEDURE — 99222 1ST HOSP IP/OBS MODERATE 55: CPT | Performed by: INTERNAL MEDICINE

## 2022-12-07 PROCEDURE — 97110 THERAPEUTIC EXERCISES: CPT

## 2022-12-07 PROCEDURE — 97530 THERAPEUTIC ACTIVITIES: CPT

## 2022-12-07 PROCEDURE — 63710000001 INSULIN LISPRO (HUMAN) PER 5 UNITS: Performed by: HOSPITALIST

## 2022-12-07 PROCEDURE — 82962 GLUCOSE BLOOD TEST: CPT

## 2022-12-07 PROCEDURE — 83735 ASSAY OF MAGNESIUM: CPT | Performed by: HOSPITALIST

## 2022-12-07 PROCEDURE — 63710000001 INSULIN LISPRO (HUMAN) PER 5 UNITS: Performed by: INTERNAL MEDICINE

## 2022-12-07 PROCEDURE — 63710000001 INSULIN LISPRO (HUMAN) PER 5 UNITS: Performed by: PHYSICAL MEDICINE & REHABILITATION

## 2022-12-07 PROCEDURE — 80048 BASIC METABOLIC PNL TOTAL CA: CPT | Performed by: HOSPITALIST

## 2022-12-07 PROCEDURE — 63710000001 DEXAMETHASONE PER 0.25 MG: Performed by: NEUROLOGICAL SURGERY

## 2022-12-07 PROCEDURE — 63710000001 DEXAMETHASONE PER 0.25 MG: Performed by: PHYSICAL MEDICINE & REHABILITATION

## 2022-12-07 PROCEDURE — 85027 COMPLETE CBC AUTOMATED: CPT | Performed by: HOSPITALIST

## 2022-12-07 RX ORDER — VALACYCLOVIR HYDROCHLORIDE 500 MG/1
2000 TABLET, FILM COATED ORAL 2 TIMES DAILY
Status: DISCONTINUED | OUTPATIENT
Start: 2022-12-07 | End: 2022-12-08

## 2022-12-07 RX ORDER — VALACYCLOVIR HYDROCHLORIDE 500 MG/1
2000 TABLET, FILM COATED ORAL 2 TIMES DAILY
Status: CANCELLED | OUTPATIENT
Start: 2022-12-07 | End: 2022-12-23

## 2022-12-07 RX ORDER — POLYETHYLENE GLYCOL 3350 17 G/17G
17 POWDER, FOR SOLUTION ORAL DAILY
Status: DISCONTINUED | OUTPATIENT
Start: 2022-12-08 | End: 2022-12-23 | Stop reason: HOSPADM

## 2022-12-07 RX ORDER — NICOTINE POLACRILEX 4 MG
15 LOZENGE BUCCAL
Status: CANCELLED | OUTPATIENT
Start: 2022-12-07

## 2022-12-07 RX ORDER — LEVETIRACETAM 500 MG/1
500 TABLET ORAL 2 TIMES DAILY
Status: CANCELLED | OUTPATIENT
Start: 2022-12-07

## 2022-12-07 RX ORDER — INSULIN LISPRO 100 [IU]/ML
8 INJECTION, SOLUTION INTRAVENOUS; SUBCUTANEOUS
Status: DISCONTINUED | OUTPATIENT
Start: 2022-12-07 | End: 2022-12-21

## 2022-12-07 RX ORDER — AMOXICILLIN 250 MG
1 CAPSULE ORAL NIGHTLY PRN
Status: CANCELLED | OUTPATIENT
Start: 2022-12-07

## 2022-12-07 RX ORDER — LEVOTHYROXINE SODIUM 0.15 MG/1
150 TABLET ORAL
Status: DISCONTINUED | OUTPATIENT
Start: 2022-12-08 | End: 2022-12-23 | Stop reason: HOSPADM

## 2022-12-07 RX ORDER — LEVOTHYROXINE SODIUM 0.15 MG/1
150 TABLET ORAL DAILY
Status: CANCELLED | OUTPATIENT
Start: 2022-12-08

## 2022-12-07 RX ORDER — NICOTINE POLACRILEX 4 MG
15 LOZENGE BUCCAL
Status: DISCONTINUED | OUTPATIENT
Start: 2022-12-07 | End: 2022-12-23 | Stop reason: HOSPADM

## 2022-12-07 RX ORDER — DEXTROSE MONOHYDRATE 25 G/50ML
25 INJECTION, SOLUTION INTRAVENOUS
Status: DISCONTINUED | OUTPATIENT
Start: 2022-12-07 | End: 2022-12-23 | Stop reason: HOSPADM

## 2022-12-07 RX ORDER — NITROGLYCERIN 0.4 MG/1
0.4 TABLET SUBLINGUAL
Status: CANCELLED | OUTPATIENT
Start: 2022-12-07

## 2022-12-07 RX ORDER — ENOXAPARIN SODIUM 100 MG/ML
40 INJECTION SUBCUTANEOUS EVERY 24 HOURS
Status: DISCONTINUED | OUTPATIENT
Start: 2022-12-08 | End: 2022-12-08

## 2022-12-07 RX ORDER — ONDANSETRON 4 MG/1
4 TABLET, FILM COATED ORAL EVERY 6 HOURS PRN
Status: DISCONTINUED | OUTPATIENT
Start: 2022-12-07 | End: 2022-12-23

## 2022-12-07 RX ORDER — DEXTROSE MONOHYDRATE 25 G/50ML
25 INJECTION, SOLUTION INTRAVENOUS
Status: CANCELLED | OUTPATIENT
Start: 2022-12-07

## 2022-12-07 RX ORDER — ACETAMINOPHEN 650 MG/1
650 SUPPOSITORY RECTAL EVERY 4 HOURS PRN
Status: DISCONTINUED | OUTPATIENT
Start: 2022-12-07 | End: 2022-12-08

## 2022-12-07 RX ORDER — AMOXICILLIN 250 MG
1 CAPSULE ORAL NIGHTLY PRN
Status: DISCONTINUED | OUTPATIENT
Start: 2022-12-07 | End: 2022-12-23

## 2022-12-07 RX ORDER — ACETAMINOPHEN 325 MG/1
650 TABLET ORAL EVERY 4 HOURS PRN
Status: CANCELLED | OUTPATIENT
Start: 2022-12-07

## 2022-12-07 RX ORDER — INSULIN LISPRO 100 [IU]/ML
0-24 INJECTION, SOLUTION INTRAVENOUS; SUBCUTANEOUS
Status: DISCONTINUED | OUTPATIENT
Start: 2022-12-07 | End: 2022-12-07 | Stop reason: HOSPADM

## 2022-12-07 RX ORDER — ATORVASTATIN CALCIUM 20 MG/1
40 TABLET, FILM COATED ORAL NIGHTLY
Status: CANCELLED | OUTPATIENT
Start: 2022-12-07

## 2022-12-07 RX ORDER — LATANOPROST 50 UG/ML
1 SOLUTION/ DROPS OPHTHALMIC NIGHTLY
Status: CANCELLED | OUTPATIENT
Start: 2022-12-07

## 2022-12-07 RX ORDER — ESCITALOPRAM OXALATE 10 MG/1
10 TABLET ORAL DAILY
Status: DISCONTINUED | OUTPATIENT
Start: 2022-12-08 | End: 2022-12-18

## 2022-12-07 RX ORDER — ONDANSETRON 2 MG/ML
4 INJECTION INTRAMUSCULAR; INTRAVENOUS EVERY 6 HOURS PRN
Status: CANCELLED | OUTPATIENT
Start: 2022-12-07

## 2022-12-07 RX ORDER — FAMOTIDINE 20 MG/1
20 TABLET, FILM COATED ORAL 2 TIMES DAILY PRN
Status: CANCELLED | OUTPATIENT
Start: 2022-12-07

## 2022-12-07 RX ORDER — LISINOPRIL 20 MG/1
20 TABLET ORAL DAILY
Status: DISCONTINUED | OUTPATIENT
Start: 2022-12-08 | End: 2022-12-16

## 2022-12-07 RX ORDER — PANTOPRAZOLE SODIUM 40 MG/1
40 TABLET, DELAYED RELEASE ORAL
Status: CANCELLED | OUTPATIENT
Start: 2022-12-08

## 2022-12-07 RX ORDER — DEXAMETHASONE 4 MG/1
4 TABLET ORAL EVERY 8 HOURS SCHEDULED
Status: DISCONTINUED | OUTPATIENT
Start: 2022-12-07 | End: 2022-12-23 | Stop reason: HOSPADM

## 2022-12-07 RX ORDER — SODIUM CHLORIDE 0.9 % (FLUSH) 0.9 %
10 SYRINGE (ML) INJECTION EVERY 12 HOURS SCHEDULED
Status: CANCELLED | OUTPATIENT
Start: 2022-12-07

## 2022-12-07 RX ORDER — FAMOTIDINE 20 MG/1
20 TABLET, FILM COATED ORAL 2 TIMES DAILY PRN
Status: DISCONTINUED | OUTPATIENT
Start: 2022-12-07 | End: 2022-12-23 | Stop reason: HOSPADM

## 2022-12-07 RX ORDER — SODIUM CHLORIDE 0.9 % (FLUSH) 0.9 %
10 SYRINGE (ML) INJECTION EVERY 12 HOURS SCHEDULED
Status: DISCONTINUED | OUTPATIENT
Start: 2022-12-07 | End: 2022-12-07

## 2022-12-07 RX ORDER — ONDANSETRON 4 MG/1
4 TABLET, FILM COATED ORAL EVERY 6 HOURS PRN
Status: CANCELLED | OUTPATIENT
Start: 2022-12-07

## 2022-12-07 RX ORDER — INSULIN LISPRO 100 [IU]/ML
0-24 INJECTION, SOLUTION INTRAVENOUS; SUBCUTANEOUS
Status: DISCONTINUED | OUTPATIENT
Start: 2022-12-07 | End: 2022-12-12

## 2022-12-07 RX ORDER — INSULIN LISPRO 100 [IU]/ML
8 INJECTION, SOLUTION INTRAVENOUS; SUBCUTANEOUS
Status: CANCELLED | OUTPATIENT
Start: 2022-12-07

## 2022-12-07 RX ORDER — ATORVASTATIN CALCIUM 20 MG/1
40 TABLET, FILM COATED ORAL NIGHTLY
Status: DISCONTINUED | OUTPATIENT
Start: 2022-12-07 | End: 2022-12-08

## 2022-12-07 RX ORDER — NITROGLYCERIN 0.4 MG/1
0.4 TABLET SUBLINGUAL
Status: DISCONTINUED | OUTPATIENT
Start: 2022-12-07 | End: 2022-12-23

## 2022-12-07 RX ORDER — LATANOPROST 50 UG/ML
1 SOLUTION/ DROPS OPHTHALMIC NIGHTLY
Status: DISCONTINUED | OUTPATIENT
Start: 2022-12-07 | End: 2022-12-23 | Stop reason: HOSPADM

## 2022-12-07 RX ORDER — LEVETIRACETAM 500 MG/1
500 TABLET ORAL 2 TIMES DAILY
Status: DISCONTINUED | OUTPATIENT
Start: 2022-12-07 | End: 2022-12-23 | Stop reason: HOSPADM

## 2022-12-07 RX ORDER — CALCIUM CARBONATE 200(500)MG
2 TABLET,CHEWABLE ORAL 3 TIMES DAILY PRN
Status: CANCELLED | OUTPATIENT
Start: 2022-12-07

## 2022-12-07 RX ORDER — ONDANSETRON 2 MG/ML
4 INJECTION INTRAMUSCULAR; INTRAVENOUS EVERY 6 HOURS PRN
Status: DISCONTINUED | OUTPATIENT
Start: 2022-12-07 | End: 2022-12-23

## 2022-12-07 RX ORDER — LISINOPRIL 20 MG/1
20 TABLET ORAL DAILY
Status: CANCELLED | OUTPATIENT
Start: 2022-12-08

## 2022-12-07 RX ORDER — POLYETHYLENE GLYCOL 3350 17 G/17G
17 POWDER, FOR SOLUTION ORAL DAILY
Status: CANCELLED | OUTPATIENT
Start: 2022-12-08

## 2022-12-07 RX ORDER — INSULIN LISPRO 100 [IU]/ML
0-24 INJECTION, SOLUTION INTRAVENOUS; SUBCUTANEOUS
Status: CANCELLED | OUTPATIENT
Start: 2022-12-07

## 2022-12-07 RX ORDER — DEXAMETHASONE 4 MG/1
4 TABLET ORAL EVERY 8 HOURS SCHEDULED
Status: CANCELLED | OUTPATIENT
Start: 2022-12-07

## 2022-12-07 RX ORDER — ESCITALOPRAM OXALATE 10 MG/1
10 TABLET ORAL DAILY
Status: CANCELLED | OUTPATIENT
Start: 2022-12-08

## 2022-12-07 RX ORDER — ACETAMINOPHEN 650 MG/1
650 SUPPOSITORY RECTAL EVERY 4 HOURS PRN
Status: CANCELLED | OUTPATIENT
Start: 2022-12-07

## 2022-12-07 RX ORDER — CALCIUM CARBONATE 200(500)MG
2 TABLET,CHEWABLE ORAL 3 TIMES DAILY PRN
Status: DISCONTINUED | OUTPATIENT
Start: 2022-12-07 | End: 2022-12-23 | Stop reason: HOSPADM

## 2022-12-07 RX ORDER — PANTOPRAZOLE SODIUM 40 MG/1
40 TABLET, DELAYED RELEASE ORAL
Status: DISCONTINUED | OUTPATIENT
Start: 2022-12-08 | End: 2022-12-23 | Stop reason: HOSPADM

## 2022-12-07 RX ORDER — ACETAMINOPHEN 325 MG/1
650 TABLET ORAL EVERY 4 HOURS PRN
Status: DISCONTINUED | OUTPATIENT
Start: 2022-12-07 | End: 2022-12-08

## 2022-12-07 RX ADMIN — INSULIN LISPRO 8 UNITS: 100 INJECTION, SOLUTION INTRAVENOUS; SUBCUTANEOUS at 08:49

## 2022-12-07 RX ADMIN — LEVOTHYROXINE SODIUM 150 MCG: 0.15 TABLET ORAL at 08:49

## 2022-12-07 RX ADMIN — DEXAMETHASONE 4 MG: 4 TABLET ORAL at 06:37

## 2022-12-07 RX ADMIN — DEXAMETHASONE 4 MG: 4 TABLET ORAL at 13:14

## 2022-12-07 RX ADMIN — INSULIN GLARGINE-YFGN 25 UNITS: 100 INJECTION, SOLUTION SUBCUTANEOUS at 06:37

## 2022-12-07 RX ADMIN — INSULIN LISPRO 8 UNITS: 100 INJECTION, SOLUTION INTRAVENOUS; SUBCUTANEOUS at 12:15

## 2022-12-07 RX ADMIN — CALCIUM CARBONATE-VITAMIN D TAB 500 MG-200 UNIT 1 TABLET: 500-200 TAB at 21:15

## 2022-12-07 RX ADMIN — LATANOPROST 1 DROP: 50 SOLUTION OPHTHALMIC at 21:15

## 2022-12-07 RX ADMIN — INSULIN LISPRO 8 UNITS: 100 INJECTION, SOLUTION INTRAVENOUS; SUBCUTANEOUS at 18:53

## 2022-12-07 RX ADMIN — ATORVASTATIN CALCIUM 40 MG: 20 TABLET, FILM COATED ORAL at 21:15

## 2022-12-07 RX ADMIN — LISINOPRIL 20 MG: 20 TABLET ORAL at 08:49

## 2022-12-07 RX ADMIN — INSULIN LISPRO 4 UNITS: 100 INJECTION, SOLUTION INTRAVENOUS; SUBCUTANEOUS at 12:14

## 2022-12-07 RX ADMIN — VALACYCLOVIR HYDROCHLORIDE 2000 MG: 500 TABLET, FILM COATED ORAL at 08:49

## 2022-12-07 RX ADMIN — PANTOPRAZOLE SODIUM 40 MG: 40 TABLET, DELAYED RELEASE ORAL at 06:37

## 2022-12-07 RX ADMIN — ESCITALOPRAM 10 MG: 10 TABLET, FILM COATED ORAL at 08:49

## 2022-12-07 RX ADMIN — POLYETHYLENE GLYCOL 3350 17 G: 17 POWDER, FOR SOLUTION ORAL at 08:49

## 2022-12-07 RX ADMIN — VALACYCLOVIR HYDROCHLORIDE 2000 MG: 500 TABLET, FILM COATED ORAL at 21:15

## 2022-12-07 RX ADMIN — DEXAMETHASONE 4 MG: 4 TABLET ORAL at 21:15

## 2022-12-07 RX ADMIN — LEVETIRACETAM 500 MG: 500 TABLET, FILM COATED ORAL at 08:49

## 2022-12-07 RX ADMIN — LEVETIRACETAM 500 MG: 500 TABLET, FILM COATED ORAL at 21:15

## 2022-12-07 RX ADMIN — INSULIN LISPRO 4 UNITS: 100 INJECTION, SOLUTION INTRAVENOUS; SUBCUTANEOUS at 01:47

## 2022-12-07 RX ADMIN — INSULIN LISPRO 8 UNITS: 100 INJECTION, SOLUTION INTRAVENOUS; SUBCUTANEOUS at 21:15

## 2022-12-07 NOTE — PROGRESS NOTES
Case Management Discharge Note      Final Note: Per [facility] has obtained [place] pre-cert and can accept today. RN updated [facility] MD for discharge/readmit order. Trinidad Romero LCSW    Provided Post Acute Provider List?: N/A  N/A Provider List Comment: No needs identified  Provided Post Acute Provider Quality & Resource List?: N/A    Selected Continued Care - Admitted Since 11/22/2022     Destination Coordination complete.    Service Provider Selected Services Address Phone Fax Patient Preferred    [facility] ACUTE REHAB PROGRAM Inpatient Rehabilitation [address] [place] [address] [phone] -- --          Durable Medical Equipment    No services have been selected for the patient.              Dialysis/Infusion    No services have been selected for the patient.              Home Medical Care    No services have been selected for the patient.              Therapy    No services have been selected for the patient.              Community Resources    No services have been selected for the patient.              Community & DME    No services have been selected for the patient.                Selected Continued Care - Episodes Includes continued care and service providers with selected services from the active episodes listed below    Oncology Episode start date: 12/2/2022   There are no active outsourced providers for this episode.                    Final Discharge Disposition Code: 62 - inpatient rehab facility

## 2022-12-07 NOTE — PROGRESS NOTES
[facility]    Insurance precert obtained. Bed available pending medical stability. Please complete discharge-readmit in Epic when discharging to acute rehab.    Sindy Feng RN  Rehab Admissions Coordinator  [phone]

## 2022-12-07 NOTE — PROGRESS NOTES
Name: Christie Avendaño ADMIT: 2022   : 1965  PCP: Tiffany Holloway MD    MRN: [id number] LOS: 15 days   AGE/SEX: 57 y.o. female  ROOM: [facility]     Subjective   Subjective   Feeling fine again this AM. Slept well. Tolerating diet. Voiding well. Working well with therapies. No HA or vis changes today.    Review of Systems     No N/V/D/abd pain/F/C/NS/SOA/CP/palp/LUTS    Objective   Objective   Vital Signs  Temp:  [97.5 °F (36.4 °C)-98.6 °F (37 °C)] 98.6 °F (37 °C)  Heart Rate:  [60-69] 69  Resp:  [18] 18  BP: (116-159)/(72-90) 132/73  SpO2:  [93 %-97 %] 97 %  on   ;   Device (Oxygen Therapy): room air  Body mass index is 41.93 kg/m².     (no change in exam today)    Physical Exam  Vitals and nursing note reviewed.   Constitutional:       General: She is not in acute distress.     Appearance: She is not ill-appearing, toxic-appearing or diaphoretic.   HENT:      Head: Normocephalic.      Comments: Surg site right forehead healing well, staples in place     Nose: Nose normal.      Mouth/Throat:      Mouth: Mucous membranes are moist.      Pharynx: Oropharynx is clear.   Eyes:      General: No scleral icterus.        Right eye: No discharge.         Left eye: No discharge.      Extraocular Movements: Extraocular movements intact.      Conjunctiva/sclera: Conjunctivae normal.   Cardiovascular:      Rate and Rhythm: Normal rate and regular rhythm.      Pulses: Normal pulses.   Pulmonary:      Effort: Pulmonary effort is normal. No respiratory distress.      Breath sounds: Normal breath sounds. No wheezing or rales.      Comments: Anteriorly   Abdominal:      General: Bowel sounds are normal. There is no distension.      Palpations: Abdomen is soft.      Tenderness: There is no abdominal tenderness.   Musculoskeletal:         General: No swelling or deformity. Normal range of motion.      Cervical back: Neck supple. No rigidity.      Comments: SCDs in place BLEs   Lymphadenopathy:      Cervical: No cervical  adenopathy.   Skin:     General: Skin is warm and dry.      Capillary Refill: Capillary refill takes less than 2 seconds.      Coloration: Skin is not jaundiced.      Findings: No rash.   Neurological:      Mental Status: She is alert and oriented to person, place, and time. Mental status is at baseline.      Cranial Nerves: No cranial nerve deficit.      Coordination: Coordination normal.      Comments: Strength in BUEs is almost symmetric this AM, maybe a little weaker on left   Psychiatric:         Mood and Affect: Mood normal.         Behavior: Behavior normal.         Thought Content: Thought content normal.       Results Review     I reviewed the patient's new clinical results.  Results from last 7 days   Lab Units 12/07/22  0617 12/02/22  0913 12/01/22  1133   WBC 10*3/mm3 16.17* 13.91* 14.32*   HEMOGLOBIN g/dL 12.8 12.3 11.9*   PLATELETS 10*3/mm3 261 253 263     Results from last 7 days   Lab Units 12/07/22  0617 12/02/22  0913 12/01/22  1133   SODIUM mmol/L 134* 136 131*   POTASSIUM mmol/L 4.3 4.3 4.3   CHLORIDE mmol/L 97* 98 97*   CO2 mmol/L 28.0 25.5 23.0   BUN mg/dL 20 17 21*   CREATININE mg/dL 0.57 0.83 0.77   GLUCOSE mg/dL 134* 300* 288*   EGFR mL/min/1.73 105.5 81.8 89.5       Results from last 7 days   Lab Units 12/07/22  0617 12/02/22  0913 12/01/22  1133   CALCIUM mg/dL 8.7 8.6 8.4*   MAGNESIUM mg/dL 2.5  --   --        Glucose   Date/Time Value Ref Range Status   12/07/2022 0609 146 (H) 70 - 130 mg/dL Final     Comment:     Meter: [id number] : [id number] Meganhelen Crumpin NA   12/07/2022 0142 152 (H) 70 - 130 mg/dL Final     Comment:     Meter: [id number] : [id number] Venkatesh Lentz NA   12/06/2022 2033 140 (H) 70 - 130 mg/dL Final     Comment:     Meter: [id number] : [id number] Venkatesh Lentz NA   12/06/2022 1752 215 (H) 70 - 130 mg/dL Final     Comment:     Meter: [id number] : [id number] Alisa Brenner RN   12/06/2022 1239 180 (H) 70 - 130 mg/dL Final     Comment:     Meter: [id number]  : [id number] Alisa Brenner RN   12/06/2022 0616 133 (H) 70 - 130 mg/dL Final     Comment:     Meter: [id number] : [id number] Vásquez Moshe KECIA   12/06/2022 0051 160 (H) 70 - 130 mg/dL Final     Comment:     Meter: [id number] : [id number] Zia Zavala PCA       No radiology results for the last day  Scheduled Medications  atorvastatin, 40 mg, Oral, Nightly  dexamethasone, 4 mg, Oral, Q8H  escitalopram, 10 mg, Oral, Daily  insulin glargine, 25 Units, Subcutaneous, QAM  insulin lispro, 0-24 Units, Subcutaneous, Q6H  insulin lispro, 8 Units, Subcutaneous, TID With Meals  latanoprost, 1 drop, Both Eyes, Nightly  levETIRAcetam, 500 mg, Oral, BID  levothyroxine, 150 mcg, Oral, Daily  lisinopril, 20 mg, Oral, Daily  pantoprazole, 40 mg, Oral, Q AM  polyethylene glycol, 17 g, Oral, Daily  sodium chloride, 10 mL, Intravenous, Q12H  valACYclovir, 2,000 mg, Oral, BID    Infusions   Diet  Diet: Regular/House Diet, Diabetic Diets, Vegetarian; Lacto-Ovo Vegetarian (Allows dairy, eggs); Consistent Carbohydrate; Texture: Regular Texture (IDDSI 7); Fluid Consistency: Thin (IDDSI 0)       Assessment/Plan     Active Hospital Problems    Diagnosis  POA   • **High grade glioma  [C71.9]  Yes   • 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       57 y.o. woman admitted with new brain tumor seen on outpatient MRI. She is s/p right frontal stereotactic needle brain biopsy on  11/28. Pathology consistent with high-grade glioma.     Continue current supportive care and therapies. IV Keppra and dexamethasone now changed to PO. Heme/Onc following.     Final pathology from [facility] pending though high-grade glioma initially identified here. Tumor not amenable to resection. Heme/Onc has mentioned starting chemoradiation today or tomorrow.    Leukocytosis most likely due to high-dose steroids. No fever. Continue to monitor closely for s/sx of infection.    Blood sugars have been elevated due to steroids and insulin has been increased. A1c 6.7 on admission. Blood sugars acceptable again today on current regimen of AM long-acting insulin, mealtime short-acting, and high-dose SSI.     TFTs are fine here, continue thyroid replacement      • SCDs for DVT prophylaxis.  • Full code.  • Discussed with patient.  • Anticipate discharge to [facility] once precert obtained.       Mathew Dumont MD  [facility]  12/07/22  08:24 EST

## 2022-12-07 NOTE — H&P
[facility]   HISTORY AND PHYSICAL    Patient Name: Christie Avendaño  : 1965  MRN: [id number]  Primary Care Physician:  Tiffany Holloway MD  Date of admission: 2022    Subjective   Subjective     Chief Complaint:   High-grade glioma-3.2 cm ring-enhancing right supratentorial mass-right thalamic-with mass-effect and left midline shift  Status post stereotactic brain biopsy on 2022  Left side weakness/incoordination/impaired mobility  Radiation therapy/Temodar to start 2022  Decadron  Diabetes mellitus-Trulicity at home.  On Lantus/Humalog  Seizure prophylaxis-Keppra  DVT prophylaxis-Lovenox/SCDs  GI prophylaxis-protein pump inhibitor  Chronic Valtrex  Germline BRCA2 and GEORGES mutations.  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  Depression-Lexapro  Hypothyroidism-on replacement  Hypertension lisinopril    History of Present Illness  Patient is a 57-year-old female previously dependent with history  of multiple cancers including papillary thyroid cancer, bilateral breast cancer, clear-cell renal cancer, left lung carcinoid tumor, bilateral non-small cell lung cancer, and melanoma of the heel, BRCA2 positive, GEORGES mutations, asthma,  , diabetes mellitus, who presented to [facility] after developing left-sided weakness and clumsiness and impairment with her gait.  Also had some headache and forgetfulness.  She had multiple falls.  She had recent COVID-19 in mid November but had recovered.  She had MRI of the brain with a right cerebral hemisphere rim-enhancing mass and was admitted for further evaluation.  She underwent stereotactic brain biopsy on 2022.  Findings for high-grade glioma.  Pathology was sent out to  [facility] for further study.  She has been seen by radiation oncology and medical oncology.  Current plan is to start radiation therapy with concurrent Temodar on December 12.  She has staples in place at the right cranial incision which is healing.  She is on Decadron 4 mg every 8 hours.  On Keppra for seizure prophylaxis.  She takes Valtrex chronically.  Patient was reviewed with internal medicine service and neurosurgery service and okay to start Lovenox at this point for DVT prophylaxis.  Also utilizing SCDs.    She describes some headache.  Denies any vision changes.  No swallowing changes.  She feels she is doing fairly well with her cognition.  She continues with left-sided weakness and incoordination and impairment with her gait.  With Occupational Therapy for mobility contact-guard.  Left upper extremity weaker than the previous occupational therapy session today.  She stood with contact-guard assist.  Minimum assist at times due to left lateral drift and left lower extremity weakness.  On December 5 ambulated 65 feet minimal assist, rolling walker, ataxic, decreased stride length, more noticeable lean to the left hip patient fatigued.  Blanchard catheter was removed 2 days ago and she is voiding without complaints.  No bladder complaints.  Swallowing okay.    Given her functional impairments and comorbidities she is now mated for acute inpatient rehabilitation      Review of Systems   10 point review of systems reviewed and as per HPI.  She would like to get the influenza vaccine while here.  Has had COVID 19 infection twice this year most recently last month.  Comfortable with her breathing.  Personal History     Past Medical History:   Diagnosis Date   • Arthritis    • Asthma    • BRCA2 positive    • Diabetes mellitus (HCC)    • H/O Liver masses 2017    x3   • H/O Lung masses 2017    1 in right lower lobe and 1 in the left infrahilar location   • H/O Ovarian cyst    • H/O Right adrenal mass  (CMS/HCC) 2017   • Lung cancer (HCC)    • Melanoma (HCC)     right foot   • PONV (postoperative nausea and vomiting)    • Thyroid disease        Past Surgical History:   Procedure Laterality Date   • APPENDECTOMY     • BRAIN BIOPSY Right 11/28/2022    Procedure: Right frontal stereotactic needle brain biopsy;  Surgeon: Manuel Thompson MD;  Location: [facility] OR;  Service: Neurosurgery;  Laterality: Right;   • BREAST IMPLANT SURGERY Bilateral    • CHOLECYSTECTOMY     • HYSTERECTOMY     • MASTECTOMY Bilateral    • OVARIAN CYST SURGERY     • THORACOSCOPY VIDEO ASSISTED WITH LOBECTOMY Right 10/9/2020    Procedure: BRONCHOSCOPY, THORACOSCOPY VIDEO ASSISTED WITH ANTERIOR BASAL SEGMENTECTOMY, INTERCOSTAL NERVE BLOCK;  Surgeon: Flaca Crisostomo MD;  Location: [facility] OR;  Service: Thoracic;  Laterality: Right;   • TONSILLECTOMY         Family History: family history includes Breast cancer in her maternal aunt, mother, and paternal cousin; Cancer in her father; Colon cancer in her maternal aunt and maternal aunt; Diabetes in an other family member; Hypertension in her mother; Leukemia in her maternal grandfather and paternal grandfather; Liver cancer in her father; Ovarian cancer (age of onset: 80) in her maternal grandmother; Thyroid cancer (age of onset: 73) in her mother; Uterine cancer in her maternal grandmother. Otherwise pertinent FHx was reviewed and not pertinent to current issue.    Social History:  reports that she has never smoked. She has never used smokeless tobacco. She reports current alcohol use. She reports that she does not use drugs.  She lives in [place] in a home with her  with one-step to enter.  First-floor bedroom bathroom  Home Medications:  Accu-Chek Guide Me, Dulaglutide, atorvastatin, bimatoprost, escitalopram, glucose blood, levothyroxine, lisinopril, temozolomide, and valACYclovir    Allergies:  Allergies   Allergen Reactions   • Morphine Anaphylaxis      Hives and throat swelling   • Peach [Prunus Persica] Anaphylaxis   • Penicillins Anaphylaxis   • Metformin Diarrhea     Medications:  Scheduled Meds:atorvastatin, 40 mg, Oral, Nightly  calcium 500 mg vitamin D 5 mcg (200 UT), 1 tablet, Oral, BID  dexamethasone, 4 mg, Oral, Q8H  [START ON 12/8/2022] enoxaparin, 40 mg, Subcutaneous, Q24H  [START ON 12/8/2022] escitalopram, 10 mg, Oral, Daily  [START ON 12/8/2022] insulin glargine, 25 Units, Subcutaneous, QAM  insulin lispro, 0-24 Units, Subcutaneous, 4x Daily With Meals & Nightly  insulin lispro, 8 Units, Subcutaneous, TID With Meals  latanoprost, 1 drop, Both Eyes, Nightly  levETIRAcetam, 500 mg, Oral, BID  [START ON 12/8/2022] levothyroxine, 150 mcg, Oral, Q AM  [START ON 12/8/2022] lisinopril, 20 mg, Oral, Daily  [START ON 12/8/2022] pantoprazole, 40 mg, Oral, Q AM  [START ON 12/8/2022] polyethylene glycol, 17 g, Oral, Daily  sodium chloride, 10 mL, Intravenous, Q12H  valACYclovir, 2,000 mg, Oral, BID      Continuous Infusions:   PRN Meds:.•  acetaminophen **OR** acetaminophen  •  calcium carbonate  •  dextrose  •  dextrose  •  famotidine  •  glucagon (human recombinant)  •  influenza vaccine  •  nitroglycerin  •  ondansetron **OR** ondansetron  •  senna-docusate sodium    Objective    Objective     Vitals:   Temp:  [97.9 °F (36.6 °C)-98.6 °F (37 °C)] 97.9 °F (36.6 °C)  Heart Rate:  [63-76] 63  Resp:  [18] 18  BP: (110-159)/(63-90) 115/66    Physical Exam  MENTAL STATUS -  AWAKE / ALERT  HEENT-right scalp incision with staples.  Clean dry and intact  SCLERAE ANICTERIC, CONJUNCTIVAE PINK, OP MOIST, NO JVD, EARS UNREMARKABLE EXTERNALLY  LUNGS - CTA, NO WHEEZES, RALES OR RHONCHI  HEART- RRR, NO RUB, MURMUR, OR GALLOP  ABD - NORMOACTIVE BOWEL SOUNDS, SOFT, NT.     EXT - NO EDEMA OR CYANOSIS  NEURO -oriented person place time and situation.  Good historian.    Able to do simple time management.  Able to do 1 of 2 simple money management tasks.  Able to spell her last  name forward and backward  Speech was fluent with slightly slow rate of speech.  Recognizes finger movement in all 4 quadrants.  Extraocular movements intact.  No dysarthria.  MOTOR EXAM - RUE/RLE 5/5.   LUE/LLE 4+/5.   Impaired motor control in the left upper extremity and left lower extremity    Result Review      Results from last 7 days   Lab Units 12/07/22  0617 12/02/22  0913 12/01/22  1133   WBC 10*3/mm3 16.17* 13.91* 14.32*   HEMOGLOBIN g/dL 12.8 12.3 11.9*   HEMATOCRIT % 37.9 38.0 35.1   PLATELETS 10*3/mm3 261 253 263     Results from last 7 days   Lab Units 12/07/22  0617 12/02/22  0913 12/01/22  1133   SODIUM mmol/L 134* 136 131*   POTASSIUM mmol/L 4.3 4.3 4.3   CHLORIDE mmol/L 97* 98 97*   CO2 mmol/L 28.0 25.5 23.0   BUN mg/dL 20 17 21*   CREATININE mg/dL 0.57 0.83 0.77   CALCIUM mg/dL 8.7 8.6 8.4*   GLUCOSE mg/dL 134* 300* 288*     CT HEAD WITHOUT CONTRAST-November 29, 2022     HISTORY: Brain mass     COMPARISON: 11/27/2022     TECHNIQUE: Axial CT imaging was obtained through the brain. No IV  contrast was administered.     FINDINGS:  The patient is status post right frontal tyrone hole with biopsy. There is  some decreased attenuation which is seen along the anterior margin of  the mass, which likely represents the biopsy site. Trace amount of  hemorrhage is noted within this area, although no large intraparenchymal  hematoma is seen. Mass itself is stable in size. Tiny focus of  pneumocephalus is seen adjacent to the tract of the tyrone hole. The mass  itself is resulting in mass effect on the right lateral ventricle, as  well as midline shift to the left of 9 mm. This is stable when compared  to the preoperative study.     IMPRESSION:  Postoperative findings, as noted above.  =========         MRI BRAIN W WO CONTRAST-DATE OF EXAM: 11/22/2022 5:10 PM     INDICATIONS:   recent falls, confusion; W19.XXXA-Unspecified fall, initial encounter;  R41.0-Disorientation, unspecified     COMPARISON:  None  available.     TECHNIQUE:   Multiplanar multisequence images of the brain were performed prior to  and after the uneventful intravenous contrast administration of Prohance     FINDINGS:   Rim enhancing mass centered in the region of the right thalamus/internal  capsule measures 3.0 x 2.8 x 3.2 cm. This mass and surrounding vasogenic  edema causes mass effect on the right lateral ventricle and associated 4  mm leftward midline shift on T2 axial image 11. This mass has associated  T1 hypointensity, T2 hyperintensity, peripheral restricted diffusion,  and peripheral low signal on susceptibility weighted imaging. No  additional enhancing lesions are seen.     No restricted diffusion is seen in a pattern to suggest acute ischemia.  Ventricles are nondilated. Basilar cisterns appear intact. Signal voids  in the vessels the skull base appear unremarkable. Included paranasal  sinuses and mastoid air cells are clear.     IMPRESSION:  1.     3.2 cm rim-enhancing right supratentorial mass causing mass  effect and leftward midline shift, as described above. Recommend  neurosurgical evaluation. I attempted to call this result to Dr. Collins  at 5:40 PM on 11/22/2022, but was unable to reach him. I was able to  discuss the result with on call Dr. Romo at 6:20 PM on 11/22/2022. He  advised that the patient should go to [facility]  emergency department, and our MRI staff will notify the patient and her  family of this recommendation.  2.     No additional enhancing lesions are seen.         Assessment & Plan   Assessment / Plan         Active Hospital Problems:  Active Hospital Problems    Diagnosis    • **Glioma (HCC)      High-grade glioma-3.2 cm ring-enhancing right supratentorial mass-right thalamic-with mass-effect and left midline shift  Status post stereotactic brain biopsy on November 28, 2022    Left side weakness/incoordination/impaired mobility    Radiation therapy/Temodar to start December 12,  2022  Decadron 4 mg every 8 hourly    Leukocytosis-steroid/stress response.  Incision healing.      Diabetes mellitus-Trulicity at home.  On Lantus/Humalog    Seizure prophylaxis-Keppra    DVT prophylaxis-Lovenox/SCDs    GI prophylaxis-protein pump inhibitor  Add calcium and vitamin D with ongoing steroids    Chronic Valtrex    Germline BRCA2 and GEORGES mutations.  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    Depression-Lexapro    Hypothyroidism-on replacement    Hypertension lisinopril    Now admit for comprehensive acute inpatient rehabilitation .  This would be an interdisciplinary program with physical therapy 1 hour,  occupational therapy 1 hour, and speech therapy 1 hour, 5 days a week.  Rehabilitation nursing for carryover, monitoring of neurologic   status, bowel and bladder, and skin  Ongoing physician follow-up.  Weekly team conferences.  Goals are to achieve a level of contact-guard supervision with  mobility and self-care and improved balance.   Rehabilitation prognosis indeterminate given her tumor and prognosis Medical prognosis defer to oncology.  Estimated length of stay is approximately 2 weeks, but is only an estimation.   The patient's functional status and clinical status is unchanged from preadmission assessment and the patient continues appropriate for acute inpatient rehabilitation.  Goal is for home with outpatient   therapies.  Barrier to discharge: Impaired mobility and self-care- work on gross and fine motor control, ADL training, functional mobility, transfers, progressive ambulation to overcome.         DVT prophylaxis:  Medical and mechanical DVT prophylaxis orders are present.    CODE STATUS:    Code Status (Patient has no pulse and is not breathing):  CPR (Attempt to Resuscitate)  Medical Interventions (Patient has pulse or is breathing): Full Support    Admission Status:  I believe this patient meets inpatient status.    Mathew Araujo MD    During rounds, used appropriate personal protective equipment including mask and gloves.  Additional gown if indicated.  Mask used was standard procedure mask. Appropriate PPE was worn during the entire visit.  Hand hygiene was completed before and after.

## 2022-12-07 NOTE — PROGRESS NOTES
Continued Stay Note  [facility]     Patient Name: Christie Avendaño  MRN: [id number]  Today's Date: 12/7/2022    Admit Date: 11/22/2022    Plan: [facility] acute rehab pending [facility] pre-cert   Discharge Plan     Row Name 12/07/22 1048       Plan    Plan [facility] acute rehab pending [facility] pre-cert    Patient/Family in Agreement with Plan yes    Plan Comments Per [facility] acute rehab continues to await [facility] pre-cert. CCP to continue to follow. Trinidad Romero LCSW               Discharge Codes    No documentation.               Expected Discharge Date and Time     Expected Discharge Date Expected Discharge Time    Dec 7, 2022             Keily Romero LCSW

## 2022-12-07 NOTE — THERAPY TREATMENT NOTE
Patient Name: Christie Avendaño  : 1965    MRN: [id number]                              Today's Date: 2022       Admit Date: 2022    Visit Dx:     ICD-10-CM ICD-9-CM   1. Brain mass  G93.89 348.89   2. BRCA2 gene mutation positive in female  Z15.01 V84.01    Z15.02 V84.02    Z15.09 V84.09   3. Type 2 diabetes mellitus without complication, without long-term current use of insulin (HCC)  E11.9 250.00   4. 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)   • Non-small cell lung cancer, right (HCC)   • Renal mass, right   • SIRS (systemic inflammatory response syndrome) (HCC)   • Hyperlipidemia   • Malignant melanoma of right lower extremity including hip (HCC)   • High grade glioma    • 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)     Past Medical History:   Diagnosis Date   • Arthritis    • Asthma    • BRCA2 positive    • Diabetes mellitus (HCC)    • H/O Liver masses 2017    x3   • H/O Lung masses 2017    1 in right lower lobe and 1 in the left infrahilar location   • H/O Ovarian cyst    • H/O Right adrenal mass (CMS/HCC) 2017   • Lung cancer (HCC)    • Melanoma (HCC)     right foot   • PONV (postoperative nausea and vomiting)    • Thyroid disease      Past Surgical History:   Procedure Laterality Date   • APPENDECTOMY     • BRAIN BIOPSY Right 2022    Procedure: Right frontal stereotactic needle brain biopsy;  Surgeon: Manuel Thompson MD;  Location: [facility];  Service: Neurosurgery;  Laterality: Right;   • BREAST IMPLANT  SURGERY Bilateral    • CHOLECYSTECTOMY     • HYSTERECTOMY     • MASTECTOMY Bilateral    • OVARIAN CYST SURGERY     • THORACOSCOPY VIDEO ASSISTED WITH LOBECTOMY Right 10/9/2020    Procedure: BRONCHOSCOPY, THORACOSCOPY VIDEO ASSISTED WITH ANTERIOR BASAL SEGMENTECTOMY, INTERCOSTAL NERVE BLOCK;  Surgeon: Flaca Crisostomo MD;  Location: [facility];  Service: Thoracic;  Laterality: Right;   • TONSILLECTOMY        General Information     Row Name 12/07/22 1324          OT Time and Intention    Document Type therapy note (daily note)  -RB     Mode of Treatment individual therapy;occupational therapy  -RB     Row Name 12/07/22 1324          General Information    Patient Profile Reviewed yes  -RB     Existing Precautions/Restrictions fall  -RB     Row Name 12/07/22 1324          Cognition    Orientation Status (Cognition) oriented x 4  -RB           User Key  (r) = Recorded By, (t) = Taken By, (c) = Cosigned By    Initials Name Provider Type    RB Jodi Crowder OT Occupational Therapist                 Mobility/ADL's     Row Name 12/07/22 1325          Bed Mobility    Bed Mobility supine-sit;sit-supine  -RB     Supine-Sit [place] (Bed Mobility) verbal cues;contact guard  -RB     Sit-Supine [place] (Bed Mobility) minimum assist (75% patient effort);verbal cues  -RB     Assistive Device (Bed Mobility) bed rails  -RB     Row Name 12/07/22 1325          Transfers    Transfers sit-stand transfer;stand-sit transfer;bed-chair transfer  -RB     Comment, (Transfers) multiple sit to stands on various surfaces - cues for BUE placement and balance (lean more to the right)  -RB     Row Name 12/07/22 1325          Bed-Chair Transfer    Bed-Chair [place] (Transfers) minimum assist (75% patient effort);1 person assist;verbal cues  -RB     Assistive Device (Bed-Chair Transfers) walker, front-wheeled  -RB     Row Name 12/07/22 1325          Sit-Stand Transfer    Sit-Stand [place] (Transfers) minimum assist (75%  patient effort);1 person assist;verbal cues  -     Assistive Device (Sit-Stand Transfers) walker, front-wheeled  -     Row Name 12/07/22 1325          Stand-Sit Transfer    Stand-Sit [place] (Transfers) minimum assist (75% patient effort);verbal cues;1 person assist  -     Assistive Device (Stand-Sit Transfers) walker, front-wheeled  -     Row Name 12/07/22 1325          Functional Mobility    Functional Mobility- Ind. Level minimum assist (75% patient effort);verbal cues required  -RB     Functional Mobility- Device walker, front-wheeled  -RB     Functional Mobility- Comment L lateral lean, LLE began to drag at the end 2/2 to fatigue. ~5 min total standing prior to a sitting rest break required  -RB           User Key  (r) = Recorded By, (t) = Taken By, (c) = Cosigned By    Initials Name Provider Type    RB Jodi Crowder OT Occupational Therapist               Obj/Interventions     Row Name 12/07/22 1327          Shoulder (Therapeutic Exercise)    Shoulder (Therapeutic Exercise) AAROM (active assistive range of motion)  -     Shoulder AROM (Therapeutic Exercise) right;flexion;extension;15 repititions  -     Shoulder AAROM (Therapeutic Exercise) left;flexion;extension;10 repetitions  -     Row Name 12/07/22 1327          Elbow/Forearm (Therapeutic Exercise)    Elbow/Forearm (Therapeutic Exercise) AAROM (active assistive range of motion)  -     Elbow/Forearm AAROM (Therapeutic Exercise) left;flexion;extension;supination;pronation  -     Row Name 12/07/22 1327          Motor Skills    Coordination left;gross motor deficit;fine motor deficit;upper extremity;moderate impairment  -     Therapeutic Exercise shoulder;elbow/forearm  -     Row Name 12/07/22 1327          Balance    Comment, Balance L lateral lean. Aware of it >75% of the time. When fatigued the lean is more pronounced and she has difficulty correcting it  -RB           User Key  (r) = Recorded By, (t) = Taken By, (c) =  Cosigned By    Initials Name Provider Type    Jodi Perla OT Occupational Therapist               Goals/Plan    No documentation.                Clinical Impression     Row Name 12/07/22 1328          Pain Assessment    Pretreatment Pain Rating 0/10 - no pain  -RB     Posttreatment Pain Rating 0/10 - no pain  -RB     Row Name 12/07/22 1328          Plan of Care Review    Plan of Care Reviewed With patient  -RB     Progress improving  -RB     Row Name 12/07/22 1328          Therapy Assessment/Plan (OT)    Rehab Potential (OT) good, to achieve stated therapy goals  -RB     Criteria for Skilled Therapeutic Interventions Met (OT) yes;skilled treatment is necessary  -RB     Therapy Frequency (OT) 5 times/wk  -RB     Row Name 12/07/22 1328          Therapy Plan Review/Discharge Plan (OT)    Anticipated Discharge Disposition (OT) inpatient rehabilitation facility  -RB     Row Name 12/07/22 1328          Vital Signs    O2 Delivery Pre Treatment room air  -RB     O2 Delivery Intra Treatment room air  -RB     O2 Delivery Post Treatment room air  -RB     Pre Patient Position Supine  -RB     Intra Patient Position Standing  -RB     Post Patient Position Supine  -RB     Row Name 12/07/22 1328          Positioning and Restraints    Pre-Treatment Position in bed  -RB     Post Treatment Position bed  -RB     In Bed notified nsg;supine;call light within reach;encouraged to call for assist;exit alarm on  -RB           User Key  (r) = Recorded By, (t) = Taken By, (c) = Cosigned By    Initials Name Provider Type    Jodi Perla OT Occupational Therapist               Outcome Measures     Row Name 12/07/22 0849          How much help from another person do you currently need...    Turning from your back to your side while in flat bed without using bedrails? 4  -LC     Moving from lying on back to sitting on the side of a flat bed without bedrails? 4  -LC     Moving to and from a bed to a chair (including a  wheelchair)? 3  -LC     Standing up from a chair using your arms (e.g., wheelchair, bedside chair)? 3  -LC     Climbing 3-5 steps with a railing? 3  -LC     To walk in hospital room? 3  -LC     AM-PAC 6 Clicks Score (PT) 20  -LC     Highest level of mobility 6 --> Walked 10 steps or more  -           User Key  (r) = Recorded By, (t) = Taken By, (c) = Cosigned By    Initials Name Provider Type    Sylvia Petty RN Registered Nurse                Occupational Therapy Education     Title: PT OT SLP Therapies (In Progress)     Topic: Occupational Therapy (Not Started)     Point: ADL training (Not Started)     Description:   Instruct learner(s) on proper safety adaptation and remediation techniques during self care or transfers.   Instruct in proper use of assistive devices.              Learner Progress:  Not documented in this visit.          Point: Home exercise program (Not Started)     Description:   Instruct learner(s) on appropriate technique for monitoring, assisting and/or progressing therapeutic exercises/activities.              Learner Progress:  Not documented in this visit.          Point: Precautions (Not Started)     Description:   Instruct learner(s) on prescribed precautions during self-care and functional transfers.              Learner Progress:  Not documented in this visit.          Point: Body mechanics (Not Started)     Description:   Instruct learner(s) on proper positioning and spine alignment during self-care, functional mobility activities and/or exercises.              Learner Progress:  Not documented in this visit.                          OT Recommendation and Plan  Planned Therapy Interventions (OT): transfer/mobility retraining, strengthening exercise, ROM/therapeutic exercise, activity tolerance training, adaptive equipment training, BADL retraining, functional balance retraining, occupation/activity based interventions, patient/caregiver education/training  Therapy Frequency  (OT): 5 times/wk  Plan of Care Review  Plan of Care Reviewed With: patient  Progress: improving     Time Calculation:    Time Calculation- OT     Row Name 12/07/22 1324             Time Calculation- OT    OT Start Time 1113  -RB      OT Stop Time 1144  -RB      OT Time Calculation (min) 31 min  -RB      Total Timed Code Minutes- OT 31 minute(s)  -RB      OT Received On 12/07/22  -RB      OT - Next Appointment 12/08/22  -RB         Timed Charges    69769 - OT Therapeutic Exercise Minutes 10  -RB      08461 - OT Therapeutic Activity Minutes 21  -RB         Total Minutes    Timed Charges Total Minutes 31  -RB       Total Minutes 31  -RB            User Key  (r) = Recorded By, (t) = Taken By, (c) = Cosigned By    Initials Name Provider Type    RB Jodi Crowder OT Occupational Therapist              Therapy Charges for Today     Code Description Service Date Service Provider Modifiers Qty    19397650725 HC OT THERAPEUTIC ACT EA 15 MIN 12/7/2022 Jodi Crowder OT GO 1    76733005157 HC OT THER PROC EA 15 MIN 12/7/2022 Jodi Crowder OT GO 1               Jodi Crowder OT  12/7/2022

## 2022-12-07 NOTE — PLAN OF CARE
Vss. Tylenol administered for mild headache. No neuro changes. Spirits seem good. Pt very pleasant.     Problem: Adult Inpatient Plan of Care  Goal: Patient-Specific Goal (Individualized)  Outcome: Ongoing, Progressing  Goal: Absence of Hospital-Acquired Illness or Injury  Outcome: Ongoing, Progressing  Intervention: Identify and Manage Fall Risk  Recent Flowsheet Documentation  Taken 12/7/2022 0559 by Halie Mark RN  Safety Promotion/Fall Prevention:   assistive device/personal items within reach   clutter free environment maintained   fall prevention program maintained   nonskid shoes/slippers when out of bed   safety round/check completed  Taken 12/7/2022 0423 by Halie Mark RN  Safety Promotion/Fall Prevention:   assistive device/personal items within reach   clutter free environment maintained   fall prevention program maintained   nonskid shoes/slippers when out of bed   safety round/check completed   activity supervised   gait belt  Taken 12/7/2022 0218 by Halie Mark RN  Safety Promotion/Fall Prevention:   assistive device/personal items within reach   clutter free environment maintained   activity supervised   fall prevention program maintained   gait belt   nonskid shoes/slippers when out of bed   safety round/check completed  Taken 12/7/2022 0004 by Halie Mark RN  Safety Promotion/Fall Prevention:   assistive device/personal items within reach   clutter free environment maintained   activity supervised   fall prevention program maintained   gait belt   nonskid shoes/slippers when out of bed   safety round/check completed  Taken 12/6/2022 2141 by Halie Mark RN  Safety Promotion/Fall Prevention:   activity supervised   assistive device/personal items within reach   clutter free environment maintained   fall prevention program maintained   gait belt   nonskid shoes/slippers when out of bed   safety round/check completed  Taken 12/6/2022 1946 by Halie Mark, CARLITO  Safety  Promotion/Fall Prevention:   assistive device/personal items within reach   clutter free environment maintained   fall prevention program maintained   nonskid shoes/slippers when out of bed   safety round/check completed   activity supervised  Intervention: Prevent Skin Injury  Recent Flowsheet Documentation  Taken 12/7/2022 0559 by Halie Mark RN  Body Position: position changed independently  Taken 12/7/2022 0423 by Halie Mark RN  Body Position: position changed independently  Taken 12/7/2022 0004 by Halie Mark RN  Body Position: position changed independently  Taken 12/6/2022 2141 by Halie Mark RN  Body Position: position changed independently  Skin Protection: incontinence pads utilized  Taken 12/6/2022 1946 by Halie Mark RN  Body Position: position changed independently  Intervention: Prevent and Manage VTE (Venous Thromboembolism) Risk  Recent Flowsheet Documentation  Taken 12/7/2022 0218 by Halie Mark RN  Activity Management: ambulated to bathroom  Taken 12/6/2022 2141 by Halie Mark RN  Activity Management: activity adjusted per tolerance  VTE Prevention/Management:   bilateral   sequential compression devices on  Taken 12/6/2022 1946 by Halie Mark RN  Activity Management: activity adjusted per tolerance  Goal: Optimal Comfort and Wellbeing  Outcome: Ongoing, Progressing  Intervention: Monitor Pain and Promote Comfort  Recent Flowsheet Documentation  Taken 12/6/2022 2141 by Halie Mark RN  Pain Management Interventions: see MAR  Intervention: Provide Person-Centered Care  Recent Flowsheet Documentation  Taken 12/6/2022 2141 by Halie Mark RN  Trust Relationship/Rapport: care explained  Goal: Readiness for Transition of Care  Outcome: Ongoing, Progressing  Goal: Plan of Care Review  Outcome: Ongoing, Progressing  Goal: Patient-Specific Goal (Individualized)  Outcome: Ongoing, Progressing  Goal: Absence of Hospital-Acquired Illness or Injury  Outcome:  Ongoing, Progressing  Intervention: Identify and Manage Fall Risk  Recent Flowsheet Documentation  Taken 12/7/2022 0559 by Halie Mark, RN  Safety Promotion/Fall Prevention:   assistive device/personal items within reach   clutter free environment maintained   fall prevention program maintained   nonskid shoes/slippers when out of bed   safety round/check completed  Taken 12/7/2022 0423 by Halie Mark, RN  Safety Promotion/Fall Prevention:   assistive device/personal items within reach   clutter free environment maintained   fall prevention program maintained   nonskid shoes/slippers when out of bed   safety round/check completed   activity supervised   gait belt  Taken 12/7/2022 0218 by Halie Mark RN  Safety Promotion/Fall Prevention:   assistive device/personal items within reach   clutter free environment maintained   activity supervised   fall prevention program maintained   gait belt   nonskid shoes/slippers when out of bed   safety round/check completed  Taken 12/7/2022 0004 by Halie Mark, RN  Safety Promotion/Fall Prevention:   assistive device/personal items within reach   clutter free environment maintained   activity supervised   fall prevention program maintained   gait belt   nonskid shoes/slippers when out of bed   safety round/check completed  Taken 12/6/2022 2141 by Halie Mark RN  Safety Promotion/Fall Prevention:   activity supervised   assistive device/personal items within reach   clutter free environment maintained   fall prevention program maintained   gait belt   nonskid shoes/slippers when out of bed   safety round/check completed  Taken 12/6/2022 1946 by Halie Mark, RN  Safety Promotion/Fall Prevention:   assistive device/personal items within reach   clutter free environment maintained   fall prevention program maintained   nonskid shoes/slippers when out of bed   safety round/check completed   activity supervised  Intervention: Prevent Skin Injury  Recent  Flowsheet Documentation  Taken 12/7/2022 0559 by Halie Mark RN  Body Position: position changed independently  Taken 12/7/2022 0423 by Halie Mark RN  Body Position: position changed independently  Taken 12/7/2022 0004 by Halie Mark RN  Body Position: position changed independently  Taken 12/6/2022 2141 by Halie Mark RN  Body Position: position changed independently  Skin Protection: incontinence pads utilized  Taken 12/6/2022 1946 by Halie Mark RN  Body Position: position changed independently  Intervention: Prevent and Manage VTE (Venous Thromboembolism) Risk  Recent Flowsheet Documentation  Taken 12/7/2022 0218 by Halie Mark RN  Activity Management: ambulated to bathroom  Taken 12/6/2022 2141 by Halie Mark RN  Activity Management: activity adjusted per tolerance  VTE Prevention/Management:   bilateral   sequential compression devices on  Taken 12/6/2022 1946 by Halie Mark RN  Activity Management: activity adjusted per tolerance  Goal: Optimal Comfort and Wellbeing  Outcome: Ongoing, Progressing  Intervention: Monitor Pain and Promote Comfort  Recent Flowsheet Documentation  Taken 12/6/2022 2141 by Halie Mark RN  Pain Management Interventions: see MAR  Intervention: Provide Person-Centered Care  Recent Flowsheet Documentation  Taken 12/6/2022 2141 by Halie Mark RN  Trust Relationship/Rapport: care explained  Goal: Readiness for Transition of Care  Outcome: Ongoing, Progressing   Goal Outcome Evaluation:

## 2022-12-07 NOTE — CONSULTS
Subjective     CHIEF COMPLAINT:     High grade glioma  Germline BRCA2 and GEORGES mutations  Papillary thyroid cancer  Bilateral DCIS  Left clear cell renal cell carcinoma   Left lower lobe NSC Lung cancer (adenocarcinoma with lipidic growth pattern)  Right lower lobe NSC lung (adenocarcinoma with lipidic growth pattern)  Melanoma of right heel    INTERVAL HISTORY:     Patient reports improvement in the strength of the left side.  She was able to work with physical therapy staff.  No headaches today.    Patient was transferred to acute rehab.    REVIEW OF SYSTEMS:  A comprehensive review of systems was obtained with pertinent positive findings as noted in the interval history above.  All other systems negative.    SCHEDULED MEDS:  atorvastatin, 40 mg, Oral, Nightly  calcium 500 mg vitamin D 5 mcg (200 UT), 1 tablet, Oral, BID  dexamethasone, 4 mg, Oral, Q8H  [START ON 12/8/2022] enoxaparin, 40 mg, Subcutaneous, Q24H  [START ON 12/8/2022] escitalopram, 10 mg, Oral, Daily  [START ON 12/8/2022] insulin glargine, 25 Units, Subcutaneous, QAM  insulin lispro, 0-24 Units, Subcutaneous, 4x Daily With Meals & Nightly  insulin lispro, 8 Units, Subcutaneous, TID With Meals  latanoprost, 1 drop, Both Eyes, Nightly  levETIRAcetam, 500 mg, Oral, BID  [START ON 12/8/2022] levothyroxine, 150 mcg, Oral, Q AM  [START ON 12/8/2022] lisinopril, 20 mg, Oral, Daily  [START ON 12/8/2022] pantoprazole, 40 mg, Oral, Q AM  [START ON 12/8/2022] polyethylene glycol, 17 g, Oral, Daily  sodium chloride, 10 mL, Intravenous, Q12H  valACYclovir, 2,000 mg, Oral, BID      Objective   VITAL SIGNS:  Temp:  [97.9 °F (36.6 °C)-98.6 °F (37 °C)] 97.9 °F (36.6 °C)  Heart Rate:  [63-76] 63  Resp:  [18] 18  BP: (110-159)/(63-90) 115/66     PHYSICAL EXAMINATION:   GENERAL:  The patient appears in fair general condition, not in acute distress.  SKIN: No skin rash. No ecchymosis.   HEAD: Surgical biopsy is well-healed.  EYES:  No Jaundice. No Pallor.   NECK:   Supple. No Masses.  LYMPHATICS:  No cervical or supraclavicular lymphadenopathy.  CHEST: Normal respiratory effort.  CARDIAC: No edema.  ABDOMEN: Nondistended  EXTREMITIES: No edema.  No calf tenderness.  PSYCH: Normal mood and affect.   NEURO: Strength in the left upper and lower extremities +4/5.    RESULT REVIEW:   Results from last 7 days   Lab Units 12/07/22  0617 12/02/22  0913 12/01/22  1133   WBC 10*3/mm3 16.17* 13.91* 14.32*   HEMOGLOBIN g/dL 12.8 12.3 11.9*   HEMATOCRIT % 37.9 38.0 35.1   PLATELETS 10*3/mm3 261 253 263     Results from last 7 days   Lab Units 12/07/22  0617 12/02/22  0913 12/01/22  1133   SODIUM mmol/L 134* 136 131*   POTASSIUM mmol/L 4.3 4.3 4.3   CHLORIDE mmol/L 97* 98 97*   CO2 mmol/L 28.0 25.5 23.0   BUN mg/dL 20 17 21*   CREATININE mg/dL 0.57 0.83 0.77   CALCIUM mg/dL 8.7 8.6 8.4*   MAGNESIUM mg/dL 2.5  --   --        Assessment & Plan   *High grade glioma.   · Patient presented with recurrent falls and confusion.  · MRI on 11/22/2022 revealed a 3.2 cm ring-enhancing right supratentorial mass.  There was mass-effect and left midline shift.  · She was started on dexamethasone and Keppra.  · CT chest abdomen pelvis on 11/24/2022 revealed no evidence of progressive metastatic disease.  · S/p stereotactic brain biopsy on 11/28/2022.  · Preliminary pathology revealed high-grade glioma.  It was sent to [facility].  · Preliminary revealed high-grade glioma.  · Patient had radiation simulation on 12/2/2022.  · Plan is to start concurrent low-dose Temodar at 75 mg/m2 daily.   · Recommended starting the Temodar at the same time as the radiation therapy-hoping for 12/7/2022 or 12/8/2022.  · She is on Decadron 4 mg p.o. every 8 hours.    *Germline BRCA2 and GEORGES mutations.  · Patient has history of Papillary thyroid cancer, Bilateral DCIS, Left clear cell renal cell carcinoma, Left lower lobe NSC Lung cancer (adenocarcinoma with lipidic growth pattern), Right lower lobe NSC lung  (adenocarcinoma with lipidic growth pattern) and Melanoma of right heel.  · Please see the note from Dr. Collins, the patient's outpatient oncologist from 12/4/2022.    *Seizure prophylaxis.  · Patient is on Keppra 500 mg p.o. twice daily.  · Patient did not have any seizures.    PLAN:    - I discussed the case with Dr. Sam.  He is planning to start radiation on 12/12/2022.  - Therefore, we will plan to start Temodar 165 mg on 12/12/2022.  -  I explained the side effects including nausea and myelosuppression.    - Temodar is to be given 1 hour before radiation therapy. Temodar is to be continued on Saturdays and Sundays  - She will be given Zofran 8 mg 1 hour before Temodar.        Laura Flores MD  12/07/22

## 2022-12-07 NOTE — PLAN OF CARE
The pt participated in OT this afternoon. CGA bed mobility and sitting balance at the EOB. She participated in BUE exercises, AAROM provided in the LUE. LUE appeared weaker than the previous OT session. Pt also reports she is in agreement. She stood with CGA, mobilized to various house surfaces in her room to complete sit to stands. Cues provided on BUE placement and generalized balance awareness. Min A provided at times due to a L lateral drift and LLE weakness. Anticipate a d/c to acute rehab when a bed is available.

## 2022-12-07 NOTE — PROGRESS NOTES
Inpatient Rehabilitation Plan of Care Note    Plan of Care  Care Plan Reviewed - Updates as Follows    Psychosocial    [RN] Coping/Adjustment(Active)  Current Status(12/07/2022): At risk for poor coping d/t recent medical  conditions.  Weekly Goal(12/14/2022): Identify progress in functional status.  Discharge Goal: Demonstrates healthy coping strategies.    Performed Intervention(s)  Support/peer groups.  Verbalizes needs and concerns.  Medication.      Safety    [RN] Potential for Injury(Active)  Current Status(12/07/2022): At risk for falls d/t history of falls.  Weekly Goal(12/14/2022): Remain fall free while in hospital.  Discharge Goal: Patient and family will be aware of risk of fall and safety in  home setting.    Performed Intervention(s)  Items w/i reach.  Safety rounds.  Bed and chair alarm.  Falls precautions.      Sphincter Control    [RN] Bladder Management(Active)  Current Status(12/07/2022): Continent of bladder.  Weekly Goal(12/14/2022): Remain continent of bladder.  Discharge Goal: Goes home continent of bladder.    [RN] Bowel Management(Active)  Current Status(12/07/2022): Continent of bowel.  Weekly Goal(12/14/2022): Remain continent of bowel.  Discharge Goal: Go home continent of bowel.    Performed Intervention(s)  Offer restroom during hourly rounding.  Encourage fluid intake.  Monitor Is and Os.  Timed voids.  Encourage appropriate diet.    Signed by: Christie Majano RN

## 2022-12-07 NOTE — DISCHARGE SUMMARY
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        Hospital Course     Very pleasant 57 y.o. woman admitted with new brain tumor seen on outpatient MRI. She is s/p right frontal stereotactic needle brain biopsy on . Pathology consistent with high-grade glioma. Please see below for details of admission:     Continue current supportive care and therapies. IV Keppra and dexamethasone now changed to PO. Heme/Onc following.      Final pathology from [facility] pending though high-grade glioma initially identified here. Tumor not amenable to resection. Heme/Onc has mentioned starting chemoradiation today or tomorrow.     Leukocytosis most likely due to high-dose steroids. No fever. Continue to monitor closely for s/sx of infection.     Blood sugars have been elevated due to steroids and insulin has been increased. A1c 6.7 on admission. Blood sugars acceptable again today on current regimen of AM long-acting insulin, mealtime short-acting, and high-dose SSI.     TFTs are fine here, continue thyroid  replacement        • SCDs for DVT prophylaxis.  • Full code.  • Discussed with patient and CCP.  • Discharge to [facility] this afternoon  • [facility] recommended f/u on 12/12--they could see here rather than outpt office (Manohar CLAROS)      Day of Discharge     Subjective:  Feeling fine again this AM. Slept well. Tolerating diet. Voiding well. Working well with therapies. No HA or vis changes today.     Review of Systems  No N/V/D/abd pain/F/C/NS/SOA/CP/palp/LUTS    Physical Exam:  Temp:  [97.9 °F (36.6 °C)-98.6 °F (37 °C)] 97.9 °F (36.6 °C)  Heart Rate:  [63-76] 75  Resp:  [18] 18  BP: (110-159)/(63-90) 110/63  Body mass index is 41.93 kg/m².  Physical Exam  Vitals and nursing note reviewed.   Constitutional:       General: She is not in acute distress.     Appearance: She is not ill-appearing, toxic-appearing or diaphoretic.   HENT:      Head: Normocephalic.      Comments: Surg site right forehead healing well, staples in place     Nose: Nose normal.      Mouth/Throat:      Mouth: Mucous membranes are moist.      Pharynx: Oropharynx is clear.   Eyes:      General: No scleral icterus.        Right eye: No discharge.         Left eye: No discharge.      Extraocular Movements: Extraocular movements intact.      Conjunctiva/sclera: Conjunctivae normal.   Cardiovascular:      Rate and Rhythm: Normal rate and regular rhythm.      Pulses: Normal pulses.   Pulmonary:      Effort: Pulmonary effort is normal. No respiratory distress.      Breath sounds: Normal breath sounds. No wheezing or rales.      Comments: Anteriorly   Abdominal:      General: Bowel sounds are normal. There is no distension.      Palpations: Abdomen is soft.      Tenderness: There is no abdominal tenderness.   Musculoskeletal:         General: No swelling or deformity. Normal range of motion.      Cervical back: Neck supple. No rigidity.      Comments: SCDs in place BLEs   Lymphadenopathy:      Cervical: No cervical adenopathy.   Skin:     General:  Skin is warm and dry.      Capillary Refill: Capillary refill takes less than 2 seconds.      Coloration: Skin is not jaundiced.      Findings: No rash.   Neurological:      Mental Status: She is alert and oriented to person, place, and time. Mental status is at baseline.      Cranial Nerves: No cranial nerve deficit.      Coordination: Coordination normal.      Comments: Strength in BUEs is almost symmetric this AM, maybe a little weaker on left   Psychiatric:         Mood and Affect: Mood normal.         Behavior: Behavior normal.         Thought Content: Thought content normal.         Consultants     Consult Orders (all) (From admission, onward)     Start     Ordered    12/01/22 1518  Inpatient Rehab Admission Consult  Once        Provider:  (Not yet assigned)    12/01/22 1518    11/28/22 1211  Inpatient Intensivist Consult  Once        Provider:  Lamine Juan MD    11/28/22 1210    11/23/22 1137  Inpatient Radiation Oncology Consult  Once        Specialty:  Radiation Oncology  Provider:  Mathew Sam MD    11/23/22 1137    11/23/22 0702  Hematology & Oncology Inpatient Consult  IN AM        Specialty:  Hematology and Oncology  Provider:  Mathew Collins Jr., MD    11/22/22 2319 11/22/22 2112  [facility] (on-call MD unless specified) Details  Once,   Status:  Canceled        Specialty:  Hospitalist  Provider:  (Not yet assigned)    11/22/22 2111 11/22/22 2052  Hematology and Oncology (on-call MD unless specified)  Once        Specialty:  Hematology and Oncology  Provider:  (Not yet assigned)    11/22/22 2051 11/22/22 2048  Neurosurgery (on-call MD unless specified)  Once        Specialty:  Neurosurgery  Provider:  (Not yet assigned)    11/22/22 2047    Signed and Held  Hematology and Oncology (on-call MD unless specified)  Once        Specialty:  Hematology and Oncology  Provider:  (Not yet assigned)    Signed and Held    Signed and Held  Inpatient Rehab Admission Consult  Once        Provider:  (Not yet  assigned)    Signed and Held              Procedures     Right frontal stereotactic needle brain biopsy      Imaging Results (All)     Procedure Component Value Units Date/Time    CT Head Without Contrast [[id number]] Collected: 11/29/22 0431     Updated: 11/29/22 0442    Narrative:      CT HEAD WITHOUT CONTRAST     HISTORY: Brain mass     COMPARISON: 11/27/2022     TECHNIQUE: Axial CT imaging was obtained through the brain. No IV  contrast was administered.     FINDINGS:  The patient is status post right frontal tyrone hole with biopsy. There is  some decreased attenuation which is seen along the anterior margin of  the mass, which likely represents the biopsy site. Trace amount of  hemorrhage is noted within this area, although no large intraparenchymal  hematoma is seen. Mass itself is stable in size. Tiny focus of  pneumocephalus is seen adjacent to the tract of the tyrone hole. The mass  itself is resulting in mass effect on the right lateral ventricle, as  well as midline shift to the left of 9 mm. This is stable when compared  to the preoperative study.       Impression:      Postoperative findings, as noted above.     Radiation dose reduction techniques were utilized, including automated  exposure control and exposure modulation based on body size.     This report was finalized on 11/29/2022 4:39 AM by Dr. Melissa Hickey M.D.       CT Head Without Contrast [[id number]] Collected: 11/27/22 0917     Updated: 11/27/22 0929    Narrative:      HEAD CT WITHOUT CONTRAST     HISTORY: Brain mass, preop for biopsy     TECHNIQUE: Radiation dose reduction techniques were utilized, including  automated exposure control and exposure modulation based on body size.  Axial images were obtained from the skull base to vertex without IV  contrast. Stereotactic protocol     COMPARISON: MRI brain from [facility] 11/22/2022     FINDINGS:  Redemonstrated is a mass centered in the right basal ganglia measuring  about 3.7 x 3.0 cm  in greatest axial dimension. Overall it is not  appreciably changed from the recent outside comparison study. Mass  effect upon the adjacent right lateral ventricle and third ventricle is  not appreciably changed. Stable right to left midline shift measuring  about 5 mm. Stable size of the lateral ventricles. No evidence for  intracranial hemorrhage. Orbits and paranasal sinuses appear  unremarkable. No acute bony abnormality       Impression:      Stable mass centered in the right basal ganglia.     Radiation dose reduction techniques were utilized, including automated  exposure control and exposure modulation based on body size.     This report was finalized on 11/27/2022 9:26 AM by Dr. Stef Pettit M.D.       CT Chest With Contrast Diagnostic [[id number]] Collected: 11/25/22 1220     Updated: 11/25/22 1248    Narrative:      CT CHEST, ABDOMEN, AND PELVIS WITH IV CONTRAST     HISTORY: Multiple cancers, new brain mass     TECHNIQUE: Radiation dose reduction techniques were utilized, including  automated exposure control and exposure modulation based on body size.   3 mm images were obtained through the chest, abdomen, and pelvis with IV  contrast.      COMPARISON: Multiple CT chest, abdomen and pelvis dating back to  01/07/2021 and PET/CT 08/12/2020     FINDINGS:      Chest:      No mediastinal, hilar or axillary adenopathy is present by size  criteria. Bilateral breast implants are present. There is no significant  pericardial effusion. No new pulmonary consolidation, pleural effusion  or pneumothorax. Postsurgical changes from left lobectomy are present.     Postsurgical changes from right lower lobe wedge resection are present..  Focal nodular opacification within the right lower lobe along the chain  sutures measures approximately 1.9 x 1.5 cm, grossly unchanged since  03/08/2021. A few scattered subcentimeter pulmonary nodules are present,  as before.     Abdomen and pelvis:      There is colonic  diverticulosis. The appendix is surgically absent. The  bladder is decompressed and cannot be evaluated. No findings of small  bowel obstruction are present. The uterus is surgically absent. Cystic  density lesion within the left hepatic lobe is present, as seen since  08/12/2020. 4.3 x 3.5 cm within the left hepatic lobe is also grossly  unchanged since 01/07/2021. Gallbladder is surgically absent. The  pancreas and spleen have an unremarkable postcontrast CT appearance.     A 1.7 cm right adrenal lesion is grossly unchanged since 08/12/2020.     Subcentimeter renal lesions are too small to characterize. There appear  to be postsurgical changes from left partial nephrectomy. No  hydronephrosis is present. No abdominopelvic adenopathy by size  criteria. No free intraperitoneal air is seen.     Bone windows: Focal osteolysis within the right aspect of the C7  vertebral body is present, as seen over multiple prior CTs. Subtle focal  osteolysis within the left ilium is present, as seen since 01/07/2021.       Impression:      Impression:  1.  No definite findings of new metastatic disease or new malignant  recurrence within the chest, abdomen or pelvis.  2.  Nodular opacification within the right lower lobe along the right  lower lobe wedge resection operative bed, right adrenal nodule, left  hepatic lesions and a few small sub-6 mm pulmonary nodules grossly  unchanged over multiple prior CTs.  3.  Other findings as above.     This report was finalized on 11/25/2022 12:45 PM by Dr. Edvin Walters M.D.       CT Abdomen Pelvis With Contrast [[id number]] Collected: 11/25/22 1220     Updated: 11/25/22 1248    Narrative:      CT CHEST, ABDOMEN, AND PELVIS WITH IV CONTRAST     HISTORY: Multiple cancers, new brain mass     TECHNIQUE: Radiation dose reduction techniques were utilized, including  automated exposure control and exposure modulation based on body size.   3 mm images were obtained through the chest, abdomen, and  pelvis with IV  contrast.      COMPARISON: Multiple CT chest, abdomen and pelvis dating back to  01/07/2021 and PET/CT 08/12/2020     FINDINGS:      Chest:      No mediastinal, hilar or axillary adenopathy is present by size  criteria. Bilateral breast implants are present. There is no significant  pericardial effusion. No new pulmonary consolidation, pleural effusion  or pneumothorax. Postsurgical changes from left lobectomy are present.     Postsurgical changes from right lower lobe wedge resection are present..  Focal nodular opacification within the right lower lobe along the chain  sutures measures approximately 1.9 x 1.5 cm, grossly unchanged since  03/08/2021. A few scattered subcentimeter pulmonary nodules are present,  as before.     Abdomen and pelvis:      There is colonic diverticulosis. The appendix is surgically absent. The  bladder is decompressed and cannot be evaluated. No findings of small  bowel obstruction are present. The uterus is surgically absent. Cystic  density lesion within the left hepatic lobe is present, as seen since  08/12/2020. 4.3 x 3.5 cm within the left hepatic lobe is also grossly  unchanged since 01/07/2021. Gallbladder is surgically absent. The  pancreas and spleen have an unremarkable postcontrast CT appearance.     A 1.7 cm right adrenal lesion is grossly unchanged since 08/12/2020.     Subcentimeter renal lesions are too small to characterize. There appear  to be postsurgical changes from left partial nephrectomy. No  hydronephrosis is present. No abdominopelvic adenopathy by size  criteria. No free intraperitoneal air is seen.     Bone windows: Focal osteolysis within the right aspect of the C7  vertebral body is present, as seen over multiple prior CTs. Subtle focal  osteolysis within the left ilium is present, as seen since 01/07/2021.       Impression:      Impression:  1.  No definite findings of new metastatic disease or new malignant  recurrence within the chest,  abdomen or pelvis.  2.  Nodular opacification within the right lower lobe along the right  lower lobe wedge resection operative bed, right adrenal nodule, left  hepatic lesions and a few small sub-6 mm pulmonary nodules grossly  unchanged over multiple prior CTs.  3.  Other findings as above.     This report was finalized on 11/25/2022 12:45 PM by Dr. Edvin Walters M.D.       XR Chest 1 View [[id number]] Collected: 11/22/22 2110     Updated: 11/22/22 2306    Narrative:      PORTABLE CHEST     HISTORY: Shortness of breath.     COMPARISON: Chest x-ray 10/26/2020.     FINDINGS: A single portable view of the chest demonstrates the heart to  be within normal limits in size. Calcified nodes are identified in the  right hilar and infrahilar region. There is an area of increased density  at the right lung base which corresponds to an area of nodularity noted  on the CT examination 08/17/2022. Increased density is noted in the  retrocardiac region suggesting left lower lobe atelectasis/infiltrate  which is new versus 08/17/2022. There is no evidence of effusion or of  congestive failure.     This report was finalized on 11/22/2022 11:03 PM by Dr. Trevin Gaming M.D.           Results for orders placed during the hospital encounter of 09/01/22    Duplex Carotid Ultrasound CAR    Interpretation Summary  · Proximal right internal carotid artery is normal.  · Proximal left internal carotid artery is normal.      Pertinent Labs     Results from last 7 days   Lab Units 12/07/22  0617 12/02/22  0913 12/01/22  1133   WBC 10*3/mm3 16.17* 13.91* 14.32*   HEMOGLOBIN g/dL 12.8 12.3 11.9*   PLATELETS 10*3/mm3 261 253 263     Results from last 7 days   Lab Units 12/07/22  0617 12/02/22  0913 12/01/22  1133   SODIUM mmol/L 134* 136 131*   POTASSIUM mmol/L 4.3 4.3 4.3   CHLORIDE mmol/L 97* 98 97*   CO2 mmol/L 28.0 25.5 23.0   BUN mg/dL 20 17 21*   CREATININE mg/dL 0.57 0.83 0.77   GLUCOSE mg/dL 134* 300* 288*   EGFR mL/min/1.73 105.5 81.8  89.5       Results from last 7 days   Lab Units 12/07/22  0617 12/02/22  0913 12/01/22  1133   CALCIUM mg/dL 8.7 8.6 8.4*   MAGNESIUM mg/dL 2.5  --   --                Invalid input(s): LDLCALC          Test Results Pending at Discharge       Discharge Details        Discharge Medications      Continue These Medications      Instructions Start Date   Accu-Chek Guide Me w/Device kit   1 Device, Does not apply, Daily         ASK your doctor about these medications      Instructions Start Date   Accu-Chek Guide test strip  Generic drug: glucose blood   Use 1-2 daily to check blood sugars Dx diabetes E11.9      glucose blood test strip   Use as instructed      atorvastatin 40 MG tablet  Commonly known as: LIPITOR   40 mg, Oral, Nightly      bimatoprost 0.01 % ophthalmic drops  Commonly known as: LUMIGAN   1 drop, Both Eyes, Nightly      escitalopram 10 MG tablet  Commonly known as: LEXAPRO   10 mg, Oral, Daily      levothyroxine 150 MCG tablet  Commonly known as: SYNTHROID, LEVOTHROID   150 mcg, Oral, Daily      lisinopril 20 MG tablet  Commonly known as: PRINIVIL,ZESTRIL   TAKE 1 TABLET BY MOUTH  DAILY      temozolomide 5 MG chemo capsule  Commonly known as: TEMODAR   5 mg, Oral, Daily      temozolomide 20 MG chemo capsule  Commonly known as: TEMODAR   20 mg, Oral, Daily      temozolomide 140 MG chemo capsule  Commonly known as: TEMODAR   140 mg, Oral, Daily      Trulicity 3 MG/0.5ML solution pen-injector  Generic drug: Dulaglutide   INJECT THE CONTENTS OF ONE  PEN SUBCUTANEOUSLY WEEKLY  AS DIRECTED      valACYclovir 1000 MG tablet  Commonly known as: VALTREX   TAKE 2 TABLETS BY MOUTH  TWICE DAILY             Allergies   Allergen Reactions   • Morphine Anaphylaxis     Hives and throat swelling   • Peach [Prunus Persica] Anaphylaxis   • Penicillins Anaphylaxis   • Metformin Diarrhea       Discharge Disposition:  Rehab Facility or Unit ([facility])      Discharge Diet:  Diet Order   Procedures   • Diet:  Regular/House Diet, Diabetic Diets, Vegetarian; Lacto-Ovo Vegetarian (Allows dairy, eggs); Consistent Carbohydrate; Texture: Regular Texture (IDDSI 7); Fluid Consistency: Thin (IDDSI 0)       Discharge Activity:   Per Dr. Araujo    CODE STATUS:    Code Status and Medical Interventions:   Ordered at: 11/22/22 1524     Code Status (Patient has no pulse and is not breathing):    CPR (Attempt to Resuscitate)     Medical Interventions (Patient has pulse or is breathing):    Full Support       Future Appointments   Date Time Provider Department Center   12/12/2022  8:30 AM Ariadna Villela APRN [facility]   1/11/2023 11:30 AM Honey Nesbitt APRN [facility] None   2/13/2023  9:15 AM [facility]   2/15/2023  9:00 AM [facility] CT 2 BH [facility]   2/20/2023  2:15 PM Tiffany Holloway MD [facility]   2/22/2023 12:30 PM LAB CHAIR 1 [facility]  [facility]   2/22/2023  1:00 PM Mathew Collins Jr., MD [facility]   5/31/2023  9:15 AM Hali Rolle MD [facility]      Contact information for follow-up providers     Tfifany Holloway MD .    Specialty: Internal Medicine  Contact information:  [address]  [facility] [address]  [place] [address]  [phone]                   Contact information for after-discharge care     Destination     [facility] .    Service: Inpatient Rehabilitation  Contact information:  [address] [facility] [address]  [phone]                             Time Spent on Discharge:  Greater than 30 minutes      Mathew Dumont MD  [facility]  12/07/22  15:08 EST

## 2022-12-07 NOTE — DISCHARGE SUMMARY
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        Hospital Course     Very pleasant 57 y.o. woman admitted with new brain tumor seen on outpatient MRI. She is s/p right frontal stereotactic needle brain biopsy on . Pathology consistent with high-grade glioma. Please see below for details of admission:     Continue current supportive care and therapies. IV Keppra and dexamethasone now changed to PO. Heme/Onc following.      Final pathology from [facility] pending though high-grade glioma initially identified here. Tumor not amenable to resection. Heme/Onc has mentioned starting chemoradiation today or tomorrow.     Leukocytosis most likely due to high-dose steroids. No fever. Continue to monitor closely for s/sx of infection.     Blood sugars have been elevated due to steroids and insulin has been increased. A1c 6.7 on admission. Blood sugars acceptable again today on current regimen of AM long-acting insulin, mealtime short-acting, and high-dose SSI.     TFTs are fine here, continue thyroid  replacement        • SCDs for DVT prophylaxis.  • Full code.  • Discussed with patient and CCP.  • Discharge to [facility] this afternoon  • [facility] recommended f/u on 12/12--they could see here rather than outpt office (Manohar CLAROS)      Day of Discharge     Subjective:  Feeling fine again this AM. Slept well. Tolerating diet. Voiding well. Working well with therapies. No HA or vis changes today.     Review of Systems  No N/V/D/abd pain/F/C/NS/SOA/CP/palp/LUTS    Physical Exam:  Temp:  [97.9 °F (36.6 °C)-98.6 °F (37 °C)] 97.9 °F (36.6 °C)  Heart Rate:  [63-76] 75  Resp:  [18] 18  BP: (110-159)/(63-90) 110/63  Body mass index is 41.93 kg/m².  Physical Exam  Vitals and nursing note reviewed.   Constitutional:       General: She is not in acute distress.     Appearance: She is not ill-appearing, toxic-appearing or diaphoretic.   HENT:      Head: Normocephalic.      Comments: Surg site right forehead healing well, staples in place     Nose: Nose normal.      Mouth/Throat:      Mouth: Mucous membranes are moist.      Pharynx: Oropharynx is clear.   Eyes:      General: No scleral icterus.        Right eye: No discharge.         Left eye: No discharge.      Extraocular Movements: Extraocular movements intact.      Conjunctiva/sclera: Conjunctivae normal.   Cardiovascular:      Rate and Rhythm: Normal rate and regular rhythm.      Pulses: Normal pulses.   Pulmonary:      Effort: Pulmonary effort is normal. No respiratory distress.      Breath sounds: Normal breath sounds. No wheezing or rales.      Comments: Anteriorly   Abdominal:      General: Bowel sounds are normal. There is no distension.      Palpations: Abdomen is soft.      Tenderness: There is no abdominal tenderness.   Musculoskeletal:         General: No swelling or deformity. Normal range of motion.      Cervical back: Neck supple. No rigidity.      Comments: SCDs in place BLEs   Lymphadenopathy:      Cervical: No cervical adenopathy.   Skin:     General:  Skin is warm and dry.      Capillary Refill: Capillary refill takes less than 2 seconds.      Coloration: Skin is not jaundiced.      Findings: No rash.   Neurological:      Mental Status: She is alert and oriented to person, place, and time. Mental status is at baseline.      Cranial Nerves: No cranial nerve deficit.      Coordination: Coordination normal.      Comments: Strength in BUEs is almost symmetric this AM, maybe a little weaker on left   Psychiatric:         Mood and Affect: Mood normal.         Behavior: Behavior normal.         Thought Content: Thought content normal.         Consultants     Consult Orders (all) (From admission, onward)     Start     Ordered    12/01/22 1518  Inpatient Rehab Admission Consult  Once        Provider:  (Not yet assigned)    12/01/22 1518    11/28/22 1211  Inpatient Intensivist Consult  Once        Provider:  Lamine Juan MD    11/28/22 1210    11/23/22 1137  Inpatient Radiation Oncology Consult  Once        Specialty:  Radiation Oncology  Provider:  Mathew Sam MD    11/23/22 1137    11/23/22 0702  Hematology & Oncology Inpatient Consult  IN AM        Specialty:  Hematology and Oncology  Provider:  Mathew Collins Jr., MD    11/22/22 2319 11/22/22 2112  [facility] (on-call MD unless specified) Details  Once,   Status:  Canceled        Specialty:  Hospitalist  Provider:  (Not yet assigned)    11/22/22 2111 11/22/22 2052  Hematology and Oncology (on-call MD unless specified)  Once        Specialty:  Hematology and Oncology  Provider:  (Not yet assigned)    11/22/22 2051 11/22/22 2048  Neurosurgery (on-call MD unless specified)  Once        Specialty:  Neurosurgery  Provider:  (Not yet assigned)    11/22/22 2047    Signed and Held  Hematology and Oncology (on-call MD unless specified)  Once        Specialty:  Hematology and Oncology  Provider:  (Not yet assigned)    Signed and Held    Signed and Held  Inpatient Rehab Admission Consult  Once        Provider:  (Not yet  assigned)    Signed and Held              Procedures     Right frontal stereotactic needle brain biopsy      Imaging Results (All)     Procedure Component Value Units Date/Time    CT Head Without Contrast [[id number]] Collected: 11/29/22 0431     Updated: 11/29/22 0442    Narrative:      CT HEAD WITHOUT CONTRAST     HISTORY: Brain mass     COMPARISON: 11/27/2022     TECHNIQUE: Axial CT imaging was obtained through the brain. No IV  contrast was administered.     FINDINGS:  The patient is status post right frontal tyrone hole with biopsy. There is  some decreased attenuation which is seen along the anterior margin of  the mass, which likely represents the biopsy site. Trace amount of  hemorrhage is noted within this area, although no large intraparenchymal  hematoma is seen. Mass itself is stable in size. Tiny focus of  pneumocephalus is seen adjacent to the tract of the tyrone hole. The mass  itself is resulting in mass effect on the right lateral ventricle, as  well as midline shift to the left of 9 mm. This is stable when compared  to the preoperative study.       Impression:      Postoperative findings, as noted above.     Radiation dose reduction techniques were utilized, including automated  exposure control and exposure modulation based on body size.     This report was finalized on 11/29/2022 4:39 AM by Dr. Melissa Hickey M.D.       CT Head Without Contrast [[id number]] Collected: 11/27/22 0917     Updated: 11/27/22 0929    Narrative:      HEAD CT WITHOUT CONTRAST     HISTORY: Brain mass, preop for biopsy     TECHNIQUE: Radiation dose reduction techniques were utilized, including  automated exposure control and exposure modulation based on body size.  Axial images were obtained from the skull base to vertex without IV  contrast. Stereotactic protocol     COMPARISON: MRI brain from [facility] 11/22/2022     FINDINGS:  Redemonstrated is a mass centered in the right basal ganglia measuring  about 3.7 x 3.0 cm  in greatest axial dimension. Overall it is not  appreciably changed from the recent outside comparison study. Mass  effect upon the adjacent right lateral ventricle and third ventricle is  not appreciably changed. Stable right to left midline shift measuring  about 5 mm. Stable size of the lateral ventricles. No evidence for  intracranial hemorrhage. Orbits and paranasal sinuses appear  unremarkable. No acute bony abnormality       Impression:      Stable mass centered in the right basal ganglia.     Radiation dose reduction techniques were utilized, including automated  exposure control and exposure modulation based on body size.     This report was finalized on 11/27/2022 9:26 AM by Dr. Stef Pettit M.D.       CT Chest With Contrast Diagnostic [[id number]] Collected: 11/25/22 1220     Updated: 11/25/22 1248    Narrative:      CT CHEST, ABDOMEN, AND PELVIS WITH IV CONTRAST     HISTORY: Multiple cancers, new brain mass     TECHNIQUE: Radiation dose reduction techniques were utilized, including  automated exposure control and exposure modulation based on body size.   3 mm images were obtained through the chest, abdomen, and pelvis with IV  contrast.      COMPARISON: Multiple CT chest, abdomen and pelvis dating back to  01/07/2021 and PET/CT 08/12/2020     FINDINGS:      Chest:      No mediastinal, hilar or axillary adenopathy is present by size  criteria. Bilateral breast implants are present. There is no significant  pericardial effusion. No new pulmonary consolidation, pleural effusion  or pneumothorax. Postsurgical changes from left lobectomy are present.     Postsurgical changes from right lower lobe wedge resection are present..  Focal nodular opacification within the right lower lobe along the chain  sutures measures approximately 1.9 x 1.5 cm, grossly unchanged since  03/08/2021. A few scattered subcentimeter pulmonary nodules are present,  as before.     Abdomen and pelvis:      There is colonic  diverticulosis. The appendix is surgically absent. The  bladder is decompressed and cannot be evaluated. No findings of small  bowel obstruction are present. The uterus is surgically absent. Cystic  density lesion within the left hepatic lobe is present, as seen since  08/12/2020. 4.3 x 3.5 cm within the left hepatic lobe is also grossly  unchanged since 01/07/2021. Gallbladder is surgically absent. The  pancreas and spleen have an unremarkable postcontrast CT appearance.     A 1.7 cm right adrenal lesion is grossly unchanged since 08/12/2020.     Subcentimeter renal lesions are too small to characterize. There appear  to be postsurgical changes from left partial nephrectomy. No  hydronephrosis is present. No abdominopelvic adenopathy by size  criteria. No free intraperitoneal air is seen.     Bone windows: Focal osteolysis within the right aspect of the C7  vertebral body is present, as seen over multiple prior CTs. Subtle focal  osteolysis within the left ilium is present, as seen since 01/07/2021.       Impression:      Impression:  1.  No definite findings of new metastatic disease or new malignant  recurrence within the chest, abdomen or pelvis.  2.  Nodular opacification within the right lower lobe along the right  lower lobe wedge resection operative bed, right adrenal nodule, left  hepatic lesions and a few small sub-6 mm pulmonary nodules grossly  unchanged over multiple prior CTs.  3.  Other findings as above.     This report was finalized on 11/25/2022 12:45 PM by Dr. Edvin Walters M.D.       CT Abdomen Pelvis With Contrast [[id number]] Collected: 11/25/22 1220     Updated: 11/25/22 1248    Narrative:      CT CHEST, ABDOMEN, AND PELVIS WITH IV CONTRAST     HISTORY: Multiple cancers, new brain mass     TECHNIQUE: Radiation dose reduction techniques were utilized, including  automated exposure control and exposure modulation based on body size.   3 mm images were obtained through the chest, abdomen, and  pelvis with IV  contrast.      COMPARISON: Multiple CT chest, abdomen and pelvis dating back to  01/07/2021 and PET/CT 08/12/2020     FINDINGS:      Chest:      No mediastinal, hilar or axillary adenopathy is present by size  criteria. Bilateral breast implants are present. There is no significant  pericardial effusion. No new pulmonary consolidation, pleural effusion  or pneumothorax. Postsurgical changes from left lobectomy are present.     Postsurgical changes from right lower lobe wedge resection are present..  Focal nodular opacification within the right lower lobe along the chain  sutures measures approximately 1.9 x 1.5 cm, grossly unchanged since  03/08/2021. A few scattered subcentimeter pulmonary nodules are present,  as before.     Abdomen and pelvis:      There is colonic diverticulosis. The appendix is surgically absent. The  bladder is decompressed and cannot be evaluated. No findings of small  bowel obstruction are present. The uterus is surgically absent. Cystic  density lesion within the left hepatic lobe is present, as seen since  08/12/2020. 4.3 x 3.5 cm within the left hepatic lobe is also grossly  unchanged since 01/07/2021. Gallbladder is surgically absent. The  pancreas and spleen have an unremarkable postcontrast CT appearance.     A 1.7 cm right adrenal lesion is grossly unchanged since 08/12/2020.     Subcentimeter renal lesions are too small to characterize. There appear  to be postsurgical changes from left partial nephrectomy. No  hydronephrosis is present. No abdominopelvic adenopathy by size  criteria. No free intraperitoneal air is seen.     Bone windows: Focal osteolysis within the right aspect of the C7  vertebral body is present, as seen over multiple prior CTs. Subtle focal  osteolysis within the left ilium is present, as seen since 01/07/2021.       Impression:      Impression:  1.  No definite findings of new metastatic disease or new malignant  recurrence within the chest,  abdomen or pelvis.  2.  Nodular opacification within the right lower lobe along the right  lower lobe wedge resection operative bed, right adrenal nodule, left  hepatic lesions and a few small sub-6 mm pulmonary nodules grossly  unchanged over multiple prior CTs.  3.  Other findings as above.     This report was finalized on 11/25/2022 12:45 PM by Dr. Edvin Walters M.D.       XR Chest 1 View [[id number]] Collected: 11/22/22 2110     Updated: 11/22/22 2306    Narrative:      PORTABLE CHEST     HISTORY: Shortness of breath.     COMPARISON: Chest x-ray 10/26/2020.     FINDINGS: A single portable view of the chest demonstrates the heart to  be within normal limits in size. Calcified nodes are identified in the  right hilar and infrahilar region. There is an area of increased density  at the right lung base which corresponds to an area of nodularity noted  on the CT examination 08/17/2022. Increased density is noted in the  retrocardiac region suggesting left lower lobe atelectasis/infiltrate  which is new versus 08/17/2022. There is no evidence of effusion or of  congestive failure.     This report was finalized on 11/22/2022 11:03 PM by Dr. Trevin Gaming M.D.           Results for orders placed during the hospital encounter of 09/01/22    Duplex Carotid Ultrasound CAR    Interpretation Summary  · Proximal right internal carotid artery is normal.  · Proximal left internal carotid artery is normal.      Pertinent Labs     Results from last 7 days   Lab Units 12/07/22  0617 12/02/22  0913 12/01/22  1133   WBC 10*3/mm3 16.17* 13.91* 14.32*   HEMOGLOBIN g/dL 12.8 12.3 11.9*   PLATELETS 10*3/mm3 261 253 263     Results from last 7 days   Lab Units 12/07/22  0617 12/02/22  0913 12/01/22  1133   SODIUM mmol/L 134* 136 131*   POTASSIUM mmol/L 4.3 4.3 4.3   CHLORIDE mmol/L 97* 98 97*   CO2 mmol/L 28.0 25.5 23.0   BUN mg/dL 20 17 21*   CREATININE mg/dL 0.57 0.83 0.77   GLUCOSE mg/dL 134* 300* 288*   EGFR mL/min/1.73 105.5 81.8  89.5       Results from last 7 days   Lab Units 12/07/22  0617 12/02/22  0913 12/01/22  1133   CALCIUM mg/dL 8.7 8.6 8.4*   MAGNESIUM mg/dL 2.5  --   --                Invalid input(s): LDLCALC          Test Results Pending at Discharge       Discharge Details        Discharge Medications      Continue These Medications      Instructions Start Date   Accu-Chek Guide Me w/Device kit   1 Device, Does not apply, Daily         ASK your doctor about these medications      Instructions Start Date   Accu-Chek Guide test strip  Generic drug: glucose blood   Use 1-2 daily to check blood sugars Dx diabetes E11.9      glucose blood test strip   Use as instructed      atorvastatin 40 MG tablet  Commonly known as: LIPITOR   40 mg, Oral, Nightly      bimatoprost 0.01 % ophthalmic drops  Commonly known as: LUMIGAN   1 drop, Both Eyes, Nightly      escitalopram 10 MG tablet  Commonly known as: LEXAPRO   10 mg, Oral, Daily      levothyroxine 150 MCG tablet  Commonly known as: SYNTHROID, LEVOTHROID   150 mcg, Oral, Daily      lisinopril 20 MG tablet  Commonly known as: PRINIVIL,ZESTRIL   TAKE 1 TABLET BY MOUTH  DAILY      temozolomide 5 MG chemo capsule  Commonly known as: TEMODAR   5 mg, Oral, Daily      temozolomide 20 MG chemo capsule  Commonly known as: TEMODAR   20 mg, Oral, Daily      temozolomide 140 MG chemo capsule  Commonly known as: TEMODAR   140 mg, Oral, Daily      Trulicity 3 MG/0.5ML solution pen-injector  Generic drug: Dulaglutide   INJECT THE CONTENTS OF ONE  PEN SUBCUTANEOUSLY WEEKLY  AS DIRECTED      valACYclovir 1000 MG tablet  Commonly known as: VALTREX   TAKE 2 TABLETS BY MOUTH  TWICE DAILY             Allergies   Allergen Reactions   • Morphine Anaphylaxis     Hives and throat swelling   • Peach [Prunus Persica] Anaphylaxis   • Penicillins Anaphylaxis   • Metformin Diarrhea       Discharge Disposition:  Rehab Facility or Unit ([facility])      Discharge Diet:  Diet Order   Procedures   • Diet:  Regular/House Diet, Diabetic Diets, Vegetarian; Lacto-Ovo Vegetarian (Allows dairy, eggs); Consistent Carbohydrate; Texture: Regular Texture (IDDSI 7); Fluid Consistency: Thin (IDDSI 0)       Discharge Activity:   Per Dr. Araujo    CODE STATUS:    Code Status and Medical Interventions:   Ordered at: 11/22/22 9887     Code Status (Patient has no pulse and is not breathing):    CPR (Attempt to Resuscitate)     Medical Interventions (Patient has pulse or is breathing):    Full Support       Future Appointments   Date Time Provider Department Center   12/12/2022  8:30 AM Ariadna Villela APRN [facility]   1/11/2023 11:30 AM Honey Nesbitt APRN [facility] None   2/13/2023  9:15 AM [facility]   2/15/2023  9:00 AM [facility] CT 2 BH [facility]   2/20/2023  2:15 PM Tiffany Holloway MD [facility]   2/22/2023 12:30 PM LAB CHAIR 1 [facility]  [facility]   2/22/2023  1:00 PM Mathew Collins Jr., MD [facility]   5/31/2023  9:15 AM Hali Rolle MD [facility]      Contact information for follow-up providers     Tiffany Holloway MD .    Specialty: Internal Medicine  Contact information:  [address]  [facility] [address]  [place] [address]  [phone]                   Contact information for after-discharge care     Destination     [facility] .    Service: Inpatient Rehabilitation  Contact information:  [address] [facility] [address]  [phone]                             Time Spent on Discharge:  Greater than 30 minutes      Mathew Dumont MD  [facility]  12/07/22  15:08 EST

## 2022-12-08 ENCOUNTER — APPOINTMENT (OUTPATIENT)
Dept: ULTRASOUND IMAGING | Facility: HOSPITAL | Age: 57
End: 2022-12-08

## 2022-12-08 LAB
ALBUMIN SERPL-MCNC: 3.7 G/DL (ref 3.5–5.2)
ALBUMIN/GLOB SERPL: 1.5 G/DL
ALP SERPL-CCNC: 190 U/L (ref 39–117)
ALPHA-FETOPROTEIN: 2.31 NG/ML (ref 0–8.3)
ALT SERPL W P-5'-P-CCNC: 1049 U/L (ref 1–33)
ANION GAP SERPL CALCULATED.3IONS-SCNC: 14.5 MMOL/L (ref 5–15)
AST SERPL-CCNC: 386 U/L (ref 1–32)
BILIRUB SERPL-MCNC: 0.6 MG/DL (ref 0–1.2)
BUN SERPL-MCNC: 21 MG/DL (ref 6–20)
BUN/CREAT SERPL: 28.8 (ref 7–25)
CALCIUM SPEC-SCNC: 8.9 MG/DL (ref 8.6–10.5)
CHLORIDE SERPL-SCNC: 94 MMOL/L (ref 98–107)
CK SERPL-CCNC: 22 U/L (ref 20–180)
CO2 SERPL-SCNC: 24.5 MMOL/L (ref 22–29)
CREAT SERPL-MCNC: 0.73 MG/DL (ref 0.57–1)
DEPRECATED RDW RBC AUTO: 44.3 FL (ref 37–54)
EGFRCR SERPLBLD CKD-EPI 2021: 95.5 ML/MIN/1.73
ERYTHROCYTE [DISTWIDTH] IN BLOOD BY AUTOMATED COUNT: 15.5 % (ref 12.3–15.4)
GLOBULIN UR ELPH-MCNC: 2.5 GM/DL
GLUCOSE BLDC GLUCOMTR-MCNC: 178 MG/DL (ref 70–130)
GLUCOSE BLDC GLUCOMTR-MCNC: 217 MG/DL (ref 70–130)
GLUCOSE BLDC GLUCOMTR-MCNC: 217 MG/DL (ref 70–130)
GLUCOSE BLDC GLUCOMTR-MCNC: 233 MG/DL (ref 70–130)
GLUCOSE SERPL-MCNC: 183 MG/DL (ref 65–99)
HAV IGM SERPL QL IA: NORMAL
HBV CORE IGM SERPL QL IA: NORMAL
HBV SURFACE AG SERPL QL IA: NORMAL
HCT VFR BLD AUTO: 39.4 % (ref 34–46.6)
HCV AB SER DONR QL: NORMAL
HGB BLD-MCNC: 13 G/DL (ref 12–15.9)
INR PPP: 1.02 (ref 0.9–1.1)
MCH RBC QN AUTO: 27 PG (ref 26.6–33)
MCHC RBC AUTO-ENTMCNC: 33 G/DL (ref 31.5–35.7)
MCV RBC AUTO: 81.9 FL (ref 79–97)
PLATELET # BLD AUTO: 238 10*3/MM3 (ref 140–450)
PMV BLD AUTO: 9.6 FL (ref 6–12)
POTASSIUM SERPL-SCNC: 4.5 MMOL/L (ref 3.5–5.2)
PROT SERPL-MCNC: 6.2 G/DL (ref 6–8.5)
PROTHROMBIN TIME: 13.5 SECONDS (ref 11.7–14.2)
RBC # BLD AUTO: 4.81 10*6/MM3 (ref 3.77–5.28)
SODIUM SERPL-SCNC: 133 MMOL/L (ref 136–145)
WBC NRBC COR # BLD: 15.27 10*3/MM3 (ref 3.4–10.8)

## 2022-12-08 PROCEDURE — 97530 THERAPEUTIC ACTIVITIES: CPT

## 2022-12-08 PROCEDURE — 99221 1ST HOSP IP/OBS SF/LOW 40: CPT

## 2022-12-08 PROCEDURE — 85610 PROTHROMBIN TIME: CPT

## 2022-12-08 PROCEDURE — 85027 COMPLETE CBC AUTOMATED: CPT | Performed by: PHYSICAL MEDICINE & REHABILITATION

## 2022-12-08 PROCEDURE — 76705 ECHO EXAM OF ABDOMEN: CPT

## 2022-12-08 PROCEDURE — 96125 COGNITIVE TEST BY HC PRO: CPT

## 2022-12-08 PROCEDURE — 77470 SPECIAL RADIATION TREATMENT: CPT | Performed by: RADIOLOGY

## 2022-12-08 PROCEDURE — 97535 SELF CARE MNGMENT TRAINING: CPT

## 2022-12-08 PROCEDURE — 97162 PT EVAL MOD COMPLEX 30 MIN: CPT

## 2022-12-08 PROCEDURE — 82550 ASSAY OF CK (CPK): CPT | Performed by: PHYSICAL MEDICINE & REHABILITATION

## 2022-12-08 PROCEDURE — 97166 OT EVAL MOD COMPLEX 45 MIN: CPT

## 2022-12-08 PROCEDURE — 80053 COMPREHEN METABOLIC PANEL: CPT | Performed by: PHYSICAL MEDICINE & REHABILITATION

## 2022-12-08 PROCEDURE — 63710000001 DEXAMETHASONE PER 0.25 MG: Performed by: PHYSICAL MEDICINE & REHABILITATION

## 2022-12-08 PROCEDURE — 63710000001 INSULIN LISPRO (HUMAN) PER 5 UNITS: Performed by: PHYSICAL MEDICINE & REHABILITATION

## 2022-12-08 PROCEDURE — 82105 ALPHA-FETOPROTEIN SERUM: CPT

## 2022-12-08 PROCEDURE — 25010000002 ENOXAPARIN PER 10 MG: Performed by: PHYSICAL MEDICINE & REHABILITATION

## 2022-12-08 PROCEDURE — 97110 THERAPEUTIC EXERCISES: CPT

## 2022-12-08 PROCEDURE — 82962 GLUCOSE BLOOD TEST: CPT

## 2022-12-08 PROCEDURE — 80074 ACUTE HEPATITIS PANEL: CPT | Performed by: HOSPITALIST

## 2022-12-08 RX ORDER — VALACYCLOVIR HYDROCHLORIDE 500 MG/1
500 TABLET, FILM COATED ORAL
Status: DISCONTINUED | OUTPATIENT
Start: 2022-12-09 | End: 2022-12-23 | Stop reason: HOSPADM

## 2022-12-08 RX ORDER — ENOXAPARIN SODIUM 100 MG/ML
40 INJECTION SUBCUTANEOUS EVERY 12 HOURS
Status: DISCONTINUED | OUTPATIENT
Start: 2022-12-08 | End: 2022-12-23

## 2022-12-08 RX ADMIN — CALCIUM CARBONATE-VITAMIN D TAB 500 MG-200 UNIT 1 TABLET: 500-200 TAB at 22:11

## 2022-12-08 RX ADMIN — CALCIUM CARBONATE-VITAMIN D TAB 500 MG-200 UNIT 1 TABLET: 500-200 TAB at 08:04

## 2022-12-08 RX ADMIN — INSULIN LISPRO 4 UNITS: 100 INJECTION, SOLUTION INTRAVENOUS; SUBCUTANEOUS at 08:05

## 2022-12-08 RX ADMIN — LATANOPROST 1 DROP: 50 SOLUTION OPHTHALMIC at 22:11

## 2022-12-08 RX ADMIN — INSULIN LISPRO 4 UNITS: 100 INJECTION, SOLUTION INTRAVENOUS; SUBCUTANEOUS at 14:04

## 2022-12-08 RX ADMIN — DEXAMETHASONE 4 MG: 4 TABLET ORAL at 05:33

## 2022-12-08 RX ADMIN — VALACYCLOVIR HYDROCHLORIDE 2000 MG: 500 TABLET, FILM COATED ORAL at 08:04

## 2022-12-08 RX ADMIN — INSULIN GLARGINE-YFGN 25 UNITS: 100 INJECTION, SOLUTION SUBCUTANEOUS at 08:04

## 2022-12-08 RX ADMIN — DEXAMETHASONE 4 MG: 4 TABLET ORAL at 14:04

## 2022-12-08 RX ADMIN — INSULIN LISPRO 8 UNITS: 100 INJECTION, SOLUTION INTRAVENOUS; SUBCUTANEOUS at 17:14

## 2022-12-08 RX ADMIN — LEVETIRACETAM 500 MG: 500 TABLET, FILM COATED ORAL at 08:04

## 2022-12-08 RX ADMIN — INSULIN LISPRO 8 UNITS: 100 INJECTION, SOLUTION INTRAVENOUS; SUBCUTANEOUS at 08:05

## 2022-12-08 RX ADMIN — ENOXAPARIN SODIUM 40 MG: 100 INJECTION SUBCUTANEOUS at 11:01

## 2022-12-08 RX ADMIN — INSULIN LISPRO 8 UNITS: 100 INJECTION, SOLUTION INTRAVENOUS; SUBCUTANEOUS at 14:04

## 2022-12-08 RX ADMIN — LEVOTHYROXINE SODIUM 150 MCG: 0.15 TABLET ORAL at 05:33

## 2022-12-08 RX ADMIN — ESCITALOPRAM 10 MG: 10 TABLET, FILM COATED ORAL at 08:04

## 2022-12-08 RX ADMIN — LISINOPRIL 20 MG: 20 TABLET ORAL at 08:04

## 2022-12-08 RX ADMIN — ENOXAPARIN SODIUM 40 MG: 100 INJECTION SUBCUTANEOUS at 22:11

## 2022-12-08 RX ADMIN — PANTOPRAZOLE SODIUM 40 MG: 40 TABLET, DELAYED RELEASE ORAL at 05:33

## 2022-12-08 RX ADMIN — LEVETIRACETAM 500 MG: 500 TABLET, FILM COATED ORAL at 22:11

## 2022-12-08 RX ADMIN — INSULIN LISPRO 8 UNITS: 100 INJECTION, SOLUTION INTRAVENOUS; SUBCUTANEOUS at 22:12

## 2022-12-08 RX ADMIN — DEXAMETHASONE 4 MG: 4 TABLET ORAL at 22:11

## 2022-12-08 NOTE — PROGRESS NOTES
"Section B. Hearing, Speech, Vision: Branch    Section C. Cognitive Patterns: Brief Interview for Mental Status (BIMS) was  conducted.  Repetition of Three Words: Three words  Able to report correct year: Correct  Able to report correct month: Accurate within 5 days  Able to report correct day of the week: Correct  Able to recall \"sock\": Yes, no cue required  Able to recall \"blue\": Yes, no cue required  Able to recall \"bed\": Yes, no cue required    BIMS SUMMARY SCORE: 15 Cognitively intact Patient was able to complete the Brief  Interview for Mental Status    Section C. Signs and Symptoms of Delirium (from [facility]): Branch    Signed by: Mackenzie Hopkins, SLP    "

## 2022-12-08 NOTE — PROGRESS NOTES
SECTION GG    Self Care Performance:   Oral Hygiene: [place] provides verbal cues and/or touching/steadying and/or  contact guard assistance as patient completes activity.   Toileting Hygiene: [place] provides verbal cues and/or touching/steadying and/or  contact guard assistance as patient completes activity.   Shower/Bathe Self: [place] does less than half the effort. [place] lifts, holds  or supports trunk or limbs but provides less than half the effort.   Upper Body Dressing: [place] does less than half the effort. [place] lifts, holds  or supports trunk or limbs but provides less than half the effort.   Lower Body Dressing: [place] does less than half the effort. [place] lifts, holds  or supports trunk or limbs but provides less than half the effort.   Putting On/Taking Off Footwear: [place] does less than half the effort. [place]  lifts, holds or supports trunk or limbs but provides less than half the effort.    Self Care Discharge Goals:   Lower Body Dressing: [place] provides verbal cues or touching/steadying  assistance as patient completes activity.    Mobility Toilet Transfer Performance: [place] provides verbal cues or  touching/steadying assistance as patient completes activity.    Mobility Toilet Transfer Discharge Goal: [place] provides verbal cues or  touching/steadying assistance as patient completes activity.    Signed by: Portia Cifuentes, Therapist

## 2022-12-08 NOTE — THERAPY EVALUATION
Inpatient Rehabilitation - Physical Therapy Initial Evaluation       [facility]     Patient Name: Christie Avendaño  : 1965  MRN: [id number]    Today's Date: 2022                    Admit Date: 2022      Visit Dx:     ICD-10-CM ICD-9-CM   1. 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)   • Non-small cell lung cancer, right (HCC)   • Renal mass, right   • SIRS (systemic inflammatory response syndrome) (HCC)   • Hyperlipidemia   • Malignant melanoma of right lower extremity including hip (HCC)   • High grade glioma    • 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)   • Glioma (HCC)       Past Medical History:   Diagnosis Date   • Arthritis    • Asthma    • BRCA2 positive    • Diabetes mellitus (HCC)    • H/O Liver masses 2017    x3   • H/O Lung masses 2017    1 in right lower lobe and 1 in the left infrahilar location   • H/O Ovarian cyst    • H/O Right adrenal mass (CMS/HCC) 2017   • Lung cancer (HCC)    • Melanoma (HCC)     right foot   • PONV (postoperative nausea and vomiting)    • Thyroid disease        Past Surgical History:   Procedure Laterality Date   • APPENDECTOMY     • BRAIN BIOPSY Right 2022    Procedure: Right frontal stereotactic needle brain biopsy;  Surgeon: Manuel Thompson MD;  Location: [facility];  Service: Neurosurgery;  Laterality: Right;   • BREAST IMPLANT SURGERY Bilateral    • CHOLECYSTECTOMY     • HYSTERECTOMY     • MASTECTOMY Bilateral    • OVARIAN  CYST SURGERY     • THORACOSCOPY VIDEO ASSISTED WITH LOBECTOMY Right 10/9/2020    Procedure: BRONCHOSCOPY, THORACOSCOPY VIDEO ASSISTED WITH ANTERIOR BASAL SEGMENTECTOMY, INTERCOSTAL NERVE BLOCK;  Surgeon: Flaca Crisostomo MD;  Location: [facility];  Service: Thoracic;  Laterality: Right;   • TONSILLECTOMY         PT ASSESSMENT (last 12 hours)     IRF PT Evaluation and Treatment     Row Name 12/08/22 0900          PT Time and Intention    Document Type initial evaluation  -     Mode of Treatment physical therapy;individual therapy  -     Row Name 12/08/22 0900          General Information    Patient Profile Reviewed yes  -     General Observations of Patient Pt up in bathroom with staff when PT arrived.  Agreeable to PT, NAD.  -     Existing Precautions/Restrictions fall  -     Row Name 12/08/22 0900          Previous Level of Function/Home Environm    Bed Mobility, Premorbid Functional Level independent  -     Transfers, Premorbid Functional Level independent  -     Household Ambulation, Premorbid Functional Level independent  -     Stairs, Premorbid Functional Level --  primarily used ramp  -     Previous Level of Function, Premorbid Per spouse, pt was not very active prior to this event.  -     Activity/Exercise/Self-Care Comment Pt enjoys brittany.  -     Row Name 12/08/22 0900          Living Environment    Current Living Arrangements home  -     Home Accessibility stairs to enter home  -     People in Home spouse  -     Primary Care Provided by self  -     Row Name 12/08/22 0900          Home Main Entrance    Number of Stairs, Main Entrance one  -     Stairs Comment, Main Entrance has ramp to enter also.  -     Row Name 12/08/22 0900          Stairs Within Home, Primary    Number of Stairs, Within Home, Primary none  -     Row Name 12/08/22 0900          Home Use of Assistive/Adaptive Equipment    Equipment Currently Used at Home rollator;grab bar;bath bench  Pt had not  been using rollator but had from previous surgery.  -     Row Name 12/08/22 0900          Pain Assessment    Pretreatment Pain Rating 0/10 - no pain  -     Posttreatment Pain Rating 0/10 - no pain  -     Row Name 12/08/22 0900          Cognition/Psychosocial    Affect/Mental Status (Cognition) [facility]  -     Orientation Status (Cognition) oriented x 4  -     Follows Commands (Cognition) follows two-step commands;75-90% accuracy;increased processing time needed  -     Personal Safety Interventions fall prevention program maintained;gait belt;nonskid shoes/slippers when out of bed;supervised activity  -     Cognitive Function memory deficit  -     Attention Deficit (Cognition) concentration;distractible in noisy environment  -     Row Name 12/08/22 0900          Range of Motion Comprehensive    Comment, General Range of Motion B LE WFL  -     Row Name 12/08/22 0900          Strength Comprehensive (MMT)    General Manual Muscle Testing (MMT) Assessment lower extremity strength deficits identified  -     Comment, General Manual Muscle Testing (MMT) Assessment L hip - 4-/5, L kne flex - 4, ext - 4-, L ankle PF/DF - 4+, L ankle inv/ev - 4, R hip - 4, R knee -4, R ankle - 5  -     Row Name 12/08/22 0900          Sensory    Additional Documentation Sensory Assessment (Somatosensory) (Group)  -     Row Name 12/08/22 0900          Sensory Assessment (Somatosensory)    Sensory Assessment (Somatosensory) right-sided sensation intact  suspect L side diminished  -     Sensory Assessment Proprioception intact, suspect sensory deficits L but testing was intact.  -     Row Name 12/08/22 0900          Mobility    Additional Documentation Advanced Gait Activity (Row)  -     Row Name 12/08/22 1000 12/08/22 0900       Bed Mobility    Bed Mobility -- rolling left;rolling right;scooting/bridging;supine-sit;sit-supine  -    Rolling Left [place] (Bed Mobility) -- modified independence  -    Rolling Right  [place] (Bed Mobility) -- modified independence  -    Scooting/Bridging [place] (Bed Mobility) -- standby assist  -    Supine-Sit [place] (Bed Mobility) -- minimum assist (75% patient effort);verbal cues  -    Sit-Supine [place] (Bed Mobility) -- minimum assist (75% patient effort);verbal cues;set up  -    Bed Mobility, Safety Issues --  huber on L  -KP decreased use of arms for pushing/pulling;decreased use of legs for bridging/pushing  huber on L  -KP    Comment, (Bed Mobility) -- In pm , pt was SBA to get into flattened hospital bed.  -    Row Name 12/08/22 0900          Transfer Assessment/Treatment    Transfers sit-stand transfer;stand-sit transfer;shower transfer;car transfer  -     Row Name 12/08/22 0900          Sit-Stand Transfer    Sit-Stand [place] (Transfers) minimum assist (75% patient effort)  -     Assistive Device (Sit-Stand Transfers) walker, front-wheeled  -     Row Name 12/08/22 0900          Stand-Sit Transfer    Stand-Sit [place] (Transfers) minimum assist (75% patient effort);verbal cues;set up  -     Assistive Device (Stand-Sit Transfers) walker, front-wheeled  -     Row Name 12/08/22 0900          Car Transfer    Type (Car Transfer) sit-stand;stand-sit  -     [place] Level (Car Transfer) minimum assist (75% patient effort);verbal cues  -     Assistive Device (Car Transfer) walker, front-wheeled  -     Row Name 12/08/22 1000 12/08/22 0900       Gait/Stairs (Locomotion)    [place] Level (Gait) -- minimum assist (75% patient effort);verbal cues  -    Assistive Device (Gait) -- walker, front-wheeled  -    Distance in Feet (Gait) -- 160, 90, 45 x 2 - in am, 45, 100 in pm  -    Deviations/Abnormal Patterns (Gait) -- base of support, narrow;fabrizio decreased;stride length decreased;weight shifting decreased  -    Bilateral Gait Deviations -- heel strike decreased  -    Left Sided Gait Deviations --  Dec foot clearance - worse in  pm .  - decreased knee extension;leans left  Dec foot clearance - worse in pm .  -    Gait Assessment/Intervention -- Max Vc for foot clearance R.  Able to correct with VC in am but in pm, pt fatigue was a factor and she was not able ot correct.  Pt  -    Stairs Assessment/Intervention --  - --    Comment, (Gait/Stairs) -- Pt has ramp at home - deferred stairs today.  -    Row Name 12/08/22 0900          Safety Issues, Functional Mobility    Safety Issues Affecting Function (Mobility) safety precautions follow-through/compliance  -     Impairments Affecting Function (Mobility) balance;endurance/activity tolerance;coordination;motor control;sensation/sensory awareness;strength  -     Row Name 12/08/22 0900          Balance    Static Sitting Balance standby assist  -     Dynamic Sitting Balance contact guard  -     Static Standing Balance contact guard  -     Dynamic Standing Balance minimal assist  -     Position/Device Used, Standing Balance walker, front-wheeled  -     Comment, Balance L lateral Lean in standing with fatigue.  -     Row Name 12/08/22 0900          Motor Skills    Motor Skills coordination  -     Coordination gross motor deficit;left;heel to shin;minimal impairment  -     Row Name 12/08/22 0900          Positioning and Restraints    Pre-Treatment Position bathroom  -     Post Treatment Position wheelchair  -     In Wheelchair sitting;call light within reach;encouraged to call for assist;exit alarm on;notified nsg  -     Row Name 12/08/22 1000 12/08/22 0900       Therapy Assessment/Plan (PT)    Patient's Goals For Discharge -- return home  -    Family Goals For Discharge patient able to return to all previous activities/roles  - patient able to return to all previous activities/roles  -    Row Name 12/08/22 0900          Therapy Assessment/Plan (PT)    Functional Level at Time of Evaluation (PT) Min  -     PT Diagnosis (PT) Impaired functional mobility  -      Rehab Potential/Prognosis (PT) good, to achieve stated therapy goals  -     Frequency of Treatment (PT) 60 minutes per session;5 times per week  -     Estimated Duration of Therapy (PT) 2 weeks  -     Problem List (PT) problems related to;balance;cognition;mobility;motor control;strength  -     Comment, Therapy Assessment/Plan (PT) Pt is a 58 yo female who presents for eval following surery for brain tumor.  Pt has co morbidities of essential HTN, Asthma, arthritis, DM and a hx of lung CA, melanoma or R foot, renal cell carcinoma and liver mass all of which will impact POC.  Pt has impairments inc dec strength R>L, impaired sensation, impaired balance, dec activity tolerance, and dec safety.  Pt would benefit from skilled PT to address impairments and improve functional mobility to allow for return home with care of spouse and friends.  -     Row Name 12/08/22 0900          IRF PT Goals    Bed Mobility Goal Selection (PT-IRF) bed mobility, PT goal 1  -KP     Transfer Goal Selection (PT-IRF) transfers, PT goal 1  -KP     Gait (Walking Locomotion) Goal Selection (PT-IRF) gait, PT goal 1  -KP     Stairs Goal Selection (PT-IRF) stairs, PT goal 1  -KP     Strength Goal Selection (PT-IRF) strength, PT goal 1 (free text)  -     Row Name 12/08/22 0900          Bed Mobility Goal 1 (PT-IRF)    Activity/Assistive Device (Bed Mobility Goal 1, PT-IRF) bed mobility activities, all  -KP     [place] Level (Bed Mobility Goal 1, PT-IRF) modified independence  -KP     Time Frame (Bed Mobility Goal 1, PT-IRF) long-term goal (LTG);2 weeks  -     Progress/Outcomes (Bed Mobility Goal 1, PT-IRF) goal ongoing  -     Row Name 12/08/22 0900          Transfer Goal 1 (PT-IRF)    Activity/Assistive Device (Transfer Goal 1, PT-IRF) all transfers;walker, rolling  -KP     [place] Level (Transfer Goal 1, PT-IRF) supervision required  -     Time Frame (Transfer Goal 1, PT-IRF) long-term goal (LTG);2 weeks  -KP      Progress/Outcomes (Transfer Goal 1, PT-IRF) goal ongoing  -KP     Row Name 12/08/22 0900          Gait/Walking Locomotion Goal 1 (PT-IRF)    Activity/Assistive Device (Gait/Walking Locomotion Goal 1, PT-IRF) gait (walking locomotion);walker, rolling;walker, 4 wheeled  -KP     Gait/Walking Locomotion Distance Goal 1 (PT-IRF) 150  -KP     [place] Level (Gait/Walking Locomotion Goal 1, PT-IRF) standby assist  -KP     Time Frame (Gait/Walking Locomotion Goal 1, PT-IRF) long-term goal (LTG);2 weeks  -KP     Progress/Outcomes (Gait/Walking Locomotion Goal 1, PT-IRF) goal ongoing  -KP     Row Name 12/08/22 0900          Stairs Goal 1 (PT-IRF)    Activity/Assistive Device (Stairs Goal 1, PT-IRF) stairs, all skills;walker, rolling  -KP     Number of Stairs (Stairs Goal 1, PT-IRF) 1  -KP     [place] Level (Stairs Goal 1, PT-IRF) minimum assist (75% or more patient effort);verbal cues required  -KP     Time Frame (Stairs Goal 1, PT-IRF) long-term goal (LTG);2 weeks  -KP     Progress/Outcomes (Stairs Goal 1, PT-IRF) goal ongoing  -KP           User Key  (r) = Recorded By, (t) = Taken By, (c) = Cosigned By    Initials Name Provider Type    Anita Cruz, PT Physical Therapist              Wound Right scalp Incision (Active)   Dressing Appearance open to air 12/08/22 0804   Closure Staples 12/08/22 0804   Base clean;dry;scab 12/08/22 0804   Periwound intact;dry 12/08/22 0804   Periwound Temperature warm 12/08/22 0804   Periwound Skin Turgor soft 12/07/22 2115   Drainage Amount none 12/08/22 0804   Dressing Care open to air 12/08/22 0804     Physical Therapy Education     Title: PT OT SLP Therapies (Done)     Topic: Physical Therapy (Done)     Point: Mobility training (Done)     Learning Progress Summary           Patient Acceptance, E,TB,D, VU,DU,NR by KP at 12/8/2022 1420    Comment: Goals, POC    Acceptance, E, VU by CB at 12/8/2022 1204   Significant Other Acceptance, E,TB,D, VU,DU,NR by  at 12/8/2022 2709     Comment: Goals, POC                   Point: Home exercise program (Done)     Learning Progress Summary           Patient Acceptance, E,TB,D, VU,DU,NR by  at 12/8/2022 1420    Comment: Goals, POC    Acceptance, E, VU by  at 12/8/2022 1204   Significant Other Acceptance, E,TB,D, VU,DU,NR by  at 12/8/2022 1420    Comment: Goals, POC                   Point: Body mechanics (Done)     Learning Progress Summary           Patient Acceptance, E, VU by  at 12/8/2022 1204                   Point: Precautions (Done)     Learning Progress Summary           Patient Acceptance, E, VU by  at 12/8/2022 1204                               User Key     Initials Effective Dates Name Provider Type [facility] 06/16/21 -  Anita Serrano PT Physical Therapist PT    CB 11/10/22 -  Mackenzie Hopkins CCC-SLP Speech and Language Pathologist SLP                PT Recommendation and Plan    Planned Therapy Interventions (PT): balance training, bed mobility training, gait training, home exercise program, neuromuscular re-education, stair training, strengthening, transfer training  Frequency of Treatment (PT): 60 minutes per session, 5 times per week                     Time Calculation:      PT Charges     Row Name 12/08/22 1613 12/08/22 1420 12/08/22 1219       Time Calculation    Start Time 1445  - 1230  - 0830  -    Stop Time 1550  - 1245  - 0900  -    Time Calculation (min) 65 min  - 15 min  - 30 min  -    PT Received On -- -- 12/08/22  -    PT - Next Appointment -- -- 12/09/22  -          User Key  (r) = Recorded By, (t) = Taken By, (c) = Cosigned By    Initials Name Provider Type     Anita Serrano PT Physical Therapist                Therapy Charges for Today     Code Description Service Date Service Provider Modifiers Qty    45924639825 HC PT EVAL MOD COMPLEXITY 3 12/8/2022 Anita Serrano, PT GP 1    [id number]  PT THERAPEUTIC ACT EA 15 MIN 12/8/2022 Anita Serrano, PT GP 1    [id number]  HC PT THER PROC EA 15 MIN 12/8/2022 Anita Serrano, PT GP 1               Patient was intermittently wearing a face mask during this therapy encounter. Therapist used appropriate personal protective equipment including mask and gloves.  Mask used was standard procedure mask. Appropriate PPE was worn during the entire therapy session. Hand hygiene was completed before and after therapy session. Patient is not in enhanced droplet precautions.       Anita Serrano, PT  12/8/2022

## 2022-12-08 NOTE — PROGRESS NOTES
Inpatient Rehabilitation Plan of Care Note    Plan of Care  Care Plan Reviewed - No updates at this time.    Psychosocial    [RN] Coping/Adjustment(Active)  Current Status(12/07/2022): At risk for poor coping d/t recent medical  conditions.  Weekly Goal(12/08/2022): Identify progress in functional status.  Discharge Goal: Demonstrates healthy coping strategies.    Performed Intervention(s)  Support/peer groups.  Verbalizes needs and concerns.  Medication.      Safety    [RN] Potential for Injury(Active)  Current Status(12/07/2022): At risk for falls d/t history of falls.  Weekly Goal(12/08/2022): Remain fall free while in hospital.  Discharge Goal: Patient and family will be aware of risk of fall and safety in  home setting.    Performed Intervention(s)  Items w/i reach.  Safety rounds.  Bed and chair alarm.  Falls precautions.      Sphincter Control    [RN] Bladder Management(Active)  Current Status(12/07/2022): Continent of bladder.  Weekly Goal(12/08/2022): Remain continent of bladder.  Discharge Goal: Goes home continent of bladder.    [RN] Bowel Management(Active)  Current Status(12/07/2022): Continent of bowel.  Weekly Goal(12/08/2022): Remain continent of bowel.  Discharge Goal: Go home continent of bowel.    Performed Intervention(s)  Offer restroom during hourly rounding.  Encourage fluid intake.  Monitor Is and Os.  Timed voids.  Encourage appropriate diet.    Signed by: Donald Fernandez RN

## 2022-12-08 NOTE — CONSULTS
Gastroenterology   Initial Inpatient Consult Note    Referring Provider: Dr. Araujo    Reason for Consultation: Elevated LFTs    Subjective     History of present illness:      Thank you for allowing us to persuade in the care of this patient.    57 y.o. female not previously known to our service with a past medical history significant for BRCA2 to positive, lung cancer, melanoma, and history of hepatic hemangiomas on previous imaging, with significantly elevated LFTs on routine CMP after transfer to rehab floor.  Patient was admitted to the hospital for further evaluation of suspicious brain mass was noted on outpatient MRI with subsequent stereotactic needle brain biopsy on 11/28 pathology consistent with high-grade glioma.    Lab work significant for:    ALT 1,049   AST  386 alkaline phosphatase  190  total bilirubin 0.6  Hepatitis panel negative    She denies history of hepatitis, alcohol abuse, or excessive Tylenol use.  Denies family history of liver disease.  Review of medication list appreciated long-term prophylactic 1 g Valtrex daily that is prescribed by her primary care provider.  Hospital records and staff confirm no episodes of hypertension with a single anesthesia event on 11/28 in which LFTs were marginally elevated on that date.  Denies antibiotic use, anticonvulsants.    She is resting comfortably.  Denies nausea, vomiting, or abdominal pain, myalgias, or obstructive jaundice symptoms    Past Medical History:  Past Medical History:   Diagnosis Date   • Arthritis    • Asthma    • BRCA2 positive    • Diabetes mellitus (HCC)    • H/O Liver masses 2017    x3   • H/O Lung masses 2017    1 in right lower lobe and 1 in the left infrahilar location   • H/O Ovarian cyst    • H/O Right adrenal mass (CMS/HCC) 2017   • Lung cancer (HCC)    • Melanoma (HCC)     right foot   • PONV (postoperative nausea and vomiting)    • Thyroid disease      Past Surgical History:  Past Surgical History:   Procedure Laterality  Date   • APPENDECTOMY     • BRAIN BIOPSY Right 11/28/2022    Procedure: Right frontal stereotactic needle brain biopsy;  Surgeon: Manuel Thompson MD;  Location: [facility] MAIN OR;  Service: Neurosurgery;  Laterality: Right;   • BREAST IMPLANT SURGERY Bilateral    • CHOLECYSTECTOMY     • HYSTERECTOMY     • MASTECTOMY Bilateral    • OVARIAN CYST SURGERY     • THORACOSCOPY VIDEO ASSISTED WITH LOBECTOMY Right 10/9/2020    Procedure: BRONCHOSCOPY, THORACOSCOPY VIDEO ASSISTED WITH ANTERIOR BASAL SEGMENTECTOMY, INTERCOSTAL NERVE BLOCK;  Surgeon: Flaca Crisostomo MD;  Location: [facility] MAIN OR;  Service: Thoracic;  Laterality: Right;   • TONSILLECTOMY        Social History:   Social History     Tobacco Use   • Smoking status: Never   • Smokeless tobacco: Never   Substance Use Topics   • Alcohol use: Yes     Comment: Rare      Family History:  Family History   Problem Relation Age of Onset   • Cancer Father         cholangiocarcinoma   • Liver cancer Father    • Breast cancer Mother    • Thyroid cancer Mother 73   • Hypertension Mother    • Leukemia Paternal Grandfather         60-80, unknown type   • Uterine cancer Maternal Grandmother    • Ovarian cancer Maternal Grandmother 80   • Leukemia Maternal Grandfather         60-80, unknown type   • Colon cancer Maternal Aunt         over age of 50   • Breast cancer Maternal Aunt         80's   • Colon cancer Maternal Aunt         colon   • Breast cancer Paternal Cousin    • Diabetes Other    • Malig Hyperthermia Neg Hx        Home Meds:  Medications Prior to Admission   Medication Sig Dispense Refill Last Dose   • atorvastatin (LIPITOR) 40 MG tablet TAKE 1 TABLET BY MOUTH  EVERY NIGHT 90 tablet 3 12/6/2022   • bimatoprost (LUMIGAN) 0.01 % ophthalmic drops Administer 1 drop to both eyes Every Night.   12/7/2022   • Blood Glucose Monitoring Suppl (Accu-Chek Guide Me) w/Device kit 1 Device Daily. 1 kit 0 Past Week   • escitalopram (LEXAPRO) 10 MG tablet Take 1 tablet by mouth  Daily. 90 tablet 1 12/6/2022   • glucose blood (Accu-Chek Guide) test strip Use 1-2 daily to check blood sugars Dx diabetes E11.9 200 each 12 Past Week   • glucose blood test strip Use as instructed 100 each 12 Past Week   • levothyroxine (SYNTHROID, LEVOTHROID) 150 MCG tablet Take 150 mcg by mouth Daily.   12/7/2022   • lisinopril (PRINIVIL,ZESTRIL) 20 MG tablet TAKE 1 TABLET BY MOUTH  DAILY 90 tablet 3 12/7/2022   • valACYclovir (VALTREX) 1000 MG tablet TAKE 2 TABLETS BY MOUTH  TWICE DAILY 180 tablet 6 Past Month   • temozolomide (TEMODAR) 140 MG chemo capsule Take 1 capsule by mouth with 2 other temozolomide prescriptions for 165 mg total Daily for 42 doses. 42 capsule 0    • temozolomide (TEMODAR) 20 MG chemo capsule Take 1 capsule by mouth with 2 other temozolomide prescriptions for 165 mg total Daily for 42 doses. 42 capsule 0 Unknown   • temozolomide (TEMODAR) 5 MG chemo capsule Take 1 capsule by mouth with 2 other temozolomide prescriptions for 165 mg total Daily for 42 doses. 42 capsule 0 Unknown   • Trulicity 3 MG/0.5ML solution pen-injector INJECT THE CONTENTS OF ONE  PEN SUBCUTANEOUSLY WEEKLY  AS DIRECTED 6 mL 3      Current Meds:   calcium 500 mg vitamin D 5 mcg (200 UT), 1 tablet, Oral, BID  dexamethasone, 4 mg, Oral, Q8H  enoxaparin, 40 mg, Subcutaneous, Q12H  escitalopram, 10 mg, Oral, Daily  insulin glargine, 25 Units, Subcutaneous, QAM  insulin lispro, 0-24 Units, Subcutaneous, 4x Daily With Meals & Nightly  insulin lispro, 8 Units, Subcutaneous, TID With Meals  latanoprost, 1 drop, Both Eyes, Nightly  levETIRAcetam, 500 mg, Oral, BID  levothyroxine, 150 mcg, Oral, Q AM  lisinopril, 20 mg, Oral, Daily  pantoprazole, 40 mg, Oral, Q AM  polyethylene glycol, 17 g, Oral, Daily  [START ON 12/9/2022] valACYclovir, 500 mg, Oral, Q24H      Allergies:  Allergies   Allergen Reactions   • Morphine Anaphylaxis     Hives and throat swelling   • Peach [Prunus Persica] Anaphylaxis   • Penicillins Anaphylaxis   •  Metformin Diarrhea     Review of Systems  Pertinent items are noted in HPI, all other systems reviewed and negative    Objective     Vital Signs  Temp:  [97.6 °F (36.4 °C)-98.3 °F (36.8 °C)] 98.3 °F (36.8 °C)  Heart Rate:  [63-75] 68  Resp:  [18] 18  BP: (110-118)/(56-66) 110/66    Physical Exam:  CONSTITUTIONAL:  today's vital signs reviewed  EARS NOSE THROAT: trachea midline and no deformity of the nares  EYES: no scleral icterus  GASTROINTESTINAL: abdomen is soft, nontender, nondistended with normal active bowel sounds, no masses are appreciated  PSYCHIATRIC: appropriate mood and affect  RESPIRATORY: normal inspiratory effort with no increased work of breathing  NEUROLOGIC: patient is awake and alert  DERMATOLOGIC: skin is warm with no cyanosis.  Well approximated incision on right  forehead  LYMPHATIC: no periumbilical lymphadenopathy     Results Review:   I reviewed the patient's new clinical results.    Results from last 7 days   Lab Units 12/08/22  0616 12/07/22 0617 12/02/22  0913   WBC 10*3/mm3 15.27* 16.17* 13.91*   HEMOGLOBIN g/dL 13.0 12.8 12.3   HEMATOCRIT % 39.4 37.9 38.0   PLATELETS 10*3/mm3 238 261 253     Results from last 7 days   Lab Units 12/08/22  0616 12/07/22  0617 12/02/22  0913   SODIUM mmol/L 133* 134* 136   POTASSIUM mmol/L 4.5 4.3 4.3   CHLORIDE mmol/L 94* 97* 98   CO2 mmol/L 24.5 28.0 25.5   BUN mg/dL 21* 20 17   CREATININE mg/dL 0.73 0.57 0.83   CALCIUM mg/dL 8.9 8.7 8.6   BILIRUBIN mg/dL 0.6  --   --    ALK PHOS U/L 190*  --   --    ALT (SGPT) U/L 1,049*  --   --    AST (SGOT) U/L 386*  --   --    GLUCOSE mg/dL 183* 134* 300*         Lab Results   Lab Value Date/Time    LIPASE 42 10/31/2018 0054       Radiology:  US Liver    (Results Pending)       Assessment & Plan   Patient Active Problem List   Diagnosis   • Carcinoid tumor of left lung   • BRCA2 gene mutation positive in female   • Papillary thyroid carcinoma (HCC)   • Renal cell carcinoma of left kidney (HCC)   • Essential  hypertension   • Postoperative hypothyroidism   • Normocytic anemia   • Type 2 diabetes mellitus with hyperglycemia, without long-term current use of insulin (HCC)   • Neoplasm of right breast, 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 BRCA2 gene mutation  2. High-grade glioma  3. Non-small cell lung cancer  4. Neoplasm of right breast  5. Renal cell carcinoma left kidney  6. Papillary thyroid carcinoma  7. Melena  8. 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 Hepatitis panel negative  10. Primary discontinued valacyclovir, atorvastatin, and Tylenol pending work-up  11. Pharmacy consultation unremarkable for alternative pharmacologic therapies as underlying etiology to significant elevation in liver enzymes  12. Liver ultrasound- pending    Plan:    Review of transaminitis pattern does not suggest obstructive etiology to elevation.  differential includes autoimmune hepatitis versus DILI versus malignancy versus rhabdomyolysis.  However, due to recent diagnosis of high-grade glioma and BRCA 2 gene mutation combined with significant history of multiple organ malignancy presentation is highly suspicious for distant metastasis.    · Monitor liver transaminases closely as well as signs of obstructive jaundice  · Obtain AFP, to consider EBV, CMV, HSV, VZV testing if initial work-up for metastatic cause is unremarkable.    Addendum: MELD score 12    I discussed the patients findings and my recommendations with patient, nursing staff, primary care team, consulting provider and Dr. Moncada.    MERRY Appiah APRN  [facility]  [address] [place] [address]

## 2022-12-08 NOTE — PROGRESS NOTES
Inpatient Rehabilitation Plan of Care Note    Plan of Care  Care Plan Reviewed - No updates at this time.    Psychosocial    Performed Intervention(s)  Support/peer groups.  Verbalizes needs and concerns.  Medication.      Sphincter Control    Performed Intervention(s)  Offer restroom during hourly rounding.  Encourage fluid intake.  Monitor Is and Os.  Timed voids.  Encourage appropriate diet.      Safety    Performed Intervention(s)  Items w/i reach.  Safety rounds.  Bed and chair alarm.  Falls precautions.    Signed by: Christie Majano RN

## 2022-12-08 NOTE — PROGRESS NOTES
SECTION GG    Mobility Performance:     Roll Left and Right: Glen Ullin provides verbal cues and/or touching/steadying  and/or contact guard assistance as patient completes activity. Assistance may be  provided throughout the activity or intermittently.   Sit to Lying: Glen Ullin provides verbal cues and/or touching/steadying and/or  contact guard assistance as patient completes activity. Assistance may be  provided throughout the activity or intermittently.   Lying to Sitting on Side of Bed: Glen Ullin does less than half the effort. Glen Ullin  lifts, holds or supports trunk or limbs but provides less than half the effort.   Sit to Stand: Glen Ullin does less than half the effort. Glen Ullin lifts, holds or  supports trunk or limbs but provides less than half the effort.   Chair/Bed to Chair Transfer: Glen Ullin does less than half the effort. Glen Ullin  lifts, holds or supports trunk or limbs but provides less than half the effort.   Car Transfer: Glen Ullin does less than half the effort. Glen Ullin lifts, holds or  supports trunk or limbs but provides less than half the effort.   Walk 10 Feet:   Glen Ullin does less than half the effort. Glen Ullin lifts, holds or  supports trunk or limbs but provides less than half the effort.  Walk 50 Feet with 2 Turns:   Glen Ullin does less than half the effort. Glen Ullin  lifts, holds or supports trunk or limbs but provides less than half the effort.  Walk 150 Feet:   Glen Ullin does less than half the effort. Glen Ullin lifts, holds or  supports trunk or limbs but provides less than half the effort.  Walking 10 Feet on Uneven Surfaces:   Not attempted due to medical or safety  concerns.  1 Step Over Curb or Up/Down Stair:   Not attempted due to medical or safety  concerns.  Picking up an Object:   Not attempted due to medical or safety concerns.  Uses Wheelchair/Scooter: No    Mobility Discharge Goals:   Sit to Stand: Glen Ullin provides verbal cues or touching/steadying assistance as  patient completes activity.    Section [facility]. Health  Conditions (Pain):  Pain Interference with Therapy Activities:   Rarely or not at all  Pain Interference with Day-to-Day Activities:   Rarely or not at all    Signed by: Anita Serrano PT

## 2022-12-08 NOTE — PROGRESS NOTES
LIVER FUNCTION TESTS:      Lab 12/08/22  0616   TOTAL PROTEIN 6.2   ALBUMIN 3.70   GLOBULIN 2.5   ALT (SGPT) 1,049*   AST (SGOT) 386*   BILIRUBIN 0.6   ALK PHOS 190*     Only took Tylenol sparingly - discontinued. D/C atorvastatin.  Consult IM for assessment.

## 2022-12-08 NOTE — PROGRESS NOTES
Case Management  Inpatient Rehabilitation Plan of Care and Discharge Plan Note    Rehabilitation Diagnosis:  High-grade glioma-3.2 cm ring-enhancing right  supratentorial mass-right thalamic-with mass-effect and left midline shift  Status post stereotactic brain biopsy  Date of Onset:  11/28/2022    Medical Summary:  Patient is a 57-year-old female previously dependent with  history  of multiple cancers including papillary thyroid cancer, bilateral  breast cancer, clear-cell renal cancer, left lung carcinoid tumor, bilateral  non-small cell lung cancer, and melanoma of the heel, BRCA2 positive, GEORGES  mutations, asthma,  , diabetes mellitus, who presented to [facility] after developing left-sided weakness and clumsiness and impairment  with her gait.  Also had some headache and forgetfulness.  She had multiple  falls.  She had recent COVID-19 in mid November but had recovered.  She had MRI  of the brain with a right cerebral hemisphere rim-enhancing mass and was  admitted for further evaluation.  She underwent stereotactic brain biopsy on November 28, 2022.  Findings for  high-grade glioma.  Pathology was sent out to [facility] for further  study.  She has been seen by radiation oncology and medical oncology.  Current  plan is to start radiation therapy with concurrent Temodar on December 12.  She  has staples in place at the right cranial incision which is healing.  She is on  Decadron 4 mg every 8 hours.  On Keppra for seizure prophylaxis.  She takes  Valtrex chronically.  Patient was reviewed with internal medicine service and  neurosurgery service and okay to start Lovenox at this point for DVT  prophylaxis.  Also utilizing SCDs.  ?  She describes some headache.  Denies any vision changes.  No swallowing changes.   She feels she is doing fairly well with her cognition.  She continues with  left-sided weakness and incoordination and impairment with her gait.  With Occupational Therapy  for mobility contact-guard.  Left upper extremity  weaker than the previous occupational therapy session today.  She stood with  contact-guard assist.  Minimum assist at times due to left lateral drift and left lower extremity  weakness.  On December 5 ambulated 65 feet minimal assist, rolling walker, ataxic,  decreased stride length, more noticeable lean to the left hip patient fatigued.  Blanchard catheter was removed 2 days ago and she is voiding without complaints.  No  bladder complaints.  Swallowing okay.  ?  Past Medical History: Past Medical History:  DiagnosisDate  ?Arthritis?  ?Asthma?  ?BRCA2 positive?  ?Diabetes mellitus (HCC)?  ?H/O Liver [id number]  ?x3  ?H/O Lung [id number]  ?1 in right lower lobe and 1 in the left infrahilar location  ?H/O Ovarian cyst?  ?H/O Right adrenal mass (CMS/HCC)2017  ?Lung cancer (HCC)?  ?Melanoma (HCC)?  ?right foot  ?PONV (postoperative nausea and vomiting)?  ?Thyroid disease?    ?  ?  Surgical History  Past Surgical History:  ProcedureLateralityDate  ?APPENDECTOMY??  ?BRAIN DTIYLLBrbxu49/28/2022  ?Procedure: Right frontal stereotactic needle brain biopsy;  Surgeon: Manuel Thompson MD;  Location: [facility];  Service: Neurosurgery;  Laterality: Right;  ?BREAST IMPLANT SURGERYBilateral?  ?CHOLECYSTECTOMY??  ?HYSTERECTOMY??  ?MASTECTOMYBilateral?  ?OVARIAN CYST SURGERY??  ?THORACOSCOPY VIDEO ASSISTED WITH XHDWUUKFRAjcwz01/9/2020  ?Procedure: BRONCHOSCOPY, THORACOSCOPY VIDEO ASSISTED WITH ANTERIOR BASAL  SEGMENTECTOMY, INTERCOSTAL NERVE BLOCK;  Surgeon: Flaca Crisostomo MD;  Location: [facility];  Service: Thoracic;  Laterality: Right;  ?TONSILLECTOMY??    ?    Plan of Care  Updated Problems/Interventions  Field    Expected Intensity:  Average of 3 hours of therapy 5 days/week.  Interdisciplinary Team:  Interdisciplinary Team: Medical Supervision and 24 Hour Rehabilitation Nursing.,  Physical Therapy:, Occupational Therapy:, Speech and Language Therapy:,  Social  Work, Therapeutic Recreation., Psychology., Registered Dietitian.  Physical Therapy Intensity/Duration: 1 hour / day, 5 days / week, for  approximately 2 weeks  Occupational Therapy Intensity/Duration: 1 hour / day, 5 days / week, for  approximately 2 weeks  Speech Language Pathology  Intensity/Duration: 1 hour / day, 5 days / week, for  approximately 2 weeks  Estimated Length of Stay/Anticipated Discharge Date: 2 weeks  Anticipated Discharge Destination:  Anticipated discharge destination from inpatient rehabilitation is community  discharge with assistance. Home with spouse, aunt and friend can stay at night  if needed when spouse works.      Based on the patient's medical and functional status, their prognosis and  expected level of functional improvement is:  Goals are to achieve a level of  contact-guard supervision with  mobility and self-care and improved balance.  Rehabilitation prognosis indeterminate given her tumor and prognosis Medical  prognosis defer to oncology.    Signed by: Lilly Cage RN

## 2022-12-08 NOTE — THERAPY EVALUATION
Inpatient Rehabilitation - IRF Speech Language Pathology   Swallow Progress Note/Discharge   [facility]     Patient Name: Christie Avendaño  : 1965  MRN: [id number]  Today's Date: 2022               Admit Date: 2022    Visit Dx:      ICD-10-CM ICD-9-CM   1. 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)   • Non-small cell lung cancer, right (HCC)   • Renal mass, right   • SIRS (systemic inflammatory response syndrome) (HCC)   • Hyperlipidemia   • Malignant melanoma of right lower extremity including hip (HCC)   • High grade glioma    • 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)   • Glioma (HCC)         SLP Recommendation and Plan                    Anticipated Discharge Disposition (SLP): home with assist (22 1030)        Predicted Duration Therapy Intervention (Days): until discharge (22 1030)                               Therapy Treatment    Evaluation/Coping         Vitals/Pain/Safety         Cognition/Communication         Oral Motor/Eating         Mobility/Basic Activities/Instrumental Activities/Motor/Modality                   ROM/MMT                   Sensory/Myotome/Dermatome/Edema               Posture/Balance/Special Tests/Exercise/Transportation/Sexual Function                   Orthotics/Residual Limb/Prosthetic Management              Outcome Summary          SLP GOALS     Row Name 22 1030             Attention Goal 1 (SLP)    Improve Attention by Goal 1 (SLP)  attending to task;80%;with minimal cues (75-90%)  -CB      Time Frame (Attention Goal 1, SLP) by discharge  -CB      Progress/Outcomes (Attention Goal 1, SLP) good progress toward goal  -CB         Functional Problem Solving Skills Goal 1 (SLP)    Improve Problem Solving Through Goal 1 (SLP) determine solutions to simple ADL/safety problems;complete organization/home management task;90%;with minimal cues (75-90%)  -CB      Time Frame (Problem Solving Goal 1, SLP) by discharge  -CB      Progress/Outcomes (Problem Solving Goal 1, SLP) good progress toward goal  -CB         Executive Functional Skills Goal 1 (SLP)    Improve Executive Function Skills Goal 1 (SLP) identify strategies, strengths, limitations;organization/planning activity;90%;with minimal cues (75-90%)  -CB      Time Frame (Executive Function Skills Goal 1, SLP) by discharge  -CB      Progress/Outcomes (Executive Function Skills Goal 1, SLP) good progress toward goal  -CB         Patient will demonstrate functional cognitive-linguistic skills for return to discharge environment    [place] with minimal cues  -CB      Time frame by discharge  -CB      Progress/Outcomes good progress toward goal  -CB            User Key  (r) = Recorded By, (t) = Taken By, (c) = Cosigned By    Initials Name Provider Type    Mackenzie Saucedo CCC-SLP Speech and Language Pathologist                EDUCATION  The patient has been educated in the following areas:   Cognitive Impairment.             Time Calculation:    Time Calculation- SLP     Row Name 12/08/22 1206             Time Calculation- SLP    SLP Start Time 1030  -CB      SLP Stop Time 1130  -CB      SLP Time Calculation (min) 60 min  -CB      Total Timed Code Minutes-  minute(s)  -CB      SLP Received On 12/08/22  -CB         Timed Charges    96125-Standardized cognitive performance testing 120  -CB         Total Minutes    Timed Charges Total Minutes 120  -CB       Total Minutes 120  -CB            User  Key  (r) = Recorded By, (t) = Taken By, (c) = Cosigned By    Initials Name Provider Type    CB Mackenzie Hopkins CCC-SLP Speech and Language Pathologist                Therapy Charges for Today     Code Description Service Date Service Provider Modifiers Qty    [id number]  ST STD COG PERF TEST PER HOUR 12/8/2022 Mackenzie Hopkins CCC-SLP GN 1               SLP Discharge Summary  Anticipated Discharge Disposition (SLP): home with assist    SHARITA Diaz  12/8/2022

## 2022-12-08 NOTE — CONSULTS
[facility]   Consult Note    Patient Name: Christie Avendaño  : 1965  MRN: [id number]  Primary Care Physician:  Tiffany Holloway MD  Referring Physician: Mathew Araujo MD  Date of admission: 2022    Inpatient Internal Medicine Consult  Consult performed by: Mathew Dumont MD  Consult ordered by: Mathew Araujo MD  Reason for consult: Elevated LFTs        Subjective   Subjective     Reason for Consult/ Chief Complaint: Elevated LFTs    History of Present Illness  Very pleasant 57 y.o. woman admitted with new brain tumor seen on outpatient MRI. She is s/p right frontal stereotactic needle brain biopsy on . Pathology consistent with high-grade glioma. She was transferred to [facility] yesterday with plans to start chemoradiation per Heme/Onc. As per protocol on rehab floor she had CMP drawn this AM and was noted to have significantly elevated LFTs. Dr. Araujo asked that I see pt in consultation for this. Pt has no complaints this AM. She specifically denies any N/V/D/abd pain/anorexia. No h/o hepatitis or IVDU. She does take an oddly high dose of daily prophylactic Valtrex started by her PCP long ago. She has also been on high-dose steroids here. No episodes of hypotension noted. Her anesthesia event was on  and LFTs were already very slightly elevated on that date.         Review of Systems   Constitutional: Negative for appetite change, chills, diaphoresis and fever.   HENT: Negative for congestion, nosebleeds, rhinorrhea, sore throat, trouble swallowing and voice change.    Eyes: Negative for pain and visual disturbance.   Respiratory: Negative for cough, chest tightness and shortness of breath.    Cardiovascular: Negative for chest pain, palpitations and leg swelling.   Gastrointestinal: Negative for abdominal pain, blood in stool, diarrhea, nausea and vomiting.   Endocrine: Negative for cold intolerance and heat intolerance.   Genitourinary: Negative for decreased  urine volume, difficulty urinating, dysuria and hematuria.   Musculoskeletal: Negative for back pain and neck pain.   Skin: Positive for wound (right scalp). Negative for color change, pallor and rash.   Allergic/Immunologic: Positive for food allergies. Negative for environmental allergies.   Neurological: Negative for tremors, seizures, syncope and headaches.   Hematological: Negative for adenopathy. Does not bruise/bleed easily.   Psychiatric/Behavioral: Negative for behavioral problems, confusion and hallucinations.        Personal History     Past Medical History:   Diagnosis Date   • Arthritis    • Asthma    • BRCA2 positive    • Diabetes mellitus (HCC)    • H/O Liver masses 2017    x3   • H/O Lung masses 2017    1 in right lower lobe and 1 in the left infrahilar location   • H/O Ovarian cyst    • H/O Right adrenal mass (CMS/HCC) 2017   • Lung cancer (HCC)    • Melanoma (HCC)     right foot   • PONV (postoperative nausea and vomiting)    • Thyroid disease        Past Surgical History:   Procedure Laterality Date   • APPENDECTOMY     • BRAIN BIOPSY Right 11/28/2022    Procedure: Right frontal stereotactic needle brain biopsy;  Surgeon: Manuel Thompson MD;  Location: [facility];  Service: Neurosurgery;  Laterality: Right;   • BREAST IMPLANT SURGERY Bilateral    • CHOLECYSTECTOMY     • HYSTERECTOMY     • MASTECTOMY Bilateral    • OVARIAN CYST SURGERY     • THORACOSCOPY VIDEO ASSISTED WITH LOBECTOMY Right 10/9/2020    Procedure: BRONCHOSCOPY, THORACOSCOPY VIDEO ASSISTED WITH ANTERIOR BASAL SEGMENTECTOMY, INTERCOSTAL NERVE BLOCK;  Surgeon: Flaca Crisostomo MD;  Location: [facility];  Service: Thoracic;  Laterality: Right;   • TONSILLECTOMY         Family History: family history includes Breast cancer in her maternal aunt, mother, and paternal cousin; Cancer in her father; Colon cancer in her maternal aunt and maternal aunt; Diabetes in an other family member; Hypertension in her mother; Leukemia in  her maternal grandfather and paternal grandfather; Liver cancer in her father; Ovarian cancer (age of onset: 80) in her maternal grandmother; Thyroid cancer (age of onset: 73) in her mother; Uterine cancer in her maternal grandmother. Otherwise pertinent FHx was reviewed and not pertinent to current issue.    Social History:  reports that she has never smoked. She has never used smokeless tobacco. She reports current alcohol use. She reports that she does not use drugs.    Home Medications:   Accu-Chek Guide Me, Dulaglutide, atorvastatin, bimatoprost, escitalopram, glucose blood, levothyroxine, lisinopril, temozolomide, and valACYclovir    Allergies:  Allergies   Allergen Reactions   • Morphine Anaphylaxis     Hives and throat swelling   • Peach [Prunus Persica] Anaphylaxis   • Penicillins Anaphylaxis   • Metformin Diarrhea       Objective    Objective     Vitals:  Temp:  [97.6 °F (36.4 °C)-98.5 °F (36.9 °C)] 98.2 °F (36.8 °C)  Heart Rate:  [63-76] 70  Resp:  [18] 18  BP: (110-128)/(56-81) 118/63    Physical Exam  Vitals and nursing note reviewed. Exam conducted with a chaperone present (SLP).   Constitutional:       General: She is not in acute distress.     Appearance: She is ill-appearing (chronically). She is not toxic-appearing or diaphoretic.   HENT:      Head: Normocephalic.      Comments: Right frontal surgical wound looks great, staples in place     Nose: Nose normal.      Mouth/Throat:      Mouth: Mucous membranes are moist.      Pharynx: Oropharynx is clear. No oropharyngeal exudate or posterior oropharyngeal erythema.   Eyes:      General: No scleral icterus.        Right eye: No discharge.         Left eye: No discharge.      Extraocular Movements: Extraocular movements intact.      Conjunctiva/sclera: Conjunctivae normal.      Pupils: Pupils are equal, round, and reactive to light.   Cardiovascular:      Rate and Rhythm: Normal rate and regular rhythm.      Pulses: Normal pulses.   Pulmonary:       Effort: Pulmonary effort is normal. No respiratory distress.      Breath sounds: Normal breath sounds. No wheezing or rales.   Abdominal:      General: Bowel sounds are normal. There is no distension.      Palpations: Abdomen is soft. There is no mass.      Tenderness: There is no abdominal tenderness.   Musculoskeletal:         General: No swelling or deformity. Normal range of motion.      Cervical back: Neck supple. No rigidity.   Lymphadenopathy:      Cervical: No cervical adenopathy.   Skin:     General: Skin is warm and dry.      Capillary Refill: Capillary refill takes less than 2 seconds.      Coloration: Skin is not jaundiced.      Findings: No rash.   Neurological:      Mental Status: She is alert and oriented to person, place, and time. Mental status is at baseline.      Cranial Nerves: No cranial nerve deficit.      Coordination: Coordination normal.      Comments: Mild left sided weakness   Psychiatric:         Mood and Affect: Mood normal.         Behavior: Behavior normal.      Comments: Flat affect, pleasant         Result Review    Result Review:  I have personally reviewed the results from the time of this admission to 12/8/2022 10:12 EST and agree with these findings:  [x]  Laboratory list / accordion  []  Microbiology  []  Radiology  []  EKG/Telemetry   []  Cardiology/Vascular   []  Pathology  [x]  Old records  []  Other:  Most notable findings include: Elevate LFTs      Assessment & Plan   Assessment / Plan     Brief Patient Summary:  Christie Avendaño is a 57 y.o. female who we are asked to see for asymptomatic LFT elevation    Active Hospital Problems:  Active Hospital Problems    Diagnosis    • **Glioma (HCC)      Plan:   Thanks for consult!  Check acute hep panel and liver US  I see no evidence of hypotension  Ask Pharm to review meds for possible culprits, agree with holding APAP and statin for now (was not taking much APAP at all)  GI consult  Both steroids and Valtrex could be an issue, will  decrease Valtrex to appropriate ppx dose (1g daily)  D/w pt and Dr. Gayle Dumont MD

## 2022-12-08 NOTE — PROGRESS NOTES
LOS: 1 day   Patient Care Team:  Tiffany Holloway MD as PCP - General (Internal Medicine)  Mathew Collins Jr., MD as Consulting Physician (Hematology and Oncology)  Dorian Sabillon MD as Surgeon (General Surgery)  Thang Dumont, JOSEFA (Optometry)  Lamine Cantu DO as Referring Physician (Family Medicine)  Hali Rolle MD as Gynecologist (Gynecology)      HERON MAGANA  1965    Diagnoses    1. 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 Patient denies fever/chills/sob/chest pain/abdominal pain and denies issue with sleep/appetite/bladder and bowels.         Objective     Vitals:    12/08/22 1214   BP: 110/66   Pulse: 68   Resp: 18   Temp: 98.3 °F (36.8 °C)   SpO2: 97%       PHYSICAL EXAM:   MENTAL STATUS -  AWAKE / ALERT  HEENT-right scalp incision with staples.  Clean dry and intact  SCLERAE ANICTERIC, CONJUNCTIVAE PINK, OP MOIST, NO JVD, EARS UNREMARKABLE EXTERNALLY  LUNGS - CTA, NO WHEEZES, RALES OR RHONCHI  HEART- RRR, NO RUB, MURMUR, OR GALLOP  ABD - NORMOACTIVE BOWEL SOUNDS, SOFT, NT.     EXT - NO EDEMA OR CYANOSIS  NEURO -oriented person place time and situation.  Good historian.    Speech was fluent with slightly slow rate of speech.  Recognizes finger movement in all 4 quadrants.  Extraocular movements intact.  No dysarthria.  MOTOR EXAM - RUE/RLE 5/5.   LUE/LLE 4+/5.   Impaired motor control in the left upper extremity and left lower  extremity      MEDICATIONS  Scheduled Meds:calcium 500 mg vitamin D 5 mcg (200 UT), 1 tablet, Oral, BID  dexamethasone, 4 mg, Oral, Q8H  enoxaparin, 40 mg, Subcutaneous, Q12H  escitalopram, 10 mg, Oral, Daily  insulin glargine, 25 Units, Subcutaneous, QAM  insulin lispro, 0-24 Units, Subcutaneous, 4x Daily With Meals & Nightly  insulin lispro, 8 Units, Subcutaneous, TID With Meals  latanoprost, 1 drop, Both Eyes, Nightly  levETIRAcetam, 500 mg, Oral, BID  levothyroxine, 150 mcg, Oral, Q AM  lisinopril, 20 mg, Oral, Daily  pantoprazole, 40 mg, Oral, Q AM  polyethylene glycol, 17 g, Oral, Daily  [START ON 12/9/2022] valACYclovir, 500 mg, Oral, Q24H      Continuous Infusions:Pharmacy Consult,       PRN Meds:.•  calcium carbonate  •  dextrose  •  dextrose  •  famotidine  •  glucagon (human recombinant)  •  influenza vaccine  •  nitroglycerin  •  ondansetron **OR** ondansetron  •  Pharmacy Consult  •  senna-docusate sodium      RESULTS  Glucose   Date/Time Value Ref Range Status   12/08/2022 1349 178 (H) 70 - 130 mg/dL Final     Comment:     Meter: [id number] : [id number] Milleryoana Clifton NA   12/08/2022 0607 217 (H) 70 - 130 mg/dL Final     Comment:     Meter: [id number] : [id number] Katie Valdivia NA   12/07/2022 2112 216 (H) 70 - 130 mg/dL Final     Comment:     Meter: [id number] : [id number] Jim Bennett RN   12/07/2022 1648 104 70 - 130 mg/dL Final     Comment:     Meter: [id number] : [id number] Torsten Bates NA   12/07/2022 1138 196 (H) 70 - 130 mg/dL Final     Comment:     Meter: [id number] : [id number] Hatchett Luisshannon NA   12/07/2022 0609 146 (H) 70 - 130 mg/dL Final     Comment:     Meter: [id number] : [id number] Eduardo Son NA   12/07/2022 0142 152 (H) 70 - 130 mg/dL Final     Comment:     Meter: [id number] : [id number] Venkatesh ACUÑA   12/06/2022 2033 140 (H) 70 - 130 mg/dL Final     Comment:     Meter: [id number] : [id number] Venkatesh ACUÑA     Results from last 7  days   Lab Units 12/08/22  0616 12/07/22  0617 12/02/22  0913   WBC 10*3/mm3 15.27* 16.17* 13.91*   HEMOGLOBIN g/dL 13.0 12.8 12.3   HEMATOCRIT % 39.4 37.9 38.0   PLATELETS 10*3/mm3 238 261 253     Results from last 7 days   Lab Units 12/08/22  0616 12/07/22  0617 12/02/22  0913   SODIUM mmol/L 133* 134* 136   POTASSIUM mmol/L 4.5 4.3 4.3   CHLORIDE mmol/L 94* 97* 98   CO2 mmol/L 24.5 28.0 25.5   BUN mg/dL 21* 20 17   CREATININE mg/dL 0.73 0.57 0.83   CALCIUM mg/dL 8.9 8.7 8.6   BILIRUBIN mg/dL 0.6  --   --    ALK PHOS U/L 190*  --   --    ALT (SGPT) U/L 1,049*  --   --    AST (SGOT) U/L 386*  --   --    GLUCOSE mg/dL 183* 134* 300*           ASSESSMENT and PLAN    Glioma (HCC)    High-grade glioma-3.2 cm ring-enhancing right supratentorial mass-right thalamic-with mass-effect and left midline shift  Status post stereotactic brain biopsy on November 28, 2022     Left side weakness/incoordination/impaired mobility     Radiation therapy/Temodar to start December 12, 2022  Decadron 4 mg every 8 hourly     Leukocytosis-steroid/stress response.  Incision healing.      Elevated LFTs  Dec 8: Labs significant for ALT 1049 (143 11/28),  (71 11/28), Alk phos 190 (96 11/28). Taking minimal tylenol and statin is a home med. Consulted IM who also consulted pharmacy and GI. They also decreased valtrex to 1g daily (she was taking daily prophylactic valtrex 2g bid). Awaiting liver ultrasound. Added CPK to labs for the am.      Diabetes mellitus-Trulicity at home.  On Lantus/Humalog     Seizure prophylaxis-Keppra     DVT prophylaxis-Lovenox/SCDs     GI prophylaxis-protein pump inhibitor  Add calcium and vitamin D with ongoing steroids     Chronic Valtrex     Germline BRCA2 and GEORGES mutations.  • Patient has history of Papillary thyroid cancer, Bilateral DCIS, Left clear cell renal cell carcinoma, Left lower lobe NSC Lung cancer (adenocarcinoma with lipidic growth pattern), Right lower lobe NSC lung (adenocarcinoma with lipidic  growth pattern) and Melanoma of right heel.     Depression-Lexapro     Hypothyroidism-on replacement     Hypertension lisinopril    Patient seen and evaluated with attending physician Dr. Araujo, please see attestation.     Gina Florence DO   Physical Medicine and Rehabilitation   PGY-4     During rounds, used appropriate personal protective equipment including mask and gloves.  Additional gown if indicated.  Mask used was standard procedure mask. Appropriate PPE was worn during the entire visit.  Hand hygiene was completed before and after.

## 2022-12-08 NOTE — CONSULTS
Nutrition Services    Patient Name:  Christie Avendaño  YOB: 1965  MRN: [id number]  Admit Date:  12/7/2022        CLINICAL NUTRITION ASSESSMENT      Reason for Assessment Acute Rehab Admission     Admitting Diagnosis Glioma  Hx Carcinoid tumor of left lung, thyroid cancer, breast cancer (BRCA2 positive)     Medical/Surgical History   Past Medical History:   Diagnosis Date   • Arthritis    • Asthma    • BRCA2 positive    • Diabetes mellitus (HCC)    • H/O Liver masses 2017    x3   • H/O Lung masses 2017    1 in right lower lobe and 1 in the left infrahilar location   • H/O Ovarian cyst    • H/O Right adrenal mass (CMS/HCC) 2017   • Lung cancer (HCC)    • Melanoma (HCC)     right foot   • PONV (postoperative nausea and vomiting)    • Thyroid disease        Past Surgical History:   Procedure Laterality Date   • APPENDECTOMY     • BRAIN BIOPSY Right 11/28/2022    Procedure: Right frontal stereotactic needle brain biopsy;  Surgeon: Manuel Thompson MD;  Location: [facility];  Service: Neurosurgery;  Laterality: Right;   • BREAST IMPLANT SURGERY Bilateral    • CHOLECYSTECTOMY     • HYSTERECTOMY     • MASTECTOMY Bilateral    • OVARIAN CYST SURGERY     • THORACOSCOPY VIDEO ASSISTED WITH LOBECTOMY Right 10/9/2020    Procedure: BRONCHOSCOPY, THORACOSCOPY VIDEO ASSISTED WITH ANTERIOR BASAL SEGMENTECTOMY, INTERCOSTAL NERVE BLOCK;  Surgeon: Flaca Crisostomo MD;  Location: [facility];  Service: Thoracic;  Laterality: Right;   • TONSILLECTOMY          Encounter Information        Nutrition History:  Pt transfers from our acute side following new diagnosis of high-grade glioma. To begin XRT on 12/12/22. Elevated LFTs noted, internal med following. Going for liver u/s today (lunch tray held). Noted to recently have had Covid-19 infection. Pt is also diabetic, on steroids(decadron).Takes Trulicity at home, currently on insulin. We discussed that the steroids may make her glu higher than normal. Admitted  "for therapy for functional impairments. CARLOSOS 2 weeks.    Food Preferences:    Supplements:    Factors Affecting Intake: pain issues     Current Nutrition Orders & Evaluation of Intake       Oral Nutrition     Food Allergies other: peaches   Current PO Diet Diet: Regular/House Diet, Diabetic Diets, Vegetarian; Lacto-Ovo Vegetarian (Allows dairy, eggs); Consistent Carbohydrate; Texture: Regular Texture (IDDSI 7); Fluid Consistency: Thin (IDDSI 0)   Supplement    PO Evaluation     % PO Intake 50-75%    # of Days Evaluated 1 day/3 meals   --  Anthropometrics        Current Height  Current Weight  BMI kg/m2 Height: 165.1 cm (65\")  Weight: 110 kg (241 lb 6.5 oz) (12/07/22 1642)  Body mass index is 40.17 kg/m².   Adj BMI (if applicable)        Ideal Body Weight (IBW) 125 lb   Adj IBW (if applicable)        Usual Body Weight (UBW) 240-250 lb range   Weight Change/Trend Stable       Weight History Wt Readings from Last 15 Encounters:   12/07/22 1642 110 kg (241 lb 6.5 oz)   11/28/22 0636 114 kg (251 lb 15.8 oz)   11/22/22 2336 114 kg (251 lb 15.8 oz)   11/22/22 1949 109 kg (240 lb)   11/15/22 1438 110 kg (243 lb)   08/24/22 1251 110 kg (243 lb 8 oz)   08/17/22 1049 109 kg (241 lb)   05/11/22 1152 109 kg (240 lb 6.4 oz)   04/13/22 1323 110 kg (242 lb)   03/01/22 1047 107 kg (235 lb 3.2 oz)   12/08/21 1121 107 kg (236 lb)   09/14/21 0857 113 kg (248 lb 1.6 oz)   08/03/21 0941 111 kg (245 lb)   04/28/21 1103 115 kg (254 lb)   03/15/21 1001 114 kg (252 lb)   03/12/21 1420 113 kg (250 lb 1.6 oz)   02/18/21 1112 114 kg (251 lb)      --  Physical Findings          Physical Appearance alert, obese, oriented, room air   Oral/Mouth Cavity WNL, other: left facial droop   Edema  no edema   Gastrointestinal last bowel movement:12/7   Skin  surgical incision scalp/right side clean, dry, intact   Tubes/Drains none     Tests/Procedures/Labs        Tests/Procedures Other: s/p right frontal stereotactic needle brain biopsy on 11/28       " Pertinent Labs     Results from last 7 days   Lab Units 12/08/22  0616 12/07/22  0617 12/02/22  0913   SODIUM mmol/L 133* 134* 136   POTASSIUM mmol/L 4.5 4.3 4.3   CHLORIDE mmol/L 94* 97* 98   CO2 mmol/L 24.5 28.0 25.5   BUN mg/dL 21* 20 17   CREATININE mg/dL 0.73 0.57 0.83   CALCIUM mg/dL 8.9 8.7 8.6   BILIRUBIN mg/dL 0.6  --   --    ALK PHOS U/L 190*  --   --    ALT (SGPT) U/L 1,049*  --   --    AST (SGOT) U/L 386*  --   --    GLUCOSE mg/dL 183* 134* 300*     Results from last 7 days   Lab Units 12/08/22  0616 12/07/22  0617   MAGNESIUM mg/dL  --  2.5   HEMOGLOBIN g/dL 13.0 12.8   HEMATOCRIT % 39.4 37.9   WBC 10*3/mm3 15.27* 16.17*   ALBUMIN g/dL 3.70  --      Results from last 7 days   Lab Units 12/08/22  0616 12/07/22  0617 12/02/22  0913 12/01/22  1133   PLATELETS 10*3/mm3 238 261 253 263     COVID19   Date Value Ref Range Status   11/22/2022 Not Detected Not Detected - Ref. Range Final     Lab Results   Component Value Date    HGBA1C 6.70 (H) 11/24/2022        Medications           Scheduled Medications calcium 500 mg vitamin D 5 mcg (200 UT), 1 tablet, Oral, BID  dexamethasone, 4 mg, Oral, Q8H  enoxaparin, 40 mg, Subcutaneous, Q12H  escitalopram, 10 mg, Oral, Daily  insulin glargine, 25 Units, Subcutaneous, QAM  insulin lispro, 0-24 Units, Subcutaneous, 4x Daily With Meals & Nightly  insulin lispro, 8 Units, Subcutaneous, TID With Meals  latanoprost, 1 drop, Both Eyes, Nightly  levETIRAcetam, 500 mg, Oral, BID  levothyroxine, 150 mcg, Oral, Q AM  lisinopril, 20 mg, Oral, Daily  pantoprazole, 40 mg, Oral, Q AM  polyethylene glycol, 17 g, Oral, Daily  valACYclovir, 2,000 mg, Oral, BID       Infusions     PRN Medications •  calcium carbonate  •  dextrose  •  dextrose  •  famotidine  •  glucagon (human recombinant)  •  influenza vaccine  •  nitroglycerin  •  ondansetron **OR** ondansetron  •  senna-docusate sodium        Nutrition Diagnosis        Nutrition Dx Problem  Problem: Altered Nutrition Related to  Labs  Etiology: Medical Diagnosis and MNT for Treatment/Condition - DM2, on steroids for brain glio  Signs/Symptoms: Biochemical    Comment:      NUTRITION INTERVENTION / PLAN OF CARE  Goals        Intervention Goal(s) Improved nutrition related labs, Disease management/therapy, Tolerate PO  and PO intake goal %: 75     Nutrition Intervention        RD Action Advise alternative selection, Interview for preferences, Menu provided, Follow Tx Progress and Care plan reviewed     Prescription        Diet Prescription (doubled checked with food services that peach allergy listed)   Supplement Prescription    Snack Prescription    EN Prescription    New Prescription Ordered? No changes at this time     Monitor/Evaluation        Monitor Per protocol   Discharge Needs No discharge needs identified at this time, Pending clinical course   Education Will instruct as appropriate       RD to follow up per protocol.    Electronically signed by:  Oralia Marie RD  12/08/22 08:45 EST

## 2022-12-08 NOTE — PAYOR COMM NOTE
"Heron Avendaño (57 y.o. Female)     PLEASE SEE ATTACHED FOR DC NOTICE    REF #  [id number]    THANK YOU  HERON DE LA TORRE RN/ DEPT  [facility]   [phone]  [phone]    Date of Birth   1965    Social Security Number       [place]   [address] [place] [address]    Home Phone   [phone]    MRN   [id number]       Orthodoxy   Sabianist    Marital Status                               Admission Date   11/22/22    Admission Type   Emergency    Admitting Provider       Attending Provider       Department, Room/Bed   [facility] [address] [place], P599/1       Discharge Date   12/7/2022    Discharge Disposition   Rehab Facility or Unit ([facility])    Discharge Destination                               Attending Provider: (none)   Allergies: Morphine, Peach [Prunus Persica], Penicillins, Metformin    Isolation: None   Infection: COVID (History) (11/26/22)   Code Status: CPR    Ht: 165.1 cm (65\")   Wt: 114 kg (251 lb 15.8 oz)    Admission Cmt: None   Principal Problem: High grade glioma  [C71.9]                 Active Insurance as of 11/22/2022     Primary Coverage     Payor Plan Insurance Group Employer/Plan Group    ANTHEM BLUE CROSS ANTHEM BLUE CROSS BLUE SHIELD PPO [id number]     Payor Plan Address Payor Plan Phone Number Payor Plan Fax Number Effective Dates    [address] [phone]  3/13/2016 - None Entered    [facility] [id number]       Subscriber Name Subscriber Birth Date Member ID       KYLER AVENDAÑO 11/16/1964 [id number]           Secondary Coverage     Payor Plan Insurance Group Employer/Plan Group    MEDICARE MEDICARE A & B      Payor Plan Address Payor Plan Phone Number Payor Plan Fax Number Effective Dates    [address] [phone]  7/1/2019 - None Entered    [facility] [address]       Subscriber Name Subscriber Birth Date Member ID       HERON AVENDAÑO 1965 [id number]                 Emergency Contacts     Contact " Person (Rel.) Home Phone Work Phone Mobile Phone    Jayesh Avendaño (Spouse) [phone] -- [phone]    Dina Lutz (Relative) [phone] -- [phone]            Parminder: DENNIS [id number]  Tax ID [id number]     Discharge Summary      Mathew Dumont MD at 22 1507              Patient Name: Christie Avendaño  : 1965  MRN: [id number]    Date of Admission: 2022  Date of Discharge:  2022  Primary Care Physician: Tiffany Holloway MD      Chief Complaint:   Abnormal Imaging and Exposure To Known Illness      Discharge Diagnoses     Active Hospital Problems    Diagnosis  POA   • **High grade glioma  [C71.9]  Yes   • 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        Hospital Course     Very pleasant 57 y.o. woman admitted with new brain tumor seen on outpatient MRI. She is s/p right frontal stereotactic needle brain biopsy on . Pathology consistent with high-grade glioma. Please see below for details of admission:     Continue current supportive care and therapies. IV Keppra and dexamethasone now changed to PO. Heme/Onc following.      Final pathology from [facility] pending though high-grade glioma initially identified here. Tumor not amenable to resection. Heme/Onc has mentioned starting chemoradiation today or tomorrow.     Leukocytosis most likely due to high-dose steroids. No fever. Continue to monitor closely for s/sx of  infection.     Blood sugars have been elevated due to steroids and insulin has been increased. A1c 6.7 on admission. Blood sugars acceptable again today on current regimen of AM long-acting insulin, mealtime short-acting, and high-dose SSI.     TFTs are fine here, continue thyroid replacement        • SCDs for DVT prophylaxis.  • Full code.  • Discussed with patient and CCP.  • Discharge to [facility] this afternoon  • [facility] recommended f/u on 12/12--they could see here rather than outpt office (Manohar CLAROS)      Day of Discharge     Subjective:  Feeling fine again this AM. Slept well. Tolerating diet. Voiding well. Working well with therapies. No HA or vis changes today.     Review of Systems  No N/V/D/abd pain/F/C/NS/SOA/CP/palp/LUTS    Physical Exam:  Temp:  [97.9 °F (36.6 °C)-98.6 °F (37 °C)] 97.9 °F (36.6 °C)  Heart Rate:  [63-76] 75  Resp:  [18] 18  BP: (110-159)/(63-90) 110/63  Body mass index is 41.93 kg/m².  Physical Exam  Vitals and nursing note reviewed.   Constitutional:       General: She is not in acute distress.     Appearance: She is not ill-appearing, toxic-appearing or diaphoretic.   HENT:      Head: Normocephalic.      Comments: Surg site right forehead healing well, staples in place     Nose: Nose normal.      Mouth/Throat:      Mouth: Mucous membranes are moist.      Pharynx: Oropharynx is clear.   Eyes:      General: No scleral icterus.        Right eye: No discharge.         Left eye: No discharge.      Extraocular Movements: Extraocular movements intact.      Conjunctiva/sclera: Conjunctivae normal.   Cardiovascular:      Rate and Rhythm: Normal rate and regular rhythm.      Pulses: Normal pulses.   Pulmonary:      Effort: Pulmonary effort is normal. No respiratory distress.      Breath sounds: Normal breath sounds. No wheezing or rales.      Comments: Anteriorly   Abdominal:      General: Bowel sounds are normal. There is no distension.      Palpations: Abdomen is soft.       Tenderness: There is no abdominal tenderness.   Musculoskeletal:         General: No swelling or deformity. Normal range of motion.      Cervical back: Neck supple. No rigidity.      Comments: SCDs in place BLEs   Lymphadenopathy:      Cervical: No cervical adenopathy.   Skin:     General: Skin is warm and dry.      Capillary Refill: Capillary refill takes less than 2 seconds.      Coloration: Skin is not jaundiced.      Findings: No rash.   Neurological:      Mental Status: She is alert and oriented to person, place, and time. Mental status is at baseline.      Cranial Nerves: No cranial nerve deficit.      Coordination: Coordination normal.      Comments: Strength in BUEs is almost symmetric this AM, maybe a little weaker on left   Psychiatric:         Mood and Affect: Mood normal.         Behavior: Behavior normal.         Thought Content: Thought content normal.         Consultants     Consult Orders (all) (From admission, onward)     Start     Ordered    12/01/22 1518  Inpatient Rehab Admission Consult  Once        Provider:  (Not yet assigned)    12/01/22 1518    11/28/22 1211  Inpatient Intensivist Consult  Once        Provider:  Lamine Juan MD    11/28/22 1210    11/23/22 1137  Inpatient Radiation Oncology Consult  Once        Specialty:  Radiation Oncology  Provider:  Mathew Sam MD    11/23/22 1137    11/23/22 0702  Hematology & Oncology Inpatient Consult  IN AM        Specialty:  Hematology and Oncology  Provider:  Mathew Collins Jr., MD    11/22/22 2319 11/22/22 2112  [facility] (on-call MD unless specified) Details  Once,   Status:  Canceled        Specialty:  Hospitalist  Provider:  (Not yet assigned)    11/22/22 2111 11/22/22 2052  Hematology and Oncology (on-call MD unless specified)  Once        Specialty:  Hematology and Oncology  Provider:  (Not yet assigned)    11/22/22 2051 11/22/22 2048  Neurosurgery (on-call MD unless specified)  Once        Specialty:  Neurosurgery  Provider:  (Not yet  assigned)    11/22/22 2047    Signed and Held  Hematology and Oncology (on-call MD unless specified)  Once        Specialty:  Hematology and Oncology  Provider:  (Not yet assigned)    Signed and Held    Signed and Held  Inpatient Rehab Admission Consult  Once        Provider:  (Not yet assigned)    Signed and Held              Procedures     Right frontal stereotactic needle brain biopsy      Imaging Results (All)     Procedure Component Value Units Date/Time    CT Head Without Contrast [[id number]] Collected: 11/29/22 0431     Updated: 11/29/22 0442    Narrative:      CT HEAD WITHOUT CONTRAST     HISTORY: Brain mass     COMPARISON: 11/27/2022     TECHNIQUE: Axial CT imaging was obtained through the brain. No IV  contrast was administered.     FINDINGS:  The patient is status post right frontal tyrone hole with biopsy. There is  some decreased attenuation which is seen along the anterior margin of  the mass, which likely represents the biopsy site. Trace amount of  hemorrhage is noted within this area, although no large intraparenchymal  hematoma is seen. Mass itself is stable in size. Tiny focus of  pneumocephalus is seen adjacent to the tract of the tyrone hole. The mass  itself is resulting in mass effect on the right lateral ventricle, as  well as midline shift to the left of 9 mm. This is stable when compared  to the preoperative study.       Impression:      Postoperative findings, as noted above.     Radiation dose reduction techniques were utilized, including automated  exposure control and exposure modulation based on body size.     This report was finalized on 11/29/2022 4:39 AM by Dr. Melissa Hickey M.D.       CT Head Without Contrast [[id number]] Collected: 11/27/22 0917     Updated: 11/27/22 0929    Narrative:      HEAD CT WITHOUT CONTRAST     HISTORY: Brain mass, preop for biopsy     TECHNIQUE: Radiation dose reduction techniques were utilized, including  automated exposure control and exposure  modulation based on body size.  Axial images were obtained from the skull base to vertex without IV  contrast. Stereotactic protocol     COMPARISON: [facility] brain from [facility] 11/22/2022     FINDINGS:  Redemonstrated is a mass centered in the right basal ganglia measuring  about 3.7 x 3.0 cm in greatest axial dimension. Overall it is not  appreciably changed from the recent outside comparison study. Mass  effect upon the adjacent right lateral ventricle and third ventricle is  not appreciably changed. Stable right to left midline shift measuring  about 5 mm. Stable size of the lateral ventricles. No evidence for  intracranial hemorrhage. Orbits and paranasal sinuses appear  unremarkable. No acute bony abnormality       Impression:      Stable mass centered in the right basal ganglia.     Radiation dose reduction techniques were utilized, including automated  exposure control and exposure modulation based on body size.     This report was finalized on 11/27/2022 9:26 AM by Dr. Stef Pettit M.D.       CT Chest With Contrast Diagnostic [[id number]] Collected: 11/25/22 1220     Updated: 11/25/22 1248    Narrative:      CT CHEST, ABDOMEN, AND PELVIS WITH IV CONTRAST     HISTORY: Multiple cancers, new brain mass     TECHNIQUE: Radiation dose reduction techniques were utilized, including  automated exposure control and exposure modulation based on body size.   3 mm images were obtained through the chest, abdomen, and pelvis with IV  contrast.      COMPARISON: Multiple CT chest, abdomen and pelvis dating back to  01/07/2021 and PET/CT 08/12/2020     FINDINGS:      Chest:      No mediastinal, hilar or axillary adenopathy is present by size  criteria. Bilateral breast implants are present. There is no significant  pericardial effusion. No new pulmonary consolidation, pleural effusion  or pneumothorax. Postsurgical changes from left lobectomy are present.     Postsurgical changes from right lower lobe wedge resection are  present..  Focal nodular opacification within the right lower lobe along the chain  sutures measures approximately 1.9 x 1.5 cm, grossly unchanged since  03/08/2021. A few scattered subcentimeter pulmonary nodules are present,  as before.     Abdomen and pelvis:      There is colonic diverticulosis. The appendix is surgically absent. The  bladder is decompressed and cannot be evaluated. No findings of small  bowel obstruction are present. The uterus is surgically absent. Cystic  density lesion within the left hepatic lobe is present, as seen since  08/12/2020. 4.3 x 3.5 cm within the left hepatic lobe is also grossly  unchanged since 01/07/2021. Gallbladder is surgically absent. The  pancreas and spleen have an unremarkable postcontrast CT appearance.     A 1.7 cm right adrenal lesion is grossly unchanged since 08/12/2020.     Subcentimeter renal lesions are too small to characterize. There appear  to be postsurgical changes from left partial nephrectomy. No  hydronephrosis is present. No abdominopelvic adenopathy by size  criteria. No free intraperitoneal air is seen.     Bone windows: Focal osteolysis within the right aspect of the C7  vertebral body is present, as seen over multiple prior CTs. Subtle focal  osteolysis within the left ilium is present, as seen since 01/07/2021.       Impression:      Impression:  1.  No definite findings of new metastatic disease or new malignant  recurrence within the chest, abdomen or pelvis.  2.  Nodular opacification within the right lower lobe along the right  lower lobe wedge resection operative bed, right adrenal nodule, left  hepatic lesions and a few small sub-6 mm pulmonary nodules grossly  unchanged over multiple prior CTs.  3.  Other findings as above.     This report was finalized on 11/25/2022 12:45 PM by Dr. Edivn Walters M.D.       CT Abdomen Pelvis With Contrast [[id number]] Collected: 11/25/22 1220     Updated: 11/25/22 1248    Narrative:      CT CHEST, ABDOMEN,  AND PELVIS WITH IV CONTRAST     HISTORY: Multiple cancers, new brain mass     TECHNIQUE: Radiation dose reduction techniques were utilized, including  automated exposure control and exposure modulation based on body size.   3 mm images were obtained through the chest, abdomen, and pelvis with IV  contrast.      COMPARISON: Multiple CT chest, abdomen and pelvis dating back to  01/07/2021 and PET/CT 08/12/2020     FINDINGS:      Chest:      No mediastinal, hilar or axillary adenopathy is present by size  criteria. Bilateral breast implants are present. There is no significant  pericardial effusion. No new pulmonary consolidation, pleural effusion  or pneumothorax. Postsurgical changes from left lobectomy are present.     Postsurgical changes from right lower lobe wedge resection are present..  Focal nodular opacification within the right lower lobe along the chain  sutures measures approximately 1.9 x 1.5 cm, grossly unchanged since  03/08/2021. A few scattered subcentimeter pulmonary nodules are present,  as before.     Abdomen and pelvis:      There is colonic diverticulosis. The appendix is surgically absent. The  bladder is decompressed and cannot be evaluated. No findings of small  bowel obstruction are present. The uterus is surgically absent. Cystic  density lesion within the left hepatic lobe is present, as seen since  08/12/2020. 4.3 x 3.5 cm within the left hepatic lobe is also grossly  unchanged since 01/07/2021. Gallbladder is surgically absent. The  pancreas and spleen have an unremarkable postcontrast CT appearance.     A 1.7 cm right adrenal lesion is grossly unchanged since 08/12/2020.     Subcentimeter renal lesions are too small to characterize. There appear  to be postsurgical changes from left partial nephrectomy. No  hydronephrosis is present. No abdominopelvic adenopathy by size  criteria. No free intraperitoneal air is seen.     Bone windows: Focal osteolysis within the right aspect of the  C7  vertebral body is present, as seen over multiple prior CTs. Subtle focal  osteolysis within the left ilium is present, as seen since 01/07/2021.       Impression:      Impression:  1.  No definite findings of new metastatic disease or new malignant  recurrence within the chest, abdomen or pelvis.  2.  Nodular opacification within the right lower lobe along the right  lower lobe wedge resection operative bed, right adrenal nodule, left  hepatic lesions and a few small sub-6 mm pulmonary nodules grossly  unchanged over multiple prior CTs.  3.  Other findings as above.     This report was finalized on 11/25/2022 12:45 PM by Dr. Edvin Walters M.D.       XR Chest 1 View [[id number]] Collected: 11/22/22 2110     Updated: 11/22/22 2306    Narrative:      PORTABLE CHEST     HISTORY: Shortness of breath.     COMPARISON: Chest x-ray 10/26/2020.     FINDINGS: A single portable view of the chest demonstrates the heart to  be within normal limits in size. Calcified nodes are identified in the  right hilar and infrahilar region. There is an area of increased density  at the right lung base which corresponds to an area of nodularity noted  on the CT examination 08/17/2022. Increased density is noted in the  retrocardiac region suggesting left lower lobe atelectasis/infiltrate  which is new versus 08/17/2022. There is no evidence of effusion or of  congestive failure.     This report was finalized on 11/22/2022 11:03 PM by Dr. Trevin Gaming M.D.           Results for orders placed during the hospital encounter of 09/01/22    Duplex Carotid Ultrasound CAR    Interpretation Summary  · Proximal right internal carotid artery is normal.  · Proximal left internal carotid artery is normal.      Pertinent Labs     Results from last 7 days   Lab Units 12/07/22  0617 12/02/22  0913 12/01/22  1133   WBC 10*3/mm3 16.17* 13.91* 14.32*   HEMOGLOBIN g/dL 12.8 12.3 11.9*   PLATELETS 10*3/mm3 261 253 263     Results from last 7 days   Lab  Units 12/07/22  0617 12/02/22  0913 12/01/22  1133   SODIUM mmol/L 134* 136 131*   POTASSIUM mmol/L 4.3 4.3 4.3   CHLORIDE mmol/L 97* 98 97*   CO2 mmol/L 28.0 25.5 23.0   BUN mg/dL 20 17 21*   CREATININE mg/dL 0.57 0.83 0.77   GLUCOSE mg/dL 134* 300* 288*   EGFR mL/min/1.73 105.5 81.8 89.5       Results from last 7 days   Lab Units 12/07/22  0617 12/02/22  0913 12/01/22  1133   CALCIUM mg/dL 8.7 8.6 8.4*   MAGNESIUM mg/dL 2.5  --   --                Invalid input(s): LDLCALC          Test Results Pending at Discharge       Discharge Details        Discharge Medications      Continue These Medications      Instructions Start Date   Accu-Chek Guide Me w/Device kit   1 Device, Does not apply, Daily         ASK your doctor about these medications      Instructions Start Date   Accu-Chek Guide test strip  Generic drug: glucose blood   Use 1-2 daily to check blood sugars Dx diabetes E11.9      glucose blood test strip   Use as instructed      atorvastatin 40 MG tablet  Commonly known as: LIPITOR   40 mg, Oral, Nightly      bimatoprost 0.01 % ophthalmic drops  Commonly known as: LUMIGAN   1 drop, Both Eyes, Nightly      escitalopram 10 MG tablet  Commonly known as: LEXAPRO   10 mg, Oral, Daily      levothyroxine 150 MCG tablet  Commonly known as: SYNTHROID, LEVOTHROID   150 mcg, Oral, Daily      lisinopril 20 MG tablet  Commonly known as: PRINIVIL,ZESTRIL   TAKE 1 TABLET BY MOUTH  DAILY      temozolomide 5 MG chemo capsule  Commonly known as: TEMODAR   5 mg, Oral, Daily      temozolomide 20 MG chemo capsule  Commonly known as: TEMODAR   20 mg, Oral, Daily      temozolomide 140 MG chemo capsule  Commonly known as: TEMODAR   140 mg, Oral, Daily      Trulicity 3 MG/0.5ML solution pen-injector  Generic drug: Dulaglutide   INJECT THE CONTENTS OF ONE  PEN SUBCUTANEOUSLY WEEKLY  AS DIRECTED      valACYclovir 1000 MG tablet  Commonly known as: VALTREX   TAKE 2 TABLETS BY MOUTH  TWICE DAILY             Allergies   Allergen  Reactions   • Morphine Anaphylaxis     Hives and throat swelling   • Peach [Prunus Persica] Anaphylaxis   • Penicillins Anaphylaxis   • Metformin Diarrhea       Discharge Disposition:  Rehab Facility or Unit ([facility])      Discharge Diet:  Diet Order   Procedures   • Diet: Regular/House Diet, Diabetic Diets, Vegetarian; Lacto-Ovo Vegetarian (Allows dairy, eggs); Consistent Carbohydrate; Texture: Regular Texture (IDDSI 7); Fluid Consistency: Thin (IDDSI 0)       Discharge Activity:   Per Dr. Araujo    CODE STATUS:    Code Status and Medical Interventions:   Ordered at: 11/22/22 8344     Code Status (Patient has no pulse and is not breathing):    CPR (Attempt to Resuscitate)     Medical Interventions (Patient has pulse or is breathing):    Full Support       Future Appointments   Date Time Provider Department Center   12/12/2022  8:30 AM Ariadna Villela APRN [facility]   1/11/2023 11:30 AM Honey Nesbitt APRN [facility] PSY ONC None   2/13/2023  9:15 AM [facility]   2/15/2023  9:00 AM LAUREL CT 2 BH [facility] CT [facility]   2/20/2023  2:15 PM Tiffany Holloway MD [facility]   2/22/2023 12:30 PM LAB CHAIR 1 [facility]  LAB [facility]   2/22/2023  1:00 PM Mathew Collins Jr., MD [facility] CBC [facility]   5/31/2023  9:15 AM Hali Rolle MD [facility] PIWH [facility]      Contact information for follow-up providers     Tiffany Holloway MD .    Specialty: Internal Medicine  Contact information:  [address]  [facility] [address]  [facility] [address]  [phone]                   Contact information for after-discharge care     Destination     [facility] .    Service: Inpatient Rehabilitation  Contact information:  [address] [facility] [address]  [phone]                             Time Spent on Discharge:  Greater than 30 minutes      Mathew Dumont MD  [facility]  12/07/22  15:08  EST                Electronically signed by Mathew Dumont MD at 12/07/22 8779

## 2022-12-08 NOTE — PROGRESS NOTES
Inpatient Rehabilitation Functional Measures Assessment and Plan of Care    Plan of Care  Updated Problems/Interventions      Functional Measures  DANDRE Eating:  Branch  DANDRE Grooming: Branch  DANDRE Bathing:  Branch  DANDRE Upper Body Dressing:  Branch  DANDRE Lower Body Dressing:  Branch  [facility] Toileting:  Branch    DANDRE Bladder Management  Level of Assistance:  Branch  Frequency/Number of Accidents this Shift:  Friendship    DANDRE Bowel Management  Level of Assistance: Branch  Frequency/Number of Accidents this Shift: [facility] Bed/Chair/Wheelchair Transfer:  [facility] Toilet Transfer:  [facility] Tub/Shower Transfer:  Branch    Previously Documented Mode of Locomotion at Discharge:  [facility] Expected Mode of Locomotion at Discharge: [facility] Walk/Wheelchair:  [facility] Stairs:  Branch    DANDRE Comprehension:  Branch  DANDRE Expression:  Branch  [facility] Social Interaction:  Branch  [facility] Problem Solving:  [facility] Memory:  Friendship    Therapy Mode Minutes  Occupational Therapy: Branch  Physical Therapy: Branch  Speech Language Pathology:  Branch    Signed by: Portia Cifuentes, Therapist

## 2022-12-08 NOTE — PROGRESS NOTES
Recreational Therapy Note    Patient Name: Christie Avendaño   MRN: [id number]    Therapeutic Recreation Eval and Treat (last 12 hours)     Therapeutic Recreation Eval & Treat     Row Name 12/08/22 1406       Lifestyle/Recreational History/Interest    Current Living Situation with family  -    Row Name 12/08/22 1406       Recreational History and Interests    How Important is Recreation to You? high importance  -    Satisfied With Leisure Lifestyle? yes  -SS    Current Hobbies/Interests arts/crafts;cooking/baking;games;family functions  -    Art/Crafts sewing;other (see comments)  crocheting  -    Row Name 12/08/22 1406       Coping/Wellbeing    Current State of Wellbeing calm  -    Factors Impacting Current State of Wellbeing no significant issues  -    Row Name 12/08/22 1406       Therapeutic Recreation Participation    Orientation to Therapeutic Recreation patient  -    Row Name 12/08/22 1406       Therapeutic Recreation Assessment/Plan    Recreation Therapy Goals/Objectives functional leisure skills;strength and endurance  -    Recreation Plan structured leisure participation  -          User Key  (r) = Recorded By, (t) = Taken By, (c) = Cosigned By    Initials Name Provider Type    SS Jemma Spencer, CTRS Recreational Therapist                  APULA Siu  12/8/2022

## 2022-12-08 NOTE — PROGRESS NOTES
SECTION GG    Eating Performance: [place] sets up or cleans up; patient completes activity.  M[place] assists only prior to or following the activity.    Eating Discharge Goals: Patient completed the activities by themself with no  assistance from a helper.    Section B. Hearing and Vision  Ability to Hear:  Adequate - no difficulty in normal conversation, social  interaction, listening to TV  Ability to See in Adequate Light:  Adequate - sees fine detail, such as regular  print in newspapers/books    Section B. Health Literacy  Frequency of Needing Assistance Reading:  Never    Section D. Mood  Presence of little interest or pleasure in doing things:   No  Frequency of having little interest or pleasure in doing things:   Never or 1  day  Presence of feeling down, depressed, or hopeless:   No  Frequency of feeling down, depressed, or hopeless:   Never or 1 day   Interview Ended. Above responses do not meet criteria to continue.  Total Severity Score:   0    Section D. Social Isolation  Frequ[place]y of Feeling Lonely or Isolated:  Never    Section J. Health Conditions (Pain Effect on Sleep)  Pain Effect on Sleep:   Does not apply - Patient has not had any pain or hurting  in the past 5 days    Signed by: Donald Fernandez RN

## 2022-12-08 NOTE — PROGRESS NOTES
[facility] Clinical Pharmacy Services: Medication Review for Causes of Elevated LFTs    Pt Name: Christie Avendaño   : 1965    Relevant clinical data and objective history reviewed:    Past Medical History:   Diagnosis Date   • Arthritis    • Asthma    • BRCA2 positive    • Diabetes mellitus (HCC)    • H/O Liver masses 2017    x3   • H/O Lung masses 2017    1 in right lower lobe and 1 in the left infrahilar location   • H/O Ovarian cyst    • H/O Right adrenal mass (CMS/HCC) 2017   • Lung cancer (HCC)    • Melanoma (HCC)     right foot   • PONV (postoperative nausea and vomiting)    • Thyroid disease      LIVER FUNCTION TESTS:      Lab 22  0616   TOTAL PROTEIN 6.2   ALBUMIN 3.70   GLOBULIN 2.5   ALT (SGPT) 1,049*   AST (SGOT) 386*   BILIRUBIN 0.6   ALK PHOS 190*       Assessment of Current Medications    Acetaminophen (prn - Discontinued ) - known to cause mild-moderate, transient elevations of aminotransferase levels. Most common at higher doses for consecutive days or non-pharmacologic doses. Patient has not had a dose since , was typically taking once or twice daily    Atorvastatin (Discontinued ) - Associated with mild, asymptomatic and usually transient serum aminotransferase elevations in 1% to 3% of patients but levels above 3 times ULN in less than 1%. Most cases occur with 6 months of starting therapy or following dose increase.    Dexamethasone - Hepatic complications of corticosteroids are mostly associated with high intravenous dosing (0.4-0.8mg/kg/day) usually represent the worsening or triggering of an underlying liver disease and rarely are the result of drug hepatotoxicity. No h/o hepatitis or risk for recurrence. Current dosing 0.1mg/kg/day.    Levothyroxine - little to no information regarding elevated LFTs while on thyroid replacement therapy when conventional doses used. However, serum enzyme elevations have been reported If patient is not in euthyroid state. TSH, T4 on  11/22 wnl    Levetiracetam - started on 11/29  - Rare instances of serum enzyme elevations and occasional cases of clinically apparent liver injury have been reported typically occurring 1 week to 5 months after starting therapy. Pattern of injury generally hepatocellular    Ondansetron (prn, no doses given) - no doses received by patient recently, and unlikely to cause liver injury    Valacyclovir - Associated with a low rate of mild-to-moderate serum aminotransferase elevations, but these abnormalities are usually asymptomatic and self-limited even with continuation of therapy.    Famotidine, Lisinopril, Pantoprazole, Escitalopram - incidence of elevated aminotransferase levels no higher than placebo in clinical trials    Summary    Most likely medications contributing to transaminitis would be acetaminophen, atorvastatin, and high/chronic dose of valacyclovir. Additional medications (levetiracetam, dexamethasone) may also be contributing but should only be further examined if LFTs do not trend down after adjustment to valacyclovir dose and discontinuation of atorvastatin and apap. Thyroid panel on 11/22 wnl but could benefit from reassessment if no other causes are apparent.     Thank you for this consult, please reach out with any further questions.    Rojas Avendaño, PharmD  Clinical Pharmacist

## 2022-12-08 NOTE — PROGRESS NOTES
Inpatient Rehabilitation Plan of Care Note    Plan of Care  Updated Problems/Interventions  Field    Signed by: Anita Serrano, PT

## 2022-12-08 NOTE — PROGRESS NOTES
Discharge Planning Assessment  [facility]     Patient Name: Christie Avendaño  MRN: [id number]  Today's Date: 12/8/2022    Admit Date: 12/7/2022    Plan: Patient will d/c home with . Aunt can stay at night when  at work. Will arrange follow up therapy as recommended.   Discharge Needs Assessment    No documentation.                Discharge Plan     Row Name 12/08/22 4215       Plan    Plan Patient will d/c home with . Aunt can stay at night when  at work. Will arrange follow up therapy as recommended.    Patient/Family in Agreement with Plan yes    Plan Comments Completed assessment with patient and , by phone. Patient lives with  in one story home with one step to enter. Has grab bar by step. Patient was independent with mobility and self care prior to hospitalization. Patient stated  would help her step over into tub for shower but she could bathe and dress self. Patient stated she also did household chores and was driving. She has been on disability and stopped working in 2017 due to cancer. Patient stated she has strong james and has good family and friend support. They have 2 daughters who live in [place] and [place], [place]. D/C plan is home with .  works night shift at [employer]. Patient and  stated patient's aunt lives close and can stay with her at night while he is at work. Discussed SW role, team and family conference. Will assist with plans.              Continued Care and Services - Admitted Since 12/7/2022    Coordination has not been started for this encounter.     Selected Continued Care - Episodes Includes continued care and service providers with selected services from the active episodes listed below    Oncology Episode start date: 12/2/2022   There are no active outsourced providers for this episode.             Selected Continued Care - Prior Encounters Includes continued care and service providers with selected  services from prior encounters from 9/8/2022 to 12/8/2022    Discharged on 12/7/2022 Admission date: 11/22/2022 - Discharge disposition: Rehab Facility or Unit ([facility])    Destination     Service Provider Selected Services Address Phone Fax Patient Preferred    [facility] Inpatient Rehabilitation [address] [place] [id number] [phone] -- --                       Demographic Summary    No documentation.                Functional Status     Row Name 12/08/22 1715       Functional Status    Usual Activity Tolerance good    Current Activity Tolerance fair    Functional Status Comments Patient was independent at home with mobility prior to hospitalization.       Functional Status, IADL    Medications independent    Meal Preparation independent    Housekeeping independent    Laundry independent    Shopping independent    IADL Comments Patient was driving and doing all household chores prior to hospitalization.       Mental Status    General Appearance WDL WDL       Mental Status Summary    Recent Changes in Mental Status/Cognitive Functioning thought patterns    Mental Status Comments Patient feels she has more trouble focusing       Employment/    Employment Status disabled    Employment/ Comments Patient quit working in 2017 due to cancer and now on social security disability.               Psychosocial     Row Name 12/08/22 1717       Values/Beliefs    Values/Beliefs Comment Confucianism       Behavior WDL    Behavior WDL interactions    Interactions cooperative;eye contact appropriate;appropriate to situation       Emotion Mood WDL    Emotion/Mood/Affect WDL emotion mood    Emotion/Mood appropriate to situation       Speech WDL    Speech WDL WDL       Perceptual State WDL    Perceptual State WDL WDL       Thought Process WDL    Thought Process WDL thought process    Thought Process other (see comments)  trouble focusing per patient        Intellectual Performance WDL    Intellectual Performance WDL intellectual performance    Intellectual Performance impaired concentration;able to comprehend       Coping/Stress    Major Change/Loss/Stressor medical condition/diagnosis;loss of independence    Patient Personal Strengths courageous;expressive of emotions;expressive of needs;james/spirituality;strong support system;resilient;positive attitude;motivated;successful coping history    Sources of Support adult child(lisa);[employer]astaf[employer] organization;friend(s);spouse;parent(s);other family members    Techniques to [place] with Loss/Stress/Change diversional activities;medication    Reaction to Health Status adjusting;uncertainty;hopeful;motivated    Understanding of Condition and Treatment needs time to process;partial understanding of medical condition;partial understanding of treatment    Coping/Stress Comments Patient feels she is coping okay and voiced she has good support from family, friends and Rastafari. Has strong james. She stated she has been taking Lexapro since last year after her mom passed away from metstatic breast cancer.       Developmental Stage (Eriksson's)    Developmental Stage Stage 7 (35-65 years/Middle Adulthood) Generativity vs. Stagnation               Abuse/Neglect    No documentation.                Legal     Row Name 12/08/22 6170       Financial/Legal    Source of Income disability;other (see comments)   employed    Who Manages Finances if Patient Unable                Substance Abuse    No documentation.                Patient Forms    No documentation.                   WERNER Patel

## 2022-12-08 NOTE — THERAPY EVALUATION
Inpatient Rehabilitation - Occupational Therapy Initial Evaluation    [facility]     Patient Name: Christie Avendaño  : 1965  MRN: [id number]    Today's Date: 2022                 Admit Date: 2022         ICD-10-CM ICD-9-CM   1. 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)   • Non-small cell lung cancer, right (HCC)   • Renal mass, right   • SIRS (systemic inflammatory response syndrome) (HCC)   • Hyperlipidemia   • Malignant melanoma of right lower extremity including hip (HCC)   • High grade glioma    • 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)   • Glioma (HCC)       Past Medical History:   Diagnosis Date   • Arthritis    • Asthma    • BRCA2 positive    • Diabetes mellitus (HCC)    • H/O Liver masses 2017    x3   • H/O Lung masses 2017    1 in right lower lobe and 1 in the left infrahilar location   • H/O Ovarian cyst    • H/O Right adrenal mass (CMS/HCC) 2017   • Lung cancer (HCC)    • Melanoma (HCC)     right foot   • PONV (postoperative nausea and vomiting)    • Thyroid disease        Past Surgical History:   Procedure Laterality Date   • APPENDECTOMY     • BRAIN BIOPSY Right 2022    Procedure: Right frontal stereotactic needle brain biopsy;  Surgeon: Manuel Thompson MD;  Location: [facility];  Service: Neurosurgery;  Laterality: Right;   • BREAST IMPLANT SURGERY Bilateral    • CHOLECYSTECTOMY     • HYSTERECTOMY     • MASTECTOMY Bilateral    • OVARIAN CYST SURGERY      • THORACOSCOPY VIDEO ASSISTED WITH LOBECTOMY Right 10/9/2020    Procedure: BRONCHOSCOPY, THORACOSCOPY VIDEO ASSISTED WITH ANTERIOR BASAL SEGMENTECTOMY, INTERCOSTAL NERVE BLOCK;  Surgeon: Flaca Crisostomo MD;  Location: [facility];  Service: Thoracic;  Laterality: Right;   • TONSILLECTOMY               IRF OT ASSESSMENT FLOWSHEET (last 12 hours)     IRF OT Evaluation and Treatment     Row Name 12/08/22 1000          OT Time and Intention    Document Type initial evaluation  -CE     Mode of Treatment individual therapy;occupational therapy  -CE     Total Minutes, Occupational Therapy 60  -CE     Patient Effort excellent  -CE     Symptoms Noted During/After Treatment none  -CE     Row Name 12/08/22 1000          General Information    Patient Profile Reviewed yes  -CE     General Observations of Patient pt seated in chair w/ PT prior to session  -CE     Existing Precautions/Restrictions fall  -CE     Row Name 12/08/22 1000          Previous Level of Function/Home Environm    Bathing/Grooming, Premorbid Functional Level independent  -CE     Dressing, Premorbid Functional Level independent  -CE     Eating/Feeding, Premorbid Functional Level independent  -CE     Toileting, Premorbid Functional Level independent  -CE     BADLs, Premorbid Functional Level independent  -CE     IADLs, Premorbid Functional Level independent   assist with some, such as cleaning  -CE     Bed Mobility, Premorbid Functional Level independent  -CE     Transfers, Premorbid Functional Level independent  -CE     Household Ambulation, Premorbid Functional Level independent  -CE     Stairs, Premorbid Functional Level independent  -CE     Community Ambulation, Premorbid Functional Level independent  -CE     Previous Level of Function, Premorbid Per PT, pt owns rollator and has ramp but was not using prior to admission.  -CE     Activity/Exercise/Self-Care Comment Per PT (from spouse) pt is not very physically active at home. Pt does enjoy  brittany.  -CE     Row Name 12/08/22 1000          Living Environment    Current Living Arrangements home  -CE     Home Accessibility wheelchair accessible  -CE     People in Home spouse  -CE     Row Name 12/08/22 1000          Home Main Entrance    Number of Stairs, Main Entrance none  pt has ramp per PT  -CE     Row Name 12/08/22 1000          Home Use of Assistive/Adaptive Equipment    Equipment Currently Used at Home rollator;grab bar;bath bench  -CE     Row Name 12/08/22 1000          Occupational Profile    Patient Goals (Occupational Profile) Patient wanting to get home before Mathias and be able to continue her hobbies (brittany).  -CE     Row Name 12/08/22 1000          Pain Assessment    Pretreatment Pain Rating 0/10 - no pain  -CE     Posttreatment Pain Rating 0/10 - no pain  -CE     Row Name 12/08/22 1000          Cognition/Psychosocial    Affect/Mental Status (Cognition) WFL  -CE     Orientation Status (Cognition) oriented x 4  -CE     Follows Commands (Cognition) increased processing time needed;follows two-step commands  -CE     Personal Safety Interventions gait belt;fall prevention program maintained;nonskid shoes/slippers when out of bed;supervised activity  -CE     Cognitive Function attention deficit;executive function deficit  -CE     Attention Deficit (Cognition) concentration;distractible in noisy environment  -CE     Executive Function Deficit (Cognition) organization/sequencing;problem-solving/reasoning  -CE     Row Name 12/08/22 1000          Range of Motion (ROM)    Range of Motion ROM is WFL  -CE     Row Name 12/08/22 1000          Strength (Manual Muscle Testing)    Strength (Manual Muscle Testing) left upper extremity;right upper extremity  -CE     Comment, Left Hand (Dynamometer Testing) 30lb  -CE     Comment, Right Hand (Dynamometer Testing) 50lb  -CE     Left Hand: Lateral (Key) Pinch Strength (Pinch Dynamometer Testing) 10lb  -CE     Left Hand: Three Point (Brian) Pinch Strength  (Pinch Dynamometer Testing) 8lb  -CE     Right Hand: Lateral (Key) Pinch Strength (Pinch Dynamometer Testing) 12lb  -CE     Right Hand: [place] (Brian) Pinch Strength (Pinch Dynamometer Testing) 13lb  -CE     Additional Documentation Comment, Left Hand (Dynamometer Testing) (Row);Comment, Left Hand (Pinch Dynamometer Testing) (Row);Comment, Right Hand (Dynamometer Testing) (Row);Comment, Right Hand (Pinch Dynamometer Testing) (Row);Left Hand: Lateral (Key) Pinch Strength (Pinch Dynamometer Testing) (Row);Left Hand: [place] (Brian) Pinch Strength (Pinch Dynamometer Testing) (Row);Right Hand: Lateral (Key) Pinch Strength (Pinch Dynamometer Testing) (Row);Right Hand: Three Point (Brian) Pinch Strength (Pinch Dynamometer Testing) (Row)  -CE     Row Name 12/08/22 1000          Strength Comprehensive (MMT)    General Manual Muscle Testing (MMT) Assessment upper extremity strength deficits identified;hand strength deficits identified  -CE     Hand Strength Assessment hand  strength  decreased on L  -CE     Row Name 12/08/22 1000          Upper Extremity (Manual Muscle Testing)    Upper Extremity: Manual Muscle Testing (MMT) left shoulder strength deficit;left wrist strength deficit;left elbow/forearm strength deficit  -CE     Comment, MMT: Upper Extremity R shoulder 4/5, R elbow flexion/extension and wrist flexion/extension 4+/5 and WFL  -CE     Row Name 12/08/22 1000          Left Shoulder (Manual Muscle Testing)    Left Shoulder Manual Muscle Testing (MMT) flexion  -CE     MMT: Flexion, Left Shoulder flexion  -CE     MMT, Gross Movement: Left Shoulder Flexion (3-/5) fair minus  -CE     Row Name 12/08/22 1000          Left Elbow/Forearm (Manual Muscle Testing)    Left Elbow/Forearm Manual Muscle Testing (MMT) flexion;extension  -CE     MMT: Flexion, Left Elbow flexion  -CE     MMT, Gross Movement: Left Elbow Flexion (3+/5) fair plus  -CE     MMT: Extension, Left Elbow extension  -CE     MMT, Gross Movement:  Left Elbow Extension (3+/5) fair plus  -CE     Row Name 12/08/22 1000          Left Wrist (Manual Muscle Testing)    Left Wrist Manual Muscle Testing (MMT) flexion;extension  -CE     MMT, Gross Movement: Left Wrist Flexion (3+/5) fair plus  -CE     MMT, Gross Movement: Left Wrist Extension (3+/5) fair plus  -CE     Row Name 12/08/22 1000          Sensory    Additional Documentation --  pt denies any sensory changes including numbness/tingling  -     Row Name 12/08/22 1000          Vision Assessment/Intervention    Visual Impairment/Limitations corrective lenses for distance;corrective lenses for reading  -     Row Name 12/08/22 1000          Bathing    [place] Level (Bathing) lower body;minimum assist (75% patient effort);upper body;standby assist  -CE     Assistive Device (Bathing) hand held shower spray hose;grab bar/tub rail;tub bench  -CE     Position (Bathing) supported sitting  -CE     Set-up Assistance (Bathing) adjust water temperature;obtain supplies  -     Row Name 12/08/22 1000          Upper Body Dressing    [place] Level (Upper Body Dressing) don;bra/undergarment;pull over garment;moderate assist (50-74% patient effort)  -CE     Position (Upper Body Dressing) supported sitting  -CE     Set-up Assistance (Upper Body Dressing) obtain clothing  -CE     Row Name 12/08/22 1000          Lower Body Dressing    [place] Level (Lower Body Dressing) don;pants/bottoms;shoes/slippers;socks;underwear;moderate assist (50% patient effort)  -CE     Position (Lower Body Dressing) supported sitting  -CE     Set-up Assistance (Lower Body Dressing) obtain clothing  -     Comment (Lower Body Dressing) --  assist with fastening bra and positioning 2/2 weakness and FM impairments on L  -CE     Row Name 12/08/22 1000          Grooming    [place] Level (Grooming) oral care regimen;wash face, hands;set up;supervision  -CE     Position (Grooming) sink side;supported sitting  -CE     Set-up Assistance  (Grooming) open containers;obtain supplies  -CE     Row Name 12/08/22 1000          Transfer Assessment/Treatment    Transfers sit-stand transfer;stand-sit transfer;shower transfer  -     Row Name 12/08/22 1000          Sit-Stand Transfer    Sit-Stand [place] (Transfers) contact guard;1 person assist  -CE     Assistive Device (Sit-Stand Transfers) walker, front-wheeled  -CE     Row Name 12/08/22 1000          Stand-Sit Transfer    Stand-Sit [place] (Transfers) verbal cues;contact guard;1 person assist  -CE     Assistive Device (Stand-Sit Transfers) walker, front-wheeled  -CE     Row Name 12/08/22 1000          Shower Transfer    Type (Shower Transfer) stand pivot/stand step  -CE     [place] Level (Shower Transfer) verbal cues;nonverbal cues (demo/gesture);minimum assist (75% patient effort);1 person assist  -CE     Assistive Device (Shower Transfer) shower chair;grab bar, tub/shower;walker, front-wheeled  -     Row Name 12/08/22 1000          Safety Issues, Functional Mobility    Safety Issues Affecting Function (Mobility) insight into deficits/self-awareness;positioning of assistive device;problem-solving;sequencing abilities  -     Row Name 12/08/22 1000          Motor Skills    Motor Skills coordination  -     Coordination fine motor deficit;gross motor deficit;left;9 Hole Peg Test of Fine Motor Coordination Results  -CE     Results, 9 Hole Peg Test of Fine Motor Coordination unable to complete 9 Hole due to FMC impairment; Box and Blocks completed and pt able to move 27 on R and 16 on L in 60 seconds.  -     Row Name 12/08/22 1000          Balance    Static Sitting Balance standby assist  -CE     Dynamic Sitting Balance contact guard  -CE     Position, Sitting Balance other (see comments)  on shower chair  -CE     Static Standing Balance contact guard  -CE     Dynamic Standing Balance minimal assist  -CE     Position/Device Used, Standing Balance walker, front-wheeled  -     Row Name  12/08/22 1000          Special Tests    Additional Documentation --  Patient completed Box and Blocks assessment: R-27 blocks and L-16 blocks in 60 seconds.  -CE     Row Name 12/08/22 1000          Positioning and Restraints    Pre-Treatment Position sitting in chair/recliner  -CE     Post Treatment Position wheelchair  -CE     In Wheelchair with other staff  RT  -CE     Row Name 12/08/22 1000          Therapy Assessment/Plan (OT)    Patient's Goals For Discharge return home  -CE     Row Name 12/08/22 1000          Therapy Assessment/Plan (OT)    OT Diagnosis Patient is 58 y/o F who presents with high grade glioma s/p stereotactic brain biopsy on 11/28. Pt presents with decreased ADL performance and falls at home. Pt able to complete ADLs with min-modA this date and demonstrates decreased FMC, GMC on LUE, strength on L as well as mild inattention to L side during ADLs. Will con't to follow for stated goals and anticipate discharge to home with assist.  -CE     Rehab Potential/Prognosis (OT) good, to achieve stated therapy goals  -CE     Frequency of Treatment (OT) 5 times per week  -CE     Estimated Duration of Therapy (OT) 2 weeks  -CE     Planned Therapy Interventions (OT) activity tolerance training;BADL retraining;functional balance retraining;IADL retraining;occupation/activity based interventions;patient/caregiver education/training;strengthening exercise;ROM/therapeutic exercise;transfer/mobility retraining  -CE     Row Name 12/08/22 1000          Therapy Plan Review/Discharge Plan (OT)    Therapy Plan Review (OT) care plan/treatment goals reviewed;evaluation/treatment results reviewed;participants voiced agreement with care plan;participants included;patient  -CE     Anticipated Equipment Needs At Discharge (OT) front wheeled walker  -CE     Anticipated Discharge Disposition (OT) home with assist  -CE     Row Name 12/08/22 1000          IRF OT Goals    Transfer Goal Selection (OT-IRF) transfers, OT goal  1;transfers, OT goal 2  -CE     Bathing Goal Selection (OT-IRF) bathing, OT goal 1  -CE     UB Dressing Goal Selection (OT-IRF) UB dressing, OT goal 1  -CE     LB Dressing Goal Selection (OT-IRF) LB dressing, OT goal 1  -CE     Grooming Goal Selection (OT-IRF) grooming, OT goal 1  -CE     Toileting Goal Selection (OT-IRF) toileting, OT goal 1  -CE     Strength Goal Selection (OT-IRF) strength, OT goal 1 (free text)  -CE     Balance Goal Selection (OT) balance, OT goal 1  -CE     Endurance Goal Selection (OT) endurance, OT goal 1  -CE     Coordination Goal Selection (OT) coordination, OT goal 1;coordination, OT goal 2  -CE     Row Name 12/08/22 1000          Transfer Goal 1 (OT-IRF)    Activity/Assistive Device (Transfer Goal 1, OT-IRF) toilet;walker, rolling  -CE     [place] Level (Transfer Goal 1, OT-IRF) supervision required  -CE     Time Frame (Transfer Goal 1, OT-IRF) 2 weeks  -CE     Progress/Outcomes (Transfer Goal 1, OT-IRF) new goal  -CE     Row Name 12/08/22 1000          Transfer Goal 2 (OT-IRF)    Activity/Assistive Device (Transfer Goal 2, OT-IRF) walk-in shower;shower chair  -CE     [place] Level (Transfer Goal 2, OT-IRF) supervision required;set-up required  -CE     Time Frame (Transfer Goal 2, OT-IRF) 2 weeks  -CE     Progress/Outcomes (Transfer Goal 2, OT-IRF) new goal  -CE     Row Name 12/08/22 1000          Bathing Goal 1 (OT-IRF)    Activity/Device (Bathing Goal 1, OT-IRF) bathing skills, all  -CE     [place] Level (Bathing Goal 1, OT-IRF) supervision required  -CE     Time Frame (Bathing Goal 1, OT-IRF) 2 weeks  -CE     Progress/Outcomes (Bathing Goal 1, OT-IRF) new goal  -CE     Row Name 12/08/22 1000          UB Dressing Goal 1 (OT-IRF)    Activity/Device (UB Dressing Goal 1, OT-IRF) upper body dressing  -CE     [place] (UB Dress Goal 1, OT-IRF) supervision required  -CE     Time Frame (UB Dressing Goal 1, OT-IRF) 2 weeks  -CE     Progress/Outcomes (UB Dressing Goal 1,  OT-IRF) new goal  -CE     Row Name 12/08/22 1000          LB Dressing Goal 1 (OT-IRF)    Activity/Device (LB Dressing Goal 1, OT-IRF) lower body dressing  -CE     [place] (LB Dressing Goal 1, OT-IRF) standby assist  -CE     Time Frame (LB Dressing Goal 1, OT-IRF) 2 weeks  -CE     Progress/Outcomes (LB Dressing Goal 1, OT-IRF) new goal  -CE     Row Name 12/08/22 1000          LB Dressing Goal 2 (OT-IRF)    Activity/Device (LB Dressing Goal 2, OT-IRF) lower body dressing  -CE     [place] (LB Dressing Goal 2, OT-IRF) minimum assist (75% or more patient effort)  -CE     Time Frame (LB Dressing Goal 2, OT-IRF) short-term goal (STG);1 week  -CE     Progress/Outcomes (LB Dressing Goal 2, OT-IRF) new goal  -CE     Row Name 12/08/22 1000          Grooming Goal 1 (OT-IRF)    Activity/Device (Grooming Goal 1, OT-IRF) grooming skills, all  -CE     [place] (Grooming Goal 1, OT-IRF) supervision required  -CE     Time Frame (Grooming Goal 1, OT-IRF) 2 weeks  -CE     Progress/Outcomes (Grooming Goal 1, OT-IRF) new goal  -CE     Row Name 12/08/22 1000          Toileting Goal 1 (OT-IRF)    Activity/Device (Toileting Goal 1, OT-IRF) toileting skills, all  -CE     [place] Level (Toileting Goal 1, OT-IRF) supervision required  -CE     Progress/Outcomes (Toileting Goal 1, OT-IRF) new goal  -CE     Time Frame (Toileting Goal 1, OT-IRF) 2 weeks  -CE     Row Name 12/08/22 1000          Strength Goal 1 (OT-IRF)    Time Frame (Strength Goal 1, OT-IRF) long-term goal (LTG);2 weeks  -CE     Strategies/Barriers (Strength Goal 1, OT-IRF) Patient will increase LUE strength by half grade in prep for ADLs.  -CE     Progress/Outcomes (Strength Goal 1, OT-IRF) new goal  -CE     Row Name 12/08/22 1000          Balance Goal 1 (OT)    Activity/Assistive Device (Balance Goal 1, OT) assistive device use  -CE     [place] Level/Cues Needed (Balance Goal 1, OT) supervision required  -CE     Time Frame (Balance Goal 1, OT) 2 weeks  -CE      Barriers (Balance Goal 1, OT) Patient will maintain dynamic standing balance in prep for ADLs at sink with AD as needed.  -CE     Progress/Outcomes (Balance Goal 1, OT) new goal  -CE     Row Name 12/08/22 1000           Endurance Goal 1 (OT)    Endurance Goal 1 (OT) Pt will tolerate full ADL session in 30 min focusing on increasing endurance and activity tolerance.  -CE     Time Frame (Endurance Goal 1, OT) 2 weeks  -CE     Progress/Outcome (Endurance Goal 1, OT) new goal  -CE     Row Name 12/08/22 1000          Coordination Goal 1 (OT)    Activity/Assistive Device (Coordination Goal 1, OT) FM written ex program  -CE     [place] Level/Cues Needed (Coordination Goal 1, OT) independent  -CE     Time Frame (Coordination Goal 1, OT) 2 weeks  -CE     Progress/Outcomes (Coordination Goal 1, OT) new goal  -CE     Row Name 12/08/22 1000          Coordination Goal 2 (OT)    Activity/Assistive Device (Coordination Goal 2, OT) --  Pt will complete FM task during ADL completion using LUE >50% of time.  -CE     [place] Level/Cues Needed (Coordination Goal 2, OT) supervision required  -CE     Time Frame (Coordination Goal 2, OT) 1 week  -CE     Progress/Outcomes (Coordination Goal 2, OT) new goal  -CE           User Key  (r) = Recorded By, (t) = Taken By, (c) = Cosigned By    Initials Name Effective Dates    CE Portia Esqueda, OT 10/17/22 -                  Occupational Therapy Education     Title: PT OT SLP Therapies (In Progress)     Topic: Occupational Therapy (Done)     Point: ADL training (Done)     Description:   Instruct learner(s) on proper safety adaptation and remediation techniques during self care or transfers.   Instruct in proper use of assistive devices.              Learning Progress Summary           Patient Acceptance, E, VU,NR by CE at 12/8/2022 1107    Comment: fall prevention, DME including w/c and shower chair                   Point: Home exercise program (Done)     Description:   Instruct  learner(s) on appropriate technique for monitoring, assisting and/or progressing therapeutic exercises/activities.              Learning Progress Summary           Patient Acceptance, E, VU,NR by CE at 12/8/2022 1107    Comment: fall prevention, DME including w/c and shower chair                   Point: Precautions (Done)     Description:   Instruct learner(s) on prescribed precautions during self-care and functional transfers.              Learning Progress Summary           Patient Acceptance, E, VU,NR by CE at 12/8/2022 1107    Comment: fall prevention, DME including w/c and shower chair                   Point: Body mechanics (Done)     Description:   Instruct learner(s) on proper positioning and spine alignment during self-care, functional mobility activities and/or exercises.              Learning Progress Summary           Patient Acceptance, E, VU,NR by CE at 12/8/2022 1107    Comment: fall prevention, DME including w/c and shower chair                               User Key     Initials Effective Dates Name Provider Type Discipline    CE 10/17/22 -  Portia Esqueda, OT Occupational Therapist OT                    OT Recommendation and Plan    Anticipated Discharge Disposition (OT): home with assist  Planned Therapy Interventions (OT): activity tolerance training, BADL retraining, functional balance retraining, IADL retraining, occupation/activity based interventions, patient/caregiver education/training, strengthening exercise, ROM/therapeutic exercise, transfer/mobility retraining                    Time Calculation:      Time Calculation- OT     Row Name 12/08/22 1109 12/08/22 1047          Time Calculation- OT    OT Start Time -- 0900  -CE     OT Stop Time -- 1000  -CE     OT Time Calculation (min) -- 60 min  -     OT Received On 12/07/22  - --     OT Goal Re-Cert Due Date 12/15/22  - --           User Key  (r) = Recorded By, (t) = Taken By, (c) = Cosigned By    Initials Name Provider Type    CE  Portia Esqueda OT Occupational Therapist              Therapy Charges for Today     Code Description Service Date Service Provider Modifiers Qty    73905592671 HC OT EVAL MOD COMPLEXITY 3 12/8/2022 Portia Esqueda OT GO 1    29370917403 HC OT SELF CARE/MGMT/TRAIN EA 15 MIN 12/8/2022 Portia Esqueda OT GO 2                   Portia Esqueda OT  12/8/2022

## 2022-12-09 LAB
ALBUMIN SERPL-MCNC: 3.8 G/DL (ref 3.5–5.2)
ALBUMIN/GLOB SERPL: 1.7 G/DL
ALP SERPL-CCNC: 190 U/L (ref 39–117)
ALT SERPL W P-5'-P-CCNC: 944 U/L (ref 1–33)
ANION GAP SERPL CALCULATED.3IONS-SCNC: 10 MMOL/L (ref 5–15)
AST SERPL-CCNC: 176 U/L (ref 1–32)
BILIRUB SERPL-MCNC: 0.6 MG/DL (ref 0–1.2)
BUN SERPL-MCNC: 21 MG/DL (ref 6–20)
BUN/CREAT SERPL: 30.9 (ref 7–25)
CALCIUM SPEC-SCNC: 9.1 MG/DL (ref 8.6–10.5)
CHLORIDE SERPL-SCNC: 97 MMOL/L (ref 98–107)
CO2 SERPL-SCNC: 26 MMOL/L (ref 22–29)
CREAT SERPL-MCNC: 0.68 MG/DL (ref 0.57–1)
DEPRECATED RDW RBC AUTO: 42.4 FL (ref 37–54)
EGFRCR SERPLBLD CKD-EPI 2021: 101.1 ML/MIN/1.73
ERYTHROCYTE [DISTWIDTH] IN BLOOD BY AUTOMATED COUNT: 15.4 % (ref 12.3–15.4)
GLOBULIN UR ELPH-MCNC: 2.3 GM/DL
GLUCOSE BLDC GLUCOMTR-MCNC: 106 MG/DL (ref 70–130)
GLUCOSE BLDC GLUCOMTR-MCNC: 129 MG/DL (ref 70–130)
GLUCOSE BLDC GLUCOMTR-MCNC: 162 MG/DL (ref 70–130)
GLUCOSE BLDC GLUCOMTR-MCNC: 213 MG/DL (ref 70–130)
GLUCOSE BLDC GLUCOMTR-MCNC: 227 MG/DL (ref 70–130)
GLUCOSE SERPL-MCNC: 134 MG/DL (ref 65–99)
HCT VFR BLD AUTO: 38.1 % (ref 34–46.6)
HGB BLD-MCNC: 12.9 G/DL (ref 12–15.9)
IGA1 MFR SER: 83 MG/DL (ref 70–400)
IGG1 SER-MCNC: 586 MG/DL (ref 700–1600)
IGM SERPL-MCNC: 48 MG/DL (ref 40–230)
MAGNESIUM SERPL-MCNC: 2.2 MG/DL (ref 1.6–2.6)
MCH RBC QN AUTO: 27.2 PG (ref 26.6–33)
MCHC RBC AUTO-ENTMCNC: 33.9 G/DL (ref 31.5–35.7)
MCV RBC AUTO: 80.4 FL (ref 79–97)
PLATELET # BLD AUTO: 225 10*3/MM3 (ref 140–450)
PMV BLD AUTO: 9.5 FL (ref 6–12)
POTASSIUM SERPL-SCNC: 4.4 MMOL/L (ref 3.5–5.2)
PROT SERPL-MCNC: 6.1 G/DL (ref 6–8.5)
RBC # BLD AUTO: 4.74 10*6/MM3 (ref 3.77–5.28)
SODIUM SERPL-SCNC: 133 MMOL/L (ref 136–145)
WBC NRBC COR # BLD: 13.31 10*3/MM3 (ref 3.4–10.8)

## 2022-12-09 PROCEDURE — 25010000002 ENOXAPARIN PER 10 MG: Performed by: PHYSICAL MEDICINE & REHABILITATION

## 2022-12-09 PROCEDURE — 85027 COMPLETE CBC AUTOMATED: CPT | Performed by: HOSPITALIST

## 2022-12-09 PROCEDURE — 86695 HERPES SIMPLEX TYPE 1 TEST: CPT

## 2022-12-09 PROCEDURE — 97110 THERAPEUTIC EXERCISES: CPT | Performed by: PHYSICAL THERAPIST

## 2022-12-09 PROCEDURE — 77300 RADIATION THERAPY DOSE PLAN: CPT | Performed by: RADIOLOGY

## 2022-12-09 PROCEDURE — 99232 SBSQ HOSP IP/OBS MODERATE 35: CPT

## 2022-12-09 PROCEDURE — 86664 EPSTEIN-BARR NUCLEAR ANTIGEN: CPT

## 2022-12-09 PROCEDURE — 86038 ANTINUCLEAR ANTIBODIES: CPT

## 2022-12-09 PROCEDURE — 82962 GLUCOSE BLOOD TEST: CPT

## 2022-12-09 PROCEDURE — 63710000001 INSULIN LISPRO (HUMAN) PER 5 UNITS: Performed by: PHYSICAL MEDICINE & REHABILITATION

## 2022-12-09 PROCEDURE — 97129 THER IVNTJ 1ST 15 MIN: CPT

## 2022-12-09 PROCEDURE — 63710000001 DEXAMETHASONE PER 0.25 MG: Performed by: PHYSICAL MEDICINE & REHABILITATION

## 2022-12-09 PROCEDURE — 86644 CMV ANTIBODY: CPT

## 2022-12-09 PROCEDURE — 82784 ASSAY IGA/IGD/IGG/IGM EACH: CPT

## 2022-12-09 PROCEDURE — 97112 NEUROMUSCULAR REEDUCATION: CPT | Performed by: OCCUPATIONAL THERAPIST

## 2022-12-09 PROCEDURE — 86665 EPSTEIN-BARR CAPSID VCA: CPT

## 2022-12-09 PROCEDURE — 86015 ACTIN ANTIBODY EACH: CPT

## 2022-12-09 PROCEDURE — 80053 COMPREHEN METABOLIC PANEL: CPT | Performed by: PHYSICAL MEDICINE & REHABILITATION

## 2022-12-09 PROCEDURE — 86787 VARICELLA-ZOSTER ANTIBODY: CPT

## 2022-12-09 PROCEDURE — 77301 RADIOTHERAPY DOSE PLAN IMRT: CPT | Performed by: RADIOLOGY

## 2022-12-09 PROCEDURE — 77338 DESIGN MLC DEVICE FOR IMRT: CPT | Performed by: RADIOLOGY

## 2022-12-09 PROCEDURE — 86696 HERPES SIMPLEX TYPE 2 TEST: CPT

## 2022-12-09 PROCEDURE — 83735 ASSAY OF MAGNESIUM: CPT | Performed by: HOSPITALIST

## 2022-12-09 PROCEDURE — 97112 NEUROMUSCULAR REEDUCATION: CPT | Performed by: PHYSICAL THERAPIST

## 2022-12-09 PROCEDURE — 99232 SBSQ HOSP IP/OBS MODERATE 35: CPT | Performed by: INTERNAL MEDICINE

## 2022-12-09 PROCEDURE — 97530 THERAPEUTIC ACTIVITIES: CPT | Performed by: PHYSICAL THERAPIST

## 2022-12-09 PROCEDURE — 97535 SELF CARE MNGMENT TRAINING: CPT | Performed by: OCCUPATIONAL THERAPIST

## 2022-12-09 PROCEDURE — 97130 THER IVNTJ EA ADDL 15 MIN: CPT

## 2022-12-09 PROCEDURE — 86645 CMV ANTIBODY IGM: CPT

## 2022-12-09 RX ORDER — GABAPENTIN 100 MG/1
100 CAPSULE ORAL 3 TIMES DAILY
Status: DISCONTINUED | OUTPATIENT
Start: 2022-12-09 | End: 2022-12-09

## 2022-12-09 RX ADMIN — LEVETIRACETAM 500 MG: 500 TABLET, FILM COATED ORAL at 08:00

## 2022-12-09 RX ADMIN — ESCITALOPRAM 10 MG: 10 TABLET, FILM COATED ORAL at 08:00

## 2022-12-09 RX ADMIN — ENOXAPARIN SODIUM 40 MG: 100 INJECTION SUBCUTANEOUS at 23:31

## 2022-12-09 RX ADMIN — DEXAMETHASONE 4 MG: 4 TABLET ORAL at 21:43

## 2022-12-09 RX ADMIN — LISINOPRIL 20 MG: 20 TABLET ORAL at 08:00

## 2022-12-09 RX ADMIN — LEVOTHYROXINE SODIUM 150 MCG: 0.15 TABLET ORAL at 05:24

## 2022-12-09 RX ADMIN — LATANOPROST 1 DROP: 50 SOLUTION OPHTHALMIC at 20:47

## 2022-12-09 RX ADMIN — INSULIN LISPRO 8 UNITS: 100 INJECTION, SOLUTION INTRAVENOUS; SUBCUTANEOUS at 08:01

## 2022-12-09 RX ADMIN — ENOXAPARIN SODIUM 40 MG: 100 INJECTION SUBCUTANEOUS at 11:51

## 2022-12-09 RX ADMIN — CALCIUM CARBONATE-VITAMIN D TAB 500 MG-200 UNIT 1 TABLET: 500-200 TAB at 20:47

## 2022-12-09 RX ADMIN — INSULIN LISPRO 8 UNITS: 100 INJECTION, SOLUTION INTRAVENOUS; SUBCUTANEOUS at 17:23

## 2022-12-09 RX ADMIN — DEXAMETHASONE 4 MG: 4 TABLET ORAL at 13:40

## 2022-12-09 RX ADMIN — POLYETHYLENE GLYCOL 3350 17 G: 17 POWDER, FOR SOLUTION ORAL at 08:01

## 2022-12-09 RX ADMIN — INSULIN LISPRO 8 UNITS: 100 INJECTION, SOLUTION INTRAVENOUS; SUBCUTANEOUS at 11:51

## 2022-12-09 RX ADMIN — MAGNESIUM OXIDE 400 MG (241.3 MG MAGNESIUM) TABLET 400 MG: TABLET at 17:28

## 2022-12-09 RX ADMIN — DEXAMETHASONE 4 MG: 4 TABLET ORAL at 05:24

## 2022-12-09 RX ADMIN — CALCIUM CARBONATE-VITAMIN D TAB 500 MG-200 UNIT 1 TABLET: 500-200 TAB at 08:00

## 2022-12-09 RX ADMIN — INSULIN LISPRO 8 UNITS: 100 INJECTION, SOLUTION INTRAVENOUS; SUBCUTANEOUS at 11:50

## 2022-12-09 RX ADMIN — LEVETIRACETAM 500 MG: 500 TABLET, FILM COATED ORAL at 20:46

## 2022-12-09 RX ADMIN — PANTOPRAZOLE SODIUM 40 MG: 40 TABLET, DELAYED RELEASE ORAL at 05:24

## 2022-12-09 RX ADMIN — INSULIN GLARGINE-YFGN 25 UNITS: 100 INJECTION, SOLUTION SUBCUTANEOUS at 08:00

## 2022-12-09 NOTE — PROGRESS NOTES
Case Management  Inpatient Rehabilitation Plan of Care and Discharge Plan Note    Rehabilitation Diagnosis:  Branch  Date of Onset:  Ismael    Medical Summary:  Branch  Past Medical History: Branch    Plan of Care  Updated Problems/Interventions  Field    Expected Intensity:  Branch  Interdisciplinary Team:  Ismael  Estimated Length of Stay/Anticipated Discharge Date: Branch  Anticipated Discharge Destination:  Anticipated discharge destination from inpatient rehabilitation is community  discharge with assistance. Patient lives with  in one story home; One  step into home with grab bar.  D/C plan is home with . He works nights at [employer]. Aunt can stay with  patient at night if needed.      Based on the patient's medical and functional status, their prognosis and  expected level of functional improvement is:  Ismael    Signed by: SHANNON Bonilla

## 2022-12-09 NOTE — PROGRESS NOTES
LOS: 2 days   Patient Care Team:  Tiffany Holloway MD as PCP - General (Internal Medicine)  Mathew Collins Jr., MD as Consulting Physician (Hematology and Oncology)  Dorian Sabillon MD as Surgeon (General Surgery)  Thang Dumont, JOSEFA (Optometry)  Lamine Cantu DO as Referring Physician (Family Medicine)  Hali Rolle MD as Gynecologist (Gynecology)      HERON MAGANA  1965    Diagnoses    1. IMPAIRED FUNCTIONAL MOBILITY, BALANCE, GAIT, AND ENDURANCE       ADMITTING DIAGNOSIS:  High-grade glioma-3.2 cm ring-enhancing right supratentorial mass-right thalamic-with mass-effect and left midline shift  Status post stereotactic brain biopsy on November 28, 2022  Left side weakness/incoordination/impaired mobilityHigh-grade glioma-3.2 cm ring-enhancing right supratentorial mass-right thalamic-with mass-effect and left midline shift  Status post stereotactic brain biopsy on November 28, 2022  Left side weakness/incoordination/impaired mobility  Diabetes      Subjective      No acute events overnight. Complaining of a headache. Patient denies fever/chills/sob/chest pain/abdominal pain and denies issue with sleep/appetite/bladder and bowels.   Strength on the left side at the same.    Objective     Vitals:    12/09/22 1206   BP: 111/71   Pulse: 64   Resp: 18   Temp: 97.8 °F (36.6 °C)   SpO2: 95%       PHYSICAL EXAM:   MENTAL STATUS -  AWAKE / ALERT  HEENT-right scalp incision with staples.  Clean dry and intact  SCLERAE ANICTERIC, CONJUNCTIVAE PINK  NO JVD, EARS UNREMARKABLE EXTERNALLY  LUNGS - CTA, NO WHEEZES, RALES OR RHONCHI  HEART- RRR, NO RUB, MURMUR, OR GALLOP  ABD - NORMOACTIVE BOWEL SOUNDS, SOFT, NT.     EXT - NO EDEMA OR CYANOSIS  NEURO -oriented person place time and situation.  Good historian.    Speech was fluent with slightly slow rate of speech.     Extraocular movements intact.  No dysarthria.  MOTOR EXAM - RUE/RLE 5/5.   LUE/LLE 4+/5.   Impaired motor control in the left upper extremity and  left lower extremity      MEDICATIONS  Scheduled Meds:calcium 500 mg vitamin D 5 mcg (200 UT), 1 tablet, Oral, BID  dexamethasone, 4 mg, Oral, Q8H  enoxaparin, 40 mg, Subcutaneous, Q12H  escitalopram, 10 mg, Oral, Daily  insulin glargine, 25 Units, Subcutaneous, QAM  insulin lispro, 0-24 Units, Subcutaneous, 4x Daily With Meals & Nightly  insulin lispro, 8 Units, Subcutaneous, TID With Meals  latanoprost, 1 drop, Both Eyes, Nightly  levETIRAcetam, 500 mg, Oral, BID  levothyroxine, 150 mcg, Oral, Q AM  lisinopril, 20 mg, Oral, Daily  pantoprazole, 40 mg, Oral, Q AM  polyethylene glycol, 17 g, Oral, Daily  valACYclovir, 500 mg, Oral, Q24H      Continuous Infusions:   PRN Meds:.•  calcium carbonate  •  dextrose  •  dextrose  •  famotidine  •  glucagon (human recombinant)  •  influenza vaccine  •  nitroglycerin  •  ondansetron **OR** ondansetron  •  senna-docusate sodium      RESULTS  Glucose   Date/Time Value Ref Range Status   12/09/2022 1552 213 (H) 70 - 130 mg/dL Final     Comment:     Meter: [id number] : [id number] Dentonloretta Pérez    12/09/2022 1139 227 (H) 70 - 130 mg/dL Final     Comment:     Meter: [id number] : [id number] Santos Mei    12/09/2022 0656 129 70 - 130 mg/dL Final     Comment:     Meter: [id number] : [id number] Mackey [facility]ay[facility]   12/08/2022 2108 217 (H) 70 - 130 mg/dL Final     Comment:     Meter: [id number] : [id number] Mackey T[facility]ay[facility]   12/08/2022 1648 233 (H) 70 - 130 mg/dL Final     Comment:     Meter: [id number] : [id number] Omero Jamil    12/08/2022 1349 178 (H) 70 - 130 mg/dL Final     Comment:     Meter: [id number] : [id number] Paul Clifton    12/08/2022 0607 217 (H) 70 - 130 mg/dL Final     Comment:     Meter: [id number] : [id number] Katie ACUÑA   12/07/2022 2112 216 (H) 70 - 130 mg/dL Final     Comment:     Meter: [id number] : [id number] Jim Bennett RN     Results from last 7 days   Lab Units 12/09/22  0539 12/08/22  0616  12/07/22  0617   WBC 10*3/mm3 13.31* 15.27* 16.17*   HEMOGLOBIN g/dL 12.9 13.0 12.8   HEMATOCRIT % 38.1 39.4 37.9   PLATELETS 10*3/mm3 225 238 261     Results from last 7 days   Lab Units 12/09/22  0539 12/08/22  0616 12/07/22  0617   SODIUM mmol/L 133* 133* 134*   POTASSIUM mmol/L 4.4 4.5 4.3   CHLORIDE mmol/L 97* 94* 97*   CO2 mmol/L 26.0 24.5 28.0   BUN mg/dL 21* 21* 20   CREATININE mg/dL 0.68 0.73 0.57   CALCIUM mg/dL 9.1 8.9 8.7   BILIRUBIN mg/dL 0.6 0.6  --    ALK PHOS U/L 190* 190*  --    ALT (SGPT) U/L 944* 1,049*  --    AST (SGOT) U/L 176* 386*  --    GLUCOSE mg/dL 134* 183* 134*       ASSESSMENT and PLAN    Glioma (HCC)    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  She reports intolerance to gabapentin, intolerances to opioids.  Tramadol comes up as contraindicated with history of anaphylactic secondary to morphine.  Valproate for headache control not an option with her liver changes     Left side weakness/incoordination/impaired mobility     Radiation therapy/Temodar to start December 12, 2022  Decadron 4 mg every 8 hourly  Dec 9: Oncology aware of increase of liver enzymes. They are following along      Leukocytosis-steroid/stress response.  Incision healing.      Elevated LFTs  Dec 8: Labs significant for ALT 1049 (143 11/28),  (71 11/28), Alk phos 190 (96 11/28). Taking minimal tylenol and statin is a home med- discontinued. Consulted IM who also consulted pharmacy and GI. They also decreased valtrex to 1g daily (she was taking daily prophylactic valtrex 2g bid). Awaiting liver ultrasound. CPK normal.   Dec 9- Ongoing workup. Liver ultrasound- remarkable for a small hepatic cyst otherwise negative  Per GI -[ Obtain duplex hepatic ultrasound to further assess for thrombosis  Obtain GEMMA, IgG  profile, ASMA, EBV, CMV, HSV, VZV to rule out autoimmune versus viral cause to elevation.].   Patient reviewed with internal medicine and medical oncology    Diabetes mellitus-Trulicity at home.  On Lantus/Humalog     Seizure prophylaxis-Keppra     DVT prophylaxis-Lovenox/SCDs     GI prophylaxis-protein pump inhibitor  Add calcium and vitamin D with ongoing steroids     Chronic Valtrex-dose decreased to 1000 mg daily     Germline BRCA2 and GEORGES mutations.  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     Depression-Lexapro     Hypothyroidism-on replacement     Hypertension lisinopril      Mathew Araujo MD       During rounds, used appropriate personal protective equipment including mask and gloves.  Additional gown if indicated.  Mask used was standard procedure mask. Appropriate PPE was worn during the entire visit.  Hand hygiene was completed before and after.    >35 minutes with > 50% face-to-face / floor time / coordination of care

## 2022-12-09 NOTE — PROGRESS NOTES
Recreational Therapy Note    Patient Name: Christie Avendaño   MRN: [id number]    Therapeutic Recreation Eval and Treat (last 12 hours)     Therapeutic Recreation Eval & Treat     Row Name 12/09/22 1400       Therapeutic Recreation Participation    Recreation Therapy Participation games  -    Games card games  Phase 10  -    Objectives of Recreation Participation increase;motivation and activity level through successful participation;positive attitudes leading to a healthy leisure lifestyle;sense of autonomy by choosing level of participation  -    Comment, Recreation Participation Min-mod cues to recall directions;slow processing;used L hand to  cards  -    Recreation Therapy Summary of Participation active participation  -          User Key  (r) = Recorded By, (t) = Taken By, (c) = Cosigned By    Initials Name Provider Type    Jemma Cottrell, CTRS Recreational Therapist                  PAULA Siu  12/9/2022

## 2022-12-09 NOTE — PROGRESS NOTES
Inpatient Rehabilitation Admission  Section A. Transportation  Issues Due to Lack of Transportation:   No    Signed by: SHANNON Bonilla

## 2022-12-09 NOTE — PROGRESS NOTES
Inpatient Rehabilitation Plan of Care Note    Plan of Care  Care Plan Reviewed - No updates at this time.    Psychosocial    Performed Intervention(s)  Support/peer groups.  Verbalizes needs and concerns.  Medication.      Sphincter Control    Performed Intervention(s)  Offer restroom during hourly rounding.  Encourage fluid intake.  Monitor Is and Os.  Timed voids.  Encourage appropriate diet.      Safety    Performed Intervention(s)  Items w/i reach.  Safety rounds.  Bed and chair alarm.  Falls precautions.    Signed by: Donald Fernandez RN

## 2022-12-09 NOTE — THERAPY TREATMENT NOTE
Inpatient Rehabilitation - Occupational Therapy Treatment Note    [facility]     Patient Name: Christie Avendaño  : 1965  MRN: [id number]    Today's Date: 2022                 Admit Date: 2022         ICD-10-CM ICD-9-CM   1. 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)   • Non-small cell lung cancer, right (HCC)   • Renal mass, right   • SIRS (systemic inflammatory response syndrome) (HCC)   • Hyperlipidemia   • Malignant melanoma of right lower extremity including hip (HCC)   • High grade glioma    • 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)   • Glioma (HCC)       Past Medical History:   Diagnosis Date   • Arthritis    • Asthma    • BRCA2 positive    • Diabetes mellitus (HCC)    • H/O Liver masses 2017    x3   • H/O Lung masses 2017    1 in right lower lobe and 1 in the left infrahilar location   • H/O Ovarian cyst    • H/O Right adrenal mass (CMS/HCC) 2017   • Lung cancer (HCC)    • Melanoma (HCC)     right foot   • PONV (postoperative nausea and vomiting)    • Thyroid disease        Past Surgical History:   Procedure Laterality Date   • APPENDECTOMY     • BRAIN BIOPSY Right 2022    Procedure: Right frontal stereotactic needle brain biopsy;  Surgeon: Manuel Thompson MD;  Location: [facility];  Service: Neurosurgery;  Laterality: Right;   • BREAST IMPLANT SURGERY Bilateral    • CHOLECYSTECTOMY     • HYSTERECTOMY     • MASTECTOMY Bilateral    • OVARIAN CYST SURGERY      • THORACOSCOPY VIDEO ASSISTED WITH LOBECTOMY Right 10/9/2020    Procedure: BRONCHOSCOPY, THORACOSCOPY VIDEO ASSISTED WITH ANTERIOR BASAL SEGMENTECTOMY, INTERCOSTAL NERVE BLOCK;  Surgeon: Flaca Crisostomo MD;  Location: [facility] MAIN OR;  Service: Thoracic;  Laterality: Right;   • TONSILLECTOMY               IRF OT ASSESSMENT FLOWSHEET (last 12 hours)     IRF OT Evaluation and Treatment     Row Name 12/09/22 1500          OT Time and Intention    Document Type daily treatment  -     Mode of Treatment individual therapy;occupational therapy  -     Patient Effort excellent  -KP     Symptoms Noted During/After Treatment none  -     Row Name 12/09/22 1500          General Information    Patient/Family/Caregiver Comments/Observations pt sitting in wc in am and pm  -     Existing Precautions/Restrictions fall  -     Row Name 12/09/22 1500          Pain Assessment    Pretreatment Pain Rating 0/10 - no pain  -     Posttreatment Pain Rating 0/10 - no pain  -     Row Name 12/09/22 1500          Cognition/Psychosocial    Affect/Mental Status (Cognition) WFL  -     Orientation Status (Cognition) oriented x 4  -     Follows Commands (Cognition) follows one-step commands;over 90% accuracy;verbal cues/prompting required  -     Personal Safety Interventions fall prevention program maintained;muscle strengthening facilitated;gait belt;nonskid shoes/slippers when out of bed  -     Cognitive Function executive function deficit  -     Row Name 12/09/22 1500          Bathing    [place] Level (Bathing) bathing skills;upper body;standby assist;lower body;minimum assist (75% patient effort)  -     Assistive Device (Bathing) hand held shower spray hose;grab bar/tub rail;shower chair  -     Position (Bathing) supported sitting;supported standing  -     Set-up Assistance (Bathing) obtain supplies;adjust water temperature  -     Row Name 12/09/22 1500          Upper Body Dressing    [place] Level (Upper  Body Dressing) upper body dressing skills;doff;don;bra/undergarment;pull over garment;set up assistance;verbal cues;minimum assist (75% or more patient effort)  -     Position (Upper Body Dressing) supported sitting  -     Set-up Assistance (Upper Body Dressing) obtain clothing  -     Row Name 12/09/22 1500          Lower Body Dressing    [place] Level (Lower Body Dressing) doff;don;pants/bottoms;shoes/slippers;socks;set up;verbal cues;moderate assist (50% patient effort)  -     Position (Lower Body Dressing) supported sitting;supported standing  -     Set-up Assistance (Lower Body Dressing) obtain clothing  -     Comment (Lower Body Dressing) pt unable to tie shoes. may want to try elastic laces for pt as she can don shoes.  -     Row Name 12/09/22 1500          Grooming    [place] Level (Grooming) grooming skills;deodorant application;oral care regimen;wash face, hands;set up;standby assist  -     Position (Grooming) sink side;supported sitting  -     Set-up Assistance (Grooming) obtain supplies  -     Row Name 12/09/22 1500          Toileting    [place] Level (Toileting) toileting skills;adjust/manage clothing;perform perineal hygiene;set up assistance;verbal cues;moderate assist (50% patient effort)  -     Assistive Device Use (Toileting) grab bar/safety frame  -     Position (Toileting) supported sitting;supported standing  -     Set-up Assistance (Toileting) obtain supplies;change pad/brief  -     Row Name 12/09/22 1500          Bed Mobility    Comment, (Bed Mobility) NT UIC  -     Row Name 12/09/22 1500          Sit-Stand Transfer    Sit-Stand [place] (Transfers) set up;verbal cues;contact guard;minimum assist (75% patient effort)  -     Assistive Device (Sit-Stand Transfers) wheelchair  -     Row Name 12/09/22 1500          Stand-Sit Transfer    Stand-Sit [place] (Transfers) set up;verbal cues;contact guard;minimum assist (75% patient effort)  -      Assistive Device (Stand-Sit Transfers) wheelchair  -     Row Name 12/09/22 1500          Toilet Transfer    Type (Toilet Transfer) stand-sit;sit-stand;stand pivot/stand step  -KP     [place] Level (Toilet Transfer) set up;verbal cues;minimum assist (75% patient effort)  -     Assistive Device (Toilet Transfer) wheelchair;grab bars/safety frame  -     Row Name 12/09/22 1500          Shower Transfer    Type (Shower Transfer) sit-stand;stand-sit;stand pivot/stand step  -KP     [place] Level (Shower Transfer) set up;minimum assist (75% patient effort);verbal cues  -     Assistive Device (Shower Transfer) grab bar, tub/shower;shower chair  -     Comment, (Shower Transfer) pt able to walk from toilet to shower bench w grab bar and min A to shower bench  -     Row Name 12/09/22 1500          Motor Skills    Results, 9 Hole Peg Test of Fine Motor Coordination pt reached for pegs and inserts into blue resistive board w L hand w three to four pinch grasp, 3pinch preferred to incr FMC w mod diff. pt completed connect 4 game to incr problem solving skills and FMC w L hand using three pt pinch ,ed on two pt pinch. incr time needed to complete FMC tasks.  -     Row Name 12/09/22 1500          Positioning and Restraints    Pre-Treatment Position sitting in chair/recliner  -     Post Treatment Position wheelchair  -KP     In Wheelchair sitting;call light within reach;encouraged to call for assist;exit alarm on  in am, in pm in  notified RT as that is her next therapy  -     Row Name 12/09/22 1500          Daily Progress Summary (OT)    Overall Progress Toward Functional Goals (OT) progressing toward functional goals as expected  -           User Key  (r) = Recorded By, (t) = Taken By, (c) = Cosigned By    Initials Name Effective Dates     Theresa Arreaga, OTR 06/16/21 -                  Occupational Therapy Education     Title: PT OT SLP Therapies (Done)     Topic: Occupational Therapy  (Done)     Point: ADL training (Done)     Description:   Instruct learner(s) on proper safety adaptation and remediation techniques during self care or transfers.   Instruct in proper use of assistive devices.              Learning Progress Summary           Patient Acceptance, E, VU by CB at 12/8/2022 1204    Acceptance, E, VU,NR by CE at 12/8/2022 1107    Comment: fall prevention, DME including w/c and shower chair                   Point: Home exercise program (Done)     Description:   Instruct learner(s) on appropriate technique for monitoring, assisting and/or progressing therapeutic exercises/activities.              Learning Progress Summary           Patient Acceptance, E, VU by CB at 12/8/2022 1204    Acceptance, E, VU,NR by CE at 12/8/2022 1107    Comment: fall prevention, DME including w/c and shower chair                   Point: Precautions (Done)     Description:   Instruct learner(s) on prescribed precautions during self-care and functional transfers.              Learning Progress Summary           Patient Acceptance, E, VU by CB at 12/8/2022 1204    Acceptance, E, VU,NR by CE at 12/8/2022 1107    Comment: fall prevention, DME including w/c and shower chair                   Point: Body mechanics (Done)     Description:   Instruct learner(s) on proper positioning and spine alignment during self-care, functional mobility activities and/or exercises.              Learning Progress Summary           Patient Acceptance, E, VU by CB at 12/8/2022 1204    Acceptance, E, VU,NR by CE at 12/8/2022 1107    Comment: fall prevention, DME including w/c and shower chair                               User Key     Initials Effective Dates Name Provider Type Discipline    CB 11/10/22 -  Mackenzie Hopkins CCC-SLP Speech and Language Pathologist SLP    CE 10/17/22 -  Portia Esqueda OT Occupational Therapist OT                    OT Recommendation and Plan            Daily Progress Summary (OT)  Overall Progress Toward  Functional Goals (OT): progressing toward functional goals as expected            Time Calculation:      Time Calculation- OT     Row Name 12/09/22 1507             Time Calculation- OT    OT Start Time 1300  -      OT Stop Time 1330  -      OT Time Calculation (min) 30 min  -            User Key  (r) = Recorded By, (t) = Taken By, (c) = Cosigned By    Initials Name Provider Type     Theresa Arreaga OTR Occupational Therapist              Therapy Charges for Today     Code Description Service Date Service Provider Modifiers Qty    18960423999 HC OT SELF CARE/MGMT/TRAIN EA 15 MIN 12/9/2022 Theresa Arreaga OTR GO 4    [id number] HC OT NEUROMUSC RE EDUCATION EA 15 MIN 12/9/2022 Theresa Arreaga OTR GO 2                   SHARON Solitario  12/9/2022

## 2022-12-09 NOTE — THERAPY TREATMENT NOTE
Inpatient Rehabilitation - Speech Language Pathology Treatment Note    [facility]     Patient Name: Christie Avendaño  : 1965  MRN: [id number]    Today's Date: 2022                   Admit Date: 2022       Visit Dx:      ICD-10-CM ICD-9-CM   1. 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)   • Non-small cell lung cancer, right (HCC)   • Renal mass, right   • SIRS (systemic inflammatory response syndrome) (HCC)   • Hyperlipidemia   • Malignant melanoma of right lower extremity including hip (HCC)   • High grade glioma    • 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)   • Glioma (HCC)       Past Medical History:   Diagnosis Date   • Arthritis    • Asthma    • BRCA2 positive    • Diabetes mellitus (HCC)    • H/O Liver masses 2017    x3   • H/O Lung masses 2017    1 in right lower lobe and 1 in the left infrahilar location   • H/O Ovarian cyst    • H/O Right adrenal mass (CMS/HCC) 2017   • Lung cancer (HCC)    • Melanoma (HCC)     right foot   • PONV (postoperative nausea and vomiting)    • Thyroid disease        Past Surgical History:   Procedure Laterality Date   • APPENDECTOMY     • BRAIN BIOPSY Right 2022    Procedure: Right frontal stereotactic needle brain biopsy;  Surgeon: Manuel Thompson MD;  Location: [facility];  Service: Neurosurgery;  Laterality: Right;   • BREAST IMPLANT SURGERY Bilateral    • CHOLECYSTECTOMY     • HYSTERECTOMY     • MASTECTOMY Bilateral    •  OVARIAN CYST SURGERY     • THORACOSCOPY VIDEO ASSISTED WITH LOBECTOMY Right 10/9/2020    Procedure: BRONCHOSCOPY, THORACOSCOPY VIDEO ASSISTED WITH ANTERIOR BASAL SEGMENTECTOMY, INTERCOSTAL NERVE BLOCK;  Surgeon: Flaca Crisostomo MD;  Location: [facility] MAIN OR;  Service: Thoracic;  Laterality: Right;   • TONSILLECTOMY         SLP Recommendation and Plan                                                            SLP EVALUATION (last 72 hours)     SLP SLC Evaluation     Row Name 12/09/22 1130 12/08/22 1030                Communication Assessment/Intervention    Document Type therapy note (daily note)  -SL evaluation  -CB       Subjective Information no complaints  -SL no complaints  -CB       Patient Observations alert;cooperative;agree to therapy  -SL alert;cooperative;agree to therapy  -CB       Patient Effort good  -SL good  -CB       Symptoms Noted During/After Treatment none  -SL --          General Information    Patient Profile Reviewed -- yes  -CB       Pertinent History Of Current Problem -- 57 year old female w/brain mass found to be high-grade glioma in right thalamus.  -CB       Precautions/Limitations, Vision -- neglect, left  -CB       Precautions/Limitations, Hearing -- WFL;for purposes of eval  -CB       Patient Level of Education -- 11th grade education  -CB       Prior Level of Function-Communication -- WFL  -CB       Plans/Goals Discussed with -- patient;agreed upon  -CB       Patient's Goals for Discharge -- functional cognition  -CB       Standardized Assessment Used -- CLQT  -CB          CLQT (The Cognitive Linguistic Quick Test)    Attention Domain Score -- 86  -CB       Attention Severity Rating -- 2: Moderate  -CB       Memory Domain Score -- 121  -CB       Memory Severity Rating -- 2: Moderate  -CB       Executive Function Domain Score -- 8  -CB       Executive Function Severity Rating -- 1: Severe  -CB       Language Domain Score -- 28  -CB       Language Severity Rating -- 3: Mild  -CB        Visuospatial Domain Score -- 31  -CB       Visuospatial Severity Rating -- 1: Severe  -CB       Clock Drawing Total Score -- 11  -CB       Clock Drawing Severity Rating -- Mild  -CB       Composite Severity Rating -- 1.8  -CB       Composite Severity Rating Range -- 2.4 - 1.5: Moderate  -CB       CLQT Comments -- Pt presents with left neglect.  -CB          SLP Evaluation Clinical Impressions    SLP Diagnosis -- moderate-severe;cognitive-linguistic disorder  -CB       Rehab Potential/Prognosis -- good  -CB       SLC Criteria for Skilled Therapy Interventions Met -- yes  -CB       Functional Impact -- functional impact in social situations;functional impact in ADLs;unable to make medical decisions  -CB          Recommendations    Therapy Frequency (SLP SLC) -- PRN  -CB       Predicted Duration Therapy Intervention (Days) -- until discharge  -CB       Anticipated Discharge Disposition (SLP) -- home with assist  -CB          Patient will demonstrate functional cognitive-linguistic skills for return to discharge environment    [place] -- with minimal cues  -CB       Time frame -- by discharge  -CB       Progress/Outcomes -- good progress toward goal  -CB          Attention Goal 1 (SLP)    Improve Attention by Goal 1 (SLP) -- attending to task;80%;with minimal cues (75-90%)  -CB       Time Frame (Attention Goal 1, SLP) -- by discharge  -CB       Progress/Outcomes (Attention Goal 1, SLP) -- good progress toward goal  -CB          Functional Problem Solving Skills Goal 1 (SLP)    Improve Problem Solving Through Goal 1 (SLP) -- determine solutions to simple ADL/safety problems;complete organization/home management task;90%;with minimal cues (75-90%)  -CB       Time Frame (Problem Solving Goal 1, SLP) -- by discharge  -CB       Progress/Outcomes (Problem Solving Goal 1, SLP) -- good progress toward goal  -CB          Executive Functional Skills Goal 1 (SLP)    Improve Executive Function Skills Goal 1 (SLP) -- identify  strategies, strengths, limitations;organization/planning activity;90%;with minimal cues (75-90%)  -CB       Time Frame (Executive Function Skills Goal 1, SLP) -- by discharge  -CB       Progress/Outcomes (Executive Function Skills Goal 1, SLP) -- good progress toward goal  -CB             User Key  (r) = Recorded By, (t) = Taken By, (c) = Cosigned By    Initials Name Effective Dates    SL Jemma Bradley, MS CCC-SLP 06/16/21 -     CB Mackenzie Hopkins CCC-SLP 11/10/22 -                    EDUCATION    The patient has been educated in the following areas:       Cognitive Impairment.             SLP GOALS     Row Name 12/09/22 1130 12/08/22 1030          Attention Goal 1 (SLP)    Improve Attention by Goal 1 (SLP) -- attending to task;80%;with minimal cues (75-90%)  -CB     Time Frame (Attention Goal 1, SLP) -- by discharge  -CB     Progress/Outcomes (Attention Goal 1, SLP) goal ongoing  -SL good progress toward goal  -CB     Comment (Attention Goal 1, SLP) auditory processing for mod complex yes/no questions- 83%;   Redirection back to ask x5 when completing sustained [employer] ipad game ( CT - matching task) )  -SL --        Memory Skills Goal 1 (SLP)    Improve Memory Skills Through Goal 1 (SLP) recall details of the day;use memory strategies;use written schedule;read a paragraph and answer questions;listen to a paragraph and answer questions;visual memory task;select a word from a list by exclusion;recalling unrelated word lists immediately;recalling unrelated word lists with an imposed delay;recalling related word lists with an imposed delay;recalling related word lists immediately  -SL --     Time Frame (Memory Skills Goal 1, SLP) by discharge  -SL --     Comment (Memory Skills Goal 1, SLP) immediate recall- digit sequences, and sentences - 70% indep;   24 tiem grid hidden picture matching task- 25%  -SL --        Organizational Skills Goal 1 (SLP)    Improve Thought Organization Through Goal 1 (SLP) completing mental  manipulation task;completing a verbal sequencing task;completing a divergent naming task;90%;independently (over 90% accuracy)  -SL --     Time Frame (Thought Organization Skills Goal 1, SLP) by discharge  -SL --     Comment (Thought Organization Skills Goal 1, SLP) abstract convergent categorization - 100%  -SL --        Reasoning Goal 1 (SLP)    Improve Reasoning Through Goal 1 (SLP) complete logic/creative thinking task;complete mental flexibility task;complete deductive reasoning task;complete analogies;complete high level reasoning task;90%;independently (over 90% accuracy)  -SL --     Time Frame (Reasoning Goal 1, SLP) by discharge  -SL --     Comment (Reasoning Goal 1, SLP) word deductions- 60%;  verbal analogies- 100%  -SL --        Functional Problem Solving Skills Goal 1 (SLP)    Improve Problem Solving Through Goal 1 (SLP) -- determine solutions to simple ADL/safety problems;complete organization/home management task;90%;with minimal cues (75-90%)  -CB     Time Frame (Problem Solving Goal 1, SLP) -- by discharge  -CB     Progress/Outcomes (Problem Solving Goal 1, SLP) -- good progress toward goal  -CB     Comment (Problem Solving Goal 1, SLP) situational problem solving for generation of 2 solutions- 100%;  stating similariy and difference b/w 2 items- 62% indep  -SL --        Functional Math Skills Goal 1 (SLP)    Improve Functional Math Skills Through Goal 1 (SLP) complete word problems involving money;complete word problems involving time;complete checkbook task;complete functional math task;90%;independently (over 90% accuracy)  -SL --     Comment (Functional Math Skills Goal 1, SLP) 0/3 for simple math word problems  -SL --        Executive Functional Skills Goal 1 (SLP)    Improve Executive Function Skills Goal 1 (SLP) -- identify strategies, strengths, limitations;organization/planning activity;90%;with minimal cues (75-90%)  -CB     Time Frame (Executive Function Skills Goal 1, SLP) -- by  discharge  -CB     Progress/Outcomes (Executive Function Skills Goal 1, SLP) -- good progress toward goal  -CB        Patient will demonstrate functional cognitive-linguistic skills for return to discharge environment    [place] -- with minimal cues  -CB     Time frame -- by discharge  -CB     Progress/Outcomes -- good progress toward goal  -CB           User Key  (r) = Recorded By, (t) = Taken By, (c) = Cosigned By    Initials Name Provider Type    Jemma Kelley MS CCC-SLP Speech and Language Pathologist    Mackenzie Saucedo CCC-SLP Speech and Language Pathologist                            Time Calculation:        Time Calculation- SLP     Row Name 12/09/22 1215             Time Calculation- SLP    SLP Start Time 1030  -      SLP Stop Time 1130  -      SLP Time Calculation (min) 60 min  -            User Key  (r) = Recorded By, (t) = Taken By, (c) = Cosigned By    Initials Name Provider Type    Jemma Kelley MS CCC-SLP Speech and Language Pathologist                  Therapy Charges for Today     Code Description Service Date Service Provider Modifiers Qty    71052943985 HC ST DEV OF COGN SKILLS INITIAL 15 MIN 12/9/2022 Jemma Bradley MS CCC-SLP  1    70370981877 HC ST DEV OF COGN SKILLS EACH ADDT'L 15 MIN 12/9/2022 Jemma Bradley MS CCC-SLP  3                           Jemma Bradley MS CCC-SLP  12/9/2022

## 2022-12-09 NOTE — THERAPY TREATMENT NOTE
Inpatient Rehabilitation - Physical Therapy Treatment Note       [facility]     Patient Name: Christie Avendaño  : 1965  MRN: [id number]    Today's Date: 2022                    Admit Date: 2022      Visit Dx:     ICD-10-CM ICD-9-CM   1. 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)   • Non-small cell lung cancer, right (HCC)   • Renal mass, right   • SIRS (systemic inflammatory response syndrome) (HCC)   • Hyperlipidemia   • Malignant melanoma of right lower extremity including hip (HCC)   • High grade glioma    • 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)   • Glioma (HCC)       Past Medical History:   Diagnosis Date   • Arthritis    • Asthma    • BRCA2 positive    • Diabetes mellitus (HCC)    • H/O Liver masses 2017    x3   • H/O Lung masses 2017    1 in right lower lobe and 1 in the left infrahilar location   • H/O Ovarian cyst    • H/O Right adrenal mass (CMS/HCC) 2017   • Lung cancer (HCC)    • Melanoma (HCC)     right foot   • PONV (postoperative nausea and vomiting)    • Thyroid disease        Past Surgical History:   Procedure Laterality Date   • APPENDECTOMY     • BRAIN BIOPSY Right 2022    Procedure: Right frontal stereotactic needle brain biopsy;  Surgeon: Manuel Thompson MD;  Location: [facility];  Service: Neurosurgery;  Laterality: Right;   • BREAST IMPLANT SURGERY Bilateral    • CHOLECYSTECTOMY     • HYSTERECTOMY     • MASTECTOMY Bilateral    • OVARIAN CYST  SURGERY     • THORACOSCOPY VIDEO ASSISTED WITH LOBECTOMY Right 10/9/2020    Procedure: BRONCHOSCOPY, THORACOSCOPY VIDEO ASSISTED WITH ANTERIOR BASAL SEGMENTECTOMY, INTERCOSTAL NERVE BLOCK;  Surgeon: Flaca Crisostomo MD;  Location: [facility];  Service: Thoracic;  Laterality: Right;   • TONSILLECTOMY         PT ASSESSMENT (last 12 hours)     IRF PT Evaluation and Treatment     Row Name 12/09/22 0830          PT Time and Intention    Document Type daily treatment  -     Mode of Treatment physical therapy  -     Patient/Family/Caregiver Comments/Observations pt supine in bed  -     Row Name 12/09/22 0830          General Information    Patient Profile Reviewed yes  -     Existing Precautions/Restrictions fall  -     Row Name 12/09/22 0830          Pain Assessment    Pretreatment Pain Rating 0/10 - no pain  -     Posttreatment Pain Rating 0/10 - no pain  -     Row Name 12/09/22 0830          Cognition/Psychosocial    Affect/Mental Status (Cognition) WFL  -     Row Name 12/09/22 0830          Bed Mobility    Supine-Sit [place] (Bed Mobility) standby assist  -     Assistive Device (Bed Mobility) bed rails  -     Row Name 12/09/22 0830          Sit-Stand Transfer    Sit-Stand [place] (Transfers) contact guard;1 person assist  -     Assistive Device (Sit-Stand Transfers) walker, front-wheeled  -     Row Name 12/09/22 0830          Stand-Sit Transfer    Stand-Sit [place] (Transfers) verbal cues;contact guard;1 person assist  -     Assistive Device (Stand-Sit Transfers) walker, front-wheeled  -     Row Name 12/09/22 0830          Toilet Transfer    Type (Toilet Transfer) sit-stand;stand-sit  -     [place] Level (Toilet Transfer) contact guard;1 person assist  -     Assistive Device (Toilet Transfer) commode;grab bars/safety frame  -     Row Name 12/09/22 0830          Gait/Stairs (Locomotion)    [place] Level (Gait) contact guard  -     Assistive Device (Gait)  "walker, front-wheeled  -     Distance in Feet (Gait) 160'; 80'  -     Comment, (Gait/Stairs) cued to \"Kick a ball\" on second lap due to decreased clearance of L foot with fatigue  -[email or web address]     Row Name 12/09/22 0830          Safety Issues, Functional Mobility    Safety Issues Affecting Function (Mobility) insight into deficits/self-awareness;awareness of need for assistance  -     Impairments Affecting Function (Mobility) balance;endurance/activity tolerance;coordination;motor control;sensation/sensory awareness;strength  -     Row Name 12/09/22 0830          Balance    Comment, Balance pt had strong lean to the L while walking out of bathroom back to her WC in her room that required mod A to right pt back to midline   -     Row Name 12/09/22 0830          Motor Skills    Therapeutic Exercise hip;knee;ankle  -     Row Name 12/09/22 0830          Hip (Therapeutic Exercise)    Hip (Therapeutic Exercise) strengthening exercise  -     Hip Strengthening (Therapeutic Exercise) bilateral;sitting;marching while seated;15 repititions  -     Row Name 12/09/22 0830          Knee (Therapeutic Exercise)    Knee (Therapeutic Exercise) strengthening exercise  -     Knee Strengthening (Therapeutic Exercise) bilateral;sitting;LAQ (long arc quad);15 repititions  -[employer]     Row Name 12/09/22 0830          Ankle (Therapeutic Exercise)    Ankle (Therapeutic Exercise) strengthening exercise  -     Ankle Strengthening (Therapeutic Exercise) bilateral;sitting;dorsiflexion;plantarflexion;15 repititions  -     Row Name 12/09/22 0830          Positioning and Restraints    Pre-Treatment Position sitting in chair/recliner  -     Post Treatment Position wheelchair  -JK     In Wheelchair sitting;encouraged to call for assist;exit alarm on;with OT;heels elevated  -           User Key  (r) = Recorded By, (t) = Taken By, (c) = Cosigned By    Initials Name Provider Type    Samantha Del Rosario, PT Physical Therapist              Wound " Right scalp Incision (Active)   Dressing Appearance open to air 12/08/22 2211   Closure Staples 12/08/22 2211   Base clean;dry;scab 12/08/22 2211   Periwound intact;dry 12/08/22 2211   Periwound Temperature warm 12/08/22 2211   Periwound Skin Turgor soft 12/08/22 2211   Drainage Amount none 12/09/22 0703   Dressing Care open to air 12/09/22 0703     Physical Therapy Education     Title: PT OT SLP Therapies (Done)     Topic: Physical Therapy (Done)     Point: Mobility training (Done)     Learning Progress Summary           Patient Acceptance, E,D, VU,DU,NR by JK at 12/9/2022 0853    Acceptance, E,TB,D, VU,DU,NR by  at 12/8/2022 1420    Comment: Goals, POC    Acceptance, E, VU by CB at 12/8/2022 1204   Significant Other Acceptance, E,TB,D, VU,DU,NR by KP at 12/8/2022 1420    Comment: Goals, POC                   Point: Home exercise program (Done)     Learning Progress Summary           Patient Acceptance, E,D, VU,DU,NR by JK at 12/9/2022 0853    Acceptance, E,TB,D, VU,DU,NR by KP at 12/8/2022 1420    Comment: Goals, POC    Acceptance, E, VU by CB at 12/8/2022 1204   Significant Other Acceptance, E,TB,D, VU,DU,NR by KP at 12/8/2022 1420    Comment: Goals, POC                   Point: Body mechanics (Done)     Learning Progress Summary           Patient Acceptance, E, VU by CB at 12/8/2022 1204                   Point: Precautions (Done)     Learning Progress Summary           Patient Acceptance, E, VU by CB at 12/8/2022 1204                               User Key     Initials Effective Dates Name Provider Type Discipline    JK 06/16/21 -  Samantha Thompson, PT Physical Therapist PT    KP 06/16/21 -  Anita Serrano, PT Physical Therapist PT    CB 11/10/22 -  Mackenzie Hopkins CCC-SLP Speech and Language Pathologist SLP                PT Recommendation and Plan                          Time Calculation:      PT Charges     Row Name 12/09/22 1413 12/09/22 1011          Time Calculation    Start Time 1230  -JK 0830  -MIKHAIL      Stop Time 1300  -JK 0900  -JK     Time Calculation (min) 30 min  -JK 30 min  -JK     PT Received On -- 12/09/22  -MIKHAIL     PT - Next Appointment -- 12/10/22  -JK        Timed Charges    67840 - PT Therapeutic Exercise Minutes 30  -JK --     18149 -  PT Neuromuscular Reeducation Minutes 15  -JK --     36203 - PT Therapeutic Activity Minutes 15  -JK --        Total Minutes    Timed Charges Total Minutes 60  -JK --      Total Minutes 60  -JK --           User Key  (r) = Recorded By, (t) = Taken By, (c) = Cosigned By    Initials Name Provider Type    Samantha Del Rosario PT Physical Therapist                Therapy Charges for Today     Code Description Service Date Service Provider Modifiers Qty    30712005374  PT NEUROMUSC RE EDUCATION EA 15 MIN 12/9/2022 Samantha Thompson, PT GP 1    21031486876 HC PT THER PROC EA 15 MIN 12/9/2022 Samantha Thompson, PT GP 2    [id number]  PT THERAPEUTIC ACT EA 15 MIN 12/9/2022 Samantha Thompson, PT GP 1        Patient was wearing a face mask during this therapy encounter. Therapist used appropriate personal protective equipment including mask and gloves.  Mask used was standard procedure mask. Appropriate PPE was worn during the entire therapy session. Hand hygiene was completed before and after therapy session. Patient is not in enhanced droplet precautions.              Samantha Thompson PT  12/9/2022

## 2022-12-09 NOTE — PROGRESS NOTES
Gastroenterology   Inpatient Progress Note    Reason for Follow Up:  Elevated LFTs    Subjective  Interval History:     Denies abdominal pain, nausea, vomiting, or dysphagia.  No obstructive jaundice symptoms.    ALT improved to 944, , alk phos 190.      Current Facility-Administered Medications:   •  calcium 500 mg vitamin D 5 mcg (200 UT) per tablet 1 tablet, 1 tablet, Oral, BID, Mathew Araujo MD, 1 tablet at 12/08/22 2211  •  calcium carbonate (TUMS) chewable tablet 500 mg (200 mg elemental), 2 tablet, Oral, TID PRN, Mathew Araujo MD  •  dexamethasone (DECADRON) tablet 4 mg, 4 mg, Oral, Q8H, Mathew Araujo MD, 4 mg at 12/09/22 0524  •  dextrose (D50W) (25 g/50 mL) IV injection 25 g, 25 g, Intravenous, Q15 Min PRN, Mathew Araujo MD  •  dextrose (GLUTOSE) oral gel 15 g, 15 g, Oral, Q15 Min PRN, Mathew Araujo MD  •  Enoxaparin Sodium (LOVENOX) syringe 40 mg, 40 mg, Subcutaneous, Q12H, Mathew Araujo MD, 40 mg at 12/08/22 2211  •  escitalopram (LEXAPRO) tablet 10 mg, 10 mg, Oral, Daily, Mathew Araujo MD, 10 mg at 12/08/22 0804  •  famotidine (PEPCID) tablet 20 mg, 20 mg, Oral, BID PRN, Mathew Araujo MD  •  glucagon (human recombinant) (GLUCAGEN DIAGNOSTIC) injection 1 mg, 1 mg, Intramuscular, Q15 Min PRN, Mathew Araujo MD  •  influenza vac split quad (FLUZONE,FLUARIX,AFLURIA,FLULAVAL) injection 0.5 mL, 0.5 mL, Intramuscular, During Hospitalization, Mathew Araujo MD  •  insulin glargine (LANTUS, SEMGLEE) injection 25 Units, 25 Units, Subcutaneous, QAM, Mathew Araujo MD, 25 Units at 12/08/22 0804  •  insulin lispro (ADMELOG) injection 0-24 Units, 0-24 Units, Subcutaneous, 4x Daily With Meals & Nightly, Mathew Araujo MD, 8 Units at 12/08/22 2212  •  insulin lispro (ADMELOG) injection 8 Units, 8 Units, Subcutaneous, TID With Meals, Mathew Araujo MD, 8 Units at 12/08/22 1714  •  latanoprost (XALATAN)  0.005 % ophthalmic solution 1 drop, 1 drop, Both Eyes, Nightly, Mathew Araujo MD, 1 drop at 12/08/22 2211  •  levETIRAcetam (KEPPRA) tablet 500 mg, 500 mg, Oral, BID, Mathew Araujo MD, 500 mg at 12/08/22 2211  •  levothyroxine (SYNTHROID, LEVOTHROID) tablet 150 mcg, 150 mcg, Oral, Q AM, Mathew Araujo MD, 150 mcg at 12/09/22 0524  •  lisinopril (PRINIVIL,ZESTRIL) tablet 20 mg, 20 mg, Oral, Daily, Mathew Araujo MD, 20 mg at 12/08/22 0804  •  nitroglycerin (NITROSTAT) SL tablet 0.4 mg, 0.4 mg, Sublingual, Q5 Min PRN, Mathew Araujo MD  •  ondansetron (ZOFRAN) tablet 4 mg, 4 mg, Oral, Q6H PRN **OR** ondansetron (ZOFRAN) injection 4 mg, 4 mg, Intravenous, Q6H PRN, Mathew Araujo MD  •  pantoprazole (PROTONIX) EC tablet 40 mg, 40 mg, Oral, Q AM, Mathew Araujo MD, 40 mg at 12/09/22 0524  •  Pharmacy Consult, , Does not apply, Continuous PRN, Mathew Dumont MD  •  polyethylene glycol (MIRALAX) packet 17 g, 17 g, Oral, Daily, Mathew Araujo MD  •  sennosides-docusate (PERICOLACE) 8.6-50 MG per tablet 1 tablet, 1 tablet, Oral, Nightly PRN, Mathew Araujo MD  •  valACYclovir (VALTREX) tablet 500 mg, 500 mg, Oral, Q24H, Mathew Dumont MD  Review of Systems:               The following systems were reviewed and negative;  gastrointestinal    Objective     Vital Signs  Temp:  [98.3 °F (36.8 °C)] 98.3 °F (36.8 °C)  Heart Rate:  [54-68] 54  Resp:  [18] 18  BP: (106-124)/(58-73) 124/73  Body mass index is 40.17 kg/m².                  General Appearance:  awake, alert, oriented, in no acute distress  Abdomen:  Soft, non-tender, normal bowel sounds; no bruits, organomegaly or masses.                Results Review:                I reviewed the patient's new clinical results.    Results from last 7 days   Lab Units 12/09/22  0539 12/08/22  0616 12/07/22  0617   WBC 10*3/mm3 13.31* 15.27* 16.17*   HEMOGLOBIN g/dL 12.9 13.0 12.8   HEMATOCRIT % 38.1 39.4 37.9    PLATELETS 10*3/mm3 225 238 261     Results from last 7 days   Lab Units 12/09/22  0539 12/08/22  0616 12/07/22  0617   SODIUM mmol/L 133* 133* 134*   POTASSIUM mmol/L 4.4 4.5 4.3   CHLORIDE mmol/L 97* 94* 97*   CO2 mmol/L 26.0 24.5 28.0   BUN mg/dL 21* 21* 20   CREATININE mg/dL 0.68 0.73 0.57   CALCIUM mg/dL 9.1 8.9 8.7   BILIRUBIN mg/dL 0.6 0.6  --    ALK PHOS U/L 190* 190*  --    ALT (SGPT) U/L 944* 1,049*  --    AST (SGOT) U/L 176* 386*  --    GLUCOSE mg/dL 134* 183* 134*     Results from last 7 days   Lab Units 12/08/22  1436   INR  1.02     Lab Results   Lab Value Date/Time    LIPASE 42 10/31/2018 0054       Radiology:  US Liver   Final Result   Small liver cyst. Cholecystectomy       This report was finalized on 12/8/2022 3:32 PM by Dr. Stef Pettit M.D.              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 BRCA2 gene mutation  2. High-grade glioma  3. Non-small cell lung cancer  4. Neoplasm of right breast  5. Renal cell carcinoma left kidney  6. Papillary thyroid carcinoma  7. Melena  8. 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 Hepatitis panel negative  10. Primary discontinued valacyclovir, atorvastatin, and Tylenol  pending work-up  11. Pharmacy consultation unremarkable for alternative pharmacologic therapies as underlying etiology to significant elevation in liver enzymes  12. Liver ultrasound- remarkable for a small hepatic cyst otherwise negative      Plan:  · Follow liver transaminases closely  · Obtain duplex hepatic ultrasound to further assess for thrombosis  · Obtain GEMMA, IgG profile, ASMA, EBV, CMV, HSV, VZV to rule out autoimmune versus viral cause to elevation.    I discussed the patients findings and my recommendations with patient and Dr. Gómez.          MERRY Appiah  [facility]  [address] [place] [address]

## 2022-12-09 NOTE — PROGRESS NOTES
Subjective     CHIEF COMPLAINT:     High grade glioma  Germline BRCA2 and GOERGES mutations  Papillary thyroid cancer  Bilateral DCIS  Left clear cell renal cell carcinoma   Left lower lobe NSC Lung cancer (adenocarcinoma with lipidic growth pattern)  Right lower lobe NSC lung (adenocarcinoma with lipidic growth pattern)  Melanoma of right heel    INTERVAL HISTORY:     Patient was seen with her father at bedside.  She is working with physical therapy.  She reports improvement in the strength of her left side.  No headaches.  No nausea or vomiting.  No abdominal pain.      REVIEW OF SYSTEMS:  A comprehensive review of systems was obtained with pertinent positive findings as noted in the interval history above.  All other systems negative.    SCHEDULED MEDS:  calcium 500 mg vitamin D 5 mcg (200 UT), 1 tablet, Oral, BID  dexamethasone, 4 mg, Oral, Q8H  enoxaparin, 40 mg, Subcutaneous, Q12H  escitalopram, 10 mg, Oral, Daily  insulin glargine, 25 Units, Subcutaneous, QAM  insulin lispro, 0-24 Units, Subcutaneous, 4x Daily With Meals & Nightly  insulin lispro, 8 Units, Subcutaneous, TID With Meals  latanoprost, 1 drop, Both Eyes, Nightly  levETIRAcetam, 500 mg, Oral, BID  levothyroxine, 150 mcg, Oral, Q AM  lisinopril, 20 mg, Oral, Daily  pantoprazole, 40 mg, Oral, Q AM  polyethylene glycol, 17 g, Oral, Daily  valACYclovir, 500 mg, Oral, Q24H      Objective   VITAL SIGNS:  Temp:  [97.8 °F (36.6 °C)-98.3 °F (36.8 °C)] 97.8 °F (36.6 °C)  Heart Rate:  [54-64] 64  Resp:  [18] 18  BP: (106-124)/(58-73) 111/71     PHYSICAL EXAMINATION:   GENERAL:  The patient appears in fair general condition, not in acute distress.  SKIN: No skin rash. No ecchymosis.   HEAD:  Normocephalic.  Surgical incision is CDI.  EYES:  No Jaundice. No Pallor.   NECK:  Supple. No Masses.  CHEST: Normal respiratory effort.  CARDIAC: No edema.  ABDOMEN: Nondistended.  EXTREMITIES: No calf tenderness  PSYCH: Normal mood and affect.   NEURO: Strength 4+5 in the  left upper extremity and 4/5 in the left lower extremity.    RESULT REVIEW:   Results from last 7 days   Lab Units 12/09/22  0539 12/08/22  0616 12/07/22  0617   WBC 10*3/mm3 13.31* 15.27* 16.17*   HEMOGLOBIN g/dL 12.9 13.0 12.8   HEMATOCRIT % 38.1 39.4 37.9   PLATELETS 10*3/mm3 225 238 261     Results from last 7 days   Lab Units 12/09/22  0539 12/08/22  0616 12/07/22  0617   SODIUM mmol/L 133* 133* 134*   POTASSIUM mmol/L 4.4 4.5 4.3   CHLORIDE mmol/L 97* 94* 97*   CO2 mmol/L 26.0 24.5 28.0   BUN mg/dL 21* 21* 20   CREATININE mg/dL 0.68 0.73 0.57   CALCIUM mg/dL 9.1 8.9 8.7   ALBUMIN g/dL 3.80 3.70  --    BILIRUBIN mg/dL 0.6 0.6  --    ALK PHOS U/L 190* 190*  --    ALT (SGPT) U/L 944* 1,049*  --    AST (SGOT) U/L 176* 386*  --    MAGNESIUM mg/dL 2.2  --  2.5       Assessment & Plan   *High grade glioma.   · Patient presented with recurrent falls and confusion.  · MRI on 11/22/2022 revealed a 3.2 cm ring-enhancing right supratentorial mass.  There was mass-effect and left midline shift.  · She was started on dexamethasone and Keppra.  · CT chest abdomen pelvis on 11/24/2022 revealed no evidence of progressive metastatic disease.  · S/p stereotactic brain biopsy on 11/28/2022.  · Preliminary pathology revealed high-grade glioma.  It was sent to [facility].  · Preliminary revealed high-grade glioma.  · Patient had radiation simulation on 12/2/2022.  · Plan is to start concurrent low-dose Temodar at 75 mg/m2 daily.   · She is on Decadron 4 mg p.o. every 8 hours.  · Plan is to start Radiation with concurrent Temodar on 12/12/2022.  · Temodar does not require dose reduction due to the elevated liver enzymes.     *Germline BRCA2 and GEORGES mutations.  · Patient has history of Papillary thyroid cancer, Bilateral DCIS, Left clear cell renal cell carcinoma, Left lower lobe NSC Lung cancer (adenocarcinoma with lipidic growth pattern), Right lower lobe NSC lung (adenocarcinoma with lipidic growth pattern) and  Melanoma of right heel.  · Please see the note from Dr. Collins, the patient's outpatient oncologist from 12/4/2022.    *Seizure prophylaxis.  · Patient is on Keppra 500 mg p.o. twice daily.  · Patient is not having any problems seizures.    *Elevated liver enzymes.  · ALT was 143 and AST was 71 on 11/28/2022.  · ALT was 1049 and AST was 386 on 12/8/2022.  · ALT is 944 and AST is 190 today.  · Bilirubin remains normal.    PLAN:      Plan to start radiation therapy with concurrent Temodar on Monday 12/12/2022.    Discussed with patient's father at bedside.   Discussed with Dr. Araujo.         Laura Flores MD  12/09/22

## 2022-12-10 LAB
ALBUMIN SERPL-MCNC: 4.3 G/DL (ref 3.5–5.2)
ALBUMIN/GLOB SERPL: 1.7 G/DL
ALP SERPL-CCNC: 188 U/L (ref 39–117)
ALT SERPL W P-5'-P-CCNC: 710 U/L (ref 1–33)
ANION GAP SERPL CALCULATED.3IONS-SCNC: 11.8 MMOL/L (ref 5–15)
AST SERPL-CCNC: 84 U/L (ref 1–32)
BILIRUB SERPL-MCNC: 0.7 MG/DL (ref 0–1.2)
BUN SERPL-MCNC: 22 MG/DL (ref 6–20)
BUN/CREAT SERPL: 27.8 (ref 7–25)
CALCIUM SPEC-SCNC: 9.6 MG/DL (ref 8.6–10.5)
CHLORIDE SERPL-SCNC: 93 MMOL/L (ref 98–107)
CMV IGG SERPL IA-ACNC: 2.9 U/ML (ref 0–0.59)
CMV IGM SERPL IA-ACNC: <30 AU/ML (ref 0–29.9)
CO2 SERPL-SCNC: 27.2 MMOL/L (ref 22–29)
CREAT SERPL-MCNC: 0.79 MG/DL (ref 0.57–1)
EGFRCR SERPLBLD CKD-EPI 2021: 86.8 ML/MIN/1.73
GLOBULIN UR ELPH-MCNC: 2.6 GM/DL
GLUCOSE BLDC GLUCOMTR-MCNC: 157 MG/DL (ref 70–130)
GLUCOSE BLDC GLUCOMTR-MCNC: 177 MG/DL (ref 70–130)
GLUCOSE BLDC GLUCOMTR-MCNC: 207 MG/DL (ref 70–130)
GLUCOSE BLDC GLUCOMTR-MCNC: 256 MG/DL (ref 70–130)
GLUCOSE SERPL-MCNC: 240 MG/DL (ref 65–99)
HSV1 IGG SER IA-ACNC: 29.7 INDEX (ref 0–0.9)
HSV2 IGG SER IA-ACNC: <0.91 INDEX (ref 0–0.9)
POTASSIUM SERPL-SCNC: 4.7 MMOL/L (ref 3.5–5.2)
PROT SERPL-MCNC: 6.9 G/DL (ref 6–8.5)
SMA IGG SER-ACNC: 14 UNITS (ref 0–19)
SODIUM SERPL-SCNC: 132 MMOL/L (ref 136–145)
VZV IGG SER IA-ACNC: 740 INDEX

## 2022-12-10 PROCEDURE — 25010000002 ENOXAPARIN PER 10 MG: Performed by: PHYSICAL MEDICINE & REHABILITATION

## 2022-12-10 PROCEDURE — 97530 THERAPEUTIC ACTIVITIES: CPT

## 2022-12-10 PROCEDURE — 97129 THER IVNTJ 1ST 15 MIN: CPT

## 2022-12-10 PROCEDURE — 82787 IGG 1 2 3 OR 4 EACH: CPT

## 2022-12-10 PROCEDURE — 82962 GLUCOSE BLOOD TEST: CPT

## 2022-12-10 PROCEDURE — 99232 SBSQ HOSP IP/OBS MODERATE 35: CPT | Performed by: INTERNAL MEDICINE

## 2022-12-10 PROCEDURE — 63710000001 INSULIN LISPRO (HUMAN) PER 5 UNITS: Performed by: PHYSICAL MEDICINE & REHABILITATION

## 2022-12-10 PROCEDURE — 97535 SELF CARE MNGMENT TRAINING: CPT

## 2022-12-10 PROCEDURE — 97110 THERAPEUTIC EXERCISES: CPT

## 2022-12-10 PROCEDURE — 80053 COMPREHEN METABOLIC PANEL: CPT | Performed by: PHYSICAL MEDICINE & REHABILITATION

## 2022-12-10 PROCEDURE — 63710000001 DEXAMETHASONE PER 0.25 MG: Performed by: PHYSICAL MEDICINE & REHABILITATION

## 2022-12-10 PROCEDURE — 82784 ASSAY IGA/IGD/IGG/IGM EACH: CPT

## 2022-12-10 PROCEDURE — 97130 THER IVNTJ EA ADDL 15 MIN: CPT

## 2022-12-10 RX ADMIN — INSULIN LISPRO 8 UNITS: 100 INJECTION, SOLUTION INTRAVENOUS; SUBCUTANEOUS at 20:35

## 2022-12-10 RX ADMIN — VALACYCLOVIR HYDROCHLORIDE 500 MG: 500 TABLET, FILM COATED ORAL at 08:00

## 2022-12-10 RX ADMIN — MAGNESIUM OXIDE 400 MG (241.3 MG MAGNESIUM) TABLET 400 MG: TABLET at 08:00

## 2022-12-10 RX ADMIN — INSULIN GLARGINE-YFGN 25 UNITS: 100 INJECTION, SOLUTION SUBCUTANEOUS at 08:00

## 2022-12-10 RX ADMIN — INSULIN LISPRO 8 UNITS: 100 INJECTION, SOLUTION INTRAVENOUS; SUBCUTANEOUS at 17:26

## 2022-12-10 RX ADMIN — LEVOTHYROXINE SODIUM 150 MCG: 0.15 TABLET ORAL at 05:42

## 2022-12-10 RX ADMIN — INSULIN LISPRO 8 UNITS: 100 INJECTION, SOLUTION INTRAVENOUS; SUBCUTANEOUS at 11:45

## 2022-12-10 RX ADMIN — INSULIN LISPRO 12 UNITS: 100 INJECTION, SOLUTION INTRAVENOUS; SUBCUTANEOUS at 11:45

## 2022-12-10 RX ADMIN — CALCIUM CARBONATE-VITAMIN D TAB 500 MG-200 UNIT 1 TABLET: 500-200 TAB at 20:34

## 2022-12-10 RX ADMIN — DEXAMETHASONE 4 MG: 4 TABLET ORAL at 14:33

## 2022-12-10 RX ADMIN — LEVETIRACETAM 500 MG: 500 TABLET, FILM COATED ORAL at 07:59

## 2022-12-10 RX ADMIN — PANTOPRAZOLE SODIUM 40 MG: 40 TABLET, DELAYED RELEASE ORAL at 05:42

## 2022-12-10 RX ADMIN — DEXAMETHASONE 4 MG: 4 TABLET ORAL at 05:42

## 2022-12-10 RX ADMIN — LISINOPRIL 20 MG: 20 TABLET ORAL at 08:00

## 2022-12-10 RX ADMIN — INSULIN LISPRO 4 UNITS: 100 INJECTION, SOLUTION INTRAVENOUS; SUBCUTANEOUS at 17:27

## 2022-12-10 RX ADMIN — ESCITALOPRAM 10 MG: 10 TABLET, FILM COATED ORAL at 08:00

## 2022-12-10 RX ADMIN — LATANOPROST 1 DROP: 50 SOLUTION OPHTHALMIC at 20:44

## 2022-12-10 RX ADMIN — INSULIN LISPRO 8 UNITS: 100 INJECTION, SOLUTION INTRAVENOUS; SUBCUTANEOUS at 08:00

## 2022-12-10 RX ADMIN — CALCIUM CARBONATE-VITAMIN D TAB 500 MG-200 UNIT 1 TABLET: 500-200 TAB at 08:00

## 2022-12-10 RX ADMIN — ENOXAPARIN SODIUM 40 MG: 100 INJECTION SUBCUTANEOUS at 11:45

## 2022-12-10 RX ADMIN — LEVETIRACETAM 500 MG: 500 TABLET, FILM COATED ORAL at 20:34

## 2022-12-10 RX ADMIN — POLYETHYLENE GLYCOL 3350 17 G: 17 POWDER, FOR SOLUTION ORAL at 07:59

## 2022-12-10 RX ADMIN — ENOXAPARIN SODIUM 40 MG: 100 INJECTION SUBCUTANEOUS at 23:31

## 2022-12-10 RX ADMIN — DEXAMETHASONE 4 MG: 4 TABLET ORAL at 20:36

## 2022-12-10 RX ADMIN — INSULIN LISPRO 4 UNITS: 100 INJECTION, SOLUTION INTRAVENOUS; SUBCUTANEOUS at 08:00

## 2022-12-10 NOTE — THERAPY TREATMENT NOTE
Inpatient Rehabilitation - Physical Therapy Treatment Note       [facility]     Patient Name: Christie Avendaño  : 1965  MRN: [id number]    Today's Date: 12/10/2022                    Admit Date: 2022      Visit Dx:     ICD-10-CM ICD-9-CM   1. 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)   • Non-small cell lung cancer, right (HCC)   • Renal mass, right   • SIRS (systemic inflammatory response syndrome) (HCC)   • Hyperlipidemia   • Malignant melanoma of right lower extremity including hip (HCC)   • High grade glioma    • 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)   • Glioma (HCC)       Past Medical History:   Diagnosis Date   • Arthritis    • Asthma    • BRCA2 positive    • Diabetes mellitus (HCC)    • H/O Liver masses 2017    x3   • H/O Lung masses 2017    1 in right lower lobe and 1 in the left infrahilar location   • H/O Ovarian cyst    • H/O Right adrenal mass (CMS/HCC) 2017   • Lung cancer (HCC)    • Melanoma (HCC)     right foot   • PONV (postoperative nausea and vomiting)    • Thyroid disease        Past Surgical History:   Procedure Laterality Date   • APPENDECTOMY     • BRAIN BIOPSY Right 2022    Procedure: Right frontal stereotactic needle brain biopsy;  Surgeon: Manuel Thompson MD;  Location: [facility];  Service: Neurosurgery;  Laterality: Right;   • BREAST IMPLANT SURGERY Bilateral    • CHOLECYSTECTOMY     • HYSTERECTOMY     • MASTECTOMY Bilateral    • OVARIAN  CYST SURGERY     • THORACOSCOPY VIDEO ASSISTED WITH LOBECTOMY Right 10/9/2020    Procedure: BRONCHOSCOPY, THORACOSCOPY VIDEO ASSISTED WITH ANTERIOR BASAL SEGMENTECTOMY, INTERCOSTAL NERVE BLOCK;  Surgeon: Flaca Crisostomo MD;  Location: [facility] OR;  Service: Thoracic;  Laterality: Right;   • TONSILLECTOMY         PT ASSESSMENT (last 12 hours)     IRF PT Evaluation and Treatment     Row Name 12/10/22 1000          PT Time and Intention    Document Type daily treatment  -     Mode of Treatment physical therapy  -     Patient/Family/Caregiver Comments/Observations pt seated in  no acute distress  -     Row Name 12/10/22 1000          General Information    Patient Profile Reviewed yes  -     Existing Precautions/Restrictions fall   noted nsg notes this AM- pt report fall in bathroom when standing to pull up pants LOB to L and could not recover- lowered self to ground  -     Row Name 12/10/22 1000          Pain Assessment    Pretreatment Pain Rating 0/10 - no pain  -     Posttreatment Pain Rating 0/10 - no pain  -     Row Name 12/10/22 1000          Cognition/Psychosocial    Orientation Status (Cognition) oriented x 4  -     Follows Commands (Cognition) follows one-step commands;over 90% accuracy;verbal cues/prompting required  -     Personal Safety Interventions fall prevention program maintained;gait belt;supervised activity  -     Cognitive Function executive function deficit  -     Row Name 12/10/22 1000          Bed Mobility    Comment, (Bed Mobility) NT  -     Row Name 12/10/22 1000          Sit-Stand Transfer    Sit-Stand [place] (Transfers) contact guard;standby assist  [facility]     Assistive Device (Sit-Stand Transfers) walker, 4-wheeled  -     Row Name 12/10/22 1000          Stand-Sit Transfer    Stand-Sit [place] (Transfers) contact guard;standby assist  [facility]     Assistive Device (Stand-Sit Transfers) walker, 4-wheeled  -     Row Name 12/10/22 1000          Gait/Stairs  (Locomotion)    [place] Level (Gait) contact guard  -     Assistive Device (Gait) walker, 4-wheeled  pt reports has rollator at home, trialed rollator today  -     Distance in Feet (Gait) 160x2, 80  -     Pattern (Gait) step-through  -     Deviations/Abnormal Patterns (Gait) fabrizio decreased;left sided deviations;stride length decreased  -     Bilateral Gait Deviations forward flexed posture  -     Left Sided Gait Deviations weight shift ability decreased;heel strike decreased  Vcs for inc heel toe pattern  -     Comment, (Gait/Stairs) trialed of air cast use L foot/ankle for inc proprioceptive input/stability, however no added improvement noted, DC'ed use  -     Row Name 12/10/22 1000          Motor Skills    Therapeutic Exercise --  APs, LAQ, MIP, hip abd/add, knee flex/ext x 20 seated in  RTB  -     Additional Documentation Advanced Stepping/Walking Interventions (Group)  -     Row Name 12/10/22 1000          Advanced Stepping/Walking Interventions    Stepping/Walking Interventions backward walking;side stepping  -     Backward Walking (Stepping/Walking Interventions) [facility] parallel bars x4  -     Side Stepping (Stepping/Walking Interventions) [facility] parallel bars x2  -     Row Name 12/10/22 1000          Positioning and Restraints    Pre-Treatment Position sitting in chair/recliner  -     Post Treatment Position wheelchair  -     In Wheelchair sitting;call light within reach;encouraged to call for assist;exit alarm on;patient within staff view  -           User Key  (r) = Recorded By, (t) = Taken By, (c) = Cosigned By    Initials Name Provider Type     Radha Ramírez, PT Physical Therapist              Wound Right scalp Incision (Active)   Dressing Appearance open to air 12/10/22 0800   Closure Staples;Approximated 12/10/22 0800   Base clean;dry;scab 12/10/22 0800   Drainage Amount none 12/10/22 0800   Dressing Care open to air 12/10/22 0800     Physical Therapy  Education     Title: PT OT SLP Therapies (Done)     Topic: Physical Therapy (Done)     Point: Mobility training (Done)     Learning Progress Summary           Patient Acceptance, E, VU,NR by  at 12/10/2022 1035    Acceptance, E,D, VU,DU,NR by JK at 12/9/2022 0853    Acceptance, E,TB,D, VU,DU,NR by  at 12/8/2022 1420    Comment: Goals, POC    Acceptance, E, VU by  at 12/8/2022 1204   Significant Other Acceptance, E,TB,D, VU,DU,NR by  at 12/8/2022 1420    Comment: Goals, POC                   Point: Home exercise program (Done)     Learning Progress Summary           Patient Acceptance, E, VU,NR by  at 12/10/2022 1035    Acceptance, E,D, VU,DU,NR by JK at 12/9/2022 0853    Acceptance, E,TB,D, VU,DU,NR by  at 12/8/2022 1420    Comment: Goals, POC    Acceptance, E, VU by  at 12/8/2022 1204   Significant Other Acceptance, E,TB,D, VU,DU,NR by  at 12/8/2022 1420    Comment: Goals, POC                   Point: Body mechanics (Done)     Learning Progress Summary           Patient Acceptance, E, VU,NR by  at 12/10/2022 1035    Acceptance, E, VU by  at 12/8/2022 1204                   Point: Precautions (Done)     Learning Progress Summary           Patient Acceptance, E, VU,NR by  at 12/10/2022 1035    Acceptance, E, VU by  at 12/8/2022 1204                               User Key     Initials Effective Dates Name Provider Type Discipline     06/16/21 -  Radha Ramírez, PT Physical Therapist PT    JK 06/16/21 -  Samantha Thompson, PT Physical Therapist PT    KP 06/16/21 -  Anita Serrano, PT Physical Therapist PT    CB 11/10/22 -  Mackenzie Hopkins CCC-SLP Speech and Language Pathologist SLP                PT Recommendation and Plan                          Time Calculation:      PT Charges     Row Name 12/10/22 1035             Time Calculation    Start Time 1000  -      Stop Time 1100  -      Time Calculation (min) 60 min  -      PT Received On 12/10/22  -      PT - Next Appointment 12/12/22   -         Time Calculation- PT    Total Timed Code Minutes- PT 60 minute(s)  -            User Key  (r) = Recorded By, (t) = Taken By, (c) = Cosigned By    Initials Name Provider Type     Radha Ramírez PT Physical Therapist                Therapy Charges for Today     Code Description Service Date Service Provider Modifiers Qty    [id number]  PT THER PROC EA 15 MIN 12/10/2022 Radha Ramírez PT GP 2    [id number]  PT THERAPEUTIC ACT EA 15 MIN 12/10/2022 Radha Ramírez PT GP 2        ..Patient was wearing a face mask during this therapy encounter. Therapist used appropriate personal protective equipment including eye protection, mask, and gloves.  Mask used was standard procedure mask. Appropriate PPE was worn during the entire therapy session. Hand hygiene was completed before and after therapy session. Patient is not in enhanced droplet precautions.                Radha Ramírez PT  12/10/2022

## 2022-12-10 NOTE — THERAPY TREATMENT NOTE
Inpatient Rehabilitation - Speech Language Pathology Treatment Note    [facility]     Patient Name: Christie Avendaño  : 1965  MRN: [id number]    Today's Date: 12/10/2022                   Admit Date: 2022       Visit Dx:      ICD-10-CM ICD-9-CM   1. 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)   • Non-small cell lung cancer, right (HCC)   • Renal mass, right   • SIRS (systemic inflammatory response syndrome) (HCC)   • Hyperlipidemia   • Malignant melanoma of right lower extremity including hip (HCC)   • High grade glioma    • 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)   • Glioma (HCC)       Past Medical History:   Diagnosis Date   • Arthritis    • Asthma    • BRCA2 positive    • Diabetes mellitus (HCC)    • H/O Liver masses 2017    x3   • H/O Lung masses 2017    1 in right lower lobe and 1 in the left infrahilar location   • H/O Ovarian cyst    • H/O Right adrenal mass (CMS/HCC) 2017   • Lung cancer (HCC)    • Melanoma (HCC)     right foot   • PONV (postoperative nausea and vomiting)    • Thyroid disease        Past Surgical History:   Procedure Laterality Date   • APPENDECTOMY     • BRAIN BIOPSY Right 2022    Procedure: Right frontal stereotactic needle brain biopsy;  Surgeon: Manuel Thompson MD;  Location: [facility];  Service: Neurosurgery;  Laterality: Right;   • BREAST IMPLANT SURGERY Bilateral    • CHOLECYSTECTOMY     • HYSTERECTOMY     • MASTECTOMY Bilateral    •  OVARIAN CYST SURGERY     • THORACOSCOPY VIDEO ASSISTED WITH LOBECTOMY Right 10/9/2020    Procedure: BRONCHOSCOPY, THORACOSCOPY VIDEO ASSISTED WITH ANTERIOR BASAL SEGMENTECTOMY, INTERCOSTAL NERVE BLOCK;  Surgeon: Flaca Crisostomo MD;  Location: [facility] OR;  Service: Thoracic;  Laterality: Right;   • TONSILLECTOMY         SLP Recommendation and Plan                                                            SLP EVALUATION (last 72 hours)     SLP SLC Evaluation     Row Name 12/10/22 1400 12/09/22 1130 12/08/22 1030             Communication Assessment/Intervention    Document Type therapy note (daily note)  -TH therapy note (daily note)  -SL evaluation  -CB      Subjective Information no complaints  -TH no complaints  -SL no complaints  -CB      Patient Observations -- alert;cooperative;agree to therapy  -SL alert;cooperative;agree to therapy  -CB      Patient Effort good  -TH good  -SL good  -CB      Symptoms Noted During/After Treatment -- none  -SL --         General Information    Patient Profile Reviewed -- -- yes  -CB      Pertinent History Of Current Problem -- -- 57 year old female w/brain mass found to be high-grade glioma in right thalamus.  -CB      Precautions/Limitations, Vision -- -- neglect, left  -CB      Precautions/Limitations, Hearing -- -- WFL;for purposes of eval  -CB      Patient Level of Education -- -- 11th grade education  -CB      Prior Level of Function-Communication -- -- WFL  -CB      Plans/Goals Discussed with -- -- patient;agreed upon  -CB      Patient's Goals for Discharge -- -- functional cognition  -CB      Standardized Assessment Used -- -- CLQT  -CB         CLQT (The Cognitive Linguistic Quick Test)    Attention Domain Score -- -- 86  -CB      Attention Severity Rating -- -- 2: Moderate  -CB      Memory Domain Score -- -- 121  -CB      Memory Severity Rating -- -- 2: Moderate  -CB      Executive Function Domain Score -- -- 8  -CB      Executive Function Severity Rating -- -- 1:  Severe  -CB      Language Domain Score -- -- 28  -CB      Language Severity Rating -- -- 3: Mild  -CB      Visuospatial Domain Score -- -- 31  -CB      Visuospatial Severity Rating -- -- 1: Severe  -CB      Clock Drawing Total Score -- -- 11  -CB      Clock Drawing Severity Rating -- -- Mild  -CB      Composite Severity Rating -- -- 1.8  -CB      Composite Severity Rating Range -- -- 2.4 - 1.5: Moderate  -CB      CLQT Comments -- -- Pt presents with left neglect.  -CB         SLP Evaluation Clinical Impressions    SLP Diagnosis -- -- moderate-severe;cognitive-linguistic disorder  -CB      Rehab Potential/Prognosis -- -- good  -CB      SLC Criteria for Skilled Therapy Interventions Met -- -- yes  -CB      Functional Impact -- -- functional impact in social situations;functional impact in ADLs;unable to make medical decisions  -CB         Recommendations    Therapy Frequency (SLP SLC) -- -- PRN  -CB      Predicted Duration Therapy Intervention (Days) -- -- until discharge  -CB      Anticipated Discharge Disposition (SLP) -- -- home with assist  -CB         Patient will demonstrate functional cognitive-linguistic skills for return to discharge environment    [place] -- -- with minimal cues  -CB      Time frame -- -- by discharge  -CB      Progress/Outcomes -- -- good progress toward goal  -CB         Attention Goal 1 (SLP)    Improve Attention by Goal 1 (SLP) -- -- attending to task;80%;with minimal cues (75-90%)  -CB      Time Frame (Attention Goal 1, SLP) -- -- by discharge  -CB      Progress/Outcomes (Attention Goal 1, SLP) -- -- good progress toward goal  -CB         Functional Problem Solving Skills Goal 1 (SLP)    Improve Problem Solving Through Goal 1 (SLP) -- -- determine solutions to simple ADL/safety problems;complete organization/home management task;90%;with minimal cues (75-90%)  -CB      Time Frame (Problem Solving Goal 1, SLP) -- -- by discharge  -CB      Progress/Outcomes (Problem Solving Goal 1,  SLP) -- -- good progress toward goal  -CB         Executive Functional Skills Goal 1 (SLP)    Improve Executive Function Skills Goal 1 (SLP) -- -- identify strategies, strengths, limitations;organization/planning activity;90%;with minimal cues (75-90%)  -CB      Time Frame (Executive Function Skills Goal 1, SLP) -- -- by discharge  -CB      Progress/Outcomes (Executive Function Skills Goal 1, SLP) -- -- good progress toward goal  -CB            User Key  (r) = Recorded By, (t) = Taken By, (c) = Cosigned By    Initials Name Effective Dates    SL Jemma Bradley, MS CCC-[facility] 06/16/21 -     TH Anuradha Kenny 06/16/21 -     CB Mackenzie Hopkins CCC-SLP 11/10/22 -                    EDUCATION    The patient has been educated in the following areas:       Cognitive Impairment.             SLP GOALS     Row Name 12/10/22 1400 12/09/22 1130 12/08/22 1030       Attention Goal 1 (SLP)    Improve Attention by Goal 1 (SLP) -- -- attending to task;80%;with minimal cues (75-90%)  -CB    Time Frame (Attention Goal 1, SLP) -- -- by discharge  -CB    Progress/Outcomes (Attention Goal 1, SLP) goal ongoing  -TH goal ongoing  - good progress toward goal  -CB    Comment (Attention Goal 1, SLP) Patient was easily distracted and required cues when working on formal task for sustained attention to task. Selective attention task- written directions- completed with 25% accuracy. Completed card sorting task with approximately 60% accuracy for attention to detail and recall of game rules. Scanning to the left was adequate for worksheet task, however, when playing cards patient demonstrated more difficulty with attention to her left side and required min-mod cues.  -TH auditory processing for mod complex yes/no questions- 83%;   Redirection back to ask x5 when completing sustained attn ipad game ( CT - matching task) )  - --       Memory Skills Goal 1 (SLP)    Improve Memory Skills Through Goal 1 (SLP) -- recall details of the day;use memory  strategies;use written schedule;read a paragraph and answer questions;listen to a paragraph and answer questions;visual memory task;select a word from a list by exclusion;recalling unrelated word lists immediately;recalling unrelated word lists with an imposed delay;recalling related word lists with an imposed delay;recalling related word lists immediately  -SL --    Time Frame (Memory Skills Goal 1, SLP) -- by discharge  -SL --    Progress/Outcomes (Memory Skills Goal 1, SLP) goal ongoing  -TH -- --    Comment (Memory Skills Goal 1, SLP) Given 5 minute delay, patient able to recall 3/3 novel facts given 5 minute delay.  -TH immediate recall- digit sequences, and sentences - 70% indep;   24 tiem grid hidden picture matching task- 25%  -SL --       Organizational Skills Goal 1 (SLP)    Improve Thought Organization Through Goal 1 (SLP) -- completing mental manipulation task;completing a verbal sequencing task;completing a divergent naming task;90%;independently (over 90% accuracy)  -SL --    Time Frame (Thought Organization Skills Goal 1, SLP) -- by discharge  -SL --    Comment (Thought Organization Skills Goal 1, SLP) -- abstract convergent categorization - 100%  -SL --       Reasoning Goal 1 (SLP)    Improve Reasoning Through Goal 1 (SLP) -- complete logic/creative thinking task;complete mental flexibility task;complete deductive reasoning task;complete analogies;complete high level reasoning task;90%;independently (over 90% accuracy)  -SL --    Time Frame (Reasoning Goal 1, SLP) -- by discharge  - --    Comment (Reasoning Goal 1, SLP) -- word deductions- 60%;  verbal analogies- 100%  -SL --       Functional Problem Solving Skills Goal 1 (SLP)    Improve Problem Solving Through Goal 1 (SLP) -- -- determine solutions to simple ADL/safety problems;complete organization/home management task;90%;with minimal cues (75-90%)  -    Time Frame (Problem Solving Goal 1, SLP) -- -- by discharge  -    Progress/Outcomes  (Problem Solving Goal 1, SLP) -- -- good progress toward goal  -CB    Comment (Problem Solving Goal 1, SLP) -- situational problem solving for generation of 2 solutions- 100%;  stating similariy and difference b/w 2 items- 62% indep  -SL --       Functional Math Skills Goal 1 (SLP)    Improve Functional Math Skills Through Goal 1 (SLP) -- complete word problems involving money;complete word problems involving time;complete checkbook task;complete functional math task;90%;independently (over 90% accuracy)  - --    Comment (Functional Math Skills Goal 1, SLP) -- 0/3 for simple math word problems  - --       Executive Functional Skills Goal 1 (SLP)    Improve Executive Function Skills Goal 1 (SLP) -- -- identify strategies, strengths, limitations;organization/planning activity;90%;with minimal cues (75-90%)  -CB    Time Frame (Executive Function Skills Goal 1, SLP) -- -- by discharge  -CB    Progress/Outcomes (Executive Function Skills Goal 1, SLP) -- -- good progress toward goal  -CB       Patient will demonstrate functional cognitive-linguistic skills for return to discharge environment    [place] -- -- with minimal cues  -CB    Time frame -- -- by discharge  -CB    Progress/Outcomes -- -- good progress toward goal  -CB          User Key  (r) = Recorded By, (t) = Taken By, (c) = Cosigned By    Initials Name Provider Type    Jemma Kelley, MS CCC-SLP Speech and Language Pathologist    Anuradha Cheung Speech and Language Pathologist    Mackenzie Saucedo CCC-SLP Speech and Language Pathologist                            Time Calculation:        Time Calculation- SLP     Row Name 12/10/22 1421             Time Calculation- [facility]    SLP Start Time 1000  -TH      SLP Stop Time 1100  -TH      SLP Time Calculation (min) 60 min  -TH            User Key  (r) = Recorded By, (t) = Taken By, (c) = Cosigned By    Initials Name Provider Type     Anuradha Kenny Speech and Language Pathologist                  Therapy  Charges for Today     Code Description Service Date Service Provider Modifiers Qty    [id number]  ST DEV OF COGN SKILLS INITIAL 15 MIN 12/10/2022 Anuradha Kenny  1    [id number]  ST DEV OF COGN SKILLS EACH ADDT'L 15 MIN 12/10/2022 Anuradha Kenny  3                           Anuradha Kenny  12/10/2022

## 2022-12-10 NOTE — THERAPY TREATMENT NOTE
Inpatient Rehabilitation - Occupational Therapy Treatment Note    [facility]     Patient Name: Christie Avendaño  : 1965  MRN: [id number]    Today's Date: 12/10/2022                 Admit Date: 2022         ICD-10-CM ICD-9-CM   1. 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)   • Non-small cell lung cancer, right (HCC)   • Renal mass, right   • SIRS (systemic inflammatory response syndrome) (HCC)   • Hyperlipidemia   • Malignant melanoma of right lower extremity including hip (HCC)   • High grade glioma    • 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)   • Glioma (HCC)       Past Medical History:   Diagnosis Date   • Arthritis    • Asthma    • BRCA2 positive    • Diabetes mellitus (HCC)    • H/O Liver masses 2017    x3   • H/O Lung masses 2017    1 in right lower lobe and 1 in the left infrahilar location   • H/O Ovarian cyst    • H/O Right adrenal mass (CMS/HCC) 2017   • Lung cancer (HCC)    • Melanoma (HCC)     right foot   • PONV (postoperative nausea and vomiting)    • Thyroid disease        Past Surgical History:   Procedure Laterality Date   • APPENDECTOMY     • BRAIN BIOPSY Right 2022    Procedure: Right frontal stereotactic needle brain biopsy;  Surgeon: Manuel Thompson MD;  Location: [facility];  Service: Neurosurgery;  Laterality: Right;   • BREAST IMPLANT SURGERY Bilateral    • CHOLECYSTECTOMY     • HYSTERECTOMY     • MASTECTOMY Bilateral    • OVARIAN CYST SURGERY      • THORACOSCOPY VIDEO ASSISTED WITH LOBECTOMY Right 10/9/2020    Procedure: BRONCHOSCOPY, THORACOSCOPY VIDEO ASSISTED WITH ANTERIOR BASAL SEGMENTECTOMY, INTERCOSTAL NERVE BLOCK;  Surgeon: Flaca Crisostomo MD;  Location: [facility] OR;  Service: Thoracic;  Laterality: Right;   • TONSILLECTOMY               IRF OT ASSESSMENT FLOWSHEET (last 12 hours)     IRF OT Evaluation and Treatment     Row Name 12/10/22 0941          OT Time and Intention    Document Type daily treatment  -MW     Mode of Treatment occupational therapy  -MW     Patient Effort good  -MW     Symptoms Noted During/After Treatment none  -MW     Row Name 12/10/22 0941          General Information    Patient Profile Reviewed yes  -MW     Patient/Family/Caregiver Comments/Observations pt up in BR upon entry to room for first session  -MW     Existing Precautions/Restrictions fall  -MW     Row Name 12/10/22 0941          Pain Assessment    Pretreatment Pain Rating 0/10 - no pain  -MW     Posttreatment Pain Rating 0/10 - no pain  -MW     Row Name 12/10/22 0941          Cognition/Psychosocial    Affect/Mental Status (Cognition) WFL  -MW     Orientation Status (Cognition) oriented x 4  -MW     Follows Commands (Cognition) follows one-step commands;over 90% accuracy;verbal cues/prompting required  -MW     Personal Safety Interventions fall prevention program maintained;gait belt;nonskid shoes/slippers when out of bed;supervised activity;toileting scheduled  -MW     Cognitive Function executive function deficit  -MW     Row Name 12/10/22 0941          Bathing    [place] Level (Bathing) bathing skills;upper body;standby assist;lower body;minimum assist (75% patient effort)  -MW     Position (Bathing) supported sitting;sink side  -MW     Set-up Assistance (Bathing) obtain supplies  -MW     Row Name 12/10/22 0941          Upper Body Dressing    Comment (Upper Body Dressing) pt did not have another clean shirt, able to adjust shirt SBA while donning  deodorant  -[facility] Name 12/10/22 0941          Lower Body Dressing    [place] Level (Lower Body Dressing) doff;don;pants/bottoms;shoes/slippers;socks;set up;verbal cues;moderate assist (50% patient effort)  -     Position (Lower Body Dressing) supported sitting;supported standing  -     Set-up Assistance (Lower Body Dressing) obtain clothing  -     Comment (Lower Body Dressing) static standing, grab bar assist, max A for donning shoes with elastic shoelaces donned  -[facility] Name 12/10/22 0941          Grooming    [place] Level (Grooming) grooming skills;deodorant application;oral care regimen;wash face, hands;set up;standby assist  -     Position (Grooming) sink side;supported sitting  -     Set-up Assistance (Grooming) obtain supplies  -MW     Row Name 12/10/22 0941          Toileting    [place] Level (Toileting) toileting skills;adjust/manage clothing;perform perineal hygiene;set up assistance;verbal cues;moderate assist (50% patient effort)  -     Assistive Device Use (Toileting) grab bar/safety frame;raised toilet seat  -     Position (Toileting) supported sitting;supported standing  -     Set-up Assistance (Toileting) obtain supplies  -     Comment (Toileting) pt able to complete anterior ed care while seated on commode, required max A for posterior hygiene in static stand CGA for balance  -[facility] Name 12/10/22 0941          Self-Feeding    [place] Level (Self-Feeding) set up  -MW     Row Name 12/10/22 0941          Sit-Stand Transfer    Sit-Stand [place] (Transfers) set up;verbal cues;contact guard;minimum assist (75% patient effort)  -     Assistive Device (Sit-Stand Transfers) wheelchair  -[facility] Name 12/10/22 0941          Stand-Sit Transfer    Stand-Sit [place] (Transfers) set up;verbal cues;contact guard;minimum assist (75% patient effort)  -     Assistive Device (Stand-Sit Transfers) wheelchair  -[facility] Name 12/10/22 0941           Toilet Transfer    Type (Toilet Transfer) stand-sit;sit-stand;stand pivot/stand step  -     [place] Level (Toilet Transfer) set up;verbal cues;minimum assist (75% patient effort)  -     Assistive Device (Toilet Transfer) wheelchair;grab bars/safety frame  -     Row Name 12/10/22 0941          Motor Skills    Results, 9 Hole Peg Test of Fine Motor Coordination pt completed L FM tasks, smaller pegs into pegbaord with increased time for accuracy, pt threading beads onto string for improved in hand manipulation. increased time with 100% accuracy  -     Row Name 12/10/22 0941          Balance    Static Standing Balance contact guard  -     Dynamic Standing Balance minimal assist  -     Row Name 12/10/22 0941          Positioning and Restraints    Pre-Treatment Position bathroom  -     Post Treatment Position bed  -     In Bed notified nsg;fowlers;call light within reach;encouraged to call for assist;exit alarm on;side rails up x2  -           User Key  (r) = Recorded By, (t) = Taken By, (c) = Cosigned By    Initials Name Effective Dates     Mireya Andre, HATTIE 08/20/21 -                  Occupational Therapy Education     Title: PT OT SLP Therapies (Done)     Topic: Occupational Therapy (Done)     Point: ADL training (Done)     Description:   Instruct learner(s) on proper safety adaptation and remediation techniques during self care or transfers.   Instruct in proper use of assistive devices.              Learning Progress Summary           Patient Acceptance, E, VU by CB at 12/8/2022 1204    Acceptance, E, VU,NR by CE at 12/8/2022 1107    Comment: fall prevention, DME including w/c and shower chair                   Point: Home exercise program (Done)     Description:   Instruct learner(s) on appropriate technique for monitoring, assisting and/or progressing therapeutic exercises/activities.              Learning Progress Summary           Patient Acceptance, E, VU by CB at 12/8/2022 1204     Acceptance, E, VU,NR by CE at 12/8/2022 1107    Comment: fall prevention, DME including w/c and shower chair                   Point: Precautions (Done)     Description:   Instruct learner(s) on prescribed precautions during self-care and functional transfers.              Learning Progress Summary           Patient Acceptance, E, VU by  at 12/8/2022 1204    Acceptance, E, VU,NR by CE at 12/8/2022 1107    Comment: fall prevention, DME including w/c and shower chair                   Point: Body mechanics (Done)     Description:   Instruct learner(s) on proper positioning and spine alignment during self-care, functional mobility activities and/or exercises.              Learning Progress Summary           Patient Acceptance, E, VU by  at 12/8/2022 1204    Acceptance, E, VU,NR by CE at 12/8/2022 1107    Comment: fall prevention, DME including w/c and shower chair                               User Key     Initials Effective Dates Name Provider Type Discipline     11/10/22 -  Mackenzie Hopkins CCC-SLP Speech and Language Pathologist SLP    CE 10/17/22 -  Portia Esqueda OT Occupational Therapist OT                    OT Recommendation and Plan                         Time Calculation:      Time Calculation- OT     Row Name 12/10/22 1135 12/10/22 1134          Time Calculation- OT    OT Start Time 1100  -MW 0830  -MW     OT Stop Time 1130  -MW 0900  -MW     OT Time Calculation (min) 30 min  -MW 30 min  -MW           User Key  (r) = Recorded By, (t) = Taken By, (c) = Cosigned By    Initials Name Provider Type     Mireya Andre OT Occupational Therapist              Therapy Charges for Today     Code Description Service Date Service Provider Modifiers Qty    31738546854 HC OT SELF CARE/MGMT/TRAIN EA 15 MIN 12/10/2022 Mireya Andre OT GO 2    01728680118 HC OT THERAPEUTIC ACT EA 15 MIN 12/10/2022 Mireya Andre OT GO 2                   Mireya Andre OT  12/10/2022

## 2022-12-10 NOTE — NURSING NOTE
Pt was on toilet, had agreed to pull cord for staff when finished; states she was trying to stand to pull up pants, went down to floor holding onto bar. Found sitting on buttocks on floor. States did not hurt herself, did not hit head. No pain, no apparent injury.

## 2022-12-10 NOTE — PROGRESS NOTES
Inpatient Rehabilitation Plan of Care Note    Plan of Care  Care Plan Reviewed - No updates at this time.    Psychosocial    Performed Intervention(s)  Support/peer groups.  Verbalizes needs and concerns.  Medication.      Sphincter Control    Performed Intervention(s)  Offer restroom during hourly rounding.  Encourage fluid intake.  Monitor Is and Os.  Timed voids.  Encourage appropriate diet.      Safety    Performed Intervention(s)  Items w/i reach.  Safety rounds.  Bed and chair alarm. Blue armband  Falls precautions.    Signed by: Ghazala Contreras RN

## 2022-12-10 NOTE — PROGRESS NOTES
Subjective     CHIEF COMPLAINT:     High grade glioma  Germline BRCA2 and GEORGES mutations  Papillary thyroid cancer  Bilateral DCIS  Left clear cell renal cell carcinoma   Left lower lobe NSC Lung cancer (adenocarcinoma with lipidic growth pattern)  Right lower lobe NSC lung (adenocarcinoma with lipidic growth pattern)  Melanoma of right heel    INTERVAL HISTORY:     Patient reports feeling okay today.  She worked with physical therapy.  She reports improvement in the strength.    REVIEW OF SYSTEMS:  A comprehensive review of systems was obtained with pertinent positive findings as noted in the interval history above.  All other systems negative.    SCHEDULED MEDS:  calcium 500 mg vitamin D 5 mcg (200 UT), 1 tablet, Oral, BID  dexamethasone, 4 mg, Oral, Q8H  enoxaparin, 40 mg, Subcutaneous, Q12H  escitalopram, 10 mg, Oral, Daily  insulin glargine, 25 Units, Subcutaneous, QAM  insulin lispro, 0-24 Units, Subcutaneous, 4x Daily With Meals & Nightly  insulin lispro, 8 Units, Subcutaneous, TID With Meals  latanoprost, 1 drop, Both Eyes, Nightly  levETIRAcetam, 500 mg, Oral, BID  levothyroxine, 150 mcg, Oral, Q AM  lisinopril, 20 mg, Oral, Daily  magnesium oxide, 400 mg, Oral, Daily  pantoprazole, 40 mg, Oral, Q AM  polyethylene glycol, 17 g, Oral, Daily  valACYclovir, 500 mg, Oral, Q24H      Objective   VITAL SIGNS:  Temp:  [97.3 °F (36.3 °C)-97.8 °F (36.6 °C)] 97.3 °F (36.3 °C)  Heart Rate:  [55-70] 55  Resp:  [18-20] 18  BP: (106-125)/(59-79) 106/59     PHYSICAL EXAMINATION:    GENERAL:  The patient appears in fair general condition, not in acute distress.  SKIN: No skin rash. No ecchymosis.   HEAD:  Normocephalic.   EYES:  No Jaundice. No Pallor.   NECK:  Supple. No Masses.  CHEST: Normal respiratory effort.  CARDIAC: No edema.  ABDOMEN: Soft.  No tenderness.  No rebound or rigidity.  EXTREMITIES: No calf tenderness  PSYCH: Normal mood and affect.   NEURO: Strength in the left upper extremity +4/5.  Strength in the  left lower extremity 4/5.    RESULT REVIEW:   Results from last 7 days   Lab Units 12/09/22  0539 12/08/22  0616 12/07/22  0617   WBC 10*3/mm3 13.31* 15.27* 16.17*   HEMOGLOBIN g/dL 12.9 13.0 12.8   HEMATOCRIT % 38.1 39.4 37.9   PLATELETS 10*3/mm3 225 238 261     Results from last 7 days   Lab Units 12/10/22  1110 12/09/22  0539 12/08/22  0616 12/07/22  0617   SODIUM mmol/L 132* 133* 133* 134*   POTASSIUM mmol/L 4.7 4.4 4.5 4.3   CHLORIDE mmol/L 93* 97* 94* 97*   CO2 mmol/L 27.2 26.0 24.5 28.0   BUN mg/dL 22* 21* 21* 20   CREATININE mg/dL 0.79 0.68 0.73 0.57   CALCIUM mg/dL 9.6 9.1 8.9 8.7   ALBUMIN g/dL 4.30 3.80 3.70  --    BILIRUBIN mg/dL 0.7 0.6 0.6  --    ALK PHOS U/L 188* 190* 190*  --    ALT (SGPT) U/L 710* 944* 1,049*  --    AST (SGOT) U/L 84* 176* 386*  --    MAGNESIUM mg/dL  --  2.2  --  2.5       Assessment & Plan   *High grade glioma.   · Patient presented with recurrent falls and confusion.  · MRI on 11/22/2022 revealed a 3.2 cm ring-enhancing right supratentorial mass.  There was mass-effect and left midline shift.  · She was started on dexamethasone and Keppra.  · CT chest abdomen pelvis on 11/24/2022 revealed no evidence of progressive metastatic disease.  · S/p stereotactic brain biopsy on 11/28/2022.  · Preliminary pathology revealed high-grade glioma.  It was sent to [facility].  · Preliminary revealed high-grade glioma.  · Patient had radiation simulation on 12/2/2022.  · Plan is to start concurrent low-dose Temodar at 75 mg/m2 daily.   · She is on Decadron 4 mg p.o. every 8 hours.  · Plan is to start Radiation with concurrent Temodar on 12/12/2022.  · Temodar does not require dose reduction due to the elevated liver enzymes.   · Molecular testing for MGMT promoter methylation is still pending.    *Germline BRCA2 and GEORGES mutations.  · Patient has history of Papillary thyroid cancer, Bilateral DCIS, Left clear cell renal cell carcinoma, Left lower lobe NSC Lung cancer (adenocarcinoma  with lipidic growth pattern), Right lower lobe NSC lung (adenocarcinoma with lipidic growth pattern) and Melanoma of right heel.  · Please see the note from Dr. Collins, the patient's outpatient oncologist from 12/4/2022.    *Seizure prophylaxis.  · Patient is on Keppra 500 mg p.o. twice daily.  · Patient is not having any problems seizures.    *Elevated liver enzymes.  · ALT was 143 and AST was 71 on 11/28/2022.  · ALT was 1049 and AST was 386 on 12/8/2022.  · ALT was 944 and AST was 190 on 12/9/2022.  · ALT is 710 and AST is 84 today.  · Bilirubin is normal at 0.7.    PLAN:    1.  Plan to start chemoradiation on Monday 12/12/2022.  2.  Patient to be given Zofran 1 hour before Temodar.  3.  Temodar is to be given 1 hour before radiation therapy.      Laura Flores MD  12/10/22

## 2022-12-10 NOTE — PROGRESS NOTES
LOS: 3 days   Patient Care Team:  Tiffany Holloway MD as PCP - General (Internal Medicine)  Mathew Collins Jr., MD as Consulting Physician (Hematology and Oncology)  Dorian Sabillon MD as Surgeon (General Surgery)  Thang Dumont, JOSEFA (Optometry)  Lamine Cantu DO as Referring Physician (Family Medicine)  Hali Rolle MD as Gynecologist (Gynecology)      HERON MAGANA  1965    Diagnoses    1. IMPAIRED FUNCTIONAL MOBILITY, BALANCE, GAIT, AND ENDURANCE       ADMITTING DIAGNOSIS:  High-grade glioma-3.2 cm ring-enhancing right supratentorial mass-right thalamic-with mass-effect and left midline shift  Status post stereotactic brain biopsy on November 28, 2022  Left side weakness/incoordination/impaired mobilityHigh-grade glioma-3.2 cm ring-enhancing right supratentorial mass-right thalamic-with mass-effect and left midline shift  Status post stereotactic brain biopsy on November 28, 2022  Left side weakness/incoordination/impaired mobility  Diabetes      Subjective      Seen and examined, no acute events overnight. Feels OK, denies chest pain, shortness of breath, f/c. Slept well. Reporting slight headache this morning, not interfering with day, overall controlled.     Objective     Vitals:    12/10/22 1100   BP: 100/69   Pulse: 65   Resp: 18   Temp: 97.9 °F (36.6 °C)   SpO2: 98%       PHYSICAL EXAM:   MENTAL STATUS -  AWAKE / ALERT  HEENT-right scalp incision with staples.  Clean dry and intact  SCLERAE ANICTERIC, CONJUNCTIVAE PINK  NO JVD, EARS UNREMARKABLE EXTERNALLY  LUNGS - CTA, NO WHEEZES, RALES OR RHONCHI  HEART- RRR, NO RUB, MURMUR, OR GALLOP  ABD - NORMOACTIVE BOWEL SOUNDS, SOFT, NT.     EXT - NO EDEMA OR CYANOSIS  NEURO -oriented person place time and situation.  Good historian.    Speech was fluent with slightly slow rate of speech.     Extraocular movements intact.  No dysarthria.  MOTOR EXAM - RUE/RLE 5/5.   LUE/LLE 4+/5.   Impaired motor control in the left upper extremity and left lower  extremity      MEDICATIONS  Scheduled Meds:calcium 500 mg vitamin D 5 mcg (200 UT), 1 tablet, Oral, BID  dexamethasone, 4 mg, Oral, Q8H  enoxaparin, 40 mg, Subcutaneous, Q12H  escitalopram, 10 mg, Oral, Daily  insulin glargine, 25 Units, Subcutaneous, QAM  insulin lispro, 0-24 Units, Subcutaneous, 4x Daily With Meals & Nightly  insulin lispro, 8 Units, Subcutaneous, TID With Meals  latanoprost, 1 drop, Both Eyes, Nightly  levETIRAcetam, 500 mg, Oral, BID  levothyroxine, 150 mcg, Oral, Q AM  lisinopril, 20 mg, Oral, Daily  magnesium oxide, 400 mg, Oral, Daily  pantoprazole, 40 mg, Oral, Q AM  polyethylene glycol, 17 g, Oral, Daily  valACYclovir, 500 mg, Oral, Q24H      Continuous Infusions:   PRN Meds:.•  calcium carbonate  •  dextrose  •  dextrose  •  famotidine  •  glucagon (human recombinant)  •  influenza vaccine  •  nitroglycerin  •  ondansetron **OR** ondansetron  •  senna-docusate sodium      RESULTS  Glucose   Date/Time Value Ref Range Status   12/10/2022 1054 256 (H) 70 - 130 mg/dL Final     Comment:     Meter: [id number] : [id number] Denton Elisa    12/10/2022 0556 177 (H) 70 - 130 mg/dL Final     Comment:     Meter: [id number] : [id number] Mackeyens Casper [facility]   12/09/2022 2232 162 (H) 70 - 130 mg/dL Final     Comment:     Meter: [id number] : [id number] Mackey Tremayne [facility]   12/09/2022 2042 106 70 - 130 mg/dL Final     Comment:     Meter: [id number] : [id number] Steve CALVO RN   12/09/2022 1552 213 (H) 70 - 130 mg/dL Final     Comment:     Meter: [id number] : [id number] Denton Elisa    12/09/2022 1139 227 (H) 70 - 130 mg/dL Final     Comment:     Meter: [id number] : [id number] Santos ACUÑA   12/09/2022 0656 129 70 - 130 mg/dL Final     Comment:     Meter: [id number] : [id number] Narendra Casper CNA   12/08/2022 2108 217 (H) 70 - 130 mg/dL Final     Comment:     Meter: [id number] : [id number] Narendra Casper CNA     Results from last 7 days   Lab Units 12/09/22  0539  12/08/22  0616 12/07/22  0617   WBC 10*3/mm3 13.31* 15.27* 16.17*   HEMOGLOBIN g/dL 12.9 13.0 12.8   HEMATOCRIT % 38.1 39.4 37.9   PLATELETS 10*3/mm3 225 238 261     Results from last 7 days   Lab Units 12/10/22  1110 12/09/22  0539 12/08/22  0616   SODIUM mmol/L 132* 133* 133*   POTASSIUM mmol/L 4.7 4.4 4.5   CHLORIDE mmol/L 93* 97* 94*   CO2 mmol/L 27.2 26.0 24.5   BUN mg/dL 22* 21* 21*   CREATININE mg/dL 0.79 0.68 0.73   CALCIUM mg/dL 9.6 9.1 8.9   BILIRUBIN mg/dL 0.7 0.6 0.6   ALK PHOS U/L 188* 190* 190*   ALT (SGPT) U/L 710* 944* 1,049*   AST (SGOT) U/L 84* 176* 386*   GLUCOSE mg/dL 240* 134* 183*       ASSESSMENT and PLAN    Glioma (HCC)    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  She reports intolerance to gabapentin, intolerances to opioids.  Tramadol comes up as contraindicated with history of anaphylactic secondary to morphine.  Valproate for headache control not an option with her liver changes     Left side weakness/incoordination/impaired mobility     Radiation therapy/Temodar to start December 12, 2022  Decadron 4 mg every 8 hourly  Dec 9: Oncology aware of increase of liver enzymes. They are following along      Leukocytosis-steroid/stress response.  Incision healing.      Elevated LFTs  Dec 8: Labs significant for ALT 1049 (143 11/28),  (71 11/28), Alk phos 190 (96 11/28). Taking minimal tylenol and statin is a home med- discontinued. Consulted IM who also consulted pharmacy and GI. They also decreased valtrex to 1g daily (she was taking daily prophylactic valtrex 2g bid). Awaiting liver ultrasound. CPK normal.   Dec 9- Ongoing workup. Liver ultrasound- remarkable for a small hepatic cyst otherwise negative  Per GI -[ Obtain duplex hepatic ultrasound to further assess for thrombosis  Obtain  GEMMA, IgG profile, ASMA, EBV, CMV, HSV, VZV to rule out autoimmune versus viral cause to elevation.].   Patient reviewed with internal medicine and medical oncology    Diabetes mellitus-Trulicity at home.  On Lantus/Humalog     Seizure prophylaxis-Keppra     DVT prophylaxis-Lovenox/SCDs     GI prophylaxis-protein pump inhibitor  Add calcium and vitamin D with ongoing steroids     Chronic Valtrex-dose decreased to 1000 mg daily     Germline BRCA2 and GEORGES mutations.  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     Depression-Lexapro     Hypothyroidism-on replacement     Hypertension lisinopril    12/10  - Continue comprehensive inpatient rehabilitation program  - Continue close medical monitoring for functional progress, decline or additional intervenable factors to amplify functional recovery  - Continue dex  - Monitor BG, slightly elevated recently, continue insulin regimen  - Labs reviewed, Na stable, WBC slowly trending down    Luis Robledo MD       During rounds, used appropriate personal protective equipment including mask and gloves.  Additional gown if indicated.  Mask used was standard procedure mask. Appropriate PPE was worn during the entire visit.  Hand hygiene was completed before and after.

## 2022-12-11 LAB
ALBUMIN SERPL-MCNC: 3.5 G/DL (ref 3.5–5.2)
ALBUMIN/GLOB SERPL: 1.4 G/DL
ALP SERPL-CCNC: 164 U/L (ref 39–117)
ALT SERPL W P-5'-P-CCNC: 546 U/L (ref 1–33)
ANION GAP SERPL CALCULATED.3IONS-SCNC: 10.7 MMOL/L (ref 5–15)
AST SERPL-CCNC: 73 U/L (ref 1–32)
BILIRUB SERPL-MCNC: 0.6 MG/DL (ref 0–1.2)
BUN SERPL-MCNC: 21 MG/DL (ref 6–20)
BUN/CREAT SERPL: 28.4 (ref 7–25)
CALCIUM SPEC-SCNC: 8.9 MG/DL (ref 8.6–10.5)
CHLORIDE SERPL-SCNC: 96 MMOL/L (ref 98–107)
CO2 SERPL-SCNC: 24.3 MMOL/L (ref 22–29)
CREAT SERPL-MCNC: 0.74 MG/DL (ref 0.57–1)
EGFRCR SERPLBLD CKD-EPI 2021: 93.9 ML/MIN/1.73
GLOBULIN UR ELPH-MCNC: 2.5 GM/DL
GLUCOSE BLDC GLUCOMTR-MCNC: 191 MG/DL (ref 70–130)
GLUCOSE BLDC GLUCOMTR-MCNC: 230 MG/DL (ref 70–130)
GLUCOSE BLDC GLUCOMTR-MCNC: 285 MG/DL (ref 70–130)
GLUCOSE BLDC GLUCOMTR-MCNC: 87 MG/DL (ref 70–130)
GLUCOSE SERPL-MCNC: 174 MG/DL (ref 65–99)
POTASSIUM SERPL-SCNC: 4.7 MMOL/L (ref 3.5–5.2)
PROT SERPL-MCNC: 6 G/DL (ref 6–8.5)
SODIUM SERPL-SCNC: 131 MMOL/L (ref 136–145)

## 2022-12-11 PROCEDURE — 99232 SBSQ HOSP IP/OBS MODERATE 35: CPT | Performed by: INTERNAL MEDICINE

## 2022-12-11 PROCEDURE — 63710000001 DEXAMETHASONE PER 0.25 MG: Performed by: PHYSICAL MEDICINE & REHABILITATION

## 2022-12-11 PROCEDURE — 82962 GLUCOSE BLOOD TEST: CPT

## 2022-12-11 PROCEDURE — 25010000002 ENOXAPARIN PER 10 MG: Performed by: PHYSICAL MEDICINE & REHABILITATION

## 2022-12-11 PROCEDURE — 80053 COMPREHEN METABOLIC PANEL: CPT | Performed by: PHYSICAL MEDICINE & REHABILITATION

## 2022-12-11 PROCEDURE — 63710000001 INSULIN LISPRO (HUMAN) PER 5 UNITS: Performed by: PHYSICAL MEDICINE & REHABILITATION

## 2022-12-11 RX ORDER — ONDANSETRON HYDROCHLORIDE 8 MG/1
8 TABLET, FILM COATED ORAL EVERY 24 HOURS
Status: DISCONTINUED | OUTPATIENT
Start: 2022-12-12 | End: 2022-12-23 | Stop reason: HOSPADM

## 2022-12-11 RX ADMIN — POLYETHYLENE GLYCOL 3350 17 G: 17 POWDER, FOR SOLUTION ORAL at 08:53

## 2022-12-11 RX ADMIN — CALCIUM CARBONATE-VITAMIN D TAB 500 MG-200 UNIT 1 TABLET: 500-200 TAB at 22:04

## 2022-12-11 RX ADMIN — VALACYCLOVIR HYDROCHLORIDE 500 MG: 500 TABLET, FILM COATED ORAL at 08:53

## 2022-12-11 RX ADMIN — LEVETIRACETAM 500 MG: 500 TABLET, FILM COATED ORAL at 08:57

## 2022-12-11 RX ADMIN — DEXAMETHASONE 4 MG: 4 TABLET ORAL at 14:38

## 2022-12-11 RX ADMIN — MAGNESIUM OXIDE 400 MG (241.3 MG MAGNESIUM) TABLET 400 MG: TABLET at 08:53

## 2022-12-11 RX ADMIN — LEVOTHYROXINE SODIUM 150 MCG: 0.15 TABLET ORAL at 05:13

## 2022-12-11 RX ADMIN — DEXAMETHASONE 4 MG: 4 TABLET ORAL at 05:13

## 2022-12-11 RX ADMIN — ENOXAPARIN SODIUM 40 MG: 100 INJECTION SUBCUTANEOUS at 22:05

## 2022-12-11 RX ADMIN — DEXAMETHASONE 4 MG: 4 TABLET ORAL at 22:04

## 2022-12-11 RX ADMIN — INSULIN LISPRO 4 UNITS: 100 INJECTION, SOLUTION INTRAVENOUS; SUBCUTANEOUS at 08:54

## 2022-12-11 RX ADMIN — ENOXAPARIN SODIUM 40 MG: 100 INJECTION SUBCUTANEOUS at 11:45

## 2022-12-11 RX ADMIN — ESCITALOPRAM 10 MG: 10 TABLET, FILM COATED ORAL at 08:53

## 2022-12-11 RX ADMIN — PANTOPRAZOLE SODIUM 40 MG: 40 TABLET, DELAYED RELEASE ORAL at 05:13

## 2022-12-11 RX ADMIN — INSULIN LISPRO 12 UNITS: 100 INJECTION, SOLUTION INTRAVENOUS; SUBCUTANEOUS at 12:07

## 2022-12-11 RX ADMIN — INSULIN LISPRO 8 UNITS: 100 INJECTION, SOLUTION INTRAVENOUS; SUBCUTANEOUS at 08:53

## 2022-12-11 RX ADMIN — INSULIN LISPRO 8 UNITS: 100 INJECTION, SOLUTION INTRAVENOUS; SUBCUTANEOUS at 12:07

## 2022-12-11 RX ADMIN — LEVETIRACETAM 500 MG: 500 TABLET, FILM COATED ORAL at 22:04

## 2022-12-11 RX ADMIN — CALCIUM CARBONATE-VITAMIN D TAB 500 MG-200 UNIT 1 TABLET: 500-200 TAB at 08:53

## 2022-12-11 RX ADMIN — LATANOPROST 1 DROP: 50 SOLUTION OPHTHALMIC at 22:17

## 2022-12-11 RX ADMIN — INSULIN GLARGINE-YFGN 25 UNITS: 100 INJECTION, SOLUTION SUBCUTANEOUS at 08:54

## 2022-12-11 RX ADMIN — INSULIN LISPRO 8 UNITS: 100 INJECTION, SOLUTION INTRAVENOUS; SUBCUTANEOUS at 22:05

## 2022-12-11 RX ADMIN — LISINOPRIL 20 MG: 20 TABLET ORAL at 08:53

## 2022-12-11 RX ADMIN — INSULIN LISPRO 8 UNITS: 100 INJECTION, SOLUTION INTRAVENOUS; SUBCUTANEOUS at 17:55

## 2022-12-11 NOTE — PROGRESS NOTES
[facility]  Inpatient Progress Note    Reason for Follow Up: Elevated liver tests    Subjective     Interval History:   Liver test pending this morning, no overnight events    Current Facility-Administered Medications:   •  calcium 500 mg vitamin D 5 mcg (200 UT) per tablet 1 tablet, 1 tablet, Oral, BID, Mathew Araujo MD, 1 tablet at 12/10/22 2034  •  calcium carbonate (TUMS) chewable tablet 500 mg (200 mg elemental), 2 tablet, Oral, TID PRN, Mathew Araujo MD  •  dexamethasone (DECADRON) tablet 4 mg, 4 mg, Oral, Q8H, Mathew Araujo MD, 4 mg at 12/11/22 0513  •  dextrose (D50W) (25 g/50 mL) IV injection 25 g, 25 g, Intravenous, Q15 Min PRN, Mathew Araujo MD  •  dextrose (GLUTOSE) oral gel 15 g, 15 g, Oral, Q15 Min PRN, Mathew Araujo MD  •  Enoxaparin Sodium (LOVENOX) syringe 40 mg, 40 mg, Subcutaneous, Q12H, Mathew Araujo MD, 40 mg at 12/10/22 2331  •  escitalopram (LEXAPRO) tablet 10 mg, 10 mg, Oral, Daily, Mathew Araujo MD, 10 mg at 12/10/22 0800  •  famotidine (PEPCID) tablet 20 mg, 20 mg, Oral, BID PRN, Mathew Araujo MD  •  glucagon (human recombinant) (GLUCAGEN DIAGNOSTIC) injection 1 mg, 1 mg, Intramuscular, Q15 Min PRN, Mathew Araujo MD  •  influenza vac split quad (FLUZONE,FLUARIX,AFLURIA,FLULAVAL) injection 0.5 mL, 0.5 mL, Intramuscular, During Hospitalization, Mathew Araujo MD  •  insulin glargine (LANTUS, SEMGLEE) injection 25 Units, 25 Units, Subcutaneous, QAM, Mathew Araujo MD, 25 Units at 12/10/22 0800  •  insulin lispro (ADMELOG) injection 0-24 Units, 0-24 Units, Subcutaneous, 4x Daily With Meals & Nightly, Mathew Araujo MD, 8 Units at 12/10/22 2035  •  insulin lispro (ADMELOG) injection 8 Units, 8 Units, Subcutaneous, TID With Meals, Mathew Araujo MD, 8 Units at 12/10/22 1726  •  latanoprost (XALATAN) 0.005 % ophthalmic solution 1 drop, 1 drop, Both Eyes,  Nightly, Mathew Araujo MD, 1 drop at 12/10/22 2044  •  levETIRAcetam (KEPPRA) tablet 500 mg, 500 mg, Oral, BID, Mathew Araujo MD, 500 mg at 12/10/22 2034  •  levothyroxine (SYNTHROID, LEVOTHROID) tablet 150 mcg, 150 mcg, Oral, Q AM, Mathew Araujo MD, 150 mcg at 12/11/22 0513  •  lisinopril (PRINIVIL,ZESTRIL) tablet 20 mg, 20 mg, Oral, Daily, Mathew Araujo MD, 20 mg at 12/10/22 0800  •  magnesium oxide (MAG-OX) tablet 400 mg, 400 mg, Oral, Daily, Gina Florence MD, 400 mg at 12/10/22 0800  •  nitroglycerin (NITROSTAT) SL tablet 0.4 mg, 0.4 mg, Sublingual, Q5 Min PRN, Mathew Araujo MD  •  ondansetron (ZOFRAN) tablet 4 mg, 4 mg, Oral, Q6H PRN **OR** ondansetron (ZOFRAN) injection 4 mg, 4 mg, Intravenous, Q6H PRN, Mathew Araujo MD  •  pantoprazole (PROTONIX) EC tablet 40 mg, 40 mg, Oral, Q AM, Mathew Araujo MD, 40 mg at 12/11/22 0513  •  polyethylene glycol (MIRALAX) packet 17 g, 17 g, Oral, Daily, Mathew Araujo MD, 17 g at 12/10/22 0759  •  sennosides-docusate (PERICOLACE) 8.6-50 MG per tablet 1 tablet, 1 tablet, Oral, Nightly PRN, Mathew Araujo MD  •  valACYclovir (VALTREX) tablet 500 mg, 500 mg, Oral, Q24H, Mathew Dumont MD, 500 mg at 12/10/22 0800  Review of Systems:    There is weakness of fatigue all other systems reviewed and negative    Objective     Vital Signs  Temp:  [97.7 °F (36.5 °C)-98 °F (36.7 °C)] 97.7 °F (36.5 °C)  Heart Rate:  [57-65] 57  Resp:  [18] 18  BP: ()/(52-69) 116/64  Body mass index is 40.17 kg/m².    Intake/Output Summary (Last 24 hours) at 12/11/2022 0749  Last data filed at 12/10/2022 1700  Gross per 24 hour   Intake 480 ml   Output --   Net 480 ml     No intake/output data recorded.     Physical Exam:   General: patient awake, alert and cooperative   Eyes: Normal lids and lashes, no scleral icterus   Neck: supple, normal ROM   Skin: warm and dry, not jaundiced   Cardiovascular: regular rhythm and  rate, no murmurs auscultated   Pulm: clear to auscultation bilaterally, regular and unlabored   Abdomen: soft, nontender, nondistended; normal bowel sounds   Extremities: no rash or edema   Psychiatric: Normal mood and behavior; memory intact     Results Review:     I reviewed the patient's new clinical results.    Results from last 7 days   Lab Units 12/09/22  0539 12/08/22  0616 12/07/22  0617   WBC 10*3/mm3 13.31* 15.27* 16.17*   HEMOGLOBIN g/dL 12.9 13.0 12.8   HEMATOCRIT % 38.1 39.4 37.9   PLATELETS 10*3/mm3 225 238 261     Results from last 7 days   Lab Units 12/10/22  1110 12/09/22  0539 12/08/22  0616   SODIUM mmol/L 132* 133* 133*   POTASSIUM mmol/L 4.7 4.4 4.5   CHLORIDE mmol/L 93* 97* 94*   CO2 mmol/L 27.2 26.0 24.5   BUN mg/dL 22* 21* 21*   CREATININE mg/dL 0.79 0.68 0.73   CALCIUM mg/dL 9.6 9.1 8.9   BILIRUBIN mg/dL 0.7 0.6 0.6   ALK PHOS U/L 188* 190* 190*   ALT (SGPT) U/L 710* 944* 1,049*   AST (SGOT) U/L 84* 176* 386*   GLUCOSE mg/dL 240* 134* 183*     Results from last 7 days   Lab Units 12/08/22  1436   INR  1.02     Lab Results   Lab Value Date/Time    LIPASE 42 10/31/2018 0054       Radiology:  US Liver   Final Result   Small liver cyst. Cholecystectomy       This report was finalized on 12/8/2022 3:32 PM by Dr. Stef Pettit M.D.              Assessment & Plan     Patient Active Problem List   Diagnosis   • Carcinoid tumor of left lung   • BRCA2 gene mutation positive in female   • Papillary thyroid carcinoma (HCC)   • Renal cell carcinoma of left kidney (HCC)   • Essential hypertension   • Postoperative hypothyroidism   • Normocytic anemia   • Type 2 diabetes mellitus with hyperglycemia, without long-term current use of insulin (HCC)   • Neoplasm of right breast, primary tumor staging category Tis: ductal carcinoma in situ (DCIS)   • Non-small cell lung cancer, right (HCC)   • Renal mass, right   • SIRS (systemic inflammatory response syndrome) (HCC)   • Hyperlipidemia   • Malignant  melanoma of right lower extremity including hip (HCC)   • High grade glioma    • 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 BRCA2 gene mutation  2. High-grade glioma  3. Non-small cell lung cancer  4. Neoplasm of right breast  5. Renal cell carcinoma left kidney  6. Papillary thyroid carcinoma  7. Melena  8. 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 Hepatitis panel negative  10. Primary discontinued valacyclovir, atorvastatin, and Tylenol pending work-up  11. Pharmacy consultation unremarkable for alternative pharmacologic therapies as underlying etiology to significant elevation in liver enzymes  12. Liver ultrasound- remarkable for a small hepatic cyst otherwise negative        Plan:  • Follow liver transaminases closely, they are improving, awaiting CMP this morning  • Obtain GEMMA, IgG profile, ASMA, EBV, CMV, HSV, VZV to rule out autoimmune versus viral cause to elevation.,   Some Resulted and negative others are pending  • We will follow  I discussed the patients findings and my recommendations with patient and nursing staff.    Scottie Franco MD

## 2022-12-11 NOTE — PROGRESS NOTES
Subjective     CHIEF COMPLAINT:     High grade glioma  Germline BRCA2 and GEORGES mutations  Papillary thyroid cancer  Bilateral DCIS  Left clear cell renal cell carcinoma   Left lower lobe NSC Lung cancer (adenocarcinoma with lipidic growth pattern)  Right lower lobe NSC lung (adenocarcinoma with lipidic growth pattern)  Melanoma of right heel    INTERVAL HISTORY:     Patient reports feeling okay today.  No nausea or vomiting.  No abdominal pain.  Appetite is okay.    REVIEW OF SYSTEMS:  A comprehensive review of systems was obtained with pertinent positive findings as noted in the interval history above.  All other systems negative.    SCHEDULED MEDS:  calcium 500 mg vitamin D 5 mcg (200 UT), 1 tablet, Oral, BID  dexamethasone, 4 mg, Oral, Q8H  enoxaparin, 40 mg, Subcutaneous, Q12H  escitalopram, 10 mg, Oral, Daily  insulin glargine, 25 Units, Subcutaneous, QAM  insulin lispro, 0-24 Units, Subcutaneous, 4x Daily With Meals & Nightly  insulin lispro, 8 Units, Subcutaneous, TID With Meals  latanoprost, 1 drop, Both Eyes, Nightly  levETIRAcetam, 500 mg, Oral, BID  levothyroxine, 150 mcg, Oral, Q AM  lisinopril, 20 mg, Oral, Daily  magnesium oxide, 400 mg, Oral, Daily  pantoprazole, 40 mg, Oral, Q AM  polyethylene glycol, 17 g, Oral, Daily  valACYclovir, 500 mg, Oral, Q24H      Objective   VITAL SIGNS:  Temp:  [97.7 °F (36.5 °C)-98 °F (36.7 °C)] 97.7 °F (36.5 °C)  Heart Rate:  [57-65] 57  Resp:  [18] 18  BP: ()/(52-69) 116/64     PHYSICAL EXAMINATION:    GENERAL: Appears in fair general condition, not in acute distress.  SKIN: No skin rash. No ecchymosis.   HEAD:  Normocephalic.   EYES: No jaundice.  No pallor.  NECK:  Supple. No Masses.  CHEST: Normal respiratory effort.  CARDIAC: No edema.  ABDOMEN: Soft.  Non-distended  EXTREMITIES: No calf tenderness.  PSYCH: Normal mood and affect.   NEURO: Strength 5/5 right side.  Strength +4/5 LUE and 4/5 LLE.    RESULT REVIEW:   Results from last 7 days   Lab Units  12/09/22  0539 12/08/22  0616 12/07/22  0617   WBC 10*3/mm3 13.31* 15.27* 16.17*   HEMOGLOBIN g/dL 12.9 13.0 12.8   HEMATOCRIT % 38.1 39.4 37.9   PLATELETS 10*3/mm3 225 238 261     Results from last 7 days   Lab Units 12/11/22  0610 12/10/22  1110 12/09/22  0539 12/08/22  0616 12/07/22  0617   SODIUM mmol/L 131* 132* 133* 133* 134*   POTASSIUM mmol/L 4.7 4.7 4.4 4.5 4.3   CHLORIDE mmol/L 96* 93* 97* 94* 97*   CO2 mmol/L 24.3 27.2 26.0 24.5 28.0   BUN mg/dL 21* 22* 21* 21* 20   CREATININE mg/dL 0.74 0.79 0.68 0.73 0.57   CALCIUM mg/dL 8.9 9.6 9.1 8.9 8.7   ALBUMIN g/dL 3.50 4.30 3.80 3.70  --    BILIRUBIN mg/dL 0.6 0.7 0.6 0.6  --    ALK PHOS U/L 164* 188* 190* 190*  --    ALT (SGPT) U/L 546* 710* 944* 1,049*  --    AST (SGOT) U/L 73* 84* 176* 386*  --    MAGNESIUM mg/dL  --   --  2.2  --  2.5       Assessment & Plan   *High grade glioma.   · Patient presented with recurrent falls and confusion.  · MRI on 11/22/2022 revealed a 3.2 cm ring-enhancing right supratentorial mass.  There was mass-effect and left midline shift.  · She was started on dexamethasone and Keppra.  · CT chest abdomen pelvis on 11/24/2022 revealed no evidence of progressive metastatic disease.  · S/p stereotactic brain biopsy on 11/28/2022.  · Preliminary pathology revealed high-grade glioma.  It was sent to [facility].  · Preliminary revealed high-grade glioma.  · Patient had radiation simulation on 12/2/2022.  · Plan is to start concurrent low-dose Temodar at 75 mg/m2 daily.   · She is on Decadron 4 mg p.o. every 8 hours.  · Plan is to start Radiation with concurrent Temodar on 12/12/2022.  · Temodar does not require dose reduction due to the elevated liver enzymes.   · Molecular testing for MGMT promoter methylation is still pending.    *Germline BRCA2 and GEORGES mutations.  · Patient has history of Papillary thyroid cancer, Bilateral DCIS, Left clear cell renal cell carcinoma, Left lower lobe NSC Lung cancer (adenocarcinoma with  lipidic growth pattern), Right lower lobe NSC lung (adenocarcinoma with lipidic growth pattern) and Melanoma of right heel.  · Please see the note from Dr. Collins, the patient's outpatient oncologist from 12/4/2022.    *Seizure prophylaxis.  · Patient is on Keppra 500 mg p.o. twice daily.  · Patient is not having any problems seizures.    *Elevated liver enzymes.  · ALT was 143 and AST was 71 on 11/28/2022.  · ALT was 1049 and AST was 386 on 12/8/2022.  · Liver enzymes started to improve afterwards.  · ALT is 546 and AST is 73 today.  Bilirubin 0.6.    PLAN:    1.  Plan is to start chemoradiation tomorrow 12/12/2022.  I discussed with Dr. Sam and he indicated that the patient will start radiation (between 2:00-3:30).  2.  We will give Zofran at 1:00 PM and Temodar at 2:00 PM.  Future doses will be adjusted based on her future schedule for radiation therapy.      Laura Flores MD  12/11/22

## 2022-12-11 NOTE — PROGRESS NOTES
LOS: 4 days   Patient Care Team:  Tiffany Holloway MD as PCP - General (Internal Medicine)  Mathew Collins Jr., MD as Consulting Physician (Hematology and Oncology)  Dorian Sabillon MD as Surgeon (General Surgery)  Thang Dumont, JOSEFA (Optometry)  Lamine Cantu DO as Referring Physician (Family Medicine)  Hali Rolle MD as Gynecologist (Gynecology)      HERON MAGANA  1965    Diagnoses    1. IMPAIRED FUNCTIONAL MOBILITY, BALANCE, GAIT, AND ENDURANCE       ADMITTING DIAGNOSIS:  High-grade glioma-3.2 cm ring-enhancing right supratentorial mass-right thalamic-with mass-effect and left midline shift  Status post stereotactic brain biopsy on November 28, 2022  Left side weakness/incoordination/impaired mobilityHigh-grade glioma-3.2 cm ring-enhancing right supratentorial mass-right thalamic-with mass-effect and left midline shift  Status post stereotactic brain biopsy on November 28, 2022  Left side weakness/incoordination/impaired mobility  Diabetes      Subjective      Seen and examined, no acute events overnight. Feels OK, denies chest pain, shortness of breath, f/c. Slept well. Continues to report slight headache, not interfering with day, overall controlled.     Objective     Vitals:    12/11/22 1316   BP: 116/62   Pulse: 66   Resp: 20   Temp: 98 °F (36.7 °C)   SpO2: 95%       PHYSICAL EXAM:   MENTAL STATUS -  AWAKE / ALERT  HEENT-right scalp incision with staples.  Clean dry and intact  SCLERAE ANICTERIC, CONJUNCTIVAE PINK  NO JVD, EARS UNREMARKABLE EXTERNALLY  LUNGS - CTA, NO WHEEZES, RALES OR RHONCHI  HEART- RRR, NO RUB, MURMUR, OR GALLOP  ABD - NORMOACTIVE BOWEL SOUNDS, SOFT, NT.     EXT - NO EDEMA OR CYANOSIS  NEURO -oriented person place time and situation.  Good historian.    Speech was fluent with slightly slow rate of speech.     Extraocular movements intact.  No dysarthria.  MOTOR EXAM - RUE/RLE 5/5.   LUE/LLE 4+/5.   Impaired motor control in the left upper extremity and left lower  extremity      MEDICATIONS  Scheduled Meds:calcium 500 mg vitamin D 5 mcg (200 UT), 1 tablet, Oral, BID  dexamethasone, 4 mg, Oral, Q8H  enoxaparin, 40 mg, Subcutaneous, Q12H  escitalopram, 10 mg, Oral, Daily  insulin glargine, 25 Units, Subcutaneous, QAM  insulin lispro, 0-24 Units, Subcutaneous, 4x Daily With Meals & Nightly  insulin lispro, 8 Units, Subcutaneous, TID With Meals  latanoprost, 1 drop, Both Eyes, Nightly  levETIRAcetam, 500 mg, Oral, BID  levothyroxine, 150 mcg, Oral, Q AM  lisinopril, 20 mg, Oral, Daily  magnesium oxide, 400 mg, Oral, Daily  pantoprazole, 40 mg, Oral, Q AM  polyethylene glycol, 17 g, Oral, Daily  valACYclovir, 500 mg, Oral, Q24H      Continuous Infusions:   PRN Meds:.•  calcium carbonate  •  dextrose  •  dextrose  •  famotidine  •  glucagon (human recombinant)  •  influenza vaccine  •  nitroglycerin  •  ondansetron **OR** ondansetron  •  senna-docusate sodium      RESULTS  Glucose   Date/Time Value Ref Range Status   12/11/2022 1124 285 (H) 70 - 130 mg/dL Final     Comment:     Meter: [id number] : [id number] Twan Emerson NA   12/11/2022 0545 191 (H) 70 - 130 mg/dL Final     Comment:     Meter: [id number] : [id number] Nisreencody GonsalezLupeOrestes NA   12/10/2022 1946 207 (H) 70 - 130 mg/dL Final     Comment:     Meter: [id number] : [id number] Flaquita Gonsalez'Orestes NA   12/10/2022 1638 157 (H) 70 - 130 mg/dL Final     Comment:     Meter: [id number] : [id number] Torsten Josiahaaron NA   12/10/2022 1054 256 (H) 70 - 130 mg/dL Final     Comment:     Meter: [id number] : [id number] Corrie Pérez NA   12/10/2022 0556 177 (H) 70 - 130 mg/dL Final     Comment:     Meter: [id number] : [id number] Narendra Casper CNA   12/09/2022 2232 162 (H) 70 - 130 mg/dL Final     Comment:     Meter: [id number] : [id number] Narendra Casper [facility]   12/09/2022 2042 106 70 - 130 mg/dL Final     Comment:     Meter: [id number] : [id number] Steve CALVO RN     Results from last 7 days   Lab Units  12/09/22  0539 12/08/22  0616 12/07/22  0617   WBC 10*3/mm3 13.31* 15.27* 16.17*   HEMOGLOBIN g/dL 12.9 13.0 12.8   HEMATOCRIT % 38.1 39.4 37.9   PLATELETS 10*3/mm3 225 238 261     Results from last 7 days   Lab Units 12/11/22  0610 12/10/22  1110 12/09/22  0539   SODIUM mmol/L 131* 132* 133*   POTASSIUM mmol/L 4.7 4.7 4.4   CHLORIDE mmol/L 96* 93* 97*   CO2 mmol/L 24.3 27.2 26.0   BUN mg/dL 21* 22* 21*   CREATININE mg/dL 0.74 0.79 0.68   CALCIUM mg/dL 8.9 9.6 9.1   BILIRUBIN mg/dL 0.6 0.7 0.6   ALK PHOS U/L 164* 188* 190*   ALT (SGPT) U/L 546* 710* 944*   AST (SGOT) U/L 73* 84* 176*   GLUCOSE mg/dL 174* 240* 134*       ASSESSMENT and PLAN    Glioma (HCC)    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  She reports intolerance to gabapentin, intolerances to opioids.  Tramadol comes up as contraindicated with history of anaphylactic secondary to morphine.  Valproate for headache control not an option with her liver changes     Left side weakness/incoordination/impaired mobility     Radiation therapy/Temodar to start December 12, 2022  Decadron 4 mg every 8 hourly  Dec 9: Oncology aware of increase of liver enzymes. They are following along      Leukocytosis-steroid/stress response.  Incision healing.      Elevated LFTs  Dec 8: Labs significant for ALT 1049 (143 11/28),  (71 11/28), Alk phos 190 (96 11/28). Taking minimal tylenol and statin is a home med- discontinued. Consulted IM who also consulted pharmacy and GI. They also decreased valtrex to 1g daily (she was taking daily prophylactic valtrex 2g bid). Awaiting liver ultrasound. CPK normal.   Dec 9- Ongoing workup. Liver ultrasound- remarkable for a small hepatic cyst otherwise negative  Per GI -[ Obtain duplex hepatic ultrasound to further assess for  thrombosis  Obtain GEMMA, IgG profile, ASMA, EBV, CMV, HSV, VZV to rule out autoimmune versus viral cause to elevation.].   Patient reviewed with internal medicine and medical oncology    Diabetes mellitus-Trulicity at home.  On Lantus/Humalog     Seizure prophylaxis-Keppra     DVT prophylaxis-Lovenox/SCDs     GI prophylaxis-protein pump inhibitor  Add calcium and vitamin D with ongoing steroids     Chronic Valtrex-dose decreased to 1000 mg daily     Germline BRCA2 and GEORGES mutations.  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     Depression-Lexapro     Hypothyroidism-on replacement     Hypertension lisinopril    12/10  - Continue comprehensive inpatient rehabilitation program  - Continue close medical monitoring for functional progress, decline or additional intervenable factors to amplify functional recovery  - Continue dex  - Monitor BG, slightly elevated recently, continue insulin regimen  - Labs reviewed, Na stable, WBC slowly trending down    12/11  - Continue comprehensive inpatient rehabilitation program  - Continue close medical monitoring for functional progress, decline or additional intervenable factors to amplify functional recovery  - GI following  - Reporting plan is for radiation starting tomorrow    Luis Robledo MD       During rounds, used appropriate personal protective equipment including mask and gloves.  Additional gown if indicated.  Mask used was standard procedure mask. Appropriate PPE was worn during the entire visit.  Hand hygiene was completed before and after.

## 2022-12-12 ENCOUNTER — EDUCATION (OUTPATIENT)
Dept: PHARMACY | Facility: HOSPITAL | Age: 57
End: 2022-12-12

## 2022-12-12 ENCOUNTER — APPOINTMENT (OUTPATIENT)
Dept: CARDIOLOGY | Facility: HOSPITAL | Age: 57
End: 2022-12-12

## 2022-12-12 ENCOUNTER — TREATMENT (OUTPATIENT)
Dept: RADIATION ONCOLOGY | Facility: HOSPITAL | Age: 57
End: 2022-12-12

## 2022-12-12 LAB
ALBUMIN SERPL-MCNC: 3.7 G/DL (ref 3.5–5.2)
ALBUMIN/GLOB SERPL: 2.1 G/DL
ALP SERPL-CCNC: 161 U/L (ref 39–117)
ALT SERPL W P-5'-P-CCNC: 467 U/L (ref 1–33)
ANA SER QL IF: NEGATIVE
ANION GAP SERPL CALCULATED.3IONS-SCNC: 11.5 MMOL/L (ref 5–15)
AST SERPL-CCNC: 58 U/L (ref 1–32)
BH CV VAS HEPATIC PORTAL VEIN DIAMETER: 0.81 CM
BH CV VAS HEPATOPORTAL HEPATIC V LT DIRECTION: NORMAL
BH CV VAS HEPATOPORTAL HEPATIC V LT SPONT: NORMAL
BH CV VAS HEPATOPORTAL HEPATIC V MID DIRECTION: NORMAL
BH CV VAS HEPATOPORTAL HEPATIC V MID SPONT: NORMAL
BH CV VAS HEPATOPORTAL HEPATIC V RT DIRECTION: NORMAL
BH CV VAS HEPATOPORTAL HEPATIC V RT SPONT: NORMAL
BH CV VAS HEPATOPORTAL IVC CONFLUENCE FLOW: NORMAL
BH CV VAS HEPATOPORTAL IVC CONFLUENCE SPONT: NORMAL
BH CV VAS HEPATOPORTAL IVC SPONT: NORMAL
BH CV VAS HEPATOPORTAL PORTAL V EXTRAHEPATIC DIRECTION: NORMAL
BH CV VAS HEPATOPORTAL PORTAL V EXTRAHEPATIC SPONT: NORMAL
BH CV VAS HEPATOPORTAL PORTAL V LT INTRA DIRECTION: NORMAL
BH CV VAS HEPATOPORTAL PORTAL V LT INTRA SPONT: NORMAL
BH CV VAS HEPATOPORTAL PORTAL V MAIN INTRA DIRECTION: NORMAL
BH CV VAS HEPATOPORTAL PORTAL V MAIN INTRA SPONT: NORMAL
BH CV VAS HEPATOPORTAL PORTAL V PSV: 31.4 CM/S
BH CV VAS HEPATOPORTAL PORTAL V RT INTRA DIRECTION: NORMAL
BH CV VAS HEPATOPORTAL PORTAL V RT INTRA SPONT: NORMAL
BH CV VAS HEPATOPORTAL SMV DIRECTION: NORMAL
BH CV VAS HEPATOPORTAL SMV PSV: 46.4 CM/S
BH CV VAS HEPATOPORTAL SMV SPONT: NORMAL
BH CV VAS HEPATOPORTAL SPLENIC DIRECTION: NORMAL
BH CV VAS HEPATOPORTAL SPLENIC SPONT: NORMAL
BH CV VAS HEPATOPORTAL SPLENIC V PSV: 30.6 CM/S
BH CV VAS SMA HEPATIC EDV: 34.7 CM/S
BH CV VAS SMA HEPATIC PSV: 130 CM/S
BH CV VAS SMA SPLENIC EDV: 38.3 CM/S
BH CV VAS SMA SPLENIC PSV: 87.6 CM/S
BILIRUB SERPL-MCNC: 0.7 MG/DL (ref 0–1.2)
BUN SERPL-MCNC: 20 MG/DL (ref 6–20)
BUN/CREAT SERPL: 29.4 (ref 7–25)
CALCIUM SPEC-SCNC: 9.2 MG/DL (ref 8.6–10.5)
CHLORIDE SERPL-SCNC: 97 MMOL/L (ref 98–107)
CO2 SERPL-SCNC: 24.5 MMOL/L (ref 22–29)
CREAT SERPL-MCNC: 0.68 MG/DL (ref 0.57–1)
DEPRECATED RDW RBC AUTO: 43.7 FL (ref 37–54)
EBV NA IGG SER IA-ACNC: >600 U/ML (ref 0–17.9)
EBV VCA IGG SER IA-ACNC: 538 U/ML (ref 0–17.9)
EBV VCA IGM SER IA-ACNC: <36 U/ML (ref 0–35.9)
EGFRCR SERPLBLD CKD-EPI 2021: 101.1 ML/MIN/1.73
ERYTHROCYTE [DISTWIDTH] IN BLOOD BY AUTOMATED COUNT: 15.7 % (ref 12.3–15.4)
GLOBULIN UR ELPH-MCNC: 1.8 GM/DL
GLUCOSE BLDC GLUCOMTR-MCNC: 107 MG/DL (ref 70–130)
GLUCOSE BLDC GLUCOMTR-MCNC: 140 MG/DL (ref 70–130)
GLUCOSE BLDC GLUCOMTR-MCNC: 241 MG/DL (ref 70–130)
GLUCOSE BLDC GLUCOMTR-MCNC: 278 MG/DL (ref 70–130)
GLUCOSE SERPL-MCNC: 156 MG/DL (ref 65–99)
HCT VFR BLD AUTO: 38.9 % (ref 34–46.6)
HGB BLD-MCNC: 13 G/DL (ref 12–15.9)
IGG SERPL-MCNC: 589 MG/DL (ref 586–1602)
IGG1 SER-MCNC: 344 MG/DL (ref 248–810)
IGG2 SER-MCNC: 336 MG/DL (ref 130–555)
IGG3 SER-MCNC: 31 MG/DL (ref 15–102)
IGG4 SER-MCNC: 2 MG/DL (ref 2–96)
LABORATORY COMMENT REPORT: NORMAL
MAXIMAL PREDICTED HEART RATE: 162 BPM
MCH RBC QN AUTO: 27.3 PG (ref 26.6–33)
MCHC RBC AUTO-ENTMCNC: 33.4 G/DL (ref 31.5–35.7)
MCV RBC AUTO: 81.7 FL (ref 79–97)
PLATELET # BLD AUTO: 174 10*3/MM3 (ref 140–450)
PMV BLD AUTO: 9.5 FL (ref 6–12)
POTASSIUM SERPL-SCNC: 4.3 MMOL/L (ref 3.5–5.2)
PROT SERPL-MCNC: 5.5 G/DL (ref 6–8.5)
RAD ONC ARIA COURSE ID: NORMAL
RAD ONC ARIA COURSE INTENT: NORMAL
RAD ONC ARIA COURSE LAST TREATMENT DATE: NORMAL
RAD ONC ARIA COURSE START DATE: NORMAL
RAD ONC ARIA COURSE TREATMENT ELAPSED DAYS: 0
RAD ONC ARIA FIRST TREATMENT DATE: NORMAL
RAD ONC ARIA PLAN FRACTIONS TREATED TO DATE: 1
RAD ONC ARIA PLAN ID: NORMAL
RAD ONC ARIA PLAN PRESCRIBED DOSE PER FRACTION: 2 GY
RAD ONC ARIA PLAN PRIMARY REFERENCE POINT: NORMAL
RAD ONC ARIA PLAN TOTAL FRACTIONS PRESCRIBED: 23
RAD ONC ARIA PLAN TOTAL PRESCRIBED DOSE: 4600 CGY
RAD ONC ARIA REFERENCE POINT DOSAGE GIVEN TO DATE: 2 GY
RAD ONC ARIA REFERENCE POINT DOSAGE GIVEN TO DATE: 2.02 GY
RAD ONC ARIA REFERENCE POINT ID: NORMAL
RAD ONC ARIA REFERENCE POINT ID: NORMAL
RAD ONC ARIA REFERENCE POINT SESSION DOSAGE GIVEN: 2 GY
RAD ONC ARIA REFERENCE POINT SESSION DOSAGE GIVEN: 2.02 GY
RBC # BLD AUTO: 4.76 10*6/MM3 (ref 3.77–5.28)
SERVICE CMNT-IMP: ABNORMAL
SODIUM SERPL-SCNC: 133 MMOL/L (ref 136–145)
STRESS TARGET HR: 138 BPM
VZV IGM SER IA-ACNC: <0.91 INDEX (ref 0–0.9)
WBC NRBC COR # BLD: 10.16 10*3/MM3 (ref 3.4–10.8)

## 2022-12-12 PROCEDURE — 80053 COMPREHEN METABOLIC PANEL: CPT | Performed by: PHYSICAL MEDICINE & REHABILITATION

## 2022-12-12 PROCEDURE — 63710000001 DEXAMETHASONE PER 0.25 MG: Performed by: PHYSICAL MEDICINE & REHABILITATION

## 2022-12-12 PROCEDURE — 77386 CHG INTENSITY MODULATED RADIATION TX DLVR COMPLEX: CPT | Performed by: RADIOLOGY

## 2022-12-12 PROCEDURE — 99024 POSTOP FOLLOW-UP VISIT: CPT | Performed by: NURSE PRACTITIONER

## 2022-12-12 PROCEDURE — 63710000001 INSULIN LISPRO (HUMAN) PER 5 UNITS: Performed by: PHYSICAL MEDICINE & REHABILITATION

## 2022-12-12 PROCEDURE — 97535 SELF CARE MNGMENT TRAINING: CPT

## 2022-12-12 PROCEDURE — 77014 CHG CT GUIDANCE RADIATION THERAPY FLDS PLACEMENT: CPT | Performed by: STUDENT IN AN ORGANIZED HEALTH CARE EDUCATION/TRAINING PROGRAM

## 2022-12-12 PROCEDURE — 99232 SBSQ HOSP IP/OBS MODERATE 35: CPT | Performed by: PHYSICIAN ASSISTANT

## 2022-12-12 PROCEDURE — 77338 DESIGN MLC DEVICE FOR IMRT: CPT | Performed by: RADIOLOGY

## 2022-12-12 PROCEDURE — 77386: CPT | Performed by: STUDENT IN AN ORGANIZED HEALTH CARE EDUCATION/TRAINING PROGRAM

## 2022-12-12 PROCEDURE — 97130 THER IVNTJ EA ADDL 15 MIN: CPT

## 2022-12-12 PROCEDURE — 63710000001 INSULIN LISPRO (HUMAN) PER 5 UNITS: Performed by: HOSPITALIST

## 2022-12-12 PROCEDURE — 25010000002 TEMOZOLOMIDE PER 5 MG: Performed by: INTERNAL MEDICINE

## 2022-12-12 PROCEDURE — 97112 NEUROMUSCULAR REEDUCATION: CPT | Performed by: PHYSICAL THERAPIST

## 2022-12-12 PROCEDURE — 82962 GLUCOSE BLOOD TEST: CPT

## 2022-12-12 PROCEDURE — 77386: CPT | Performed by: RADIOLOGY

## 2022-12-12 PROCEDURE — 97110 THERAPEUTIC EXERCISES: CPT | Performed by: PHYSICAL THERAPIST

## 2022-12-12 PROCEDURE — 97530 THERAPEUTIC ACTIVITIES: CPT | Performed by: PHYSICAL THERAPIST

## 2022-12-12 PROCEDURE — 77300 RADIATION THERAPY DOSE PLAN: CPT | Performed by: RADIOLOGY

## 2022-12-12 PROCEDURE — 63710000001 ONDANSETRON PER 8 MG: Performed by: INTERNAL MEDICINE

## 2022-12-12 PROCEDURE — 93975 VASCULAR STUDY: CPT

## 2022-12-12 PROCEDURE — 85027 COMPLETE CBC AUTOMATED: CPT | Performed by: PHYSICAL MEDICINE & REHABILITATION

## 2022-12-12 PROCEDURE — 97129 THER IVNTJ 1ST 15 MIN: CPT

## 2022-12-12 PROCEDURE — 77301 RADIOTHERAPY DOSE PLAN IMRT: CPT | Performed by: RADIOLOGY

## 2022-12-12 PROCEDURE — 25010000002 TEMOZOLOMIDE 140 MG CAPSULE: Performed by: INTERNAL MEDICINE

## 2022-12-12 PROCEDURE — 77427 RADIATION TX MANAGEMENT X5: CPT | Performed by: RADIOLOGY

## 2022-12-12 PROCEDURE — 25010000002 ENOXAPARIN PER 10 MG: Performed by: PHYSICAL MEDICINE & REHABILITATION

## 2022-12-12 RX ORDER — SULFAMETHOXAZOLE AND TRIMETHOPRIM 800; 160 MG/1; MG/1
1 TABLET ORAL 3 TIMES WEEKLY
Status: DISCONTINUED | OUTPATIENT
Start: 2022-12-12 | End: 2022-12-23 | Stop reason: HOSPADM

## 2022-12-12 RX ORDER — TEMOZOLOMIDE 140 MG/1
140 CAPSULE ORAL NIGHTLY
Status: DISCONTINUED | OUTPATIENT
Start: 2022-12-12 | End: 2022-12-23 | Stop reason: HOSPADM

## 2022-12-12 RX ORDER — INSULIN LISPRO 100 [IU]/ML
0-14 INJECTION, SOLUTION INTRAVENOUS; SUBCUTANEOUS
Status: DISCONTINUED | OUTPATIENT
Start: 2022-12-12 | End: 2022-12-23 | Stop reason: HOSPADM

## 2022-12-12 RX ORDER — TEMOZOLOMIDE 20 MG/1
20 CAPSULE ORAL NIGHTLY
Status: DISCONTINUED | OUTPATIENT
Start: 2022-12-12 | End: 2022-12-23 | Stop reason: HOSPADM

## 2022-12-12 RX ORDER — TEMOZOLOMIDE 5 MG/1
5 CAPSULE ORAL NIGHTLY
Status: DISCONTINUED | OUTPATIENT
Start: 2022-12-12 | End: 2022-12-23 | Stop reason: HOSPADM

## 2022-12-12 RX ORDER — VALACYCLOVIR HYDROCHLORIDE 1 G/1
2000 TABLET, FILM COATED ORAL 2 TIMES DAILY
COMMUNITY
End: 2022-12-23 | Stop reason: HOSPADM

## 2022-12-12 RX ORDER — DULAGLUTIDE 3 MG/.5ML
3 INJECTION, SOLUTION SUBCUTANEOUS WEEKLY
COMMUNITY
End: 2022-12-23 | Stop reason: HOSPADM

## 2022-12-12 RX ORDER — LISINOPRIL 20 MG/1
20 TABLET ORAL DAILY
COMMUNITY
End: 2022-12-23 | Stop reason: HOSPADM

## 2022-12-12 RX ORDER — GABAPENTIN 100 MG/1
100 CAPSULE ORAL NIGHTLY PRN
Status: DISCONTINUED | OUTPATIENT
Start: 2022-12-12 | End: 2022-12-13

## 2022-12-12 RX ADMIN — LISINOPRIL 20 MG: 20 TABLET ORAL at 08:05

## 2022-12-12 RX ADMIN — INSULIN GLARGINE-YFGN 3 UNITS: 100 INJECTION, SOLUTION SUBCUTANEOUS at 08:10

## 2022-12-12 RX ADMIN — MAGNESIUM OXIDE 400 MG (241.3 MG MAGNESIUM) TABLET 400 MG: TABLET at 08:04

## 2022-12-12 RX ADMIN — TEMOZOLOMIDE 5 MG: 5 CAPSULE ORAL at 21:35

## 2022-12-12 RX ADMIN — DEXAMETHASONE 4 MG: 4 TABLET ORAL at 16:13

## 2022-12-12 RX ADMIN — TEMOZOLOMIDE 140 MG: 140 CAPSULE ORAL at 21:35

## 2022-12-12 RX ADMIN — DEXAMETHASONE 4 MG: 4 TABLET ORAL at 05:15

## 2022-12-12 RX ADMIN — ONDANSETRON HYDROCHLORIDE 8 MG: 8 TABLET, FILM COATED ORAL at 13:39

## 2022-12-12 RX ADMIN — GABAPENTIN 100 MG: 100 CAPSULE ORAL at 19:25

## 2022-12-12 RX ADMIN — INSULIN LISPRO 3 UNITS: 100 INJECTION, SOLUTION INTRAVENOUS; SUBCUTANEOUS at 08:03

## 2022-12-12 RX ADMIN — CALCIUM CARBONATE-VITAMIN D TAB 500 MG-200 UNIT 1 TABLET: 500-200 TAB at 08:07

## 2022-12-12 RX ADMIN — INSULIN LISPRO 8 UNITS: 100 INJECTION, SOLUTION INTRAVENOUS; SUBCUTANEOUS at 07:59

## 2022-12-12 RX ADMIN — SULFAMETHOXAZOLE AND TRIMETHOPRIM 1 TABLET: 800; 160 TABLET ORAL at 16:55

## 2022-12-12 RX ADMIN — CALCIUM CARBONATE-VITAMIN D TAB 500 MG-200 UNIT 1 TABLET: 500-200 TAB at 19:37

## 2022-12-12 RX ADMIN — LATANOPROST 1 DROP: 50 SOLUTION OPHTHALMIC at 21:34

## 2022-12-12 RX ADMIN — TEMOZOLOMIDE 20 MG: 20 CAPSULE ORAL at 21:35

## 2022-12-12 RX ADMIN — ESCITALOPRAM 10 MG: 10 TABLET, FILM COATED ORAL at 08:05

## 2022-12-12 RX ADMIN — POLYETHYLENE GLYCOL 3350 17 G: 17 POWDER, FOR SOLUTION ORAL at 08:04

## 2022-12-12 RX ADMIN — INSULIN GLARGINE-YFGN 25 UNITS: 100 INJECTION, SOLUTION SUBCUTANEOUS at 07:58

## 2022-12-12 RX ADMIN — LEVETIRACETAM 500 MG: 500 TABLET, FILM COATED ORAL at 19:37

## 2022-12-12 RX ADMIN — ENOXAPARIN SODIUM 40 MG: 100 INJECTION SUBCUTANEOUS at 11:37

## 2022-12-12 RX ADMIN — PANTOPRAZOLE SODIUM 40 MG: 40 TABLET, DELAYED RELEASE ORAL at 05:15

## 2022-12-12 RX ADMIN — VALACYCLOVIR HYDROCHLORIDE 500 MG: 500 TABLET, FILM COATED ORAL at 08:05

## 2022-12-12 RX ADMIN — INSULIN LISPRO 8 UNITS: 100 INJECTION, SOLUTION INTRAVENOUS; SUBCUTANEOUS at 17:26

## 2022-12-12 RX ADMIN — LEVETIRACETAM 500 MG: 500 TABLET, FILM COATED ORAL at 08:04

## 2022-12-12 RX ADMIN — DEXAMETHASONE 4 MG: 4 TABLET ORAL at 21:29

## 2022-12-12 RX ADMIN — LEVOTHYROXINE SODIUM 150 MCG: 0.15 TABLET ORAL at 05:15

## 2022-12-12 NOTE — PROGRESS NOTES
"Nutrition Services    Patient Name:  Christie Avendaño  YOB: 1965  MRN: [id number]  Admit Date:  12/7/2022    Assessment Date:  12/12/22    PROGRESS NOTE - REHAB    Encounter Information        Current Issues Tolerating diet. LFTs are improved. No n/v. Started chemo-radiation today. Liver u/s showed small hepatic cyst. Left sided weakness noted.      Current Nutrition Orders & Evaluation of Intake       Oral Nutrition     Current PO Diet NPO Diet NPO Type: Ice Chips   Supplement    PO Evaluation    % PO Intake/# of days 50-75%/3 days   Factors Affecting Intake  weakness, Other: beginning chemo/XRT for glio     Anthropometrics         Height   Weight Height: 165.1 cm (65\")  Weight: 110 kg (241 lb 6.5 oz) (12/07/22 1642)   BMI kg/m2 Body mass index is 40.17 kg/m².   Weight trend No new weight available     Physical Findings          Physical Appearance alert, oriented, room air   Oral/Mouth Cavity WNL   Edema  no edema   Gastrointestinal last bowel movement:today?   Skin  skin intact   Tubes/Drains none     Labs       Pertinent Labs Reviewed, listed below     Results from last 7 days   Lab Units 12/12/22  0555 12/11/22  0610 12/10/22  1110   SODIUM mmol/L 133* 131* 132*   POTASSIUM mmol/L 4.3 4.7 4.7   CHLORIDE mmol/L 97* 96* 93*   CO2 mmol/L 24.5 24.3 27.2   BUN mg/dL 20 21* 22*   CREATININE mg/dL 0.68 0.74 0.79   CALCIUM mg/dL 9.2 8.9 9.6   BILIRUBIN mg/dL 0.7 0.6 0.7   ALK PHOS U/L 161* 164* 188*   ALT (SGPT) U/L 467* 546* 710*   AST (SGOT) U/L 58* 73* 84*   GLUCOSE mg/dL 156* 174* 240*     Results from last 7 days   Lab Units 12/12/22  0555 12/09/22  0539 12/08/22  0616 12/07/22  0617   MAGNESIUM mg/dL  --  2.2  --  2.5   HEMOGLOBIN g/dL 13.0 12.9   < > 12.8   HEMATOCRIT % 38.9 38.1   < > 37.9   WBC 10*3/mm3 10.16 13.31*   < > 16.17*    < > = values in this interval not displayed.     Results from last 7 days   Lab Units 12/12/22  0555 12/09/22  0539 12/08/22  1436 12/08/22  0616 12/07/22  0617   INR   " --   --  1.02  --   --    PLATELETS 10*3/mm3 174 225  --  238 261     COVID19   Date Value Ref Range Status   11/22/2022 Not Detected Not Detected - Ref. Range Final     Lab Results   Component Value Date    HGBA1C 6.70 (H) 11/24/2022          Medications           Scheduled Medications calcium 500 mg vitamin D 5 mcg (200 UT), 1 tablet, Oral, BID  dexamethasone, 4 mg, Oral, Q8H  enoxaparin, 40 mg, Subcutaneous, Q12H  escitalopram, 10 mg, Oral, Daily  [START ON 12/13/2022] insulin glargine, 28 Units, Subcutaneous, QAM  insulin lispro, 0-14 Units, Subcutaneous, TID AC  insulin lispro, 8 Units, Subcutaneous, TID With Meals  latanoprost, 1 drop, Both Eyes, Nightly  levETIRAcetam, 500 mg, Oral, BID  levothyroxine, 150 mcg, Oral, Q AM  lisinopril, 20 mg, Oral, Daily  magnesium oxide, 400 mg, Oral, Daily  ondansetron, 8 mg, Oral, Q24H  pantoprazole, 40 mg, Oral, Q AM  polyethylene glycol, 17 g, Oral, Daily  sulfamethoxazole-trimethoprim, 1 tablet, Oral, Once per day on Mon Wed Fri  temozolomide, 140 mg, Oral, Nightly   And  temozolomide, 20 mg, Oral, Nightly   And  temozolomide, 5 mg, Oral, Nightly  valACYclovir, 500 mg, Oral, Q24H       Infusions     PRN Medications •  calcium carbonate  •  dextrose  •  dextrose  •  famotidine  •  glucagon (human recombinant)  •  influenza vaccine  •  nitroglycerin  •  ondansetron **OR** ondansetron  •  senna-docusate sodium     PLAN OF CARE  Intervention Goal        Intervention Goal(s) Maintain nutrition status, Tolerate PO  and PO intake goal %: 75     Nutrition Intervention        RD Action Advise alternative selection, Menu provided, Encourage intake, Follow Tx Progress and Care plan reviewed     Prescription        Diet Prescription    Supplement Prescription    EN/PN Prescription    Prescription Ordered No changes at this time   --  Monitor/Evaluation        Monitor Per protocol   Discharge Plan Pending clinical course   Education Will educate as needed       Electronically signed  by:  Oralia Marie, JONATHAN  12/12/22 14:52 EST

## 2022-12-12 NOTE — PROGRESS NOTES
Inpatient Rehabilitation Plan of Care Note    Plan of Care  Updated Problems/Interventions  Mobility    [PT] Walk(Active)  Current Status(12/12/2022): 160 ft, CG to occ min, VC, rwx  Weekly Goal(12/19/2022): 160 ft, CG, rwx  Discharge Goal: 160 ft, Sup, rwx    [PT] Bed/Chair/Wheelchair(Active)  Current Status(12/12/2022): Min/CGA, rwx  Weekly Goal(12/19/2022): CG, rwx  Discharge Goal: Sup, RWX    [PT] Bed Mobility(Active)  Current Status(12/12/2022): SBA, VCS  Weekly Goal(12/19/2022): Mod I  Discharge Goal: Mod Indep    Signed by: Samantha Thompson, PT

## 2022-12-12 NOTE — PROGRESS NOTES
PPS CMG Coordinator  Inpatient Rehabilitation Admission    Ethnic Group: White.  Marital Status:  Marital Status: .    IRF Admission Date:  12/07/2022  Admission Class: Initial Rehab.  Admit From:  [facility]    Pre-Hospital Living: Home. Pre-Hospital Living  With: (2) Family/Relatives.    Payment Sources: Primary: Not Listed.  Secondary: Medicare Fee for Service  Impairment Group: 02.1 Non-traumatic  Date of Onset of Impairment: 11/22/2022    Etiologic Diagnosis Code(s):  Rank Code      Description  1    C71.9     Malignant neoplasm of brain, unspecified    Comorbidities:  ICD    Are there any arthritis conditions recorded for Impairment Group, Etiologic  Diagnosis, or Comorbid Conditions that meet all of the regulatory requirements  for IRF classification (in 42 .29(b)(2)(x), (xi), and xii))? No    Presence of Pressure Ulcer:  No observed/documented pressure ulcers.    MEDICAL NEEDS  Height on Admission:  65 inches.  Weight on Admission:  241 pounds.    QUALITY INDICATORS  Prior Functioning:  Self Care: Patient completed all the activities by themself, with or without an  assistive device, with no assistance from a helper.  Indoor Mobility: Patient completed the activities by themself, with or without  an assistive device, with no assistance from a helper.  Stairs: Patient completed the activities by themself, with or without an  assistive device, with no assistance from a helper.  Functional Cognition: Patient needed partial assistance from another person to  complete activities.  Prior Device Use: Patient does not use manual or motorized wheelchair or  scooter, mechanical lift, walker, or an orthotic/prosthesis.    Bladder and Bowel: Bladder Continence: Incontinent less than daily (e.g., once  or twice during the 3-day assessment period).  Bowel Continence: Always continent (no documented incontinence).  Swallowing/Nutritional Status: Regular food (solids and liquids swallowed  safely  without supervision or modified food or liquid consistency).  Special Conditions: Patient did not receive total parenteral nutrition treatment  at the time of admission.  Section A. Ethnicity/ Race/Language  Ethnicity: Not of , /a, Citizen of [place] Origin  Race: White  Preferred Language: English  Requests  to Communicate:   No    Section I. Active Diagnosis: Comorbidities and Co-existing Conditions:  Diabetes Mellitus (DM) - e.g., diabetic retinopathy, nephropathy, and  neuropathy).  Section J. Health Conditions: Patient has had two or more falls, or a fall with  injury, in the past year. Patient has not had major surgery during the 100 days  prior to admission.  Section K. Swallowing/Nutritional Status  Nutritional Approaches on Admission:  Nutritional Approaches on Admission:  Therapeutic diet (e.g., low salt, diabetic, low cholesterol)  Section M. Skin Conditions  Unhealed Pressure Ulcer/Injuries at Stage 1 or  Higher on Admission:  No.  Section N. Medication:  Potential Clinically Significant Medication Issues: No issues found during  review  Section . High-Risk Drug Classes: Use and Indication                       Is Taking                    Indication noted  High-RiskDrug Class  E. Anticoagulant     Yes                          Yes  J. Hypoglycemic      Yes                          Yes    Section O. Special Treatments, Procedures, and Programs  None    Signed by: Lilly Cage RN

## 2022-12-12 NOTE — THERAPY TREATMENT NOTE
Inpatient Rehabilitation - Speech Language Pathology Treatment Note    [facility]     Patient Name: Christie Avendaño  : 1965  MRN: [id number]    Today's Date: 2022                   Admit Date: 2022       Visit Dx:      ICD-10-CM ICD-9-CM   1. 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)   • Non-small cell lung cancer, right (HCC)   • Renal mass, right   • SIRS (systemic inflammatory response syndrome) (HCC)   • Hyperlipidemia   • Malignant melanoma of right lower extremity including hip (HCC)   • High grade glioma    • 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)   • Glioma (HCC)       Past Medical History:   Diagnosis Date   • Arthritis    • Asthma    • BRCA2 positive    • Diabetes mellitus (HCC)    • H/O Liver masses 2017    x3   • H/O Lung masses 2017    1 in right lower lobe and 1 in the left infrahilar location   • H/O Ovarian cyst    • H/O Right adrenal mass (CMS/HCC) 2017   • Lung cancer (HCC)    • Melanoma (HCC)     right foot   • PONV (postoperative nausea and vomiting)    • Thyroid disease        Past Surgical History:   Procedure Laterality Date   • APPENDECTOMY     • BRAIN BIOPSY Right 2022    Procedure: Right frontal stereotactic needle brain biopsy;  Surgeon: Manuel Thompson MD;  Location: [facility];  Service: Neurosurgery;  Laterality: Right;   • BREAST IMPLANT SURGERY Bilateral    • CHOLECYSTECTOMY     • HYSTERECTOMY     • MASTECTOMY Bilateral    •  OVARIAN CYST SURGERY     • THORACOSCOPY VIDEO ASSISTED WITH LOBECTOMY Right 10/9/2020    Procedure: BRONCHOSCOPY, THORACOSCOPY VIDEO ASSISTED WITH ANTERIOR BASAL SEGMENTECTOMY, INTERCOSTAL NERVE BLOCK;  Surgeon: Flaca Crisostomo MD;  Location: [facility] OR;  Service: Thoracic;  Laterality: Right;   • TONSILLECTOMY         SLP Recommendation and Plan                                                            SLP EVALUATION (last 72 hours)     SLP SLC Evaluation     Row Name 12/12/22 1100 12/10/22 1400                Communication Assessment/Intervention    Document Type therapy note (daily note)  -SL therapy note (daily note)  -TH       Subjective Information no complaints  -SL no complaints  -TH       Patient Observations alert;cooperative;agree to therapy  -SL --       Patient Effort good  -SL good  -TH       Symptoms Noted During/After Treatment none  -SL --             User Key  (r) = Recorded By, (t) = Taken By, (c) = Cosigned By    Initials Name Effective Dates    Jemma Kelley, MS CCC-SLP 06/16/21 -     TH Anuradha Kenny 06/16/21 -                    EDUCATION    The patient has been educated in the following areas:       Cognitive Impairment ADL Safety.             SLP GOALS     Row Name 12/12/22 1100 12/10/22 1400          Attention Goal 1 (SLP)    Progress/Outcomes (Attention Goal 1, SLP) -- goal ongoing  -TH     Comment (Attention Goal 1, SLP) -- Patient was easily distracted and required cues when working on formal task for sustained attention to task. Selective attention task- written directions- completed with 25% accuracy. Completed card sorting task with approximately 60% accuracy for attention to detail and recall of game rules. Scanning to the left was adequate for worksheet task, however, when playing cards patient demonstrated more difficulty with attention to her left side and required min-mod cues.  -TH        Memory Skills Goal 1 (SLP)    Progress/Outcomes (Memory Skills Goal 1, SLP) goal  ongoing  -SL goal ongoing  -TH     Comment (Memory Skills Goal 1, SLP) visual immediate recall task- 12 pictures with grouping strategy- 50% indep  -SL Given 5 minute delay, patient able to recall 3/3 novel facts given 5 minute delay.  -TH        Organizational Skills Goal 1 (SLP)    Progress/Outcomes (Thought Organization Skills Goal 1, SLP) goal ongoing  -SL --     Comment (Thought Organization Skills Goal 1, SLP) sorting and categorizaton of 15 items into 3 categories- initally pt groupoed everything into 2 groups and missed column on left side completely- with min cues for that side and first colums, pt completed organization of items with 47%; poor attn to details noted  -SL --        Functional Problem Solving Skills Goal 1 (SLP)    Progress/Outcomes (Problem Solving Goal 1, SLP) goal ongoing  -SL --     Comment (Problem Solving Goal 1, SLP) answering ?'s re: prescriptions- 100%;  medication dosage amts and times- 100% indep;  answering ?'sr e: detailed charted info ( names, ages, sex, addresses)- 67% indep;   following multistep directives and using hospital map- 40% indep  -SL --           User Key  (r) = Recorded By, (t) = Taken By, (c) = Cosigned By    Initials Name Provider Type    Jemma Kelley MS CCC-SLP Speech and Language Pathologist     Anuradha Kenny Speech and Language Pathologist                            Time Calculation:        Time Calculation- SLP     Row Name 12/12/22 1156             Time Calculation- [facility]    SLP Start Time 1000  -      SLP Stop Time 1100  -      SLP Time Calculation (min) 60 min  -            User Key  (r) = Recorded By, (t) = Taken By, (c) = Cosigned By    Initials Name Provider Type    Jemma Kelley MS CCC-SLP Speech and Language Pathologist                  Therapy Charges for Today     Code Description Service Date Service Provider Modifiers Qty    [id number] HC ST DEV OF COGN SKILLS INITIAL 15 MIN 12/12/2022 Jemma Bradley MS CCC-SLP  1    [id number]  ST  DEV OF COGN SKILLS EACH ADDT'L 15 MIN 12/12/2022 Jemma Bradley, MS CCC-SLP  3                           Jemma Bradley, MS CHASE-SLP  12/12/2022

## 2022-12-12 NOTE — PROGRESS NOTES
NEUROSURGERY TWO WEEK POST OP VISIT     LOS: 5 days   Patient Care Team:  Tiffany Holloway MD as PCP - General (Internal Medicine)  Mathew Collins Jr., MD as Consulting Physician (Hematology and Oncology)  Dorian Sabillon MD as Surgeon (General Surgery)  Thang Dumont, JOSEFA (Optometry)  Lamine Cantu DO as Referring Physician (Family Medicine)  Hali Rolle MD as Gynecologist (Gynecology)      Interval History: Patient is now 2 weeks s/p R thalamic brain biopsy for tumor. Patient to begin radiation treatment today. Currently working with speech therapy. Reports some continued L sided weakness more so in L leg than L arm; otherwise no complaints.      History taken from: patient chart RN    Objective        Vital Signs  Temp:  [97.5 °F (36.4 °C)-98 °F (36.7 °C)] 97.8 °F (36.6 °C)  Heart Rate:  [58-66] 58  Resp:  [18-20] 18  BP: (108-116)/(61-67) 114/67     Physical Exam:     AA&O x 3. Speech normal.   Face symmetric.   R frontal incision well approximated; staples x 5 removed.       Results Review:     I reviewed the patient's new clinical results.      Results from last 7 days   Lab Units 12/12/22  0555 12/09/22  0539 12/08/22  0616   WBC 10*3/mm3 10.16 13.31* 15.27*   HEMOGLOBIN g/dL 13.0 12.9 13.0   HEMATOCRIT % 38.9 38.1 39.4   PLATELETS 10*3/mm3 174 225 238       Assessment & Plan       Glioma (HCC)    POD 14 right frontal stereotactic needle brain biopsy by Dr. Thompson for high grade glioma - path c/w grade IV glioblastoma per [facility] path report 12/7/22.     R frontal staples x 5 removed.  Will leave management of steroids, protonix, anticonvulsants to Radiation Oncology. Patient to start first radiation treatment today.   Tumor was non-resectable due to location.   Continue with current plan for  radiation therapy per Radiation Oncology.  Nothing further to add from Neurosurgical perspective.   Please call if needed from here.     Radha Crisostomo, MERRY  12/12/22  10:05 EST

## 2022-12-12 NOTE — PROGRESS NOTES
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  Incision healing.    Elevated LFTs  Dec 8: Labs significant for ALT 1049 (143 11/28),  (71 11/28), Alk phos  190 (96 11/28). Taking minimal tylenol and statin is a home med. Consulted IM  who also consulted pharmacy and GI. They also decreased valtrex to 1g daily (she  was taking daily prophylactic valtrex 2g bid). Awaiting liver ultrasound. Added  CPK to labs for the am.    Diabetes mellitus-Trulicity at home.  On Lantus/Humalog    Seizure prophylaxis-Keppra    DVT prophylaxis-Lovenox/SCDs    GI prophylaxis-protein pump inhibitor  Add calcium and vitamin D with ongoing steroids    Chronic Valtrex    Germline BRCA2 and GEORGES mutations.  ?Patient has history of Papillary thyroid cancer, Bilateral DCIS, Left clear  cell renal cell carcinoma, Left lower lobe NSC Lung cancer (adenocarcinoma with  lipidic growth pattern), Right lower lobe NSC lung (adenocarcinoma with lipidic  growth pattern) and Melanoma of right heel.    Depression-Lexapro    Hypothyroidism-on replacement      Plan of Care  Anticipated Discharge Date/Estimated Length of Stay: 2 weeks  Anticipated Discharge Destination: Community discharge with assistance  Discharge Plan : Patient lives with  in one story home; One step into  home with grab bar.  D/C plan is home with . He works nights at [employer]. Aunt can stay with  patient at night if needed.  Medical Necessity Expected Level  Rationale: Goals are to achieve a level of  contact-guard supervision with  mobility and self-care and improved balance.  Rehabilitation prognosis indeterminate given her tumor and prognosis Medical  prognosis defer to oncology.  Intensity and Duration: an average of 3 hours/5 days per week  Medical Supervision and 24 Hour Rehab Nursing: x  Physical Therapy: x  PT Intensity/Duration: 1 hour / day, 5 days / week, for approximately 2 weeks  Occupational Therapy: x  OT Intensity/Duration: 1 hour / day, 5 days / week, for approximately 2 weeks  Speech and Language Therapy: x  SLP Intensity/Duration: 1 hour / day, 5 days / week, for approximately 2 weeks  Social Work: x  Therapeutic Recreation: x  Psychology: x  Registered Dietician: x  Updated (if changes indicated)  No changes to plan.    Based on the patient's medical and functional status, their prognosis and  expected level of functional improvement is: Goals are to achieve a level of  contact-guard supervision with  mobility and self-care and improved balance.  Rehabilitation prognosis indeterminate given her tumor and prognosis Medical  prognosis defer to oncology.    Interdisciplinary Problem/Goals/Status  Cognition    [ST] Attention(Active)  Current Status(12/12/2022): deficits for attn to details, complex reasoning,  memory, executive fxn, left visual spatial skills  Weekly Goal(12/15/2022): follow schedule indep; recall details from daily events  indep  Discharge Goal: fxnl cognition for home with assist        Mobility    [OT] Toilet Transfers(Active)  Current Status(12/12/2022): CGA  Weekly Goal(12/15/2022): SBA  Discharge Goal: SBA    [OT] Tub/Shower Transfers(Active)  Current Status(12/12/2022): CGA/Min  Weekly Goal(12/20/2022): SBA  Discharge Goal: SBA    [PT] Walk(Active)  Current Status(12/12/2022): 160 ft, CG to occ min, VC, rwx  Weekly Goal(12/19/2022): 160 ft, CG, rwx  Discharge Goal: 160 ft, Sup, rwx    [PT] Bed/Chair/Wheelchair(Active)  Current  Status(12/12/2022): Min/CGA, rwx  Weekly Goal(12/19/2022): CG, rwx  Discharge Goal: Sup, RWX    [PT] Bed Mobility(Active)  Current Status(12/12/2022): SBA, VCS  Weekly Goal(12/19/2022): Mod I  Discharge Goal: Mod Indep        Psychosocial    [RN] Coping/Adjustment(Active)  Current Status(12/13/2022): At risk for poor coping d/t recent medical  conditions.  Weekly Goal(12/20/2022): Identify progress in functional status.  Discharge Goal: Demonstrates healthy coping strategies.        Safety    [RN] Potential for Injury(Active)  Current Status(12/13/2022): At risk for falls d/t history of falls. 12/10 Pt  found on floor in BR, states she was trying to pull her pants up, did not call  for assist like she said she would. No apparent injury. Blue armband placed.  Weekly Goal(12/20/2022): Pt will use call light, no further attempts to transfer  self.  Discharge Goal: Patient and family will be aware of risk of fall and safety in  home setting.        Self Care    [OT] Bathing(Active)  Current Status(12/12/2022): Min  Weekly Goal(12/19/2022): SBA  Discharge Goal: SBA    [OT] Dressing (Lower)(Active)  Current Status(12/12/2022): Min  Weekly Goal(12/20/2022): CGA  Discharge Goal: SBA    [OT] Dressing (Upper)(Active)  Current Status(12/12/2022): Min  Weekly Goal(12/19/2022): SBA  Discharge Goal: SBA    [OT] Eating(Active)  Current Status(12/12/2022): IND  Weekly Goal(12/12/2022): IND  Discharge Goal: IND    [OT] Grooming(Active)  Current Status(12/12/2022): SBA  Weekly Goal(12/12/2022): SETUP  Discharge Goal: SETUP    [OT] Toileting(Active)  Current Status(12/12/2022):  cga/Min  Weekly Goal(12/20/2022):  SBACGA  Discharge Goal: SBA        Sphincter Control    [RN] Bladder Management(Active)  Current Status(12/13/2022): Continent of bladder. Occasional urge incont.  Weekly Goal(12/20/2022): Remain continent of bladder.  Discharge Goal: Goes home continent of bladder.    [RN] Bowel Management(Active)  Current  Status(12/13/2022): Continent of bowel.  Weekly Goal(12/20/2022): Remain continent of bowel.  Discharge Goal: Go home continent of bowel.      Comments:    Signed by: Mathew Araujo MD

## 2022-12-12 NOTE — PROGRESS NOTES
LOS: 5 days   Patient Care Team:  Tiffany Holloway MD as PCP - General (Internal Medicine)  Mathew Collins Jr., MD as Consulting Physician (Hematology and Oncology)  Dorian Sabillon MD as Surgeon (General Surgery)  Thang Dumont, JOSEFA (Optometry)  Lamine Cantu DO as Referring Physician (Family Medicine)  Hali Rolle MD as Gynecologist (Gynecology)      HERON MAGANA  1965    Diagnoses    1. IMPAIRED FUNCTIONAL MOBILITY, BALANCE, GAIT, AND ENDURANCE       ADMITTING DIAGNOSIS:  High-grade glioma-3.2 cm ring-enhancing right supratentorial mass-right thalamic-with mass-effect and left midline shift  Status post stereotactic brain biopsy on November 28, 2022  Left side weakness/incoordination/impaired mobilityHigh-grade glioma-3.2 cm ring-enhancing right supratentorial mass-right thalamic-with mass-effect and left midline shift  Status post stereotactic brain biopsy on November 28, 2022  Left side weakness/incoordination/impaired mobility  Diabetes      Subjective     Denies significant headache.  Strength in the left side but the same.  Tolerating therapies.  Tolerating initial radiation therapy.  Starting on Temodar.  Further liver evaluation today with portal hepatic duplex    Objective     Vitals:    12/12/22 1349   BP: 105/72   Pulse: 78   Resp: 18   Temp: 97.7 °F (36.5 °C)   SpO2: 99%       PHYSICAL EXAM:   MENTAL STATUS -  AWAKE / ALERT  HEENT-right scalp incision with staples removed.  Clean dry and intact  SCLERAE ANICTERIC, CONJUNCTIVAE PINK  NO JVD, EARS UNREMARKABLE EXTERNALLY  LUNGS - CTA, NO WHEEZES, RALES OR RHONCHI  HEART- RRR, NO RUB, MURMUR, OR GALLOP  ABD - NORMOACTIVE BOWEL SOUNDS, SOFT, NT.     EXT - NO EDEMA OR CYANOSIS  NEURO -oriented person place time and situation.  Good historian.    Speech was fluent with slightly slow rate of speech.     Extraocular movements intact.  No dysarthria.  MOTOR EXAM - RUE/RLE 5/5.   LUE/LLE 4+/5.   Impaired motor control in the left upper  extremity and left lower extremity      MEDICATIONS  Scheduled Meds:calcium 500 mg vitamin D 5 mcg (200 UT), 1 tablet, Oral, BID  dexamethasone, 4 mg, Oral, Q8H  enoxaparin, 40 mg, Subcutaneous, Q12H  escitalopram, 10 mg, Oral, Daily  [START ON 12/13/2022] insulin glargine, 28 Units, Subcutaneous, QAM  insulin lispro, 0-14 Units, Subcutaneous, TID AC  insulin lispro, 8 Units, Subcutaneous, TID With Meals  latanoprost, 1 drop, Both Eyes, Nightly  levETIRAcetam, 500 mg, Oral, BID  levothyroxine, 150 mcg, Oral, Q AM  lisinopril, 20 mg, Oral, Daily  magnesium oxide, 400 mg, Oral, Daily  ondansetron, 8 mg, Oral, Q24H  pantoprazole, 40 mg, Oral, Q AM  polyethylene glycol, 17 g, Oral, Daily  sulfamethoxazole-trimethoprim, 1 tablet, Oral, Once per day on Mon Wed Fri  temozolomide, 140 mg, Oral, Nightly   And  temozolomide, 20 mg, Oral, Nightly   And  temozolomide, 5 mg, Oral, Nightly  valACYclovir, 500 mg, Oral, Q24H      Continuous Infusions:   PRN Meds:.•  calcium carbonate  •  dextrose  •  dextrose  •  famotidine  •  glucagon (human recombinant)  •  influenza vaccine  •  nitroglycerin  •  ondansetron **OR** ondansetron  •  senna-docusate sodium      RESULTS  Glucose   Date/Time Value Ref Range Status   12/12/2022 1634 107 70 - 130 mg/dL Final     Comment:     Meter: [id number] : [id number] Santos Mei    12/12/2022 1133 241 (H) 70 - 130 mg/dL Final     Comment:     Meter: [id number] : [id number] Santos Mei    12/12/2022 0527 140 (H) 70 - 130 mg/dL Final     Comment:     Meter: [id number] : [id number] Narendra Casper [facility]   12/11/2022 2015 230 (H) 70 - 130 mg/dL Final     Comment:     Meter: [id number] : [id number] Narendra Casper [facility]   12/11/2022 1639 87 70 - 130 mg/dL Final     Comment:     Meter: [id number] : [id number] Paul Clifton NA   12/11/2022 1124 285 (H) 70 - 130 mg/dL Final     Comment:     Meter: [id number] : [id number] Twan ACUÑA   12/11/2022 0545 191 (H) 70 - 130 mg/dL  Final     Comment:     Meter: [id number] : [id number] Flaquita Robledo NA   12/10/2022 1946 207 (H) 70 - 130 mg/dL Final     Comment:     Meter: [id number] : [id number] Flaquita ACUÑA     Results from last 7 days   Lab Units 12/12/22  0555 12/09/22  0539 12/08/22  0616   WBC 10*3/mm3 10.16 13.31* 15.27*   HEMOGLOBIN g/dL 13.0 12.9 13.0   HEMATOCRIT % 38.9 38.1 39.4   PLATELETS 10*3/mm3 174 225 238     Results from last 7 days   Lab Units 12/12/22  0555 12/11/22  0610 12/10/22  1110   SODIUM mmol/L 133* 131* 132*   POTASSIUM mmol/L 4.3 4.7 4.7   CHLORIDE mmol/L 97* 96* 93*   CO2 mmol/L 24.5 24.3 27.2   BUN mg/dL 20 21* 22*   CREATININE mg/dL 0.68 0.74 0.79   CALCIUM mg/dL 9.2 8.9 9.6   BILIRUBIN mg/dL 0.7 0.6 0.7   ALK PHOS U/L 161* 164* 188*   ALT (SGPT) U/L 467* 546* 710*   AST (SGOT) U/L 58* 73* 84*   GLUCOSE mg/dL 156* 174* 240*       ASSESSMENT and PLAN    Glioma (HCC)    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  She reports intolerance to gabapentin, intolerances to opioids.  Tramadol comes up as contraindicated with history of anaphylactic secondary to morphine.  Valproate for headache control not an option with her liver changes     Left side weakness/incoordination/impaired mobility     Radiation therapy/Temodar to start December 12, 2022  Decadron 4 mg every 8 hourly  Dec 9: Oncology aware of increase of liver enzymes. They are following along   December 12: Radiation therapy and Temodar initiating today     Leukocytosis-steroid/stress response.  Incision healing.      Elevated LFTs  Dec 8: Labs significant for ALT 1049 (143 11/28),  (71 11/28), Alk phos 190 (96 11/28). Taking minimal tylenol and statin is a home med- discontinued. Consulted IM who also consulted pharmacy and GI. They  also decreased valtrex to 1g daily (she was taking daily prophylactic valtrex 2g bid). Awaiting liver ultrasound. CPK normal.   Dec 9- Ongoing workup. Liver ultrasound- remarkable for a small hepatic cyst otherwise negative  Per GI -[ Obtain duplex hepatic ultrasound to further assess for thrombosis  Obtain GEMMA, IgG profile, ASMA, EBV, CMV, HSV, VZV to rule out autoimmune versus viral cause to elevation.].   Patient reviewed with internal medicine and medical oncology  December 12-transaminases elevated but trending better.  Hepatic portal duplex today    Diabetes mellitus-Trulicity at home.  On Lantus/Humalog     Seizure prophylaxis-Keppra     DVT prophylaxis-Lovenox/SCDs     GI prophylaxis-protein pump inhibitor  Add calcium and vitamin D with ongoing steroids     Chronic Valtrex-dose decreased to 1000 mg daily     Germline BRCA2 and GEORGES mutations.  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     Depression-Lexapro     Hypothyroidism-on replacement     Hypertension lisinopril      Mathew Araujo MD       During rounds, used appropriate personal protective equipment including mask and gloves.  Additional gown if indicated.  Mask used was standard procedure mask. Appropriate PPE was worn during the entire visit.  Hand hygiene was completed before and after.

## 2022-12-12 NOTE — PROGRESS NOTES
Inpatient Rehabilitation Functional Measures Assessment and Plan of Care    Plan of Care  Updated Problems/Interventions  Cognition    [ST] Attention(Active)  Current Status(12/12/2022): deficits for attn to details, complex reasoning,  memory, executive fxn, left visual spatial skills  Weekly Goal(12/15/2022): follow schedule indep; recall details from daily events  indep  Discharge Goal: fxnl cognition for home with assist    Functional Measures  [facility] Eating:  [facility] Grooming: [facility] Bathing:  [facility] Upper Body Dressing:  [facility] Lower Body Dressing:  [facility] Toileting:  [facility] Bladder Management  Level of Assistance:  [place]  Frequency/Number of Accidents this Shift:  [facility] Bowel Management  Level of Assistance: [place]  Frequency/Number of Accidents this Shift: [facility] Bed/Chair/Wheelchair Transfer:  [facility] Toilet Transfer:  [facility] Tub/Shower Transfer:  [place]    Previously Documented Mode of Locomotion at Discharge: Field  DANDRE Expected Mode of Locomotion at Discharge: [facility] Walk/Wheelchair:  [facility] Stairs:  [facility] Comprehension:  [facility] Expression:  [facility] Social Interaction:  [facility] Problem Solving:  [facility] Memory:  [place]    Therapy Mode Minutes  Occupational Therapy: Branch  Physical Therapy: Branch  Speech Language Pathology:  [place]anch    Signed by: Jemma Bradley, SLP

## 2022-12-12 NOTE — PROGRESS NOTES
[facility]  Inpatient Progress Note    Reason for Follow-up: Elevated liver tests    Subjective     Interval History:   Doing well today without complaints.  She denies abdominal pain, nausea, vomiting.  Tolerating p.o. intake well.    12/12/2022 labs show continued improvement in LFTs with , AST 58, , normal total bilirubin.    12/9/2020 labs showed ASMA 14, VZV IgG 749, CMV with negative IgM, positive IgG, HSV1 IgG 29.7, HSV2 IgG negative, total IgG low at 586, normal total IgM and IgA.  Pending EBV, VZV IgM, and GEMMA    Starting chemoradiation today.    Current Facility-Administered Medications:   •  calcium 500 mg vitamin D 5 mcg (200 UT) per tablet 1 tablet, 1 tablet, Oral, BID, Mathew Araujo MD, 1 tablet at 12/12/22 0807  •  calcium carbonate (TUMS) chewable tablet 500 mg (200 mg elemental), 2 tablet, Oral, TID PRN, Mathew Araujo MD  •  dexamethasone (DECADRON) tablet 4 mg, 4 mg, Oral, Q8H, Mathew Araujo MD, 4 mg at 12/12/22 0515  •  dextrose (D50W) (25 g/50 mL) IV injection 25 g, 25 g, Intravenous, Q15 Min PRN, Mathew Araujo MD  •  dextrose (GLUTOSE) oral gel 15 g, 15 g, Oral, Q15 Min PRN, Mathew Araujo MD  •  Enoxaparin Sodium (LOVENOX) syringe 40 mg, 40 mg, Subcutaneous, Q12H, Mathew Araujo MD, 40 mg at 12/11/22 2205  •  escitalopram (LEXAPRO) tablet 10 mg, 10 mg, Oral, Daily, Mathew Araujo MD, 10 mg at 12/12/22 0805  •  famotidine (PEPCID) tablet 20 mg, 20 mg, Oral, BID PRN, Mathew Araujo MD  •  glucagon (human recombinant) (GLUCAGEN DIAGNOSTIC) injection 1 mg, 1 mg, Intramuscular, Q15 Min PRN, Mathew Araujo MD  •  influenza vac split quad (FLUZONE,FLUARIX,AFLURIA,FLULAVAL) injection 0.5 mL, 0.5 mL, Intramuscular, During Hospitalization, Mathew Araujo MD  •  [START ON 12/13/2022] insulin glargine (LANTUS, SEMGLEE) injection 28 Units, 28 Units, Subcutaneous, QAM, Naman, Gina L,  MD  •  insulin lispro (ADMELOG) injection 0-14 Units, 0-14 Units, Subcutaneous, TID AC, Mathew Dumont MD, 3 Units at 12/12/22 0803  •  insulin lispro (ADMELOG) injection 8 Units, 8 Units, Subcutaneous, TID With Meals, Mathew Araujo MD, 8 Units at 12/12/22 0759  •  latanoprost (XALATAN) 0.005 % ophthalmic solution 1 drop, 1 drop, Both Eyes, Nightly, Mathew Araujo MD, 1 drop at 12/11/22 2217  •  levETIRAcetam (KEPPRA) tablet 500 mg, 500 mg, Oral, BID, Mathew Araujo MD, 500 mg at 12/12/22 0804  •  levothyroxine (SYNTHROID, LEVOTHROID) tablet 150 mcg, 150 mcg, Oral, Q AM, Mathew Araujo MD, 150 mcg at 12/12/22 0515  •  lisinopril (PRINIVIL,ZESTRIL) tablet 20 mg, 20 mg, Oral, Daily, Mathew Araujo MD, 20 mg at 12/12/22 0805  •  magnesium oxide (MAG-OX) tablet 400 mg, 400 mg, Oral, Daily, Gina Florence MD, 400 mg at 12/12/22 0804  •  nitroglycerin (NITROSTAT) SL tablet 0.4 mg, 0.4 mg, Sublingual, Q5 Min PRN, Mathew Araujo MD  •  ondansetron (ZOFRAN) tablet 4 mg, 4 mg, Oral, Q6H PRN **OR** ondansetron (ZOFRAN) injection 4 mg, 4 mg, Intravenous, Q6H PRN, Mathew Araujo MD  •  ondansetron (ZOFRAN) tablet 8 mg, 8 mg, Oral, Q24H, Laura Flores MD  •  pantoprazole (PROTONIX) EC tablet 40 mg, 40 mg, Oral, Q AM, Mathew Araujo MD, 40 mg at 12/12/22 0515  •  polyethylene glycol (MIRALAX) packet 17 g, 17 g, Oral, Daily, Mathew Araujo MD, 17 g at 12/12/22 0804  •  sennosides-docusate (PERICOLACE) 8.6-50 MG per tablet 1 tablet, 1 tablet, Oral, Nightly PRN, Mathew Araujo MD  •  valACYclovir (VALTREX) tablet 500 mg, 500 mg, Oral, Q24H, Mathew Dumont MD, 500 mg at 12/12/22 0805  Review of Systems:    The following systems were reviewed and negative;  gastrointestinal    Objective     Vital Signs  Temp:  [97.5 °F (36.4 °C)-98 °F (36.7 °C)] 97.8 °F (36.6 °C)  Heart Rate:  [58-66] 58  Resp:  [18-20] 18  BP: (108-116)/(61-67) 114/67  Body  mass index is 40.17 kg/m².    Intake/Output Summary (Last 24 hours) at 12/12/2022 1118  Last data filed at 12/12/2022 0740  Gross per 24 hour   Intake 118 ml   Output --   Net 118 ml     I/O this shift:  In: 118 [P.O.:118]  Out: -      Physical Exam:    General: patient awake, alert and cooperative   Eyes: Normal lids and lashes, no scleral icterus   Skin: warm and dry, not jaundiced   Pulm: regular and unlabored   Abdomen: soft, nontender, nondistended; normal bowel sounds   Psychiatric: Normal mood and behavior; memory intact     Results Review:     I reviewed the patient's new clinical results.    Results from last 7 days   Lab Units 12/12/22  0555 12/09/22  0539 12/08/22  0616   WBC 10*3/mm3 10.16 13.31* 15.27*   HEMOGLOBIN g/dL 13.0 12.9 13.0   HEMATOCRIT % 38.9 38.1 39.4   PLATELETS 10*3/mm3 174 225 238     Results from last 7 days   Lab Units 12/12/22  0555 12/11/22  0610 12/10/22  1110   SODIUM mmol/L 133* 131* 132*   POTASSIUM mmol/L 4.3 4.7 4.7   CHLORIDE mmol/L 97* 96* 93*   CO2 mmol/L 24.5 24.3 27.2   BUN mg/dL 20 21* 22*   CREATININE mg/dL 0.68 0.74 0.79   CALCIUM mg/dL 9.2 8.9 9.6   BILIRUBIN mg/dL 0.7 0.6 0.7   ALK PHOS U/L 161* 164* 188*   ALT (SGPT) U/L 467* 546* 710*   AST (SGOT) U/L 58* 73* 84*   GLUCOSE mg/dL 156* 174* 240*     Results from last 7 days   Lab Units 12/08/22  1436   INR  1.02     Lab Results   Lab Value Date/Time    LIPASE 42 10/31/2018 0054       Radiology:  US Liver   Final Result   Small liver cyst. Cholecystectomy       This report was finalized on 12/8/2022 3:32 PM by Dr. Stef Pettit M.D.              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 BRCA2 gene mutation  2. High-grade glioma  3. Non-small cell lung cancer  4. Neoplasm of right breast  5. Renal cell carcinoma left kidney  6. Papillary thyroid carcinoma  7. Melanoma  8. 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 Elevated LFTs, improving      Plan:  · Primary discontinued valacyclovir, atorvastatin, and Tylenol pending work-up  · Pharmacy consultation unremarkable for alternative pharmacologic therapies as underlying etiology to significant elevation in liver enzymes  · Liver ultrasound- remarkable for a small hepatic cyst otherwise negative  · ASMA 14, VZV IgG 749, CMV with old infection, not currently, HSV1 IgG 29.7, HSV2 IgG negative, total IgG low at 586, normal total IgM and IgA. Pending EBV, VZV IgM, and GEMMA  · Continue to follow LFTs.    I discussed the patients findings and my recommendations with patient.    Dragon dictation used throughout this note.            Taylor Vásquez PA-C  [facility]  [address] [place] [address]  Office: [phone]

## 2022-12-12 NOTE — PROGRESS NOTES
Inpatient Rehabilitation Functional Measures Assessment and Plan of Care    Plan of Care  Updated Problems/Interventions  Mobility    [OT] Toilet Transfers(Active)  Current Status(12/12/2022): CGA  Weekly Goal(12/15/2022): SBA  Discharge Goal: SBA    [OT] Tub/Shower Transfers(Active)  Current Status(12/12/2022): CGA/Min  Weekly Goal(12/20/2022): SBA  Discharge Goal: SBA        Self Care    [OT] Bathing(Active)  Current Status(12/12/2022): Min  Weekly Goal(12/19/2022): SBA  Discharge Goal: SBA    [OT] Dressing (Lower)(Active)  Current Status(12/12/2022): Min  Weekly Goal(12/20/2022): CGA  Discharge Goal: SBA    [OT] Dressing (Upper)(Active)  Current Status(12/12/2022): Min  Weekly Goal(12/19/2022): SBA  Discharge Goal: SBA    [OT] Eating(Active)  Current Status(12/12/2022): IND  Weekly Goal(12/12/2022): IND  Discharge Goal: IND    [OT] Grooming(Active)  Current Status(12/12/2022): SBA  Weekly Goal(12/12/2022): SETUP  Discharge Goal: SETUP    [OT] Toileting(Active)  Current Status(12/12/2022):  cga/Min  Weekly Goal(12/20/2022):  SBACGA  Discharge Goal: SBA    Functional Measures  DANDRE Eating:  Branch  DANDRE Grooming: Branch  DANDRE Bathing:  Branch  DANDRE Upper Body Dressing:  Branch  DANDRE Lower Body Dressing:  Branch  DANDRE Toileting:  Branch    DANDRE Bladder Management  Level of Assistance:  Branch  Frequency/Number of Accidents this Shift:  Branch    DANDRE Bowel Management  Level of Assistance: Branch  Frequency/Number of Accidents this Shift: Branch    DANDRE Bed/Chair/Wheelchair Transfer:  Branch  DANDRE Toilet Transfer:  Branch  DANDRE Tub/Shower Transfer:  Branch    Previously Documented Mode of Locomotion at Discharge: Field  DANDRE Expected Mode of Locomotion at Discharge: Branch  DANDRE Walk/Wheelchair:  Branch  DANDRE Stairs:  Branch    DANDRE Comprehension:  Branch  DANDRE Expression:  Branch  DANDRE Social Interaction:  Branch  DANDRE Problem Solving:  Branch  DANDRE Memory:  Branch    Therapy Mode Minutes  Occupational Therapy: Branch  Physical  Therapy: Branch  Speech Language Pathology:  Branch    Signed by: Yissel Goel OTR/L

## 2022-12-12 NOTE — PROGRESS NOTES
Inpatient Rehabilitation Plan of Care Note    Plan of Care  Care Plan Reviewed - No updates at this time.    Psychosocial    Performed Intervention(s)  Support/peer groups.  Verbalizes needs and concerns.  Medication.      Sphincter Control    Performed Intervention(s)  Offer restroom during hourly rounding.  Encourage fluid intake.  Monitor Is and Os.  Timed voids.  Encourage appropriate diet.      Safety    Performed Intervention(s)  Items w/i reach.  Safety rounds.  Bed and chair alarm. Blue armband  Falls precautions.    Signed by: Doc Park RN

## 2022-12-12 NOTE — PROGRESS NOTES
Recreational Therapy Note    Patient Name: Christie Avendaño   MRN: [id number]    Therapeutic Recreation Eval and Treat (last 12 hours)     Therapeutic Recreation Eval & Treat     Row Name 12/12/22 1400       Therapeutic Recreation Participation    Recreation Therapy Participation games  -    Games card games  Phase 10  -    Objectives of Recreation Participation increase;motivation and activity level through successful participation;positive attitudes leading to a healthy leisure lifestyle;sense of autonomy by choosing level of participation  -    Comment, Recreation Participation Min-mod cues to recall directions;used left hand to  and place cards in card agee  -    Recreation Therapy Summary of Participation active participation  -          User Key  (r) = Recorded By, (t) = Taken By, (c) = Cosigned By    Initials Name Provider Type    SS Jemma Spencer, CTRS Recreational Therapist                  PAULA Siu  12/12/2022

## 2022-12-12 NOTE — PROGRESS NOTES
Inpatient Rehabilitation Plan of Care Note    Plan of Care  Care Plan Reviewed - No updates at this time.    Psychosocial    Performed Intervention(s)  Support/peer groups.  Verbalizes needs and concerns.  Medication.      Sphincter Control    Performed Intervention(s)  Offer restroom during hourly rounding.  Encourage fluid intake.  Monitor Is and Os.  Timed voids.  Encourage appropriate diet.      Safety    Performed Intervention(s)  Items w/i reach.  Safety rounds.  Bed and chair alarm. Blue armband  Falls precautions.    Signed by: Yessi Luther RN

## 2022-12-12 NOTE — THERAPY TREATMENT NOTE
Inpatient Rehabilitation - Occupational Therapy Treatment Note    [facility]     Patient Name: Christie Avendaño  : 1965  MRN: [id number]    Today's Date: 2022                 Admit Date: 2022         ICD-10-CM ICD-9-CM   1. 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)   • Non-small cell lung cancer, right (HCC)   • Renal mass, right   • SIRS (systemic inflammatory response syndrome) (HCC)   • Hyperlipidemia   • Malignant melanoma of right lower extremity including hip (HCC)   • High grade glioma    • 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)   • Glioma (HCC)       Past Medical History:   Diagnosis Date   • Arthritis    • Asthma    • BRCA2 positive    • Diabetes mellitus (HCC)    • H/O Liver masses 2017    x3   • H/O Lung masses 2017    1 in right lower lobe and 1 in the left infrahilar location   • H/O Ovarian cyst    • H/O Right adrenal mass (CMS/HCC) 2017   • Lung cancer (HCC)    • Melanoma (HCC)     right foot   • PONV (postoperative nausea and vomiting)    • Thyroid disease        Past Surgical History:   Procedure Laterality Date   • APPENDECTOMY     • BRAIN BIOPSY Right 2022    Procedure: Right frontal stereotactic needle brain biopsy;  Surgeon: Manuel Thompson MD;  Location: [facility];  Service: Neurosurgery;  Laterality: Right;   • BREAST IMPLANT SURGERY Bilateral    • CHOLECYSTECTOMY     • HYSTERECTOMY     • MASTECTOMY Bilateral    • OVARIAN CYST SURGERY      • THORACOSCOPY VIDEO ASSISTED WITH LOBECTOMY Right 10/9/2020    Procedure: BRONCHOSCOPY, THORACOSCOPY VIDEO ASSISTED WITH ANTERIOR BASAL SEGMENTECTOMY, INTERCOSTAL NERVE BLOCK;  Surgeon: Flaca Crisostomo MD;  Location: [facility] OR;  Service: Thoracic;  Laterality: Right;   • TONSILLECTOMY               IRF OT ASSESSMENT FLOWSHEET (last 12 hours)     IRF OT Evaluation and Treatment     Row Name 12/12/22 1204          OT Time and Intention    Document Type daily treatment  -CC     Mode of Treatment occupational therapy  -CC     Patient Effort good  -CC     Symptoms Noted During/After Treatment none  -CC     Row Name 12/12/22 1204          Pain Assessment    Pretreatment Pain Rating 0/10 - no pain  -CC     Posttreatment Pain Rating 0/10 - no pain  -CC     Row Name 12/12/22 1204          Cognition/Psychosocial    Affect/Mental Status (Cognition) WFL  -CC     Orientation Status (Cognition) oriented x 4  -CC     Follows Commands (Cognition) follows one-step commands;over 90% accuracy;verbal cues/prompting required  -CC     Personal Safety Interventions fall prevention program maintained;gait belt;nonskid shoes/slippers when out of bed;supervised activity  -CC     Row Name 12/12/22 1204          Bathing    [place] Level (Bathing) bathing skills;upper body;standby assist;lower body;minimum assist (75% patient effort)  -CC     Assistive Device (Bathing) hand held shower spray hose;grab bar/tub rail;shower chair  -CC     Position (Bathing) supported sitting;sink side  -CC     Set-up Assistance (Bathing) adaptive equipment set-up;adjust water temperature;obtain supplies  -CC     Row Name 12/12/22 1204          Upper Body Dressing    [place] Level (Upper Body Dressing) upper body dressing skills;doff;don;bra/undergarment;pull over garment;minimum assist (75% or more patient effort)  -CC     Position (Upper Body Dressing) supported sitting  -CC     Set-up Assistance (Upper Body Dressing) obtain clothing  -CC      Row Name 12/12/22 1204          Lower Body Dressing    [place] Level (Lower Body Dressing) doff;don;pants/bottoms;shoes/slippers;socks;set up;verbal cues;minimum assist (75% patient effort)  -     Position (Lower Body Dressing) supported sitting;supported standing  -     Set-up Assistance (Lower Body Dressing) obtain clothing  -     Row Name 12/12/22 1204          Grooming    [place] Level (Grooming) grooming skills;deodorant application;oral care regimen;wash face, hands;set up;standby assist  -     Position (Grooming) sink side;supported sitting  -     Row Name 12/12/22 1204          Bed Mobility    Comment, (Bed Mobility) in w/c  -     Row Name 12/12/22 1204          Sit-Stand Transfer    Sit-Stand [place] (Transfers) contact guard  -     Assistive Device (Sit-Stand Transfers) wheelchair  -[facility] Name 12/12/22 1204          Stand-Sit Transfer    Stand-Sit [place] (Transfers) contact guard  -     Assistive Device (Stand-Sit Transfers) wheelchair  -[facility] Name 12/12/22 1204          Shower Transfer    Type (Shower Transfer) sit-stand;stand-sit;stand pivot/stand step  -     [place] Level (Shower Transfer) set up;minimum assist (75% patient effort);verbal cues  -     Assistive Device (Shower Transfer) grab bar, tub/shower;shower chair;wheelchair  -     Row Name 12/12/22 1204          Balance    Static Sitting Balance contact guard;standby assist  hvy lean L during shower  -     Static Standing Balance contact guard  -[facility] Name 12/12/22 1204          Positioning and Restraints    Pre-Treatment Position sitting in chair/recliner  -     Post Treatment Position wheelchair  -     In Wheelchair sitting;call light within reach;encouraged to call for assist;exit alarm on  -           User Key  (r) = Recorded By, (t) = Taken By, (c) = Cosigned By    Initials Name Effective Dates    CC Yissel Goel, SHARON 06/16/21 -                  Occupational Therapy  Education     Title: PT OT SLP Therapies (Done)     Topic: Occupational Therapy (Done)     Point: ADL training (Done)     Description:   Instruct learner(s) on proper safety adaptation and remediation techniques during self care or transfers.   Instruct in proper use of assistive devices.              Learning Progress Summary           Patient Acceptance, E, VU by WN at 12/12/2022 0155    Acceptance, E, VU by CB at 12/8/2022 1204    Acceptance, E, VU,NR by CE at 12/8/2022 1107    Comment: fall prevention, DME including w/c and shower chair                   Point: Home exercise program (Done)     Description:   Instruct learner(s) on appropriate technique for monitoring, assisting and/or progressing therapeutic exercises/activities.              Learning Progress Summary           Patient Acceptance, E, VU by WN at 12/12/2022 0155    Acceptance, E, VU by CB at 12/8/2022 1204    Acceptance, E, VU,NR by CE at 12/8/2022 1107    Comment: fall prevention, DME including w/c and shower chair                   Point: Precautions (Done)     Description:   Instruct learner(s) on prescribed precautions during self-care and functional transfers.              Learning Progress Summary           Patient Acceptance, E, VU by WN at 12/12/2022 0155    Acceptance, E, VU by CB at 12/8/2022 1204    Acceptance, E, VU,NR by CE at 12/8/2022 1107    Comment: fall prevention, DME including w/c and shower chair                   Point: Body mechanics (Done)     Description:   Instruct learner(s) on proper positioning and spine alignment during self-care, functional mobility activities and/or exercises.              Learning Progress Summary           Patient Acceptance, E, VU by WN at 12/12/2022 0155    Acceptance, E, VU by CB at 12/8/2022 1204    Acceptance, E, VU,NR by CE at 12/8/2022 1107    Comment: fall prevention, DME including w/c and shower chair                               User Key     Initials Effective Dates Name Provider Type  Discipline    WN 08/23/22 -  Doc Park, RN Registered Nurse Nurse    CB 11/10/22 -  Mackenzie Hopkins CCC-SLP Speech and Language Pathologist SLP    CE 10/17/22 -  Portia Esqueda OT Occupational Therapist OT                    OT Recommendation and Plan                         Time Calculation:      Time Calculation- OT     Row Name 12/12/22 0900             Time Calculation- OT    OT Start Time 0900  -CC      OT Stop Time 0930  -CC      OT Time Calculation (min) 30 min  -CC            User Key  (r) = Recorded By, (t) = Taken By, (c) = Cosigned By    Initials Name Provider Type    CC Yissel Goel OTR Occupational Therapist              Therapy Charges for Today     Code Description Service Date Service Provider Modifiers Qty    [id number] HC OT SELF CARE/MGMT/TRAIN EA 15 MIN 12/12/2022 Yissel Goel OTR GO 2                   SHARON Vargas  12/12/2022

## 2022-12-12 NOTE — CONSULTS
Pharmacy Chemotherapy Education - Venetoclax + A    Christie Avendaño is a 57 y.o. female admitted and to start treatment with Temodar for glioma. Met with patient to discuss schedule and expected effects of chemotherapy.    Reviewed with patient common and clinically significant effects including the risk of nausea/vomiting, pancytopenia, constipation, fatigue, headache, and decreased appetite/weight loss. Explained to patient that they will be premedicated to help with nausea. Infection prevention precautions were reviewed including hand washing, food safety and the avoidance of crowds/use of mask; patient was advised to contact provider in the event of a sustained fever >100.4 for more than 1 hour. Explained to patient that we will be starting an antibiotic (Bactrim) that they will take to prevent Pneumocystis pneumonia.  Importance of appropriate hygiene reinforced.  Bleeding precautions also reviewed including the use of soft bristle toothbrush / electric razor as well as falls precautions.     Patient was counseled on when to notify a provider including in the event of the following:   - Signs and symptoms of infection, including temperature >100.4   - Signs and symptoms of serious bleeding including blood in the urine or stool   - Frequent nausea/vomitting   - Profound or prolonged fatigue    Reviewed with patient appropriate use and handling of oral chemotherapy. Patient educated to take the medication on an empty stomach at bedtime; patients should keep the medication in the original packaging and self-administer. Discussed this can be hazardous if handled by others and they should wear gloves if the medication is to be handled by anyone other than the patient. Medication should be kept in a cool, dry place away from children and pets. Management of bodily fluids and waste was reviewed including the use of gloves when handling by caregivers and washing of soiled materials separately. Advised patient of  teratogenic risk associated with this medication and to use protective methods when engaging in sexual activity. Anyone who is currently pregnant should not handle medication or soiled materials.    Provided patient with handout regarding medication and reviewed general plan for chemotherapy administration. Patient engaged in counseling throughout and asked appropriate questions as needed to confirm understanding. Patient without any further questions at this time; patient verbalized understanding.    Thank you for the opportunity to provide education on chemotherapy; please do not hesitate if further assistance is needed.    Rojas Shelby, PharmD  PGY2 Ambulatory Care Pharmacy Resident

## 2022-12-12 NOTE — THERAPY TREATMENT NOTE
Inpatient Rehabilitation - Physical Therapy Treatment Note       [facility]     Patient Name: Christie Avendaño  : 1965  MRN: [id number]    Today's Date: 2022                    Admit Date: 2022      Visit Dx:     ICD-10-CM ICD-9-CM   1. Impaired functional mobility, balance, gait, and endurance  Z74.09 V49.89   2. 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)   • Non-small cell lung cancer, right (HCC)   • Renal mass, right   • SIRS (systemic inflammatory response syndrome) (HCC)   • Hyperlipidemia   • Malignant melanoma of right lower extremity including hip (HCC)   • High grade glioma    • 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)   • Glioma (HCC)       Past Medical History:   Diagnosis Date   • Arthritis    • Asthma    • BRCA2 positive    • Diabetes mellitus (HCC)    • H/O Liver masses 2017    x3   • H/O Lung masses 2017    1 in right lower lobe and 1 in the left infrahilar location   • H/O Ovarian cyst    • H/O Right adrenal mass (CMS/HCC) 2017   • Lung cancer (HCC)    • Melanoma (HCC)     right foot   • PONV (postoperative nausea and vomiting)    • Thyroid disease        Past Surgical History:   Procedure Laterality Date   • APPENDECTOMY     • BRAIN BIOPSY Right 2022    Procedure: Right frontal stereotactic needle brain biopsy;  Surgeon: Manuel Thompson MD;  Location: [facility] MAIN OR;  Service: Neurosurgery;  Laterality: Right;   • BREAST IMPLANT SURGERY Bilateral    • CHOLECYSTECTOMY     • HYSTERECTOMY     •  MASTECTOMY Bilateral    • OVARIAN CYST SURGERY     • THORACOSCOPY VIDEO ASSISTED WITH LOBECTOMY Right 10/9/2020    Procedure: BRONCHOSCOPY, THORACOSCOPY VIDEO ASSISTED WITH ANTERIOR BASAL SEGMENTECTOMY, INTERCOSTAL NERVE BLOCK;  Surgeon: Flaca Crisostomo MD;  Location: [facility];  Service: Thoracic;  Laterality: Right;   • TONSILLECTOMY         PT ASSESSMENT (last 12 hours)     IRF PT Evaluation and Treatment     Row Name 12/12/22 0830          PT Time and Intention    Document Type daily treatment  -JK     Mode of Treatment physical therapy  -JK     Patient/Family/Caregiver Comments/Observations pt supine in bed  -JK     Row Name 12/12/22 0830          General Information    Patient Profile Reviewed yes  -JK     Existing Precautions/Restrictions fall  -JK     Comment, General Information pt gone to radiation at 12;30 and to liver US at 14:30 on 12/12/22 and missed pm PT  -JK     Row Name 12/12/22 0830          Pain Assessment    Pretreatment Pain Rating 0/10 - no pain  -JK     Posttreatment Pain Rating 0/10 - no pain  -JK     Row Name 12/12/22 0830          Bed Mobility    Supine-Sit [place] (Bed Mobility) standby assist  -JK     Row Name 12/12/22 0830          Sit-Stand Transfer    Sit-Stand [place] (Transfers) contact guard;verbal cues  -JK     Assistive Device (Sit-Stand Transfers) walker, front-wheeled  -JK     Row Name 12/12/22 0830          Stand-Sit Transfer    Stand-Sit [place] (Transfers) contact guard;standby assist  -JK     Assistive Device (Stand-Sit Transfers) walker, 4-wheeled  -JK     Row Name 12/12/22 0830          Toilet Transfer    Type (Toilet Transfer) sit-stand;stand-sit  -JK     [place] Level (Toilet Transfer) contact guard  -JK     Assistive Device (Toilet Transfer) walker, front-wheeled  -JK     Row Name 12/12/22 0830          Gait/Stairs (Locomotion)    [place] Level (Gait) contact guard  -JK     Assistive Device (Gait) walker, front-wheeled  -JK     Distance  in Feet (Gait) 80  -JK     Pattern (Gait) step-through  -JK     Deviations/Abnormal Patterns (Gait) fabrizio decreased;left sided deviations;stride length decreased;base of support, narrow  -JK     Bilateral Gait Deviations forward flexed posture  -JK     Left Sided Gait Deviations leans left  -JK     Row Name 12/12/22 0830          Safety Issues, Functional Mobility    Safety Issues Affecting Function (Mobility) awareness of need for assistance;insight into deficits/self-awareness;judgment  -JK     Impairments Affecting Function (Mobility) balance;endurance/activity tolerance;coordination;motor control;sensation/sensory awareness;strength  -JK     Row Name 12/12/22 0830          Positioning and Restraints    Pre-Treatment Position in bed  -JK     Post Treatment Position wheelchair  -JK     In Wheelchair sitting;call light within reach;encouraged to call for assist;exit alarm on;heels elevated  -JK           User Key  (r) = Recorded By, (t) = Taken By, (c) = Cosigned By    Initials Name Provider Type    Samantha Del Rosario, PT Physical Therapist              Wound Right scalp Incision (Active)   Dressing Appearance open to air 12/11/22 2010   Closure Staples;Approximated 12/12/22 0813   Base clean;dry;scab 12/12/22 0813   Periwound intact;dry 12/12/22 0813   Periwound Temperature warm 12/12/22 0813   Periwound Skin Turgor soft 12/12/22 0813   Dressing Care open to air 12/12/22 0813     Physical Therapy Education     Title: PT OT SLP Therapies (Done)     Topic: Physical Therapy (Done)     Point: Mobility training (Done)     Learning Progress Summary           Patient Acceptance, E, VU,DU,NR by MIKHAIL at 12/12/2022 1011    Acceptance, E, VU by WN at 12/12/2022 0155    Acceptance, E, VU,NR by  at 12/10/2022 1035    Acceptance, E,D, VU,DU,NR by MIKHAIL at 12/9/2022 0853    Acceptance, E,TB,D, VU,DU,NR by  at 12/8/2022 1420    Comment: Goals, POC    Acceptance, E, VU by CB at 12/8/2022 1204   Significant Other Acceptance,  E,TB,D, VU,DU,NR by  at 12/8/2022 1420    Comment: Goals, POC                   Point: Home exercise program (Done)     Learning Progress Summary           Patient Acceptance, E, VU by WN at 12/12/2022 0155    Acceptance, E, VU,NR by  at 12/10/2022 1035    Acceptance, E,D, VU,DU,NR by JK at 12/9/2022 0853    Acceptance, E,TB,D, VU,DU,NR by  at 12/8/2022 1420    Comment: Goals, POC    Acceptance, E, VU by CB at 12/8/2022 1204   Significant Other Acceptance, E,TB,D, VU,DU,NR by  at 12/8/2022 1420    Comment: Goals, POC                   Point: Body mechanics (Done)     Learning Progress Summary           Patient Acceptance, E, VU by WN at 12/12/2022 0155    Acceptance, E, VU,NR by  at 12/10/2022 1035    Acceptance, E, VU by  at 12/8/2022 1204                   Point: Precautions (Done)     Learning Progress Summary           Patient Acceptance, E, VU by WN at 12/12/2022 0155    Acceptance, E, VU,NR by  at 12/10/2022 1035    Acceptance, E, VU by  at 12/8/2022 1204                               User Key     Initials Effective Dates Name Provider Type Discipline     06/16/21 -  Radha Ramírez, PT Physical Therapist PT    JK 06/16/21 -  Samantha Thompson, PT Physical Therapist PT     06/16/21 -  Anita Serrano, PT Physical Therapist PT    WN 08/23/22 -  Doc Park RN Registered Nurse Nurse     11/10/22 -  Mackenzie Hopkins CCC-SLP Speech and Language Pathologist SLP                PT Recommendation and Plan                          Time Calculation:      PT Charges     Row Name 12/12/22 1010             Time Calculation    Start Time 0830  -JK      Stop Time 0900  -JK      Time Calculation (min) 30 min  -JK      PT Received On 12/12/22  -JK      PT - Next Appointment 12/13/22  -JK      PT Goal Re-Cert Due Date 12/15/22  -JK            User Key  (r) = Recorded By, (t) = Taken By, (c) = Cosigned By    Initials Name Provider Type    JK Kvamme, Samantha S, PT Physical Therapist                Therapy  Charges for Today     Code Description Service Date Service Provider Modifiers Qty    88291700932  PT THERAPEUTIC ACT EA 15 MIN 12/12/2022 Samantha Thompson, PT GP 1    [id number]  PT NEUROMUSC RE EDUCATION EA 15 MIN 12/12/2022 Samantha Thompson, PT GP 1    [id number]  PT THER PROC EA 15 MIN 12/12/2022 Samantha Thompson, PT GP 2              Patient was wearing a face mask during this therapy encounter. Therapist used appropriate personal protective equipment including mask and gloves.  Mask used was standard procedure mask. Appropriate PPE was worn during the entire therapy session. Hand hygiene was completed before and after therapy session. Patient is not in enhanced droplet precautions.         Samantha Thompson, PT  12/12/2022

## 2022-12-12 NOTE — PROGRESS NOTES
Chart reviewed  LFTs falling, GI following  Sugars a bit high with intermittent lows  Continue current dose of Lantus and AC short-acting, decrease SSI dose algorithm and monitor, may be able to go up on dose of Lantus in a day or two if hypoglycemic episodes resolve

## 2022-12-13 ENCOUNTER — TREATMENT (OUTPATIENT)
Dept: RADIATION ONCOLOGY | Facility: HOSPITAL | Age: 57
End: 2022-12-13

## 2022-12-13 LAB
ALBUMIN SERPL-MCNC: 3.9 G/DL (ref 3.5–5.2)
ALBUMIN/GLOB SERPL: 1.9 G/DL
ALP SERPL-CCNC: 168 U/L (ref 39–117)
ALT SERPL W P-5'-P-CCNC: 421 U/L (ref 1–33)
ANION GAP SERPL CALCULATED.3IONS-SCNC: 10.2 MMOL/L (ref 5–15)
AST SERPL-CCNC: 60 U/L (ref 1–32)
BILIRUB SERPL-MCNC: 0.6 MG/DL (ref 0–1.2)
BUN SERPL-MCNC: 20 MG/DL (ref 6–20)
BUN/CREAT SERPL: 24.7 (ref 7–25)
CALCIUM SPEC-SCNC: 9.3 MG/DL (ref 8.6–10.5)
CHLORIDE SERPL-SCNC: 92 MMOL/L (ref 98–107)
CO2 SERPL-SCNC: 27.8 MMOL/L (ref 22–29)
CREAT SERPL-MCNC: 0.81 MG/DL (ref 0.57–1)
DX PRELIMINARY: NORMAL
EGFRCR SERPLBLD CKD-EPI 2021: 84.3 ML/MIN/1.73
GLOBULIN UR ELPH-MCNC: 2.1 GM/DL
GLUCOSE BLDC GLUCOMTR-MCNC: 164 MG/DL (ref 70–130)
GLUCOSE BLDC GLUCOMTR-MCNC: 226 MG/DL (ref 70–130)
GLUCOSE BLDC GLUCOMTR-MCNC: 250 MG/DL (ref 70–130)
GLUCOSE BLDC GLUCOMTR-MCNC: 99 MG/DL (ref 70–130)
GLUCOSE SERPL-MCNC: 246 MG/DL (ref 65–99)
LAB AP CASE REPORT: NORMAL
LAB AP DIAGNOSIS COMMENT: NORMAL
LAB AP SPECIAL STAINS: NORMAL
Lab: NORMAL
Lab: NORMAL
PATH REPORT.ADDENDUM SPEC: NORMAL
PATH REPORT.FINAL DX SPEC: NORMAL
PATH REPORT.GROSS SPEC: NORMAL
POTASSIUM SERPL-SCNC: 4.9 MMOL/L (ref 3.5–5.2)
PROT SERPL-MCNC: 6 G/DL (ref 6–8.5)
RAD ONC ARIA COURSE ID: NORMAL
RAD ONC ARIA COURSE INTENT: NORMAL
RAD ONC ARIA COURSE LAST TREATMENT DATE: NORMAL
RAD ONC ARIA COURSE START DATE: NORMAL
RAD ONC ARIA COURSE TREATMENT ELAPSED DAYS: 1
RAD ONC ARIA FIRST TREATMENT DATE: NORMAL
RAD ONC ARIA PLAN FRACTIONS TREATED TO DATE: 2
RAD ONC ARIA PLAN ID: NORMAL
RAD ONC ARIA PLAN PRESCRIBED DOSE PER FRACTION: 2 GY
RAD ONC ARIA PLAN PRIMARY REFERENCE POINT: NORMAL
RAD ONC ARIA PLAN TOTAL FRACTIONS PRESCRIBED: 23
RAD ONC ARIA PLAN TOTAL PRESCRIBED DOSE: 4600 CGY
RAD ONC ARIA REFERENCE POINT DOSAGE GIVEN TO DATE: 4 GY
RAD ONC ARIA REFERENCE POINT DOSAGE GIVEN TO DATE: 4.05 GY
RAD ONC ARIA REFERENCE POINT ID: NORMAL
RAD ONC ARIA REFERENCE POINT ID: NORMAL
RAD ONC ARIA REFERENCE POINT SESSION DOSAGE GIVEN: 2 GY
RAD ONC ARIA REFERENCE POINT SESSION DOSAGE GIVEN: 2.02 GY
SODIUM SERPL-SCNC: 130 MMOL/L (ref 136–145)

## 2022-12-13 PROCEDURE — 97535 SELF CARE MNGMENT TRAINING: CPT

## 2022-12-13 PROCEDURE — 80053 COMPREHEN METABOLIC PANEL: CPT | Performed by: PHYSICAL MEDICINE & REHABILITATION

## 2022-12-13 PROCEDURE — 82962 GLUCOSE BLOOD TEST: CPT

## 2022-12-13 PROCEDURE — 63710000001 INSULIN LISPRO (HUMAN) PER 5 UNITS: Performed by: PHYSICAL MEDICINE & REHABILITATION

## 2022-12-13 PROCEDURE — 25010000002 TEMOZOLOMIDE PER 5 MG: Performed by: INTERNAL MEDICINE

## 2022-12-13 PROCEDURE — 77386: CPT | Performed by: RADIOLOGY

## 2022-12-13 PROCEDURE — 77386 CHG INTENSITY MODULATED RADIATION TX DLVR COMPLEX: CPT | Performed by: RADIOLOGY

## 2022-12-13 PROCEDURE — 25010000002 TEMOZOLOMIDE 140 MG CAPSULE: Performed by: INTERNAL MEDICINE

## 2022-12-13 PROCEDURE — 97130 THER IVNTJ EA ADDL 15 MIN: CPT

## 2022-12-13 PROCEDURE — 97112 NEUROMUSCULAR REEDUCATION: CPT

## 2022-12-13 PROCEDURE — 25010000002 ENOXAPARIN PER 10 MG: Performed by: PHYSICAL MEDICINE & REHABILITATION

## 2022-12-13 PROCEDURE — 99231 SBSQ HOSP IP/OBS SF/LOW 25: CPT | Performed by: PHYSICIAN ASSISTANT

## 2022-12-13 PROCEDURE — 99233 SBSQ HOSP IP/OBS HIGH 50: CPT | Performed by: INTERNAL MEDICINE

## 2022-12-13 PROCEDURE — 77386: CPT | Performed by: STUDENT IN AN ORGANIZED HEALTH CARE EDUCATION/TRAINING PROGRAM

## 2022-12-13 PROCEDURE — 97110 THERAPEUTIC EXERCISES: CPT

## 2022-12-13 PROCEDURE — 77014 CHG CT GUIDANCE RADIATION THERAPY FLDS PLACEMENT: CPT | Performed by: RADIOLOGY

## 2022-12-13 PROCEDURE — 97129 THER IVNTJ 1ST 15 MIN: CPT

## 2022-12-13 PROCEDURE — 63710000001 DEXAMETHASONE PER 0.25 MG: Performed by: PHYSICAL MEDICINE & REHABILITATION

## 2022-12-13 PROCEDURE — 63710000001 ONDANSETRON PER 8 MG: Performed by: INTERNAL MEDICINE

## 2022-12-13 PROCEDURE — 63710000001 INSULIN LISPRO (HUMAN) PER 5 UNITS: Performed by: HOSPITALIST

## 2022-12-13 PROCEDURE — 97530 THERAPEUTIC ACTIVITIES: CPT

## 2022-12-13 RX ORDER — GABAPENTIN 100 MG/1
100 CAPSULE ORAL 3 TIMES DAILY
Status: DISCONTINUED | OUTPATIENT
Start: 2022-12-13 | End: 2022-12-23 | Stop reason: HOSPADM

## 2022-12-13 RX ADMIN — TEMOZOLOMIDE 140 MG: 140 CAPSULE ORAL at 21:43

## 2022-12-13 RX ADMIN — LEVETIRACETAM 500 MG: 500 TABLET, FILM COATED ORAL at 21:35

## 2022-12-13 RX ADMIN — CALCIUM CARBONATE-VITAMIN D TAB 500 MG-200 UNIT 1 TABLET: 500-200 TAB at 21:34

## 2022-12-13 RX ADMIN — DEXAMETHASONE 4 MG: 4 TABLET ORAL at 13:46

## 2022-12-13 RX ADMIN — INSULIN GLARGINE-YFGN 28 UNITS: 100 INJECTION, SOLUTION SUBCUTANEOUS at 08:51

## 2022-12-13 RX ADMIN — TEMOZOLOMIDE 20 MG: 20 CAPSULE ORAL at 21:43

## 2022-12-13 RX ADMIN — LEVETIRACETAM 500 MG: 500 TABLET, FILM COATED ORAL at 08:56

## 2022-12-13 RX ADMIN — INSULIN LISPRO 5 UNITS: 100 INJECTION, SOLUTION INTRAVENOUS; SUBCUTANEOUS at 08:51

## 2022-12-13 RX ADMIN — VALACYCLOVIR HYDROCHLORIDE 500 MG: 500 TABLET, FILM COATED ORAL at 08:56

## 2022-12-13 RX ADMIN — ENOXAPARIN SODIUM 40 MG: 100 INJECTION SUBCUTANEOUS at 11:44

## 2022-12-13 RX ADMIN — ONDANSETRON HYDROCHLORIDE 8 MG: 8 TABLET, FILM COATED ORAL at 11:50

## 2022-12-13 RX ADMIN — INSULIN LISPRO 8 UNITS: 100 INJECTION, SOLUTION INTRAVENOUS; SUBCUTANEOUS at 17:20

## 2022-12-13 RX ADMIN — INSULIN LISPRO 8 UNITS: 100 INJECTION, SOLUTION INTRAVENOUS; SUBCUTANEOUS at 11:44

## 2022-12-13 RX ADMIN — CALCIUM CARBONATE-VITAMIN D TAB 500 MG-200 UNIT 1 TABLET: 500-200 TAB at 08:56

## 2022-12-13 RX ADMIN — GABAPENTIN 100 MG: 100 CAPSULE ORAL at 21:34

## 2022-12-13 RX ADMIN — LISINOPRIL 20 MG: 20 TABLET ORAL at 08:57

## 2022-12-13 RX ADMIN — MAGNESIUM OXIDE 400 MG (241.3 MG MAGNESIUM) TABLET 400 MG: TABLET at 08:56

## 2022-12-13 RX ADMIN — TEMOZOLOMIDE 5 MG: 5 CAPSULE ORAL at 21:42

## 2022-12-13 RX ADMIN — ESCITALOPRAM 10 MG: 10 TABLET, FILM COATED ORAL at 08:56

## 2022-12-13 RX ADMIN — ENOXAPARIN SODIUM 40 MG: 100 INJECTION SUBCUTANEOUS at 00:03

## 2022-12-13 RX ADMIN — INSULIN LISPRO 8 UNITS: 100 INJECTION, SOLUTION INTRAVENOUS; SUBCUTANEOUS at 08:50

## 2022-12-13 RX ADMIN — DEXAMETHASONE 4 MG: 4 TABLET ORAL at 05:26

## 2022-12-13 RX ADMIN — LEVOTHYROXINE SODIUM 150 MCG: 0.15 TABLET ORAL at 05:26

## 2022-12-13 RX ADMIN — LATANOPROST 1 DROP: 50 SOLUTION OPHTHALMIC at 21:38

## 2022-12-13 RX ADMIN — ENOXAPARIN SODIUM 40 MG: 100 INJECTION SUBCUTANEOUS at 21:45

## 2022-12-13 RX ADMIN — DEXAMETHASONE 4 MG: 4 TABLET ORAL at 21:34

## 2022-12-13 RX ADMIN — PANTOPRAZOLE SODIUM 40 MG: 40 TABLET, DELAYED RELEASE ORAL at 05:26

## 2022-12-13 NOTE — THERAPY TREATMENT NOTE
Inpatient Rehabilitation - Occupational Therapy Treatment Note    [facility]     Patient Name: Christie Avendaño  : 1965  MRN: [id number]    Today's Date: 2022                 Admit Date: 2022         ICD-10-CM ICD-9-CM   1. Impaired functional mobility, balance, gait, and endurance  Z74.09 V49.89   2. 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)   • Non-small cell lung cancer, right (HCC)   • Renal mass, right   • SIRS (systemic inflammatory response syndrome) (HCC)   • Hyperlipidemia   • Malignant melanoma of right lower extremity including hip (HCC)   • High grade glioma    • 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)   • Glioma (HCC)       Past Medical History:   Diagnosis Date   • Arthritis    • Asthma    • BRCA2 positive    • Diabetes mellitus (HCC)    • H/O Liver masses 2017    x3   • H/O Lung masses 2017    1 in right lower lobe and 1 in the left infrahilar location   • H/O Ovarian cyst    • H/O Right adrenal mass (CMS/HCC) 2017   • Lung cancer (HCC)    • Melanoma (HCC)     right foot   • PONV (postoperative nausea and vomiting)    • Thyroid disease        Past Surgical History:   Procedure Laterality Date   • APPENDECTOMY     • BRAIN BIOPSY Right 2022    Procedure: Right frontal stereotactic needle brain biopsy;  Surgeon: Manuel Thompson MD;  Location: [facility];  Service: Neurosurgery;  Laterality: Right;   • BREAST IMPLANT SURGERY Bilateral    • CHOLECYSTECTOMY     • HYSTERECTOMY     • MASTECTOMY  Bilateral    • OVARIAN CYST SURGERY     • THORACOSCOPY VIDEO ASSISTED WITH LOBECTOMY Right 10/9/2020    Procedure: BRONCHOSCOPY, THORACOSCOPY VIDEO ASSISTED WITH ANTERIOR BASAL SEGMENTECTOMY, INTERCOSTAL NERVE BLOCK;  Surgeon: Flaca Crisostomo MD;  Location: [facility] OR;  Service: Thoracic;  Laterality: Right;   • TONSILLECTOMY               IRF OT ASSESSMENT FLOWSHEET (last 12 hours)     IRF OT Evaluation and Treatment     Row Name 12/13/22 1206          OT Time and Intention    Document Type daily treatment  -     Mode of Treatment occupational therapy  -CC     Patient Effort good  -CC     Row Name 12/13/22 1206          Pain Assessment    Pretreatment Pain Rating 0/10 - no pain  -CC     Posttreatment Pain Rating 0/10 - no pain  -CC     Row Name 12/13/22 1206          Cognition/Psychosocial    Affect/Mental Status (Cognition) flat/blunted affect  -     Orientation Status (Cognition) oriented x 4  -CC     Follows Commands (Cognition) follows one-step commands;over 90% accuracy;verbal cues/prompting required  -     Personal Safety Interventions fall prevention program maintained;gait belt;nonskid shoes/slippers when out of bed  -     Attention Deficit (Cognition) concentration;distractible in noisy environment  -CC     Row Name 12/13/22 1206          Bathing    [place] Level (Bathing) bathing skills;upper body;lower body;contact guard assist;minimum assist (75% patient effort)  -     Assistive Device (Bathing) hand held shower spray hose;grab bar/tub rail;tub bench  -     Position (Bathing) supported sitting;sink side  -     Set-up Assistance (Bathing) adjust water temperature;adaptive equipment set-up;obtain supplies  -CC     Row Name 12/13/22 1206          Upper Body Dressing    [place] Level (Upper Body Dressing) upper body dressing skills;doff;don;bra/undergarment;pull over garment;minimum assist (75% or more patient effort);clothes fastener management;set up assistance  -      Position (Upper Body Dressing) supported sitting  -     Set-up Assistance (Upper Body Dressing) obtain clothing  -     Comment (Upper Body Dressing) assist hooking bra  -     Row Name 12/13/22 1206          Lower Body Dressing    [place] Level (Lower Body Dressing) doff;don;pants/bottoms;shoes/slippers;socks;set up;verbal cues;minimum assist (75% patient effort)  -     Position (Lower Body Dressing) supported sitting;supported standing  -     Set-up Assistance (Lower Body Dressing) obtain clothing  -     Row Name 12/13/22 1206          Grooming    [place] Level (Grooming) grooming skills;deodorant application;oral care regimen;wash face, hands;set up;standby assist  -     Position (Grooming) sink side;supported sitting  -     Set-up Assistance (Grooming) obtain supplies  -[facility] Name 12/13/22 1206          Bed Mobility    Comment, (Bed Mobility) in w/c  -[facility] Name 12/13/22 1206          Sit-Stand Transfer    Sit-Stand [place] (Transfers) contact guard;verbal cues;nonverbal cues (demo/gesture)  -     Assistive Device (Sit-Stand Transfers) wheelchair  -[facility] Name 12/13/22 1206          Stand-Sit Transfer    Stand-Sit [place] (Transfers) contact guard;minimum assist (75% patient effort);verbal cues;nonverbal cues (demo/gesture)  -     Assistive Device (Stand-Sit Transfers) wheelchair  -[facility] Name 12/13/22 1206          Shower Transfer    Type (Shower Transfer) sit-stand;stand-sit;stand pivot/stand step  -     [place] Level (Shower Transfer) set up;minimum assist (75% patient effort);verbal cues  -     Assistive Device (Shower Transfer) grab bar, tub/shower;shower chair;wheelchair  -[facility] Name 12/13/22 1206          Balance    Static Sitting Balance standby assist;contact guard;verbal cues  -     Static Standing Balance contact guard;minimal assist  -     Comment, Balance vc to correct balance when leaning L  -     Row Name 12/13/22 1206           Positioning and Restraints    Pre-Treatment Position sitting in chair/recliner  -CC     Post Treatment Position wheelchair  -CC     In Wheelchair sitting;exit alarm on;with SLP  -CC           User Key  (r) = Recorded By, (t) = Taken By, (c) = Cosigned By    Initials Name Effective Dates    CC Yissel Goel, OTR 06/16/21 -                  Occupational Therapy Education     Title: PT OT SLP Therapies (In Progress)     Topic: Occupational Therapy (Done)     Point: ADL training (Done)     Description:   Instruct learner(s) on proper safety adaptation and remediation techniques during self care or transfers.   Instruct in proper use of assistive devices.              Learning Progress Summary           Patient Acceptance, E, VU by WN at 12/12/2022 2327    Acceptance, E, VU by WN at 12/12/2022 0155    Acceptance, E, VU by CB at 12/8/2022 1204    Acceptance, E, VU,NR by CE at 12/8/2022 1107    Comment: fall prevention, DME including w/c and shower chair                   Point: Home exercise program (Done)     Description:   Instruct learner(s) on appropriate technique for monitoring, assisting and/or progressing therapeutic exercises/activities.              Learning Progress Summary           Patient Acceptance, E, VU by WN at 12/12/2022 2327    Acceptance, E, VU by WN at 12/12/2022 0155    Acceptance, E, VU by CB at 12/8/2022 1204    Acceptance, E, VU,NR by CE at 12/8/2022 1107    Comment: fall prevention, DME including w/c and shower chair                   Point: Precautions (Done)     Description:   Instruct learner(s) on prescribed precautions during self-care and functional transfers.              Learning Progress Summary           Patient Acceptance, E, VU by WN at 12/12/2022 2327    Acceptance, E, VU by WN at 12/12/2022 0155    Acceptance, E, VU by CB at 12/8/2022 1204    Acceptance, E, VU,NR by CE at 12/8/2022 1107    Comment: fall prevention, DME including w/c and shower chair                    Point: Body mechanics (Done)     Description:   Instruct learner(s) on proper positioning and spine alignment during self-care, functional mobility activities and/or exercises.              Learning Progress Summary           Patient Acceptance, E, VU by WN at 12/12/2022 2327    Acceptance, E, VU by WN at 12/12/2022 0155    Acceptance, E, VU by CB at 12/8/2022 1204    Acceptance, E, VU,NR by CE at 12/8/2022 1107    Comment: fall prevention, DME including w/c and shower chair                               User Key     Initials Effective Dates Name Provider Type Discipline    WN 08/23/22 -  Doc Park, RN Registered Nurse Nurse    CB 11/10/22 -  Mackenzie Hopkins CCC-SLP Speech and Language Pathologist SLP    CE 10/17/22 -  Portia Esqueda OT Occupational Therapist OT                    OT Recommendation and Plan                         Time Calculation:      Time Calculation- OT     Row Name 12/13/22 0930             Time Calculation- OT    OT Start Time 0930  -CC      OT Stop Time 1000  -CC      OT Time Calculation (min) 30 min  -CC            User Key  (r) = Recorded By, (t) = Taken By, (c) = Cosigned By    Initials Name Provider Type    CC Yissel Goel OTR Occupational Therapist              Therapy Charges for Today     Code Description Service Date Service Provider Modifiers Qty    47003948076 HC OT SELF CARE/MGMT/TRAIN EA 15 MIN 12/12/2022 Yissel Goel OTR GO 2    [id number] HC OT SELF CARE/MGMT/TRAIN EA 15 MIN 12/13/2022 Yissel Goel OTR GO 2                   SHARON Vargas  12/13/2022

## 2022-12-13 NOTE — THERAPY TREATMENT NOTE
• MASTECTOMY Bilateral    • OVARIAN CYST SURGERY     • THORACOSCOPY VIDEO ASSISTED WITH LOBECTOMY Right 10/9/2020    Procedure: BRONCHOSCOPY, THORACOSCOPY VIDEO ASSISTED WITH ANTERIOR BASAL SEGMENTECTOMY, INTERCOSTAL NERVE BLOCK;  Surgeon: Flaca Crisostomo MD;  Location: [facility] OR;  Service: Thoracic;  Laterality: Right;   • TONSILLECTOMY         SLP Recommendation and Plan                                                            SLP EVALUATION (last 72 hours)     SLP SLC Evaluation     Row Name 12/13/22 1000 12/12/22 1100 12/10/22 1400             Communication Assessment/Intervention    Document Type therapy note (daily note)  -SL therapy note (daily note)  -SL therapy note (daily note)  -TH      Subjective Information no complaints  -SL no complaints  -SL no complaints  -TH      Patient Observations alert;cooperative;agree to therapy  -SL alert;cooperative;agree to therapy  -SL --      Patient Effort good  -SL good  -SL good  -TH      Symptoms Noted During/After Treatment none  -SL none  -SL --            User Key  (r) = Recorded By, (t) = Taken By, (c) = Cosigned By    Initials Name Effective Dates    SL Jemma Bradley MS CCC-SLP 06/16/21 -      Anuradha Kenny 06/16/21 -                    EDUCATION    The patient has been educated in the following areas:       Cognitive Impairment.             SLP GOALS     Row Name 12/13/22 1000 12/12/22 1100 12/10/22 1400       Attention Goal 1 (SLP)    Progress/Outcomes (Attention Goal 1, SLP) goal ongoing  -SL -- goal ongoing  -TH    Comment (Attention Goal 1, SLP) simple word altering task- letter altering ( only 4 steps)- 88% indep  -SL -- Patient was easily distracted and required cues when working on formal task for sustained attention to task. Selective attention task- written directions- completed with 25% accuracy. Completed card sorting task with approximately 60% accuracy for attention to detail and recall of game rules. Scanning to the left was adequate  for worksheet task, however, when playing cards patient demonstrated more difficulty with attention to her left side and required min-mod cues.  -TH       Memory Skills Goal 1 (SLP)    Progress/Outcomes (Memory Skills Goal 1, SLP) goal ongoing  -SL goal ongoing  -SL goal ongoing  -TH    Comment (Memory Skills Goal 1, SLP) recall of new advertisements- 47% indep;  visual recall- details from picture scenes- 90% indep after 5 min review  -SL visual immediate recall task- 12 pictures with grouping strategy- 50% indep  -SL Given 5 minute delay, patient able to recall 3/3 novel facts given 5 minute delay.  -TH       Organizational Skills Goal 1 (SLP)    Progress/Outcomes (Thought Organization Skills Goal 1, SLP) goal ongoing  -SL goal ongoing  -SL --    Comment (Thought Organization Skills Goal 1, SLP) word organizaiton task- filling in missing letters in words, given categories- 60% indep  -SL sorting and categorizaton of 15 items into 3 categories- initally pt groupoed everything into 2 groups and missed column on left side completely- with min cues for that side and first colums, pt completed organization of items with 47%; poor attn to details noted  -SL --       Reasoning Goal 1 (SLP)    Progress/Outcomes (Reasoning Goal 1, SLP) goal ongoing  -SL -- --    Comment (Reasoning Goal 1, SLP) making inferences from short stories- 60% indep  -SL -- --       Functional Problem Solving Skills Goal 1 (SLP)    Progress/Outcomes (Problem Solving Goal 1, SLP) -- goal ongoing  -SL --    Comment (Problem Solving Goal 1, SLP) -- answering ?'s re: prescriptions- 100%;  medication dosage amts and times- 100% indep;  answering ?'sr e: detailed charted info ( names, ages, sex, addresses)- 67% indep;   following multistep directives and using hospital map- 40% indep  -SL --       Functional Math Skills Goal 1 (SLP)    Progress/Outcomes (Functional Math Skills Goal 1, SLP) goal ongoing  -SL -- --    Comment (Functional Math Skills Goal  1, SLP) simple math word problems - use of paper and pencil to assist with calculations- 20% indep; mod cues fro task  -SL -- --       Right Hemisphere Function Goal 1 (SLP)    Improve Right Hemisphere Function Through Goal 1 (SLP) complete visuo-spatial activities (visual closure, trail making, mazes;complete visuo-perceptual activities (L/R discrimination, spatial concepts);use compensatory strategies for left neglect;90%;independently (over 90% accuracy)  -SL -- --    Time Frame (Right Hemisphere Function Goal 1, SLP) by discharge  - -- --    Progress/Outcomes (Right Hemisphere Function Goal 1, SLP) continuing progress toward goal;goal ongoing  - -- --    Comment (Right Hemisphere Function Goal 1, SLP) scanning for target stimulus in 4 rows of letters - 100% without additional visual cues required  - -- --          User Key  (r) = Recorded By, (t) = Taken By, (c) = Cosigned By    Initials Name Provider Type    Jemma Kelley MS CCC-SLP Speech and Language Pathologist     Anuradha Kenny Speech and Language Pathologist                            Time Calculation:        Time Calculation- SLP     Row Name 12/13/22 1054             Time Calculation- [facility]    SLP Start Time 1000  -      SLP Stop Time 1100  -      SLP Time Calculation (min) 60 min  -            User Key  (r) = Recorded By, (t) = Taken By, (c) = Cosigned By    Initials Name Provider Type    Jemma Kelley MS CCC-SLP Speech and Language Pathologist                  Therapy Charges for Today     Code Description Service Date Service Provider Modifiers Qty    80725635238 HC ST DEV OF COGN SKILLS INITIAL 15 MIN 12/12/2022 Jemma Bradley MS CCC-SLP  1    20835117211 HC ST DEV OF COGN SKILLS EACH ADDT'L 15 MIN 12/12/2022 Jemma Bradley MS CCC-SLP  3    75491856862 HC ST DEV OF COGN SKILLS INITIAL 15 MIN 12/13/2022 Jemma Bradley MS CCC-SLP  1    97005723028 HC ST DEV OF COGN SKILLS EACH ADDT'L 15 MIN 12/13/2022 Jemma Bradley MS CCC-SLP  3                            Jemma Bradley, MS CCC-SLP  12/13/2022

## 2022-12-13 NOTE — PROGRESS NOTES
Reviewed current status/progress, weekly and d/c goals, and ELOS with patient and , by phone. They are agreeable to plan. Scheduled family conference for Tuesday, 12/20 at 3:00 p.m. Will assist with plans.

## 2022-12-13 NOTE — PROGRESS NOTES
LOS: 6 days   Patient Care Team:  Tiffany Holloway MD as PCP - General (Internal Medicine)  Mathew Collins Jr., MD as Consulting Physician (Hematology and Oncology)  Dorian Sabillon MD as Surgeon (General Surgery)  Thang Dumont, JOSEFA (Optometry)  Lamine Cantu DO as Referring Physician (Family Medicine)  Hali Rolle MD as Gynecologist (Gynecology)      HERON MAGANA  1965    Diagnoses    1. IMPAIRED FUNCTIONAL MOBILITY, BALANCE, GAIT, AND ENDURANCE       ADMITTING DIAGNOSIS:  High-grade glioma-3.2 cm ring-enhancing right supratentorial mass-right thalamic-with mass-effect and left midline shift  Status post stereotactic brain biopsy on November 28, 2022  Left side weakness/incoordination/impaired mobilityHigh-grade glioma-3.2 cm ring-enhancing right supratentorial mass-right thalamic-with mass-effect and left midline shift  Status post stereotactic brain biopsy on November 28, 2022  Left side weakness/incoordination/impaired mobility  Diabetes      Subjective   Patient with intermittent headache.  She continues with some impaired motor control in the left side-no change.  Tolerating initiation of radiation therapy.  Tolerating therapies     Objective     Vitals:    12/13/22 0510   BP: 116/71   Pulse: 60   Resp: 20   Temp: 98.3 °F (36.8 °C)   SpO2: 94%       PHYSICAL EXAM:   MENTAL STATUS -  AWAKE / ALERT  HEENT-right scalp incision with staples removed.  Clean dry and intact  SCLERAE ANICTERIC, CONJUNCTIVAE PINK  NO JVD, EARS UNREMARKABLE EXTERNALLY  LUNGS - CTA, NO WHEEZES, RALES OR RHONCHI  HEART- RRR, NO RUB, MURMUR, OR GALLOP  ABD - NORMOACTIVE BOWEL SOUNDS, SOFT, NT.     EXT - NO EDEMA OR CYANOSIS  NEURO -oriented       Speech was fluent with slightly slow rate of speech.     Extraocular movements intact.  No dysarthria.  MOTOR EXAM - RUE/RLE 5/5.   LUE/LLE 4+/5.   Impaired motor control in the left upper extremity and left lower extremity      MEDICATIONS  Scheduled Meds:calcium 500 mg  vitamin D 5 mcg (200 UT), 1 tablet, Oral, BID  dexamethasone, 4 mg, Oral, Q8H  enoxaparin, 40 mg, Subcutaneous, Q12H  escitalopram, 10 mg, Oral, Daily  insulin glargine, 28 Units, Subcutaneous, QAM  insulin lispro, 0-14 Units, Subcutaneous, TID AC  insulin lispro, 8 Units, Subcutaneous, TID With Meals  latanoprost, 1 drop, Both Eyes, Nightly  levETIRAcetam, 500 mg, Oral, BID  levothyroxine, 150 mcg, Oral, Q AM  lisinopril, 20 mg, Oral, Daily  magnesium oxide, 400 mg, Oral, Daily  ondansetron, 8 mg, Oral, Q24H  pantoprazole, 40 mg, Oral, Q AM  polyethylene glycol, 17 g, Oral, Daily  sulfamethoxazole-trimethoprim, 1 tablet, Oral, Once per day on Mon Wed Fri  temozolomide, 140 mg, Oral, Nightly   And  temozolomide, 20 mg, Oral, Nightly   And  temozolomide, 5 mg, Oral, Nightly  valACYclovir, 500 mg, Oral, Q24H      Continuous Infusions:   PRN Meds:.•  calcium carbonate  •  dextrose  •  dextrose  •  famotidine  •  gabapentin  •  glucagon (human recombinant)  •  influenza vaccine  •  nitroglycerin  •  ondansetron **OR** ondansetron  •  senna-docusate sodium      RESULTS  Glucose   Date/Time Value Ref Range Status   12/13/2022 0629 226 (H) 70 - 130 mg/dL Final     Comment:     Meter: [id number] : [id number] Nabeel Pacheco    12/12/2022 2041 278 (H) 70 - 130 mg/dL Final     Comment:     Meter: [id number] : [id number] Nabeel Pacheco NA   12/12/2022 1634 107 70 - 130 mg/dL Final     Comment:     Meter: [id number] : [id number] Santoslaura Ramirezrajat NA   12/12/2022 1133 241 (H) 70 - 130 mg/dL Final     Comment:     Meter: [id number] : [id number] Santos Ramireza NA   12/12/2022 0527 140 (H) 70 - 130 mg/dL Final     Comment:     Meter: [id number] : [id number] Narendra Casper CNA   12/11/2022 2015 230 (H) 70 - 130 mg/dL Final     Comment:     Meter: [id number] : [id number] Narendra Casper CNA   12/11/2022 1639 87 70 - 130 mg/dL Final     Comment:     Meter: [id number] : [id number] Paul ACUÑA   12/11/2022 1124  285 (H) 70 - 130 mg/dL Final     Comment:     Meter: [id number] : [id number] Twan ACUÑA     Results from last 7 days   Lab Units 12/12/22  0555 12/09/22  0539 12/08/22  0616   WBC 10*3/mm3 10.16 13.31* 15.27*   HEMOGLOBIN g/dL 13.0 12.9 13.0   HEMATOCRIT % 38.9 38.1 39.4   PLATELETS 10*3/mm3 174 225 238     Results from last 7 days   Lab Units 12/13/22  0620 12/12/22  0555 12/11/22  0610   SODIUM mmol/L 130* 133* 131*   POTASSIUM mmol/L 4.9 4.3 4.7   CHLORIDE mmol/L 92* 97* 96*   CO2 mmol/L 27.8 24.5 24.3   BUN mg/dL 20 20 21*   CREATININE mg/dL 0.81 0.68 0.74   CALCIUM mg/dL 9.3 9.2 8.9   BILIRUBIN mg/dL 0.6 0.7 0.6   ALK PHOS U/L 168* 161* 164*   ALT (SGPT) U/L 421* 467* 546*   AST (SGOT) U/L 60* 58* 73*   GLUCOSE mg/dL 246* 156* 174*       ASSESSMENT and PLAN    Glioma (HCC)    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  She reports intolerance to gabapentin, intolerances to opioids.  Tramadol comes up as contraindicated with history of anaphylactic secondary to morphine.  Valproate for headache control not an option with her liver changes  Dec 13: patient reports intolerance to gabapentin was drowsiness on 300 mg dose. Agreeable to try 100 mg tid for HA.      Left side weakness/incoordination/impaired mobility     Radiation therapy/Temodar to start December 12, 2022  Decadron 4 mg every 8 hourly  Dec 9: Oncology aware of increase of liver enzymes. They are following along   December 12: Radiation therapy and Temodar initiating today     Leukocytosis-steroid/stress response.  Incision healing.      Elevated LFTs  Dec 8: Labs significant for ALT 1049 (143 11/28),  (71 11/28), Alk phos 190 (96 11/28). Taking minimal tylenol and statin is a home med- discontinued. Consulted IM who also consulted  pharmacy and GI. They also decreased valtrex to 1g daily (she was taking daily prophylactic valtrex 2g bid). Awaiting liver ultrasound. CPK normal.   Dec 9- Ongoing workup. Liver ultrasound- remarkable for a small hepatic cyst otherwise negative  Per GI -[ Obtain duplex hepatic ultrasound to further assess for thrombosis  Obtain GEMMA, IgG profile, ASMA, EBV, CMV, HSV, VZV to rule out autoimmune versus viral cause to elevation.].   Patient reviewed with internal medicine and medical oncology  December 12-transaminases elevated but trending better.   Portal hepatic duplex unremarkable    Diabetes mellitus-Trulicity at home.  On Lantus/Humalog  December 13-Lantus increased to 28 units a.m.  On Humalog 8 units 3 times daily with meals     Seizure prophylaxis-Keppra     DVT prophylaxis-Lovenox/SCDs     GI prophylaxis-protein pump inhibitor  Add calcium and vitamin D with ongoing steroids     Chronic Valtrex-dose decreased to 1000 mg daily     Germline BRCA2 and GEORGES mutations.  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     Depression-Lexapro     Hypothyroidism-on replacement     Hypertension lisinopril    TEAM CONF - DEC 13 - BED SBA. TRANSFERS MIN CTG. GAIT 160 FEET CTG. LEANS LEFT. TOILET TRANSFERS CTG. SHOWER TRANSFERS CTG MIN. LBD MIN. UBD MIN. BATH MIN. GROOMING SBA. TOILETING CTG MIN. IMPAIRED ATTENTION.IMPAIRED EXECUTIVE FUNCTION. FATIGUES WITH COGNITIVE TASKS. CONTINENT BOWEL AND BLADDER. SCALP INCISION HEALING.   XRT/TEMODAR. ELEVATED TRANSAMINASES TREND BETTER.   ELOS - 2 WEEKS    Mathew Araujo MD       During rounds, used appropriate personal protective equipment including mask and gloves.  Additional gown if indicated.  Mask used was standard procedure mask.  Appropriate PPE was worn during the entire visit.  Hand hygiene was completed before and after.

## 2022-12-13 NOTE — THERAPY TREATMENT NOTE
Inpatient Rehabilitation - Physical Therapy Treatment Note       [facility]     Patient Name: Christie Avendaño  : 1965  MRN: [id number]    Today's Date: 2022                    Admit Date: 2022      Visit Dx:     ICD-10-CM ICD-9-CM   1. Impaired functional mobility, balance, gait, and endurance  Z74.09 V49.89   2. 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)   • Non-small cell lung cancer, right (HCC)   • Renal mass, right   • SIRS (systemic inflammatory response syndrome) (HCC)   • Hyperlipidemia   • Malignant melanoma of right lower extremity including hip (HCC)   • High grade glioma    • 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)   • Glioma (HCC)       Past Medical History:   Diagnosis Date   • Arthritis    • Asthma    • BRCA2 positive    • Diabetes mellitus (HCC)    • H/O Liver masses 2017    x3   • H/O Lung masses 2017    1 in right lower lobe and 1 in the left infrahilar location   • H/O Ovarian cyst    • H/O Right adrenal mass (CMS/HCC) 2017   • Lung cancer (HCC)    • Melanoma (HCC)     right foot   • PONV (postoperative nausea and vomiting)    • Thyroid disease        Past Surgical History:   Procedure Laterality Date   • APPENDECTOMY     • BRAIN BIOPSY Right 2022    Procedure: Right frontal stereotactic needle brain biopsy;  Surgeon: Manuel Thompson MD;  Location: [facility] MAIN OR;  Service: Neurosurgery;  Laterality: Right;   • BREAST IMPLANT SURGERY Bilateral    • CHOLECYSTECTOMY     • HYSTERECTOMY     •  MASTECTOMY Bilateral    • OVARIAN CYST SURGERY     • THORACOSCOPY VIDEO ASSISTED WITH LOBECTOMY Right 10/9/2020    Procedure: BRONCHOSCOPY, THORACOSCOPY VIDEO ASSISTED WITH ANTERIOR BASAL SEGMENTECTOMY, INTERCOSTAL NERVE BLOCK;  Surgeon: Flaca Crisostomo MD;  Location: [facility] OR;  Service: Thoracic;  Laterality: Right;   • TONSILLECTOMY         PT ASSESSMENT (last 12 hours)     IRF PT Evaluation and Treatment     Row Name 12/13/22 1400 12/13/22 0900       PT Time and Intention    Document Type daily treatment  -LH daily treatment  -LH    Mode of Treatment physical therapy  -LH physical therapy  -    Patient/Family/Caregiver Comments/Observations pt supine in bed no acute distress  -LH pt supine in bed no acute distress  -LH    Evaluation/Treatment Not Performed -- patient unavailable  attempted PT [id number] of [facility]  -LH    Row Name 12/13/22 1400          General Information    Patient Profile Reviewed yes  -LH     Existing Precautions/Restrictions fall  -LH     Row Name 12/13/22 1400 12/13/22 0900       Pain Assessment    Pretreatment Pain Rating 2/10  -LH 0/10 - no pain  -LH    Posttreatment Pain Rating 2/10  -LH 0/10 - no pain  -LH    Pain Location - Side/Orientation Right  -LH --    Pain Location - head  -LH --    Row Name 12/13/22 1400 12/13/22 0900       Cognition/Psychosocial    Affect/Mental Status (Cognition) flat/blunted affect  -LH flat/blunted affect  -LH    Orientation Status (Cognition) oriented x 4  -LH oriented x 4  -LH    Follows Commands (Cognition) follows one-step commands;over 90% accuracy;verbal cues/prompting required  -LH follows one-step commands;over 90% accuracy;verbal cues/prompting required  -LH    Personal Safety Interventions fall prevention program maintained;gait belt;supervised activity  -LH --    Cognitive Function executive function deficit  - executive function deficit  -LH    Row Name 12/13/22 1400 12/13/22 0900       Bed Mobility    Supine-Sit [place] (Bed  Mobility) standby assist  - contact guard  -    Sit-Supine [place] (Bed Mobility) -- not tested  -    Assistive Device (Bed Mobility) bed rails  - --    Comment, (Bed Mobility) inc time to complete task  - --    Row Name 12/13/22 1400 12/13/22 0900       Bed-Chair Transfer    Bed-Chair [place] (Transfers) contact guard;verbal cues  tsf to R  - minimum assist (75% patient effort);verbal cues;nonverbal cues (demo/gesture)  -    Assistive Device (Bed-Chair Transfers) wheelchair  - wheelchair  -    Row Name 12/13/22 1400 12/13/22 0900       Sit-Stand Transfer    Sit-Stand [place] (Transfers) contact guard;verbal cues;nonverbal cues (demo/gesture)  - contact guard;verbal cues;nonverbal cues (demo/gesture)  -    Assistive Device (Sit-Stand Transfers) wheelchair;walker, front-wheeled  - walker, front-wheeled  -    Row Name 12/13/22 1400 12/13/22 0900       Stand-Sit Transfer    Stand-Sit [place] (Transfers) contact guard;verbal cues  - contact guard;minimum assist (75% patient effort);verbal cues;nonverbal cues (demo/gesture)  -    Assistive Device (Stand-Sit Transfers) walker, front-wheeled;wheelchair  - walker, front-wheeled;wheelchair  -    Row Name 12/13/22 1400 12/13/22 0900       Gait/Stairs (Locomotion)    [place] Level (Gait) contact guard;verbal cues;nonverbal cues (demo/gesture)  - contact guard;minimum assist (75% patient effort);verbal cues;nonverbal cues (demo/gesture)  -    Assistive Device (Gait) walker, front-wheeled  - walker, front-wheeled  -    Distance in Feet (Gait) 160  -  -    Pattern (Gait) step-through  - step-through  -    Deviations/Abnormal Patterns (Gait) fabrizio decreased;left sided deviations;stride length decreased;base of support, narrow  - fabrizio decreased;left sided deviations;stride length decreased;base of support, narrow  -    Bilateral Gait Deviations forward flexed posture  - forward flexed posture   -LH    Left Sided Gait Deviations leans left  improved toe clearance however significant L lean, max cueing for correction  -LH leans left  -LH    Right Sided Gait Deviations weight shift ability decreased  - --    Row Name 12/13/22 1400 12/13/22 0900       Motor Skills    Therapeutic Exercise --  heel toe raises, MIP, mini squats x 10 UE supported hemibar  - --  APs, MIP, knee flex/ext, hip abd/add x10 RTB  -    Additional Documentation Advanced Stepping/Walking Interventions (Group)  - --    Row Name 12/13/22 1400          Advanced Stepping/Walking Interventions    Stepping/Walking Interventions side stepping  -     Side Stepping (Stepping/Walking Interventions) CGA hemibars 8ft x 2  -LH     Row Name 12/13/22 1400 12/13/22 0900       Positioning and Restraints    Pre-Treatment Position in bed  - in bed  -    Post Treatment Position wheelchair  - wheelchair  -    In Wheelchair sitting;call light within reach;encouraged to call for assist;exit alarm on;patient within staff view  - sitting;call light within reach;encouraged to call for assist;exit alarm on  -          User Key  (r) = Recorded By, (t) = Taken By, (c) = Cosigned By    Initials Name Provider Type     Radha Ramírez, PT Physical Therapist              Wound Right scalp Incision (Active)   Dressing Appearance open to air 12/12/22 1928   Closure Staples 12/13/22 0858   Base clean;dry;scab 12/13/22 0858   Periwound intact;dry 12/13/22 0858   Periwound Temperature warm 12/13/22 0858   Periwound Skin Turgor soft 12/13/22 0858   Drainage Amount none 12/12/22 1928     Physical Therapy Education     Title: PT OT SLP Therapies (In Progress)     Topic: Physical Therapy (In Progress)     Point: Mobility training (In Progress)     Learning Progress Summary           Patient Acceptance, E, NR by  at 12/13/2022 0923    Acceptance, E, VU by WN at 12/12/2022 2327    Acceptance, E, VU,DU,NR by JK at 12/12/2022 1011    Acceptance, E, VU by WN at  12/12/2022 0155    Acceptance, E, VU,NR by  at 12/10/2022 1035    Acceptance, E,D, VU,DU,NR by JK at 12/9/2022 0853    Acceptance, E,TB,D, VU,DU,NR by KP at 12/8/2022 1420    Comment: Goals, POC    Acceptance, E, VU by CB at 12/8/2022 1204   Significant Other Acceptance, E,TB,D, VU,DU,NR by KP at 12/8/2022 1420    Comment: Goals, POC                   Point: Home exercise program (In Progress)     Learning Progress Summary           Patient Acceptance, E, NR by  at 12/13/2022 0923    Acceptance, E, VU by WN at 12/12/2022 2327    Acceptance, E, VU by WN at 12/12/2022 0155    Acceptance, E, VU,NR by  at 12/10/2022 1035    Acceptance, E,D, VU,DU,NR by JAYSONK at 12/9/2022 0853    Acceptance, E,TB,D, VU,DU,NR by KP at 12/8/2022 1420    Comment: Goals, POC    Acceptance, E, VU by CB at 12/8/2022 1204   Significant Other Acceptance, E,TB,D, VU,DU,NR by KP at 12/8/2022 1420    Comment: Goals, POC                   Point: Body mechanics (In Progress)     Learning Progress Summary           Patient Acceptance, E, NR by  at 12/13/2022 0923    Acceptance, E, VU by WN at 12/12/2022 2327    Acceptance, E, VU by WN at 12/12/2022 0155    Acceptance, E, VU,NR by  at 12/10/2022 1035    Acceptance, E, VU by CB at 12/8/2022 1204                   Point: Precautions (In Progress)     Learning Progress Summary           Patient Acceptance, E, NR by  at 12/13/2022 0923    Acceptance, E, VU by WN at 12/12/2022 2327    Acceptance, E, VU by WN at 12/12/2022 0155    Acceptance, E, VU,NR by  at 12/10/2022 1035    Acceptance, E, VU by CB at 12/8/2022 1204                               User Key     Initials Effective Dates Name Provider Type Discipline     06/16/21 -  Radha Ramírez, PT Physical Therapist PT    JK 06/16/21 -  Samantha Thompson, PT Physical Therapist PT    KP 06/16/21 -  Anita Serrano, PT Physical Therapist PT    WN 08/23/22 -  Doc Park, RN Registered Nurse Nurse    CB 11/10/22 -  Mackenzie Hopkins CCC-SLP Speech  and Language Pathologist SLP                PT Recommendation and Plan                          Time Calculation:      PT Charges     Row Name 12/13/22 1437 12/13/22 0921          Time Calculation    Start Time 1350  - 0900  -     Stop Time 1420  - 0930  -     Time Calculation (min) 30 min  - 30 min  -     PT Received On 12/13/22  - 12/13/22  -     PT - Next Appointment 12/14/22  - 12/14/22  -        Time Calculation- PT    Total Timed Code Minutes- PT 30 minute(s)  -LH 30 minute(s)  -           User Key  (r) = Recorded By, (t) = Taken By, (c) = Cosigned By    Initials Name Provider Type     Radha Ramírez, PT Physical Therapist                Therapy Charges for Today     Code Description Service Date Service Provider Modifiers Qty    [id number]  PT THER PROC EA 15 MIN 12/13/2022 Radha Ramírez, PT GP 1    [id number]  PT THERAPEUTIC ACT EA 15 MIN 12/13/2022 Radha Ramírez, PT GP 1    [id number]  PT THER PROC EA 15 MIN 12/13/2022 Radha Ramírez, PT GP 1    [id number]  PT THERAPEUTIC ACT EA 15 MIN 12/13/2022 Radha Ramírez, PT GP 1        ..Patient was wearing a face mask during this therapy encounter. Therapist used appropriate personal protective equipment including eye protection, mask, and gloves.  Mask used was standard procedure mask. Appropriate PPE was worn during the entire therapy session. Hand hygiene was completed before and after therapy session. Patient is not in enhanced droplet precautions.              Radha Ramírez PT  12/13/2022

## 2022-12-13 NOTE — PROGRESS NOTES
Inpatient Rehabilitation Plan of Care Note    Plan of Care  Care Plan Reviewed - No updates at this time.    Psychosocial    [RN] Coping/Adjustment(Active)  Current Status(12/17/2022): At risk for poor coping d/t recent medical  conditions.  Weekly Goal(12/24/2022): Identify progress in functional status.  Discharge Goal: Demonstrates healthy coping strategies.    Performed Intervention(s)  Support/peer groups.  Verbalizes needs and concerns.  Medication.      Safety    [RN] Potential for Injury(Active)  Current Status(12/17/2022): At risk for falls d/t history of falls. 12/10 Pt  found on floor in BR, states she was trying to pull her pants up, did not call  for assist like she said she would. No apparent injury. Blue armband placed.  Weekly Goal(12/20/2022): Pt will use call light, no further attempts to transfer  self.  Discharge Goal: Patient and family will be aware of risk of fall and safety in  home setting.    Performed Intervention(s)  Items w/i reach.  Safety rounds.  Bed and chair alarm. Blue armband  Falls precautions.      Sphincter Control    [RN] Bladder Management(Active)  Current Status(12/17/2022): Continent of bladder. Occasional urge incont.  Weekly Goal(12/24/2022): Remain continent of bladder.  Discharge Goal: Goes home continent of bladder.    Performed Intervention(s)  Offer restroom during hourly rounding.  Encourage fluid intake.  Monitor Is and Os.  Timed voids.  Encourage appropriate diet.    Signed by: Doc Park RN

## 2022-12-13 NOTE — PROGRESS NOTES
REASON FOR FOLLOWUP/CHIEF COMPLAINT:  High-grade glioma    HISTORY OF PRESENT ILLNESS:   Radiation and Temodar started yesterday.  States she tolerated this well.  No nausea.  She states she was glad about that.  No new issues overnight    Past Medical History, Past Surgical History, Social History, Family History have been reviewed and are without significant changes except as mentioned.    Review of Systems   Review of Systems   Constitutional: Negative for activity change.   HENT: Negative for nosebleeds and trouble swallowing.    Respiratory: Negative for shortness of breath and wheezing.    Cardiovascular: Negative for chest pain and palpitations.   Gastrointestinal: Negative for constipation, diarrhea and nausea.   Genitourinary: Negative for dysuria and hematuria.   Musculoskeletal: Negative for arthralgias and myalgias.   Skin: Negative for rash and wound.   Neurological: Negative for seizures and syncope.   Hematological: Negative for adenopathy. Does not bruise/bleed easily.   Psychiatric/Behavioral: Negative for confusion.       Medications:  The current medication list was reviewed in the EMR    ALLERGIES:    Allergies   Allergen Reactions   • Morphine Anaphylaxis     Hives and throat swelling   • Peach [Prunus Persica] Anaphylaxis   • Penicillins Anaphylaxis   • Metformin Diarrhea              Vitals:    12/12/22 1349 12/12/22 1933 12/13/22 0510 12/13/22 1100   BP: 105/72 117/70 116/71 112/58   BP Location: Right arm Left arm Left arm Left arm   Patient Position: Lying Lying Lying Lying   Pulse: 78 76 60 73   Resp: 18 18 20 20   Temp: 97.7 °F (36.5 °C) 98 °F (36.7 °C) 98.3 °F (36.8 °C) 98.2 °F (36.8 °C)   TempSrc: Oral Oral Oral Oral   SpO2: 99% 96% 94% 95%   Weight:       Height:         Physical Exam    CONSTITUTIONAL:  Vital signs reviewed.  No distress, looks comfortable.  EYES:  Conjunctivae and lids unremarkable.  PERRLA  EARS, NOSE, MOUTH, THROAT:  Ears and nose appear unremarkable.   Lips, teeth, gums appear unremarkable.  RESPIRATORY:  Normal respiratory effort.  Lungs clear to auscultation bilaterally.  CARDIOVASCULAR:  Normal S1, S2.  No murmurs, rubs or gallops.  No significant lower extremity edema.  GASTROINTESTINAL: Abdomen appears unremarkable.  Nontender.  No hepatomegaly.  No splenomegaly.  NEURO: Cranial nerves 2-12 grossly intact.  No focal deficits.  Appears to have equal strength all 4 extremities.  MUSCULOSKELETAL:  Unremarkable digits/nails.  No cyanosis or clubbing.  SKIN:  Warm.  No rashes.  PSYCHIATRIC:  Normal judgment and insight.  Normal mood and affect.        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  Intact MGMT expression  • Patient presented with recurrent falls and confusion.  • MRI on 11/22/2022 revealed a 3.2 cm ring-enhancing right supratentorial mass.  There was mass-effect and left midline shift.  • She was started on dexamethasone and Keppra.  • CT chest abdomen pelvis on 11/24/2022 revealed no evidence of progressive metastatic disease.  • S/p stereotactic brain biopsy on 11/28/2022.  • Preliminary pathology revealed high-grade glioma.  It was sent to [facility].  • Preliminary revealed high-grade glioma.  • Patient had radiation simulation on 12/2/2022.  • Plan is to start concurrent low-dose Temodar at 75 mg/m2 daily.   • She is on Decadron 4 mg p.o. every 8 hours.  • Plan is to start Radiation with concurrent Temodar on 12/12/2022.  • Temodar does not require dose reduction due to the elevated liver enzymes.   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  This is associated with a worse prognosis and  relative resistance to alkylating chemotherapy such as temozolomide.  However, temozolomide and radiation remain the recommended treatments.     *Germline BRCA2 and GEORGES mutations.  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  • Please see the note from Dr. Collins, the patient's outpatient oncologist from 12/4/2022.     *Seizure prophylaxis.  • Patient is on Keppra 500 mg p.o. twice daily.  • Patient is not having any problems seizures.     *Elevated liver enzymes.  • ALT was 143 and AST was 71 on 11/28/2022.  • ALT was 1049 and AST was 386 on 12/8/2022.  • Hepatic duplex, 12/12/2022 normal.  • Labs slowly improving     PLAN:  · Temodar and radiation started 12/12/2022.  · Has follow-up arranged in the office with Dr. Collins on 2/22/2023 and CT CAP 1 week prior.  Patient states the hope is to be discharged from rehab before [place].  I have asked our office to arrange an appointment with Dr. Collins in a few weeks in the office.    Will follow periodically.  36 minutes.  Over half the time counseling and coordinating care.  More time was spent working on this patient than what is reflected in chart time.  I reviewed her pathology and NCCN guidelines

## 2022-12-13 NOTE — PROGRESS NOTES
[facility]  Inpatient Progress Note    Reason for Follow-up: Elevated liver tests    Subjective     Interval History:   Doing well without complaints.  Denies abdominal pain, nausea, vomiting.  Tolerating p.o. intake well.    12/13 CMP shows sodium 130, chloride 92, fairly stable LFTs from yesterday with , , AST 60.  Overall trending down.    ASMA, GEMMA, and HSV negative.  Normal total IgM and IgA.  Low IgG.  CMV, VZV, and EBV with past infection only.     Hepatic portal duplex showed all vessels with normal flow and no evidence of thrombus.    Current Facility-Administered Medications:   •  calcium 500 mg vitamin D 5 mcg (200 UT) per tablet 1 tablet, 1 tablet, Oral, BID, Mathew Araujo MD, 1 tablet at 12/13/22 0856  •  calcium carbonate (TUMS) chewable tablet 500 mg (200 mg elemental), 2 tablet, Oral, TID PRN, Mathew Araujo MD  •  dexamethasone (DECADRON) tablet 4 mg, 4 mg, Oral, Q8H, Mathew Araujo MD, 4 mg at 12/13/22 0526  •  dextrose (D50W) (25 g/50 mL) IV injection 25 g, 25 g, Intravenous, Q15 Min PRN, Mathew Araujo MD  •  dextrose (GLUTOSE) oral gel 15 g, 15 g, Oral, Q15 Min PRN, Mathew Araujo MD  •  Enoxaparin Sodium (LOVENOX) syringe 40 mg, 40 mg, Subcutaneous, Q12H, Mathew Araujo MD, 40 mg at 12/13/22 0003  •  escitalopram (LEXAPRO) tablet 10 mg, 10 mg, Oral, Daily, Mathew Araujo MD, 10 mg at 12/13/22 0856  •  famotidine (PEPCID) tablet 20 mg, 20 mg, Oral, BID PRN, Mathew Araujo MD  •  gabapentin (NEURONTIN) capsule 100 mg, 100 mg, Oral, Nightly PRN, Luis Sanz MD, 100 mg at 12/12/22 1925  •  glucagon (human recombinant) (GLUCAGEN DIAGNOSTIC) injection 1 mg, 1 mg, Intramuscular, Q15 Min PRN, Mathew Araujo MD  •  influenza vac split quad (FLUZONE,FLUARIX,AFLURIA,FLULAVAL) injection 0.5 mL, 0.5 mL, Intramuscular, During Hospitalization, Mathew Araujo MD  •  insulin glargine  (LANTUS, SEMGLEE) injection 28 Units, 28 Units, Subcutaneous, QAM, Gina Florence MD, 28 Units at 12/13/22 0851  •  insulin lispro (ADMELOG) injection 0-14 Units, 0-14 Units, Subcutaneous, TID AC, Mathew Dumont MD, 5 Units at 12/13/22 0851  •  insulin lispro (ADMELOG) injection 8 Units, 8 Units, Subcutaneous, TID With Meals, Mathew Araujo MD, 8 Units at 12/13/22 0850  •  latanoprost (XALATAN) 0.005 % ophthalmic solution 1 drop, 1 drop, Both Eyes, Nightly, Mathew Araujo MD, 1 drop at 12/12/22 2134  •  levETIRAcetam (KEPPRA) tablet 500 mg, 500 mg, Oral, BID, Mathew Araujo MD, 500 mg at 12/13/22 0856  •  levothyroxine (SYNTHROID, LEVOTHROID) tablet 150 mcg, 150 mcg, Oral, Q AM, Mathew Araujo MD, 150 mcg at 12/13/22 0526  •  lisinopril (PRINIVIL,ZESTRIL) tablet 20 mg, 20 mg, Oral, Daily, Mathew Araujo MD, 20 mg at 12/13/22 0857  •  magnesium oxide (MAG-OX) tablet 400 mg, 400 mg, Oral, Daily, Gina Florence MD, 400 mg at 12/13/22 0856  •  nitroglycerin (NITROSTAT) SL tablet 0.4 mg, 0.4 mg, Sublingual, Q5 Min PRN, Mathew Araujo MD  •  ondansetron (ZOFRAN) tablet 4 mg, 4 mg, Oral, Q6H PRN **OR** ondansetron (ZOFRAN) injection 4 mg, 4 mg, Intravenous, Q6H PRN, Mathew Araujo MD  •  ondansetron (ZOFRAN) tablet 8 mg, 8 mg, Oral, Q24H, Laura Flores MD, 8 mg at 12/12/22 1339  •  pantoprazole (PROTONIX) EC tablet 40 mg, 40 mg, Oral, Q AM, Mathew Araujo MD, 40 mg at 12/13/22 0526  •  polyethylene glycol (MIRALAX) packet 17 g, 17 g, Oral, Daily, Mathew Araujo MD, 17 g at 12/12/22 0804  •  sennosides-docusate (PERICOLACE) 8.6-50 MG per tablet 1 tablet, 1 tablet, Oral, Nightly PRN, Mathew Araujo MD  •  sulfamethoxazole-trimethoprim (BACTRIM DS,SEPTRA DS) 800-160 MG per tablet 1 tablet, 1 tablet, Oral, Once per day on Mon Wed Fri, Radha, German PACHECO II, MD, 1 tablet at 12/12/22 1652  •  temozolomide (TEMODAR) chemo capsule 140 mg, 140  mg, Oral, Nightly, 140 mg at 12/12/22 2135 **AND** temozolomide (TEMODAR) chemo capsule 20 mg, 20 mg, Oral, Nightly, 20 mg at 12/12/22 2135 **AND** temozolomide (TEMODAR) chemo capsule 5 mg, 5 mg, Oral, Nightly, German Garcia II, MD, 5 mg at 12/12/22 2135  •  valACYclovir (VALTREX) tablet 500 mg, 500 mg, Oral, Q24H, Mathew Dumont MD, 500 mg at 12/13/22 0856  Review of Systems:    The following systems were reviewed and negative;  gastrointestinal    Objective     Vital Signs  Temp:  [97.7 °F (36.5 °C)-98.3 °F (36.8 °C)] 98.3 °F (36.8 °C)  Heart Rate:  [60-78] 60  Resp:  [18-20] 20  BP: (105-117)/(70-72) 116/71  Body mass index is 40.17 kg/m².    Intake/Output Summary (Last 24 hours) at 12/13/2022 1123  Last data filed at 12/13/2022 0858  Gross per 24 hour   Intake 600 ml   Output --   Net 600 ml     I/O this shift:  In: 120 [P.O.:120]  Out: -      Physical Exam:    General: patient awake, alert and cooperative   Eyes: Normal lids and lashes, no scleral icterus   Skin: not jaundiced   Pulm:  regular and unlabored   Psychiatric: Normal mood and behavior;      Results Review:     I reviewed the patient's new clinical results.    Results from last 7 days   Lab Units 12/12/22  0555 12/09/22  0539 12/08/22  0616   WBC 10*3/mm3 10.16 13.31* 15.27*   HEMOGLOBIN g/dL 13.0 12.9 13.0   HEMATOCRIT % 38.9 38.1 39.4   PLATELETS 10*3/mm3 174 225 238     Results from last 7 days   Lab Units 12/13/22  0620 12/12/22  0555 12/11/22  0610   SODIUM mmol/L 130* 133* 131*   POTASSIUM mmol/L 4.9 4.3 4.7   CHLORIDE mmol/L 92* 97* 96*   CO2 mmol/L 27.8 24.5 24.3   BUN mg/dL 20 20 21*   CREATININE mg/dL 0.81 0.68 0.74   CALCIUM mg/dL 9.3 9.2 8.9   BILIRUBIN mg/dL 0.6 0.7 0.6   ALK PHOS U/L 168* 161* 164*   ALT (SGPT) U/L 421* 467* 546*   AST (SGOT) U/L 60* 58* 73*   GLUCOSE mg/dL 246* 156* 174*     Results from last 7 days   Lab Units 12/08/22  1436   INR  1.02     Lab Results   Lab Value Date/Time    LIPASE 42 10/31/2018 0054        Radiology:  US Liver   Final Result   Small liver cyst. Cholecystectomy       This report was finalized on 12/8/2022 3:32 PM by Dr. Stef Pettit M.D.              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 BRCA2 gene mutation  2. High-grade glioma  3. Non-small cell lung cancer  4. Neoplasm of right breast  5. Renal cell carcinoma left kidney  6. Papillary thyroid carcinoma  7. Melanoma  8. 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 Elevated LFTs, improving      Plan:  • Primary discontinued valacyclovir, atorvastatin, and Tylenol pending work-up  • Pharmacy consultation unremarkable for alternative pharmacologic therapies as underlying etiology to significant elevation in liver enzymes  • Liver ultrasound - remarkable for a small hepatic cyst otherwise negative  • Portal hepatic duplex unremarkable  • ASMA, GEMMA, and HSV negative.  Normal total IgM and IgA.  Low IgG.  CMV, VZV, and EBV with past infection only.   • Continue to follow LFTs    I discussed the patients findings and my recommendations with patient.    Dragon dictation used throughout this note.            Taylor Vásquez PA-C  [facility]  [facility] [place] [address]  Office: [phone]

## 2022-12-13 NOTE — PROGRESS NOTES
Case Management  Inpatient Rehabilitation Team Conference    Conference Date/Time: 12/13/2022 7:51:24 AM    Team Conference Attendees:  Lainey Conrad MSSW Jenny Kvamme, VERONIKA Goel, HATTIE Bradley, SLP  Jemma Spencer, CTRS  Lilly Cage, CARLITO Luther, RN  Chaplain Bryce    Demographics            Age: 57Y            Gender: Female    Admission Date: 12/7/2022 4:40:00 PM  Rehabilitation Diagnosis:  High-grade glioma-3.2 cm ring-enhancing right  supratentorial mass-right thalamic-with mass-effect and left midline shift  Status post stereotactic brain biopsy  Past Medical History: Past Medical History:  DiagnosisDate  ?Arthritis?  ?Asthma?  ?BRCA2 positive?  ?Diabetes mellitus (HCC)?  ?H/O Liver [id number]  ?x3  ?H/O Lung [id number]  ?1 in right lower lobe and 1 in the left infrahilar location  ?H/O Ovarian cyst?  ?H/O Right adrenal mass (CMS/HCC)2017  ?Lung cancer (HCC)?  ?Melanoma (HCC)?  ?right foot  ?PONV (postoperative nausea and vomiting)?  ?Thyroid disease?    ?  ?  Surgical History  Past Surgical History:  ProcedureLateralityDate  ?APPENDECTOMY??  ?BRAIN XIPFXKKlsrl07/28/2022  ?Procedure: Right frontal stereotactic needle brain biopsy;  Surgeon: Manuel Thompson MD;  Location: [facility];  Service: Neurosurgery;  Laterality: Right;  ?BREAST IMPLANT SURGERYBilateral?  ?CHOLECYSTECTOMY??  ?HYSTERECTOMY??  ?MASTECTOMYBilateral?  ?OVARIAN CYST SURGERY??  ?THORACOSCOPY VIDEO ASSISTED WITH [id number]/9/2020  ?Procedure: BRONCHOSCOPY, THORACOSCOPY VIDEO ASSISTED WITH ANTERIOR BASAL  SEGMENTECTOMY, INTERCOSTAL NERVE BLOCK;  Surgeon: Flaca Crisostomo MD;  Location: [facility];  Service: Thoracic;  Laterality: Right;  ?TONSILLECTOMY??    ?      Plan of Care  Anticipated Discharge Date/Estimated Length of Stay: 2 weeks  Anticipated Discharge Destination: Community discharge with assistance  Discharge Plan : Patient lives with  in one  story home; One step into  home with grab bar.  D/C plan is home with . He works nights at [employer]. Aunt can stay with  patient at night if needed.  Medical Necessity Expected Level Rationale: Goals are to achieve a level of  contact-guard supervision with  mobility and self-care and improved balance.  Rehabilitation prognosis indeterminate given her tumor and prognosis Medical  prognosis defer to oncology.  Intensity and Duration: an average of 3 hours/5 days per week  Medical Supervision and 24 Hour Rehab Nursing: x  Physical Therapy: x  PT Intensity/Duration: 1 hour / day, 5 days / week, for approximately 2 weeks  Occupational Therapy: x  OT Intensity/Duration: 1 hour / day, 5 days / week, for approximately 2 weeks  Speech and Language Therapy: x  SLP Intensity/Duration: 1 hour / day, 5 days / week, for approximately 2 weeks  Social Work: x  Therapeutic Recreation: x  Psychology: x  Registered Dietician: x  Updated (if changes indicated)    Anticipated Discharge Date/Estimated Length of Stay:   ELOS: 2 weeks    Based on the patient's medical and functional status, their prognosis and  expected level of functional improvement is: Goals are to achieve a level of  contact-guard supervision with  mobility and self-care and improved balance.  Rehabilitation prognosis indeterminate given her tumor and prognosis Medical  prognosis defer to oncology.      Interdisciplinary Problem/Goals/Status    All Rehab Problems:  Cognition    [ST] Attention(Active)  Current Status(12/12/2022): deficits for attn to details, complex reasoning,  memory, executive fxn, left visual spatial skills  Weekly Goal(12/15/2022): follow schedule indep; recall details from daily events  indep  Discharge Goal: fxnl cognition for home with assist        Mobility    [OT] Toilet Transfers(Active)  Current Status(12/12/2022): CGA  Weekly Goal(12/15/2022): SBA  Discharge Goal: SBA    [OT] Tub/Shower Transfers(Active)  Current Status(12/12/2022):  CGA/Min  Weekly Goal(12/20/2022): SBA  Discharge Goal: SBA    [PT] Walk(Active)  Current Status(12/12/2022): 160 ft, CG to occ min, VC, rwx  Weekly Goal(12/19/2022): 160 ft, CG, rwx  Discharge Goal: 160 ft, Sup, rwx    [PT] Bed/Chair/Wheelchair(Active)  Current Status(12/12/2022): Min/CGA, rwx  Weekly Goal(12/19/2022): CG, rwx  Discharge Goal: Sup, RWX    [PT] Bed Mobility(Active)  Current Status(12/12/2022): SBA, VCS  Weekly Goal(12/19/2022): Mod I  Discharge Goal: Mod Indep        Psychosocial    [RN] Coping/Adjustment(Active)  Current Status(12/17/2022): At risk for poor coping d/t recent medical  conditions.  Weekly Goal(12/24/2022): Identify progress in functional status.  Discharge Goal: Demonstrates healthy coping strategies.        Safety    [RN] Potential for Injury(Active)  Current Status(12/17/2022): At risk for falls d/t history of falls. 12/10 Pt  found on floor in [place], states she was trying to pull her pants up, did not call  for assist like she said she would. No apparent injury. Blue armband placed.  Weekly Goal(12/20/2022): Pt will use call light, no further attempts to transfer  self.  Discharge Goal: Patient and family will be aware of risk of fall and safety in  home setting.        Self Care    [OT] Bathing(Active)  Current Status(12/12/2022): Min  Weekly Goal(12/19/2022): SBA  Discharge Goal: SBA    [OT] Dressing (Lower)(Active)  Current Status(12/12/2022): Min  Weekly Goal(12/20/2022): CGA  Discharge Goal: SBA    [OT] Dressing (Upper)(Active)  Current Status(12/12/2022): Min  Weekly Goal(12/19/2022): SBA  Discharge Goal: SBA    [OT] Eating(Active)  Current Status(12/12/2022): IND  Weekly Goal(12/12/2022): IND  Discharge Goal: IND    [OT] Grooming(Active)  Current Status(12/12/2022): SBA  Weekly Goal(12/12/2022): SETUP  Discharge Goal: SETUP    [OT] Toileting(Active)  Current Status(12/12/2022):  cga/Min  Weekly Goal(12/20/2022):  SBACGA  Discharge Goal: SBA        Sphincter Control    [RN]  Bladder Management(Active)  Current Status(12/17/2022): Continent of bladder. Occasional urge incont.  Weekly Goal(12/24/2022): Remain continent of bladder.  Discharge Goal: Goes home continent of bladder.    [RN] Bowel Management(Active)  Current Status(12/13/2022): Continent of bowel.  Weekly Goal(12/20/2022): Remain continent of bowel.  Discharge Goal: Go home continent of bowel.        Comments: 12/13 SBA VC's for bed mob, amb 160' CGA - to occ Min A VC's and rwx.  CGA toilet transfer, CGA / Min A shower transfer.  Leans and patient unaware of  deficit. CGA / Min A toileting.  Deficits for attention to detail, complex  reasoning, memory, executive function, left visual spatial skills.  Continent of  bladder and bowel.    Signed by: Lilly Cage RN    Physician CoSigned By: Mathew Araujo 12/13/2022 08:12:37

## 2022-12-13 NOTE — THERAPY TREATMENT NOTE
Inpatient Rehabilitation - Occupational Therapy Treatment Note    [facility]     Patient Name: Christie Avendaño  : 1965  MRN: [id number]    Today's Date: 2022                 Admit Date: 2022         ICD-10-CM ICD-9-CM   1. Impaired functional mobility, balance, gait, and endurance  Z74.09 V49.89   2. 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)   • Non-small cell lung cancer, right (HCC)   • Renal mass, right   • SIRS (systemic inflammatory response syndrome) (HCC)   • Hyperlipidemia   • Malignant melanoma of right lower extremity including hip (HCC)   • High grade glioma    • 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)   • Glioma (HCC)       Past Medical History:   Diagnosis Date   • Arthritis    • Asthma    • BRCA2 positive    • Diabetes mellitus (HCC)    • H/O Liver masses 2017    x3   • H/O Lung masses 2017    1 in right lower lobe and 1 in the left infrahilar location   • H/O Ovarian cyst    • H/O Right adrenal mass (CMS/HCC) 2017   • Lung cancer (HCC)    • Melanoma (HCC)     right foot   • PONV (postoperative nausea and vomiting)    • Thyroid disease        Past Surgical History:   Procedure Laterality Date   • APPENDECTOMY     • BRAIN BIOPSY Right 2022    Procedure: Right frontal stereotactic needle brain biopsy;  Surgeon: Manuel Thompson MD;  Location: [facility];  Service: Neurosurgery;  Laterality: Right;   • BREAST IMPLANT SURGERY Bilateral    • CHOLECYSTECTOMY     • HYSTERECTOMY     • MASTECTOMY  Bilateral    • OVARIAN CYST SURGERY     • THORACOSCOPY VIDEO ASSISTED WITH LOBECTOMY Right 10/9/2020    Procedure: BRONCHOSCOPY, THORACOSCOPY VIDEO ASSISTED WITH ANTERIOR BASAL SEGMENTECTOMY, INTERCOSTAL NERVE BLOCK;  Surgeon: Flaca Crisostomo MD;  Location: [facility] OR;  Service: Thoracic;  Laterality: Right;   • TONSILLECTOMY               IRF OT ASSESSMENT FLOWSHEET (last 12 hours)     IRF OT Evaluation and Treatment     Row Name 12/13/22 1500 12/13/22 1206       OT Time and Intention    Document Type daily treatment  -CC daily treatment  -CC    Mode of Treatment occupational therapy  -CC occupational therapy  -CC    Patient Effort adequate  -CC good  -CC    Symptoms Noted During/After Treatment fatigue  -CC --    Row Name 12/13/22 1500 12/13/22 1206       Pain Assessment    Pretreatment Pain Rating 3/10  -CC 0/10 - no pain  -CC    Posttreatment Pain Rating 3/10  -CC 0/10 - no pain  -CC    Pain Location - head  -CC --    Pre/Posttreatment Pain Comment dull aching pain. RN notifed  -CC --    Row Name 12/13/22 1500 12/13/22 1206       Cognition/Psychosocial    Affect/Mental Status (Cognition) flat/blunted affect  -CC flat/blunted affect  -CC    Orientation Status (Cognition) oriented x 4  -CC oriented x 4  -CC    Follows Commands (Cognition) follows one-step commands;delayed response/completion;repetition of directions required;increased processing time needed  -CC follows one-step commands;over 90% accuracy;verbal cues/prompting required  -CC    Personal Safety Interventions fall prevention program maintained;gait belt;nonskid shoes/slippers when out of bed  -CC fall prevention program maintained;gait belt;nonskid shoes/slippers when out of bed  -CC    Attention Deficit (Cognition) -- concentration;distractible in noisy environment  -CC    Comment, Cognition pt requitred mod/max vc w folling directions and pattern with a Beth craft.  -CC --    Row Name 12/13/22 1206          Bathing    [place] Level  (Bathing) bathing skills;upper body;lower body;contact guard assist;minimum assist (75% patient effort)  -     Assistive Device (Bathing) hand held shower spray hose;grab bar/tub rail;tub bench  -     Position (Bathing) supported sitting;sink side  -     Set-up Assistance (Bathing) adjust water temperature;adaptive equipment set-up;obtain supplies  -     Row Name 12/13/22 1206          Upper Body Dressing    [place] Level (Upper Body Dressing) upper body dressing skills;doff;don;bra/undergarment;pull over garment;minimum assist (75% or more patient effort);clothes fastener management;set up assistance  -     Position (Upper Body Dressing) supported sitting  -     Set-up Assistance (Upper Body Dressing) obtain clothing  -     Comment (Upper Body Dressing) assist hooking bra  -     Row Name 12/13/22 1206          Lower Body Dressing    [place] Level (Lower Body Dressing) doff;don;pants/bottoms;shoes/slippers;socks;set up;verbal cues;minimum assist (75% patient effort)  -     Position (Lower Body Dressing) supported sitting;supported standing  -     Set-up Assistance (Lower Body Dressing) obtain clothing  -     Row Name 12/13/22 1206          Grooming    [place] Level (Grooming) grooming skills;deodorant application;oral care regimen;wash face, hands;set up;standby assist  -     Position (Grooming) sink side;supported sitting  -     Set-up Assistance (Grooming) obtain supplies  -     Row Name 12/13/22 1500 12/13/22 1206       Bed Mobility    Sit-Supine [place] (Bed Mobility) contact guard  - --    Assistive Device (Bed Mobility) bed rails;head of bed elevated  - --    Comment, (Bed Mobility) -- in w/c  -CC    Row Name 12/13/22 1500          Chair-Bed Transfer    Chair-Bed [place] (Transfers) contact guard;nonverbal cues (demo/gesture);verbal cues  -     Assistive Device (Chair-Bed Transfers) wheelchair  -     Comment, (Chair-Bed Transfer) stand pivot  -CC      Row Name 12/13/22 1206          Sit-Stand Transfer    Sit-Stand [place] (Transfers) contact guard;verbal cues;nonverbal cues (demo/gesture)  -     Assistive Device (Sit-Stand Transfers) wheelchair  -CC     Row Name 12/13/22 1206          Stand-Sit Transfer    Stand-Sit [place] (Transfers) contact guard;minimum assist (75% patient effort);verbal cues;nonverbal cues (demo/gesture)  -     Assistive Device (Stand-Sit Transfers) wheelchair  -CC     Row Name 12/13/22 1206          Shower Transfer    Type (Shower Transfer) sit-stand;stand-sit;stand pivot/stand step  -     [place] Level (Shower Transfer) set up;minimum assist (75% patient effort);verbal cues  -     Assistive Device (Shower Transfer) grab bar, tub/shower;shower chair;wheelchair  -CC     Row Name 12/13/22 1500          Motor Skills    Coordination fine motor deficit;left  pt had difficlty threading larger craft needle. Pt had moderate dificulty with craft sweing activity using large craft needlle and threading fabric balls and felt pieces on string for [employer] activity.  -     Row Name 12/13/22 1500 12/13/22 1206       Balance    Static Sitting Balance standby assist  -CC standby assist;contact guard;verbal cues  -CC    Static Standing Balance contact guard  -CC contact guard;minimal assist  -CC    Comment, Balance -- vc to correct balance when leaning L  -CC    Row Name 12/13/22 1500 12/13/22 1206       Positioning and Restraints    Pre-Treatment Position sitting in chair/recliner  -CC sitting in chair/recliner  -CC    Post Treatment Position bed  -CC wheelchair  -CC    In Bed notified nsg;fowlers;call light within reach;encouraged to call for assist;exit alarm on  -CC --    In Wheelchair -- sitting;exit alarm on;with SLP  -CC          User Key  (r) = Recorded By, (t) = Taken By, (c) = Cosigned By    Initials Name Effective Dates    CC Yissel Goel, SHARON 06/16/21 -                  Occupational Therapy Education      Title: PT OT SLP Therapies (In Progress)     Topic: Occupational Therapy (Done)     Point: ADL training (Done)     Description:   Instruct learner(s) on proper safety adaptation and remediation techniques during self care or transfers.   Instruct in proper use of assistive devices.              Learning Progress Summary           Patient Acceptance, E, VU by WN at 12/12/2022 2327    Acceptance, E, VU by WN at 12/12/2022 0155    Acceptance, E, VU by CB at 12/8/2022 1204    Acceptance, E, VU,NR by CE at 12/8/2022 1107    Comment: fall prevention, DME including w/c and shower chair                   Point: Home exercise program (Done)     Description:   Instruct learner(s) on appropriate technique for monitoring, assisting and/or progressing therapeutic exercises/activities.              Learning Progress Summary           Patient Acceptance, E, VU by WN at 12/12/2022 2327    Acceptance, E, VU by WN at 12/12/2022 0155    Acceptance, E, VU by CB at 12/8/2022 1204    Acceptance, E, VU,NR by CE at 12/8/2022 1107    Comment: fall prevention, DME including w/c and shower chair                   Point: Precautions (Done)     Description:   Instruct learner(s) on prescribed precautions during self-care and functional transfers.              Learning Progress Summary           Patient Acceptance, E, VU by WN at 12/12/2022 2327    Acceptance, E, VU by WN at 12/12/2022 0155    Acceptance, E, VU by CB at 12/8/2022 1204    Acceptance, E, VU,NR by CE at 12/8/2022 1107    Comment: fall prevention, DME including w/c and shower chair                   Point: Body mechanics (Done)     Description:   Instruct learner(s) on proper positioning and spine alignment during self-care, functional mobility activities and/or exercises.              Learning Progress Summary           Patient Acceptance, E, VU by WN at 12/12/2022 2327    Acceptance, E, VU by WN at 12/12/2022 0155    Acceptance, E, VU by CB at 12/8/2022 1204    Acceptance, E,  VU,NR by CE at 12/8/2022 1107    Comment: fall prevention, DME including w/c and shower chair                               User Key     Initials Effective Dates Name Provider Type Discipline    WN 08/23/22 -  Doc Park, RN Registered Nurse Nurse    CB 11/10/22 -  Mackenzie Hopkins CCC-SLP Speech and Language Pathologist SLP    CE 10/17/22 -  Portia Esqueda, OT Occupational Therapist OT                    OT Recommendation and Plan                         Time Calculation:      Time Calculation- OT     Row Name 12/13/22 1430 12/13/22 0930          Time Calculation- OT    OT Start Time 1430  -CC 0930  -CC     OT Stop Time 1500  -CC 1000  -CC     OT Time Calculation (min) 30 min  -CC 30 min  -CC           User Key  (r) = Recorded By, (t) = Taken By, (c) = Cosigned By    Initials Name Provider Type    CC Yissel Goel OTR Occupational Therapist              Therapy Charges for Today     Code Description Service Date Service Provider Modifiers Qty    72910940812 HC OT SELF CARE/MGMT/TRAIN EA 15 MIN 12/12/2022 Yissel Goel OTR GO 2    02361361481 HC OT SELF CARE/MGMT/TRAIN EA 15 MIN 12/13/2022 Yissel Goel OTR GO 2    56422092459 HC OT NEUROMUSC RE EDUCATION EA 15 MIN 12/13/2022 Yissel Goel OTR GO 2                   SHARON Vargas  12/13/2022

## 2022-12-14 ENCOUNTER — TREATMENT (OUTPATIENT)
Dept: RADIATION ONCOLOGY | Facility: HOSPITAL | Age: 57
End: 2022-12-14

## 2022-12-14 ENCOUNTER — RADIATION ONCOLOGY WEEKLY ASSESSMENT (OUTPATIENT)
Dept: RADIATION ONCOLOGY | Facility: HOSPITAL | Age: 57
End: 2022-12-14

## 2022-12-14 DIAGNOSIS — C71.9 HIGH GRADE GLIOMA NOT CLASSIFIABLE BY WHO CRITERIA: Primary | ICD-10-CM

## 2022-12-14 LAB
ALBUMIN SERPL-MCNC: 3.8 G/DL (ref 3.5–5.2)
ALBUMIN/GLOB SERPL: 1.7 G/DL
ALP SERPL-CCNC: 159 U/L (ref 39–117)
ALT SERPL W P-5'-P-CCNC: 365 U/L (ref 1–33)
ANION GAP SERPL CALCULATED.3IONS-SCNC: 10.9 MMOL/L (ref 5–15)
AST SERPL-CCNC: 50 U/L (ref 1–32)
BILIRUB SERPL-MCNC: 0.7 MG/DL (ref 0–1.2)
BUN SERPL-MCNC: 20 MG/DL (ref 6–20)
BUN/CREAT SERPL: 27.8 (ref 7–25)
CALCIUM SPEC-SCNC: 9.5 MG/DL (ref 8.6–10.5)
CHLORIDE SERPL-SCNC: 94 MMOL/L (ref 98–107)
CO2 SERPL-SCNC: 26.1 MMOL/L (ref 22–29)
CREAT SERPL-MCNC: 0.72 MG/DL (ref 0.57–1)
EGFRCR SERPLBLD CKD-EPI 2021: 97.1 ML/MIN/1.73
GLOBULIN UR ELPH-MCNC: 2.2 GM/DL
GLUCOSE BLDC GLUCOMTR-MCNC: 112 MG/DL (ref 70–130)
GLUCOSE BLDC GLUCOMTR-MCNC: 166 MG/DL (ref 70–130)
GLUCOSE BLDC GLUCOMTR-MCNC: 189 MG/DL (ref 70–130)
GLUCOSE BLDC GLUCOMTR-MCNC: 294 MG/DL (ref 70–130)
GLUCOSE SERPL-MCNC: 188 MG/DL (ref 65–99)
POTASSIUM SERPL-SCNC: 4.7 MMOL/L (ref 3.5–5.2)
PROT SERPL-MCNC: 6 G/DL (ref 6–8.5)
RAD ONC ARIA COURSE ID: NORMAL
RAD ONC ARIA COURSE INTENT: NORMAL
RAD ONC ARIA COURSE LAST TREATMENT DATE: NORMAL
RAD ONC ARIA COURSE START DATE: NORMAL
RAD ONC ARIA COURSE TREATMENT ELAPSED DAYS: 2
RAD ONC ARIA FIRST TREATMENT DATE: NORMAL
RAD ONC ARIA PLAN FRACTIONS TREATED TO DATE: 3
RAD ONC ARIA PLAN ID: NORMAL
RAD ONC ARIA PLAN PRESCRIBED DOSE PER FRACTION: 2 GY
RAD ONC ARIA PLAN PRIMARY REFERENCE POINT: NORMAL
RAD ONC ARIA PLAN TOTAL FRACTIONS PRESCRIBED: 23
RAD ONC ARIA PLAN TOTAL PRESCRIBED DOSE: 4600 CGY
RAD ONC ARIA REFERENCE POINT DOSAGE GIVEN TO DATE: 6 GY
RAD ONC ARIA REFERENCE POINT DOSAGE GIVEN TO DATE: 6.07 GY
RAD ONC ARIA REFERENCE POINT ID: NORMAL
RAD ONC ARIA REFERENCE POINT ID: NORMAL
RAD ONC ARIA REFERENCE POINT SESSION DOSAGE GIVEN: 2 GY
RAD ONC ARIA REFERENCE POINT SESSION DOSAGE GIVEN: 2.02 GY
SODIUM SERPL-SCNC: 131 MMOL/L (ref 136–145)

## 2022-12-14 PROCEDURE — 63710000001 DEXAMETHASONE PER 0.25 MG: Performed by: PHYSICAL MEDICINE & REHABILITATION

## 2022-12-14 PROCEDURE — 97530 THERAPEUTIC ACTIVITIES: CPT

## 2022-12-14 PROCEDURE — 63710000001 INSULIN LISPRO (HUMAN) PER 5 UNITS: Performed by: PHYSICAL MEDICINE & REHABILITATION

## 2022-12-14 PROCEDURE — 97129 THER IVNTJ 1ST 15 MIN: CPT

## 2022-12-14 PROCEDURE — 97112 NEUROMUSCULAR REEDUCATION: CPT

## 2022-12-14 PROCEDURE — 80053 COMPREHEN METABOLIC PANEL: CPT | Performed by: PHYSICAL MEDICINE & REHABILITATION

## 2022-12-14 PROCEDURE — 77386: CPT | Performed by: STUDENT IN AN ORGANIZED HEALTH CARE EDUCATION/TRAINING PROGRAM

## 2022-12-14 PROCEDURE — 99231 SBSQ HOSP IP/OBS SF/LOW 25: CPT | Performed by: PHYSICIAN ASSISTANT

## 2022-12-14 PROCEDURE — 77336 RADIATION PHYSICS CONSULT: CPT | Performed by: RADIOLOGY

## 2022-12-14 PROCEDURE — 25010000002 TEMOZOLOMIDE 140 MG CAPSULE: Performed by: INTERNAL MEDICINE

## 2022-12-14 PROCEDURE — 25010000002 TEMOZOLOMIDE PER 5 MG: Performed by: INTERNAL MEDICINE

## 2022-12-14 PROCEDURE — 63710000001 INSULIN LISPRO (HUMAN) PER 5 UNITS: Performed by: HOSPITALIST

## 2022-12-14 PROCEDURE — 77386 CHG INTENSITY MODULATED RADIATION TX DLVR COMPLEX: CPT | Performed by: STUDENT IN AN ORGANIZED HEALTH CARE EDUCATION/TRAINING PROGRAM

## 2022-12-14 PROCEDURE — 63710000001 ONDANSETRON PER 8 MG: Performed by: INTERNAL MEDICINE

## 2022-12-14 PROCEDURE — 97130 THER IVNTJ EA ADDL 15 MIN: CPT

## 2022-12-14 PROCEDURE — 99232 SBSQ HOSP IP/OBS MODERATE 35: CPT | Performed by: INTERNAL MEDICINE

## 2022-12-14 PROCEDURE — 82962 GLUCOSE BLOOD TEST: CPT

## 2022-12-14 PROCEDURE — 25010000002 ENOXAPARIN PER 10 MG: Performed by: PHYSICAL MEDICINE & REHABILITATION

## 2022-12-14 PROCEDURE — 77014 CHG CT GUIDANCE RADIATION THERAPY FLDS PLACEMENT: CPT | Performed by: STUDENT IN AN ORGANIZED HEALTH CARE EDUCATION/TRAINING PROGRAM

## 2022-12-14 RX ADMIN — GABAPENTIN 100 MG: 100 CAPSULE ORAL at 15:00

## 2022-12-14 RX ADMIN — PANTOPRAZOLE SODIUM 40 MG: 40 TABLET, DELAYED RELEASE ORAL at 05:29

## 2022-12-14 RX ADMIN — TEMOZOLOMIDE 140 MG: 140 CAPSULE ORAL at 22:13

## 2022-12-14 RX ADMIN — SULFAMETHOXAZOLE AND TRIMETHOPRIM 1 TABLET: 800; 160 TABLET ORAL at 08:43

## 2022-12-14 RX ADMIN — POLYETHYLENE GLYCOL 3350 17 G: 17 POWDER, FOR SOLUTION ORAL at 08:43

## 2022-12-14 RX ADMIN — INSULIN GLARGINE-YFGN 28 UNITS: 100 INJECTION, SOLUTION SUBCUTANEOUS at 08:41

## 2022-12-14 RX ADMIN — GABAPENTIN 100 MG: 100 CAPSULE ORAL at 08:43

## 2022-12-14 RX ADMIN — INSULIN LISPRO 8 UNITS: 100 INJECTION, SOLUTION INTRAVENOUS; SUBCUTANEOUS at 11:46

## 2022-12-14 RX ADMIN — DEXAMETHASONE 4 MG: 4 TABLET ORAL at 05:29

## 2022-12-14 RX ADMIN — ENOXAPARIN SODIUM 40 MG: 100 INJECTION SUBCUTANEOUS at 22:22

## 2022-12-14 RX ADMIN — ONDANSETRON HYDROCHLORIDE 8 MG: 8 TABLET, FILM COATED ORAL at 11:30

## 2022-12-14 RX ADMIN — GABAPENTIN 100 MG: 100 CAPSULE ORAL at 22:12

## 2022-12-14 RX ADMIN — ENOXAPARIN SODIUM 40 MG: 100 INJECTION SUBCUTANEOUS at 11:30

## 2022-12-14 RX ADMIN — CALCIUM CARBONATE-VITAMIN D TAB 500 MG-200 UNIT 1 TABLET: 500-200 TAB at 22:12

## 2022-12-14 RX ADMIN — LEVOTHYROXINE SODIUM 150 MCG: 0.15 TABLET ORAL at 05:29

## 2022-12-14 RX ADMIN — TEMOZOLOMIDE 20 MG: 20 CAPSULE ORAL at 22:13

## 2022-12-14 RX ADMIN — INSULIN LISPRO 8 UNITS: 100 INJECTION, SOLUTION INTRAVENOUS; SUBCUTANEOUS at 16:41

## 2022-12-14 RX ADMIN — CALCIUM CARBONATE-VITAMIN D TAB 500 MG-200 UNIT 1 TABLET: 500-200 TAB at 08:43

## 2022-12-14 RX ADMIN — DEXAMETHASONE 4 MG: 4 TABLET ORAL at 13:49

## 2022-12-14 RX ADMIN — DEXAMETHASONE 4 MG: 4 TABLET ORAL at 22:12

## 2022-12-14 RX ADMIN — MAGNESIUM OXIDE 400 MG (241.3 MG MAGNESIUM) TABLET 400 MG: TABLET at 08:43

## 2022-12-14 RX ADMIN — LATANOPROST 1 DROP: 50 SOLUTION OPHTHALMIC at 22:12

## 2022-12-14 RX ADMIN — INSULIN LISPRO 8 UNITS: 100 INJECTION, SOLUTION INTRAVENOUS; SUBCUTANEOUS at 08:41

## 2022-12-14 RX ADMIN — LEVETIRACETAM 500 MG: 500 TABLET, FILM COATED ORAL at 08:43

## 2022-12-14 RX ADMIN — LISINOPRIL 20 MG: 20 TABLET ORAL at 08:43

## 2022-12-14 RX ADMIN — TEMOZOLOMIDE 5 MG: 5 CAPSULE ORAL at 22:13

## 2022-12-14 RX ADMIN — INSULIN LISPRO 3 UNITS: 100 INJECTION, SOLUTION INTRAVENOUS; SUBCUTANEOUS at 08:41

## 2022-12-14 RX ADMIN — VALACYCLOVIR HYDROCHLORIDE 500 MG: 500 TABLET, FILM COATED ORAL at 08:43

## 2022-12-14 RX ADMIN — ESCITALOPRAM 10 MG: 10 TABLET, FILM COATED ORAL at 08:43

## 2022-12-14 RX ADMIN — LEVETIRACETAM 500 MG: 500 TABLET, FILM COATED ORAL at 22:12

## 2022-12-14 NOTE — PROGRESS NOTES
Radiation Oncology  On-Treatment Note      Patient: Christie Avendaño    MRN: [id number]    Attending Physician: Juan Marr MD    Diagnosis:     ICD-10-CM ICD-9-CM   1. High grade glioma   C71.9 191.9       Radiation Therapy Visit:  Continue radiation therapy, Dosimetry plan remains acceptable, Films reviewed and remains acceptable, Pain assessed, Pain management planned, Radiation dose schedule reviewed and remains acceptable, Radiation technique remains acceptable and Symptoms within expected range    Radiation Treatments     Active   Plans   [id number]   Most recent treatment: Dose planned: 200 cGy (fraction 3 on 12/14/2022)   Total: Dose planned: 4,600 cGy (23 fractions)   Elapsed Days: 2      Reference Points   GBM Initial   Most recent treatment: Dose given: 200 cGy (on 12/14/2022)   Total: Dose given: 600 cGy   Elapsed Days: 2      calc Initial   Most recent treatment: Dose given: 202 cGy (on 12/14/2022)   Total: Dose given: 607 cGy   Elapsed Days: 2                    Physical Examination:  Vitals: not currently breastfeeding.  There were no vitals filed for this visit.  There were no vitals filed for this visit.    Capable of only limited selfcare; confined to bed or chair more than 50% of waking hours = 3    We examined the relevant areas: yes  Findings are within the expected range for this stage of treatment: yes  -------------------------------------------------------------------------------------------------------------------    ACTION ITEMS:  Patient tolerating treatment well and as expected for this stage in their treatment and Continue radiation therapy as planned    Estimated Completion Date: 1/13/2023      Juan Marr MD  Radiation Oncology

## 2022-12-14 NOTE — THERAPY TREATMENT NOTE
Inpatient Rehabilitation - Occupational Therapy Treatment Note    [facility]     Patient Name: Christie Avendaño  : 1965  MRN: [id number]    Today's Date: 2022                 Admit Date: 2022         ICD-10-CM ICD-9-CM   1. Impaired functional mobility, balance, gait, and endurance  Z74.09 V49.89   2. 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)   • Non-small cell lung cancer, right (HCC)   • Renal mass, right   • SIRS (systemic inflammatory response syndrome) (HCC)   • Hyperlipidemia   • Malignant melanoma of right lower extremity including hip (HCC)   • High grade glioma    • 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)   • Glioma (HCC)       Past Medical History:   Diagnosis Date   • Arthritis    • Asthma    • BRCA2 positive    • Diabetes mellitus (HCC)    • H/O Liver masses 2017    x3   • H/O Lung masses 2017    1 in right lower lobe and 1 in the left infrahilar location   • H/O Ovarian cyst    • H/O Right adrenal mass (CMS/HCC) 2017   • Lung cancer (HCC)    • Melanoma (HCC)     right foot   • PONV (postoperative nausea and vomiting)    • Thyroid disease        Past Surgical History:   Procedure Laterality Date   • APPENDECTOMY     • BRAIN BIOPSY Right 2022    Procedure: Right frontal stereotactic needle brain biopsy;  Surgeon: Manuel Thompson MD;  Location: [facility];  Service: Neurosurgery;  Laterality: Right;   • BREAST IMPLANT SURGERY Bilateral    • CHOLECYSTECTOMY     • HYSTERECTOMY     • MASTECTOMY  Bilateral    • OVARIAN CYST SURGERY     • THORACOSCOPY VIDEO ASSISTED WITH LOBECTOMY Right 10/9/2020    Procedure: BRONCHOSCOPY, THORACOSCOPY VIDEO ASSISTED WITH ANTERIOR BASAL SEGMENTECTOMY, INTERCOSTAL NERVE BLOCK;  Surgeon: Flaca Crisostomo MD;  Location: [facility] OR;  Service: Thoracic;  Laterality: Right;   • TONSILLECTOMY               IRF OT ASSESSMENT FLOWSHEET (last 12 hours)     IRF OT Evaluation and Treatment     Row Name 12/14/22 1211          OT Time and Intention    Document Type daily treatment  -CC     Mode of Treatment occupational therapy  -CC     Patient Effort good  -CC     Symptoms Noted During/After Treatment fatigue  -CC     Row Name 12/14/22 1211          Pain Assessment    Pretreatment Pain Rating 0/10 - no pain  -CC     Posttreatment Pain Rating 0/10 - no pain  -CC     Row Name 12/14/22 1211          Cognition/Psychosocial    Affect/Mental Status (Cognition) flat/blunted affect  -CC     Orientation Status (Cognition) oriented x 4  -CC     Follows Commands (Cognition) follows one-step commands;delayed response/completion;repetition of directions required;increased processing time needed  -CC     Comment, Cognition difficulty sequencing 3 step craft tasks w visual pattern present.  -CC     Row Name 12/14/22 1211          Bed Mobility    Rolling Left [place] (Bed Mobility) standby assist  -CC     Supine-Sit [place] (Bed Mobility) minimum assist (75% patient effort);contact guard  leaned to L  -     Row Name 12/14/22 1211          Bed-Chair Transfer    Bed-Chair [place] (Transfers) contact guard;minimum assist (75% patient effort)  -     Assistive Device (Bed-Chair Transfers) wheelchair  -     Row Name 12/14/22 1211          Motor Skills    Coordination fine motor deficit;left;upper extremity  min difficulty w using craft needle and threading felt pieces for a Beth pattern craft.  -     Row Name 12/14/22 1211          Balance    Static Sitting Balance minimal  assist  seated EOB; Leaned to L and unable to self corect  -CC     Static Standing Balance minimal assist;contact guard  -CC     Row Name 12/14/22 1211          Positioning and Restraints    Pre-Treatment Position in bed  -CC     Post Treatment Position wheelchair  -CC     In Wheelchair sitting;with PT;exit alarm on  -CC           User Key  (r) = Recorded By, (t) = Taken By, (c) = Cosigned By    Initials Name Effective Dates    CC Yissel Goel, OTR 06/16/21 -                  Occupational Therapy Education     Title: PT OT SLP Therapies (In Progress)     Topic: Occupational Therapy (Done)     Point: ADL training (Done)     Description:   Instruct learner(s) on proper safety adaptation and remediation techniques during self care or transfers.   Instruct in proper use of assistive devices.              Learning Progress Summary           Patient Acceptance, E, VU by WN at 12/12/2022 2327    Acceptance, E, VU by WN at 12/12/2022 0155    Acceptance, E, VU by CB at 12/8/2022 1204    Acceptance, E, VU,NR by CE at 12/8/2022 1107    Comment: fall prevention, DME including w/c and shower chair                   Point: Home exercise program (Done)     Description:   Instruct learner(s) on appropriate technique for monitoring, assisting and/or progressing therapeutic exercises/activities.              Learning Progress Summary           Patient Acceptance, E, VU by WN at 12/12/2022 2327    Acceptance, E, VU by WN at 12/12/2022 0155    Acceptance, E, VU by CB at 12/8/2022 1204    Acceptance, E, VU,NR by CE at 12/8/2022 1107    Comment: fall prevention, DME including w/c and shower chair                   Point: Precautions (Done)     Description:   Instruct learner(s) on prescribed precautions during self-care and functional transfers.              Learning Progress Summary           Patient Acceptance, E, VU by WN at 12/12/2022 2327    Acceptance, E, VU by WN at 12/12/2022 0155    Acceptance, E, VU by CB at 12/8/2022 1204     Acceptance, E, VU,NR by CE at 12/8/2022 1107    Comment: fall prevention, DME including w/c and shower chair                   Point: Body mechanics (Done)     Description:   Instruct learner(s) on proper positioning and spine alignment during self-care, functional mobility activities and/or exercises.              Learning Progress Summary           Patient Acceptance, E, VU by WN at 12/12/2022 2327    Acceptance, E, VU by WN at 12/12/2022 0155    Acceptance, E, VU by CB at 12/8/2022 1204    Acceptance, E, VU,NR by CE at 12/8/2022 1107    Comment: fall prevention, DME including w/c and shower chair                               User Key     Initials Effective Dates Name Provider Type Discipline    WN 08/23/22 -  Doc Park, RN Registered Nurse Nurse    CB 11/10/22 -  Mackenzie Hopkins CCC-SLP Speech and Language Pathologist SLP    CE 10/17/22 -  Portia Esqueda OT Occupational Therapist OT                    OT Recommendation and Plan                         Time Calculation:      Time Calculation- OT     Row Name 12/14/22 0900             Time Calculation- OT    OT Start Time 0900  -CC      OT Stop Time 0930  -CC      OT Time Calculation (min) 30 min  -CC            User Key  (r) = Recorded By, (t) = Taken By, (c) = Cosigned By    Initials Name Provider Type     Yissel Goel OTR Occupational Therapist              Therapy Charges for Today     Code Description Service Date Service Provider Modifiers Qty    50769464041 HC OT SELF CARE/MGMT/TRAIN EA 15 MIN 12/13/2022 Yissel Goel OTR GO 2    93757326272 HC OT NEUROMUSC RE EDUCATION EA 15 MIN 12/13/2022 Yissel Goel OTR GO 2    95124214711 HC OT NEUROMUSC RE EDUCATION EA 15 MIN 12/14/2022 Yissel Goel OTR GO 1    74322830055 HC OT THERAPEUTIC ACT EA 15 MIN 12/14/2022 Yissel Goel OTR GO 1                   SHARON Vargas  12/14/2022

## 2022-12-14 NOTE — PROGRESS NOTES
Name: Christie Avendaño ADMIT: 2022   : 1965  PCP: Tiffany Holloway MD    MRN: [id number] LOS: 7 days   AGE/SEX: 57 y.o. female  ROOM: [facility]     Subjective   Subjective     Patient is lying on the bed and does not appear in any major distress.  Denies nausea, vomiting, abdominal pain, chest pain.       Objective   Objective   Vital Signs  Temp:  [97.9 °F (36.6 °C)-98 °F (36.7 °C)] 97.9 °F (36.6 °C)  Heart Rate:  [64-70] 70  Resp:  [17-20] 20  BP: (105-123)/(51-74) 105/51  SpO2:  [95 %-98 %] 97 %  on   ;   Device (Oxygen Therapy): room air  Body mass index is 40.17 kg/m².  Physical Exam  Constitutional:       General: She is not in acute distress.  HENT:      Head: Normocephalic and atraumatic.      Mouth/Throat:      Mouth: Mucous membranes are moist.   Eyes:      Extraocular Movements: Extraocular movements intact.      Pupils: Pupils are equal, round, and reactive to light.   Cardiovascular:      Rate and Rhythm: Normal rate.      Pulses: Normal pulses.      Heart sounds: Normal heart sounds.   Pulmonary:      Effort: Pulmonary effort is normal.      Breath sounds: Normal breath sounds.   Abdominal:      General: Bowel sounds are normal. There is no distension.      Palpations: Abdomen is soft.      Tenderness: There is no abdominal tenderness.   Musculoskeletal:      Cervical back: Normal range of motion and neck supple.      Right lower leg: No edema.      Left lower leg: No edema.   Skin:     General: Skin is warm and dry.   Neurological:      General: No focal deficit present.      Mental Status: She is alert and oriented to person, place, and time.   Psychiatric:         Mood and Affect: Mood normal.         Behavior: Behavior normal.       Results Review     I reviewed the patient's new clinical results.  Results from last 7 days   Lab Units 22  0555 22  0539 22  0616   WBC 10*3/mm3 10.16 13.31* 15.27*   HEMOGLOBIN g/dL 13.0 12.9 13.0   PLATELETS 10*3/mm3 174 225 238      Results from last 7 days   Lab Units 12/14/22  0533 12/13/22  0620 12/12/22  0555 12/11/22  0610   SODIUM mmol/L 131* 130* 133* 131*   POTASSIUM mmol/L 4.7 4.9 4.3 4.7   CHLORIDE mmol/L 94* 92* 97* 96*   CO2 mmol/L 26.1 27.8 24.5 24.3   BUN mg/dL 20 20 20 21*   CREATININE mg/dL 0.72 0.81 0.68 0.74   GLUCOSE mg/dL 188* 246* 156* 174*   EGFR mL/min/1.73 97.1 84.3 101.1 93.9     Results from last 7 days   Lab Units 12/14/22  0533 12/13/22  0620 12/12/22  0555 12/11/22  0610   ALBUMIN g/dL 3.80 3.90 3.70 3.50   BILIRUBIN mg/dL 0.7 0.6 0.7 0.6   ALK PHOS U/L 159* 168* 161* 164*   AST (SGOT) U/L 50* 60* 58* 73*   ALT (SGPT) U/L 365* 421* 467* 546*     Results from last 7 days   Lab Units 12/14/22  0533 12/13/22  0620 12/12/22  0555 12/11/22  0610 12/10/22  1110 12/09/22  0539   CALCIUM mg/dL 9.5 9.3 9.2 8.9   < > 9.1   ALBUMIN g/dL 3.80 3.90 3.70 3.50   < > 3.80   MAGNESIUM mg/dL  --   --   --   --   --  2.2    < > = values in this interval not displayed.       Glucose   Date/Time Value Ref Range Status   12/14/2022 1111 294 (H) 70 - 130 mg/dL Final     Comment:     Meter: [id number] : [id number] Katie Valdivia NA   12/14/2022 0523 166 (H) 70 - 130 mg/dL Final     Comment:     Meter: [id number] : [id number] Nisreencody Gonsalez'Orestes NA   12/13/2022 2009 164 (H) 70 - 130 mg/dL Final     Comment:     Meter: [id number] : [id number] Persley De'Orestes NA   12/13/2022 1638 99 70 - 130 mg/dL Final     Comment:     Meter: [id number] : [id number] Paul Clifton NA   12/13/2022 1111 250 (H) 70 - 130 mg/dL Final     Comment:     Meter: [id number] : [id number] Corrie Pérez NA   12/13/2022 0629 226 (H) 70 - 130 mg/dL Final     Comment:     Meter: [id number] : [id number] Nabeel Pacheco    12/12/2022 2041 278 (H) 70 - 130 mg/dL Final     Comment:     Meter: [id number] : [id number] Nabeel Pacheco NA       No radiology results for the last day  Scheduled Medications  calcium 500 mg vitamin D 5 mcg (200 UT), 1 tablet,  Oral, BID  dexamethasone, 4 mg, Oral, Q8H  enoxaparin, 40 mg, Subcutaneous, Q12H  escitalopram, 10 mg, Oral, Daily  gabapentin, 100 mg, Oral, TID  insulin glargine, 28 Units, Subcutaneous, QAM  insulin lispro, 0-14 Units, Subcutaneous, TID AC  insulin lispro, 8 Units, Subcutaneous, TID With Meals  latanoprost, 1 drop, Both Eyes, Nightly  levETIRAcetam, 500 mg, Oral, BID  levothyroxine, 150 mcg, Oral, Q AM  lisinopril, 20 mg, Oral, Daily  magnesium oxide, 400 mg, Oral, Daily  ondansetron, 8 mg, Oral, Q24H  pantoprazole, 40 mg, Oral, Q AM  polyethylene glycol, 17 g, Oral, Daily  sulfamethoxazole-trimethoprim, 1 tablet, Oral, Once per day on Mon Wed Fri  temozolomide, 140 mg, Oral, Nightly   And  temozolomide, 20 mg, Oral, Nightly   And  temozolomide, 5 mg, Oral, Nightly  valACYclovir, 500 mg, Oral, Q24H    Infusions   Diet  Diet: Regular/House Diet, Diabetic Diets, Vegetarian; Lacto-Ovo Vegetarian (Allows dairy, eggs); Consistent Carbohydrate; Texture: Regular Texture (IDDSI 7); Fluid Consistency: Thin (IDDSI 0)       Assessment/Plan     Active Hospital Problems    Diagnosis  POA   • **Glioma (HCC) [C71.9]  Yes      Resolved Hospital Problems   No resolved problems to display.       57 y.o. female admitted with Glioma (HCC).    Reason for medical consultation--> hyperglycemia/elevated LFTs    1. Glioblastoma, patient is currently being treated with Temodar and radiation by oncology.  On dexamethasone and Keppra for seizure prophylaxis.    2.  History of lung cancer/thyroid cancer/breast cancer/renal cell cancer/melanoma, being managed by oncology.    3.  Neuropathy, Neurontin.    4.  Acute transaminitis, was felt to be medication induced therefore atorvastatin and Tylenol have been discontinued.  Valacyclovir was held but has been resumed now.  LFTs are improving and GI did evaluate and underwent liver ultrasound which revealed only a simple cyst and no evidence of any portal vein DVT.  Autoimmune work-up was negative  and CMV, VZV, EBV testing revealed past infection.  Continue to follow LFTs.    5.  Hypertension, on lisinopril and monitor blood pressure closely.    6.  Diabetes mellitus, patient normally takes Trulicity which is on hold for the moment and is on Lantus 28 units which will be further advanced to 35 units and monitor blood sugars closely.  Hemoglobin A1c level will be checked and uncontrolled blood sugars are most likely secondary to steroids.    Amador Malik MD  [facility]  12/14/22  14:03 EST

## 2022-12-14 NOTE — PROGRESS NOTES
REASON FOR FOLLOWUP/CHIEF COMPLAINT:  High-grade glioma    HISTORY OF PRESENT ILLNESS:   No new events overnight.  No nausea.  New neurological symptoms.    Past Medical History, Past Surgical History, Social History, Family History have been reviewed and are without significant changes except as mentioned.    Review of Systems   Review of Systems   Constitutional: Negative for activity change.   HENT: Negative for nosebleeds and trouble swallowing.    Respiratory: Negative for shortness of breath and wheezing.    Cardiovascular: Negative for chest pain and palpitations.   Gastrointestinal: Negative for constipation, diarrhea and nausea.   Genitourinary: Negative for dysuria and hematuria.   Musculoskeletal: Negative for arthralgias and myalgias.   Skin: Negative for rash and wound.   Neurological: Negative for seizures and syncope.   Hematological: Negative for adenopathy. Does not bruise/bleed easily.   Psychiatric/Behavioral: Negative for confusion.       Medications:  The current medication list was reviewed in the EMR    ALLERGIES:    Allergies   Allergen Reactions   • Morphine Anaphylaxis     Hives and throat swelling   • Peach [Prunus Persica] Anaphylaxis   • Penicillins Anaphylaxis   • Metformin Diarrhea              Vitals:    12/13/22 0510 12/13/22 1100 12/13/22 1900 12/14/22 0500   BP: 116/71 112/58 123/72 116/74   BP Location: Left arm Left arm Left arm Left arm   Patient Position: Lying Lying Lying Lying   Pulse: 60 73 64 66   Resp: 20 20 18 17   Temp: 98.3 °F (36.8 °C) 98.2 °F (36.8 °C) 98 °F (36.7 °C) 98 °F (36.7 °C)   TempSrc: Oral Oral Oral Oral   SpO2: 94% 95% 95% 98%   Weight:       Height:         Physical Exam    CONSTITUTIONAL:  Vital signs reviewed.  No distress, looks comfortable.  EYES:  Conjunctivae and lids unremarkable.  PERRLA  EARS, NOSE, MOUTH, THROAT:  Ears and nose appear unremarkable.  Lips, teeth, gums appear unremarkable.  RESPIRATORY:  Normal respiratory effort.  Lungs  clear to auscultation bilaterally.  CARDIOVASCULAR:  Normal S1, S2.  No murmurs, rubs or gallops.  No significant lower extremity edema.  GASTROINTESTINAL: Abdomen appears unremarkable.  Nontender.  No hepatomegaly.  No splenomegaly.  NEURO: Cranial nerves 2-12 grossly intact.  No focal deficits.  Appears to have equal strength all 4 extremities.  MUSCULOSKELETAL:  Unremarkable digits/nails.  No cyanosis or clubbing.  SKIN:  Warm.  No rashes.  PSYCHIATRIC:  Normal judgment and insight.  Normal mood and affect.        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  Intact MGMT expression  • Patient presented with recurrent falls and confusion.  • MRI on 11/22/2022 revealed a 3.2 cm ring-enhancing right supratentorial mass.  There was mass-effect and left midline shift.  • She was started on dexamethasone and Keppra.  • CT chest abdomen pelvis on 11/24/2022 revealed no evidence of progressive metastatic disease.  • S/p stereotactic brain biopsy on 11/28/2022.  • Preliminary pathology revealed high-grade glioma.  It was sent to [facility].  • Preliminary revealed high-grade glioma.  • Patient had radiation simulation on 12/2/2022.  • Plan is to start concurrent low-dose Temodar at 75 mg/m2 daily.   • She is on Decadron 4 mg p.o. every 8 hours.  • Plan is to start Radiation with concurrent Temodar on 12/12/2022.  • Temodar does not require dose reduction due to the elevated liver enzymes.   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  This is associated with a worse prognosis and relative resistance to alkylating chemotherapy such as temozolomide.  However, temozolomide and  radiation remain the recommended treatments.  • Today is day 3 of Temodar radiation     *Germline BRCA2 and GEORGES mutations.  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  • Please see the note from Dr. Collins, the patient's outpatient oncologist from 12/4/2022.     *Seizure prophylaxis.  • Patient is on Keppra 500 mg p.o. twice daily.  • Patient is not having any problems seizures.     *Elevated liver enzymes.  • ALT was 143 and AST was 71 on 11/28/2022.  • ALT was 1049 and AST was 386 on 12/8/2022.  • Hepatic duplex, 12/12/2022 normal.  • Labs continuing to improve     PLAN:  · Temodar and radiation started 12/12/2022.  · Has follow-up arranged in the office with Dr. Collins on 2/22/2023 and CT CAP 1 week prior.  Patient states the hope is to be discharged from rehab before [place].  I have asked our office to arrange an appointment with Dr. Collins in a few weeks in the office.    Following periodically.  Reviewed Dr. Marr's note, continuing treatments.

## 2022-12-14 NOTE — PROGRESS NOTES
Inpatient Rehabilitation Plan of Care Note    Plan of Care  Care Plan Reviewed - No updates at this time.    Psychosocial    [RN] Coping/Adjustment(Active)  Current Status(12/15/2022): At risk for poor coping d/t recent medical  conditions.  Weekly Goal(12/22/2022): Identify progress in functional status.  Discharge Goal: Demonstrates healthy coping strategies.    Performed Intervention(s)  Support/peer groups.  Verbalizes needs and concerns.  Medication.      Safety    [RN] Potential for Injury(Active)  Current Status(12/15/2022): At risk for falls d/t history of falls. 12/10 Pt  found on floor in BR, states she was trying to pull her pants up, did not call  for assist like she said she would. No apparent injury. Blue armband placed.  Weekly Goal(12/22/2022): Pt will use call light, no further attempts to transfer  self.  Discharge Goal: Patient and family will be aware of risk of fall and safety in  home setting.    Performed Intervention(s)  Items w/i reach.  Safety rounds.  Bed and chair alarm. Blue armband  Falls precautions.      Sphincter Control    [RN] Bladder Management(Active)  Current Status(12/15/2022): Continent of bladder. Occasional urge incont.  Weekly Goal(12/22/2022): Remain continent of bladder.  Discharge Goal: Goes home continent of bladder.    [RN] Bowel Management(Active)  Current Status(12/15/2022): Continent of bowel.  Weekly Goal(12/22/2022): Remain continent of bowel.  Discharge Goal: Go home continent of bowel.    Performed Intervention(s)  Offer restroom during hourly rounding.  Encourage fluid intake.  Monitor Is and Os.  Timed voids.  Encourage appropriate diet.    Signed by: Yessi Luther RN

## 2022-12-14 NOTE — PROGRESS NOTES
[facility]  Inpatient Progress Note    Reason for Follow-up: Elevated liver tests    Subjective     Interval History:   Doing well today without GI complaints.  Denies abdominal pain, nausea, vomiting.  Tolerating p.o. intake well.    12/14/2022 shows continued LFT improvement with , , and AST 50    Current Facility-Administered Medications:   •  calcium 500 mg vitamin D 5 mcg (200 UT) per tablet 1 tablet, 1 tablet, Oral, BID, Mathew Araujo MD, 1 tablet at 12/14/22 0843  •  calcium carbonate (TUMS) chewable tablet 500 mg (200 mg elemental), 2 tablet, Oral, TID PRN, Mathew Araujo MD  •  dexamethasone (DECADRON) tablet 4 mg, 4 mg, Oral, Q8H, Mathew Araujo MD, 4 mg at 12/14/22 0529  •  dextrose (D50W) (25 g/50 mL) IV injection 25 g, 25 g, Intravenous, Q15 Min PRN, Mathew Araujo MD  •  dextrose (GLUTOSE) oral gel 15 g, 15 g, Oral, Q15 Min PRN, Mathew Araujo MD  •  Enoxaparin Sodium (LOVENOX) syringe 40 mg, 40 mg, Subcutaneous, Q12H, Mathew Araujo MD, 40 mg at 12/13/22 2145  •  escitalopram (LEXAPRO) tablet 10 mg, 10 mg, Oral, Daily, Mathew Araujo MD, 10 mg at 12/14/22 0843  •  famotidine (PEPCID) tablet 20 mg, 20 mg, Oral, BID PRN, Mathew Araujo MD  •  gabapentin (NEURONTIN) capsule 100 mg, 100 mg, Oral, TID, Mathew Araujo MD, 100 mg at 12/14/22 0843  •  glucagon (human recombinant) (GLUCAGEN DIAGNOSTIC) injection 1 mg, 1 mg, Intramuscular, Q15 Min PRN, Mathew Araujo MD  •  influenza vac split quad (FLUZONE,FLUARIX,AFLURIA,FLULAVAL) injection 0.5 mL, 0.5 mL, Intramuscular, During Hospitalization, Mathew Araujo MD  •  insulin glargine (LANTUS, SEMGLEE) injection 28 Units, 28 Units, Subcutaneous, Naman DE LA ROSA Brooke L, MD, 28 Units at 12/14/22 0841  •  insulin lispro (ADMELOG) injection 0-14 Units, 0-14 Units, Subcutaneous, TID Tim LOW John B, MD, 3 Units at 12/14/22 0841  •   insulin lispro (ADMELOG) injection 8 Units, 8 Units, Subcutaneous, TID With Meals, Mathew Araujo MD, 8 Units at 12/14/22 0841  •  latanoprost (XALATAN) 0.005 % ophthalmic solution 1 drop, 1 drop, Both Eyes, Nightly, Mathew Araujo MD, 1 drop at 12/13/22 2138  •  levETIRAcetam (KEPPRA) tablet 500 mg, 500 mg, Oral, BID, Mathew Araujo MD, 500 mg at 12/14/22 0843  •  levothyroxine (SYNTHROID, LEVOTHROID) tablet 150 mcg, 150 mcg, Oral, Q AM, Mathew Araujo MD, 150 mcg at 12/14/22 0529  •  lisinopril (PRINIVIL,ZESTRIL) tablet 20 mg, 20 mg, Oral, Daily, Mathew Araujo MD, 20 mg at 12/14/22 0843  •  magnesium oxide (MAG-OX) tablet 400 mg, 400 mg, Oral, Daily, Gina Florence MD, 400 mg at 12/14/22 0843  •  nitroglycerin (NITROSTAT) SL tablet 0.4 mg, 0.4 mg, Sublingual, Q5 Min PRN, Mathew Araujo MD  •  ondansetron (ZOFRAN) tablet 4 mg, 4 mg, Oral, Q6H PRN **OR** ondansetron (ZOFRAN) injection 4 mg, 4 mg, Intravenous, Q6H PRN, Mathew Araujo MD  •  ondansetron (ZOFRAN) tablet 8 mg, 8 mg, Oral, Q24H, Laura Flores MD, 8 mg at 12/13/22 1150  •  pantoprazole (PROTONIX) EC tablet 40 mg, 40 mg, Oral, Q AM, Mathew Araujo MD, 40 mg at 12/14/22 0529  •  polyethylene glycol (MIRALAX) packet 17 g, 17 g, Oral, Daily, Mathew Araujo MD, 17 g at 12/14/22 0843  •  sennosides-docusate (PERICOLACE) 8.6-50 MG per tablet 1 tablet, 1 tablet, Oral, Nightly PRN, Mathew Araujo MD  •  sulfamethoxazole-trimethoprim (BACTRIM DS,SEPTRA DS) 800-160 MG per tablet 1 tablet, 1 tablet, Oral, Once per day on Mon Wed Fri, German Garcia II, MD, 1 tablet at 12/14/22 0843  •  temozolomide (TEMODAR) chemo capsule 140 mg, 140 mg, Oral, Nightly, 140 mg at 12/13/22 2143 **AND** temozolomide (TEMODAR) chemo capsule 20 mg, 20 mg, Oral, Nightly, 20 mg at 12/13/22 2143 **AND** temozolomide (TEMODAR) chemo capsule 5 mg, 5 mg, Oral, Nightly, German Garcia II, MD, 5 mg at  12/13/22 2142  •  valACYclovir (VALTREX) tablet 500 mg, 500 mg, Oral, Q24H, Mathew Dumont MD, 500 mg at 12/14/22 0843  Review of Systems:    The following systems were reviewed and negative;  gastrointestinal    Objective     Vital Signs  Temp:  [98 °F (36.7 °C)] 98 °F (36.7 °C)  Heart Rate:  [64-66] 66  Resp:  [17-18] 17  BP: (116-123)/(72-74) 116/74  Body mass index is 40.17 kg/m².    Intake/Output Summary (Last 24 hours) at 12/14/2022 1119  Last data filed at 12/13/2022 1700  Gross per 24 hour   Intake 360 ml   Output --   Net 360 ml     No intake/output data recorded.     Physical Exam:    General: patient awake, alert and cooperative   Eyes: Normal lids and lashes, no scleral icterus   Skin: warm and dry, not jaundiced   Pulm: regular and unlabored   Psychiatric: Normal mood and behavior;      Results Review:     I reviewed the patient's new clinical results.    Results from last 7 days   Lab Units 12/12/22  0555 12/09/22  0539 12/08/22  0616   WBC 10*3/mm3 10.16 13.31* 15.27*   HEMOGLOBIN g/dL 13.0 12.9 13.0   HEMATOCRIT % 38.9 38.1 39.4   PLATELETS 10*3/mm3 174 225 238     Results from last 7 days   Lab Units 12/14/22  0533 12/13/22  0620 12/12/22  0555   SODIUM mmol/L 131* 130* 133*   POTASSIUM mmol/L 4.7 4.9 4.3   CHLORIDE mmol/L 94* 92* 97*   CO2 mmol/L 26.1 27.8 24.5   BUN mg/dL 20 20 20   CREATININE mg/dL 0.72 0.81 0.68   CALCIUM mg/dL 9.5 9.3 9.2   BILIRUBIN mg/dL 0.7 0.6 0.7   ALK PHOS U/L 159* 168* 161*   ALT (SGPT) U/L 365* 421* 467*   AST (SGOT) U/L 50* 60* 58*   GLUCOSE mg/dL 188* 246* 156*     Results from last 7 days   Lab Units 12/08/22  1436   INR  1.02     Lab Results   Lab Value Date/Time    LIPASE 42 10/31/2018 0054       Radiology:  US Liver   Final Result   Small liver cyst. Cholecystectomy       This report was finalized on 12/8/2022 3:32 PM by Dr. Stef Pettit M.D.              Assessment & Plan     Patient Active Problem List   Diagnosis   • Carcinoid tumor of left lung   • BRCA2  gene mutation positive in female   • Papillary thyroid carcinoma (HCC)   • Renal cell carcinoma of left kidney (HCC)   • Essential hypertension   • Postoperative hypothyroidism   • Normocytic anemia   • Type 2 diabetes mellitus with hyperglycemia, without long-term current use of insulin (HCC)   • Neoplasm of right breast, 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 BRCA2 gene mutation  2. High-grade glioma  3. Non-small cell lung cancer  4. Neoplasm of right breast  5. Renal cell carcinoma left kidney  6. Papillary thyroid carcinoma  7. Melanoma  8. 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 Elevated LFTs, improving      Plan:  • Primary discontinued valacyclovir, atorvastatin, and Tylenol pending work-up  • Pharmacy consultation unremarkable for alternative pharmacologic therapies as underlying etiology to significant elevation in liver enzymes  • Liver ultrasound - remarkable for a small hepatic cyst otherwise negative  • Portal hepatic duplex unremarkable  • ASMA, GEMMA, and HSV negative.  Normal total IgM and IgA.  Low IgG.  CMV, VZV, and EBV with past infection only.   • Continue to follow LFTs.  Elevation likely medication effect.  • Our service will sign off at this time.  Please call us if we are needed further.  Thank you for the consult.    I discussed the patients findings and my recommendations with patient.    Dragon dictation used throughout this note.            Taylor Vásquez PA-C  [facility]  [address] [place] [address]  Office: [phone]

## 2022-12-14 NOTE — PROGRESS NOTES
LOS: 7 days   Patient Care Team:  Tiffany Holloway MD as PCP - General (Internal Medicine)  Mathew Collins Jr., MD as Consulting Physician (Hematology and Oncology)  Dorian Sabillon MD as Surgeon (General Surgery)  Thang Dumont, JOSEFA (Optometry)  Lamine Cantu DO as Referring Physician (Family Medicine)  Hali Rolle MD as Gynecologist (Gynecology)      HERON MAGANA  1965    Diagnoses    1. 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 States headache is improved this morning. Had radiation this am. Patient denies fever/chills/sob/chest pain and denies issue with sleep/appetite/bladder and bowels.        Objective     Vitals:    12/14/22 0500   BP: 116/74   Pulse: 66   Resp: 17   Temp: 98 °F (36.7 °C)   SpO2: 98%       PHYSICAL EXAM:   MENTAL STATUS -  AWAKE / ALERT  HEENT-right scalp incision with staples removed.  Clean dry and intact  SCLERAE ANICTERIC, CONJUNCTIVAE PINK, EARS UNREMARKABLE EXTERNALLY  LUNGS - CTA, NO WHEEZES, RALES OR RHONCHI  HEART- RRR, NO RUB, MURMUR, OR GALLOP  ABD - NORMOACTIVE BOWEL SOUNDS, SOFT, NT.     EXT - NO EDEMA OR CYANOSIS  NEURO -oriented       Speech was fluent with slightly slow rate of speech.  Extraocular movements intact.  No dysarthria.  MOTOR EXAM - RUE/RLE 5/5.   LUE/LLE 4+/5.   Impaired motor control in the left upper extremity and left lower extremity, improving dexterity of left hand        MEDICATIONS  Scheduled Meds:calcium 500 mg vitamin D 5 mcg (200 UT), 1 tablet, Oral, BID  dexamethasone, 4 mg, Oral, Q8H  enoxaparin, 40 mg, Subcutaneous, Q12H  escitalopram, 10 mg, Oral, Daily  gabapentin, 100 mg, Oral, TID  insulin glargine, 28 Units, Subcutaneous, QAM  insulin lispro, 0-14 Units, Subcutaneous, TID AC  insulin lispro, 8 Units, Subcutaneous, TID With Meals  latanoprost, 1 drop, Both Eyes, Nightly  levETIRAcetam, 500 mg, Oral, BID  levothyroxine, 150 mcg, Oral, Q AM  lisinopril, 20 mg, Oral, Daily  magnesium oxide, 400 mg, Oral, Daily  ondansetron, 8 mg, Oral, Q24H  pantoprazole, 40 mg, Oral, Q AM  polyethylene glycol, 17 g, Oral, Daily  sulfamethoxazole-trimethoprim, 1 tablet, Oral, Once per day on Mon Wed Fri  temozolomide, 140 mg, Oral, Nightly   And  temozolomide, 20 mg, Oral, Nightly   And  temozolomide, 5 mg, Oral, Nightly  valACYclovir, 500 mg, Oral, Q24H      Continuous Infusions:   PRN Meds:.•  calcium carbonate  •  dextrose  •  dextrose  •  famotidine  •  glucagon (human recombinant)  •  influenza vaccine  •  nitroglycerin  •  ondansetron **OR** ondansetron  •  senna-docusate sodium      RESULTS  Glucose   Date/Time Value Ref Range Status   12/14/2022 0523 166 (H) 70 - 130 mg/dL Final     Comment:     Meter: [id number] : [id number] Flaquita GonsalezLupeOrestes NA   12/13/2022 2009 164 (H) 70 - 130 mg/dL Final     Comment:     Meter: [id number] : [id number] Flaquita GonsalezLupeOrestes NA   12/13/2022 1638 99 70 - 130 mg/dL Final     Comment:     Meter: [id number] : [id number] Miller Elodia NA   12/13/2022 1111 250 (H) 70 - 130 mg/dL Final     Comment:     Meter: [id number] : [id number] Dentonloretta Pérez NA   12/13/2022 0629 226 (H) 70 - 130 mg/dL Final     Comment:     Meter: [id number] : [id number] Nabeel ACUÑA   12/12/2022 2041 278 (H) 70 - 130 mg/dL Final     Comment:     Meter: [id number] : [id number] Nabeel ACUÑA   12/12/2022 1634 107 70 - 130 mg/dL Final     Comment:      Meter: [id number] : [id number] Santos ACUÑA   12/12/2022 1133 241 (H) 70 - 130 mg/dL Final     Comment:     Meter: [id number] : [id number] Santos ACUÑA     Results from last 7 days   Lab Units 12/12/22  0555 12/09/22  0539 12/08/22  0616   WBC 10*3/mm3 10.16 13.31* 15.27*   HEMOGLOBIN g/dL 13.0 12.9 13.0   HEMATOCRIT % 38.9 38.1 39.4   PLATELETS 10*3/mm3 174 225 238     Results from last 7 days   Lab Units 12/14/22  0533 12/13/22  0620 12/12/22  0555   SODIUM mmol/L 131* 130* 133*   POTASSIUM mmol/L 4.7 4.9 4.3   CHLORIDE mmol/L 94* 92* 97*   CO2 mmol/L 26.1 27.8 24.5   BUN mg/dL 20 20 20   CREATININE mg/dL 0.72 0.81 0.68   CALCIUM mg/dL 9.5 9.3 9.2   BILIRUBIN mg/dL 0.7 0.6 0.7   ALK PHOS U/L 159* 168* 161*   ALT (SGPT) U/L 365* 421* 467*   AST (SGOT) U/L 50* 60* 58*   GLUCOSE mg/dL 188* 246* 156*       ASSESSMENT and PLAN    Glioma (HCC)    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  She reports intolerance to gabapentin, intolerances to opioids.  Tramadol comes up as contraindicated with history of anaphylactic secondary to morphine.  Valproate for headache control not an option with her liver changes  Dec 13: patient reports intolerance to gabapentin was drowsiness on 300 mg dose. Agreeable to try 100 mg tid for HA.      Left side weakness/incoordination/impaired mobility     Radiation therapy/Temodar to start December 12, 2022  Decadron 4 mg every 8 hourly  Dec 9: Oncology aware of increase of liver enzymes. They are following along   December 12: Radiation therapy and Temodar initiating today  Dec 14: Temodar Radiation, fraction 3 today.      Leukocytosis-steroid/stress response.  Incision healing.      Elevated LFTs  Dec 8: Labs significant for ALT 1049 (143 11/28),  (71 11/28), Alk phos  190 (96 11/28). Taking minimal tylenol and statin is a home med- discontinued. Consulted IM who also consulted pharmacy and GI. They also decreased valtrex to 1g daily (she was taking daily prophylactic valtrex 2g bid). Awaiting liver ultrasound. CPK normal.   Dec 9- Ongoing workup. Liver ultrasound- remarkable for a small hepatic cyst otherwise negative  Per GI -[ Obtain duplex hepatic ultrasound to further assess for thrombosis  Obtain GEMMA, IgG profile, ASMA, EBV, CMV, HSV, VZV to rule out autoimmune versus viral cause to elevation.].   Patient reviewed with internal medicine and medical oncology  December 12-transaminases elevated but trending better.   Portal hepatic duplex unremarkable  Dec 14: Continued decrease of LFTs.     Diabetes mellitus-Trulicity at home.  On Lantus/Humalog  December 13-Lantus increased to 28 units a.m.  On Humalog 8 units 3 times daily with meals  Dec 14: Continued elevation of blood sugars, will ask IM to provide insight.      Seizure prophylaxis-Keppra     DVT prophylaxis-Lovenox/SCDs     GI prophylaxis-protein pump inhibitor  Add calcium and vitamin D with ongoing steroids     Chronic Valtrex-dose decreased to 1000 mg daily     Germline BRCA2 and GEORGES mutations.  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     Depression-Lexapro     Hypothyroidism-on replacement     Hypertension lisinopril    TEAM CONF - DEC 13 - BED SBA. TRANSFERS MIN CTG. GAIT 160 FEET CTG. LEANS LEFT. TOILET TRANSFERS CTG. SHOWER TRANSFERS CTG MIN. LBD MIN. UBD MIN. BATH MIN. GROOMING SBA. TOILETING CTG MIN. IMPAIRED ATTENTION.IMPAIRED EXECUTIVE FUNCTION. FATIGUES WITH COGNITIVE TASKS. CONTINENT BOWEL AND BLADDER. SCALP INCISION HEALING.   XRT/TEMODAR. ELEVATED TRANSAMINASES TREND  BETTER.   ELOS - 2 WEEKS    Patient discussed with attending physician Dr. Araujo, please see attestation.     Gina Florence DO   Physical Medicine and Rehabilitation   PGY-4     During rounds, used appropriate personal protective equipment including mask and gloves.  Additional gown if indicated.  Mask used was standard procedure mask. Appropriate PPE was worn during the entire visit.  Hand hygiene was completed before and after.

## 2022-12-14 NOTE — THERAPY TREATMENT NOTE
Inpatient Rehabilitation - Speech Language Pathology Treatment Note    [facility]     Patient Name: Christie Avendaño  : 1965  MRN: [id number]    Today's Date: 2022                   Admit Date: 2022       Visit Dx:      ICD-10-CM ICD-9-CM   1. Impaired functional mobility, balance, gait, and endurance  Z74.09 V49.89   2. 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)   • Non-small cell lung cancer, right (HCC)   • Renal mass, right   • SIRS (systemic inflammatory response syndrome) (HCC)   • Hyperlipidemia   • Malignant melanoma of right lower extremity including hip (HCC)   • High grade glioma    • 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)   • Glioma (HCC)       Past Medical History:   Diagnosis Date   • Arthritis    • Asthma    • BRCA2 positive    • Diabetes mellitus (HCC)    • H/O Liver masses 2017    x3   • H/O Lung masses 2017    1 in right lower lobe and 1 in the left infrahilar location   • H/O Ovarian cyst    • H/O Right adrenal mass (CMS/HCC) 2017   • Lung cancer (HCC)    • Melanoma (HCC)     right foot   • PONV (postoperative nausea and vomiting)    • Thyroid disease        Past Surgical History:   Procedure Laterality Date   • APPENDECTOMY     • BRAIN BIOPSY Right 2022    Procedure: Right frontal stereotactic needle brain biopsy;  Surgeon: Manuel Thompson MD;  Location: [facility] MAIN OR;  Service: Neurosurgery;  Laterality: Right;   • BREAST IMPLANT SURGERY Bilateral    • CHOLECYSTECTOMY     • HYSTERECTOMY      • MASTECTOMY Bilateral    • OVARIAN CYST SURGERY     • THORACOSCOPY VIDEO ASSISTED WITH LOBECTOMY Right 10/9/2020    Procedure: BRONCHOSCOPY, THORACOSCOPY VIDEO ASSISTED WITH ANTERIOR BASAL SEGMENTECTOMY, INTERCOSTAL NERVE BLOCK;  Surgeon: Flaca Crisostomo MD;  Location: [facility] OR;  Service: Thoracic;  Laterality: Right;   • TONSILLECTOMY         SLP Recommendation and Plan                                                            SLP EVALUATION (last 72 hours)     SLP SLC Evaluation     Row Name 12/14/22 1008 12/13/22 1000 12/12/22 1100             Communication Assessment/Intervention    Document Type therapy note (daily note)  -SL therapy note (daily note)  -SL therapy note (daily note)  -SL      Subjective Information no complaints  -SL no complaints  -SL no complaints  -SL      Patient Observations alert;cooperative;agree to therapy  -SL alert;cooperative;agree to therapy  -SL alert;cooperative;agree to therapy  -SL      Patient Effort good  -SL good  -SL good  -SL      Symptoms Noted During/After Treatment none  -SL none  -SL none  -SL            User Key  (r) = Recorded By, (t) = Taken By, (c) = Cosigned By    Initials Name Effective Dates    SL Jemma Bradley MS CCC-SLP 06/16/21 -                    EDUCATION    The patient has been educated in the following areas:       Cognitive Impairment.             SLP GOALS     Row Name 12/14/22 1008 12/13/22 1000 12/12/22 1100       Attention Goal 1 (SLP)    Progress/Outcomes (Attention Goal 1, SLP) -- goal ongoing  -SL --    Comment (Attention Goal 1, SLP) -- simple word altering task- letter altering ( only 4 steps)- 88% indep  -SL --       Memory Skills Goal 1 (SLP)    Progress/Outcomes (Memory Skills Goal 1, SLP) goal ongoing  -SL goal ongoing  -SL goal ongoing  -SL    Comment (Memory Skills Goal 1, SLP) 24 item hidden picture matching task-48% indep; 24 item hidden face matching task- 25% indep  -SL recall of new advertisements- 47% indep;  visual recall-  details from picture scenes- 90% indep after 5 min review  - visual immediate recall task- 12 pictures with grouping strategy- 50% indep  -SL       Organizational Skills Goal 1 (SLP)    Progress/Outcomes (Thought Organization Skills Goal 1, SLP) -- goal ongoing  -SL goal ongoing  -SL    Comment (Thought Organization Skills Goal 1, SLP) -- word organizaiton task- filling in missing letters in words, given categories- 60% indep  -SL sorting and categorizaton of 15 items into 3 categories- initally pt groupoed everything into 2 groups and missed column on left side completely- with min cues for that side and first colums, pt completed organization of items with 47%; poor attn to details noted  -SL       Reasoning Goal 1 (SLP)    Progress/Outcomes (Reasoning Goal 1, SLP) -- goal ongoing  -SL --    Comment (Reasoning Goal 1, SLP) -- making inferences from short stories- 60% indep  -SL --       Functional Problem Solving Skills Goal 1 (SLP)    Progress/Outcomes (Problem Solving Goal 1, SLP) goal ongoing  -SL -- goal ongoing  -SL    Comment (Problem Solving Goal 1, SLP) chart completion based on multiple sentence clues - kitchen shelf organizaiton- somewhat impulsive with resposnes, decreased attn to details noted; impaired working memory- needed multiple reminders and redirection as to task directives ( 1 item perbox on chart)- 40% indep- accurate performance required min /mod cues  - -- answering ?'s re: prescriptions- 100%;  medication dosage amts and times- 100% indep;  answering ?'sr e: detailed charted info ( names, ages, sex, addresses)- 67% indep;   following multistep directives and using hospital map- 40% indep  -SL       Functional Problem Solving Skills Goal 2 (SLP)    Improve Problem Solving Through Goal 2 (SLP) sequence steps in a task;complete organization/home management task  -SL -- --    Progress/Outcomes (Problem Solving Goal 2, SLP) goal ongoing  -SL -- --    Comment (Problem Solving Goal 2, SLP)  sequencing of 6 basic steps for ADL activities- intermittent slef review/correction noted- 66% indep and 100% with min cues;   Additional tsak- following 2 step written directives and marking row of stimulus words accordingly- 60% indep; decreased attn to specifics at tiems, and intermittently marked some but not all of the stimulus words  -SL -- --       Functional Math Skills Goal 1 (SLP)    Progress/Outcomes (Functional Math Skills Goal 1, SLP) goal ongoing  - goal ongoing  - --    Comment (Functional Math Skills Goal 1, SLP) determining denomination amounts- 38% indep;  attn to details- with min cues - 100%  - simple math word problems - use of paper and pencil to assist with calculations- 20% indep; mod cues fro task  - --       Right Hemisphere Function Goal 1 (SLP)    Improve Right Hemisphere Function Through Goal 1 (SLP) -- complete visuo-spatial activities (visual closure, trail making, mazes;complete visuo-perceptual activities (L/R discrimination, spatial concepts);use compensatory strategies for left neglect;90%;independently (over 90% accuracy)  - --    Time Frame (Right Hemisphere Function Goal 1, SLP) -- by discharge  - --    Progress/Outcomes (Right Hemisphere Function Goal 1, SLP) goal ongoing  - continuing progress toward goal;goal ongoing  - --    Comment (Right Hemisphere Function Goal 1, SLP) min visual and verbal cues for scanning to left side during ipad memory matching task  - scanning for target stimulus in 4 rows of letters - 100% without additional visual cues required  - --          User Key  (r) = Recorded By, (t) = Taken By, (c) = Cosigned By    Initials Name Provider Type    Jemma Kelley MS CCC-SLP Speech and Language Pathologist                            Time Calculation:        Time Calculation- SLP     Row Name 22 1100             Time Calculation- [facility]    SLP Start Time 1000  -      SLP Stop Time 1100  -[facility] Time Calculation (min) 60 min   -RAULITO            User Key  (r) = Recorded By, (t) = Taken By, (c) = Cosigned By    Initials Name Provider Type    Jemma Kelley MS CCC-SLP Speech and Language Pathologist                  Therapy Charges for Today     Code Description Service Date Service Provider Modifiers Qty    90800454418 HC ST DEV OF COGN SKILLS INITIAL 15 MIN 12/13/2022 Jemma Bradley MS CCC-SLP  1    45390180443 HC ST DEV OF COGN SKILLS EACH ADDT'L 15 MIN 12/13/2022 Jemma Bradley MS CCC-SLP  3    10653933543 HC ST DEV OF COGN SKILLS INITIAL 15 MIN 12/14/2022 Jemma Bradley MS CCC-SLP  1    82581085243 HC ST DEV OF COGN SKILLS EACH ADDT'L 15 MIN 12/14/2022 Jemma Bradley MS CCC-SLP  3                           Jemma Bradley MS CCC-BRENDA  12/14/2022

## 2022-12-14 NOTE — PROGRESS NOTES
Inpatient Rehabilitation Plan of Care Note    Plan of Care  Care Plan Reviewed - No updates at this time.    Psychosocial    [RN] Coping/Adjustment(Active)  Current Status(12/14/2022): At risk for poor coping d/t recent medical  conditions.  Weekly Goal(12/24/2022): Identify progress in functional status.  Discharge Goal: Demonstrates healthy coping strategies.    Performed Intervention(s)  Support/peer groups.  Verbalizes needs and concerns.  Medication.      Safety    [RN] Potential for Injury(Active)  Current Status(12/14/2022): At risk for falls d/t history of falls. 12/10 Pt  found on floor in BR, states she was trying to pull her pants up, did not call  for assist like she said she would. No apparent injury. Blue armband placed.  Weekly Goal(12/20/2022): Pt will use call light, no further attempts to transfer  self.  Discharge Goal: Patient and family will be aware of risk of fall and safety in  home setting.    Performed Intervention(s)  Items w/i reach.  Safety rounds.  Bed and chair alarm. Blue armband  Falls precautions.      Sphincter Control    [RN] Bladder Management(Active)  Current Status(12/14/2022): Continent of bladder. Occasional urge incont.  Weekly Goal(12/24/2022): Remain continent of bladder.  Discharge Goal: Goes home continent of bladder.    [RN] Bowel Management(Active)  Current Status(12/14/2022): Continent of bowel.  Weekly Goal(12/20/2022): Remain continent of bowel.  Discharge Goal: Go home continent of bowel.    Performed Intervention(s)  Offer restroom during hourly rounding.  Encourage fluid intake.  Monitor Is and Os.  Timed voids.  Encourage appropriate diet.    Signed by: Ashley Sanders RN

## 2022-12-14 NOTE — PROGRESS NOTES
Recreational Therapy Note    Patient Name: Christie Avendaño   MRN: [id number]    Therapeutic Recreation Eval and Treat (last 12 hours)     Therapeutic Recreation Eval & Treat     Row Name 12/14/22 1400       Therapeutic Recreation Participation    Recreation Therapy Participation games  -    Games card games  Phase 10  -    Objectives of Recreation Participation increase;motivation and activity level through successful participation;positive attitudes leading to a healthy leisure lifestyle  -    Comment, Recreation Participation Min-mod cues;used L hand to  cards;slow processing  -    Recreation Therapy Summary of Participation active participation  -          User Key  (r) = Recorded By, (t) = Taken By, (c) = Cosigned By    Initials Name Provider Type    SS Jemma Spencer, CTRS Recreational Therapist                  PAULA Siu  12/14/2022

## 2022-12-14 NOTE — NURSING NOTE
Oncology called and sending transport to get pt for radiation at 0730. Yessi ESTEBAN and pt  aware

## 2022-12-14 NOTE — THERAPY TREATMENT NOTE
Inpatient Rehabilitation - Occupational Therapy Treatment Note    [facility]     Patient Name: Christie Avendaño  : 1965  MRN: [id number]    Today's Date: 2022                 Admit Date: 2022         ICD-10-CM ICD-9-CM   1. Impaired functional mobility, balance, gait, and endurance  Z74.09 V49.89   2. 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)   • Non-small cell lung cancer, right (HCC)   • Renal mass, right   • SIRS (systemic inflammatory response syndrome) (HCC)   • Hyperlipidemia   • Malignant melanoma of right lower extremity including hip (HCC)   • High grade glioma    • 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)   • Glioma (HCC)       Past Medical History:   Diagnosis Date   • Arthritis    • Asthma    • BRCA2 positive    • Diabetes mellitus (HCC)    • H/O Liver masses 2017    x3   • H/O Lung masses 2017    1 in right lower lobe and 1 in the left infrahilar location   • H/O Ovarian cyst    • H/O Right adrenal mass (CMS/HCC) 2017   • Lung cancer (HCC)    • Melanoma (HCC)     right foot   • PONV (postoperative nausea and vomiting)    • Thyroid disease        Past Surgical History:   Procedure Laterality Date   • APPENDECTOMY     • BRAIN BIOPSY Right 2022    Procedure: Right frontal stereotactic needle brain biopsy;  Surgeon: Manuel Thompson MD;  Location: [facility];  Service: Neurosurgery;  Laterality: Right;   • BREAST IMPLANT SURGERY Bilateral    • CHOLECYSTECTOMY     • HYSTERECTOMY     • MASTECTOMY  Bilateral    • OVARIAN CYST SURGERY     • THORACOSCOPY VIDEO ASSISTED WITH LOBECTOMY Right 10/9/2020    Procedure: BRONCHOSCOPY, THORACOSCOPY VIDEO ASSISTED WITH ANTERIOR BASAL SEGMENTECTOMY, INTERCOSTAL NERVE BLOCK;  Surgeon: Flaca Crisostomo MD;  Location: [facility] OR;  Service: Thoracic;  Laterality: Right;   • TONSILLECTOMY               IRF OT ASSESSMENT FLOWSHEET (last 12 hours)     IRF OT Evaluation and Treatment     Row Name 12/14/22 1400 12/14/22 1211       OT Time and Intention    Document Type -- daily treatment  -CC    Mode of Treatment -- occupational therapy  -CC    Patient Effort -- good  -CC    Symptoms Noted During/After Treatment -- fatigue  -CC    Evaluation/Treatment Not Performed patient/family refused  nursing put ot back to bed and pt refused to get back up.  -CC --    Row Name 12/14/22 1211          Pain Assessment    Pretreatment Pain Rating 0/10 - no pain  -CC     Posttreatment Pain Rating 0/10 - no pain  -CC     Row Name 12/14/22 1211          Cognition/Psychosocial    Affect/Mental Status (Cognition) flat/blunted affect  -     Orientation Status (Cognition) oriented x 4  -CC     Follows Commands (Cognition) follows one-step commands;delayed response/completion;repetition of directions required;increased processing time needed  -CC     Comment, Cognition difficulty sequencing 3 step craft tasks w visual pattern present.  -     Row Name 12/14/22 1211          Bed Mobility    Rolling Left Snyder (Bed Mobility) standby assist  -     Supine-Sit Snyder (Bed Mobility) minimum assist (75% patient effort);contact guard  leaned to L  -     Row Name 12/14/22 1211          Bed-Chair Transfer    Bed-Chair Snyder (Transfers) contact guard;minimum assist (75% patient effort)  -     Assistive Device (Bed-Chair Transfers) wheelchair  -CC     Row Name 12/14/22 1211          Motor Skills    Coordination fine motor deficit;left;upper extremity  min difficulty w using craft  needle and threading felt pieces for a [place] pattern craft.  -CC     Row Name 12/14/22 1211          Balance    Static Sitting Balance minimal assist  seated EOB; Leaned to L and unable to self corect  -CC     Static Standing Balance minimal assist;contact guard  -CC     Row Name 12/14/22 1211          Positioning and Restraints    Pre-Treatment Position in bed  -CC     Post Treatment Position wheelchair  -CC     In Wheelchair sitting;with PT;exit alarm on  -           User Key  (r) = Recorded By, (t) = Taken By, (c) = Cosigned By    Initials Name Effective Dates    CC Yissel Goel, OTR 06/16/21 -                  Occupational Therapy Education     Title: PT OT SLP Therapies (In Progress)     Topic: Occupational Therapy (Done)     Point: ADL training (Done)     Description:   Instruct learner(s) on proper safety adaptation and remediation techniques during self care or transfers.   Instruct in proper use of assistive devices.              Learning Progress Summary           Patient Acceptance, E, VU by WN at 12/12/2022 2327    Acceptance, E, VU by WN at 12/12/2022 0155    Acceptance, E, VU by CB at 12/8/2022 1204    Acceptance, E, VU,NR by CE at 12/8/2022 1107    Comment: fall prevention, DME including w/c and shower chair                   Point: Home exercise program (Done)     Description:   Instruct learner(s) on appropriate technique for monitoring, assisting and/or progressing therapeutic exercises/activities.              Learning Progress Summary           Patient Acceptance, E, VU by WN at 12/12/2022 2327    Acceptance, E, VU by WN at 12/12/2022 0155    Acceptance, E, VU by CB at 12/8/2022 1204    Acceptance, E, VU,NR by CE at 12/8/2022 1107    Comment: fall prevention, DME including w/c and shower chair                   Point: Precautions (Done)     Description:   Instruct learner(s) on prescribed precautions during self-care and functional transfers.              Learning Progress Summary            Patient Acceptance, E, VU by WN at 12/12/2022 2327    Acceptance, E, VU by WN at 12/12/2022 0155    Acceptance, E, VU by CB at 12/8/2022 1204    Acceptance, E, VU,NR by CE at 12/8/2022 1107    Comment: fall prevention, DME including w/c and shower chair                   Point: Body mechanics (Done)     Description:   Instruct learner(s) on proper positioning and spine alignment during self-care, functional mobility activities and/or exercises.              Learning Progress Summary           Patient Acceptance, E, VU by WN at 12/12/2022 2327    Acceptance, E, VU by WN at 12/12/2022 0155    Acceptance, E, VU by CB at 12/8/2022 1204    Acceptance, E, VU,NR by CE at 12/8/2022 1107    Comment: fall prevention, DME including w/c and shower chair                               User Key     Initials Effective Dates Name Provider Type Discipline     08/23/22 -  Doc Park, RN Registered Nurse Nurse     11/10/22 -  Mackenzie Hopkins CCC-SLP Speech and Language Pathologist SLP    CE 10/17/22 -  Portia Esqueda OT Occupational Therapist OT                    OT Recommendation and Plan                         Time Calculation:      Time Calculation- OT     Row Name 12/14/22 0900             Time Calculation- OT    OT Start Time 0900  -CC      OT Stop Time 0930  -CC      OT Time Calculation (min) 30 min  -CC            User Key  (r) = Recorded By, (t) = Taken By, (c) = Cosigned By    Initials Name Provider Type     Yissel Goel OTR Occupational Therapist              Therapy Charges for Today     Code Description Service Date Service Provider Modifiers Qty    93326843842 HC OT SELF CARE/MGMT/TRAIN EA 15 MIN 12/13/2022 Yissel Goel OTR GO 2    74722080786 HC OT NEUROMUSC RE EDUCATION EA 15 MIN 12/13/2022 Yissel Goel OTR GO 2    46865109741 HC OT NEUROMUSC RE EDUCATION EA 15 MIN 12/14/2022 Yissel Goel OTR GO 1    05721802106 HC OT THERAPEUTIC ACT EA 15 MIN 12/14/2022 Yissel Goel OTR GO 1                    Yissel Goel, OTR  12/14/2022

## 2022-12-15 ENCOUNTER — TREATMENT (OUTPATIENT)
Dept: RADIATION ONCOLOGY | Facility: HOSPITAL | Age: 57
End: 2022-12-15

## 2022-12-15 LAB
ALBUMIN SERPL-MCNC: 3.9 G/DL (ref 3.5–5.2)
ALBUMIN/GLOB SERPL: 2 G/DL
ALP SERPL-CCNC: 158 U/L (ref 39–117)
ALT SERPL W P-5'-P-CCNC: 321 U/L (ref 1–33)
ANION GAP SERPL CALCULATED.3IONS-SCNC: 7 MMOL/L (ref 5–15)
AST SERPL-CCNC: 52 U/L (ref 1–32)
BILIRUB SERPL-MCNC: 0.7 MG/DL (ref 0–1.2)
BUN SERPL-MCNC: 25 MG/DL (ref 6–20)
BUN/CREAT SERPL: 32.9 (ref 7–25)
CALCIUM SPEC-SCNC: 8.8 MG/DL (ref 8.6–10.5)
CHLORIDE SERPL-SCNC: 93 MMOL/L (ref 98–107)
CO2 SERPL-SCNC: 28 MMOL/L (ref 22–29)
CREAT SERPL-MCNC: 0.76 MG/DL (ref 0.57–1)
DEPRECATED RDW RBC AUTO: 45.4 FL (ref 37–54)
EGFRCR SERPLBLD CKD-EPI 2021: 91 ML/MIN/1.73
ERYTHROCYTE [DISTWIDTH] IN BLOOD BY AUTOMATED COUNT: 16.2 % (ref 12.3–15.4)
GLOBULIN UR ELPH-MCNC: 2 GM/DL
GLUCOSE BLDC GLUCOMTR-MCNC: 149 MG/DL (ref 70–130)
GLUCOSE BLDC GLUCOMTR-MCNC: 196 MG/DL (ref 70–130)
GLUCOSE BLDC GLUCOMTR-MCNC: 198 MG/DL (ref 70–130)
GLUCOSE BLDC GLUCOMTR-MCNC: 89 MG/DL (ref 70–130)
GLUCOSE SERPL-MCNC: 156 MG/DL (ref 65–99)
HBA1C MFR BLD: 7.7 % (ref 4.8–5.6)
HCT VFR BLD AUTO: 38.2 % (ref 34–46.6)
HGB BLD-MCNC: 12.7 G/DL (ref 12–15.9)
MCH RBC QN AUTO: 27.3 PG (ref 26.6–33)
MCHC RBC AUTO-ENTMCNC: 33.2 G/DL (ref 31.5–35.7)
MCV RBC AUTO: 82 FL (ref 79–97)
OSMOLALITY SERPL: 280 MOSM/KG (ref 275–295)
OSMOLALITY UR: 495 MOSM/KG (ref 300–800)
PLATELET # BLD AUTO: 181 10*3/MM3 (ref 140–450)
PMV BLD AUTO: 10.2 FL (ref 6–12)
POTASSIUM SERPL-SCNC: 4.6 MMOL/L (ref 3.5–5.2)
PROT SERPL-MCNC: 5.9 G/DL (ref 6–8.5)
RAD ONC ARIA COURSE ID: NORMAL
RAD ONC ARIA COURSE INTENT: NORMAL
RAD ONC ARIA COURSE LAST TREATMENT DATE: NORMAL
RAD ONC ARIA COURSE START DATE: NORMAL
RAD ONC ARIA COURSE TREATMENT ELAPSED DAYS: 3
RAD ONC ARIA FIRST TREATMENT DATE: NORMAL
RAD ONC ARIA PLAN FRACTIONS TREATED TO DATE: 4
RAD ONC ARIA PLAN ID: NORMAL
RAD ONC ARIA PLAN PRESCRIBED DOSE PER FRACTION: 2 GY
RAD ONC ARIA PLAN PRIMARY REFERENCE POINT: NORMAL
RAD ONC ARIA PLAN TOTAL FRACTIONS PRESCRIBED: 23
RAD ONC ARIA PLAN TOTAL PRESCRIBED DOSE: 4600 CGY
RAD ONC ARIA REFERENCE POINT DOSAGE GIVEN TO DATE: 8 GY
RAD ONC ARIA REFERENCE POINT DOSAGE GIVEN TO DATE: 8.1 GY
RAD ONC ARIA REFERENCE POINT ID: NORMAL
RAD ONC ARIA REFERENCE POINT ID: NORMAL
RAD ONC ARIA REFERENCE POINT SESSION DOSAGE GIVEN: 2 GY
RAD ONC ARIA REFERENCE POINT SESSION DOSAGE GIVEN: 2.02 GY
RBC # BLD AUTO: 4.66 10*6/MM3 (ref 3.77–5.28)
SODIUM SERPL-SCNC: 128 MMOL/L (ref 136–145)
SODIUM UR-SCNC: 80 MMOL/L
WBC NRBC COR # BLD: 12.4 10*3/MM3 (ref 3.4–10.8)
WHOLE BLOOD HOLD SPECIMEN: NORMAL
WHOLE BLOOD HOLD SPECIMEN: NORMAL

## 2022-12-15 PROCEDURE — 63710000001 ONDANSETRON PER 8 MG: Performed by: INTERNAL MEDICINE

## 2022-12-15 PROCEDURE — 80053 COMPREHEN METABOLIC PANEL: CPT | Performed by: PHYSICAL MEDICINE & REHABILITATION

## 2022-12-15 PROCEDURE — 77386: CPT | Performed by: STUDENT IN AN ORGANIZED HEALTH CARE EDUCATION/TRAINING PROGRAM

## 2022-12-15 PROCEDURE — 77386 CHG INTENSITY MODULATED RADIATION TX DLVR COMPLEX: CPT | Performed by: STUDENT IN AN ORGANIZED HEALTH CARE EDUCATION/TRAINING PROGRAM

## 2022-12-15 PROCEDURE — 77014 CHG CT GUIDANCE RADIATION THERAPY FLDS PLACEMENT: CPT | Performed by: STUDENT IN AN ORGANIZED HEALTH CARE EDUCATION/TRAINING PROGRAM

## 2022-12-15 PROCEDURE — 25010000002 ENOXAPARIN PER 10 MG: Performed by: PHYSICAL MEDICINE & REHABILITATION

## 2022-12-15 PROCEDURE — 84300 ASSAY OF URINE SODIUM: CPT | Performed by: INTERNAL MEDICINE

## 2022-12-15 PROCEDURE — 97110 THERAPEUTIC EXERCISES: CPT

## 2022-12-15 PROCEDURE — 82962 GLUCOSE BLOOD TEST: CPT

## 2022-12-15 PROCEDURE — 63710000001 DEXAMETHASONE PER 0.25 MG: Performed by: PHYSICAL MEDICINE & REHABILITATION

## 2022-12-15 PROCEDURE — 83930 ASSAY OF BLOOD OSMOLALITY: CPT | Performed by: INTERNAL MEDICINE

## 2022-12-15 PROCEDURE — 83036 HEMOGLOBIN GLYCOSYLATED A1C: CPT | Performed by: INTERNAL MEDICINE

## 2022-12-15 PROCEDURE — 97130 THER IVNTJ EA ADDL 15 MIN: CPT

## 2022-12-15 PROCEDURE — 97129 THER IVNTJ 1ST 15 MIN: CPT

## 2022-12-15 PROCEDURE — 63710000001 INSULIN LISPRO (HUMAN) PER 5 UNITS: Performed by: HOSPITALIST

## 2022-12-15 PROCEDURE — 85027 COMPLETE CBC AUTOMATED: CPT | Performed by: PHYSICAL MEDICINE & REHABILITATION

## 2022-12-15 PROCEDURE — 97535 SELF CARE MNGMENT TRAINING: CPT

## 2022-12-15 PROCEDURE — 63710000001 INSULIN LISPRO (HUMAN) PER 5 UNITS: Performed by: PHYSICAL MEDICINE & REHABILITATION

## 2022-12-15 PROCEDURE — 25010000002 TEMOZOLOMIDE PER 5 MG: Performed by: INTERNAL MEDICINE

## 2022-12-15 PROCEDURE — 97530 THERAPEUTIC ACTIVITIES: CPT

## 2022-12-15 PROCEDURE — 97112 NEUROMUSCULAR REEDUCATION: CPT

## 2022-12-15 PROCEDURE — 99232 SBSQ HOSP IP/OBS MODERATE 35: CPT | Performed by: INTERNAL MEDICINE

## 2022-12-15 PROCEDURE — 83935 ASSAY OF URINE OSMOLALITY: CPT | Performed by: INTERNAL MEDICINE

## 2022-12-15 PROCEDURE — 25010000002 TEMOZOLOMIDE 140 MG CAPSULE: Performed by: INTERNAL MEDICINE

## 2022-12-15 RX ADMIN — INSULIN LISPRO 8 UNITS: 100 INJECTION, SOLUTION INTRAVENOUS; SUBCUTANEOUS at 07:50

## 2022-12-15 RX ADMIN — LISINOPRIL 20 MG: 20 TABLET ORAL at 07:49

## 2022-12-15 RX ADMIN — LEVOTHYROXINE SODIUM 150 MCG: 0.15 TABLET ORAL at 05:41

## 2022-12-15 RX ADMIN — PANTOPRAZOLE SODIUM 40 MG: 40 TABLET, DELAYED RELEASE ORAL at 05:41

## 2022-12-15 RX ADMIN — GABAPENTIN 100 MG: 100 CAPSULE ORAL at 15:01

## 2022-12-15 RX ADMIN — DEXAMETHASONE 4 MG: 4 TABLET ORAL at 21:42

## 2022-12-15 RX ADMIN — CALCIUM CARBONATE-VITAMIN D TAB 500 MG-200 UNIT 1 TABLET: 500-200 TAB at 07:49

## 2022-12-15 RX ADMIN — INSULIN LISPRO 8 UNITS: 100 INJECTION, SOLUTION INTRAVENOUS; SUBCUTANEOUS at 11:51

## 2022-12-15 RX ADMIN — GABAPENTIN 100 MG: 100 CAPSULE ORAL at 21:42

## 2022-12-15 RX ADMIN — ESCITALOPRAM 10 MG: 10 TABLET, FILM COATED ORAL at 07:49

## 2022-12-15 RX ADMIN — TEMOZOLOMIDE 140 MG: 140 CAPSULE ORAL at 21:43

## 2022-12-15 RX ADMIN — GABAPENTIN 100 MG: 100 CAPSULE ORAL at 07:50

## 2022-12-15 RX ADMIN — ENOXAPARIN SODIUM 40 MG: 100 INJECTION SUBCUTANEOUS at 11:52

## 2022-12-15 RX ADMIN — TEMOZOLOMIDE 20 MG: 20 CAPSULE ORAL at 21:43

## 2022-12-15 RX ADMIN — VALACYCLOVIR HYDROCHLORIDE 500 MG: 500 TABLET, FILM COATED ORAL at 07:49

## 2022-12-15 RX ADMIN — ENOXAPARIN SODIUM 40 MG: 100 INJECTION SUBCUTANEOUS at 21:42

## 2022-12-15 RX ADMIN — MAGNESIUM OXIDE 400 MG (241.3 MG MAGNESIUM) TABLET 400 MG: TABLET at 07:49

## 2022-12-15 RX ADMIN — CALCIUM CARBONATE-VITAMIN D TAB 500 MG-200 UNIT 1 TABLET: 500-200 TAB at 21:42

## 2022-12-15 RX ADMIN — INSULIN LISPRO 8 UNITS: 100 INJECTION, SOLUTION INTRAVENOUS; SUBCUTANEOUS at 17:14

## 2022-12-15 RX ADMIN — TEMOZOLOMIDE 5 MG: 5 CAPSULE ORAL at 21:43

## 2022-12-15 RX ADMIN — LEVETIRACETAM 500 MG: 500 TABLET, FILM COATED ORAL at 07:49

## 2022-12-15 RX ADMIN — DEXAMETHASONE 4 MG: 4 TABLET ORAL at 14:58

## 2022-12-15 RX ADMIN — LEVETIRACETAM 500 MG: 500 TABLET, FILM COATED ORAL at 21:42

## 2022-12-15 RX ADMIN — INSULIN GLARGINE-YFGN 28 UNITS: 100 INJECTION, SOLUTION SUBCUTANEOUS at 07:52

## 2022-12-15 RX ADMIN — LATANOPROST 1 DROP: 50 SOLUTION OPHTHALMIC at 21:43

## 2022-12-15 RX ADMIN — POLYETHYLENE GLYCOL 3350 17 G: 17 POWDER, FOR SOLUTION ORAL at 07:49

## 2022-12-15 RX ADMIN — INSULIN LISPRO 3 UNITS: 100 INJECTION, SOLUTION INTRAVENOUS; SUBCUTANEOUS at 11:51

## 2022-12-15 RX ADMIN — ONDANSETRON HYDROCHLORIDE 8 MG: 8 TABLET, FILM COATED ORAL at 11:54

## 2022-12-15 RX ADMIN — DEXAMETHASONE 4 MG: 4 TABLET ORAL at 05:41

## 2022-12-15 NOTE — PROGRESS NOTES
REASON FOR FOLLOWUP/CHIEF COMPLAINT:  High-grade glioma    HISTORY OF PRESENT ILLNESS:   No new problems overnight.  Denies nausea.  No new neurological symptoms.  States rehab is going well    Past Medical History, Past Surgical History, Social History, Family History have been reviewed and are without significant changes except as mentioned.    Review of Systems   Review of Systems   Constitutional: Negative for activity change.   HENT: Negative for nosebleeds and trouble swallowing.    Respiratory: Negative for shortness of breath and wheezing.    Cardiovascular: Negative for chest pain and palpitations.   Gastrointestinal: Negative for constipation, diarrhea and nausea.   Genitourinary: Negative for dysuria and hematuria.   Musculoskeletal: Negative for arthralgias and myalgias.   Skin: Negative for rash and wound.   Neurological: Negative for seizures and syncope.   Hematological: Negative for adenopathy. Does not bruise/bleed easily.   Psychiatric/Behavioral: Negative for confusion.       Medications:  The current medication list was reviewed in the EMR    ALLERGIES:    Allergies   Allergen Reactions   • Morphine Anaphylaxis     Hives and throat swelling   • Peach [Prunus Persica] Anaphylaxis   • Penicillins Anaphylaxis   • Metformin Diarrhea              Vitals:    12/14/22 0500 12/14/22 1213 12/14/22 1900 12/15/22 0500   BP: 116/74 105/51 99/63 111/71   BP Location: Left arm Left arm Left arm Left arm   Patient Position: Lying Sitting Lying Lying   Pulse: 66 70 67 69   Resp: 17 20 18 17   Temp: 98 °F (36.7 °C) 97.9 °F (36.6 °C) 97.7 °F (36.5 °C) 97.5 °F (36.4 °C)   TempSrc: Oral Oral Oral Oral   SpO2: 98% 97% 94% 97%   Weight:       Height:         Physical Exam    CONSTITUTIONAL:  Vital signs reviewed.  No distress, looks comfortable.  EYES:  Conjunctivae and lids unremarkable.  PERRLA  EARS, NOSE, MOUTH, THROAT:  Ears and nose appear unremarkable.  Lips, teeth, gums appear  unremarkable.  RESPIRATORY:  Normal respiratory effort.  Lungs clear to auscultation bilaterally.  CARDIOVASCULAR:  Normal S1, S2.  No murmurs, rubs or gallops.  No significant lower extremity edema.  GASTROINTESTINAL: Abdomen appears unremarkable.  Nontender.  No hepatomegaly.  No splenomegaly.  NEURO: Cranial nerves 2-12 grossly intact.  No focal deficits.  Appears to have equal strength all 4 extremities.  MUSCULOSKELETAL:  Unremarkable digits/nails.  No cyanosis or clubbing.  SKIN:  Warm.  No rashes.  PSYCHIATRIC:  Normal judgment and insight.  Normal mood and affect.        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  Intact MGMT expression  • Patient presented with recurrent falls and confusion.  • MRI on 11/22/2022 revealed a 3.2 cm ring-enhancing right supratentorial mass.  There was mass-effect and left midline shift.  • She was started on dexamethasone and Keppra.  • CT chest abdomen pelvis on 11/24/2022 revealed no evidence of progressive metastatic disease.  • S/p stereotactic brain biopsy on 11/28/2022.  • Preliminary pathology revealed high-grade glioma.  It was sent to [facility].  • Preliminary revealed high-grade glioma.  • Patient had radiation simulation on 12/2/2022.  • Plan is to start concurrent low-dose Temodar at 75 mg/m2 daily.   • She is on Decadron 4 mg p.o. every 8 hours.  • Plan is to start Radiation with concurrent Temodar on 12/12/2022.  • Temodar does not require dose reduction due to the elevated liver enzymes.   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  This is associated with a worse prognosis and relative resistance to  alkylating chemotherapy such as temozolomide.  However, temozolomide and radiation remain the recommended treatments.  • Today is day 4 of Temodar radiation.  No clinical signs of progression.     *Germline BRCA2 and GEORGES mutations.  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  • Please see the note from Dr. Collins, the patient's outpatient oncologist from 12/4/2022.     *Seizure prophylaxis.  • Patient is on Keppra 500 mg p.o. twice daily.  • Patient is not having any issues seizures.     *Elevated liver enzymes.  • ALT was 143 and AST was 71 on 11/28/2022.  • ALT was 1049 and AST was 386 on 12/8/2022.  • Hepatic duplex, 12/12/2022 normal.  • LFTs continuing to improve    *Hyponatremia.  Sodium dropping.  Defer to hospitalist service     PLAN:  · Temodar and radiation started 12/12/2022.  · Has follow-up arranged in the office with Dr. Collins on 1/4/2023 and 2/22/2023 and CT CAP 1 week prior.  Patient states the hope is to be discharged from rehab before Beth.     Following periodically.  Reviewed Dr. Marr's note, continuing treatments.

## 2022-12-15 NOTE — THERAPY TREATMENT NOTE
Inpatient Rehabilitation - Speech Language Pathology Treatment Note    [facility]     Patient Name: Christie Avendaño  : 1965  MRN: [id number]    Today's Date: 12/15/2022                   Admit Date: 2022       Visit Dx:      ICD-10-CM ICD-9-CM   1. Impaired functional mobility, balance, gait, and endurance  Z74.09 V49.89   2. 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)   • Non-small cell lung cancer, right (HCC)   • Renal mass, right   • SIRS (systemic inflammatory response syndrome) (HCC)   • Hyperlipidemia   • Malignant melanoma of right lower extremity including hip (HCC)   • High grade glioma    • 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)   • Glioma (HCC)       Past Medical History:   Diagnosis Date   • Arthritis    • Asthma    • BRCA2 positive    • Diabetes mellitus (HCC)    • H/O Liver masses 2017    x3   • H/O Lung masses 2017    1 in right lower lobe and 1 in the left infrahilar location   • H/O Ovarian cyst    • H/O Right adrenal mass (CMS/HCC) 2017   • Lung cancer (HCC)    • Melanoma (HCC)     right foot   • PONV (postoperative nausea and vomiting)    • Thyroid disease        Past Surgical History:   Procedure Laterality Date   • APPENDECTOMY     • BRAIN BIOPSY Right 2022    Procedure: Right frontal stereotactic needle brain biopsy;  Surgeon: Manuel Thompson MD;  Location: [facility] MAIN OR;  Service: Neurosurgery;  Laterality: Right;   • BREAST IMPLANT SURGERY Bilateral    • CHOLECYSTECTOMY     • HYSTERECTOMY      • MASTECTOMY Bilateral    • OVARIAN CYST SURGERY     • THORACOSCOPY VIDEO ASSISTED WITH LOBECTOMY Right 10/9/2020    Procedure: BRONCHOSCOPY, THORACOSCOPY VIDEO ASSISTED WITH ANTERIOR BASAL SEGMENTECTOMY, INTERCOSTAL NERVE BLOCK;  Surgeon: Flaca Crisostomo MD;  Location: [facility] OR;  Service: Thoracic;  Laterality: Right;   • TONSILLECTOMY       Patient was wearing a face mask during this therapy encounter. Therapist used appropriate personal protective equipment including mask, eye protection and gloves.  Mask used was standard procedure mask. Appropriate PPE was worn during the entire therapy session. Hand hygiene was completed before and after therapy session. Patient is not in enhanced droplet precautions.             SLP Recommendation and Plan                                                            SLP EVALUATION (last 72 hours)     SLP SLC Evaluation     Row Name 12/15/22 1300 12/14/22 1008 12/13/22 1000             Communication Assessment/Intervention    Document Type therapy note (daily note)  -NR therapy note (daily note)  -SL therapy note (daily note)  -SL      Subjective Information no complaints  -NR no complaints  -SL no complaints  -SL      Patient Observations alert;cooperative;agree to therapy  -NR alert;cooperative;agree to therapy  -SL alert;cooperative;agree to therapy  -SL      Patient Effort good  -NR good  -SL good  -SL      Symptoms Noted During/After Treatment none  -NR none  -SL none  -SL         General Information    Patient Profile Reviewed yes  -NR -- --         Pain Scale: Numbers Pre/Post-Treatment    Pretreatment Pain Rating 0/10 - no pain  -NR -- --      Posttreatment Pain Rating 0/10 - no pain  -NR -- --         Memory Skills Goal 1 (SLP)    Improve Memory Skills Through Goal 1 (SLP) visual memory task  -NR -- --      Time Frame (Memory Skills Goal 1, SLP) by discharge  -NR -- --      Progress (Memory Skills Goal 1, SLP) 80%;independently (over 90% accuracy);100%;with  moderate cues (50-74%)  -NR -- --      Progress/Outcomes (Memory Skills Goal 1, SLP) goal ongoing  -NR -- --      Comment (Memory Skills Goal 1, SLP) Able to answer 8/10(80%) immediate memory questions over pictured scene, increased to 10/10 (100%) with moderate verbal/visual cues.  -NR -- --         Organizational Skills Goal 1 (SLP)    Improve Thought Organization Through Goal 1 (SLP) concrete  -NR -- --      Time Frame (Thought Organization Skills Goal 1, SLP) by discharge  -NR -- --      Progress (Thought Organization Skills Goal 1, SLP) 60%;100%;independently (over 90% accuracy);with moderate cues (50-74%)  -NR -- --      Progress/Outcomes (Thought Organization Skills Goal 1, SLP) goal ongoing  -NR -- --      Comment (Thought Organization Skills Goal 1, SLP) Able to organize 15 words into three concrete categories with 9/15 (60%), increased to 10/10 (100%) with mod cues.  -NR -- --         Right Hemisphere Function Goal 1 (SLP)    Improve Right Hemisphere Function Through Goal 1 (SLP) complete visuo-spatial activities (visual closure, trail making, mazes  -NR -- --      Time Frame (Right Hemisphere Function Goal 1, SLP) by discharge  -NR -- --      Progress (Right Hemisphere Function Goal 1, SLP) 80%;100%;with minimal cues (75-90%);independently (over 90% accuracy)  -NR -- --      Progress/Outcomes (Right Hemisphere Function Goal 1, SLP) goal ongoing  -NR -- --      Comment (Right Hemisphere Function Goal 1, SLP) followed two step written directions with 8/20 (80%) I'ly, increased to 10/10 (100%) with min cues.  -NR -- --            User Key  (r) = Recorded By, (t) = Taken By, (c) = Cosigned By    Initials Name Effective Dates    SL Jemma Bradley, MS CCC-SLP 06/16/21 -     NR Carmina Cruz MA,CCC-SLP 06/16/21 -                    EDUCATION    The patient has been educated in the following areas:       Cognitive Impairment Communication Impairment.             SLP GOALS     Row Name 12/15/22 1300 12/14/22 1008  12/13/22 1000       Attention Goal 1 (SLP)    Progress/Outcomes (Attention Goal 1, SLP) -- -- goal ongoing  -    Comment (Attention Goal 1, SLP) -- -- simple word altering task- letter altering ( only 4 steps)- 88% indep  -SL       Memory Skills Goal 1 (SLP)    Improve Memory Skills Through Goal 1 (SLP) visual memory task  -NR -- --    Time Frame (Memory Skills Goal 1, SLP) by discharge  -NR -- --    Progress (Memory Skills Goal 1, SLP) 80%;independently (over 90% accuracy);100%;with moderate cues (50-74%)  -NR -- --    Progress/Outcomes (Memory Skills Goal 1, SLP) goal ongoing  -NR goal ongoing  -SL goal ongoing  -SL    Comment (Memory Skills Goal 1, SLP) Able to answer 8/10(80%) immediate memory questions over pictured scene, increased to 10/10 (100%) with moderate verbal/visual cues.  -NR 24 item hidden picture matching task-48% indep; 24 item hidden face matching task- 25% indep  -SL recall of new advertisements- 47% indep;  visual recall- details from picture scenes- 90% indep after 5 min review  -SL       Organizational Skills Goal 1 (SLP)    Improve Thought Organization Through Goal 1 (SLP) concrete  -NR -- --    Time Frame (Thought Organization Skills Goal 1, SLP) by discharge  -NR -- --    Progress (Thought Organization Skills Goal 1, SLP) 60%;100%;independently (over 90% accuracy);with moderate cues (50-74%)  -NR -- --    Progress/Outcomes (Thought Organization Skills Goal 1, SLP) goal ongoing  -NR -- goal ongoing  -SL    Comment (Thought Organization Skills Goal 1, SLP) Able to organize 15 words into three concrete categories with 9/15 (60%), increased to 10/10 (100%) with mod cues.  -NR -- word organizaiton task- filling in missing letters in words, given categories- 60% indep  -SL       Reasoning Goal 1 (SLP)    Progress/Outcomes (Reasoning Goal 1, SLP) -- -- goal ongoing  -    Comment (Reasoning Goal 1, SLP) -- -- making inferences from short stories- 60% indep  -SL       Functional Problem Solving  Skills Goal 1 (SLP)    Progress/Outcomes (Problem Solving Goal 1, SLP) -- goal ongoing  -SL --    Comment (Problem Solving Goal 1, SLP) -- chart completion based on multiple sentence clues - kitchen shelf organizaiton- somewhat impulsive with resposnes, decreased attn to details noted; impaired working memory- needed multiple reminders and redirection as to task directives ( 1 item perbox on chart)- 40% indep- accurate performance required min /mod cues  -SL --       Functional Problem Solving Skills Goal 2 (SLP)    Improve Problem Solving Through Goal 2 (SLP) -- sequence steps in a task;complete organization/home management task  -SL --    Progress/Outcomes (Problem Solving Goal 2, SLP) -- goal ongoing  -SL --    Comment (Problem Solving Goal 2, SLP) -- sequencing of 6 basic steps for ADL activities- intermittent slef review/correction noted- 66% indep and 100% with min cues;   Additional tsak- following 2 step written directives and marking row of stimulus words accordingly- 60% indep; decreased attn to specifics at tiems, and intermittently marked some but not all of the stimulus words  -SL --       Functional Math Skills Goal 1 (SLP)    Progress/Outcomes (Functional Math Skills Goal 1, SLP) -- goal ongoing  -SL goal ongoing  -SL    Comment (Functional Math Skills Goal 1, SLP) -- determining denomination amounts- 38% indep;  attn to details- with min cues - 100%  -SL simple math word problems - use of paper and pencil to assist with calculations- 20% indep; mod cues fro task  -SL       Right Hemisphere Function Goal 1 (SLP)    Improve Right Hemisphere Function Through Goal 1 (SLP) complete visuo-spatial activities (visual closure, trail making, mazes  -NR -- complete visuo-spatial activities (visual closure, trail making, mazes;complete visuo-perceptual activities (L/R discrimination, spatial concepts);use compensatory strategies for left neglect;90%;independently (over 90% accuracy)  -    Time Frame  (Right Hemisphere Function Goal 1, SLP) by discharge  -NR -- by discharge  -SL    Progress (Right Hemisphere Function Goal 1, SLP) 80%;100%;with minimal cues (75-90%);independently (over 90% accuracy)  -NR -- --    Progress/Outcomes (Right Hemisphere Function Goal 1, SLP) goal ongoing  -NR goal ongoing  -SL continuing progress toward goal;goal ongoing  -SL    Comment (Right Hemisphere Function Goal 1, SLP) followed two step written directions with 8/20 (80%) I'ly, increased to 10/10 (100%) with min cues.  -NR min visual and verbal cues for scanning to left side during ipad memory matching task  -SL scanning for target stimulus in 4 rows of letters - 100% without additional visual cues required  -SL          User Key  (r) = Recorded By, (t) = Taken By, (c) = Cosigned By    Initials Name Provider Type    SL Jemma Bradley MS CCC-SLP Speech and Language Pathologist    Carmina Rosado MA,CCC-SLP Speech and Language Pathologist                SLP Outcome Measures (last 72 hours)     SLP Outcome Measures     Row Name 12/15/22 1300             SLP Outcome Measures    Outcome Measure Used? Adult NOMS  -NR         Adult FCM Scores    FCM Chosen Problem Solving  -NR      Problem Solving Score FCM 3  -NR            User Key  (r) = Recorded By, (t) = Taken By, (c) = Cosigned By    Initials Name Effective Dates    NR Carmina Cruz MA,CCC-SLP 06/16/21 -                         Time Calculation:        Time Calculation- SLP     Row Name 12/15/22 1346 12/15/22 1345          Time Calculation- SLP    SLP Start Time 1200  -NR 1030  -NR     SLP Stop Time 1230  -NR 1100  -NR     SLP Time Calculation (min) 30 min  -NR 30 min  -NR     Total Timed Code Minutes- SLP 60 minute(s)  -NR --     SLP Received On 12/15/22  -NR 12/15/22  -NR           User Key  (r) = Recorded By, (t) = Taken By, (c) = Cosigned By    Initials Name Provider Type    NR Carmina Cruz MA,CCC-SLP Speech and Language Pathologist                  Therapy Charges for Today      Code Description Service Date Service Provider Modifiers Qty    05770694407  ST DEV OF COGN SKILLS INITIAL 15 MIN 12/15/2022 Carmina Cruz MA,CCC-SLP  1    [id number]  ST DEV OF COGN SKILLS EACH ADDT'L 15 MIN 12/15/2022 Carmina Cruz MA,CCC-SLP  3            ADULT NOMS (last 72 hours)     Adult NOMS     Row Name 12/15/22 1300                   Adult FCM Scores    FCM Chosen Problem Solving  -NR        Problem Solving Score FCM 3  -NR              User Key  (r) = Recorded By, (t) = Taken By, (c) = Cosigned By    Initials Name Effective Dates    NR Carmina Cruz MA,CCC-SLP 06/16/21 -                            Carmina Cruz MA,CCC-SLP  12/15/2022

## 2022-12-15 NOTE — PROGRESS NOTES
Name: Christie Avendaño ADMIT: 2022   : 1965  PCP: Tiffany Holloway MD    MRN: [id number] LOS: 8 days   AGE/SEX: 57 y.o. female  ROOM: [facility]     Subjective   Subjective     Patient is lying on the bed and does not appear in any major distress.  Denies nausea, vomiting, abdominal pain, chest pain, shortness of breath.       Objective   Objective   Vital Signs  Temp:  [97.5 °F (36.4 °C)-97.9 °F (36.6 °C)] 97.5 °F (36.4 °C)  Heart Rate:  [67-70] 69  Resp:  [17-20] 17  BP: ()/(51-71) 111/71  SpO2:  [94 %-97 %] 97 %  on   ;   Device (Oxygen Therapy): room air  Body mass index is 40.17 kg/m².  Physical Exam  Constitutional:       General: She is not in acute distress.  HENT:      Head: Normocephalic and atraumatic.      Mouth/Throat:      Mouth: Mucous membranes are moist.   Eyes:      Extraocular Movements: Extraocular movements intact.      Pupils: Pupils are equal, round, and reactive to light.   Cardiovascular:      Rate and Rhythm: Normal rate.      Pulses: Normal pulses.      Heart sounds: Normal heart sounds.   Pulmonary:      Effort: Pulmonary effort is normal.      Breath sounds: Normal breath sounds.   Abdominal:      General: Bowel sounds are normal. There is no distension.      Palpations: Abdomen is soft.      Tenderness: There is no abdominal tenderness.   Musculoskeletal:      Cervical back: Normal range of motion and neck supple.      Right lower leg: No edema.      Left lower leg: No edema.   Skin:     General: Skin is warm and dry.   Neurological:      General: No focal deficit present.      Mental Status: She is alert and oriented to person, place, and time.   Psychiatric:         Mood and Affect: Mood normal.         Behavior: Behavior normal.       Results Review     I reviewed the patient's new clinical results.  Results from last 7 days   Lab Units 12/15/22  0607 22  0555 22  0539   WBC 10*3/mm3 12.40* 10.16 13.31*   HEMOGLOBIN g/dL 12.7 13.0 12.9   PLATELETS 10*3/mm3  181 174 225     Results from last 7 days   Lab Units 12/15/22  0607 12/14/22  0533 12/13/22  0620 12/12/22  0555   SODIUM mmol/L 128* 131* 130* 133*   POTASSIUM mmol/L 4.6 4.7 4.9 4.3   CHLORIDE mmol/L 93* 94* 92* 97*   CO2 mmol/L 28.0 26.1 27.8 24.5   BUN mg/dL 25* 20 20 20   CREATININE mg/dL 0.76 0.72 0.81 0.68   GLUCOSE mg/dL 156* 188* 246* 156*   EGFR mL/min/1.73 91.0 97.1 84.3 101.1     Results from last 7 days   Lab Units 12/15/22  0607 12/14/22  0533 12/13/22  0620 12/12/22  0555   ALBUMIN g/dL 3.90 3.80 3.90 3.70   BILIRUBIN mg/dL 0.7 0.7 0.6 0.7   ALK PHOS U/L 158* 159* 168* 161*   AST (SGOT) U/L 52* 50* 60* 58*   ALT (SGPT) U/L 321* 365* 421* 467*     Results from last 7 days   Lab Units 12/15/22  0607 12/14/22  0533 12/13/22  0620 12/12/22  0555 12/10/22  1110 12/09/22  0539   CALCIUM mg/dL 8.8 9.5 9.3 9.2   < > 9.1   ALBUMIN g/dL 3.90 3.80 3.90 3.70   < > 3.80   MAGNESIUM mg/dL  --   --   --   --   --  2.2    < > = values in this interval not displayed.       Hemoglobin A1C   Date/Time Value Ref Range Status   12/15/2022 0607 7.70 (H) 4.80 - 5.60 % Final     Glucose   Date/Time Value Ref Range Status   12/15/2022 0519 149 (H) 70 - 130 mg/dL Final     Comment:     Meter: [id number] : [id number] [email or web address] NA   12/14/2022 1957 189 (H) 70 - 130 mg/dL Final     Comment:     Meter: [id number] : [id number] Tellybean De'Orestes NA   12/14/2022 1559 112 70 - 130 mg/dL Final     Comment:     Meter: [id number] : [id number] Katie Moctezumayana NA   12/14/2022 1111 294 (H) 70 - 130 mg/dL Final     Comment:     Meter: [id number] : [id number] Isaiyeva Shea NA   12/14/2022 0523 166 (H) 70 - 130 mg/dL Final     Comment:     Meter: [id number] : [id number] Persley De'Orestes NA   12/13/2022 2009 164 (H) 70 - 130 mg/dL Final     Comment:     Meter: [id number] : [id number] Persley De'Orestes NA   12/13/2022 1638 99 70 - 130 mg/dL Final     Comment:     Meter: [id number] : [id number] Paul Clifton NA        No radiology results for the last day  Scheduled Medications  calcium 500 mg vitamin D 5 mcg (200 UT), 1 tablet, Oral, BID  dexamethasone, 4 mg, Oral, Q8H  enoxaparin, 40 mg, Subcutaneous, Q12H  escitalopram, 10 mg, Oral, Daily  gabapentin, 100 mg, Oral, TID  insulin glargine, 28 Units, Subcutaneous, QAM  insulin lispro, 0-14 Units, Subcutaneous, TID AC  insulin lispro, 8 Units, Subcutaneous, TID With Meals  latanoprost, 1 drop, Both Eyes, Nightly  levETIRAcetam, 500 mg, Oral, BID  levothyroxine, 150 mcg, Oral, Q AM  lisinopril, 20 mg, Oral, Daily  magnesium oxide, 400 mg, Oral, Daily  ondansetron, 8 mg, Oral, Q24H  pantoprazole, 40 mg, Oral, Q AM  polyethylene glycol, 17 g, Oral, Daily  sulfamethoxazole-trimethoprim, 1 tablet, Oral, Once per day on Mon Wed Fri  temozolomide, 140 mg, Oral, Nightly   And  temozolomide, 20 mg, Oral, Nightly   And  temozolomide, 5 mg, Oral, Nightly  valACYclovir, 500 mg, Oral, Q24H    Infusions   Diet  Diet: Regular/House Diet, Diabetic Diets, Vegetarian; Lacto-Ovo Vegetarian (Allows dairy, eggs); Consistent Carbohydrate; Texture: Regular Texture (IDDSI 7); Fluid Consistency: Thin (IDDSI 0)       Assessment/Plan     Active Hospital Problems    Diagnosis  POA   • **Glioma (HCC) [C71.9]  Yes      Resolved Hospital Problems   No resolved problems to display.       57 y.o. female admitted with Glioma (HCC).    Reason for medical consultation--> hyperglycemia/elevated LFTs    1. Glioblastoma, patient is currently being treated with Temodar and radiation by oncology.  On dexamethasone and Keppra for seizure prophylaxis.    2.  History of lung cancer/thyroid cancer/breast cancer/renal cell cancer/melanoma, being managed by oncology.    3.  Neuropathy, Neurontin.    4.  Acute transaminitis, was felt to be medication induced therefore atorvastatin and Tylenol have been discontinued.  Valacyclovir was held but has been resumed now.  LFTs are improving and GI did evaluate and underwent  liver ultrasound which revealed only a simple cyst and no evidence of any portal vein DVT.  Autoimmune work-up was negative and CMV, VZV, EBV testing revealed past infection.  Continue to follow LFTs.    5.  Hypertension, on lisinopril and monitor blood pressure closely.    6.  Diabetes mellitus, patient normally takes Trulicity which is on hold for the moment and is on Lantus 35 units subcu daily and blood sugars are in 140s to 150s which is acceptable range.  Uncontrolled blood sugars are most likely secondary to steroids.    7.Hyponatremia, check serum and urine osmolality and urine sodium. Defer nephrology consult to primary team.    Amador Malik MD  [facility]  12/15/22  10:37 EST

## 2022-12-15 NOTE — THERAPY PROGRESS REPORT/RE-CERT
Inpatient Rehabilitation - Physical Therapy Progress Note and Treatment Note       [facility]     Patient Name: Christie Avendaño  : 1965  MRN: [id number]    Today's Date: 12/15/2022                    Admit Date: 2022      Visit Dx:     ICD-10-CM ICD-9-CM   1. Impaired functional mobility, balance, gait, and endurance  Z74.09 V49.89   2. 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)   • Non-small cell lung cancer, right (HCC)   • Renal mass, right   • SIRS (systemic inflammatory response syndrome) (HCC)   • Hyperlipidemia   • Malignant melanoma of right lower extremity including hip (HCC)   • High grade glioma    • 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)   • Glioma (HCC)       Past Medical History:   Diagnosis Date   • Arthritis    • Asthma    • BRCA2 positive    • Diabetes mellitus (HCC)    • H/O Liver masses 2017    x3   • H/O Lung masses 2017    1 in right lower lobe and 1 in the left infrahilar location   • H/O Ovarian cyst    • H/O Right adrenal mass (CMS/HCC) 2017   • Lung cancer (HCC)    • Melanoma (HCC)     right foot   • PONV (postoperative nausea and vomiting)    • Thyroid disease        Past Surgical History:   Procedure Laterality Date   • APPENDECTOMY     • BRAIN BIOPSY Right 2022    Procedure: Right frontal stereotactic needle brain biopsy;  Surgeon: Manuel Thompson MD;  Location: [facility] OR;  Service: Neurosurgery;  Laterality: Right;   • BREAST IMPLANT SURGERY Bilateral    • CHOLECYSTECTOMY     •  HYSTERECTOMY     • MASTECTOMY Bilateral    • OVARIAN CYST SURGERY     • THORACOSCOPY VIDEO ASSISTED WITH LOBECTOMY Right 10/9/2020    Procedure: BRONCHOSCOPY, THORACOSCOPY VIDEO ASSISTED WITH ANTERIOR BASAL SEGMENTECTOMY, INTERCOSTAL NERVE BLOCK;  Surgeon: Flaca Crisostomo MD;  Location: [facility] OR;  Service: Thoracic;  Laterality: Right;   • TONSILLECTOMY         PT ASSESSMENT (last 12 hours)     IRF PT Evaluation and Treatment     Row Name 12/15/22 0824          PT Time and Intention    Document Type daily treatment;progress note  -DP     Mode of Treatment individual therapy;physical therapy  -DP     Patient/Family/Caregiver Comments/Observations pt sitting up in gym upon PT arrival  -DP     Row Name 12/15/22 0824          General Information    Patient Profile Reviewed yes  -DP     Existing Precautions/Restrictions fall  -DP     Row Name 12/15/22 0824          Pain Assessment    Pretreatment Pain Rating 0/10 - no pain  -DP     Posttreatment Pain Rating 0/10 - no pain  -DP     Row Name 12/15/22 0824          Cognition/Psychosocial    Affect/Mental Status (Cognition) flat/blunted affect  -DP     Orientation Status (Cognition) oriented x 4  -DP     Follows Commands (Cognition) follows one-step commands;delayed response/completion;repetition of directions required;increased processing time needed  -DP     Personal Safety Interventions safety round/check completed;elopement precautions initiated;fall prevention program maintained;gait belt;muscle strengthening facilitated;nonskid shoes/slippers when out of bed;supervised activity  -DP     Cognitive Function executive function deficit  -DP     Attention Deficit (Cognition) concentration;distractible in noisy environment  -DP     Executive Function Deficit (Cognition) organization/sequencing;problem-solving/reasoning  -DP     Row Name 12/15/22 0824          Bed Mobility    Comment, (Bed Mobility) NT  -DP     Row Name 12/15/22 0824          Transfer  Assessment/Treatment    Transfers sit-stand transfer;stand-sit transfer;stand pivot/stand step transfer  -DP     Comment, (Transfers) pt extensively educated on turning/sitting with correct position of body to w/c and hand placement  -DP     Row Name 12/15/22 0824          Sit-Stand Transfer    Sit-Stand [place] (Transfers) contact guard;verbal cues;nonverbal cues (demo/gesture)  -DP     Assistive Device (Sit-Stand Transfers) wheelchair;walker, front-wheeled  -DP     Row Name 12/15/22 0824          Stand-Sit Transfer    Stand-Sit [place] (Transfers) contact guard;verbal cues  -DP     Assistive Device (Stand-Sit Transfers) walker, front-wheeled;wheelchair  -DP     Row Name 12/15/22 0824          Stand Pivot/Stand Step Transfer    Stand Pivot/Stand Step [place] (Transfers) minimum assist (75% patient effort);contact guard  -DP     Assistive Device (Stand Pivot Stand Step Transfer) walker, front-wheeled  -DP     Row Name 12/15/22 0824          Gait/Stairs (Locomotion)    [place] Level (Gait) contact guard;verbal cues;nonverbal cues (demo/gesture)  -DP     Assistive Device (Gait) walker, front-wheeled  -DP     Distance in Feet (Gait) 100'x1, 30'x1  -DP     Pattern (Gait) step-through  -DP     Deviations/Abnormal Patterns (Gait) fabrizio decreased;left sided deviations;stride length decreased;base of support, narrow  -DP     Bilateral Gait Deviations forward flexed posture  -DP     Left Sided Gait Deviations leans left  -DP     Right Sided Gait Deviations weight shift ability decreased  -DP     [place] Level (Stairs) minimum assist (75% patient effort);moderate assist (50% patient effort)  -DP     Handrail Location (Stairs) both sides  -DP     Number of Steps (Stairs) 4  -DP     Ascending Technique (Stairs) step-to-step  -DP     Descending Technique (Stairs) step-to-step  -DP     Stairs, Safety Issues balance decreased during turns;sequencing ability decreased;loses balance  backward;weight-shifting ability decreased  -DP     Negotiation (Ramp) ramp independence;ramp assistive device  -DP     [place] Level (Ramp) minimum assist (75% patient effort);moderate assist (50% patient effort)  -DP     Assistive Device (Ramp) walker, front-wheeled  -DP     Row Name 12/15/22 0824          Safety Issues, Functional Mobility    Impairments Affecting Function (Mobility) balance;endurance/activity tolerance;coordination;motor control;sensation/sensory awareness;strength  -DP     Row Name 12/15/22 0824          Balance    Comment, Balance pt performed standing with narrow HORTENCIA on stable surface with 5  seconds and Jackie. Pt also performed semi tandem stance with 1-2 seconds with Jackie/modA.  -DP     Row Name 12/15/22 0824          Motor Skills    Therapeutic Exercise aerobic;hip;knee  -DP     Row Name 12/15/22 0824          Hip (Therapeutic Exercise)    Hip (Therapeutic Exercise) strengthening exercise  -DP     Hip Strengthening (Therapeutic Exercise) bilateral;marching while seated;sitting;1 lb free weight;10 repetitions  -DP     Row Name 12/15/22 0824          Knee (Therapeutic Exercise)    Knee (Therapeutic Exercise) strengthening exercise  -DP     Knee Strengthening (Therapeutic Exercise) bilateral;LAQ (long arc quad);1 lb free weight;hamstring curls;resistance band;red;10 repetitions  -DP     Row Name 12/15/22 0824          Aerobic Exercise    Type (Aerobic Exercise) recumbent elliptical   -DP     Time Performed (Aerobic Exercise) 2 mins  -DP     Comment, Aerobic Exercise (Therapeutic Exercise) BUE/BLE WL 2 mins  -DP     Row Name 12/15/22 0824          Positioning and Restraints    Pre-Treatment Position sitting in chair/recliner  -DP     Post Treatment Position wheelchair  -DP     In Wheelchair sitting;exit alarm on;with other staff  -DP     Row Name 12/15/22 0824          Weekly Progress Summary (PT)    Weekly Progress Summary (PT) Pt is progressing well toward goals this week in  rehab. Pt is SBA but can be Jackie with bed mobility, CGA with transfers, CGA/Jackie with ambulation of up to 240 feet using FWW and Jackie/modA with stair navigation of up to 4 steps with BUE support. Pt will continue to benefit from sklled PT services in order to address impairments listed above.  -DP     Row Name 12/15/22 0824          IRF PT Goals    Bed Mobility Goal Selection (PT-IRF) bed mobility, PT goal 1  -DP     Transfer Goal Selection (PT-IRF) transfers, PT goal 1  -DP     Gait (Walking Locomotion) Goal Selection (PT-IRF) gait, PT goal 1  -DP     Stairs Goal Selection (PT-IRF) stairs, PT goal 1  -DP     Strength Goal Selection (PT-IRF) --  -DP     Row Name 12/15/22 0824          Bed Mobility Goal 1 (PT-IRF)    Activity/Assistive Device (Bed Mobility Goal 1, PT-IRF) bed mobility activities, all  -DP     [place] Level (Bed Mobility Goal 1, PT-IRF) modified independence  -DP     Time Frame (Bed Mobility Goal 1, PT-IRF) long-term goal (LTG);2 weeks  -DP     Progress/Outcomes (Bed Mobility Goal 1, PT-IRF) goal ongoing  -DP     Row Name 12/15/22 0824          Transfer Goal 1 (PT-IRF)    Activity/Assistive Device (Transfer Goal 1, PT-IRF) all transfers;walker, rolling  -DP     [place] Level (Transfer Goal 1, PT-IRF) supervision required  -DP     Time Frame (Transfer Goal 1, PT-IRF) long-term goal (LTG);2 weeks  -DP     Progress/Outcomes (Transfer Goal 1, PT-IRF) goal ongoing  -DP     Row Name 12/15/22 0824          Gait/Walking Locomotion Goal 1 (PT-IRF)    Activity/Assistive Device (Gait/Walking Locomotion Goal 1, PT-IRF) gait (walking locomotion);walker, rolling;walker, 4 wheeled  -DP     Gait/Walking Locomotion Distance Goal 1 (PT-IRF) 150  -DP     [place] Level (Gait/Walking Locomotion Goal 1, PT-IRF) standby assist  -DP     Time Frame (Gait/Walking Locomotion Goal 1, PT-IRF) long-term goal (LTG);2 weeks  -DP     Progress/Outcomes (Gait/Walking Locomotion Goal 1, PT-IRF) goal ongoing  -DP     Row  Name 12/15/22 0824          Stairs Goal 1 (PT-IRF)    Activity/Assistive Device (Stairs Goal 1, PT-IRF) stairs, all skills;walker, rolling  -DP     Number of Stairs (Stairs Goal 1, PT-IRF) 1  -DP     [place] Level (Stairs Goal 1, PT-IRF) minimum assist (75% or more patient effort);verbal cues required  -DP     Time Frame (Stairs Goal 1, PT-IRF) long-term goal (LTG);2 weeks  -DP     Progress/Outcomes (Stairs Goal 1, PT-IRF) goal ongoing  -DP           User Key  (r) = Recorded By, (t) = Taken By, (c) = Cosigned By    Initials Name Provider Type    DP Dillan Calderon, PT Physical Therapist              Wound Right scalp Incision (Active)   Dressing Appearance open to air 12/15/22 0734   Closure Liquid skin adhesive 12/15/22 0734   Base dry;clean;scab 12/15/22 0734   Periwound intact;dry 12/14/22 2225   Periwound Temperature warm 12/14/22 2225   Periwound Skin Turgor soft 12/14/22 2225     Physical Therapy Education     Title: PT OT SLP Therapies (Done)     Topic: Physical Therapy (Done)     Point: Mobility training (Done)     Learning Progress Summary           Patient Acceptance, E,D, VU,DU by DP at 12/15/2022 1606    Acceptance, E,D, VU,DU,NR by DP at 12/15/2022 1147    Comment: transfer training on this date    Acceptance, E, NR by  at 12/13/2022 0923    Acceptance, E, VU by JACK at 12/12/2022 2327    Acceptance, E, VU,DU,NR by JK at 12/12/2022 1011    Acceptance, E, VU by WN at 12/12/2022 0155    Acceptance, E, VU,NR by  at 12/10/2022 1035    Acceptance, E,D, VU,DU,NR by JK at 12/9/2022 0853    Acceptance, E,TB,D, VU,DU,NR by  at 12/8/2022 1420    Comment: Goals, POC    Acceptance, E, VU by  at 12/8/2022 1204   Significant Other Acceptance, E,TB,D, VU,DU,NR by KP at 12/8/2022 1420    Comment: Goals, POC                   Point: Home exercise program (Done)     Learning Progress Summary           Patient Acceptance, THAD CELAYA VU, DU by CARLIN at 12/15/2022 1606    Acceptance, THAD CELAYA VU, DU,JENNA by CARLIN at 12/15/2022 6404     Comment: transfer training on this date    Acceptance, E, NR by  at 12/13/2022 0923    Acceptance, E, VU by WN at 12/12/2022 2327    Acceptance, E, VU by WN at 12/12/2022 0155    Acceptance, E, VU,NR by  at 12/10/2022 1035    Acceptance, E,D, VU,DU,NR by JK at 12/9/2022 0853    Acceptance, E,TB,D, VU,DU,NR by  at 12/8/2022 1420    Comment: Goals, POC    Acceptance, E, VU by CB at 12/8/2022 1204   Significant Other Acceptance, E,TB,D, VU,DU,NR by KP at 12/8/2022 1420    Comment: Goals, POC                   Point: Body mechanics (Done)     Learning Progress Summary           Patient Acceptance, E,D, VU,DU by DP at 12/15/2022 1606    Acceptance, E,D, VU,DU,NR by DP at 12/15/2022 1147    Comment: transfer training on this date    Acceptance, E, NR by  at 12/13/2022 0923    Acceptance, E, VU by WN at 12/12/2022 2327    Acceptance, E, VU by WN at 12/12/2022 0155    Acceptance, E, VU,NR by  at 12/10/2022 1035    Acceptance, E, VU by  at 12/8/2022 1204                   Point: Precautions (Done)     Learning Progress Summary           Patient Acceptance, E,D, VU,DU by DP at 12/15/2022 1606    Acceptance, E,D, VU,DU,NR by DP at 12/15/2022 1147    Comment: transfer training on this date    Acceptance, E, NR by  at 12/13/2022 0923    Acceptance, E, VU by WN at 12/12/2022 2327    Acceptance, E, VU by WN at 12/12/2022 0155    Acceptance, E, VU,NR by  at 12/10/2022 1035    Acceptance, E, VU by  at 12/8/2022 1204                               User Key     Initials Effective Dates Name Provider Type Discipline     06/16/21 -  Radha Ramírez, PT Physical Therapist PT    MIKHAIL 06/16/21 -  Samantha Thompson, PT Physical Therapist PT    TRACIE 06/16/21 -  Anita Serrano, PT Physical Therapist PT    WN 08/23/22 -  Doc Park, RN Registered Nurse Nurse    DP 08/24/21 -  Dillan Calderon, PT Physical Therapist PT    CB 11/10/22 -  Mackenzie Hopkins CCC-SLP Speech and Language Pathologist SLP                PT  Recommendation and Plan                          Time Calculation:      PT Charges     Row Name 12/15/22 1606 12/15/22 1156          Time Calculation    Start Time 1230  -DP 1100  -DP     Stop Time 1300  -DP 1130  -DP     Time Calculation (min) 30 min  -DP 30 min  -DP     PT Received On 12/15/22  -DP 12/15/22  -DP     PT - Next Appointment 12/16/22  -DP 12/16/22  -DP     PT Goal Re-Cert Due Date -- 12/22/22  -DP        Time Calculation- PT    Total Timed Code Minutes- PT 30 minute(s)  -DP 30 minute(s)  -DP           User Key  (r) = Recorded By, (t) = Taken By, (c) = Cosigned By    Initials Name Provider Type    DP Dillan Calderon, PT Physical Therapist                Therapy Charges for Today     Code Description Service Date Service Provider Modifiers Qty    21708185945 HC PT THERAPEUTIC ACT EA 15 MIN 12/14/2022 Dillan Calderon, PT GP 2    55322119933 HC PT NEUROMUSC RE EDUCATION EA 15 MIN 12/14/2022 Dillan Calderon, PT GP 2    61655734588 HC PT NEUROMUSC RE EDUCATION EA 15 MIN 12/15/2022 Dillan Calderon, PT GP 1    84765076049 HC PT THERAPEUTIC ACT EA 15 MIN 12/15/2022 Dillan Calderon, PT GP 1    86069050938 HC PT THERAPEUTIC ACT EA 15 MIN 12/15/2022 Dillan Calderon, PT GP 1    83783626127 HC PT THER PROC EA 15 MIN 12/15/2022 Dillan Calderon, PT GP 1              Patient was wearing a face mask during this therapy encounter. Therapist used appropriate personal protective equipment including eye protection, mask, and gloves.  Mask used was standard procedure mask. Appropriate PPE was worn during the entire therapy session. Hand hygiene was completed before and after therapy session. Patient is not in enhanced droplet precautions.         Dillan Calderon, VERONIKA  12/15/2022

## 2022-12-15 NOTE — THERAPY TREATMENT NOTE
Inpatient Rehabilitation - Occupational Therapy Treatment Note    [facility]     Patient Name: Christie Avendaño  : 1965  MRN: [id number]    Today's Date: 12/15/2022                 Admit Date: 2022         ICD-10-CM ICD-9-CM   1. Impaired functional mobility, balance, gait, and endurance  Z74.09 V49.89   2. 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)   • Non-small cell lung cancer, right (HCC)   • Renal mass, right   • SIRS (systemic inflammatory response syndrome) (HCC)   • Hyperlipidemia   • Malignant melanoma of right lower extremity including hip (HCC)   • High grade glioma    • 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)   • Glioma (HCC)       Past Medical History:   Diagnosis Date   • Arthritis    • Asthma    • BRCA2 positive    • Diabetes mellitus (HCC)    • H/O Liver masses 2017    x3   • H/O Lung masses 2017    1 in right lower lobe and 1 in the left infrahilar location   • H/O Ovarian cyst    • H/O Right adrenal mass (CMS/HCC) 2017   • Lung cancer (HCC)    • Melanoma (HCC)     right foot   • PONV (postoperative nausea and vomiting)    • Thyroid disease        Past Surgical History:   Procedure Laterality Date   • APPENDECTOMY     • BRAIN BIOPSY Right 2022    Procedure: Right frontal stereotactic needle brain biopsy;  Surgeon: Manuel Thompson MD;  Location: [facility];  Service: Neurosurgery;  Laterality: Right;   • BREAST IMPLANT SURGERY Bilateral    • CHOLECYSTECTOMY     • HYSTERECTOMY     • MASTECTOMY  Bilateral    • OVARIAN CYST SURGERY     • THORACOSCOPY VIDEO ASSISTED WITH LOBECTOMY Right 10/9/2020    Procedure: BRONCHOSCOPY, THORACOSCOPY VIDEO ASSISTED WITH ANTERIOR BASAL SEGMENTECTOMY, INTERCOSTAL NERVE BLOCK;  Surgeon: Flaca Crisostomo MD;  Location: [facility] MAIN OR;  Service: Thoracic;  Laterality: Right;   • TONSILLECTOMY               IRF OT ASSESSMENT FLOWSHEET (last 12 hours)     IRF OT Evaluation and Treatment     Row Name 12/15/22 1506          OT Time and Intention    Document Type daily treatment  -KA     Mode of Treatment individual therapy;occupational therapy  -KA     Patient Effort good  -KA     Symptoms Noted During/After Treatment fatigue  pt reports increased fatigue after chemo/radiation this AM. Saw pt for both sessions in PM  -KA     Row Name 12/15/22 1506          General Information    Patient Profile Reviewed yes  -KA     Patient/Family/Caregiver Comments/Observations Pt laying supine in bed upon arrival  -KA     Existing Precautions/Restrictions fall  Leans L  -KA     Row Name 12/15/22 1506          Pain Assessment    Pretreatment Pain Rating 0/10 - no pain  -KA     Posttreatment Pain Rating 0/10 - no pain  -     Row Name 12/15/22 1506          Cognition/Psychosocial    Orientation Status (Cognition) oriented x 4  -KA     Follows Commands (Cognition) follows one-step commands;follows two-step commands;delayed response/completion;increased processing time needed  -KA     Personal Safety Interventions fall prevention program maintained;gait belt;nonskid shoes/slippers when out of bed  -KA     Executive Function Deficit (Cognition) organization/sequencing;problem-solving/reasoning  -KA     Row Name 12/15/22 150          Bathing    [place] Level (Bathing) bathing skills;upper body;lower body;contact guard assist;minimum assist (75% patient effort)  -KA     Assistive Device (Bathing) hand held shower spray hose;tub bench  -KA     Position (Bathing) supported sitting;sink side  -KA      Set-up Assistance (Bathing) adjust water temperature;obtain supplies  -     Comment (Bathing) cues for safety at times with bathing  -     Row Name 12/15/22 1506          Upper Body Dressing    [place] Level (Upper Body Dressing) upper body dressing skills;doff;don;bra/undergarment;pull over garment;minimum assist (75% or more patient effort);clothes fastener management;set up assistance  -     Position (Upper Body Dressing) supported sitting  -     Set-up Assistance (Upper Body Dressing) obtain clothing  -     Comment (Upper Body Dressing) Assist hooking bra  -     Row Name 12/15/22 1506          Lower Body Dressing    [place] Level (Lower Body Dressing) doff;don;pants/bottoms;shoes/slippers;socks;set up;verbal cues;minimum assist (75% patient effort)  -     Position (Lower Body Dressing) supported sitting;supported standing  -     Comment (Lower Body Dressing) Donned socks with CGA seated. Required min A for management of pants with increased time  -     Row Name 12/15/22 1506          Grooming    [place] Level (Grooming) grooming skills;deodorant application;oral care regimen;wash face, hands;set up;standby assist  -     Position (Grooming) sink side;supported sitting  -     Set-up Assistance (Grooming) obtain supplies  -     Row Name 12/15/22 1506          Bed Mobility    Supine-Sit [place] (Bed Mobility) minimum assist (75% patient effort);contact guard  Leaned to the left. Assistance required to bring trunk to alignment  -     Sit-Supine [place] (Bed Mobility) contact guard  -     Assistive Device (Bed Mobility) bed rails;head of bed elevated  -[facility] Name 12/15/22 1506          Bed-Chair Transfer    Bed-Chair [place] (Transfers) contact guard;minimum assist (75% patient effort)  -     Assistive Device (Bed-Chair Transfers) walker, front-wheeled;wheelchair  -     Row Name 12/15/22 1506          Chair-Bed Transfer    Chair-Bed [place]  (Transfers) contact guard;nonverbal cues (demo/gesture);verbal cues  -     Assistive Device (Chair-Bed Transfers) walker, front-wheeled;wheelchair  -     Row Name 12/15/22 1506          Sit-Stand Transfer    Sit-Stand [place] (Transfers) contact guard;verbal cues;nonverbal cues (demo/gesture)  -     Assistive Device (Sit-Stand Transfers) wheelchair;walker, front-wheeled  -     Comment, (Sit-Stand Transfer) Performed several STS this session from bed, chair and tub bench  used grab bars in bathroom  -     Row Name 12/15/22 1506          Stand-Sit Transfer    Stand-Sit [place] (Transfers) contact guard;verbal cues  -     Assistive Device (Stand-Sit Transfers) walker, front-wheeled;wheelchair  -     Row Name 12/15/22 1506          Shower Transfer    Type (Shower Transfer) sit-stand;stand-sit;stand pivot/stand step  -     [place] Level (Shower Transfer) set up;minimum assist (75% patient effort);verbal cues  -     Assistive Device (Shower Transfer) grab bar, tub/shower;shower chair;wheelchair  -     Row Name 12/15/22 1506          Shoulder (Therapeutic Exercise)    Shoulder AROM (Therapeutic Exercise) bilateral;10 repetitions;sitting;flexion;extension  -     Row Name 12/15/22 1506          Elbow/Forearm (Therapeutic Exercise)    Elbow/Forearm (Therapeutic Exercise) AROM (active range of motion)  -     Elbow/Forearm AROM (Therapeutic Exercise) bilateral;flexion;extension;10 repetitions;2 sets  forward press  -     Row Name 12/15/22 1506          Balance    Static Sitting Balance minimal assist  seated EOB pt leaned to the L and required assistance from therapist to correct. Balance improved as pt sat on tub bench for bathing  -     Dynamic Sitting Balance contact guard  while donning socks seated  -     Static Standing Balance minimal assist;contact guard  -     Dynamic Standing Balance minimal assist  Required min A while pt doffed pants. 1 slight LOB with assist from  therapist to correct  -SHANICE     Row Name 12/15/22 1506          Positioning and Restraints    Pre-Treatment Position in bed  -KA     Post Treatment Position bed  -KA     In Bed supine;call light within reach;encouraged to call for assist;exit alarm on;with nsg  -SHANICE           User Key  (r) = Recorded By, (t) = Taken By, (c) = Cosigned By    Initials Name Effective Dates    Alissa Larose, HATTIE 09/22/22 -                  Occupational Therapy Education     Title: PT OT SLP Therapies (Done)     Topic: Occupational Therapy (Done)     Point: ADL training (Done)     Description:   Instruct learner(s) on proper safety adaptation and remediation techniques during self care or transfers.   Instruct in proper use of assistive devices.              Learning Progress Summary           Patient Acceptance, E, VU by WN at 12/12/2022 2327    Acceptance, E, VU by WN at 12/12/2022 0155    Acceptance, E, VU by CB at 12/8/2022 1204    Acceptance, E, VU,NR by CE at 12/8/2022 1107    Comment: fall prevention, DME including w/c and shower chair                   Point: Home exercise program (Done)     Description:   Instruct learner(s) on appropriate technique for monitoring, assisting and/or progressing therapeutic exercises/activities.              Learning Progress Summary           Patient Acceptance, E, VU by WN at 12/12/2022 2327    Acceptance, E, VU by WN at 12/12/2022 0155    Acceptance, E, VU by CB at 12/8/2022 1204    Acceptance, E, VU,NR by CE at 12/8/2022 1107    Comment: fall prevention, DME including w/c and shower chair                   Point: Precautions (Done)     Description:   Instruct learner(s) on prescribed precautions during self-care and functional transfers.              Learning Progress Summary           Patient Acceptance, E, VU by WN at 12/12/2022 2327    Acceptance, E, VU by WN at 12/12/2022 0155    Acceptance, E, VU by CB at 12/8/2022 1204    Acceptance, E, VU,NR by CE at 12/8/2022 1107    Comment: fall  prevention, DME including w/c and shower chair                   Point: Body mechanics (Done)     Description:   Instruct learner(s) on proper positioning and spine alignment during self-care, functional mobility activities and/or exercises.              Learning Progress Summary           Patient Acceptance, E, VU by WN at 12/12/2022 2327    Acceptance, E, VU by WN at 12/12/2022 0155    Acceptance, E, VU by CB at 12/8/2022 1204    Acceptance, E, VU,NR by CE at 12/8/2022 1107    Comment: fall prevention, DME including w/c and shower chair                               User Key     Initials Effective Dates Name Provider Type Discipline    WN 08/23/22 -  Doc Park, RN Registered Nurse Nurse    CB 11/10/22 -  Mackenzie Hopkins CCC-SLP Speech and Language Pathologist SLP    CE 10/17/22 -  Portia Esqueda OT Occupational Therapist OT                    OT Recommendation and Plan                         Time Calculation:      Time Calculation- OT     Row Name 12/15/22 1521             Time Calculation- OT    OT Start Time 1400  -KA      OT Stop Time 1500  -KA      OT Time Calculation (min) 60 min  -KA            User Key  (r) = Recorded By, (t) = Taken By, (c) = Cosigned By    Initials Name Provider Type     Alissa Lynn OT Occupational Therapist              Therapy Charges for Today     Code Description Service Date Service Provider Modifiers Qty    54391319104 HC OT SELF CARE/MGMT/TRAIN EA 15 MIN 12/15/2022 Alissa Lynn OT GO 3    43067364299 HC OT THER PROC EA 15 MIN 12/15/2022 Alissa Lynn OT GO 1                   Alissa Lynn OT  12/15/2022

## 2022-12-15 NOTE — PROGRESS NOTES
Inpatient Rehabilitation Plan of Care Note    Plan of Care  Care Plan Reviewed - No updates at this time.    Psychosocial    Performed Intervention(s)  Support/peer groups.  Verbalizes needs and concerns.  Medication.      Sphincter Control    Performed Intervention(s)  Offer restroom during hourly rounding.  Encourage fluid intake.  Monitor Is and Os.  Timed voids.  Encourage appropriate diet.      Safety    Performed Intervention(s)  Items w/i reach.  Safety rounds.  Bed and chair alarm. Blue armband  Falls precautions.    Signed by: Annalise Hu RN

## 2022-12-15 NOTE — PROGRESS NOTES
Recreational Therapy Note    Patient Name: Christie Avendaño   MRN: [id number]    Therapeutic Recreation Eval and Treat (last 12 hours)     Therapeutic Recreation Eval & Treat     Row Name 12/15/22 1400       Therapeutic Recreation Participation    Recreation Therapy Participation games  -    Games card games  Phase 10  -    Objectives of Recreation Participation increase;motivation and activity level through successful participation;positive attitudes leading to a healthy leisure lifestyle;sense of autonomy by choosing level of participation  -    Comment, Recreation Participation min-mod cues for recall;used L hand to  cards and place in card agee;processing time s/w improved  -    Recreation Therapy Summary of Participation active participation  -          User Key  (r) = Recorded By, (t) = Taken By, (c) = Cosigned By    Initials Name Provider Type    SS Jemma Spencer, CTRS Recreational Therapist                  PAULA Siu  12/15/2022

## 2022-12-15 NOTE — PROGRESS NOTES
Inpatient Rehabilitation Plan of Care Note    Plan of Care  Care Plan Reviewed - No updates at this time.    Psychosocial    Performed Intervention(s)  Support/peer groups.  Verbalizes needs and concerns.  Medication.      Sphincter Control    Performed Intervention(s)  Offer restroom during hourly rounding.  Encourage fluid intake.  Monitor Is and Os.  Timed voids.  Encourage appropriate diet.      Safety    Performed Intervention(s)  Items w/i reach.  Safety rounds.  Bed and chair alarm. Blue armband  Falls precautions.    Signed by: Christie Majano RN

## 2022-12-15 NOTE — PROGRESS NOTES
LOS: 8 days   Patient Care Team:  Tiffany Holloway MD as PCP - General (Internal Medicine)  Mathew Collins Jr., MD as Consulting Physician (Hematology and Oncology)  Dorian Sabillon MD as Surgeon (General Surgery)  Thang Dumont, JOSEFA (Optometry)  Lamine Cantu DO as Referring Physician (Family Medicine)  Hali Rolle MD as Gynecologist (Gynecology)      HERON MAGANA  1965    Diagnoses    1. 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 Continued mild headache. Had radiation this am. Patient denies fever/chills/sob/chest pain and denies issue with sleep/appetite/bladder and bowels.        Objective     Vitals:    12/15/22 1216   BP: 116/77   Pulse: 71   Resp: 20   Temp: 97.6 °F (36.4 °C)   SpO2: 98%       PHYSICAL EXAM:   MENTAL STATUS -  AWAKE / ALERT  HEENT-right scalp incision with staples removed.  Clean dry and intact  SCLERAE ANICTERIC, CONJUNCTIVAE PINK, EARS UNREMARKABLE EXTERNALLY  LUNGS - CTA, NO WHEEZES, RALES OR RHONCHI  HEART- RRR, NO RUB, MURMUR, OR GALLOP  ABD - NORMOACTIVE BOWEL SOUNDS, SOFT, NT.     EXT - NO EDEMA OR CYANOSIS  NEURO -oriented       Speech was fluent with slightly slow rate of speech.  Extraocular movements intact.  No dysarthria.  MOTOR EXAM - RUE/RLE 5/5.   LUE/LLE 5/5.   Impaired motor control in the left upper extremity and left lower extremity, improving dexterity of left hand       MEDICATIONS  Scheduled  Meds:calcium 500 mg vitamin D 5 mcg (200 UT), 1 tablet, Oral, BID  dexamethasone, 4 mg, Oral, Q8H  enoxaparin, 40 mg, Subcutaneous, Q12H  escitalopram, 10 mg, Oral, Daily  gabapentin, 100 mg, Oral, TID  insulin glargine, 28 Units, Subcutaneous, QAM  insulin lispro, 0-14 Units, Subcutaneous, TID AC  insulin lispro, 8 Units, Subcutaneous, TID With Meals  latanoprost, 1 drop, Both Eyes, Nightly  levETIRAcetam, 500 mg, Oral, BID  levothyroxine, 150 mcg, Oral, Q AM  lisinopril, 20 mg, Oral, Daily  magnesium oxide, 400 mg, Oral, Daily  ondansetron, 8 mg, Oral, Q24H  pantoprazole, 40 mg, Oral, Q AM  polyethylene glycol, 17 g, Oral, Daily  sulfamethoxazole-trimethoprim, 1 tablet, Oral, Once per day on Mon Wed Fri  temozolomide, 140 mg, Oral, Nightly   And  temozolomide, 20 mg, Oral, Nightly   And  temozolomide, 5 mg, Oral, Nightly  valACYclovir, 500 mg, Oral, Q24H      Continuous Infusions:   PRN Meds:.•  calcium carbonate  •  dextrose  •  dextrose  •  famotidine  •  glucagon (human recombinant)  •  influenza vaccine  •  nitroglycerin  •  ondansetron **OR** ondansetron  •  senna-docusate sodium      RESULTS  Glucose   Date/Time Value Ref Range Status   12/15/2022 1145 198 (H) 70 - 130 mg/dL Final     Comment:     Meter: [id number] : [id number] Katie Mendeza ARIANNE   12/15/2022 0519 149 (H) 70 - 130 mg/dL Final     Comment:     Meter: [id number] : [id number] Flaquita Gonsalez'Orestes NA   12/14/2022 1957 189 (H) 70 - 130 mg/dL Final     Comment:     Meter: [id number] : [id number] Flaquita Gonsalez'Orestes NA   12/14/2022 1559 112 70 - 130 mg/dL Final     Comment:     Meter: [id number] : [id number] Katie Mendeza NA   12/14/2022 1111 294 (H) 70 - 130 mg/dL Final     Comment:     Meter: [id number] : [id number] Katie ACUÑA   12/14/2022 0523 166 (H) 70 - 130 mg/dL Final     Comment:     Meter: [id number] : [id number] Flaquita ACUÑA   12/13/2022 2009 164 (H) 70 - 130 mg/dL Final     Comment:     Meter: [id number]  : [id number] Flaquita ACUÑA   12/13/2022 1638 99 70 - 130 mg/dL Final     Comment:     Meter: [id number] : [id number] Paul ACUÑA     Results from last 7 days   Lab Units 12/15/22  0607 12/12/22  0555 12/09/22  0539   WBC 10*3/mm3 12.40* 10.16 13.31*   HEMOGLOBIN g/dL 12.7 13.0 12.9   HEMATOCRIT % 38.2 38.9 38.1   PLATELETS 10*3/mm3 181 174 225     Results from last 7 days   Lab Units 12/15/22  0607 12/14/22  0533 12/13/22  0620   SODIUM mmol/L 128* 131* 130*   POTASSIUM mmol/L 4.6 4.7 4.9   CHLORIDE mmol/L 93* 94* 92*   CO2 mmol/L 28.0 26.1 27.8   BUN mg/dL 25* 20 20   CREATININE mg/dL 0.76 0.72 0.81   CALCIUM mg/dL 8.8 9.5 9.3   BILIRUBIN mg/dL 0.7 0.7 0.6   ALK PHOS U/L 158* 159* 168*   ALT (SGPT) U/L 321* 365* 421*   AST (SGOT) U/L 52* 50* 60*   GLUCOSE mg/dL 156* 188* 246*       ASSESSMENT and PLAN    Glioma (HCC)    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  She reports intolerance to gabapentin, intolerances to opioids.  Tramadol comes up as contraindicated with history of anaphylactic secondary to morphine.  Valproate for headache control not an option with her liver changes  Dec 13: patient reports intolerance to gabapentin was drowsiness on 300 mg dose. Agreeable to try 100 mg tid for HA.      Left side weakness/incoordination/impaired mobility     Radiation therapy/Temodar to start December 12, 2022  Decadron 4 mg every 8 hourly  Dec 9: Oncology aware of increase of liver enzymes. They are following along   December 12: Radiation therapy and Temodar initiating today  Dec 14: Temodar Radiation, fraction 3 today.   Dec 15: Today is day 4 of Temodar Radiation. Per oncology no clinical signs of progression.      Leukocytosis-steroid/stress response.  Incision healing.      Elevated  LFTs  Dec 8: Labs significant for ALT 1049 (143 11/28),  (71 11/28), Alk phos 190 (96 11/28). Taking minimal tylenol and statin is a home med- discontinued. Consulted IM who also consulted pharmacy and GI. They also decreased valtrex to 1g daily (she was taking daily prophylactic valtrex 2g bid). Awaiting liver ultrasound. CPK normal.   Dec 9- Ongoing workup. Liver ultrasound- remarkable for a small hepatic cyst otherwise negative  Per GI -[ Obtain duplex hepatic ultrasound to further assess for thrombosis  Obtain GEMMA, IgG profile, ASMA, EBV, CMV, HSV, VZV to rule out autoimmune versus viral cause to elevation.].   Patient reviewed with internal medicine and medical oncology  December 12-transaminases elevated but trending better.   Portal hepatic duplex unremarkable  Dec 14: Continued decrease of LFTs.     Diabetes mellitus-Trulicity at home.  On Lantus/Humalog  December 13-Lantus increased to 28 units a.m.  On Humalog 8 units 3 times daily with meals  Dec 14: Continued elevation of blood sugars, will ask IM to provide insight.   Dec 15: Lantus increased to 35 daily yesterday with improved readings.      Hyponatremia   Dec 15: Na 128 today, 131 yesterday. IM ordered serum and urine osmolality and urine sodium. Deferring to nephrology consult to us. Will repeat Cmp in am.     Seizure prophylaxis-Keppra     DVT prophylaxis-Lovenox/SCDs     GI prophylaxis-protein pump inhibitor  Add calcium and vitamin D with ongoing steroids     Chronic Valtrex-dose decreased to 1000 mg daily     Germline BRCA2 and GEORGES mutations.  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     Depression-Lexapro     Hypothyroidism-on replacement     Hypertension lisinopril    TEAM CONF - DEC 13 - BED SBA.  TRANSFERS MIN CTG. GAIT 160 FEET CTG. LEANS LEFT. TOILET TRANSFERS CTG. SHOWER TRANSFERS CTG MIN. LBD MIN. UBD MIN. BATH MIN. GROOMING SBA. TOILETING CTG MIN. IMPAIRED ATTENTION.IMPAIRED EXECUTIVE FUNCTION. FATIGUES WITH COGNITIVE TASKS. CONTINENT BOWEL AND BLADDER. SCALP INCISION HEALING.   XRT/TEMODAR. ELEVATED TRANSAMINASES TREND BETTER.   ELOS - 2 WEEKS    Patient seen and discussed with attending physician Dr. Araujo, please see attestation.     Gina Florence DO   Physical Medicine and Rehabilitation   PGY-4     During rounds, used appropriate personal protective equipment including mask and gloves.  Additional gown if indicated.  Mask used was standard procedure mask. Appropriate PPE was worn during the entire visit.  Hand hygiene was completed before and after.

## 2022-12-16 ENCOUNTER — TELEPHONE (OUTPATIENT)
Dept: ONCOLOGY | Facility: CLINIC | Age: 57
End: 2022-12-16

## 2022-12-16 ENCOUNTER — TREATMENT (OUTPATIENT)
Dept: RADIATION ONCOLOGY | Facility: HOSPITAL | Age: 57
End: 2022-12-16

## 2022-12-16 LAB
ALBUMIN SERPL-MCNC: 4 G/DL (ref 3.5–5.2)
ALBUMIN/GLOB SERPL: 2.2 G/DL
ALP SERPL-CCNC: 160 U/L (ref 39–117)
ALT SERPL W P-5'-P-CCNC: 311 U/L (ref 1–33)
ANION GAP SERPL CALCULATED.3IONS-SCNC: 13.4 MMOL/L (ref 5–15)
AST SERPL-CCNC: 51 U/L (ref 1–32)
BILIRUB SERPL-MCNC: 0.7 MG/DL (ref 0–1.2)
BUN SERPL-MCNC: 28 MG/DL (ref 6–20)
BUN/CREAT SERPL: 32.9 (ref 7–25)
CALCIUM SPEC-SCNC: 9.5 MG/DL (ref 8.6–10.5)
CHLORIDE SERPL-SCNC: 93 MMOL/L (ref 98–107)
CO2 SERPL-SCNC: 23.6 MMOL/L (ref 22–29)
CREAT SERPL-MCNC: 0.85 MG/DL (ref 0.57–1)
EGFRCR SERPLBLD CKD-EPI 2021: 79.5 ML/MIN/1.73
GLOBULIN UR ELPH-MCNC: 1.8 GM/DL
GLUCOSE BLDC GLUCOMTR-MCNC: 136 MG/DL (ref 70–130)
GLUCOSE BLDC GLUCOMTR-MCNC: 139 MG/DL (ref 70–130)
GLUCOSE BLDC GLUCOMTR-MCNC: 178 MG/DL (ref 70–130)
GLUCOSE BLDC GLUCOMTR-MCNC: 347 MG/DL (ref 70–130)
GLUCOSE SERPL-MCNC: 191 MG/DL (ref 65–99)
POTASSIUM SERPL-SCNC: 4.9 MMOL/L (ref 3.5–5.2)
PROT SERPL-MCNC: 5.8 G/DL (ref 6–8.5)
RAD ONC ARIA COURSE ID: NORMAL
RAD ONC ARIA COURSE INTENT: NORMAL
RAD ONC ARIA COURSE LAST TREATMENT DATE: NORMAL
RAD ONC ARIA COURSE START DATE: NORMAL
RAD ONC ARIA COURSE TREATMENT ELAPSED DAYS: 4
RAD ONC ARIA FIRST TREATMENT DATE: NORMAL
RAD ONC ARIA PLAN FRACTIONS TREATED TO DATE: 5
RAD ONC ARIA PLAN ID: NORMAL
RAD ONC ARIA PLAN PRESCRIBED DOSE PER FRACTION: 2 GY
RAD ONC ARIA PLAN PRIMARY REFERENCE POINT: NORMAL
RAD ONC ARIA PLAN TOTAL FRACTIONS PRESCRIBED: 23
RAD ONC ARIA PLAN TOTAL PRESCRIBED DOSE: 4600 CGY
RAD ONC ARIA REFERENCE POINT DOSAGE GIVEN TO DATE: 10 GY
RAD ONC ARIA REFERENCE POINT DOSAGE GIVEN TO DATE: 10.12 GY
RAD ONC ARIA REFERENCE POINT ID: NORMAL
RAD ONC ARIA REFERENCE POINT ID: NORMAL
RAD ONC ARIA REFERENCE POINT SESSION DOSAGE GIVEN: 2 GY
RAD ONC ARIA REFERENCE POINT SESSION DOSAGE GIVEN: 2.02 GY
SODIUM SERPL-SCNC: 130 MMOL/L (ref 136–145)

## 2022-12-16 PROCEDURE — 97530 THERAPEUTIC ACTIVITIES: CPT

## 2022-12-16 PROCEDURE — 25010000002 ENOXAPARIN PER 10 MG: Performed by: PHYSICAL MEDICINE & REHABILITATION

## 2022-12-16 PROCEDURE — 97110 THERAPEUTIC EXERCISES: CPT

## 2022-12-16 PROCEDURE — 99232 SBSQ HOSP IP/OBS MODERATE 35: CPT | Performed by: INTERNAL MEDICINE

## 2022-12-16 PROCEDURE — 63710000001 INSULIN LISPRO (HUMAN) PER 5 UNITS: Performed by: PHYSICAL MEDICINE & REHABILITATION

## 2022-12-16 PROCEDURE — 97112 NEUROMUSCULAR REEDUCATION: CPT

## 2022-12-16 PROCEDURE — 77386 CHG INTENSITY MODULATED RADIATION TX DLVR COMPLEX: CPT | Performed by: RADIOLOGY

## 2022-12-16 PROCEDURE — 63710000001 INSULIN LISPRO (HUMAN) PER 5 UNITS: Performed by: HOSPITALIST

## 2022-12-16 PROCEDURE — 97130 THER IVNTJ EA ADDL 15 MIN: CPT

## 2022-12-16 PROCEDURE — 97535 SELF CARE MNGMENT TRAINING: CPT

## 2022-12-16 PROCEDURE — 97129 THER IVNTJ 1ST 15 MIN: CPT

## 2022-12-16 PROCEDURE — 25010000002 TEMOZOLOMIDE 140 MG CAPSULE: Performed by: INTERNAL MEDICINE

## 2022-12-16 PROCEDURE — 63710000001 ONDANSETRON PER 8 MG: Performed by: INTERNAL MEDICINE

## 2022-12-16 PROCEDURE — 63710000001 DEXAMETHASONE PER 0.25 MG: Performed by: PHYSICAL MEDICINE & REHABILITATION

## 2022-12-16 PROCEDURE — 25010000002 TEMOZOLOMIDE PER 5 MG: Performed by: INTERNAL MEDICINE

## 2022-12-16 PROCEDURE — 77014 CHG CT GUIDANCE RADIATION THERAPY FLDS PLACEMENT: CPT | Performed by: RADIOLOGY

## 2022-12-16 PROCEDURE — 80053 COMPREHEN METABOLIC PANEL: CPT | Performed by: PHYSICAL MEDICINE & REHABILITATION

## 2022-12-16 PROCEDURE — 82962 GLUCOSE BLOOD TEST: CPT

## 2022-12-16 PROCEDURE — 77386: CPT | Performed by: RADIOLOGY

## 2022-12-16 RX ORDER — LISINOPRIL 10 MG/1
10 TABLET ORAL DAILY
Status: DISCONTINUED | OUTPATIENT
Start: 2022-12-17 | End: 2022-12-18

## 2022-12-16 RX ORDER — PROPRANOLOL HYDROCHLORIDE 20 MG/1
20 TABLET ORAL
Status: DISCONTINUED | OUTPATIENT
Start: 2022-12-16 | End: 2022-12-20

## 2022-12-16 RX ADMIN — LEVOTHYROXINE SODIUM 150 MCG: 0.15 TABLET ORAL at 05:43

## 2022-12-16 RX ADMIN — POLYETHYLENE GLYCOL 3350 17 G: 17 POWDER, FOR SOLUTION ORAL at 09:15

## 2022-12-16 RX ADMIN — TEMOZOLOMIDE 140 MG: 140 CAPSULE ORAL at 21:05

## 2022-12-16 RX ADMIN — PANTOPRAZOLE SODIUM 40 MG: 40 TABLET, DELAYED RELEASE ORAL at 05:43

## 2022-12-16 RX ADMIN — INSULIN LISPRO 8 UNITS: 100 INJECTION, SOLUTION INTRAVENOUS; SUBCUTANEOUS at 09:16

## 2022-12-16 RX ADMIN — GABAPENTIN 100 MG: 100 CAPSULE ORAL at 09:13

## 2022-12-16 RX ADMIN — INSULIN LISPRO 8 UNITS: 100 INJECTION, SOLUTION INTRAVENOUS; SUBCUTANEOUS at 17:32

## 2022-12-16 RX ADMIN — INSULIN LISPRO 10 UNITS: 100 INJECTION, SOLUTION INTRAVENOUS; SUBCUTANEOUS at 12:45

## 2022-12-16 RX ADMIN — CALCIUM CARBONATE-VITAMIN D TAB 500 MG-200 UNIT 1 TABLET: 500-200 TAB at 21:03

## 2022-12-16 RX ADMIN — DEXAMETHASONE 4 MG: 4 TABLET ORAL at 14:58

## 2022-12-16 RX ADMIN — TEMOZOLOMIDE 5 MG: 5 CAPSULE ORAL at 21:06

## 2022-12-16 RX ADMIN — ESCITALOPRAM 10 MG: 10 TABLET, FILM COATED ORAL at 09:15

## 2022-12-16 RX ADMIN — SULFAMETHOXAZOLE AND TRIMETHOPRIM 1 TABLET: 800; 160 TABLET ORAL at 09:14

## 2022-12-16 RX ADMIN — INSULIN LISPRO 3 UNITS: 100 INJECTION, SOLUTION INTRAVENOUS; SUBCUTANEOUS at 09:17

## 2022-12-16 RX ADMIN — LEVETIRACETAM 500 MG: 500 TABLET, FILM COATED ORAL at 09:15

## 2022-12-16 RX ADMIN — ONDANSETRON HYDROCHLORIDE 8 MG: 8 TABLET, FILM COATED ORAL at 12:46

## 2022-12-16 RX ADMIN — ENOXAPARIN SODIUM 40 MG: 100 INJECTION SUBCUTANEOUS at 09:15

## 2022-12-16 RX ADMIN — GABAPENTIN 100 MG: 100 CAPSULE ORAL at 21:03

## 2022-12-16 RX ADMIN — TEMOZOLOMIDE 20 MG: 20 CAPSULE ORAL at 21:06

## 2022-12-16 RX ADMIN — INSULIN LISPRO 8 UNITS: 100 INJECTION, SOLUTION INTRAVENOUS; SUBCUTANEOUS at 12:46

## 2022-12-16 RX ADMIN — GABAPENTIN 100 MG: 100 CAPSULE ORAL at 17:32

## 2022-12-16 RX ADMIN — LATANOPROST 1 DROP: 50 SOLUTION OPHTHALMIC at 21:03

## 2022-12-16 RX ADMIN — INSULIN GLARGINE-YFGN 28 UNITS: 100 INJECTION, SOLUTION SUBCUTANEOUS at 09:16

## 2022-12-16 RX ADMIN — DEXAMETHASONE 4 MG: 4 TABLET ORAL at 05:43

## 2022-12-16 RX ADMIN — DEXAMETHASONE 4 MG: 4 TABLET ORAL at 21:03

## 2022-12-16 RX ADMIN — CALCIUM CARBONATE-VITAMIN D TAB 500 MG-200 UNIT 1 TABLET: 500-200 TAB at 09:15

## 2022-12-16 RX ADMIN — LISINOPRIL 20 MG: 20 TABLET ORAL at 09:15

## 2022-12-16 RX ADMIN — ENOXAPARIN SODIUM 40 MG: 100 INJECTION SUBCUTANEOUS at 21:05

## 2022-12-16 RX ADMIN — LEVETIRACETAM 500 MG: 500 TABLET, FILM COATED ORAL at 21:03

## 2022-12-16 RX ADMIN — VALACYCLOVIR HYDROCHLORIDE 500 MG: 500 TABLET, FILM COATED ORAL at 09:14

## 2022-12-16 RX ADMIN — MAGNESIUM OXIDE 400 MG (241.3 MG MAGNESIUM) TABLET 400 MG: TABLET at 09:14

## 2022-12-16 NOTE — TELEPHONE ENCOUNTER
Returned call to patient's  with the information that Dr. Collins will be over after seeing patients in the office.  This will be around 5 or 5:30 today.   v/u.

## 2022-12-16 NOTE — PROGRESS NOTES
REASON FOR FOLLOWUP/CHIEF COMPLAINT:  High-grade glioma    HISTORY OF PRESENT ILLNESS:   No new issues.  Doing well.  Headed to radiation this morning.  Denies any new neurological symptoms    Past Medical History, Past Surgical History, Social History, Family History have been reviewed and are without significant changes except as mentioned.    Review of Systems   Review of Systems   Constitutional: Negative for activity change.   HENT: Negative for nosebleeds and trouble swallowing.    Respiratory: Negative for shortness of breath and wheezing.    Cardiovascular: Negative for chest pain and palpitations.   Gastrointestinal: Negative for constipation, diarrhea and nausea.   Genitourinary: Negative for dysuria and hematuria.   Musculoskeletal: Negative for arthralgias and myalgias.   Skin: Negative for rash and wound.   Neurological: Negative for seizures and syncope.   Hematological: Negative for adenopathy. Does not bruise/bleed easily.   Psychiatric/Behavioral: Negative for confusion.       Medications:  The current medication list was reviewed in the EMR    ALLERGIES:    Allergies   Allergen Reactions   • Morphine Anaphylaxis     Hives and throat swelling   • Peach [Prunus Persica] Anaphylaxis   • Penicillins Anaphylaxis   • Metformin Diarrhea              Vitals:    12/15/22 0500 12/15/22 1216 12/15/22 2038 12/16/22 0558   BP: 111/71 116/77 105/62 106/69   BP Location: Left arm Left arm Left arm Left arm   Patient Position: Lying Sitting Lying Lying   Pulse: 69 71 73 67   Resp: 17 20 20 20   Temp: 97.5 °F (36.4 °C) 97.6 °F (36.4 °C) 97.2 °F (36.2 °C) 97.2 °F (36.2 °C)   TempSrc: Oral Oral Oral Oral   SpO2: 97% 98% 94% 92%   Weight:       Height:         Physical Exam    CONSTITUTIONAL:  Vital signs reviewed.  No distress, looks comfortable.  EYES:  Conjunctivae and lids unremarkable.  PERRLA  EARS, NOSE, MOUTH, THROAT:  Ears and nose appear unremarkable.  Lips, teeth, gums appear  unremarkable.  RESPIRATORY:  Normal respiratory effort.  Lungs clear to auscultation bilaterally.  CARDIOVASCULAR:  Normal S1, S2.  No murmurs, rubs or gallops.  No significant lower extremity edema.  GASTROINTESTINAL: Abdomen appears unremarkable.  Nontender.  No hepatomegaly.  No splenomegaly.  NEURO: Cranial nerves 2-12 grossly intact.  No focal deficits.  Appears to have equal strength all 4 extremities.  MUSCULOSKELETAL:  Unremarkable digits/nails.  No cyanosis or clubbing.  SKIN:  Warm.  No rashes.  PSYCHIATRIC:  Normal judgment and insight.  Normal mood and affect.        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  Intact MGMT expression  • Patient presented with recurrent falls and confusion.  • MRI on 11/22/2022 revealed a 3.2 cm ring-enhancing right supratentorial mass.  There was mass-effect and left midline shift.  • She was started on dexamethasone and Keppra.  • CT chest abdomen pelvis on 11/24/2022 revealed no evidence of progressive metastatic disease.  • S/p stereotactic brain biopsy on 11/28/2022.  • Preliminary pathology revealed high-grade glioma.  It was sent to [facility].  • Preliminary revealed high-grade glioma.  • Patient had radiation simulation on 12/2/2022.  • Plan is to start concurrent low-dose Temodar at 75 mg/m2 daily.   • She is on Decadron 4 mg p.o. every 8 hours.  • Plan is to start Radiation with concurrent Temodar on 12/12/2022.  • Temodar does not require dose reduction due to the elevated liver enzymes.   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  This is associated with a worse prognosis and relative resistance to  alkylating chemotherapy such as temozolomide.  However, temozolomide and radiation remain the recommended treatments.  • Today is day 5 of Temodar radiation.  No clinical evidence of progression.     *Germline BRCA2 and GEORGES mutations.  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  • Please see the note from Dr. Collins, the patient's outpatient oncologist from 12/4/2022.     *Seizure prophylaxis.  • Patient is on Keppra 500 mg p.o. twice daily.  • Patient is not having any symptoms consistent with seizures.     *Elevated liver enzymes.  • ALT was 143 and AST was 71 on 11/28/2022.  • ALT was 1049 and AST was 386 on 12/8/2022.  • Hepatic duplex, 12/12/2022 normal.  • LFTs continue to improve    *Hyponatremia.  Sodium dropping.,  But is a little better today     PLAN:  · Temodar and radiation started 12/12/2022.  · Has follow-up arranged in the office with Dr. Collins on 1/4/2023 and 2/22/2023 and CT CAP 1 week prior.  Patient states the hope is to be discharged from rehab before [place].     Following periodically.  Reviewed rehab MD note, continuing same plan and assessing sodium

## 2022-12-16 NOTE — CONSULTS
"Diabetes Education  Assessment/Teaching    Patient Name:  Christie Avendaño  YOB: 1965  MRN: [id number]  Admit Date:  12/7/2022      Assessment Date:  12/16/2022  Flowsheet Row Most Recent Value   General Information     Referral From: A1c, Database  [A1C 7.7%]   Height 165.1 cm (65\")   Height Method Stated   Weight 110 kg (241 lb 6.5 oz)   Weight Method Bed scale   Are you currently involved in an activity/exercise program?  No   How would you rate your current health? poor   Pregnancy Assessment    Diabetes History    What type of diabetes do you have? Type 2   Length of Diabetes Diagnosis 1 - 5 years   Current DM knowledge good   Have you had diabetes education/teaching in the past? no   Do you test your blood sugar at home? yes   How would you rate your diabetes control? good   What makes it difficult for you to take care of your diabetes or yourself? taking steroids and all the other health issues I am dealing with   Education Preferences    What areas of diabetes would you like to learn about? avoiding high blood sugar, medications for diabetes, testing my blood sugar at home   Nutrition Information    Assessment Topics    Taking Medication - Assessment Needs education   Problem Solving - Assessment Needs education   DM Goals           Flowsheet Row Most Recent Value   DM Education Needs    Meter Has own   Medication Insulin  [discussed insulin therapy with pt,pt has given injections with trulicity]   Problem Solving Hyperglycemia   Healthy Coping Appropriate   Discharge Plan Home, Follow-up with MD   Motivation Moderate   Teaching Method Discussion, Explanation, Teach back, Demonstration   Patient Response Verbalized understanding, Needs reinforcement            Other Comments:  Began discussion with pt about managing her diabetes upon discharge. She states she has a meter at home. She also states she has given injections of trulicity at home. We discussed insulin pens  and pt was shown an insulin " pen and pen needles,but pt has such a weak left hand,she was not able to put needle on pens. Pt states her  could potentially help with putting needles on pens(pt was able to inject the insulin)she states he works at [employer],but insulin doses could be worked around him being home.    Pt was also planning on sending a mesaage via my chart to her MD to look into getting a CGM device.    We have an appointment on Tuesday dec 20,3pm to visit with the  prior to pt discharge          Electronically signed by:  Roula Hogan RN  12/16/22 13:23 EST

## 2022-12-16 NOTE — PROGRESS NOTES
Name: Christie Avendaño ADMIT: 2022   : 1965  PCP: Tiffany Holloway MD    MRN: [id number] LOS: 9 days   AGE/SEX: 57 y.o. female  ROOM: [facility]     Subjective   Subjective     Patient is lying on the bed and does not appear in any major distress.  Denies nausea, vomiting, abdominal pain, chest pain, shortness of breath, palpitations, dizziness.       Objective   Objective   Vital Signs  Temp:  [97.2 °F (36.2 °C)] 97.2 °F (36.2 °C)  Heart Rate:  [67-86] 86  Resp:  [20] 20  BP: ()/(58-69) 92/58  SpO2:  [92 %-94 %] 92 %  on   ;   Device (Oxygen Therapy): room air  Body mass index is 40.17 kg/m².  Physical Exam  Constitutional:       General: She is not in acute distress.  HENT:      Head: Normocephalic and atraumatic.      Mouth/Throat:      Mouth: Mucous membranes are moist.   Eyes:      Extraocular Movements: Extraocular movements intact.      Pupils: Pupils are equal, round, and reactive to light.   Cardiovascular:      Rate and Rhythm: Normal rate.      Pulses: Normal pulses.      Heart sounds: Normal heart sounds.   Pulmonary:      Effort: Pulmonary effort is normal.      Breath sounds: Normal breath sounds.   Abdominal:      General: Bowel sounds are normal. There is no distension.      Palpations: Abdomen is soft.      Tenderness: There is no abdominal tenderness.   Musculoskeletal:      Cervical back: Normal range of motion and neck supple.      Right lower leg: No edema.      Left lower leg: No edema.   Skin:     General: Skin is warm and dry.   Neurological:      General: No focal deficit present.      Mental Status: She is alert and oriented to person, place, and time.   Psychiatric:         Mood and Affect: Mood normal.         Behavior: Behavior normal.       Results Review     I reviewed the patient's new clinical results.  Results from last 7 days   Lab Units 12/15/22  0607 22  0555   WBC 10*3/mm3 12.40* 10.16   HEMOGLOBIN g/dL 12.7 13.0   PLATELETS 10*3/mm3 181 174     Results from  last 7 days   Lab Units 12/16/22  0558 12/15/22  0607 12/14/22  0533 12/13/22  0620   SODIUM mmol/L 130* 128* 131* 130*   POTASSIUM mmol/L 4.9 4.6 4.7 4.9   CHLORIDE mmol/L 93* 93* 94* 92*   CO2 mmol/L 23.6 28.0 26.1 27.8   BUN mg/dL 28* 25* 20 20   CREATININE mg/dL 0.85 0.76 0.72 0.81   GLUCOSE mg/dL 191* 156* 188* 246*   EGFR mL/min/1.73 79.5 91.0 97.1 84.3     Results from last 7 days   Lab Units 12/16/22  0558 12/15/22  0607 12/14/22  0533 12/13/22  0620   ALBUMIN g/dL 4.00 3.90 3.80 3.90   BILIRUBIN mg/dL 0.7 0.7 0.7 0.6   ALK PHOS U/L 160* 158* 159* 168*   AST (SGOT) U/L 51* 52* 50* 60*   ALT (SGPT) U/L 311* 321* 365* 421*     Results from last 7 days   Lab Units 12/16/22  0558 12/15/22  0607 12/14/22  0533 12/13/22  0620   CALCIUM mg/dL 9.5 8.8 9.5 9.3   ALBUMIN g/dL 4.00 3.90 3.80 3.90       Hemoglobin A1C   Date/Time Value Ref Range Status   12/15/2022 0607 7.70 (H) 4.80 - 5.60 % Final     Glucose   Date/Time Value Ref Range Status   12/16/2022 1135 347 (H) 70 - 130 mg/dL Final     Comment:     Meter: [id number] : [id number] Torsten Mikental NA   12/16/2022 0602 178 (H) 70 - 130 mg/dL Final     Comment:     Meter: [id number] : [id number] Nabeel Pikeid NA   12/15/2022 2038 196 (H) 70 - 130 mg/dL Final     Comment:     Meter: [id number] : [id number] Nabeel Pikeid NA   12/15/2022 1612 89 70 - 130 mg/dL Final     Comment:     Meter: [id number] : [id number] Katie Valdivia NA   12/15/2022 1145 198 (H) 70 - 130 mg/dL Final     Comment:     Meter: [id number] : [id number] Katie Valdivia NA   12/15/2022 0519 149 (H) 70 - 130 mg/dL Final     Comment:     Meter: [id number] : [id number] Flaquita ACUÑA   12/14/2022 1957 189 (H) 70 - 130 mg/dL Final     Comment:     Meter: [id number] : [id number] Flaquita Robledo NA       No radiology results for the last day  Scheduled Medications  calcium 500 mg vitamin D 5 mcg (200 UT), 1 tablet, Oral, BID  dexamethasone, 4 mg, Oral, Q8H  enoxaparin,  40 mg, Subcutaneous, Q12H  escitalopram, 10 mg, Oral, Daily  gabapentin, 100 mg, Oral, TID  insulin glargine, 28 Units, Subcutaneous, QAM  insulin lispro, 0-14 Units, Subcutaneous, TID AC  insulin lispro, 8 Units, Subcutaneous, TID With Meals  latanoprost, 1 drop, Both Eyes, Nightly  levETIRAcetam, 500 mg, Oral, BID  levothyroxine, 150 mcg, Oral, Q AM  [START ON 12/17/2022] lisinopril, 10 mg, Oral, Daily  magnesium oxide, 400 mg, Oral, Daily  ondansetron, 8 mg, Oral, Q24H  pantoprazole, 40 mg, Oral, Q AM  polyethylene glycol, 17 g, Oral, Daily  propranolol, 20 mg, Oral, Daily With Breakfast & Dinner  sulfamethoxazole-trimethoprim, 1 tablet, Oral, Once per day on Mon Wed Fri  temozolomide, 140 mg, Oral, Nightly   And  temozolomide, 20 mg, Oral, Nightly   And  temozolomide, 5 mg, Oral, Nightly  valACYclovir, 500 mg, Oral, Q24H    Infusions   Diet  Diet: Regular/House Diet, Diabetic Diets, Vegetarian; Lacto-Ovo Vegetarian (Allows dairy, eggs); Consistent Carbohydrate; Texture: Regular Texture (IDDSI 7); Fluid Consistency: Thin (IDDSI 0)       Assessment/Plan     Active Hospital Problems    Diagnosis  POA   • **Glioma (HCC) [C71.9]  Yes      Resolved Hospital Problems   No resolved problems to display.       57 y.o. female admitted with Glioma (HCC).    Reason for medical consultation--> hyperglycemia/elevated LFTs    1. Glioblastoma, patient is currently being treated with Temodar and radiation by oncology.  On dexamethasone and Keppra for seizure prophylaxis.    2.  History of lung cancer/thyroid cancer/breast cancer/renal cell cancer/melanoma, being managed by oncology.    3.  Neuropathy, Neurontin.    4.  Acute transaminitis, was felt to be medication induced therefore atorvastatin and Tylenol have been discontinued.  Valacyclovir was held but has been resumed now.  LFTs are improving and GI did evaluate and underwent liver ultrasound which revealed only a simple cyst and no evidence of any portal vein DVT.   Autoimmune work-up was negative and CMV, VZV, EBV testing revealed past infection.  Continue to follow LFTs.    5.  Hypertension, on lisinopril and monitor blood pressure closely.    6.  Diabetes mellitus, patient normally takes Trulicity which is on hold for the moment and will further advance Lantus to 40 units subcu daily.  Uncontrolled blood sugars are most likely secondary to steroids.  Lantus needs to be cut back once steroids are being weaned.    7.Hyponatremia, most likely related to elevated blood sugars and is a pseudohyponatremia.  Sodium level is 130 today.    Copied text on this note has been reviewed by me on 12/16/2022    Amador Malik MD  [facility]  12/16/22  14:37 EST

## 2022-12-16 NOTE — PROGRESS NOTES
Recreational Therapy Note    Patient Name: Christie Avendaño   MRN: [id number]    Therapeutic Recreation Eval and Treat (last 12 hours)     Therapeutic Recreation Eval & Treat     Row Name 12/16/22 1300       Therapeutic Recreation Participation    Recreation Therapy Participation puzzles  word search puzzle  -TW    Objectives of Recreation Participation increase;motivation and activity level through successful participation;sense of autonomy by choosing level of participation  -TW    Comment, Recreation Participation max cues to scan to L; mod assist with finding words; additional processing time  -TW    Recreation Therapy Summary of Participation active participation  -TW          User Key  (r) = Recorded By, (t) = Taken By, (c) = Cosigned By    Initials Name Provider Type    TW Kiesha Bello CTRS Recreational Therapist                  PAULA Dupree  12/16/2022

## 2022-12-16 NOTE — THERAPY TREATMENT NOTE
Inpatient Rehabilitation - Physical Therapy Treatment Note       [facility]     Patient Name: Christie Avendaño  : 1965  MRN: [id number]    Today's Date: 2022                    Admit Date: 2022      Visit Dx:     ICD-10-CM ICD-9-CM   1. Impaired functional mobility, balance, gait, and endurance  Z74.09 V49.89   2. 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)   • Non-small cell lung cancer, right (HCC)   • Renal mass, right   • SIRS (systemic inflammatory response syndrome) (HCC)   • Hyperlipidemia   • Malignant melanoma of right lower extremity including hip (HCC)   • High grade glioma    • 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)   • Glioma (HCC)       Past Medical History:   Diagnosis Date   • Arthritis    • Asthma    • BRCA2 positive    • Diabetes mellitus (HCC)    • H/O Liver masses 2017    x3   • H/O Lung masses 2017    1 in right lower lobe and 1 in the left infrahilar location   • H/O Ovarian cyst    • H/O Right adrenal mass (CMS/HCC) 2017   • Lung cancer (HCC)    • Melanoma (HCC)     right foot   • PONV (postoperative nausea and vomiting)    • Thyroid disease        Past Surgical History:   Procedure Laterality Date   • APPENDECTOMY     • BRAIN BIOPSY Right 2022    Procedure: Right frontal stereotactic needle brain biopsy;  Surgeon: Manuel Thompson MD;  [place] [facility];  Service: Neurosurgery;  Laterality: Right;   • BREAST IMPLANT SURGERY Bilateral    • CHOLECYSTECTOMY     • HYSTERECTOMY     •  MASTECTOMY Bilateral    • OVARIAN CYST SURGERY     • THORACOSCOPY VIDEO ASSISTED WITH LOBECTOMY Right 10/9/2020    Procedure: BRONCHOSCOPY, THORACOSCOPY VIDEO ASSISTED WITH ANTERIOR BASAL SEGMENTECTOMY, INTERCOSTAL NERVE BLOCK;  Surgeon: Flaca Crisostomo MD;  Location: [facility] OR;  Service: Thoracic;  Laterality: Right;   • TONSILLECTOMY         PT ASSESSMENT (last 12 hours)     IRF PT Evaluation and Treatment     Row Name 12/16/22 0810          PT Time and Intention    Document Type daily treatment  -DP     Mode of Treatment individual therapy;physical therapy  -DP     Patient/Family/Caregiver Comments/Observations Pt supine in bed upon PT arrival and agreeable to PT  -DP     Row Name 12/16/22 0810          General Information    Patient Profile Reviewed yes  -DP     Existing Precautions/Restrictions fall  -DP     Row Name 12/16/22 0810          Pain Assessment    Pretreatment Pain Rating 0/10 - no pain  -DP     Posttreatment Pain Rating 0/10 - no pain  -DP     Row Name 12/16/22 0810          Cognition/Psychosocial    Affect/Mental Status (Cognition) flat/blunted affect  -DP     Orientation Status (Cognition) oriented x 4  -DP     Follows Commands (Cognition) follows one-step commands;follows two-step commands;delayed response/completion;increased processing time needed  -DP     Personal Safety Interventions safety round/check completed;elopement precautions initiated;fall prevention program maintained;gait belt;muscle strengthening facilitated;supervised activity;nonskid shoes/slippers when out of bed  -DP     Row Name 12/16/22 0810          Bed Mobility    Rolling Right [place] (Bed Mobility) modified independence  -DP     Supine-Sit [place] (Bed Mobility) supervision;verbal cues  -DP     Assistive Device (Bed Mobility) bed rails;head of bed elevated  -DP     Row Name 12/16/22 0810          Transfer Assessment/Treatment    Transfers car transfer  -DP     Row Name 12/16/22 0810          Bed-Chair  Transfer    Bed-Chair [place] (Transfers) contact guard;minimum assist (75% patient effort)  -DP     Assistive Device (Bed-Chair Transfers) walker, front-wheeled;wheelchair  -DP     Row Name 12/16/22 0810          Sit-Stand Transfer    Sit-Stand [place] (Transfers) contact guard;verbal cues;nonverbal cues (demo/gesture)  -DP     Assistive Device (Sit-Stand Transfers) wheelchair;walker, front-wheeled  -DP     Row Name 12/16/22 0810          Stand-Sit Transfer    Stand-Sit [place] (Transfers) contact guard;verbal cues  -DP     Assistive Device (Stand-Sit Transfers) walker, front-wheeled;wheelchair  -DP     Row Name 12/16/22 0810          Car Transfer    Type (Car Transfer) sit-stand;stand-sit  -DP     [place] Level (Car Transfer) minimum assist (75% patient effort);verbal cues  -DP     Assistive Device (Car Transfer) walker, front-wheeled  -DP     Row Name 12/16/22 0810          Gait/Stairs (Locomotion)    [place] Level (Gait) contact guard;verbal cues;nonverbal cues (demo/gesture)  -DP     Assistive Device (Gait) walker, front-wheeled  -DP     Distance in Feet (Gait) 160'x1, 80  -DP     Pattern (Gait) step-through  -DP     Deviations/Abnormal Patterns (Gait) fabrizio decreased;left sided deviations;stride length decreased;base of support, narrow  -DP     Bilateral Gait Deviations forward flexed posture  -DP     Left Sided Gait Deviations leans left  -DP     Right Sided Gait Deviations weight shift ability decreased  -DP     [place] Level (Stairs) minimum assist (75% patient effort);moderate assist (50% patient effort)  -DP     Handrail Location (Stairs) both sides  -DP     Number of Steps (Stairs) 4  -DP     Ascending Technique (Stairs) step-to-step  -DP     Descending Technique (Stairs) step-to-step  -DP     Stairs, Safety Issues balance decreased during turns;sequencing ability decreased;weight-shifting ability decreased;loses balance backward  pt required VC for sequencing of  descending stairs  -DP     Comment, (Gait/Stairs) Pt navigated cone seperated by 3.5 feet for 40 feet with 100% clearance of cones  -DP     Row Name 12/16/22 0810          Safety Issues, Functional Mobility    Impairments Affecting Function (Mobility) balance;endurance/activity tolerance;coordination;motor control;sensation/sensory awareness;strength  -DP     Row Name 12/16/22 0810          Motor Skills    Therapeutic Exercise hip;knee;ankle  -DP     Row Name 12/16/22 0810          Hip (Therapeutic Exercise)    Hip (Therapeutic Exercise) strengthening exercise  -DP     Hip Strengthening (Therapeutic Exercise) bilateral;marching while seated;1 lb free weight;10 repetitions  -DP     Row Name 12/16/22 0810          Knee (Therapeutic Exercise)    Knee (Therapeutic Exercise) strengthening exercise  -DP     Knee Strengthening (Therapeutic Exercise) bilateral;LAQ (long arc quad);1 lb free weight;hamstring curls;red;resistance band;10 repetitions  -DP     Row Name 12/16/22 0810          Ankle (Therapeutic Exercise)    Ankle (Therapeutic Exercise) strengthening exercise  -DP     Ankle Strengthening (Therapeutic Exercise) bilateral;dorsiflexion;eversion;red;resistance band;10 repetitions  -DP     Row Name 12/16/22 0810          Positioning and Restraints    Pre-Treatment Position in bed  -DP     Post Treatment Position wheelchair  -DP     In Wheelchair sitting;encouraged to call for assist;exit alarm on;with nsg  -DP           User Key  (r) = Recorded By, (t) = Taken By, (c) = Cosigned By    Initials Name Provider Type    Dillan Gallardo, PT Physical Therapist              Wound Right scalp Incision (Active)   Dressing Appearance open to air 12/16/22 0900   Closure Open to air 12/15/22 2149   Base clean;dry;scab 12/16/22 0900   Periwound intact;dry 12/15/22 2149   Periwound Temperature warm 12/15/22 2149   Periwound Skin Turgor soft 12/15/22 2149   Drainage Amount none 12/16/22 0900     Physical Therapy Education     Title:  PT OT SLP Therapies (Done)     Topic: Physical Therapy (Done)     Point: Mobility training (Done)     Learning Progress Summary           Patient Acceptance, E,D, VU,DU,NR by DP at 12/16/2022 0811    Acceptance, E,D, VU,DU by DP at 12/15/2022 1606    Acceptance, E,D, VU,DU,NR by DP at 12/15/2022 1147    Comment: transfer training on this date    Acceptance, E, NR by  at 12/13/2022 0923    Acceptance, E, VU by WN at 12/12/2022 2327    Acceptance, E, VU,DU,NR by JK at 12/12/2022 1011    Acceptance, E, VU by WN at 12/12/2022 0155    Acceptance, E, VU,NR by  at 12/10/2022 1035    Acceptance, E,D, VU,DU,NR by JK at 12/9/2022 0853    Acceptance, E,TB,D, VU,DU,NR by  at 12/8/2022 1420    Comment: Goals, POC    Acceptance, E, VU by CB at 12/8/2022 1204   Significant Other Acceptance, E,TB,D, VU,DU,NR by KP at 12/8/2022 1420    Comment: Goals, POC                   Point: Home exercise program (Done)     Learning Progress Summary           Patient Acceptance, E,D, VU,DU,NR by DP at 12/16/2022 0811    Acceptance, E,D, VU,DU by DP at 12/15/2022 1606    Acceptance, E,D, VU,DU,NR by DP at 12/15/2022 1147    Comment: transfer training on this date    Acceptance, E, NR by  at 12/13/2022 0923    Acceptance, E, VU by WN at 12/12/2022 2327    Acceptance, E, VU by WN at 12/12/2022 0155    Acceptance, E, VU,NR by  at 12/10/2022 1035    Acceptance, E,D, VU,DU,NR by JK at 12/9/2022 0853    Acceptance, E,TB,D, VU,DU,NR by  at 12/8/2022 1420    Comment: Goals, POC    Acceptance, E, VU by CB at 12/8/2022 1204   Significant Other Acceptance, E,TB,D, VU,DU,NR by KP at 12/8/2022 1420    Comment: Goals, POC                   Point: Body mechanics (Done)     Learning Progress Summary           Patient Acceptance, E,D, VU,DU,NR by DP at 12/16/2022 0811    Acceptance, E,D, VU,DU by DP at 12/15/2022 1606    Acceptance, E,D, VU,DU,NR by DP at 12/15/2022 1147    Comment: transfer training on this date    Acceptance, E, NR by  at  12/13/2022 0923    Acceptance, E, VU by WN at 12/12/2022 2327    Acceptance, E, VU by WN at 12/12/2022 0155    Acceptance, E, VU,NR by  at 12/10/2022 1035    Acceptance, E, VU by CB at 12/8/2022 1204                   Point: Precautions (Done)     Learning Progress Summary           Patient Acceptance, E,D, VU,DU,NR by DP at 12/16/2022 0811    Acceptance, E,D, VU,DU by DP at 12/15/2022 1606    Acceptance, E,D, VU,DU,NR by DP at 12/15/2022 1147    Comment: transfer training on this date    Acceptance, E, NR by  at 12/13/2022 0923    Acceptance, E, VU by WN at 12/12/2022 2327    Acceptance, E, VU by WN at 12/12/2022 0155    Acceptance, E, VU,NR by  at 12/10/2022 1035    Acceptance, E, VU by CB at 12/8/2022 1204                               User Key     Initials Effective Dates Name Provider Type Discipline     06/16/21 -  Radha Ramírez, PT Physical Therapist PT    JK 06/16/21 -  Samantha Thompson, PT Physical Therapist PT    KP 06/16/21 -  Anita Serrano, PT Physical Therapist PT    WN 08/23/22 -  Doc Park RN Registered Nurse Nurse    DP 08/24/21 -  Dillan Calderon, PT Physical Therapist PT    CB 11/10/22 -  Mackenzie Hopkins CCC-SLP Speech and Language Pathologist SLP                PT Recommendation and Plan                          Time Calculation:      PT Charges     Row Name 12/16/22 1443 12/16/22 1138          Time Calculation    Start Time 1400  -DP 1100  -DP     Stop Time 1430  -DP 1130  -DP     Time Calculation (min) 30 min  -DP 30 min  -DP     PT Received On -- 12/16/22  -DP     PT - Next Appointment -- 12/17/22  -DP        Time Calculation- PT    Total Timed Code Minutes- PT 30 minute(s)  -DP 30 minute(s)  -DP           User Key  (r) = Recorded By, (t) = Taken By, (c) = Cosigned By    Initials Name Provider Type    DP Dillan Calderon, PT Physical Therapist                Therapy Charges for Today     Code Description Service Date Service Provider Modifiers Qty    95829241460 HC PT NEUROMUSC RE  EDUCATION EA 15 MIN 12/15/2022 Dillan Calderon, PT GP 1    64757670281 HC PT THERAPEUTIC ACT EA 15 MIN 12/15/2022 KvngDillan hollingsworth, PT GP 1    99630869750 HC PT THERAPEUTIC ACT EA 15 MIN 12/15/2022 Kvng, Dillan, PT GP 1    35582825199 HC PT THER PROC EA 15 MIN 12/15/2022 KvngDillan hollingsworth, PT GP 1    45742886651 HC PT THERAPEUTIC ACT EA 15 MIN 12/16/2022 KvngDillan hollingsworth, PT GP 2    85533233640 HC PT THER PROC EA 15 MIN 12/16/2022 KvngDillan hollingsworth, PT GP 1    76799189714 HC PT NEUROMUSC RE EDUCATION EA 15 MIN 12/16/2022 Dillan Calderon, PT GP 1              Patient was wearing a face mask during this therapy encounter. Therapist used appropriate personal protective equipment including mask and gloves.  Mask used was standard procedure mask. Appropriate PPE was worn during the entire therapy session. Hand hygiene was completed before and after therapy session. Patient is not in enhanced droplet precautions.         Dillan Calderon PT  12/16/2022

## 2022-12-16 NOTE — TELEPHONE ENCOUNTER
Caller: Jayesh Avendaño    Relationship: Emergency Contact    Best call back number: [phone]    Who are you requesting to speak with (clinical staff, provider,  specific staff member): DR GRIFFIN      What was the call regarding: PT'S  WANTED TO KNOW IF DR GRIFFIN WILL BE MAKING ROUNDS AT [facility] THIS WEEKEND.  HE WOULD LIKE TO SPEAK TO HIM SOON IN PERSON ABOUT THE PT'S CARE.  HE WILL CALL BACK IF MORE IS NEEDED BEFORE HE GETS A CHANCE TO TALK TO THE , BUT WANTED IT NOTED THAT HE CALLED AND WILL WANT TO SPEAK WITH HIM SOON.    Do you require a callback: NO

## 2022-12-16 NOTE — PROGRESS NOTES
LOS: 9 days   Patient Care Team:  Tiffany Holloway MD as PCP - General (Internal Medicine)  Mathew Collins Jr., MD as Consulting Physician (Hematology and Oncology)  Dorian Sabillon MD as Surgeon (General Surgery)  Thang Dumont, JOSEFA (Optometry)  Lamine Cantu DO as Referring Physician (Family Medicine)  Hali Rolle MD as Gynecologist (Gynecology)      HERON MAGANA  1965    Diagnoses    1. 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 Endorses mild headache to us but per therapy she has deferred some therapies due to extreme headache. Had radiation this am. Patient denies fever/chills/sob/chest pain and denies issue with sleep/appetite/bladder and bowels.     Per therapy, performance in therapies is decreasing.        Objective     Vitals:    12/16/22 0558   BP: 106/69   Pulse: 67   Resp: 20   Temp: 97.2 °F (36.2 °C)   SpO2: 92%       PHYSICAL EXAM:   MENTAL STATUS -  AWAKE / ALERT  HEENT-right scalp incision with staples removed.  Clean dry and intact  SCLERAE ANICTERIC, CONJUNCTIVAE PINK, EARS UNREMARKABLE EXTERNALLY  LUNGS - CTA, NO WHEEZES, RALES OR RHONCHI  HEART- RRR, NO RUB, MURMUR, OR GALLOP  ABD - NORMOACTIVE BOWEL SOUNDS, SOFT, NT.     EXT - NO EDEMA OR CYANOSIS  NEURO -oriented       Speech was fluent with slightly slow rate of speech.  Extraocular movements intact.  No dysarthria.  MOTOR EXAM - RUE/RLE 5/5.   LUE/LLE 5/5.    Impaired motor control in the left upper extremity and left lower extremity, improving dexterity of left hand       MEDICATIONS  Scheduled Meds:calcium 500 mg vitamin D 5 mcg (200 UT), 1 tablet, Oral, BID  dexamethasone, 4 mg, Oral, Q8H  enoxaparin, 40 mg, Subcutaneous, Q12H  escitalopram, 10 mg, Oral, Daily  gabapentin, 100 mg, Oral, TID  insulin glargine, 28 Units, Subcutaneous, QAM  insulin lispro, 0-14 Units, Subcutaneous, TID AC  insulin lispro, 8 Units, Subcutaneous, TID With Meals  latanoprost, 1 drop, Both Eyes, Nightly  levETIRAcetam, 500 mg, Oral, BID  levothyroxine, 150 mcg, Oral, Q AM  [START ON 12/17/2022] lisinopril, 10 mg, Oral, Daily  magnesium oxide, 400 mg, Oral, Daily  ondansetron, 8 mg, Oral, Q24H  pantoprazole, 40 mg, Oral, Q AM  polyethylene glycol, 17 g, Oral, Daily  propranolol, 20 mg, Oral, Daily With Breakfast & Dinner  sulfamethoxazole-trimethoprim, 1 tablet, Oral, Once per day on Mon Wed Fri  temozolomide, 140 mg, Oral, Nightly   And  temozolomide, 20 mg, Oral, Nightly   And  temozolomide, 5 mg, Oral, Nightly  valACYclovir, 500 mg, Oral, Q24H      Continuous Infusions:   PRN Meds:.•  calcium carbonate  •  dextrose  •  dextrose  •  famotidine  •  glucagon (human recombinant)  •  influenza vaccine  •  nitroglycerin  •  ondansetron **OR** ondansetron  •  senna-docusate sodium      RESULTS  Glucose   Date/Time Value Ref Range Status   12/16/2022 0602 178 (H) 70 - 130 mg/dL Final     Comment:     Meter: [id number] : [id number] Nabeel Pacheco NA   12/15/2022 2038 196 (H) 70 - 130 mg/dL Final     Comment:     Meter: [id number] : [id number] Nabeel Pikeid NA   12/15/2022 1612 89 70 - 130 mg/dL Final     Comment:     Meter: [id number] : [id number] Katie Valdivia NA   12/15/2022 1145 198 (H) 70 - 130 mg/dL Final     Comment:     Meter: [id number] : [id number] Katie ACUÑA   12/15/2022 0519 149 (H) 70 - 130 mg/dL Final     Comment:     Meter: [id number] : [id number]  Flaquita Robledo NA   12/14/2022 1957 189 (H) 70 - 130 mg/dL Final     Comment:     Meter: [id number] : [id number] Flaquita Shahon NA   12/14/2022 1559 112 70 - 130 mg/dL Final     Comment:     Meter: [id number] : [id number] Katie Mendeza NA   12/14/2022 1111 294 (H) 70 - 130 mg/dL Final     Comment:     Meter: [id number] : [id number] Katie Moctezumayana NA     Results from last 7 days   Lab Units 12/15/22  0607 12/12/22  0555   WBC 10*3/mm3 12.40* 10.16   HEMOGLOBIN g/dL 12.7 13.0   HEMATOCRIT % 38.2 38.9   PLATELETS 10*3/mm3 181 174     Results from last 7 days   Lab Units 12/16/22  0558 12/15/22  0607 12/14/22  0533   SODIUM mmol/L 130* 128* 131*   POTASSIUM mmol/L 4.9 4.6 4.7   CHLORIDE mmol/L 93* 93* 94*   CO2 mmol/L 23.6 28.0 26.1   BUN mg/dL 28* 25* 20   CREATININE mg/dL 0.85 0.76 0.72   CALCIUM mg/dL 9.5 8.8 9.5   BILIRUBIN mg/dL 0.7 0.7 0.7   ALK PHOS U/L 160* 158* 159*   ALT (SGPT) U/L 311* 321* 365*   AST (SGOT) U/L 51* 52* 50*   GLUCOSE mg/dL 191* 156* 188*       ASSESSMENT and PLAN    Glioma (HCC)    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  She reports intolerance to gabapentin, intolerances to opioids.  Tramadol comes up as contraindicated with history of anaphylactic secondary to morphine.  Valproate for headache control not an option with her liver changes  Dec 13: patient reports intolerance to gabapentin was drowsiness on 300 mg dose. Agreeable to try 100 mg tid for HA.   Dec 14: Will add propranolol 20mg BID for headache. Holding parameters in place.      Left side weakness/incoordination/impaired mobility     Radiation therapy/Temodar to start December 12, 2022  Decadron 4 mg every 8 hourly  Dec 9: Oncology aware of increase of liver enzymes. They are following along   December 12:  Radiation therapy and Temodar initiating today  Dec 14: Temodar Radiation, fraction 3 today.   Dec 15: Today is day 4 of Temodar Radiation. Per oncology no clinical signs of progression.      Leukocytosis-steroid/stress response.  Incision healing.      Elevated LFTs  Dec 8: Labs significant for ALT 1049 (143 11/28),  (71 11/28), Alk phos 190 (96 11/28). Taking minimal tylenol and statin is a home med- discontinued. Consulted IM who also consulted pharmacy and GI. They also decreased valtrex to 1g daily (she was taking daily prophylactic valtrex 2g bid). Awaiting liver ultrasound. CPK normal.   Dec 9- Ongoing workup. Liver ultrasound- remarkable for a small hepatic cyst otherwise negative  Per GI -[ Obtain duplex hepatic ultrasound to further assess for thrombosis  Obtain GEMMA, IgG profile, ASMA, EBV, CMV, HSV, VZV to rule out autoimmune versus viral cause to elevation.].   Patient reviewed with internal medicine and medical oncology  December 12-transaminases elevated but trending better.   Portal hepatic duplex unremarkable  Dec 14: Continued decrease of LFTs.     Diabetes mellitus-Trulicity at home.  On Lantus/Humalog  December 13-Lantus increased to 28 units a.m.  On Humalog 8 units 3 times daily with meals  Dec 14: Continued elevation of blood sugars, will ask IM to provide insight.   Dec 15: Lantus increased to 35 daily yesterday with improved readings.      Hyponatremia   Dec 15: Na 128 today, 131 yesterday. IM ordered serum and urine osmolality and urine sodium. Deferring to nephrology consult to us. Will repeat Cmp in am.   Dec 16: Na 130 today, urine osmols 495 and urine Na 80. Continue to monitor.     Seizure prophylaxis-Keppra     DVT prophylaxis-Lovenox/SCDs     GI prophylaxis-protein pump inhibitor  Add calcium and vitamin D with ongoing steroids     Chronic Valtrex-dose decreased to 1000 mg daily     Germline BRCA2 and GEORGES mutations.  • Patient has history of Papillary thyroid cancer, Bilateral  DCIS, Left clear cell renal cell carcinoma, Left lower lobe NSC Lung cancer (adenocarcinoma with lipidic growth pattern), Right lower lobe NSC lung (adenocarcinoma with lipidic growth pattern) and Melanoma of right heel.     Depression-Lexapro     Hypothyroidism-on replacement     Hypertension   Dec 16: Will decrease lisinopril to 10mg from 20mg due to addition of propranolol for headaches. Holding parameters in place for propranolol of SBP <110 and HR <60    TEAM CONF - DEC 13 - BED SBA. TRANSFERS MIN CTG. GAIT 160 FEET CTG. LEANS LEFT. TOILET TRANSFERS CTG. SHOWER TRANSFERS CTG MIN. LBD MIN. UBD MIN. BATH MIN. GROOMING SBA. TOILETING CTG MIN. IMPAIRED ATTENTION.IMPAIRED EXECUTIVE FUNCTION. FATIGUES WITH COGNITIVE TASKS. CONTINENT BOWEL AND BLADDER. SCALP INCISION HEALING.   XRT/TEMODAR. ELEVATED TRANSAMINASES TREND BETTER.   ELOS - 2 WEEKS    Patient seen and discussed with attending physician Dr. Araujo, please see attestation.     Gina Florence DO   Physical Medicine and Rehabilitation   PGY-4     During rounds, used appropriate personal protective equipment including mask and gloves.  Additional gown if indicated.  Mask used was standard procedure mask. Appropriate PPE was worn during the entire visit.  Hand hygiene was completed before and after.

## 2022-12-16 NOTE — PROGRESS NOTES
Inpatient Rehabilitation Plan of Care Note    Plan of Care  Care Plan Reviewed - No updates at this time.    Psychosocial    Performed Intervention(s)  Support/peer groups.  Verbalizes needs and concerns.  Medication.      Sphincter Control    Performed Intervention(s)  Offer restroom during hourly rounding.  Encourage fluid intake.  Monitor Is and Os.  Timed voids.  Encourage appropriate diet.      Safety    Performed Intervention(s)  Items w/i reach.  Safety rounds.  Bed and chair alarm. Blue armband  Falls precautions.    Signed by: Gina Eldridge RN

## 2022-12-16 NOTE — THERAPY PROGRESS REPORT/RE-CERT
Inpatient Rehabilitation - Occupational Therapy Progress Note    [facility]     Patient Name: Christie Avendaño  : 1965  MRN: [id number]    Today's Date: 2022                 Admit Date: 2022         ICD-10-CM ICD-9-CM   1. Impaired functional mobility, balance, gait, and endurance  Z74.09 V49.89   2. 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)   • Non-small cell lung cancer, right (HCC)   • Renal mass, right   • SIRS (systemic inflammatory response syndrome) (HCC)   • Hyperlipidemia   • Malignant melanoma of right lower extremity including hip (HCC)   • High grade glioma    • 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)   • Glioma (HCC)       Past Medical History:   Diagnosis Date   • Arthritis    • Asthma    • BRCA2 positive    • Diabetes mellitus (HCC)    • H/O Liver masses 2017    x3   • H/O Lung masses 2017    1 in right lower lobe and 1 in the left infrahilar location   • H/O Ovarian cyst    • H/O Right adrenal mass (CMS/HCC) 2017   • Lung cancer (HCC)    • Melanoma (HCC)     right foot   • PONV (postoperative nausea and vomiting)    • Thyroid disease        Past Surgical History:   Procedure Laterality Date   • APPENDECTOMY     • BRAIN BIOPSY Right 2022    Procedure: Right frontal stereotactic needle brain biopsy;  Surgeon: Manuel Thompson MD;  Location: [facility];  Service: Neurosurgery;  Laterality: Right;   • BREAST IMPLANT SURGERY Bilateral    • CHOLECYSTECTOMY     • HYSTERECTOMY     • MASTECTOMY  Bilateral    • OVARIAN CYST SURGERY     • THORACOSCOPY VIDEO ASSISTED WITH LOBECTOMY Right 10/9/2020    Procedure: BRONCHOSCOPY, THORACOSCOPY VIDEO ASSISTED WITH ANTERIOR BASAL SEGMENTECTOMY, INTERCOSTAL NERVE BLOCK;  Surgeon: Flaca Crisostomo MD;  Location: [facility] OR;  Service: Thoracic;  Laterality: Right;   • TONSILLECTOMY               IRF OT ASSESSMENT FLOWSHEET (last 12 hours)     IRF OT Evaluation and Treatment     Row Name 12/16/22 1538          OT Time and Intention    Document Type progress note;daily treatment  -CC     Mode of Treatment occupational therapy;individual therapy  -CC     Patient Effort good  -CC     Symptoms Noted During/After Treatment fatigue  -CC     Comment, Evaluation/Treatment Not Performed headache in am; rescheduled for all PM treatment  -CC     Row Name 12/16/22 1538          Pain Assessment    Pretreatment Pain Rating 8/10  -CC     Posttreatment Pain Rating 8/10  -CC     Pain Location - head  -CC     Additional Documentation --  in am  -CC     Row Name 12/16/22 1538          Cognition/Psychosocial    Affect/Mental Status (Cognition) flat/blunted affect  -     Orientation Status (Cognition) oriented x 4  -CC     Row Name 12/16/22 1538          Vision Assessment/Intervention    Visual Processing Deficit --  max vc to follwo pattern acrd for small peg design. Pt neglected L side of pattern. able to follow through with w one on one step directions  -     Row Name 12/16/22 1538          Bathing    [place] Level (Bathing) bathing skills;upper body;lower body;contact guard assist;minimum assist (75% patient effort)  -     Assistive Device (Bathing) hand held shower spray hose;grab bar/tub rail;shower chair  -     Position (Bathing) supported sitting;sink side  -     Set-up Assistance (Bathing) obtain supplies;adjust water temperature  -CC     Row Name 12/16/22 1538          Upper Body Dressing    [place] Level (Upper Body Dressing) upper body dressing  skills;doff;don;pull over garment;supervision  -     Position (Upper Body Dressing) supported sitting  -     Set-up Assistance (Upper Body Dressing) obtain clothing  -     Row Name 12/16/22 1538          Lower Body Dressing    [place] Level (Lower Body Dressing) doff;don;pants/bottoms;shoes/slippers;socks;set up;verbal cues;minimum assist (75% patient effort)  -     Position (Lower Body Dressing) supported sitting;supported standing  -     Row Name 12/16/22 1538          Grooming    [place] Level (Grooming) grooming skills;deodorant application;oral care regimen;wash face, hands;set up;standby assist  -     Position (Grooming) sink side;supported sitting  -     Set-up Assistance (Grooming) obtain supplies  -[facility] Name 12/16/22 1538          Toileting    [place] Level (Toileting) toileting skills;adjust/manage clothing;perform perineal hygiene;set up assistance;verbal cues;moderate assist (50% patient effort)  -     Position (Toileting) supported sitting;supported standing  -     Set-up Assistance (Toileting) obtain supplies  -[facility] Name 12/16/22 1538          Bed Mobility    Scooting/Bridging [place] (Bed Mobility) standby assist  -     Sit-Supine [place] (Bed Mobility) minimum assist (75% patient effort)  -     Assistive Device (Bed Mobility) bed rails;head of bed elevated  -[facility] Name 12/16/22 1538          Chair-Bed Transfer    Chair-Bed [place] (Transfers) contact guard;nonverbal cues (demo/gesture);verbal cues  -     Assistive Device (Chair-Bed Transfers) wheelchair  -     Row Name 12/16/22 1538          Sit-Stand Transfer    Sit-Stand [place] (Transfers) contact guard;verbal cues;nonverbal cues (demo/gesture)  -     Assistive Device (Sit-Stand Transfers) wheelchair  -     Row Name 12/16/22 1538          Stand-Sit Transfer    Stand-Sit [place] (Transfers) contact guard;verbal cues  -     Assistive Device (Stand-Sit Transfers)  wheelchair  -[facility] Name 12/16/22 1538          Toilet Transfer    Type (Toilet Transfer) sit-stand;stand-sit  -     [place] Level (Toilet Transfer) contact guard  -     Assistive Device (Toilet Transfer) wheelchair;grab bars/safety frame;raised toilet seat  -[facility] Name 12/16/22 1538          Shower Transfer    Type (Shower Transfer) sit-stand;stand-sit;stand pivot/stand step  -     [place] Level (Shower Transfer) set up;minimum assist (75% patient effort);verbal cues  -     Assistive Device (Shower Transfer) grab bar, tub/shower;tub bench  -[facility] Name 12/16/22 1538          Motor Skills    Motor Skills functional endurance;coordination  -     Coordination fine motor deficit;upper extremity;left  min difficulty w in hand manipulation  -[facility] Name 12/16/22 1538          Balance    Static Sitting Balance standby assist;verbal cues  -     Static Standing Balance contact guard;minimal assist  -CC     Row Name 12/16/22 1538          Positioning and Restraints    Pre-Treatment Position sitting in chair/recliner  -     Post Treatment Position bed  -     In Bed fowlers;call light within reach;encouraged to call for assist;exit alarm on  -[facility] Name 12/16/22 1538          Weekly Progress Summary (OT)    Overall Progress Toward Functional Goals (OT) progressing toward functional goals slower than expected  -     Weekly Progress Summary (OT) Pt fatigued and having frequent headaches. Pt performing better w ADls. Transfers and balance fluccuate. FMC and visual neglectcontinue to be an issue.  -[facility] Name 12/16/22 1538          Transfer Goal 1 (OT-IRF)    Progress/Outcomes (Transfer Goal 1, OT-IRF) continuing progress toward goal  -[facility] Name 12/16/22 1538          Transfer Goal 2 (OT-IRF)    Progress/Outcomes (Transfer Goal 2, OT-IRF) continuing progress toward goal  -[facility] Name 12/16/22 1538          Bathing Goal 1 (OT-IRF)    Progress/Outcomes (Bathing Goal 1,  OT-IRF) continuing progress toward goal  -     Row Name 12/16/22 1538          UB Dressing Goal 1 (OT-IRF)    Progress/Outcomes (UB Dressing Goal 1, OT-IRF) continuing progress toward goal  -     Row Name 12/16/22 1538          LB Dressing Goal 1 (OT-IRF)    Progress/Outcomes (LB Dressing Goal 1, OT-IRF) continuing progress toward goal  -     Row Name 12/16/22 1538          LB Dressing Goal 2 (OT-IRF)    Progress/Outcomes (LB Dressing Goal 2, OT-IRF) goal met  -     Row Name 12/16/22 1538          Grooming Goal 1 (OT-IRF)    Progress/Outcomes (Grooming Goal 1, OT-IRF) goal met  -     Row Name 12/16/22 1538          Toileting Goal 1 (OT-IRF)    Progress/Outcomes (Toileting Goal 1, OT-IRF) continuing progress toward goal  -[facility] Name 12/16/22 1538          Strength Goal 1 (OT-IRF)    Progress/Outcomes (Strength Goal 1, OT-IRF) continuing progress toward goal  -     Row Name 12/16/22 1538          Balance Goal 1 (OT)    Progress/Outcomes (Balance Goal 1, OT) continuing progress toward goal  -     Row Name 12/16/22 1538           Endurance Goal 1 (OT)    Progress/Outcome (Endurance Goal 1, OT) goal met  -[facility] Name 12/16/22 1538          Coordination Goal 1 (OT)    Progress/Outcomes (Coordination Goal 1, OT) continuing progress toward goal  -     Row Name 12/16/22 1538          Coordination Goal 2 (OT)    Progress/Outcomes (Coordination Goal 2, OT) continuing progress toward goal  -           User Key  (r) = Recorded By, (t) = Taken By, (c) = Cosigned By    Initials Name Effective Dates    CC Yissel Goel, OTR 06/16/21 -                  Occupational Therapy Education     Title: PT OT SLP Therapies (Done)     Topic: Occupational Therapy (Done)     Point: ADL training (Done)     Description:   Instruct learner(s) on proper safety adaptation and remediation techniques during self care or transfers.   Instruct in proper use of assistive devices.              Learning Progress Summary            Patient Acceptance, E, VU by WN at 12/12/2022 2327    Acceptance, E, VU by WN at 12/12/2022 0155    Acceptance, E, VU by CB at 12/8/2022 1204    Acceptance, E, VU,NR by CE at 12/8/2022 1107    Comment: fall prevention, DME including w/c and shower chair                   Point: Home exercise program (Done)     Description:   Instruct learner(s) on appropriate technique for monitoring, assisting and/or progressing therapeutic exercises/activities.              Learning Progress Summary           Patient Acceptance, E, VU by WN at 12/12/2022 2327    Acceptance, E, VU by WN at 12/12/2022 0155    Acceptance, E, VU by CB at 12/8/2022 1204    Acceptance, E, VU,NR by CE at 12/8/2022 1107    Comment: fall prevention, DME including w/c and shower chair                   Point: Precautions (Done)     Description:   Instruct learner(s) on prescribed precautions during self-care and functional transfers.              Learning Progress Summary           Patient Acceptance, E, VU by WN at 12/12/2022 2327    Acceptance, E, VU by WN at 12/12/2022 0155    Acceptance, E, VU by CB at 12/8/2022 1204    Acceptance, E, VU,NR by CE at 12/8/2022 1107    Comment: fall prevention, DME including w/c and shower chair                   Point: Body mechanics (Done)     Description:   Instruct learner(s) on proper positioning and spine alignment during self-care, functional mobility activities and/or exercises.              Learning Progress Summary           Patient Acceptance, E, VU by WN at 12/12/2022 2327    Acceptance, E, VU by WN at 12/12/2022 0155    Acceptance, E, VU by CB at 12/8/2022 1204    Acceptance, E, VU,NR by CE at 12/8/2022 1107    Comment: fall prevention, DME including w/c and shower chair                               User Key     Initials Effective Dates Name Provider Type Discipline    JACK 08/23/22 -  Doc Park, RN Registered Nurse Nurse    CB 11/10/22 -  Mackenzie Hopkins CCC-SLP Speech and Language Pathologist SLP     CE 10/17/22 -  Portia Esqueda, OT Occupational Therapist OT                    OT Recommendation and Plan                         Time Calculation:      Time Calculation- OT     Row Name 12/16/22 1500 12/16/22 1330          Time Calculation- OT    OT Start Time 1500  -CC 1330  -CC     OT Stop Time 1530  -CC 1400  -CC     OT Time Calculation (min) 30 min  -CC 30 min  -CC     OT Goal Re-Cert Due Date -- 12/22/22  -CC           User Key  (r) = Recorded By, (t) = Taken By, (c) = Cosigned By    Initials Name Provider Type    CC Yissel Goel OTR Occupational Therapist              Therapy Charges for Today     Code Description Service Date Service Provider Modifiers Qty    41017263683 HC OT SELF CARE/MGMT/TRAIN EA 15 MIN 12/16/2022 Yissel Goel OTR GO 2    [id number] HC OT NEUROMUSC RE EDUCATION EA 15 MIN 12/16/2022 Yissel Goel OTR GO 2                   SHARON Vargas  12/16/2022

## 2022-12-16 NOTE — THERAPY TREATMENT NOTE
Inpatient Rehabilitation - Speech Language Pathology Treatment Note    [facility]     Patient Name: Christie Avendaño  : 1965  MRN: [id number]    Today's Date: 2022                   Admit Date: 2022       Visit Dx:      ICD-10-CM ICD-9-CM   1. Impaired functional mobility, balance, gait, and endurance  Z74.09 V49.89   2. 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)   • Non-small cell lung cancer, right (HCC)   • Renal mass, right   • SIRS (systemic inflammatory response syndrome) (HCC)   • Hyperlipidemia   • Malignant melanoma of right lower extremity including hip (HCC)   • High grade glioma    • 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)   • Glioma (HCC)       Past Medical History:   Diagnosis Date   • Arthritis    • Asthma    • BRCA2 positive    • Diabetes mellitus (HCC)    • H/O Liver masses 2017    x3   • H/O Lung masses 2017    1 in right lower lobe and 1 in the left infrahilar location   • H/O Ovarian cyst    • H/O Right adrenal mass (CMS/HCC) 2017   • Lung cancer (HCC)    • Melanoma (HCC)     right foot   • PONV (postoperative nausea and vomiting)    • Thyroid disease        Past Surgical History:   Procedure Laterality Date   • APPENDECTOMY     • BRAIN BIOPSY Right 2022    Procedure: Right frontal stereotactic needle brain biopsy;  Surgeon: Manuel Thompson MD;  Location: [facility] OR;  Service: Neurosurgery;  Laterality: Right;   • BREAST IMPLANT SURGERY Bilateral    • CHOLECYSTECTOMY     • HYSTERECTOMY      • MASTECTOMY Bilateral    • OVARIAN CYST SURGERY     • THORACOSCOPY VIDEO ASSISTED WITH LOBECTOMY Right 10/9/2020    Procedure: BRONCHOSCOPY, THORACOSCOPY VIDEO ASSISTED WITH ANTERIOR BASAL SEGMENTECTOMY, INTERCOSTAL NERVE BLOCK;  Surgeon: Flaca Crisostomo MD;  Location: [facility];  Service: Thoracic;  Laterality: Right;   • TONSILLECTOMY         SLP Recommendation and Plan                                                            SLP EVALUATION (last 72 hours)     SLP SLC Evaluation     Row Name 12/16/22 1200 12/16/22 0930 12/15/22 1300 12/14/22 1008          Communication Assessment/Intervention    Document Type therapy note (daily note)  -AL therapy note (daily note)  -AL therapy note (daily note)  -NR therapy note (daily note)  -SL     Subjective Information -- -- no complaints  -NR no complaints  -SL     Patient Observations -- -- alert;cooperative;agree to therapy  -NR alert;cooperative;agree to therapy  -SL     Patient/Family/Caregiver Comments/Observations Pt upright in wheelchair in room. She participated well.  -AL Pt participated well.  -AL -- --     Patient Effort -- -- good  -NR good  -SL     Symptoms Noted During/After Treatment -- -- none  -NR none  -SL        General Information    Patient Profile Reviewed -- -- yes  -NR --        Pain Scale: Numbers Pre/Post-Treatment    Pretreatment Pain Rating 0/10 - no pain  -AL 0/10 - no pain  -AL 0/10 - no pain  -NR --     Posttreatment Pain Rating -- -- 0/10 - no pain  -NR --     Pre/Posttreatment Pain Comment -- Pt did not c/o pain  -AL -- --        Memory Skills Goal 1 (SLP)    Improve Memory Skills Through Goal 1 (SLP) -- -- visual memory task  -NR --     Time Frame (Memory Skills Goal 1, SLP) -- -- by discharge  -NR --     Progress (Memory Skills Goal 1, SLP) -- -- 80%;independently (over 90% accuracy);100%;with moderate cues (50-74%)  -NR --     Progress/Outcomes (Memory Skills Goal 1, SLP) -- -- goal ongoing  -NR --     Comment (Memory Skills  Goal 1, SLP) -- -- Able to answer 8/10(80%) immediate memory questions over pictured scene, increased to 10/10 (100%) with moderate verbal/visual cues.  -NR --        Organizational Skills Goal 1 (SLP)    Improve Thought Organization Through Goal 1 (SLP) -- -- concrete  -NR --     Time Frame (Thought Organization Skills Goal 1, SLP) -- -- by discharge  -NR --     Progress (Thought Organization Skills Goal 1, SLP) -- -- 60%;100%;independently (over 90% accuracy);with moderate cues (50-74%)  -NR --     Progress/Outcomes (Thought Organization Skills Goal 1, SLP) -- -- goal ongoing  -NR --     Comment (Thought Organization Skills Goal 1, SLP) -- -- Able to organize 15 words into three concrete categories with 9/15 (60%), increased to 10/10 (100%) with mod cues.  -NR --        Right Hemisphere Function Goal 1 (SLP)    Improve Right Hemisphere Function Through Goal 1 (SLP) -- -- complete visuo-spatial activities (visual closure, trail making, mazes  -NR --     Time Frame (Right Hemisphere Function Goal 1, SLP) -- -- by discharge  -NR --     Progress (Right Hemisphere Function Goal 1, SLP) -- -- 80%;100%;with minimal cues (75-90%);independently (over 90% accuracy)  -NR --     Progress/Outcomes (Right Hemisphere Function Goal 1, SLP) -- -- goal ongoing  -NR --     Comment (Right Hemisphere Function Goal 1, SLP) -- -- followed two step written directions with 8/20 (80%) I'ly, increased to 10/10 (100%) with min cues.  -NR --           User Key  (r) = Recorded By, (t) = Taken By, (c) = Cosigned By    Initials Name Effective Dates    Jemma Kelley, MS CCC-SLP 06/16/21 -     Carmina Rosado MA,CCC-SLP 06/16/21 -     Concetta Fleming, MS CCC-SLP 06/16/21 -                Patient was not wearing a face mask during this therapy encounter. Therapist used appropriate personal protective equipment including mask, eye protection and gloves.  Mask used was standard procedure mask. Appropriate PPE was worn during the entire therapy  session. Hand hygiene was completed before and after therapy session. Patient is not in enhanced droplet precautions.               EDUCATION    The patient has been educated in the following areas:       Cognitive Impairment.             SLP GOALS     Row Name 12/16/22 1200 12/16/22 0930 12/15/22 1300       Memory Skills Goal 1 (SLP)    Improve Memory Skills Through Goal 1 (SLP) -- visual memory task  -AL visual memory task  -NR    Time Frame (Memory Skills Goal 1, SLP) -- -- by discharge  -NR    Progress (Memory Skills Goal 1, SLP) -- 80%;independently (over 90% accuracy);100%;with moderate cues (50-74%)  -AL 80%;independently (over 90% accuracy);100%;with moderate cues (50-74%)  -NR    Progress/Outcomes (Memory Skills Goal 1, SLP) -- goal ongoing  -AL goal ongoing  -NR    Comment (Memory Skills Goal 1, SLP) Diagnostic treatment: Working memory for 4 unit list retention task: 50% with NO cues, 100% with MIN cues. Pt recalled 3/3 errands after 5 minute delay and 20 minute delay with NO cues.  -AL Immediate recall of visual scene: 70% with NO cues, 80% with MAX cues. After rehearsal, recalled 100% of details with NO cues.  -AL Able to answer 8/10(80%) immediate memory questions over pictured scene, increased to 10/10 (100%) with moderate verbal/visual cues.  -NR       Organizational Skills Goal 1 (SLP)    Improve Thought Organization Through Goal 1 (SLP) -- -- concrete  -NR    Time Frame (Thought Organization Skills Goal 1, SLP) -- -- by discharge  -NR    Progress (Thought Organization Skills Goal 1, SLP) -- -- 60%;100%;independently (over 90% accuracy);with moderate cues (50-74%)  -NR    Progress/Outcomes (Thought Organization Skills Goal 1, SLP) -- -- goal ongoing  -NR    Comment (Thought Organization Skills Goal 1, SLP) -- -- Able to organize 15 words into three concrete categories with 9/15 (60%), increased to 10/10 (100%) with mod cues.  -NR       Reasoning Goal 1 (SLP)    Improve Reasoning Through Goal 1 (SLP)  complete logic/creative thinking task;complete mental flexibility task;complete deductive reasoning task;complete analogies;complete high level reasoning task;90%;independently (over 90% accuracy)  -AL -- --    Progress/Outcomes (Reasoning Goal 1, SLP) goal ongoing  -AL -- --    Comment (Reasoning Goal 1, SLP) Paragraph inferencin% with NO cues, 100% with MIN cues.  -AL -- --       Functional Math Skills Goal 1 (SLP)    Improve Functional Math Skills Through Goal 1 (SLP) complete word problems involving money;complete word problems involving time;complete checkbook task;complete functional math task;90%;independently (over 90% accuracy)  -AL -- --    Progress/Outcomes (Functional Math Skills Goal 1, SLP) goal ongoing  -AL -- --    Comment (Functional Math Skills Goal 1, SLP) Adding coins (multiple numbers of each coin):  with NO cues, 7/ with MIN-MOD cues.  -AL -- --       Right Hemisphere Function Goal 1 (SLP)    Improve Right Hemisphere Function Through Goal 1 (SLP) -- complete visuo-spatial activities (visual closure, trail making, mazes  -AL complete visuo-spatial activities (visual closure, trail making, mazes  -NR    Time Frame (Right Hemisphere Function Goal 1, SLP) -- by discharge  -AL by discharge  -NR    Progress (Right Hemisphere Function Goal 1, SLP) -- 80%;100%;with minimal cues (75-90%);independently (over 90% accuracy)  -AL 80%;100%;with minimal cues (75-90%);independently (over 90% accuracy)  -NR    Progress/Outcomes (Right Hemisphere Function Goal 1, SLP) -- goal ongoing  -AL goal ongoing  -NR    Comment (Right Hemisphere Function Goal 1, SLP) -- Deduction calendar task: 3/7 with NO cues, 7/ with MIN-MOD cues for scanning to the left and reasoning.  -AL followed two step written directions with 8/20 (80%) I'ly, increased to 10/10 (100%) with min cues.  -NR    Row Name 22 1008             Memory Skills Goal 1 (SLP)    Progress/Outcomes (Memory Skills Goal 1, SLP) goal ongoing  -SL       Comment (Memory Skills Goal 1, SLP) 24 item hidden picture matching task-48% indep; 24 item hidden face matching task- 25% indep  -SL         Functional Problem Solving Skills Goal 1 (SLP)    Progress/Outcomes (Problem Solving Goal 1, SLP) goal ongoing  -SL      Comment (Problem Solving Goal 1, SLP) chart completion based on multiple sentence clues - kitchen shelf organizaiton- somewhat impulsive with resposnes, decreased attn to details noted; impaired working memory- needed multiple reminders and redirection as to task directives ( 1 item perbox on chart)- 40% indep- accurate performance required min /mod cues  -         Functional Problem Solving Skills Goal 2 (SLP)    Improve Problem Solving Through Goal 2 (SLP) sequence steps in a task;complete organization/home management task  -      Progress/Outcomes (Problem Solving Goal 2, SLP) goal ongoing  -SL      Comment (Problem Solving Goal 2, SLP) sequencing of 6 basic steps for ADL activities- intermittent slef review/correction noted- 66% indep and 100% with min cues;   Additional tsak- following 2 step written directives and marking row of stimulus words accordingly- 60% indep; decreased attn to specifics at tiems, and intermittently marked some but not all of the stimulus words  -SL         Functional Math Skills Goal 1 (SLP)    Progress/Outcomes (Functional Math Skills Goal 1, SLP) goal ongoing  -SL      Comment (Functional Math Skills Goal 1, SLP) determining denomination amounts- 38% indep;  attn to details- with min cues - 100%  -SL         Right Hemisphere Function Goal 1 (SLP)    Progress/Outcomes (Right Hemisphere Function Goal 1, SLP) goal ongoing  -SL      Comment (Right Hemisphere Function Goal 1, SLP) min visual and verbal cues for scanning to left side during ipad memory matching task  -            User Key  (r) = Recorded By, (t) = Taken By, (c) = Cosigned By    Initials Name Provider Type    Jemma Kelley MS CCC-SLP Speech and  Language Pathologist    Carmina Rosado MA,CCC-SLP Speech and Language Pathologist    Concetta Fleming, MS CCC-SLP Speech and Language Pathologist                SLP Outcome Measures (last 72 hours)     SLP Outcome Measures     Row Name 12/15/22 1300             SLP Outcome Measures    Outcome Measure Used? Adult NOMS  -NR         Adult FCM Scores    FCM Chosen Problem Solving  -NR      Problem Solving Score FCM 3  -NR            User Key  (r) = Recorded By, (t) = Taken By, (c) = Cosigned By    Initials Name Effective Dates    NR Carmina Cruz MA,CCC-SLP 06/16/21 -                         Time Calculation:        Time Calculation- SLP     Row Name 12/16/22 1254 12/16/22 1253          Time Calculation- SLP    SLP Start Time 0930  -AL 1200  -AL     SLP Stop Time 1000  -AL 1230  -AL     SLP Time Calculation (min) 30 min  -AL 30 min  -AL           User Key  (r) = Recorded By, (t) = Taken By, (c) = Cosigned By    Initials Name Provider Type    Concetta Fleming, MS CCC-SLP Speech and Language Pathologist                  Therapy Charges for Today     Code Description Service Date Service Provider Modifiers Qty    30800762708 HC ST DEV OF COGN SKILLS INITIAL 15 MIN 12/16/2022 Concetta Keith, MS CCC-SLP  1    62050124849 HC ST DEV OF COGN SKILLS EACH ADDT'L 15 MIN 12/16/2022 Concetta Keith, MS CCC-SLP  3            ADULT NOMS (last 72 hours)     Adult NOMS     Row Name 12/15/22 1300                   Adult FCM Scores    FCM Chosen Problem Solving  -NR        Problem Solving Score FCM 3  -NR              User Key  (r) = Recorded By, (t) = Taken By, (c) = Cosigned By    Initials Name Effective Dates    Carmina Rosado MA,CCC-SLP 06/16/21 -                            Concetta Keith MS CCC-SLP  12/16/2022

## 2022-12-17 LAB
ALBUMIN SERPL-MCNC: 3.7 G/DL (ref 3.5–5.2)
ALBUMIN/GLOB SERPL: 1.4 G/DL
ALP SERPL-CCNC: 156 U/L (ref 39–117)
ALT SERPL W P-5'-P-CCNC: 273 U/L (ref 1–33)
ANION GAP SERPL CALCULATED.3IONS-SCNC: 10.4 MMOL/L (ref 5–15)
AST SERPL-CCNC: 44 U/L (ref 1–32)
BILIRUB SERPL-MCNC: 0.7 MG/DL (ref 0–1.2)
BUN SERPL-MCNC: 24 MG/DL (ref 6–20)
BUN/CREAT SERPL: 31.6 (ref 7–25)
CALCIUM SPEC-SCNC: 9.4 MG/DL (ref 8.6–10.5)
CHLORIDE SERPL-SCNC: 92 MMOL/L (ref 98–107)
CO2 SERPL-SCNC: 24.6 MMOL/L (ref 22–29)
CREAT SERPL-MCNC: 0.76 MG/DL (ref 0.57–1)
EGFRCR SERPLBLD CKD-EPI 2021: 91 ML/MIN/1.73
GLOBULIN UR ELPH-MCNC: 2.6 GM/DL
GLUCOSE BLDC GLUCOMTR-MCNC: 152 MG/DL (ref 70–130)
GLUCOSE BLDC GLUCOMTR-MCNC: 158 MG/DL (ref 70–130)
GLUCOSE BLDC GLUCOMTR-MCNC: 217 MG/DL (ref 70–130)
GLUCOSE SERPL-MCNC: 167 MG/DL (ref 65–99)
POTASSIUM SERPL-SCNC: 4.8 MMOL/L (ref 3.5–5.2)
PROT SERPL-MCNC: 6.3 G/DL (ref 6–8.5)
SODIUM SERPL-SCNC: 127 MMOL/L (ref 136–145)

## 2022-12-17 PROCEDURE — 63710000001 INSULIN LISPRO (HUMAN) PER 5 UNITS: Performed by: PHYSICAL MEDICINE & REHABILITATION

## 2022-12-17 PROCEDURE — 97112 NEUROMUSCULAR REEDUCATION: CPT

## 2022-12-17 PROCEDURE — 82962 GLUCOSE BLOOD TEST: CPT

## 2022-12-17 PROCEDURE — 97129 THER IVNTJ 1ST 15 MIN: CPT

## 2022-12-17 PROCEDURE — 80053 COMPREHEN METABOLIC PANEL: CPT | Performed by: PHYSICAL MEDICINE & REHABILITATION

## 2022-12-17 PROCEDURE — 97535 SELF CARE MNGMENT TRAINING: CPT

## 2022-12-17 PROCEDURE — 97530 THERAPEUTIC ACTIVITIES: CPT

## 2022-12-17 PROCEDURE — 99232 SBSQ HOSP IP/OBS MODERATE 35: CPT | Performed by: INTERNAL MEDICINE

## 2022-12-17 PROCEDURE — 63710000001 DEXAMETHASONE PER 0.25 MG: Performed by: PHYSICAL MEDICINE & REHABILITATION

## 2022-12-17 PROCEDURE — 25010000002 TEMOZOLOMIDE 140 MG CAPSULE: Performed by: INTERNAL MEDICINE

## 2022-12-17 PROCEDURE — 25010000002 ENOXAPARIN PER 10 MG: Performed by: PHYSICAL MEDICINE & REHABILITATION

## 2022-12-17 PROCEDURE — 63710000001 ONDANSETRON PER 8 MG: Performed by: INTERNAL MEDICINE

## 2022-12-17 PROCEDURE — 97130 THER IVNTJ EA ADDL 15 MIN: CPT

## 2022-12-17 PROCEDURE — 63710000001 INSULIN LISPRO (HUMAN) PER 5 UNITS: Performed by: HOSPITALIST

## 2022-12-17 PROCEDURE — 97110 THERAPEUTIC EXERCISES: CPT

## 2022-12-17 PROCEDURE — 25010000002 TEMOZOLOMIDE PER 5 MG: Performed by: INTERNAL MEDICINE

## 2022-12-17 RX ADMIN — LEVOTHYROXINE SODIUM 150 MCG: 0.15 TABLET ORAL at 05:46

## 2022-12-17 RX ADMIN — MAGNESIUM OXIDE 400 MG (241.3 MG MAGNESIUM) TABLET 400 MG: TABLET at 10:37

## 2022-12-17 RX ADMIN — DEXAMETHASONE 4 MG: 4 TABLET ORAL at 14:35

## 2022-12-17 RX ADMIN — LISINOPRIL 10 MG: 10 TABLET ORAL at 08:02

## 2022-12-17 RX ADMIN — PROPRANOLOL HYDROCHLORIDE 20 MG: 20 TABLET ORAL at 07:56

## 2022-12-17 RX ADMIN — ENOXAPARIN SODIUM 40 MG: 100 INJECTION SUBCUTANEOUS at 10:36

## 2022-12-17 RX ADMIN — TEMOZOLOMIDE 140 MG: 140 CAPSULE ORAL at 20:09

## 2022-12-17 RX ADMIN — CALCIUM CARBONATE-VITAMIN D TAB 500 MG-200 UNIT 1 TABLET: 500-200 TAB at 08:02

## 2022-12-17 RX ADMIN — GABAPENTIN 100 MG: 100 CAPSULE ORAL at 15:30

## 2022-12-17 RX ADMIN — VALACYCLOVIR HYDROCHLORIDE 500 MG: 500 TABLET, FILM COATED ORAL at 10:37

## 2022-12-17 RX ADMIN — LATANOPROST 1 DROP: 50 SOLUTION OPHTHALMIC at 20:06

## 2022-12-17 RX ADMIN — TEMOZOLOMIDE 20 MG: 20 CAPSULE ORAL at 20:09

## 2022-12-17 RX ADMIN — POLYETHYLENE GLYCOL 3350 17 G: 17 POWDER, FOR SOLUTION ORAL at 08:01

## 2022-12-17 RX ADMIN — INSULIN GLARGINE-YFGN 28 UNITS: 100 INJECTION, SOLUTION SUBCUTANEOUS at 07:55

## 2022-12-17 RX ADMIN — DEXAMETHASONE 4 MG: 4 TABLET ORAL at 20:05

## 2022-12-17 RX ADMIN — TEMOZOLOMIDE 5 MG: 5 CAPSULE ORAL at 20:09

## 2022-12-17 RX ADMIN — LEVETIRACETAM 500 MG: 500 TABLET, FILM COATED ORAL at 08:02

## 2022-12-17 RX ADMIN — INSULIN LISPRO 8 UNITS: 100 INJECTION, SOLUTION INTRAVENOUS; SUBCUTANEOUS at 12:27

## 2022-12-17 RX ADMIN — GABAPENTIN 100 MG: 100 CAPSULE ORAL at 10:36

## 2022-12-17 RX ADMIN — CALCIUM CARBONATE-VITAMIN D TAB 500 MG-200 UNIT 1 TABLET: 500-200 TAB at 20:05

## 2022-12-17 RX ADMIN — ONDANSETRON HYDROCHLORIDE 8 MG: 8 TABLET, FILM COATED ORAL at 12:28

## 2022-12-17 RX ADMIN — ENOXAPARIN SODIUM 40 MG: 100 INJECTION SUBCUTANEOUS at 20:06

## 2022-12-17 RX ADMIN — INSULIN LISPRO 3 UNITS: 100 INJECTION, SOLUTION INTRAVENOUS; SUBCUTANEOUS at 12:27

## 2022-12-17 RX ADMIN — INSULIN LISPRO 3 UNITS: 100 INJECTION, SOLUTION INTRAVENOUS; SUBCUTANEOUS at 07:55

## 2022-12-17 RX ADMIN — INSULIN LISPRO 8 UNITS: 100 INJECTION, SOLUTION INTRAVENOUS; SUBCUTANEOUS at 07:56

## 2022-12-17 RX ADMIN — LEVETIRACETAM 500 MG: 500 TABLET, FILM COATED ORAL at 20:06

## 2022-12-17 RX ADMIN — PANTOPRAZOLE SODIUM 40 MG: 40 TABLET, DELAYED RELEASE ORAL at 05:46

## 2022-12-17 RX ADMIN — DEXAMETHASONE 4 MG: 4 TABLET ORAL at 05:46

## 2022-12-17 RX ADMIN — ESCITALOPRAM 10 MG: 10 TABLET, FILM COATED ORAL at 10:36

## 2022-12-17 RX ADMIN — INSULIN LISPRO 8 UNITS: 100 INJECTION, SOLUTION INTRAVENOUS; SUBCUTANEOUS at 16:57

## 2022-12-17 RX ADMIN — GABAPENTIN 100 MG: 100 CAPSULE ORAL at 20:05

## 2022-12-17 RX ADMIN — INSULIN LISPRO 5 UNITS: 100 INJECTION, SOLUTION INTRAVENOUS; SUBCUTANEOUS at 16:57

## 2022-12-17 NOTE — PROGRESS NOTES
Inpatient Rehabilitation Plan of Care Note    Plan of Care  Care Plan Reviewed - No updates at this time.    Psychosocial    [RN] Coping/Adjustment(Active)  Current Status(12/17/2022): At risk for poor coping d/t recent medical  conditions.  Weekly Goal(12/22/2022): Identify progress in functional status.  Discharge Goal: Demonstrates healthy coping strategies.    Performed Intervention(s)  Support/peer groups.  Verbalizes needs and concerns.  Medication.      Safety    [RN] Potential for Injury(Active)  Current Status(12/17/2022): At risk for falls d/t history of falls. 12/10 Pt  found on floor in BR, states she was trying to pull her pants up, did not call  for assist like she said she would. No apparent injury. Blue armband placed.  Weekly Goal(12/22/2022): Pt will use call light, no further attempts to transfer  self.  Discharge Goal: Patient and family will be aware of risk of fall and safety in  home setting.    Performed Intervention(s)  Items w/i reach.  Safety rounds.  Bed and chair alarm. Blue armband  Falls precautions.      Sphincter Control    [RN] Bladder Management(Active)  Current Status(12/17/2022): Continent of bladder. Occasional urge incont.  Weekly Goal(12/22/2022): Remain continent of bladder.  Discharge Goal: Goes home continent of bladder.    [RN] Bowel Management(Active)  Current Status(12/17/2022): Continent of bowel.  Weekly Goal(12/22/2022): Remain continent of bowel.  Discharge Goal: Go home continent of bowel.    Performed Intervention(s)  Offer restroom during hourly rounding.  Encourage fluid intake.  Monitor Is and Os.  Timed voids.  Encourage appropriate diet.    Signed by: Ashley Sanders RN

## 2022-12-17 NOTE — PROGRESS NOTES
Name: Christie Avendaño ADMIT: 2022   : 1965  PCP: Tiffany Holloway MD    MRN: [id number] LOS: 10 days   AGE/SEX: 57 y.o. female  ROOM: [facility]     Subjective   Subjective     Patient is lying on the bed and does not appear in any major distress.  Denies nausea, vomiting, abdominal pain, chest pain, shortness of breath, palpitations, dizziness.       Objective   Objective   Vital Signs  Temp:  [97.3 °F (36.3 °C)-98 °F (36.7 °C)] 97.4 °F (36.3 °C)  Heart Rate:  [60-69] 60  Resp:  [18] 18  BP: (106-129)/(71-81) 117/71  SpO2:  [95 %-98 %] 98 %  on   ;   Device (Oxygen Therapy): room air  Body mass index is 40.17 kg/m².  Physical Exam  Constitutional:       General: She is not in acute distress.  HENT:      Head: Normocephalic and atraumatic.      Mouth/Throat:      Mouth: Mucous membranes are moist.   Eyes:      Extraocular Movements: Extraocular movements intact.      Pupils: Pupils are equal, round, and reactive to light.   Cardiovascular:      Rate and Rhythm: Normal rate.      Pulses: Normal pulses.      Heart sounds: Normal heart sounds.   Pulmonary:      Effort: Pulmonary effort is normal.      Breath sounds: Normal breath sounds.   Abdominal:      General: Bowel sounds are normal. There is no distension.      Palpations: Abdomen is soft.      Tenderness: There is no abdominal tenderness.   Musculoskeletal:      Cervical back: Normal range of motion and neck supple.      Right lower leg: No edema.      Left lower leg: No edema.   Skin:     General: Skin is warm and dry.   Neurological:      General: No focal deficit present.      Mental Status: She is alert and oriented to person, place, and time.   Psychiatric:         Mood and Affect: Mood normal.         Behavior: Behavior normal.       Results Review     I reviewed the patient's new clinical results.  Results from last 7 days   Lab Units 12/15/22  0607 22  0555   WBC 10*3/mm3 12.40* 10.16   HEMOGLOBIN g/dL 12.7 13.0   PLATELETS 10*3/mm3 181  174     Results from last 7 days   Lab Units 12/17/22  0542 12/16/22  0558 12/15/22  0607 12/14/22  0533   SODIUM mmol/L 127* 130* 128* 131*   POTASSIUM mmol/L 4.8 4.9 4.6 4.7   CHLORIDE mmol/L 92* 93* 93* 94*   CO2 mmol/L 24.6 23.6 28.0 26.1   BUN mg/dL 24* 28* 25* 20   CREATININE mg/dL 0.76 0.85 0.76 0.72   GLUCOSE mg/dL 167* 191* 156* 188*   EGFR mL/min/1.73 91.0 79.5 91.0 97.1     Results from last 7 days   Lab Units 12/17/22  0542 12/16/22  0558 12/15/22  0607 12/14/22  0533   ALBUMIN g/dL 3.70 4.00 3.90 3.80   BILIRUBIN mg/dL 0.7 0.7 0.7 0.7   ALK PHOS U/L 156* 160* 158* 159*   AST (SGOT) U/L 44* 51* 52* 50*   ALT (SGPT) U/L 273* 311* 321* 365*     Results from last 7 days   Lab Units 12/17/22  0542 12/16/22  0558 12/15/22  0607 12/14/22  0533   CALCIUM mg/dL 9.4 9.5 8.8 9.5   ALBUMIN g/dL 3.70 4.00 3.90 3.80       Hemoglobin A1C   Date/Time Value Ref Range Status   12/15/2022 0607 7.70 (H) 4.80 - 5.60 % Final     Glucose   Date/Time Value Ref Range Status   12/17/2022 0607 158 (H) 70 - 130 mg/dL Final     Comment:     Meter: [id number] : [id number] [facility]   12/16/2022 2117 139 (H) 70 - 130 mg/dL Final     Comment:     Meter: [id number] : [id number] [place] NA   12/16/2022 1715 136 (H) 70 - 130 mg/dL Final     Comment:     Meter: [id number] : [id number] Torsten Josiahaaron    12/16/2022 1135 347 (H) 70 - 130 mg/dL Final     Comment:     Meter: [id number] : [id number] Torsten Bates ARIANNE   12/16/2022 0602 178 (H) 70 - 130 mg/dL Final     Comment:     Meter: [id number] : [id number] Nabeel Pacheco NA   12/15/2022 2038 196 (H) 70 - 130 mg/dL Final     Comment:     Meter: [id number] : [id number] Nabeel Pacheco NA   12/15/2022 1612 89 70 - 130 mg/dL Final     Comment:     Meter: [id number] : [id number] Katie ACUÑA       No radiology results for the last day  Scheduled Medications  calcium 500 mg vitamin D 5 mcg (200 UT), 1 tablet, Oral, BID  dexamethasone, 4 mg, Oral,  Q8H  enoxaparin, 40 mg, Subcutaneous, Q12H  escitalopram, 10 mg, Oral, Daily  gabapentin, 100 mg, Oral, TID  insulin glargine, 28 Units, Subcutaneous, QAM  insulin lispro, 0-14 Units, Subcutaneous, TID AC  insulin lispro, 8 Units, Subcutaneous, TID With Meals  latanoprost, 1 drop, Both Eyes, Nightly  levETIRAcetam, 500 mg, Oral, BID  levothyroxine, 150 mcg, Oral, Q AM  lisinopril, 10 mg, Oral, Daily  magnesium oxide, 400 mg, Oral, Daily  ondansetron, 8 mg, Oral, Q24H  pantoprazole, 40 mg, Oral, Q AM  polyethylene glycol, 17 g, Oral, Daily  propranolol, 20 mg, Oral, Daily With Breakfast & Dinner  sulfamethoxazole-trimethoprim, 1 tablet, Oral, Once per day on Mon Wed Fri  temozolomide, 140 mg, Oral, Nightly   And  temozolomide, 20 mg, Oral, Nightly   And  temozolomide, 5 mg, Oral, Nightly  valACYclovir, 500 mg, Oral, Q24H    Infusions   Diet  Diet: Regular/House Diet, Diabetic Diets; Consistent Carbohydrate; Texture: Regular Texture (IDDSI 7); Fluid Consistency: Thin (IDDSI 0)       Assessment/Plan     Active Hospital Problems    Diagnosis  POA   • **Glioma (HCC) [C71.9]  Yes      Resolved Hospital Problems   No resolved problems to display.       57 y.o. female admitted with Glioma (HCC).    Reason for medical consultation--> hyperglycemia/elevated LFTs    1. Glioblastoma, patient is currently being treated with Temodar and radiation by oncology.  On dexamethasone and Keppra for seizure prophylaxis.    2.  History of lung cancer/thyroid cancer/breast cancer/renal cell cancer/melanoma, being managed by oncology.    3.  Neuropathy, Neurontin.    4.  Acute transaminitis, was felt to be medication induced therefore atorvastatin and Tylenol have been discontinued.  Valacyclovir was held but has been resumed now.  LFTs are improving and GI did evaluate and underwent liver ultrasound which revealed only a simple cyst and no evidence of any portal vein DVT.  Autoimmune work-up was negative and CMV, VZV, EBV testing revealed  past infection.  Continue to follow LFTs.    5.  Hypertension, on lisinopril and monitor blood pressure closely.    6.  Diabetes mellitus, patient normally takes Trulicity which is on hold for the moment and currently on Lantus 40 units and blood sugars are in the acceptable range.    7.Hyponatremia, most likely related to elevated blood sugars and is a pseudohyponatremia.  Sodium level is 127 today.    Copied text on this note has been reviewed by me on 12/17/2022    Amador Malik MD  [facility]  12/17/22  14:22 EST

## 2022-12-17 NOTE — THERAPY TREATMENT NOTE
Inpatient Rehabilitation - Speech Language Pathology Treatment Note    [facility]     Patient Name: Christie Avendaño  : 1965  MRN: [id number]    Today's Date: 2022                   Admit Date: 2022       Visit Dx:      ICD-10-CM ICD-9-CM   1. Impaired functional mobility, balance, gait, and endurance  Z74.09 V49.89   2. 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)   • Non-small cell lung cancer, right (HCC)   • Renal mass, right   • SIRS (systemic inflammatory response syndrome) (HCC)   • Hyperlipidemia   • Malignant melanoma of right lower extremity including hip (HCC)   • High grade glioma    • 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)   • Glioma (HCC)       Past Medical History:   Diagnosis Date   • Arthritis    • Asthma    • BRCA2 positive    • Diabetes mellitus (HCC)    • H/O Liver masses 2017    x3   • H/O Lung masses 2017    1 in right lower lobe and 1 in the left infrahilar location   • H/O Ovarian cyst    • H/O Right adrenal mass (CMS/HCC) 2017   • Lung cancer (HCC)    • Melanoma (HCC)     right foot   • PONV (postoperative nausea and vomiting)    • Thyroid disease        Past Surgical History:   Procedure Laterality Date   • APPENDECTOMY     • BRAIN BIOPSY Right 2022    Procedure: Right frontal stereotactic needle brain biopsy;  Surgeon: Manuel Thompson MD;  Location: [facility] MAIN OR;  Service: Neurosurgery;  Laterality: Right;   • BREAST IMPLANT SURGERY Bilateral    • CHOLECYSTECTOMY     • HYSTERECTOMY      • MASTECTOMY Bilateral    • OVARIAN CYST SURGERY     • THORACOSCOPY VIDEO ASSISTED WITH LOBECTOMY Right 10/9/2020    Procedure: BRONCHOSCOPY, THORACOSCOPY VIDEO ASSISTED WITH ANTERIOR BASAL SEGMENTECTOMY, INTERCOSTAL NERVE BLOCK;  Surgeon: Flaca Crisostomo MD;  Location: [facility];  Service: Thoracic;  Laterality: Right;   • TONSILLECTOMY         SLP Recommendation and Plan                                                            SLP EVALUATION (last 72 hours)     SLP SLC Evaluation     Row Name 12/17/22 1100 12/16/22 1200 12/16/22 0930 12/15/22 1300          Communication Assessment/Intervention    Document Type therapy note (daily note)  -HF therapy note (daily note)  -AL therapy note (daily note)  -AL therapy note (daily note)  -NR     Subjective Information no complaints  -HF -- -- no complaints  -NR     Patient Observations alert;cooperative;agree to therapy  -HF -- -- alert;cooperative;agree to therapy  -NR     Patient/Family/Caregiver Comments/Observations -- Pt upright in wheelchair in room. She participated well.  -AL Pt participated well.  -AL --     Patient Effort good  -HF -- -- good  -NR     Symptoms Noted During/After Treatment -- -- -- none  -NR        General Information    Patient Profile Reviewed -- -- -- yes  -NR        Pain Scale: Numbers Pre/Post-Treatment    Pretreatment Pain Rating -- 0/10 - no pain  -AL 0/10 - no pain  -AL 0/10 - no pain  -NR     Posttreatment Pain Rating -- -- -- 0/10 - no pain  -NR     Pre/Posttreatment Pain Comment -- -- Pt did not c/o pain  -AL --        Memory Skills Goal 1 (SLP)    Improve Memory Skills Through Goal 1 (SLP) -- -- -- visual memory task  -NR     Time Frame (Memory Skills Goal 1, SLP) -- -- -- by discharge  -NR     Progress (Memory Skills Goal 1, SLP) -- -- -- 80%;independently (over 90% accuracy);100%;with moderate cues (50-74%)  -NR     Progress/Outcomes (Memory Skills Goal 1, SLP) -- -- -- goal ongoing  -NR     Comment (Memory Skills Goal 1,  SLP) -- -- -- Able to answer 8/10(80%) immediate memory questions over pictured scene, increased to 10/10 (100%) with moderate verbal/visual cues.  -NR        Organizational Skills Goal 1 (SLP)    Improve Thought Organization Through Goal 1 (SLP) -- -- -- concrete  -NR     Time Frame (Thought Organization Skills Goal 1, SLP) -- -- -- by discharge  -NR     Progress (Thought Organization Skills Goal 1, SLP) -- -- -- 60%;100%;independently (over 90% accuracy);with moderate cues (50-74%)  -NR     Progress/Outcomes (Thought Organization Skills Goal 1, SLP) -- -- -- goal ongoing  -NR     Comment (Thought Organization Skills Goal 1, SLP) -- -- -- Able to organize 15 words into three concrete categories with 9/15 (60%), increased to 10/10 (100%) with mod cues.  -NR        Right Hemisphere Function Goal 1 (SLP)    Improve Right Hemisphere Function Through Goal 1 (SLP) -- -- -- complete visuo-spatial activities (visual closure, trail making, mazes  -NR     Time Frame (Right Hemisphere Function Goal 1, SLP) -- -- -- by discharge  -NR     Progress (Right Hemisphere Function Goal 1, SLP) -- -- -- 80%;100%;with minimal cues (75-90%);independently (over 90% accuracy)  -NR     Progress/Outcomes (Right Hemisphere Function Goal 1, SLP) -- -- -- goal ongoing  -NR     Comment (Right Hemisphere Function Goal 1, SLP) -- -- -- followed two step written directions with 8/20 (80%) I'ly, increased to 10/10 (100%) with min cues.  -NR           User Key  (r) = Recorded By, (t) = Taken By, (c) = Cosigned By    Initials Name Effective Dates    NR Carmina Cruz MA,CCC-SLP 06/16/21 -     Concetta Fleming, MS CCC-SLP 06/16/21 -     Keila Murillo, CCC-SLP 11/10/22 -                    EDUCATION    The patient has been educated in the following areas:       Cognitive Impairment.             SLP GOALS     Row Name 12/17/22 1100 12/16/22 1200 12/16/22 0930       Attention Goal 1 (SLP)    Comment (Attention Goal 1, SLP) Alternating attention  between 2 written exercises requiring attention-to-detail, sustained attention, selective attention (to prefered music). Patient alternated between these two tasks in relation to external stimulus with 75% accuracy and NO cues. Discussed using crocheting as a method to address her attention. HEP initiated to brittany her sister a towel agee.  -HF -- --       Memory Skills Goal 1 (SLP)    Improve Memory Skills Through Goal 1 (SLP) -- -- visual memory task  -AL    Progress (Memory Skills Goal 1, SLP) -- -- 80%;independently (over 90% accuracy);100%;with moderate cues (50-74%)  -AL    Progress/Outcomes (Memory Skills Goal 1, SLP) -- -- goal ongoing  -AL    Comment (Memory Skills Goal 1, SLP) -- Diagnostic treatment: Working memory for 4 unit list retention task: 50% with NO cues, 100% with MIN cues. Pt recalled 3/3 errands after 5 minute delay and 20 minute delay with NO cues.  -AL Immediate recall of visual scene: 70% with NO cues, 80% with MAX cues. After rehearsal, recalled 100% of details with NO cues.  -AL       Organizational Skills Goal 1 (SLP)    Comment (Thought Organization Skills Goal 1, SLP) Patient independently organized a check list/to-do list to complete HEP project (brittany towel agee for sister).  -HF -- --       Reasoning Goal 1 (SLP)    Improve Reasoning Through Goal 1 (SLP) -- complete logic/creative thinking task;complete mental flexibility task;complete deductive reasoning task;complete analogies;complete high level reasoning task;90%;independently (over 90% accuracy)  -AL --    Progress/Outcomes (Reasoning Goal 1, SLP) -- goal ongoing  -AL --    Comment (Reasoning Goal 1, SLP) -- Paragraph inferencin% with NO cues, 100% with MIN cues.  -AL --       Functional Problem Solving Skills Goal 2 (SLP)    Comment (Problem Solving Goal 2, SLP) The patient was 60% accurate with NO cues for problem solving task related to prescription label -- improved to 100% with MIN cues to review. She was less  accurate for deduction task, requiring MOD-MAX supports (manipulatives, check lists, etc.) in order to complete.  -HF -- --       Functional Math Skills Goal 1 (SLP)    Improve Functional Math Skills Through Goal 1 (SLP) -- complete word problems involving money;complete word problems involving time;complete checkbook task;complete functional math task;90%;independently (over 90% accuracy)  -AL --    Progress/Outcomes (Functional Math Skills Goal 1, SLP) -- goal ongoing  -AL --    Comment (Functional Math Skills Goal 1, SLP) -- Adding coins (multiple numbers of each coin): 5/7 with NO cues, 7/7 with MIN-MOD cues.  -AL --       Right Hemisphere Function Goal 1 (SLP)    Improve Right Hemisphere Function Through Goal 1 (SLP) -- -- complete visuo-spatial activities (visual closure, trail making, mazes  -AL    Time Frame (Right Hemisphere Function Goal 1, SLP) -- -- by discharge  -AL    Progress (Right Hemisphere Function Goal 1, SLP) -- -- 80%;100%;with minimal cues (75-90%);independently (over 90% accuracy)  -AL    Progress/Outcomes (Right Hemisphere Function Goal 1, SLP) -- -- goal ongoing  -AL    Comment (Right Hemisphere Function Goal 1, SLP) -- -- Deduction calendar task: 3/7 with NO cues, 7/7 with MIN-MOD cues for scanning to the left and reasoning.  -AL    Row Name 12/15/22 [place]             Memory Skills Goal 1 (SLP)    Improve Memory Skills Through Goal 1 (SLP) visual memory task  -NR      Time Frame (Memory Skills Goal 1, SLP) by discharge  -NR      Progress (Memory Skills Goal 1, SLP) 80%;independently (over 90% accuracy);100%;with moderate cues (50-74%)  -NR      Progress/Outcomes (Memory Skills Goal 1, SLP) goal ongoing  -NR      Comment (Memory Skills Goal 1, SLP) Able to answer 8/10(80%) immediate memory questions over pictured scene, increased to 10/10 (100%) with moderate verbal/visual cues.  -NR         Organizational Skills Goal 1 (SLP)    Improve Thought Organization Through Goal 1 (SLP) concrete  -NR       Time Frame (Thought Organization Skills Goal 1, SLP) by discharge  -NR      Progress (Thought Organization Skills Goal 1, SLP) 60%;100%;independently (over 90% accuracy);with moderate cues (50-74%)  -NR      Progress/Outcomes (Thought Organization Skills Goal 1, SLP) goal ongoing  -NR      Comment (Thought Organization Skills Goal 1, SLP) Able to organize 15 words into three concrete categories with 9/15 (60%), increased to 10/10 (100%) with mod cues.  -NR         Right Hemisphere Function Goal 1 (SLP)    Improve Right Hemisphere Function Through Goal 1 (SLP) complete visuo-spatial activities (visual closure, trail making, mazes  -NR      Time Frame (Right Hemisphere Function Goal 1, SLP) by discharge  -NR      Progress (Right Hemisphere Function Goal 1, SLP) 80%;100%;with minimal cues (75-90%);independently (over 90% accuracy)  -NR      Progress/Outcomes (Right Hemisphere Function Goal 1, SLP) goal ongoing  -NR      Comment (Right Hemisphere Function Goal 1, SLP) followed two step written directions with 8/20 (80%) I'ly, increased to 10/10 (100%) with min cues.  -NR            User Key  (r) = Recorded By, (t) = Taken By, (c) = Cosigned By    Initials Name Provider Type    NR Carmina Cruz MA,CCC-SLP Speech and Language Pathologist    Concetta Fleming, MS CCC-SLP Speech and Language Pathologist    Keila Murillo, CCC-SLP Speech and Language Pathologist                [facility] Outcome Measures (last 72 hours)     SLP Outcome Measures     Row Name 12/15/22 1300             SLP Outcome Measures    Outcome Measure Used? Adult NOMS  -NR         Adult FCM Scores    FCM Chosen Problem Solving  -NR      Problem Solving Score FCM 3  -NR            User Key  (r) = Recorded By, (t) = Taken By, (c) = Cosigned By    Initials Name Effective Dates    NR Carmina Cruz MA,CCC-SLP 06/16/21 -                         Time Calculation:        Time Calculation- SLP     Row Name 12/17/22 1210             Time Calculation- SLP    SLP  Start Time 1100  -HF      SLP Stop Time 1200  -HF      SLP Time Calculation (min) 60 min  -HF      SLP Received On 12/17/22  -HF         Timed Charges    35463-RM Dev of Cogn Skills Initial Minutes 15  -HF      28564-YJ Dev of Cogn Skills Add Minutes 45  -HF         Total Minutes    Timed Charges Total Minutes 60  -HF       Total Minutes 60  -HF            User Key  (r) = Recorded By, (t) = Taken By, (c) = Cosigned By    Initials Name Provider Type    HF Keila Haskins CCC-SLP Speech and Language Pathologist                  Therapy Charges for Today     Code Description Service Date Service Provider Modifiers Qty    92502960168 HC ST DEV OF COGN SKILLS INITIAL 15 MIN 12/17/2022 Keila Haskins CCC-SLP  1    57566563914 HC ST DEV OF COGN SKILLS EACH ADDT'L 15 MIN 12/17/2022 Keila Haskins CCC-SLP  3            ADULT NOMS (last 72 hours)     Adult NOMS     Row Name 12/15/22 1300                   Adult FCM Scores    FCM Chosen Problem Solving  -NR        Problem Solving Score FCM 3  -NR              User Key  (r) = Recorded By, (t) = Taken By, (c) = Cosigned By    Initials Name Effective Dates    NR Carmina Cruz MA,CCC-SLP 06/16/21 -                            SHARITA Kohler  12/17/2022

## 2022-12-17 NOTE — THERAPY TREATMENT NOTE
Inpatient Rehabilitation - Occupational Therapy Treatment Note    [facility]     Patient Name: Christie Avendaño  : 1965  MRN: [id number]    Today's Date: 2022                 Admit Date: 2022         ICD-10-CM ICD-9-CM   1. Impaired functional mobility, balance, gait, and endurance  Z74.09 V49.89   2. 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)   • Non-small cell lung cancer, right (HCC)   • Renal mass, right   • SIRS (systemic inflammatory response syndrome) (HCC)   • Hyperlipidemia   • Malignant melanoma of right lower extremity including hip (HCC)   • High grade glioma    • 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)   • Glioma (HCC)       Past Medical History:   Diagnosis Date   • Arthritis    • Asthma    • BRCA2 positive    • Diabetes mellitus (HCC)    • H/O Liver masses 2017    x3   • H/O Lung masses 2017    1 in right lower lobe and 1 in the left infrahilar location   • H/O Ovarian cyst    • H/O Right adrenal mass (CMS/HCC) 2017   • Lung cancer (HCC)    • Melanoma (HCC)     right foot   • PONV (postoperative nausea and vomiting)    • Thyroid disease        Past Surgical History:   Procedure Laterality Date   • APPENDECTOMY     • BRAIN BIOPSY Right 2022    Procedure: Right frontal stereotactic needle brain biopsy;  Surgeon: Manuel Thompson MD;  Location: [facility];  Service: Neurosurgery;  Laterality: Right;   • BREAST IMPLANT SURGERY Bilateral    • CHOLECYSTECTOMY     • HYSTERECTOMY     • MASTECTOMY  Bilateral    • OVARIAN CYST SURGERY     • THORACOSCOPY VIDEO ASSISTED WITH LOBECTOMY Right 10/9/2020    Procedure: BRONCHOSCOPY, THORACOSCOPY VIDEO ASSISTED WITH ANTERIOR BASAL SEGMENTECTOMY, INTERCOSTAL NERVE BLOCK;  Surgeon: Flaca Crisostomo MD;  Location: [facility] MAIN OR;  Service: Thoracic;  Laterality: Right;   • TONSILLECTOMY               IRF OT ASSESSMENT FLOWSHEET (last 12 hours)     IRF OT Evaluation and Treatment     Row Name 12/17/22 1400          OT Time and Intention    Document Type daily treatment  -RE     Mode of Treatment occupational therapy  -RE     Patient Effort good  -RE     Row Name 12/17/22 1400          Pain Assessment    Pretreatment Pain Rating 0/10 - no pain  -RE     Posttreatment Pain Rating 0/10 - no pain  -RE     Row Name 12/17/22 1400          Upper Body Dressing    [place]iken Level (Upper Body Dressing) upper body dressing skills;set up assistance;bra/undergarment;maximal assist (25-49% patient effort)  -RE     Position (Upper Body Dressing) supported sitting  -RE     Set-up Assistance (Upper Body Dressing) obtain clothing  -RE     Row Name 12/17/22 1400          Lower Body Dressing    [place] Level (Lower Body Dressing) doff;don;pants/bottoms;shoes/slippers;socks;set up;verbal cues;minimum assist (75% patient effort)  -RE     Position (Lower Body Dressing) supported sitting;supported standing  -RE     Set-up Assistance (Lower Body Dressing) obtain clothing  -RE     Row Name 12/17/22 1400          Bed-Chair Transfer    Bed-Chair Aiken (Transfers) minimum assist (75% patient effort);contact guard;verbal cues  -RE     Assistive Device (Bed-Chair Transfers) wheelchair  -RE     Row Name 12/17/22 1400          Shower Transfer    Type (Shower Transfer) stand pivot/stand step  -RE     [place]iken Level (Shower Transfer) set up;minimum assist (75% patient effort);verbal cues  -RE     Assistive Device (Shower Transfer) grab bar, tub/shower;tub bench  -RE     Row Name 12/17/22  1400          Motor Skills    Motor Skills motor control/coordination interventions  -RE     Motor Control/Coordination Interventions neuro-muscular re-education  -RE     Row Name 12/17/22 1400          Shoulder (Therapeutic Exercise)    Shoulder (Therapeutic Exercise) AROM (active range of motion)  -RE     Shoulder AROM (Therapeutic Exercise) scapular retraction;10 repetitions  -RE     Row Name 12/17/22 1400          Neuromuscular Re-education    Interventions (Neuromuscular Re-education) facilitation/inhibition;pattern movements  -RE     Positioning (Neuromuscular Re-education) sitting;supported  -RE     Comment (Neuromuscular Re-education) trunk rotation with reaching  -RE     Row Name 12/17/22 1400          Positioning and Restraints    Pre-Treatment Position in bed  -RE     Post Treatment Position wheelchair  -RE     In Wheelchair with PT  -RE           User Key  (r) = Recorded By, (t) = Taken By, (c) = Cosigned By    Initials Name Effective Dates    RE ValpitojoselineCarin, OTR 06/16/21 -                  Occupational Therapy Education     Title: PT OT SLP Therapies (In Progress)     Topic: Occupational Therapy (Done)     Point: ADL training (Done)     Description:   Instruct learner(s) on proper safety adaptation and remediation techniques during self care or transfers.   Instruct in proper use of assistive devices.              Learning Progress Summary           Patient Acceptance, E, VU by WN at 12/12/2022 2327    Acceptance, E, VU by WN at 12/12/2022 0155    Acceptance, E, VU by CB at 12/8/2022 1204    Acceptance, E, VU,NR by CE at 12/8/2022 1107    Comment: fall prevention, DME including w/c and shower chair                   Point: Home exercise program (Done)     Description:   Instruct learner(s) on appropriate technique for monitoring, assisting and/or progressing therapeutic exercises/activities.              Learning Progress Summary           Patient Acceptance, E, VU by WN at 12/12/2022 2326     Acceptance, E, VU by WN at 12/12/2022 0155    Acceptance, E, VU by CB at 12/8/2022 1204    Acceptance, E, VU,NR by CE at 12/8/2022 1107    Comment: fall prevention, DME including w/c and shower chair                   Point: Precautions (Done)     Description:   Instruct learner(s) on prescribed precautions during self-care and functional transfers.              Learning Progress Summary           Patient Acceptance, E, VU by WN at 12/12/2022 2327    Acceptance, E, VU by WN at 12/12/2022 0155    Acceptance, E, VU by CB at 12/8/2022 1204    Acceptance, E, VU,NR by CE at 12/8/2022 1107    Comment: fall prevention, DME including w/c and shower chair                   Point: Body mechanics (Done)     Description:   Instruct learner(s) on proper positioning and spine alignment during self-care, functional mobility activities and/or exercises.              Learning Progress Summary           Patient Acceptance, E, VU by WN at 12/12/2022 2327    Acceptance, E, VU by WN at 12/12/2022 0155    Acceptance, E, VU by CB at 12/8/2022 1204    Acceptance, E, VU,NR by CE at 12/8/2022 1107    Comment: fall prevention, DME including w/c and shower chair                               User Key     Initials Effective Dates Name Provider Type Discipline     08/23/22 -  Doc Park, RN Registered Nurse Nurse     11/10/22 -  Mackenzie Hopkins CCC-SLP Speech and Language Pathologist SLP    CE 10/17/22 -  Portia Esqueda OT Occupational Therapist OT                    OT Recommendation and Plan                         Time Calculation:      Time Calculation- OT     Row Name 12/17/22 1456 12/17/22 1455          Time Calculation- OT    OT Start Time 1330  -RE 0930  -RE     OT Stop Time 1345  -RE 1015  -RE     OT Time Calculation (min) 15 min  -RE 45 min  -RE     Total Timed Code Minutes- OT 15 minute(s)  -RE 45 minute(s)  -RE        Timed Charges    45953 -  OT Neuromuscular Reeducation Minutes 15  -RE --     45794 - OT Self Care/Mgmt  Minutes -- 45  -RE        Total Minutes    Timed Charges Total Minutes 15  -RE 45  -RE      Total Minutes 15  -RE 45  -RE           User Key  (r) = Recorded By, (t) = Taken By, (c) = Cosigned By    Initials Name Provider Type    RE Carin Rdz OTR Occupational Therapist              Therapy Charges for Today     Code Description Service Date Service Provider Modifiers Qty    55176850333  OT NEUROMUSC RE EDUCATION EA 15 MIN 12/17/2022 Carin Rdz OTR GO 1    [id number]  OT SELF CARE/MGMT/TRAIN EA 15 MIN 12/17/2022 Carin Rdz OTR GO 3                   SHARON Vaz  12/17/2022

## 2022-12-17 NOTE — THERAPY TREATMENT NOTE
Inpatient Rehabilitation - Physical Therapy Treatment Note       [facility]     Patient Name: Christie Avendaño  : 1965  MRN: [id number]    Today's Date: 2022                    Admit Date: 2022      Visit Dx:     ICD-10-CM ICD-9-CM   1. Impaired functional mobility, balance, gait, and endurance  Z74.09 V49.89   2. 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)   • Non-small cell lung cancer, right (HCC)   • Renal mass, right   • SIRS (systemic inflammatory response syndrome) (HCC)   • Hyperlipidemia   • Malignant melanoma of right lower extremity including hip (HCC)   • High grade glioma    • 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)   • Glioma (HCC)       Past Medical History:   Diagnosis Date   • Arthritis    • Asthma    • BRCA2 positive    • Diabetes mellitus (HCC)    • H/O Liver masses 2017    x3   • H/O Lung masses 2017    1 in right lower lobe and 1 in the left infrahilar location   • H/O Ovarian cyst    • H/O Right adrenal mass (CMS/HCC) 2017   • Lung cancer (HCC)    • Melanoma (HCC)     right foot   • PONV (postoperative nausea and vomiting)    • Thyroid disease        Past Surgical History:   Procedure Laterality Date   • APPENDECTOMY     • BRAIN BIOPSY Right 2022    Procedure: Right frontal stereotactic needle brain biopsy;  Surgeon: Manuel Thompson MD;  Location: [facility] MAIN OR;  Service: Neurosurgery;  Laterality: Right;   • BREAST IMPLANT SURGERY Bilateral    • CHOLECYSTECTOMY     • HYSTERECTOMY     •  MASTECTOMY Bilateral    • OVARIAN CYST SURGERY     • THORACOSCOPY VIDEO ASSISTED WITH LOBECTOMY Right 10/9/2020    Procedure: BRONCHOSCOPY, THORACOSCOPY VIDEO ASSISTED WITH ANTERIOR BASAL SEGMENTECTOMY, INTERCOSTAL NERVE BLOCK;  Surgeon: Flaca Crisostomo MD;  Location: [facility] OR;  Service: Thoracic;  Laterality: Right;   • TONSILLECTOMY         PT ASSESSMENT (last 12 hours)     IRF PT Evaluation and Treatment     Row Name 12/17/22 1030          PT Time and Intention    Document Type daily treatment  -     Mode of Treatment physical therapy  -     Patient/Family/Caregiver Comments/Observations Pt up in w/c following w/c  -     Row Name 12/17/22 1030          General Information    Patient Profile Reviewed yes  -     General Observations of Patient Pt up in chair with OT upon arrival.  -     Existing Precautions/Restrictions fall  -     Row Name 12/17/22 1030          Pain Assessment    Pre/Posttreatment Pain Comment Pt did not c/o pain today.  -     Row Name 12/17/22 1030          Cognition/Psychosocial    Affect/Mental Status (Cognition) flat/blunted affect  -     Orientation Status (Cognition) oriented x 4  -     Follows Commands (Cognition) follows one-step commands;follows two-step commands;delayed response/completion;increased processing time needed  -     Personal Safety Interventions fall prevention program maintained;gait belt;nonskid shoes/slippers when out of bed;supervised activity  -     Cognitive Function executive function deficit  -     Attention Deficit (Cognition) concentration;distractible in noisy environment  -     Executive Function Deficit (Cognition) organization/sequencing;problem-solving/reasoning  -     Row Name 12/17/22 1030          Bed Mobility    Supine-Sit [place] (Bed Mobility) supervision;verbal cues  -     Sit-Supine [place] (Bed Mobility) supervision;verbal cues  -     Assistive Device (Bed Mobility) bed rails;head of bed elevated  -      Row Name 12/17/22 1030          Bed-Chair Transfer    Bed-Chair [place] (Transfers) minimum assist (75% patient effort);contact guard;verbal cues  -     Assistive Device (Bed-Chair Transfers) walker, front-wheeled;wheelchair  -     Row Name 12/17/22 1030          Chair-Bed Transfer    Chair-Bed [place] (Transfers) minimum assist (75% patient effort);contact guard;verbal cues  -     Assistive Device (Chair-Bed Transfers) wheelchair;walker, front-wheeled  -     Row Name 12/17/22 1030          Sit-Stand Transfer    Sit-Stand [place] (Transfers) contact guard;verbal cues  -     Assistive Device (Sit-Stand Transfers) walker, front-wheeled;walker, 4-wheeled  -     Row Name 12/17/22 1030          Stand-Sit Transfer    Stand-Sit [place] (Transfers) contact guard;verbal cues  -     Assistive Device (Stand-Sit Transfers) walker, front-wheeled;wheelchair  -     Row Name 12/17/22 1030          Gait/Stairs (Locomotion)    [place] Level (Gait) contact guard;verbal cues  -     Assistive Device (Gait) walker, front-wheeled;walker, 4-wheeled  -     Distance in Feet (Gait) 160, 45 x 2(in am), 80  -KP     Pattern (Gait) step-through  -KP     Deviations/Abnormal Patterns (Gait) fabrizio decreased;left sided deviations;stride length decreased;base of support, narrow  -KP     Bilateral Gait Deviations forward flexed posture  -KP     Left Sided Gait Deviations leans left  -KP     Right Sided Gait Deviations weight shift ability decreased  -     Gait Assessment/Intervention Vc for foot clearance L, HS, swing.  Pt has rollator at home.  On trial rollator in am, pt did wellwith rollator, in pm, when fatigue, pt was less safe with rollator.  Pt visual deficits limit safety. visual field limited on both left and right.  -     Row Name 12/17/22 1030          Hip (Therapeutic Exercise)    Hip Strengthening (Therapeutic Exercise) sitting;bilateral;marching while seated;15 repititions  -[place] [place]  Name 12/17/22 1030          Knee (Therapeutic Exercise)    Knee Strengthening (Therapeutic Exercise) sitting;bilateral;LAQ (long arc quad);15 repititions  -     Row Name 12/17/22 1030          Ankle (Therapeutic Exercise)    Ankle Strengthening (Therapeutic Exercise) sitting;bilateral;dorsiflexion;plantarflexion;15 repititions  -KP     Row Name 12/17/22 1030          Aerobic Exercise    Type (Aerobic Exercise) recumbent elliptical   -     Time Performed (Aerobic Exercise) 5  -KP     Comment, Aerobic Exercise (Therapeutic Exercise) B UE/LE, W2  -KP     Row Name 12/17/22 1030          Advanced Stepping/Walking Interventions    Stepping/Walking Interventions side stepping  -KP     Row Name 12/17/22 1030          Positioning and Restraints    Pre-Treatment Position sitting in chair/recliner  -KP     Post Treatment Position wheelchair  -KP     In Wheelchair call light within reach;sitting  -KP           User Key  (r) = Recorded By, (t) = Taken By, (c) = Cosigned By    Initials Name Provider Type    Anita Cruz, PT Physical Therapist              Wound Right scalp Incision (Active)   Dressing Appearance open to air 12/16/22 2105   Closure Open to air 12/16/22 2105   Base clean;dry;scab 12/17/22 0755   Periwound intact;dry 12/17/22 0755   Periwound Temperature warm 12/17/22 0755   Periwound Skin Turgor soft 12/17/22 0755   Drainage Amount none 12/16/22 2105   Dressing Care open to air 12/16/22 2105   Periwound Care dry periwound area maintained 12/16/22 2105     Physical Therapy Education     Title: PT OT SLP Therapies (In Progress)     Topic: Physical Therapy (In Progress)     Point: Mobility training (In Progress)     Learning Progress Summary           Patient Acceptance, E,TB,D, NR by KP at 12/17/2022 1047    Acceptance, E,D, VU,DU,NR by DP at 12/16/2022 0811    Acceptance, E,D, VU,DU by DP at 12/15/2022 1606    Acceptance, E,D, VU,DU,NR by DP at 12/15/2022 1147    Comment: transfer training on this  date    Acceptance, E, NR by  at 12/13/2022 0923    Acceptance, E, VU by WN at 12/12/2022 2327    Acceptance, E, VU,DU,NR by JK at 12/12/2022 1011    Acceptance, E, VU by WN at 12/12/2022 0155    Acceptance, E, VU,NR by  at 12/10/2022 1035    Acceptance, E,D, VU,DU,NR by JK at 12/9/2022 0853    Acceptance, E,TB,D, VU,DU,NR by  at 12/8/2022 1420    Comment: Goals, POC    Acceptance, E, VU by CB at 12/8/2022 1204   Significant Other Acceptance, E,TB,D, VU,DU,NR by KP at 12/8/2022 1420    Comment: Goals, POC                   Point: Home exercise program (In Progress)     Learning Progress Summary           Patient Acceptance, E,TB,D, NR by KP at 12/17/2022 1047    Acceptance, E,D, VU,DU,NR by DP at 12/16/2022 0811    Acceptance, E,D, VU,DU by DP at 12/15/2022 1606    Acceptance, E,D, VU,DU,NR by DP at 12/15/2022 1147    Comment: transfer training on this date    Acceptance, E, NR by  at 12/13/2022 0923    Acceptance, E, VU by WN at 12/12/2022 2327    Acceptance, E, VU by WN at 12/12/2022 0155    Acceptance, E, VU,NR by  at 12/10/2022 1035    Acceptance, E,D, VU,DU,NR by JK at 12/9/2022 0853    Acceptance, E,TB,D, VU,DU,NR by  at 12/8/2022 1420    Comment: Goals, POC    Acceptance, E, VU by CB at 12/8/2022 1204   Significant Other Acceptance, E,TB,D, VU,DU,NR by  at 12/8/2022 1420    Comment: Goals, POC                   Point: Body mechanics (Done)     Learning Progress Summary           Patient Acceptance, E,D, VU,DU,NR by DP at 12/16/2022 0811    Acceptance, E,D, VU,DU by DP at 12/15/2022 1606    Acceptance, E,D, VU,DU,NR by DP at 12/15/2022 1147    Comment: transfer training on this date    Acceptance, E, NR by LH at 12/13/2022 0923    Acceptance, E, VU by WN at 12/12/2022 2327    Acceptance, E, VU by WN at 12/12/2022 0155    Acceptance, E, VU,NR by  at 12/10/2022 1035    Acceptance, E, VU by CB at 12/8/2022 1204                   Point: Precautions (Done)     Learning Progress Summary            Patient Acceptance, E,D, VU,DU,NR by DP at 12/16/2022 0811    Acceptance, E,D, VU,DU by DP at 12/15/2022 1606    Acceptance, E,D, VU,DU,NR by DP at 12/15/2022 1147    Comment: transfer training on this date    Acceptance, E, NR by  at 12/13/2022 0923    Acceptance, E, VU by WN at 12/12/2022 2327    Acceptance, E, VU by WN at 12/12/2022 0155    Acceptance, E, VU,NR by  at 12/10/2022 1035    Acceptance, E, VU by CB at 12/8/2022 1204                               User Key     Initials Effective Dates Name Provider Type Discipline     06/16/21 -  Radha Ramírez, PT Physical Therapist PT    JK 06/16/21 -  Samantha Thompson, PT Physical Therapist PT    KP 06/16/21 -  Anita Serrano, PT Physical Therapist PT    WN 08/23/22 -  Doc Park RN Registered Nurse Nurse    DP 08/24/21 -  Dillan Calderon, PT Physical Therapist PT    CB 11/10/22 -  Mackenzie Hopkins CCC-SLP Speech and Language Pathologist SLP                PT Recommendation and Plan    Planned Therapy Interventions (PT): balance training, bed mobility training, gait training, home exercise program, neuromuscular re-education, stair training, strengthening, transfer training  Frequency of Treatment (PT): 60 minutes per session, 5 times per week                     Time Calculation:      PT Charges     Row Name 12/17/22 1402 12/17/22 1048          Time Calculation    Start Time 1330  - 1020  -     Stop Time 1345  - 1100  -     Time Calculation (min) 15 min  - 40 min  -     PT Received On -- 12/17/22  -     PT - Next Appointment -- 12/19/22  -           User Key  (r) = Recorded By, (t) = Taken By, (c) = Cosigned By    Initials Name Provider Type     Anita Serrano, PT Physical Therapist                Therapy Charges for Today     Code Description Service Date Service Provider Modifiers Qty    58638646396 HC PT THER PROC EA 15 MIN 12/17/2022 Anita Serrano, PT GP 2    67790048411 HC PT THERAPEUTIC ACT EA 15 MIN 12/17/2022 Anita Serrano  CHERYL, PT GP 2               Patient was intermittently wearing a face mask during this therapy encounter. Therapist used appropriate personal protective equipment including mask and gloves.  Mask used was standard procedure mask. Appropriate PPE was worn during the entire therapy session. Hand hygiene was completed before and after therapy session. Patient is not in enhanced droplet precautions.     Anita Serrano, PT  12/17/2022

## 2022-12-17 NOTE — PROGRESS NOTES
REASON FOR FOLLOWUP/CHIEF COMPLAINT:  High-grade glioma    HISTORY OF PRESENT ILLNESS:   No new issues.  No new neurological symptoms.      Past Medical History, Past Surgical History, Social History, Family History have been reviewed and are without significant changes except as mentioned.    Review of Systems   Review of Systems   Constitutional: Negative for activity change.   HENT: Negative for nosebleeds and trouble swallowing.    Respiratory: Negative for shortness of breath and wheezing.    Cardiovascular: Negative for chest pain and palpitations.   Gastrointestinal: Negative for constipation, diarrhea and nausea.   Genitourinary: Negative for dysuria and hematuria.   Musculoskeletal: Negative for arthralgias and myalgias.   Skin: Negative for rash and wound.   Neurological: Negative for seizures and syncope.   Hematological: Negative for adenopathy. Does not bruise/bleed easily.   Psychiatric/Behavioral: Negative for confusion.       Medications:  The current medication list was reviewed in the EMR    ALLERGIES:    Allergies   Allergen Reactions   • Morphine Anaphylaxis     Hives and throat swelling   • Peach [Prunus Persica] Anaphylaxis   • Penicillins Anaphylaxis   • Metformin Diarrhea              Vitals:    12/16/22 1256 12/16/22 1737 12/16/22 1953 12/17/22 0548   BP: 92/58 106/78 116/75 129/81   BP Location: Left arm  Left arm Left arm   Patient Position: Sitting  Lying Lying   Pulse: 86  69 65   Resp:   18 18   Temp:   97.3 °F (36.3 °C) 98 °F (36.7 °C)   TempSrc:   Oral Oral   SpO2:   95% 95%   Weight:       Height:         Physical Exam    CONSTITUTIONAL:  Vital signs reviewed.  No distress, looks comfortable.  EYES:  Conjunctivae and lids unremarkable.  PERRLA  EARS, NOSE, MOUTH, THROAT:  Ears and nose appear unremarkable.  Lips, teeth, gums appear unremarkable.  RESPIRATORY:  Normal respiratory effort.  Lungs clear to auscultation bilaterally.  CARDIOVASCULAR:  Normal S1, S2.  No murmurs,  rubs or gallops.  No significant lower extremity edema.  GASTROINTESTINAL: Abdomen appears unremarkable.  Nontender.  No hepatomegaly.  No splenomegaly.  NEURO: Cranial nerves 2-12 grossly intact.  No focal deficits.  Appears to have equal strength all 4 extremities.  MUSCULOSKELETAL:  Unremarkable digits/nails.  No cyanosis or clubbing.  SKIN:  Warm.  No rashes.  PSYCHIATRIC:  Normal judgment and insight.  Normal mood and affect.        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  Intact MGMT expression  • Patient presented with recurrent falls and confusion.  • MRI on 11/22/2022 revealed a 3.2 cm ring-enhancing right supratentorial mass.  There was mass-effect and left midline shift.  • She was started on dexamethasone and Keppra.  • CT chest abdomen pelvis on 11/24/2022 revealed no evidence of progressive metastatic disease.  • S/p stereotactic brain biopsy on 11/28/2022.  • Preliminary pathology revealed high-grade glioma.  It was sent to [facility].  • Preliminary revealed high-grade glioma.  • Patient had radiation simulation on 12/2/2022.  • Plan is to start concurrent low-dose Temodar at 75 mg/m2 daily.   • She is on Decadron 4 mg p.o. every 8 hours.  • Plan is to start Radiation with concurrent Temodar on 12/12/2022.  • Temodar does not require dose reduction due to the elevated liver enzymes.   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  This is associated with a worse prognosis and relative resistance to alkylating chemotherapy such as temozolomide.  However, temozolomide and radiation remain the recommended treatments.  • Today is day 6 of Temodar  radiation.  No clinical signs of progression.     *Germline BRCA2 and GEORGES mutations.  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  • Please see the note from Dr. Collins, the patient's outpatient oncologist from 12/4/2022.     *Seizure prophylaxis.  • Patient is on Keppra 500 mg p.o. twice daily.  • Patient is not having any symptoms consistent with seizures.     *Elevated liver enzymes.  • ALT was 143 and AST was 71 on 11/28/2022.  • ALT was 1049 and AST was 386 on 12/8/2022.  • Hepatic duplex, 12/12/2022 normal.  • LFTs continuing to improve    *Hyponatremia.  Sodium dropping, down to 127.  Defer to rehab MD and hospitalist service     PLAN:  · Temodar and radiation started 12/12/2022.  · Has follow-up arranged in the office with Dr. Collins on 1/4/2023 and 2/22/2023 and CT CAP 1 week prior.  Patient states the hope is to be discharged from rehab before Beth.     Following periodically.  Reviewed hospitalist note, stating hyponatremia most likely due to elevated blood sugars

## 2022-12-17 NOTE — PROGRESS NOTES
LOS: 10 days   Patient Care Team:  Tiffany Holloway MD as PCP - General (Internal Medicine)  Mathew Collins Jr., MD as Consulting Physician (Hematology and Oncology)  Dorian Sabillon MD as Surgeon (General Surgery)  Thang Dumont, JOSEFA (Optometry)  Lamine Cantu DO as Referring Physician (Family Medicine)  Hali Rolle MD as Gynecologist (Gynecology)      HERON MAGANA  1965    Diagnoses    1. 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  Is fatigued after radiation therapy and has some increased headache after that  Weakness coordination on the left side is about the same         Objective     Vitals:    12/17/22 1218   BP: 117/71   Pulse: 60   Resp: 18   Temp: 97.4 °F (36.3 °C)   SpO2: 98%       PHYSICAL EXAM:   MENTAL STATUS -  AWAKE / ALERT  HEENT-right scalp incision with staples removed.  Clean dry and intact  SCLERAE ANICTERIC, CONJUNCTIVAE PINK, EARS UNREMARKABLE EXTERNALLY  LUNGS - CTA, NO WHEEZES, RALES OR RHONCHI  HEART- RRR, NO RUB, MURMUR, OR GALLOP  ABD - NORMOACTIVE BOWEL SOUNDS, SOFT, NT.     EXT - NO EDEMA OR CYANOSIS  NEURO -oriented       Speech was fluent with slightly slow rate of speech.  Extraocular movements intact.  No dysarthria.  MOTOR EXAM - RUE/RLE 5/5.   LUE/LLE 5/5.   Impaired motor control in the left upper extremity and left lower extremity,        MEDICATIONS  Scheduled Meds:calcium 500 mg  vitamin D 5 mcg (200 UT), 1 tablet, Oral, BID  dexamethasone, 4 mg, Oral, Q8H  enoxaparin, 40 mg, Subcutaneous, Q12H  escitalopram, 10 mg, Oral, Daily  gabapentin, 100 mg, Oral, TID  insulin glargine, 28 Units, Subcutaneous, QAM  insulin lispro, 0-14 Units, Subcutaneous, TID AC  insulin lispro, 8 Units, Subcutaneous, TID With Meals  latanoprost, 1 drop, Both Eyes, Nightly  levETIRAcetam, 500 mg, Oral, BID  levothyroxine, 150 mcg, Oral, Q AM  lisinopril, 10 mg, Oral, Daily  magnesium oxide, 400 mg, Oral, Daily  ondansetron, 8 mg, Oral, Q24H  pantoprazole, 40 mg, Oral, Q AM  polyethylene glycol, 17 g, Oral, Daily  propranolol, 20 mg, Oral, Daily With Breakfast & Dinner  sulfamethoxazole-trimethoprim, 1 tablet, Oral, Once per day on Mon Wed Fri  temozolomide, 140 mg, Oral, Nightly   And  temozolomide, 20 mg, Oral, Nightly   And  temozolomide, 5 mg, Oral, Nightly  valACYclovir, 500 mg, Oral, Q24H      Continuous Infusions:   PRN Meds:.•  calcium carbonate  •  dextrose  •  dextrose  •  famotidine  •  glucagon (human recombinant)  •  influenza vaccine  •  nitroglycerin  •  ondansetron **OR** ondansetron  •  senna-docusate sodium      RESULTS  Glucose   Date/Time Value Ref Range Status   12/17/2022 0607 158 (H) 70 - 130 mg/dL Final     Comment:     Meter: [id number] : [id number] [facility]   12/16/2022 2117 139 (H) 70 - 130 mg/dL Final     Comment:     Meter: [id number] : [id number] [facility]   12/16/2022 1715 136 (H) 70 - 130 mg/dL Final     Comment:     Meter: [id number] : [id number] Torsten Josiahaaron    12/16/2022 1135 347 (H) 70 - 130 mg/dL Final     Comment:     Meter: [id number] : [id number] Torsten Josiahaaron    12/16/2022 0602 178 (H) 70 - 130 mg/dL Final     Comment:     Meter: [id number] : [id number] Nabeel Pacheco ARIANNE   12/15/2022 2038 196 (H) 70 - 130 mg/dL Final     Comment:     Meter: [id number] : [id number] Nabeel Pikenona ACUÑA   12/15/2022 1612 89 70 - 130 mg/dL Final      Comment:     Meter: [id number] : [id number] Katie ACUÑA   12/15/2022 1145 198 (H) 70 - 130 mg/dL Final     Comment:     Meter: [id number] : [id number] Katie ACUÑA     Results from last 7 days   Lab Units 12/15/22  0607 12/12/22  0555   WBC 10*3/mm3 12.40* 10.16   HEMOGLOBIN g/dL 12.7 13.0   HEMATOCRIT % 38.2 38.9   PLATELETS 10*3/mm3 181 174     Results from last 7 days   Lab Units 12/17/22  0542 12/16/22  0558 12/15/22  0607   SODIUM mmol/L 127* 130* 128*   POTASSIUM mmol/L 4.8 4.9 4.6   CHLORIDE mmol/L 92* 93* 93*   CO2 mmol/L 24.6 23.6 28.0   BUN mg/dL 24* 28* 25*   CREATININE mg/dL 0.76 0.85 0.76   CALCIUM mg/dL 9.4 9.5 8.8   BILIRUBIN mg/dL 0.7 0.7 0.7   ALK PHOS U/L 156* 160* 158*   ALT (SGPT) U/L 273* 311* 321*   AST (SGOT) U/L 44* 51* 52*   GLUCOSE mg/dL 167* 191* 156*       ASSESSMENT and PLAN    Glioma (HCC)    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  She reports intolerance to gabapentin, intolerances to opioids.  Tramadol comes up as contraindicated with history of anaphylactic secondary to morphine.  Valproate for headache control not an option with her liver changes  Dec 13: patient reports intolerance to gabapentin was drowsiness on 300 mg dose. Agreeable to try 100 mg tid for HA.   December 16-Propranolol added for baseline headache control but not able to receive first 2 doses today secondary to parameters to hold for pulse less than 60 or blood pressure less than 110.  Lisinopril dose decreased which may allow some range to receive propranolol.  Continues on low-dose gabapentin.  Has history of allergy with anaphylaxis to opioid-morphine-with hives and throat swelling  December 17-received first dose of propanolol this morning     Left side weakness/incoordination/impaired  mobility     Radiation therapy/Temodar to start December 12, 2022  Decadron 4 mg every 8 hourly  Dec 9: Oncology aware of increase of liver enzymes. They are following along   December 12: Radiation therapy and Temodar initiating today  Dec 14: Temodar Radiation, fraction 3 today.   Dec 15: Today is day 4 of Temodar Radiation. Per oncology no clinical signs of progression.      Leukocytosis-steroid/stress response.  Incision healing.      Elevated LFTs  Dec 8: Labs significant for ALT 1049 (143 11/28),  (71 11/28), Alk phos 190 (96 11/28). Taking minimal tylenol and statin is a home med- discontinued. Consulted IM who also consulted pharmacy and GI. They also decreased valtrex to 1g daily (she was taking daily prophylactic valtrex 2g bid). Awaiting liver ultrasound. CPK normal.   Dec 9- Ongoing workup. Liver ultrasound- remarkable for a small hepatic cyst otherwise negative  Per GI -[ Obtain duplex hepatic ultrasound to further assess for thrombosis  Obtain GEMMA, IgG profile, ASMA, EBV, CMV, HSV, VZV to rule out autoimmune versus viral cause to elevation.].   Patient reviewed with internal medicine and medical oncology  December 12-transaminases elevated but trending better.   Portal hepatic duplex unremarkable  Dec 14: Continued decrease of LFTs.   December 17-continue to trend better    Diabetes mellitus-Trulicity at home.  On Lantus/Humalog  December 13-Lantus increased to 28 units a.m.  On Humalog 8 units 3 times daily with meals  Dec 14: Continued elevation of blood sugars, will ask IM to provide insight.   Dec 15: Lantus increased to 35 daily yesterday with improved readings.      Hyponatremia   Dec 15: Na 128 today, 131 yesterday. IM ordered serum and urine osmolality and urine sodium. Deferring to nephrology consult to us. Will repeat Cmp in am.   Dec 16: Na 130 today, urine osmols 495 and urine Na 80. Continue to monitor.  Felt related to hyperglycemia per internal medicine note  December 17-sodium  127 today    Seizure prophylaxis-Keppra     DVT prophylaxis-Lovenox/SCDs     GI prophylaxis-protein pump inhibitor  Add calcium and vitamin D with ongoing steroids     Chronic Valtrex-dose decreased to 1000 mg daily     Germline BRCA2 and GEORGES mutations.  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     Depression-Lexapro     Hypothyroidism-on replacement     Hypertension   Dec 16: Will decrease lisinopril to 10mg from 20mg due to addition of propranolol for headaches. Holding parameters in place for propranolol of SBP <110 and HR <60    TEAM CONF - DEC 13 - BED SBA. TRANSFERS MIN CTG. GAIT 160 FEET CTG. LEANS LEFT. TOILET TRANSFERS CTG. SHOWER TRANSFERS CTG MIN. LBD MIN. UBD MIN. BATH MIN. GROOMING SBA. TOILETING CTG MIN. IMPAIRED ATTENTION.IMPAIRED EXECUTIVE FUNCTION. FATIGUES WITH COGNITIVE TASKS. CONTINENT BOWEL AND BLADDER. SCALP INCISION HEALING.   XRT/TEMODAR. ELEVATED TRANSAMINASES TREND BETTER.   ELOS - 2 WEEKS      Mathew Araujo MD       During rounds, used appropriate personal protective equipment including mask and gloves.  Additional gown if indicated.  Mask used was standard procedure mask. Appropriate PPE was worn during the entire visit.  Hand hygiene was completed before and after.

## 2022-12-18 LAB
ALBUMIN SERPL-MCNC: 3.9 G/DL (ref 3.5–5.2)
ALBUMIN/GLOB SERPL: 1.8 G/DL
ALP SERPL-CCNC: 159 U/L (ref 39–117)
ALT SERPL W P-5'-P-CCNC: 244 U/L (ref 1–33)
ANION GAP SERPL CALCULATED.3IONS-SCNC: 10.2 MMOL/L (ref 5–15)
AST SERPL-CCNC: 40 U/L (ref 1–32)
BILIRUB SERPL-MCNC: 0.7 MG/DL (ref 0–1.2)
BUN SERPL-MCNC: 29 MG/DL (ref 6–20)
BUN/CREAT SERPL: 33.7 (ref 7–25)
CALCIUM SPEC-SCNC: 9.2 MG/DL (ref 8.6–10.5)
CHLORIDE SERPL-SCNC: 89 MMOL/L (ref 98–107)
CO2 SERPL-SCNC: 25.8 MMOL/L (ref 22–29)
CREAT SERPL-MCNC: 0.86 MG/DL (ref 0.57–1)
EGFRCR SERPLBLD CKD-EPI 2021: 78.4 ML/MIN/1.73
GLOBULIN UR ELPH-MCNC: 2.2 GM/DL
GLUCOSE BLDC GLUCOMTR-MCNC: 206 MG/DL (ref 70–130)
GLUCOSE BLDC GLUCOMTR-MCNC: 215 MG/DL (ref 70–130)
GLUCOSE BLDC GLUCOMTR-MCNC: 227 MG/DL (ref 70–130)
GLUCOSE BLDC GLUCOMTR-MCNC: 307 MG/DL (ref 70–130)
GLUCOSE SERPL-MCNC: 319 MG/DL (ref 65–99)
OSMOLALITY SERPL: 288 MOSM/KG (ref 275–300)
OSMOLALITY UR: 599 MOSM/KG
POTASSIUM SERPL-SCNC: 5.1 MMOL/L (ref 3.5–5.2)
PROT SERPL-MCNC: 6.1 G/DL (ref 6–8.5)
SODIUM SERPL-SCNC: 125 MMOL/L (ref 136–145)
TSH SERPL DL<=0.05 MIU/L-ACNC: 0.03 UIU/ML (ref 0.27–4.2)
URATE SERPL-MCNC: 5.9 MG/DL (ref 2.4–5.7)

## 2022-12-18 PROCEDURE — 99232 SBSQ HOSP IP/OBS MODERATE 35: CPT | Performed by: INTERNAL MEDICINE

## 2022-12-18 PROCEDURE — 63710000001 INSULIN LISPRO (HUMAN) PER 5 UNITS: Performed by: HOSPITALIST

## 2022-12-18 PROCEDURE — 82962 GLUCOSE BLOOD TEST: CPT

## 2022-12-18 PROCEDURE — 25010000002 TEMOZOLOMIDE PER 5 MG: Performed by: INTERNAL MEDICINE

## 2022-12-18 PROCEDURE — 63710000001 ONDANSETRON PER 8 MG: Performed by: INTERNAL MEDICINE

## 2022-12-18 PROCEDURE — 63710000001 DEXAMETHASONE PER 0.25 MG: Performed by: PHYSICAL MEDICINE & REHABILITATION

## 2022-12-18 PROCEDURE — 84550 ASSAY OF BLOOD/URIC ACID: CPT | Performed by: INTERNAL MEDICINE

## 2022-12-18 PROCEDURE — 83930 ASSAY OF BLOOD OSMOLALITY: CPT | Performed by: INTERNAL MEDICINE

## 2022-12-18 PROCEDURE — 84443 ASSAY THYROID STIM HORMONE: CPT | Performed by: INTERNAL MEDICINE

## 2022-12-18 PROCEDURE — 25010000002 TEMOZOLOMIDE 140 MG CAPSULE: Performed by: INTERNAL MEDICINE

## 2022-12-18 PROCEDURE — 63710000001 INSULIN LISPRO (HUMAN) PER 5 UNITS: Performed by: PHYSICAL MEDICINE & REHABILITATION

## 2022-12-18 PROCEDURE — 83935 ASSAY OF URINE OSMOLALITY: CPT | Performed by: INTERNAL MEDICINE

## 2022-12-18 PROCEDURE — 80053 COMPREHEN METABOLIC PANEL: CPT | Performed by: PHYSICAL MEDICINE & REHABILITATION

## 2022-12-18 PROCEDURE — 25010000002 ENOXAPARIN PER 10 MG: Performed by: PHYSICAL MEDICINE & REHABILITATION

## 2022-12-18 RX ORDER — ESCITALOPRAM OXALATE 5 MG/1
5 TABLET ORAL DAILY
Status: DISCONTINUED | OUTPATIENT
Start: 2022-12-19 | End: 2022-12-23 | Stop reason: HOSPADM

## 2022-12-18 RX ORDER — LISINOPRIL 5 MG/1
5 TABLET ORAL DAILY
Status: DISCONTINUED | OUTPATIENT
Start: 2022-12-19 | End: 2022-12-20

## 2022-12-18 RX ORDER — BUPROPION HYDROCHLORIDE 100 MG/1
100 TABLET ORAL EVERY 12 HOURS SCHEDULED
Status: DISCONTINUED | OUTPATIENT
Start: 2022-12-23 | End: 2022-12-23

## 2022-12-18 RX ADMIN — MAGNESIUM OXIDE 400 MG (241.3 MG MAGNESIUM) TABLET 400 MG: TABLET at 08:03

## 2022-12-18 RX ADMIN — CALCIUM CARBONATE-VITAMIN D TAB 500 MG-200 UNIT 1 TABLET: 500-200 TAB at 20:35

## 2022-12-18 RX ADMIN — TEMOZOLOMIDE 5 MG: 5 CAPSULE ORAL at 20:37

## 2022-12-18 RX ADMIN — INSULIN LISPRO 5 UNITS: 100 INJECTION, SOLUTION INTRAVENOUS; SUBCUTANEOUS at 08:01

## 2022-12-18 RX ADMIN — LEVETIRACETAM 500 MG: 500 TABLET, FILM COATED ORAL at 08:02

## 2022-12-18 RX ADMIN — INSULIN LISPRO 8 UNITS: 100 INJECTION, SOLUTION INTRAVENOUS; SUBCUTANEOUS at 11:46

## 2022-12-18 RX ADMIN — CALCIUM CARBONATE-VITAMIN D TAB 500 MG-200 UNIT 1 TABLET: 500-200 TAB at 08:02

## 2022-12-18 RX ADMIN — DEXAMETHASONE 4 MG: 4 TABLET ORAL at 20:35

## 2022-12-18 RX ADMIN — GABAPENTIN 100 MG: 100 CAPSULE ORAL at 20:35

## 2022-12-18 RX ADMIN — GABAPENTIN 100 MG: 100 CAPSULE ORAL at 16:07

## 2022-12-18 RX ADMIN — LEVETIRACETAM 500 MG: 500 TABLET, FILM COATED ORAL at 20:35

## 2022-12-18 RX ADMIN — TEMOZOLOMIDE 20 MG: 20 CAPSULE ORAL at 20:37

## 2022-12-18 RX ADMIN — PANTOPRAZOLE SODIUM 40 MG: 40 TABLET, DELAYED RELEASE ORAL at 05:27

## 2022-12-18 RX ADMIN — GABAPENTIN 100 MG: 100 CAPSULE ORAL at 08:02

## 2022-12-18 RX ADMIN — POLYETHYLENE GLYCOL 3350 17 G: 17 POWDER, FOR SOLUTION ORAL at 08:03

## 2022-12-18 RX ADMIN — TEMOZOLOMIDE 140 MG: 140 CAPSULE ORAL at 20:37

## 2022-12-18 RX ADMIN — LEVOTHYROXINE SODIUM 150 MCG: 0.15 TABLET ORAL at 05:27

## 2022-12-18 RX ADMIN — ESCITALOPRAM 10 MG: 10 TABLET, FILM COATED ORAL at 08:02

## 2022-12-18 RX ADMIN — LISINOPRIL 10 MG: 10 TABLET ORAL at 08:02

## 2022-12-18 RX ADMIN — INSULIN LISPRO 5 UNITS: 100 INJECTION, SOLUTION INTRAVENOUS; SUBCUTANEOUS at 11:47

## 2022-12-18 RX ADMIN — DEXAMETHASONE 4 MG: 4 TABLET ORAL at 05:27

## 2022-12-18 RX ADMIN — INSULIN LISPRO 8 UNITS: 100 INJECTION, SOLUTION INTRAVENOUS; SUBCUTANEOUS at 16:07

## 2022-12-18 RX ADMIN — INSULIN GLARGINE-YFGN 28 UNITS: 100 INJECTION, SOLUTION SUBCUTANEOUS at 08:01

## 2022-12-18 RX ADMIN — DEXAMETHASONE 4 MG: 4 TABLET ORAL at 13:35

## 2022-12-18 RX ADMIN — ENOXAPARIN SODIUM 40 MG: 100 INJECTION SUBCUTANEOUS at 11:46

## 2022-12-18 RX ADMIN — INSULIN LISPRO 10 UNITS: 100 INJECTION, SOLUTION INTRAVENOUS; SUBCUTANEOUS at 16:08

## 2022-12-18 RX ADMIN — ONDANSETRON HYDROCHLORIDE 8 MG: 8 TABLET, FILM COATED ORAL at 11:59

## 2022-12-18 RX ADMIN — LATANOPROST 1 DROP: 50 SOLUTION OPHTHALMIC at 20:35

## 2022-12-18 RX ADMIN — VALACYCLOVIR HYDROCHLORIDE 500 MG: 500 TABLET, FILM COATED ORAL at 08:03

## 2022-12-18 RX ADMIN — INSULIN LISPRO 8 UNITS: 100 INJECTION, SOLUTION INTRAVENOUS; SUBCUTANEOUS at 08:00

## 2022-12-18 RX ADMIN — ENOXAPARIN SODIUM 40 MG: 100 INJECTION SUBCUTANEOUS at 20:35

## 2022-12-18 NOTE — PROGRESS NOTES
Inpatient Rehabilitation Plan of Care Note    Plan of Care  Care Plan Reviewed - No updates at this time.    Psychosocial    Performed Intervention(s)  Support/peer groups.    Signed by: Yessi Luther RN

## 2022-12-18 NOTE — PROGRESS NOTES
Name: Christie Avendaño ADMIT: 2022   : 1965  PCP: Tiffany Holloway MD    MRN: [id number] LOS: 11 days   AGE/SEX: 57 y.o. female  ROOM: [facility]     Subjective   Subjective     Patient is lying on the bed and does not appear in any major distress.  Denies nausea, vomiting, abdominal pain, chest pain, shortness of breath.     Objective   Objective   Vital Signs  Temp:  [97.3 °F (36.3 °C)-98.2 °F (36.8 °C)] 97.3 °F (36.3 °C)  Heart Rate:  [57-70] 70  Resp:  [18] 18  BP: ()/(48-68) 113/56  SpO2:  [94 %-97 %] 97 %  on   ;   Device (Oxygen Therapy): room air  Body mass index is 40.17 kg/m².  Physical Exam  Constitutional:       General: She is not in acute distress.  HENT:      Head: Normocephalic and atraumatic.      Mouth/Throat:      Mouth: Mucous membranes are moist.   Eyes:      Extraocular Movements: Extraocular movements intact.      Pupils: Pupils are equal, round, and reactive to light.   Cardiovascular:      Rate and Rhythm: Normal rate.      Pulses: Normal pulses.      Heart sounds: Normal heart sounds.   Pulmonary:      Effort: Pulmonary effort is normal.      Breath sounds: Normal breath sounds.   Abdominal:      General: Bowel sounds are normal. There is no distension.      Palpations: Abdomen is soft.      Tenderness: There is no abdominal tenderness.   Musculoskeletal:      Cervical back: Normal range of motion and neck supple.      Right lower leg: No edema.      Left lower leg: No edema.   Skin:     General: Skin is warm and dry.   Neurological:      General: No focal deficit present.      Mental Status: She is alert and oriented to person, place, and time.   Psychiatric:         Mood and Affect: Mood normal.         Behavior: Behavior normal.       Results Review     I reviewed the patient's new clinical results.  Results from last 7 days   Lab Units 12/15/22  0607 22  0555   WBC 10*3/mm3 12.40* 10.16   HEMOGLOBIN g/dL 12.7 13.0   PLATELETS 10*3/mm3 181 174     Results from last 7  days   Lab Units 12/18/22  0852 12/17/22  0542 12/16/22  0558 12/15/22  0607   SODIUM mmol/L 125* 127* 130* 128*   POTASSIUM mmol/L 5.1 4.8 4.9 4.6   CHLORIDE mmol/L 89* 92* 93* 93*   CO2 mmol/L 25.8 24.6 23.6 28.0   BUN mg/dL 29* 24* 28* 25*   CREATININE mg/dL 0.86 0.76 0.85 0.76   GLUCOSE mg/dL 319* 167* 191* 156*   EGFR mL/min/1.73 78.4 91.0 79.5 91.0     Results from last 7 days   Lab Units 12/18/22  0852 12/17/22  0542 12/16/22  0558 12/15/22  0607   ALBUMIN g/dL 3.90 3.70 4.00 3.90   BILIRUBIN mg/dL 0.7 0.7 0.7 0.7   ALK PHOS U/L 159* 156* 160* 158*   AST (SGOT) U/L 40* 44* 51* 52*   ALT (SGPT) U/L 244* 273* 311* 321*     Results from last 7 days   Lab Units 12/18/22  0852 12/17/22  0542 12/16/22  0558 12/15/22  0607   CALCIUM mg/dL 9.2 9.4 9.5 8.8   ALBUMIN g/dL 3.90 3.70 4.00 3.90       Glucose   Date/Time Value Ref Range Status   12/18/2022 1146 206 (H) 70 - 130 mg/dL Final     Comment:     Meter: [id number] : [id number] [address] CN   12/18/2022 0622 227 (H) 70 - 130 mg/dL Final     Comment:     Meter: [id number] : [id number] Dulal Eliana NA   12/17/2022 2033 152 (H) 70 - 130 mg/dL Final     Comment:     Meter: [id number] : [id number] Dulal Eliana NA   12/17/2022 1559 217 (H) 70 - 130 mg/dL Final     Comment:     Meter: [id number] : [id number] Christopher Don CNA   12/17/2022 0607 158 (H) 70 - 130 mg/dL Final     Comment:     Meter: [id number] : [id number] John ACUÑA   12/16/2022 2117 139 (H) 70 - 130 mg/dL Final     Comment:     Meter: [id number] : [id number] John Taylor NA   12/16/2022 1715 136 (H) 70 - 130 mg/dL Final     Comment:     Meter: [id number] : [id number] Torsten ACUÑA       No radiology results for the last day  Scheduled Medications  calcium 500 mg vitamin D 5 mcg (200 UT), 1 tablet, Oral, BID  dexamethasone, 4 mg, Oral, Q8H  enoxaparin, 40 mg, Subcutaneous, Q12H  escitalopram, 10 mg, Oral, Daily  gabapentin, 100 mg, Oral, TID  insulin glargine,  28 Units, Subcutaneous, QAM  insulin lispro, 0-14 Units, Subcutaneous, TID AC  insulin lispro, 8 Units, Subcutaneous, TID With Meals  latanoprost, 1 drop, Both Eyes, Nightly  levETIRAcetam, 500 mg, Oral, BID  levothyroxine, 150 mcg, Oral, Q AM  lisinopril, 10 mg, Oral, Daily  magnesium oxide, 400 mg, Oral, Daily  ondansetron, 8 mg, Oral, Q24H  pantoprazole, 40 mg, Oral, Q AM  polyethylene glycol, 17 g, Oral, Daily  propranolol, 20 mg, Oral, Daily With Breakfast & Dinner  sulfamethoxazole-trimethoprim, 1 tablet, Oral, Once per day on Mon Wed Fri  temozolomide, 140 mg, Oral, Nightly   And  temozolomide, 20 mg, Oral, Nightly   And  temozolomide, 5 mg, Oral, Nightly  valACYclovir, 500 mg, Oral, Q24H    Infusions   Diet  Diet: Regular/House Diet, Diabetic Diets; Consistent Carbohydrate; Texture: Regular Texture (IDDSI 7); Fluid Consistency: Thin (IDDSI 0)       Assessment/Plan     Active Hospital Problems    Diagnosis  POA   • **Glioma (HCC) [C71.9]  Yes      Resolved Hospital Problems   No resolved problems to display.       57 y.o. female admitted with Glioma (HCC).    Reason for medical consultation--> hyperglycemia/elevated LFTs    1. Glioblastoma, patient is currently being treated with Temodar and radiation by oncology.  On dexamethasone and Keppra for seizure prophylaxis.    2.  History of lung cancer/thyroid cancer/breast cancer/renal cell cancer/melanoma, being managed by oncology.    3.  Neuropathy, Neurontin.    4.  Acute transaminitis, was felt to be medication induced therefore atorvastatin and Tylenol have been discontinued.  Valacyclovir was held but has been resumed now.  LFTs are improving and GI did evaluate and underwent liver ultrasound which revealed only a simple cyst and no evidence of any portal vein DVT.  Autoimmune work-up was negative and CMV, VZV, EBV testing revealed past infection.  Continue to follow LFTs.    5.  Hypertension, on lisinopril and monitor blood pressure closely.    6.   Diabetes mellitus, patient normally takes Trulicity which is on hold for the moment and currently on Lantus 40 units and blood sugars are in the acceptable range.    7.Hyponatremia, sodium steadily continues to drop to 125 when nephrology consult will be obtained regarding the same.    Copied text on this note has been reviewed by me on 12/18/2022    Amador Malik MD  [facility]  12/18/22  14:52 EST

## 2022-12-18 NOTE — PROGRESS NOTES
LOS: 11 days   Patient Care Team:  Tiffany Holloway MD as PCP - General (Internal Medicine)  Mathew Collins Jr., MD as Consulting Physician (Hematology and Oncology)  Dorian Sabillon MD as Surgeon (General Surgery)  Thang Dumont, JOSEFA (Optometry)  Lamine Cantu DO as Referring Physician (Family Medicine)  Hali Rolle MD as Gynecologist (Gynecology)      HERON MAGANA  1965    Diagnoses    1. 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  Has not had radiation therapy the last 2 days.  She feels her coordination on the left side showing some improvement.  Patient reviewed with nephrology service.  Patient agreeable to transition escitalopram to bupropion given hyponatremia  Blood pressure 84/48 earlier today.  Had not received any propranolol since yesterday morning.     Objective     Vitals:    12/18/22 1739   BP: 102/58   Pulse: 78   Resp:    Temp:    SpO2:        PHYSICAL EXAM:   MENTAL STATUS -  AWAKE / ALERT  HEENT-right scalp incision with staples removed.  Clean dry and intact  SCLERAE ANICTERIC, CONJUNCTIVAE PINK, EARS UNREMARKABLE EXTERNALLY  LUNGS - CTA, NO WHEEZES, RALES OR RHONCHI  HEART- RRR, NO RUB, MURMUR, OR GALLOP  ABD - NORMOACTIVE BOWEL SOUNDS, SOFT, NT.     EXT - NO EDEMA OR CYANOSIS  NEURO -oriented       Speech was fluent with slightly slow rate of speech.  Extraocular movements intact.  No dysarthria.  MOTOR EXAM - RUE/RLE  5/5.   LUE/LLE 5/5.   Impaired motor control in the left upper extremity and left lower extremity,        MEDICATIONS  Scheduled Meds:[START ON 12/26/2022] buPROPion, 100 mg, Oral, Q12H  calcium 500 mg vitamin D 5 mcg (200 UT), 1 tablet, Oral, BID  dexamethasone, 4 mg, Oral, Q8H  enoxaparin, 40 mg, Subcutaneous, Q12H  [START ON 12/19/2022] escitalopram, 5 mg, Oral, Daily  gabapentin, 100 mg, Oral, TID  [START ON 12/19/2022] insulin glargine, 40 Units, Subcutaneous, QAM  insulin lispro, 0-14 Units, Subcutaneous, TID AC  insulin lispro, 8 Units, Subcutaneous, TID With Meals  latanoprost, 1 drop, Both Eyes, Nightly  levETIRAcetam, 500 mg, Oral, BID  levothyroxine, 150 mcg, Oral, Q AM  [START ON 12/19/2022] lisinopril, 5 mg, Oral, Daily  magnesium oxide, 400 mg, Oral, Daily  ondansetron, 8 mg, Oral, Q24H  pantoprazole, 40 mg, Oral, Q AM  polyethylene glycol, 17 g, Oral, Daily  propranolol, 20 mg, Oral, Daily With Breakfast & Dinner  sulfamethoxazole-trimethoprim, 1 tablet, Oral, Once per day on Mon Wed Fri  temozolomide, 140 mg, Oral, Nightly   And  temozolomide, 20 mg, Oral, Nightly   And  temozolomide, 5 mg, Oral, Nightly  valACYclovir, 500 mg, Oral, Q24H      Continuous Infusions:   PRN Meds:.•  calcium carbonate  •  dextrose  •  dextrose  •  famotidine  •  glucagon (human recombinant)  •  influenza vaccine  •  nitroglycerin  •  ondansetron **OR** ondansetron  •  senna-docusate sodium      RESULTS  Glucose   Date/Time Value Ref Range Status   12/18/2022 1600 307 (H) 70 - 130 mg/dL Final     Comment:     Meter: [id number] : [id number] Ye Ravi NA   12/18/2022 1146 206 (H) 70 - 130 mg/dL Final     Comment:     Meter: [id number] : [id number] Yuriy CORTEZA   12/18/2022 0622 227 (H) 70 - 130 mg/dL Final     Comment:     Meter: [id number] : [id number] Paola ACUÑA   12/17/2022 2033 152 (H) 70 - 130 mg/dL Final     Comment:     Meter: [id number] : [id number] Paola ACUÑA   12/17/2022 1559  217 (H) 70 - 130 mg/dL Final     Comment:     Meter: [id number] : [id number] Christopher CORTEZA   12/17/2022 0607 158 (H) 70 - 130 mg/dL Final     Comment:     Meter: [id number] : [id number] John Taylor NA   12/16/2022 2117 139 (H) 70 - 130 mg/dL Final     Comment:     Meter: [id number] : [id number] John Taylor NA   12/16/2022 1715 136 (H) 70 - 130 mg/dL Final     Comment:     Meter: [id number] : [id number] Torsten ACUÑA     Results from last 7 days   Lab Units 12/15/22  0607 12/12/22  0555   WBC 10*3/mm3 12.40* 10.16   HEMOGLOBIN g/dL 12.7 13.0   HEMATOCRIT % 38.2 38.9   PLATELETS 10*3/mm3 181 174     Results from last 7 days   Lab Units 12/18/22  0852 12/17/22  0542 12/16/22  0558   SODIUM mmol/L 125* 127* 130*   POTASSIUM mmol/L 5.1 4.8 4.9   CHLORIDE mmol/L 89* 92* 93*   CO2 mmol/L 25.8 24.6 23.6   BUN mg/dL 29* 24* 28*   CREATININE mg/dL 0.86 0.76 0.85   CALCIUM mg/dL 9.2 9.4 9.5   BILIRUBIN mg/dL 0.7 0.7 0.7   ALK PHOS U/L 159* 156* 160*   ALT (SGPT) U/L 244* 273* 311*   AST (SGOT) U/L 40* 44* 51*   GLUCOSE mg/dL 319* 167* 191*       ASSESSMENT and PLAN    Glioma (HCC)    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  She reports intolerance to gabapentin, intolerances to opioids.  Tramadol comes up as contraindicated with history of anaphylactic secondary to morphine.  Valproate for headache control not an option with her liver changes  Dec 13: patient reports intolerance to gabapentin was drowsiness on 300 mg dose. Agreeable to try 100 mg tid for HA.   December 16-Propranolol added for baseline headache control but not able to receive first 2 doses today secondary to parameters to hold for pulse less than 60 or blood pressure less than 110.  Lisinopril dose decreased which may allow some  range to receive propranolol.  Continues on low-dose gabapentin.  Has history of allergy with anaphylaxis to opioid-morphine-with hives and throat swelling  December 17-received first dose of propanolol this morning  December 18-headache better on the weekend off of radiation therapy.  Has not received propranolol secondary to parameters since yesterday morning     Left side weakness/incoordination/impaired mobility     Radiation therapy/Temodar to start December 12, 2022  Decadron 4 mg every 8 hourly  Dec 9: Oncology aware of increase of liver enzymes. They are following along   December 12: Radiation therapy and Temodar initiating today  Dec 14: Temodar Radiation, fraction 3 today.   Dec 15: Today is day 4 of Temodar Radiation. Per oncology no clinical signs of progression.      Leukocytosis-steroid/stress response.  Incision healing.      Elevated LFTs  Dec 8: Labs significant for ALT 1049 (143 11/28),  (71 11/28), Alk phos 190 (96 11/28). Taking minimal tylenol and statin is a home med- discontinued. Consulted IM who also consulted pharmacy and GI. They also decreased valtrex to 1g daily (she was taking daily prophylactic valtrex 2g bid). Awaiting liver ultrasound. CPK normal.   Dec 9- Ongoing workup. Liver ultrasound- remarkable for a small hepatic cyst otherwise negative  Per GI -[ Obtain duplex hepatic ultrasound to further assess for thrombosis  Obtain GEMMA, IgG profile, ASMA, EBV, CMV, HSV, VZV to rule out autoimmune versus viral cause to elevation.].   Patient reviewed with internal medicine and medical oncology  December 12-transaminases elevated but trending better.   Portal hepatic duplex unremarkable  Dec 14: Continued decrease of LFTs.   December 17-continue to trend better    Diabetes mellitus-Trulicity at home.  On Lantus/Humalog  December 13-Lantus increased to 28 units a.m.  On Humalog 8 units 3 times daily with meals  Dec 14: Continued elevation of blood sugars, will ask IM to provide  insight.   Dec 15: Lantus increased to 35 daily yesterday with improved readings.      Hyponatremia   Dec 15: Na 128 today, 131 yesterday. IM ordered serum and urine osmolality and urine sodium. Deferring to nephrology consult to us. Will repeat Cmp in am.   Dec 16: Na 130 today, urine osmols 495 and urine Na 80. Continue to monitor.  Felt related to hyperglycemia per internal medicine note  December 17-sodium 127 today  December 18-sodium 125.  Nephrology evaluating.  Transitioning escitalopram to bupropion.  TSH low at 0.03    Seizure prophylaxis-Keppra     DVT prophylaxis-Lovenox/SCDs     GI prophylaxis-protein pump inhibitor  Add calcium and vitamin D with ongoing steroids     Chronic Valtrex-dose decreased to 1000 mg daily     Germline BRCA2 and GEORGES mutations.  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     Depression-Lexapro  December 18-transition to Lexapro 5 mg daily for 7 days then start bupropion 100 mg twice daily to possibly help with hyponatremia     Hypothyroidism-on replacement  December 18-TSH equals 0.030     Hypertension   Dec 16: Will decrease lisinopril to 10mg from 20mg due to addition of propranolol for headaches. Holding parameters in place for propranolol of SBP <110 and HR <60  December 18-blood pressure 84/48-had not received propranolol since yesterday morning.  Will decrease lisinopril to 5 mg daily to allow more range for giving propanolol for headache    TEAM CONF - DEC 13 - BED SBA. TRANSFERS MIN CTG. GAIT 160 FEET CTG. LEANS LEFT. TOILET TRANSFERS CTG. SHOWER TRANSFERS CTG MIN. LBD MIN. UBD MIN. BATH MIN. GROOMING SBA. TOILETING CTG MIN. IMPAIRED ATTENTION.IMPAIRED EXECUTIVE FUNCTION. FATIGUES WITH COGNITIVE TASKS. CONTINENT BOWEL AND BLADDER. SCALP  INCISION HEALING.   XRT/TEMODAR. ELEVATED TRANSAMINASES TREND BETTER.   ELOS - 2 WEEKS      Mathew Araujo MD       During rounds, used appropriate personal protective equipment including mask and gloves.  Additional gown if indicated.  Mask used was standard procedure mask. Appropriate PPE was worn during the entire visit.  Hand hygiene was completed before and after.          None

## 2022-12-18 NOTE — PROGRESS NOTES
Inpatient Rehabilitation Plan of Care Note    Plan of Care  Care Plan Reviewed - No updates at this time.    Psychosocial    Performed Intervention(s)  Support/peer groups.  Verbalizes needs and concerns.  Medication.      Sphincter Control    Performed Intervention(s)  Offer restroom during hourly rounding.  Encourage fluid intake.  Monitor Is and Os.  Timed voids.  Encourage appropriate diet.      Safety    Performed Intervention(s)  Items w/i reach.  Safety rounds.  Bed and chair alarm. Blue armband  Falls precautions.    Signed by: Melissa Wilson RN

## 2022-12-18 NOTE — PROGRESS NOTES
REASON FOR FOLLOWUP/CHIEF COMPLAINT:  High-grade glioma    HISTORY OF PRESENT ILLNESS:   No new problems.  No new neurological issues.  Hopes to go home on Tuesday    Past Medical History, Past Surgical History, Social History, Family History have been reviewed and are without significant changes except as mentioned.    Review of Systems   Review of Systems   Constitutional: Negative for activity change.   HENT: Negative for nosebleeds and trouble swallowing.    Respiratory: Negative for shortness of breath and wheezing.    Cardiovascular: Negative for chest pain and palpitations.   Gastrointestinal: Negative for constipation, diarrhea and nausea.   Genitourinary: Negative for dysuria and hematuria.   Musculoskeletal: Negative for arthralgias and myalgias.   Skin: Negative for rash and wound.   Neurological: Negative for seizures and syncope.   Hematological: Negative for adenopathy. Does not bruise/bleed easily.   Psychiatric/Behavioral: Negative for confusion.       Medications:  The current medication list was reviewed in the EMR    ALLERGIES:    Allergies   Allergen Reactions   • Morphine Anaphylaxis     Hives and throat swelling   • Peach [Prunus Persica] Anaphylaxis   • Penicillins Anaphylaxis   • Metformin Diarrhea              Vitals:    12/17/22 0548 12/17/22 1218 12/17/22 1952 12/18/22 0513   BP: 129/81 117/71 101/62 107/68   BP Location: Left arm Right arm Right arm Right arm   Patient Position: Lying Sitting Lying Lying   Pulse: 65 60 57 66   Resp: 18 18 18 18   Temp: 98 °F (36.7 °C) 97.4 °F (36.3 °C) 98.2 °F (36.8 °C) 97.4 °F (36.3 °C)   TempSrc: Oral Oral Oral Oral   SpO2: 95% 98% 94% 97%   Weight:       Height:         Physical Exam    CONSTITUTIONAL:  Vital signs reviewed.  No distress, looks comfortable.  EYES:  Conjunctivae and lids unremarkable.  PERRLA  EARS, NOSE, MOUTH, THROAT:  Ears and nose appear unremarkable.  Lips, teeth, gums appear unremarkable.  RESPIRATORY:  Normal respiratory  effort.  Lungs clear to auscultation bilaterally.  CARDIOVASCULAR:  Normal S1, S2.  No murmurs, rubs or gallops.  No significant lower extremity edema.  GASTROINTESTINAL: Abdomen appears unremarkable.  Nontender.  No hepatomegaly.  No splenomegaly.  NEURO: Cranial nerves 2-12 grossly intact.  No focal deficits.  Appears to have equal strength all 4 extremities.  MUSCULOSKELETAL:  Unremarkable digits/nails.  No cyanosis or clubbing.  SKIN:  Warm.  No rashes.  PSYCHIATRIC:  Normal judgment and insight.  Normal mood and affect.        RECENT LABS:  No results found for: WBC, HGB, PLT    ASSESSMENT/PLAN:  Christie ONEIL Jarocho 4411/1   *Glioblastoma, IDH 1 R132H negative, CNS WHO grade 4.  Intact MGMT expression  • Patient presented with recurrent falls and confusion.  • MRI on 11/22/2022 revealed a 3.2 cm ring-enhancing right supratentorial mass.  There was mass-effect and left midline shift.  • She was started on dexamethasone and Keppra.  • CT chest abdomen pelvis on 11/24/2022 revealed no evidence of progressive metastatic disease.  • S/p stereotactic brain biopsy on 11/28/2022.  • Preliminary pathology revealed high-grade glioma.  It was sent to [facility].  • Preliminary revealed high-grade glioma.  • Patient had radiation simulation on 12/2/2022.  • Plan is to start concurrent low-dose Temodar at 75 mg/m2 daily.   • She is on Decadron 4 mg p.o. every 8 hours.  • Plan is to start Radiation with concurrent Temodar on 12/12/2022.  • Temodar does not require dose reduction due to the elevated liver enzymes.   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  This is associated with a worse prognosis and relative resistance to alkylating chemotherapy such as temozolomide.  However, temozolomide and radiation remain the recommended treatments.  • Today is day 7 of Temodar radiation.  No clinical evidence of progression.     *Germline BRCA2 and GEORGES mutations.  • Patient has history of Papillary  thyroid cancer, Bilateral DCIS, Left clear cell renal cell carcinoma, Left lower lobe NSC Lung cancer (adenocarcinoma with lipidic growth pattern), Right lower lobe NSC lung (adenocarcinoma with lipidic growth pattern) and Melanoma of right heel.  • Please see the note from Dr. Collins, the patient's outpatient oncologist from 12/4/2022.     *Seizure prophylaxis.  • Patient is on Keppra 500 mg p.o. twice daily.  • Patient is not having any symptoms consistent with seizures.     *Elevated liver enzymes.  • ALT was 143 and AST was 71 on 11/28/2022.  • ALT was 1049 and AST was 386 on 12/8/2022.  • Hepatic duplex, 12/12/2022 normal.  • LFTs continues to improve    *Hyponatremia.  Sodium lower again today, down to 125.  Defer to hospitalist service and rehab service.     PLAN:  · Temodar and radiation started 12/12/2022.  · Has follow-up arranged in the office with Dr. Collins on 1/4/2023 and 2/22/2023 and CT CAP 1 week prior.  Patient states the hope is to be discharged from rehab before [facility].     Following periodically.  Reviewed rehab MD note, noting hospitalist service believes hyponatremia is due to hyperglycemia

## 2022-12-18 NOTE — CONSULTS
[facility] Consult Note      Patient Name: Christie Avendaño  : 1965  MRN: [id number]  Primary Care Physician:  Tiffany Holloway MD  Referring Physician: Mathew Araujo MD  Date of admission: 2022    Subjective     Reason for Consult: Hyponatremia    HPI:   Christie Avendaño is a 57 y.o. female was admitted to acute rehab on 2022 prior to that she was hospitalized at [facility] where she was diagnosed with grade 4 glioblastoma by biopsy and she is undergoing Temodar and radiation  She has history of BRCA2 and GEORGES mutation and opted to have bilateral mastectomy.  Also history of renal cell cancer with prior right partial nephrectomy, she has history of papillary thyroid cancer and right lower lobe adenocarcinoma which was resected also had melanoma of the right heel.    The patient has worsening sodium hence nephrology consult was requested, sodium on admission initially on 2022 was 140 and it fluctuated as low as 131 on 2022, and has been decreasing is down to 125 today with creatinine 0.86.    The patient admitted increased water intake, she has been on citalopram for over a year also treated for seizure prophylaxis with Keppra.    She denies any chest pain or shortness of air, no orthopnea or PND, no nausea or vomiting, no abdominal pain, no dysuria or gross hematuria, no lower extremity edema, no dysuria or gross hematuria, she has some difficulty ambulating.    Review of Systems:   14 point review of systems is otherwise negative except for mentioned above on HPI    Personal History     Past Medical History:   Diagnosis Date   • Arthritis    • Asthma    • BRCA2 positive    • Diabetes mellitus (HCC)    • H/O Liver masses 2017    x3   • H/O Lung masses 2017    1 in right lower lobe and 1 in the left infrahilar location   • H/O Ovarian cyst    • H/O Right adrenal mass (CMS/HCC) 2017   • Lung cancer (HCC)    • Melanoma (HCC)     right foot   •  PONV (postoperative nausea and vomiting)    • Thyroid disease        Past Surgical History:   Procedure Laterality Date   • APPENDECTOMY     • BRAIN BIOPSY Right 11/28/2022    Procedure: Right frontal stereotactic needle brain biopsy;  Surgeon: Manuel Thompson MD;  Location: [facility] MAIN OR;  Service: Neurosurgery;  Laterality: Right;   • BREAST IMPLANT SURGERY Bilateral    • CHOLECYSTECTOMY     • HYSTERECTOMY     • MASTECTOMY Bilateral    • OVARIAN CYST SURGERY     • THORACOSCOPY VIDEO ASSISTED WITH LOBECTOMY Right 10/9/2020    Procedure: BRONCHOSCOPY, THORACOSCOPY VIDEO ASSISTED WITH ANTERIOR BASAL SEGMENTECTOMY, INTERCOSTAL NERVE BLOCK;  Surgeon: Flaca Crisostomo MD;  Location: [facility] MAIN OR;  Service: Thoracic;  Laterality: Right;   • TONSILLECTOMY         Family History: family history includes Breast cancer in her maternal aunt, mother, and paternal cousin; Cancer in her father; Colon cancer in her maternal aunt and maternal aunt; Diabetes in an other family member; Hypertension in her mother; Leukemia in her maternal grandfather and paternal grandfather; Liver cancer in her father; Ovarian cancer (age of onset: 80) in her maternal grandmother; Thyroid cancer (age of onset: 73) in her mother; Uterine cancer in her maternal grandmother.    Social History:  reports that she has never smoked. She has never used smokeless tobacco. She reports current alcohol use. She reports that she does not use drugs.    Home Medications:  Prior to Admission medications    Medication Sig Start Date End Date Taking? Authorizing Provider   atorvastatin (LIPITOR) 40 MG tablet TAKE 1 TABLET BY MOUTH  EVERY NIGHT 10/17/22  Yes Tiffany Holloway MD   bimatoprost (LUMIGAN) 0.01 % ophthalmic drops Administer 1 drop to both eyes Every Night.   Yes Provider, MD Shonna   Blood Glucose Monitoring Suppl (Accu-Chek Guide Me) w/Device kit 1 Device Daily. 1/6/22  Yes Tiffany Holloway MD   Dulaglutide (Trulicity) 3 MG/0.5ML solution  pen-injector Inject 3 mg under the skin into the appropriate area as directed 1 (One) Time Per Week.   Yes ProviderShonna MD   escitalopram (LEXAPRO) 10 MG tablet Take 1 tablet by mouth Daily. 9/7/22  Yes Honey Nesbitt APRN   glucose blood (Accu-Chek Guide) test strip Use 1-2 daily to check blood sugars Dx diabetes E11.9 1/6/22  Yes Tiffany Holloway MD   levothyroxine (SYNTHROID, LEVOTHROID) 150 MCG tablet Take 150 mcg by mouth Daily.   Yes Provider, MD Shonna   lisinopril (PRINIVIL,ZESTRIL) 20 MG tablet Take 20 mg by mouth Daily.   Yes ProviderShonna MD   valACYclovir (VALTREX) 1000 MG tablet Take 2,000 mg by mouth 2 (Two) Times a Day.   Yes ProviderShonna MD   temozolomide (TEMODAR) 140 MG chemo capsule Take 1 capsule by mouth with 2 other temozolomide prescriptions for 165 mg total Daily for 42 doses. 12/4/22 1/15/23  Mathew Collins Jr., MD   temozolomide (TEMODAR) 20 MG chemo capsule Take 1 capsule by mouth with 2 other temozolomide prescriptions for 165 mg total Daily for 42 doses. 12/4/22 1/15/23  Mathew Collins Jr., MD   temozolomide (TEMODAR) 5 MG chemo capsule Take 1 capsule by mouth with 2 other temozolomide prescriptions for 165 mg total Daily for 42 doses. 12/4/22 1/15/23  Mathew Collins Jr., MD       Allergies:  Allergies   Allergen Reactions   • Morphine Anaphylaxis     Hives and throat swelling   • Peach [Prunus Persica] Anaphylaxis   • Penicillins Anaphylaxis   • Metformin Diarrhea       Objective     Vitals:   Temp:  [97.3 °F (36.3 °C)-98.2 °F (36.8 °C)] 97.3 °F (36.3 °C)  Heart Rate:  [57-70] 70  Resp:  [18] 18  BP: ()/(48-68) 113/56    Intake/Output Summary (Last 24 hours) at 12/18/2022 1723  Last data filed at 12/18/2022 1640  Gross per 24 hour   Intake 340 ml   Output 300 ml   Net 40 ml       Physical Exam:   Constitutional: Awake, alert, no acute distress.  Appears to be chronically  HEENT: Sclera anicteric, no conjunctival injection  Neck: Supple, no  thyromegaly, no lymphadenopathy, trachea at midline, no JVD  Respiratory: Clear to auscultation bilaterally, nonlabored respiration  Cardiovascular: RRR, no murmurs, no rubs or gallops, no carotid bruit  Gastrointestinal: Positive bowel sounds, abdomen is soft, nontender and nondistended  : No palpable bladder  Musculoskeletal: No edema, no clubbing or cyanosis  Psychiatric: Appropriate affect, cooperative  Neurologic: Oriented x3, normal speech and mental status  Skin: Warm and dry       Scheduled Meds:     calcium 500 mg vitamin D 5 mcg (200 UT), 1 tablet, Oral, BID  dexamethasone, 4 mg, Oral, Q8H  enoxaparin, 40 mg, Subcutaneous, Q12H  escitalopram, 10 mg, Oral, Daily  gabapentin, 100 mg, Oral, TID  [START ON 12/19/2022] insulin glargine, 40 Units, Subcutaneous, QAM  insulin lispro, 0-14 Units, Subcutaneous, TID AC  insulin lispro, 8 Units, Subcutaneous, TID With Meals  latanoprost, 1 drop, Both Eyes, Nightly  levETIRAcetam, 500 mg, Oral, BID  levothyroxine, 150 mcg, Oral, Q AM  lisinopril, 10 mg, Oral, Daily  magnesium oxide, 400 mg, Oral, Daily  ondansetron, 8 mg, Oral, Q24H  pantoprazole, 40 mg, Oral, Q AM  polyethylene glycol, 17 g, Oral, Daily  propranolol, 20 mg, Oral, Daily With Breakfast & Dinner  sulfamethoxazole-trimethoprim, 1 tablet, Oral, Once per day on Mon Wed Fri  temozolomide, 140 mg, Oral, Nightly   And  temozolomide, 20 mg, Oral, Nightly   And  temozolomide, 5 mg, Oral, Nightly  valACYclovir, 500 mg, Oral, Q24H      IV Meds:        Results Reviewed:   I have personally reviewed the results from the time of this admission to 12/18/2022 17:23 EST     Lab Results   Component Value Date    GLUCOSE 319 (H) 12/18/2022    CALCIUM 9.2 12/18/2022     (L) 12/18/2022    K 5.1 12/18/2022    CO2 25.8 12/18/2022    CL 89 (L) 12/18/2022    BUN 29 (H) 12/18/2022    CREATININE 0.86 12/18/2022    EGFRIFAFRI 106 12/01/2021    EGFRIFNONA 92 12/01/2021    BCR 33.7 (H) 12/18/2022    ANIONGAP 10.2 12/18/2022       Lab Results   Component Value Date    MG 2.2 12/09/2022    ALBUMIN 3.90 12/18/2022     US Liver    Result Date: 12/8/2022  RIGHT UPPER QUADRANT ULTRASOUND  HISTORY: Elevated LFTs  COMPARISON: CT of the abdomen and pelvis 11/25/2022  TECHNIQUE: Real time ultrasound imaging of the right upper quadrant was performed. Static images reviewed.  FINDINGS: Visualized portions of the pancreas are unremarkable. Liver is normal in echogenicity. There is a 2.1 cm cyst within the left lobe of the liver. Cholecystectomy Common duct measures about 7.3 mm. The right kidney measures 9.9 cm in length and is normal in appearance.      Impression: Small liver cyst. Cholecystectomy  This report was finalized on 12/8/2022 3:32 PM by Dr. Stef Pettit M.D.         Assessment / Plan     ASSESSMENT:  -Hyponatremia appears to be euvolemic with this history of multiple malignancies concern about SIADH especially with the stage IV glioblastoma.  But the patient also admitted increased water intake and has been on thyroid supplement after her thyroidectomy for her thyroid cancer also has been on SSRI.  -Prior right partial nephrectomy for renal cell cancer  -Adenocarcinoma of the lung status postresection  -Melanoma of the left heel status post resect  -Bilateral mastectomy because of BRCA2 mutation      PLAN:  -Check random urine for sodium, chloride, check urine and serum osmolality  -Check TSH and uric acid to determine if the patient has SIADH the uric acid is very low  -Will restrict fluid intake to 1200 cc per 24 hours  -Will monitor her sodium closely  -I would recommend discontinuation of the SSRI and replace it with Wellbutrin.    I discussed the case with the patient and she voiced good understanding also I discussed the case with Dr. Mathew Araujo    Thank you for involving us in the care of Christie Avendaño.  Please feel free to call with any questions.    Golden Gonzalez MD  12/18/22  17:23 [facility]    [facility]\""  [phone]      Please note that portions of this note were completed with a voice recognition program.

## 2022-12-19 ENCOUNTER — TREATMENT (OUTPATIENT)
Dept: RADIATION ONCOLOGY | Facility: HOSPITAL | Age: 57
End: 2022-12-19

## 2022-12-19 LAB
ALBUMIN SERPL-MCNC: 4 G/DL (ref 3.5–5.2)
ALBUMIN/GLOB SERPL: 1.9 G/DL
ALP SERPL-CCNC: 163 U/L (ref 39–117)
ALT SERPL W P-5'-P-CCNC: 220 U/L (ref 1–33)
ANION GAP SERPL CALCULATED.3IONS-SCNC: 7.2 MMOL/L (ref 5–15)
AST SERPL-CCNC: 34 U/L (ref 1–32)
BILIRUB SERPL-MCNC: 0.6 MG/DL (ref 0–1.2)
BUN SERPL-MCNC: 30 MG/DL (ref 6–20)
BUN/CREAT SERPL: 36.6 (ref 7–25)
CALCIUM SPEC-SCNC: 9.4 MG/DL (ref 8.6–10.5)
CHLORIDE SERPL-SCNC: 91 MMOL/L (ref 98–107)
CHLORIDE UR-SCNC: 73 MMOL/L
CO2 SERPL-SCNC: 27.8 MMOL/L (ref 22–29)
CREAT SERPL-MCNC: 0.82 MG/DL (ref 0.57–1)
DEPRECATED RDW RBC AUTO: 42.9 FL (ref 37–54)
EGFRCR SERPLBLD CKD-EPI 2021: 83 ML/MIN/1.73
ERYTHROCYTE [DISTWIDTH] IN BLOOD BY AUTOMATED COUNT: 15.8 % (ref 12.3–15.4)
GLOBULIN UR ELPH-MCNC: 2.1 GM/DL
GLUCOSE BLDC GLUCOMTR-MCNC: 141 MG/DL (ref 70–130)
GLUCOSE BLDC GLUCOMTR-MCNC: 206 MG/DL (ref 70–130)
GLUCOSE BLDC GLUCOMTR-MCNC: 216 MG/DL (ref 70–130)
GLUCOSE BLDC GLUCOMTR-MCNC: 450 MG/DL (ref 70–130)
GLUCOSE SERPL-MCNC: 190 MG/DL (ref 65–99)
HCT VFR BLD AUTO: 36.6 % (ref 34–46.6)
HGB BLD-MCNC: 12.6 G/DL (ref 12–15.9)
MAGNESIUM SERPL-MCNC: 2.2 MG/DL (ref 1.6–2.6)
MCH RBC QN AUTO: 27.2 PG (ref 26.6–33)
MCHC RBC AUTO-ENTMCNC: 34.4 G/DL (ref 31.5–35.7)
MCV RBC AUTO: 79 FL (ref 79–97)
PHOSPHATE SERPL-MCNC: 3.1 MG/DL (ref 2.5–4.5)
PLATELET # BLD AUTO: 175 10*3/MM3 (ref 140–450)
PMV BLD AUTO: 10.3 FL (ref 6–12)
POTASSIUM SERPL-SCNC: 4.8 MMOL/L (ref 3.5–5.2)
PROT SERPL-MCNC: 6.1 G/DL (ref 6–8.5)
RAD ONC ARIA COURSE ID: NORMAL
RAD ONC ARIA COURSE INTENT: NORMAL
RAD ONC ARIA COURSE LAST TREATMENT DATE: NORMAL
RAD ONC ARIA COURSE START DATE: NORMAL
RAD ONC ARIA COURSE TREATMENT ELAPSED DAYS: 7
RAD ONC ARIA FIRST TREATMENT DATE: NORMAL
RAD ONC ARIA PLAN FRACTIONS TREATED TO DATE: 6
RAD ONC ARIA PLAN ID: NORMAL
RAD ONC ARIA PLAN PRESCRIBED DOSE PER FRACTION: 2 GY
RAD ONC ARIA PLAN PRIMARY REFERENCE POINT: NORMAL
RAD ONC ARIA PLAN TOTAL FRACTIONS PRESCRIBED: 23
RAD ONC ARIA PLAN TOTAL PRESCRIBED DOSE: 4600 CGY
RAD ONC ARIA REFERENCE POINT DOSAGE GIVEN TO DATE: 12 GY
RAD ONC ARIA REFERENCE POINT DOSAGE GIVEN TO DATE: 12.15 GY
RAD ONC ARIA REFERENCE POINT ID: NORMAL
RAD ONC ARIA REFERENCE POINT ID: NORMAL
RAD ONC ARIA REFERENCE POINT SESSION DOSAGE GIVEN: 2 GY
RAD ONC ARIA REFERENCE POINT SESSION DOSAGE GIVEN: 2.02 GY
RBC # BLD AUTO: 4.63 10*6/MM3 (ref 3.77–5.28)
SODIUM SERPL-SCNC: 126 MMOL/L (ref 136–145)
SODIUM UR-SCNC: 74 MMOL/L
URATE SERPL-MCNC: 5.4 MG/DL (ref 2.4–5.7)
WBC NRBC COR # BLD: 9.55 10*3/MM3 (ref 3.4–10.8)

## 2022-12-19 PROCEDURE — 77427 RADIATION TX MANAGEMENT X5: CPT | Performed by: STUDENT IN AN ORGANIZED HEALTH CARE EDUCATION/TRAINING PROGRAM

## 2022-12-19 PROCEDURE — 97112 NEUROMUSCULAR REEDUCATION: CPT

## 2022-12-19 PROCEDURE — 82962 GLUCOSE BLOOD TEST: CPT

## 2022-12-19 PROCEDURE — 97110 THERAPEUTIC EXERCISES: CPT

## 2022-12-19 PROCEDURE — 63710000001 INSULIN LISPRO (HUMAN) PER 5 UNITS: Performed by: HOSPITALIST

## 2022-12-19 PROCEDURE — 83735 ASSAY OF MAGNESIUM: CPT | Performed by: INTERNAL MEDICINE

## 2022-12-19 PROCEDURE — 25010000002 TEMOZOLOMIDE 140 MG CAPSULE: Performed by: INTERNAL MEDICINE

## 2022-12-19 PROCEDURE — 97535 SELF CARE MNGMENT TRAINING: CPT

## 2022-12-19 PROCEDURE — 25010000002 ENOXAPARIN PER 10 MG: Performed by: PHYSICAL MEDICINE & REHABILITATION

## 2022-12-19 PROCEDURE — 82436 ASSAY OF URINE CHLORIDE: CPT | Performed by: INTERNAL MEDICINE

## 2022-12-19 PROCEDURE — 63710000001 DEXAMETHASONE PER 0.25 MG: Performed by: PHYSICAL MEDICINE & REHABILITATION

## 2022-12-19 PROCEDURE — 97530 THERAPEUTIC ACTIVITIES: CPT

## 2022-12-19 PROCEDURE — 85027 COMPLETE CBC AUTOMATED: CPT | Performed by: PHYSICAL MEDICINE & REHABILITATION

## 2022-12-19 PROCEDURE — 99233 SBSQ HOSP IP/OBS HIGH 50: CPT | Performed by: INTERNAL MEDICINE

## 2022-12-19 PROCEDURE — 84100 ASSAY OF PHOSPHORUS: CPT | Performed by: INTERNAL MEDICINE

## 2022-12-19 PROCEDURE — 25010000002 TEMOZOLOMIDE PER 5 MG: Performed by: INTERNAL MEDICINE

## 2022-12-19 PROCEDURE — 84550 ASSAY OF BLOOD/URIC ACID: CPT | Performed by: INTERNAL MEDICINE

## 2022-12-19 PROCEDURE — 97129 THER IVNTJ 1ST 15 MIN: CPT

## 2022-12-19 PROCEDURE — 63710000001 INSULIN LISPRO (HUMAN) PER 5 UNITS: Performed by: PHYSICAL MEDICINE & REHABILITATION

## 2022-12-19 PROCEDURE — 77386 CHG INTENSITY MODULATED RADIATION TX DLVR COMPLEX: CPT | Performed by: STUDENT IN AN ORGANIZED HEALTH CARE EDUCATION/TRAINING PROGRAM

## 2022-12-19 PROCEDURE — 77386: CPT | Performed by: STUDENT IN AN ORGANIZED HEALTH CARE EDUCATION/TRAINING PROGRAM

## 2022-12-19 PROCEDURE — 80053 COMPREHEN METABOLIC PANEL: CPT | Performed by: PHYSICAL MEDICINE & REHABILITATION

## 2022-12-19 PROCEDURE — 97130 THER IVNTJ EA ADDL 15 MIN: CPT

## 2022-12-19 PROCEDURE — 63710000001 ONDANSETRON PER 8 MG: Performed by: INTERNAL MEDICINE

## 2022-12-19 PROCEDURE — 84300 ASSAY OF URINE SODIUM: CPT | Performed by: INTERNAL MEDICINE

## 2022-12-19 RX ADMIN — GABAPENTIN 100 MG: 100 CAPSULE ORAL at 09:31

## 2022-12-19 RX ADMIN — TEMOZOLOMIDE 140 MG: 140 CAPSULE ORAL at 20:06

## 2022-12-19 RX ADMIN — POLYETHYLENE GLYCOL 3350 17 G: 17 POWDER, FOR SOLUTION ORAL at 09:32

## 2022-12-19 RX ADMIN — INSULIN LISPRO 8 UNITS: 100 INJECTION, SOLUTION INTRAVENOUS; SUBCUTANEOUS at 11:57

## 2022-12-19 RX ADMIN — DEXAMETHASONE 4 MG: 4 TABLET ORAL at 05:30

## 2022-12-19 RX ADMIN — GABAPENTIN 100 MG: 100 CAPSULE ORAL at 20:04

## 2022-12-19 RX ADMIN — GABAPENTIN 100 MG: 100 CAPSULE ORAL at 15:04

## 2022-12-19 RX ADMIN — CALCIUM CARBONATE-VITAMIN D TAB 500 MG-200 UNIT 1 TABLET: 500-200 TAB at 20:04

## 2022-12-19 RX ADMIN — TEMOZOLOMIDE 20 MG: 20 CAPSULE ORAL at 20:06

## 2022-12-19 RX ADMIN — INSULIN LISPRO 5 UNITS: 100 INJECTION, SOLUTION INTRAVENOUS; SUBCUTANEOUS at 09:34

## 2022-12-19 RX ADMIN — INSULIN LISPRO 8 UNITS: 100 INJECTION, SOLUTION INTRAVENOUS; SUBCUTANEOUS at 16:49

## 2022-12-19 RX ADMIN — LISINOPRIL 5 MG: 5 TABLET ORAL at 09:32

## 2022-12-19 RX ADMIN — TEMOZOLOMIDE 5 MG: 5 CAPSULE ORAL at 20:05

## 2022-12-19 RX ADMIN — DEXAMETHASONE 4 MG: 4 TABLET ORAL at 20:11

## 2022-12-19 RX ADMIN — PROPRANOLOL HYDROCHLORIDE 20 MG: 20 TABLET ORAL at 09:32

## 2022-12-19 RX ADMIN — INSULIN GLARGINE-YFGN 40 UNITS: 100 INJECTION, SOLUTION SUBCUTANEOUS at 09:34

## 2022-12-19 RX ADMIN — LEVETIRACETAM 500 MG: 500 TABLET, FILM COATED ORAL at 09:32

## 2022-12-19 RX ADMIN — CALCIUM CARBONATE-VITAMIN D TAB 500 MG-200 UNIT 1 TABLET: 500-200 TAB at 09:32

## 2022-12-19 RX ADMIN — VALACYCLOVIR HYDROCHLORIDE 500 MG: 500 TABLET, FILM COATED ORAL at 09:32

## 2022-12-19 RX ADMIN — LEVETIRACETAM 500 MG: 500 TABLET, FILM COATED ORAL at 20:04

## 2022-12-19 RX ADMIN — ENOXAPARIN SODIUM 40 MG: 100 INJECTION SUBCUTANEOUS at 20:07

## 2022-12-19 RX ADMIN — INSULIN LISPRO 8 UNITS: 100 INJECTION, SOLUTION INTRAVENOUS; SUBCUTANEOUS at 09:33

## 2022-12-19 RX ADMIN — LEVOTHYROXINE SODIUM 150 MCG: 0.15 TABLET ORAL at 05:31

## 2022-12-19 RX ADMIN — ONDANSETRON HYDROCHLORIDE 8 MG: 8 TABLET, FILM COATED ORAL at 11:57

## 2022-12-19 RX ADMIN — DEXAMETHASONE 4 MG: 4 TABLET ORAL at 15:04

## 2022-12-19 RX ADMIN — MAGNESIUM OXIDE 400 MG (241.3 MG MAGNESIUM) TABLET 400 MG: TABLET at 09:32

## 2022-12-19 RX ADMIN — SULFAMETHOXAZOLE AND TRIMETHOPRIM 1 TABLET: 800; 160 TABLET ORAL at 09:32

## 2022-12-19 RX ADMIN — PANTOPRAZOLE SODIUM 40 MG: 40 TABLET, DELAYED RELEASE ORAL at 05:31

## 2022-12-19 RX ADMIN — ESCITALOPRAM 5 MG: 5 TABLET, FILM COATED ORAL at 09:32

## 2022-12-19 RX ADMIN — ENOXAPARIN SODIUM 40 MG: 100 INJECTION SUBCUTANEOUS at 09:31

## 2022-12-19 RX ADMIN — LATANOPROST 1 DROP: 50 SOLUTION OPHTHALMIC at 20:12

## 2022-12-19 NOTE — PROGRESS NOTES
Contacted [facility] regarding time for radiation treatment tomorrow. They will plan for treatment to be at 4:00 p.m. tomorrow due to family conference being at 3:00 p.m.

## 2022-12-19 NOTE — THERAPY TREATMENT NOTE
Inpatient Rehabilitation - Occupational Therapy Treatment Note    [facility]     Patient Name: Christie Avendaño  : 1965  MRN: [id number]    Today's Date: 2022                 Admit Date: 2022         ICD-10-CM ICD-9-CM   1. Impaired functional mobility, balance, gait, and endurance  Z74.09 V49.89   2. 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)   • Non-small cell lung cancer, right (HCC)   • Renal mass, right   • SIRS (systemic inflammatory response syndrome) (HCC)   • Hyperlipidemia   • Malignant melanoma of right lower extremity including hip (HCC)   • High grade glioma    • 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)   • Glioma (HCC)       Past Medical History:   Diagnosis Date   • Arthritis    • Asthma    • BRCA2 positive    • Diabetes mellitus (HCC)    • H/O Liver masses 2017    x3   • H/O Lung masses 2017    1 in right lower lobe and 1 in the left infrahilar location   • H/O Ovarian cyst    • H/O Right adrenal mass (CMS/HCC) 2017   • Lung cancer (HCC)    • Melanoma (HCC)     right foot   • PONV (postoperative nausea and vomiting)    • Thyroid disease        Past Surgical History:   Procedure Laterality Date   • APPENDECTOMY     • BRAIN BIOPSY Right 2022    Procedure: Right frontal stereotactic needle brain biopsy;  Surgeon: Manuel Thompson MD;  Location: [facility];  Service: Neurosurgery;  Laterality: Right;   • BREAST IMPLANT SURGERY Bilateral    • CHOLECYSTECTOMY     • HYSTERECTOMY     • MASTECTOMY  Bilateral    • OVARIAN CYST SURGERY     • THORACOSCOPY VIDEO ASSISTED WITH LOBECTOMY Right 10/9/2020    Procedure: BRONCHOSCOPY, THORACOSCOPY VIDEO ASSISTED WITH ANTERIOR BASAL SEGMENTECTOMY, INTERCOSTAL NERVE BLOCK;  Surgeon: Flaca Crisostomo MD;  Location: [facility] MAIN OR;  Service: Thoracic;  Laterality: Right;   • TONSILLECTOMY               IRF OT ASSESSMENT FLOWSHEET (last 12 hours)     IRF OT Evaluation and Treatment     Row Name 12/19/22 1620          OT Time and Intention    Document Type daily treatment  -CC     Mode of Treatment occupational therapy  -CC     Patient Effort good  -CC     Symptoms Noted During/After Treatment fatigue  -CC     Row Name 12/19/22 1620          Pain Assessment    Pretreatment Pain Rating 0/10 - no pain  -CC     Posttreatment Pain Rating 0/10 - no pain  -CC     Row Name 12/19/22 1620          Cognition/Psychosocial    Affect/Mental Status (Cognition) flat/blunted affect  -     Orientation Status (Cognition) oriented x 4  -CC     Follows Commands (Cognition) follows one-step commands;follows two-step commands;delayed response/completion;increased processing time needed  -     Personal Safety Interventions fall prevention program maintained;gait belt;nonskid shoes/slippers when out of bed  -CC     Row Name 12/19/22 1620          Vision Assessment/Intervention    Visual Processing Deficit nicolette-inattention/neglect, left;visual attention, left  -     Vision Assessment Comment mac vc to attend to left of visual cue cards 2 block design tasks. Pt able  to duplicate model of blocks w exception of far left.  -CC     Row Name 12/19/22 1620          Bathing    [place] Level (Bathing) bathing skills;upper body;lower body;contact guard assist;minimum assist (75% patient effort)  -     Assistive Device (Bathing) hand held shower spray hose;shower chair  -     Position (Bathing) supported sitting  -     Set-up Assistance (Bathing) adjust water temperature;obtain  "supplies;adaptive equipment set-up  -     Row Name 12/19/22 1620          Upper Body Dressing    [place] Level (Upper Body Dressing) upper body dressing skills;doff;don;bra/undergarment;pull over garment;minimum assist (75% or more patient effort)  -     Position (Upper Body Dressing) supported sitting  -     Set-up Assistance (Upper Body Dressing) obtain clothing  -     Row Name 12/19/22 1620          Lower Body Dressing    [place] Level (Lower Body Dressing) doff;don;pants/bottoms;socks;set up;verbal cues;minimum assist (75% patient effort)  -     Position (Lower Body Dressing) supported sitting;supported standing  -     Set-up Assistance (Lower Body Dressing) obtain clothing  -     Row Name 12/19/22 1620          Grooming    [place] Level (Grooming) grooming skills;deodorant application;oral care regimen;wash face, hands;set up;standby assist  -     Position (Grooming) sink side;supported sitting  -     Set-up Assistance (Grooming) obtain supplies;open containers  -     Row Name 12/19/22 1620          Bed Mobility    Comment, (Bed Mobility) in w/c  -     Row Name 12/19/22 1620          Sit-Stand Transfer    Sit-Stand [place] (Transfers) verbal cues;contact guard  -     Assistive Device (Sit-Stand Transfers) wheelchair  -     Row Name 12/19/22 1620          Stand-Sit Transfer    Stand-Sit [place] (Transfers) verbal cues;contact guard  -     Assistive Device (Stand-Sit Transfers) wheelchair  -     Row Name 12/19/22 1620          Shower Transfer    Type (Shower Transfer) stand pivot/stand step  -     [place] Level (Shower Transfer) contact guard;minimum assist (75% patient effort)  -     Assistive Device (Shower Transfer) grab bar, tub/shower;tub bench  -     Row Name 12/19/22 1620          Motor Skills    Coordination fine motor deficit;left;upper extremity  -     Motor Control/Coordination Interventions --  pt able to stack 1\" blocks w vc to " attend to LUE to avoid knocking them down. Pt had moderate doifficulty manipulating smaller pegs.  -CC     Row Name 12/19/22 1620          Balance    Static Sitting Balance supervision;standby assist;verbal cues  -     Static Standing Balance contact guard;minimal assist;verbal cues  -     Row Name 12/19/22 1620          Positioning and Restraints    Pre-Treatment Position sitting in chair/recliner  -     Post Treatment Position wheelchair  -     In Wheelchair sitting;with nsg;exit alarm on  -           User Key  (r) = Recorded By, (t) = Taken By, (c) = Cosigned By    Initials Name Effective Dates    CC GoelYissel, OTR 06/16/21 -                  Occupational Therapy Education     Title: PT OT SLP Therapies (In Progress)     Topic: Occupational Therapy (Done)     Point: ADL training (Done)     Description:   Instruct learner(s) on proper safety adaptation and remediation techniques during self care or transfers.   Instruct in proper use of assistive devices.              Learning Progress Summary           Patient Acceptance, E, VU by WN at 12/12/2022 2327    Acceptance, E, VU by WN at 12/12/2022 0155    Acceptance, E, VU by CB at 12/8/2022 1204    Acceptance, E, VU,NR by CE at 12/8/2022 1107    Comment: fall prevention, DME including w/c and shower chair                   Point: Home exercise program (Done)     Description:   Instruct learner(s) on appropriate technique for monitoring, assisting and/or progressing therapeutic exercises/activities.              Learning Progress Summary           Patient Acceptance, E, VU by WN at 12/12/2022 2327    Acceptance, E, VU by WN at 12/12/2022 0155    Acceptance, E, VU by CB at 12/8/2022 1204    Acceptance, E, VU,NR by CE at 12/8/2022 1107    Comment: fall prevention, DME including w/c and shower chair                   Point: Precautions (Done)     Description:   Instruct learner(s) on prescribed precautions during self-care and functional transfers.               Learning Progress Summary           Patient Acceptance, E, VU by WN at 12/12/2022 2327    Acceptance, E, VU by WN at 12/12/2022 0155    Acceptance, E, VU by CB at 12/8/2022 1204    Acceptance, E, VU,NR by CE at 12/8/2022 1107    Comment: fall prevention, DME including w/c and shower chair                   Point: Body mechanics (Done)     Description:   Instruct learner(s) on proper positioning and spine alignment during self-care, functional mobility activities and/or exercises.              Learning Progress Summary           Patient Acceptance, E, VU by WN at 12/12/2022 2327    Acceptance, E, VU by WN at 12/12/2022 0155    Acceptance, E, VU by CB at 12/8/2022 1204    Acceptance, E, VU,NR by CE at 12/8/2022 1107    Comment: fall prevention, DME including w/c and shower chair                               User Key     Initials Effective Dates Name Provider Type Discipline     08/23/22 -  Doc Park RN Registered Nurse Nurse     11/10/22 -  Mackenzie Hopkins CCC-SLP Speech and Language Pathologist SLP    CE 10/17/22 -  Portia Esqueda OT Occupational Therapist OT                    OT Recommendation and Plan                         Time Calculation:      Time Calculation- OT     Row Name 12/19/22 1500 12/19/22 1330          Time Calculation- OT    OT Start Time 1500  -CC 1330  -CC     OT Stop Time 1545  -CC 1400  -CC     OT Time Calculation (min) 45 min  -CC 30 min  -CC           User Key  (r) = Recorded By, (t) = Taken By, (c) = Cosigned By    Initials Name Provider Type     Yissel Goel OTR Occupational Therapist              Therapy Charges for Today     Code Description Service Date Service Provider Modifiers Qty    94826291930 HC OT SELF CARE/MGMT/TRAIN EA 15 MIN 12/19/2022 Yissel Goel OTR GO 3    36538169676 HC OT NEUROMUSC RE EDUCATION EA 15 MIN 12/19/2022 Yissel Goel OTR GO 2                   HSARON Vargas  12/19/2022

## 2022-12-19 NOTE — PROGRESS NOTES
REASON FOR FOLLOWUP/CHIEF COMPLAINT:  High-grade glioma    HISTORY OF PRESENT ILLNESS:   No new problems.  No new neurological issues.  Tolerating chemoradiation fairly well.  Denies any headache, dizziness, visual changes or nausea/vomiting.    Past Medical History, Past Surgical History, Social History, Family History have been reviewed and are without significant changes except as mentioned.    Review of Systems   Review of Systems   Constitutional: Negative for activity change.   HENT: Negative for nosebleeds and trouble swallowing.    Respiratory: Negative for shortness of breath and wheezing.    Cardiovascular: Negative for chest pain and palpitations.   Gastrointestinal: Negative for constipation, diarrhea and nausea.   Genitourinary: Negative for dysuria and hematuria.   Musculoskeletal: Negative for arthralgias and myalgias.   Skin: Negative for rash and wound.   Neurological: Negative for seizures and syncope.   Hematological: Negative for adenopathy. Does not bruise/bleed easily.   Psychiatric/Behavioral: Negative for confusion.       Medications:  The current medication list was reviewed in the EMR    ALLERGIES:    Allergies   Allergen Reactions   • Morphine Anaphylaxis     Hives and throat swelling   • Peach [Prunus Persica] Anaphylaxis   • Penicillins Anaphylaxis   • Metformin Diarrhea              Vitals:    12/18/22 1339 12/18/22 1739 12/18/22 2100 12/19/22 0430   BP: 113/56 102/58 112/72 138/80   BP Location: Left arm  Right arm Left arm   Patient Position: Lying  Lying Lying   Pulse: 70 78 75 63   Resp:   18 18   Temp:   97.9 °F (36.6 °C) 97.6 °F (36.4 °C)   TempSrc:   Oral Oral   SpO2:   95% 98%   Weight:       Height:         Physical Exam    CONSTITUTIONAL:  Vital signs reviewed.  No distress, looks comfortable.  EYES:  Conjunctivae and lids unremarkable.  EARS, NOSE, MOUTH, THROAT:  Ears and nose appear unremarkable.  Lips, teeth, gums appear unremarkable.  RESPIRATORY:  Normal  respiratory effort.  Lungs clear to auscultation bilaterally.  CARDIOVASCULAR:  Normal S1, S2.  No murmurs, rubs or gallops.  No significant lower extremity edema.  GASTROINTESTINAL: Abdomen appears unremarkable.  Nondistended  NEURO: AAOx3, no focal deficits.  Appears to have equal strength all 4 extremities.  MUSCULOSKELETAL:  Unremarkable digits/nails.  No cyanosis or clubbing.  SKIN:  Warm.  No rashes.  PSYCHIATRIC:  Normal judgment and insight.  Normal mood and affect.        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  Intact MGMT expression  • Patient presented with recurrent falls and confusion.  • MRI on 11/22/2022 revealed a 3.2 cm ring-enhancing right supratentorial mass.  There was mass-effect and left midline shift.  • She was started on dexamethasone and Keppra.  • CT chest abdomen pelvis on 11/24/2022 revealed no evidence of progressive metastatic disease.  • S/p stereotactic brain biopsy on 11/28/2022.  • Preliminary pathology revealed high-grade glioma.  It was sent to [facility].  • Preliminary revealed high-grade glioma.  • Patient had radiation simulation on 12/2/2022.  • Plan is to start concurrent low-dose Temodar at 75 mg/m2 daily.   • She is on Decadron 4 mg p.o. every 8 hours.  • Plan is to start Radiation with concurrent Temodar on 12/12/2022.  • Temodar does not require dose reduction due to the elevated liver enzymes.   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  This is associated with a worse prognosis and relative resistance to alkylating chemotherapy such as temozolomide.  However, temozolomide and radiation remain the recommended  treatments.  • Today is day 8 of Temodar with radiation.  No clinical evidence of progression.     *Germline BRCA2 and GEORGES mutations.  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  • Please see the note from Dr. Collins, the patient's outpatient oncologist from 12/4/2022.     *Seizure prophylaxis.  • Patient is on Keppra 500 mg p.o. twice daily.  • Patient is not having any symptoms consistent with seizures.     *Elevated liver enzymes.  • ALT was 143 and AST was 71 on 11/28/2022.  • ALT was 1049 and AST was 386 on 12/8/2022.  • Hepatic duplex, 12/12/2022 normal.  • LFTs continues to improve    *Hyponatremia.  Sodium lower again today, down to 126.  ?  SIADH.  Defer to hospitalist service and rehab service.     PLAN:  · Temodar and radiation started 12/12/2022.  Continue daily concurrent chemoradiation  · Has follow-up arranged in the office with Dr. Collins on 1/4/2023 and 2/22/2023 and CT CAP 1 week prior.  Patient states the hope is to be discharged from rehab before [facility].     Will follow periodically.  Reviewed rehab MD, nephrology and hospitalist not.    All issues new to me.

## 2022-12-19 NOTE — PROGRESS NOTES
"Nutrition Services    Patient Name:  Christie Avendaño  YOB: 1965  MRN: [id number]  Admit Date:  12/7/2022    Assessment Date:  12/19/22    PROGRESS NOTE - REHAB    Encounter Information        Current Issues Tolerating diet. Denies any requests/issues with meals. Didn't eat much today for lunch. Getting chemo-radiation for stage IV glioblastoma. Issues with hyponatremia, now on fluid restriction. Nephrology following. Glucoses running high, steroid response (pt not eating excessively). LFTs still high but trending down. Left sided weakness noted but getting stronger.     Current Nutrition Orders & Evaluation of Intake       Oral Nutrition     Current PO Diet Diet: Regular/House Diet, Diabetic Diets, Fluid Restriction (240 mL/tray) Diets; Consistent Carbohydrate; Other (Specify mL/day) (1200); Texture: Regular Texture (IDDSI 7); Fluid Consistency: Thin (IDDSI 0)   Supplement    PO Evaluation    % PO Intake/# of days 50-75%/3 days   Factors Affecting Intake  weakness, Other: beginning chemo/XRT for glio     Anthropometrics         Height   Weight Height: 165.1 cm (65\")  Weight: 110 kg (241 lb 6.5 oz) (12/07/22 1642)   BMI kg/m2 Body mass index is 40.17 kg/m².   Weight trend No new weight available     Physical Findings          Physical Appearance alert, oriented, room air   Oral/Mouth Cavity WNL   Edema  no edema   Gastrointestinal last bowel movement:12/18   Skin  skin intact   Tubes/Drains none     Labs       Pertinent Labs Reviewed, listed below     Results from last 7 days   Lab Units 12/19/22  0552 12/18/22  0852 12/17/22  0542   SODIUM mmol/L 126* 125* 127*   POTASSIUM mmol/L 4.8 5.1 4.8   CHLORIDE mmol/L 91* 89* 92*   CO2 mmol/L 27.8 25.8 24.6   BUN mg/dL 30* 29* 24*   CREATININE mg/dL 0.82 0.86 0.76   CALCIUM mg/dL 9.4 9.2 9.4   BILIRUBIN mg/dL 0.6 0.7 0.7   ALK PHOS U/L 163* 159* 156*   ALT (SGPT) U/L 220* 244* 273*   AST (SGOT) U/L 34* 40* 44*   GLUCOSE mg/dL 190* 319* 167*     Results from " last 7 days   Lab Units 12/19/22  0552   MAGNESIUM mg/dL 2.2   PHOSPHORUS mg/dL 3.1   HEMOGLOBIN g/dL 12.6   HEMATOCRIT % 36.6   WBC 10*3/mm3 9.55     Results from last 7 days   Lab Units 12/19/22  0552 12/15/22  0607   PLATELETS 10*3/mm3 175 181     COVID19   Date Value Ref Range Status   11/22/2022 Not Detected Not Detected - Ref. Range Final     Lab Results   Component Value Date    HGBA1C 7.70 (H) 12/15/2022          Medications           Scheduled Medications [START ON 12/26/2022] buPROPion, 100 mg, Oral, Q12H  calcium 500 mg vitamin D 5 mcg (200 UT), 1 tablet, Oral, BID  dexamethasone, 4 mg, Oral, Q8H  enoxaparin, 40 mg, Subcutaneous, Q12H  escitalopram, 5 mg, Oral, Daily  gabapentin, 100 mg, Oral, TID  insulin glargine, 40 Units, Subcutaneous, QAM  insulin lispro, 0-14 Units, Subcutaneous, TID AC  insulin lispro, 8 Units, Subcutaneous, TID With Meals  latanoprost, 1 drop, Both Eyes, Nightly  levETIRAcetam, 500 mg, Oral, BID  levothyroxine, 150 mcg, Oral, Q AM  lisinopril, 5 mg, Oral, Daily  magnesium oxide, 400 mg, Oral, Daily  ondansetron, 8 mg, Oral, Q24H  pantoprazole, 40 mg, Oral, Q AM  polyethylene glycol, 17 g, Oral, Daily  propranolol, 20 mg, Oral, Daily With Breakfast & Dinner  sulfamethoxazole-trimethoprim, 1 tablet, Oral, Once per day on Mon Wed Fri  temozolomide, 140 mg, Oral, Nightly   And  temozolomide, 20 mg, Oral, Nightly   And  temozolomide, 5 mg, Oral, Nightly  valACYclovir, 500 mg, Oral, Q24H       Infusions     PRN Medications •  calcium carbonate  •  dextrose  •  dextrose  •  famotidine  •  glucagon (human recombinant)  •  influenza vaccine  •  nitroglycerin  •  ondansetron **OR** ondansetron  •  senna-docusate sodium     PLAN OF CARE  Intervention Goal        Intervention Goal(s) Maintain nutrition status, Tolerate PO  and PO intake goal %: 75     Nutrition Intervention        RD Action Advise alternative selection, Menu provided, Encourage intake, Follow Tx Progress and Care plan  reviewed     Prescription        Diet Prescription    Supplement Prescription    EN/PN Prescription    Prescription Ordered No changes at this time   --  Monitor/Evaluation        Monitor Per protocol   Discharge Plan Pending clinical course   Education Will educate as needed       Electronically signed by:  Oralia Marie RD  12/19/22 13:48 EST

## 2022-12-19 NOTE — PROGRESS NOTES
Inpatient Rehabilitation Plan of Care Note    Plan of Care  Updated Problems/Interventions  Mobility    [PT] Walk(Active)  Current Status(12/19/2022): 160 ft, CG to occ min, VC, rollator  Weekly Goal(12/27/2022): 160 ft, CG, rwx  Discharge Goal: 160 ft, Sup/CGA rollator    [PT] Bed/Chair/Wheelchair(Active)  Current Status(12/19/2022): Min/CGA, rwx  Weekly Goal(12/27/2022): CG, rwx  Discharge Goal: Sup/CGA, RWX    [PT] Bed Mobility(Active)  Current Status(12/19/2022): Min  Weekly Goal(12/27/2022): SBA  Discharge Goal: Mod Indep    Signed by: Maryanne Oleary PT

## 2022-12-19 NOTE — PROGRESS NOTES
Case Management  Inpatient Rehabilitation Plan of Care and Discharge Plan Note    Rehabilitation Diagnosis:  Branch  Date of Onset:  Branch    Medical Summary:  Branch  Past Medical History: Branch    Plan of Care  Updated Problems/Interventions  Field    Expected Intensity:  Branch  Interdisciplinary Team:  Ismael  Estimated Length of Stay/Anticipated Discharge Date: Branch  Anticipated Discharge Destination:  Anticipated discharge destination from inpatient rehabilitation is community  discharge with assistance. Patient lives with  in one story home; One  step into home with grab bar.  Family conference scheduled for 12/20 at 3:00 pm.  D/C plan is home with . He works nights at [employer]. Aunt can stay with  patient at night if needed.      Based on the patient's medical and functional status, their prognosis and  expected level of functional improvement is:  Ismael    Signed by: SHANNON Bonilla

## 2022-12-19 NOTE — PROGRESS NOTES
Inpatient Rehabilitation Plan of Care Note    Plan of Care  Updated Problems/Interventions  Cognition    [ST] Attention(Active)  Current Status(12/19/2022): deficits for attn to details, complex reasoning,  memory, executive fxn, left visual spatial skills  Weekly Goal(12/26/2022): follow schedule indep; recall details from daily events  indep; completed therapy activities given min-mod cues  Discharge Goal: fxnl cognition for home with assist    Signed by: Minal Bennett, SLP

## 2022-12-19 NOTE — THERAPY TREATMENT NOTE
Inpatient Rehabilitation - Speech Language Pathology Treatment Note    [facility]     Patient Name: Christie Avendaño  : 1965  MRN: [id number]    Today's Date: 2022                   Admit Date: 2022       Visit Dx:      ICD-10-CM ICD-9-CM   1. Impaired functional mobility, balance, gait, and endurance  Z74.09 V49.89   2. 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)   • Non-small cell lung cancer, right (HCC)   • Renal mass, right   • SIRS (systemic inflammatory response syndrome) (HCC)   • Hyperlipidemia   • Malignant melanoma of right lower extremity including hip (HCC)   • High grade glioma    • 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)   • Glioma (HCC)       Past Medical History:   Diagnosis Date   • Arthritis    • Asthma    • BRCA2 positive    • Diabetes mellitus (HCC)    • H/O Liver masses 2017    x3   • H/O Lung masses 2017    1 in right lower lobe and 1 in the left infrahilar location   • H/O Ovarian cyst    • H/O Right adrenal mass (CMS/HCC) 2017   • Lung cancer (HCC)    • Melanoma (HCC)     right foot   • PONV (postoperative nausea and vomiting)    • Thyroid disease        Past Surgical History:   Procedure Laterality Date   • APPENDECTOMY     • BRAIN BIOPSY Right 2022    Procedure: Right frontal stereotactic needle brain biopsy;  Surgeon: Manuel Thompson MD;  Location: [facility] MAIN OR;  Service: Neurosurgery;  Laterality: Right;   • BREAST IMPLANT SURGERY Bilateral    • CHOLECYSTECTOMY     • HYSTERECTOMY      • MASTECTOMY Bilateral    • OVARIAN CYST SURGERY     • THORACOSCOPY VIDEO ASSISTED WITH LOBECTOMY Right 10/9/2020    Procedure: BRONCHOSCOPY, THORACOSCOPY VIDEO ASSISTED WITH ANTERIOR BASAL SEGMENTECTOMY, INTERCOSTAL NERVE BLOCK;  Surgeon: Flaca Crisostomo MD;  Location: [facility] OR;  Service: Thoracic;  Laterality: Right;   • TONSILLECTOMY         SLP Recommendation and Plan                                                            SLP EVALUATION (last 72 hours)     SLP SLC Evaluation     Row Name 12/19/22 1200 12/19/22 1030 12/17/22 1100             Communication Assessment/Intervention    Document Type therapy note (daily note)  -SR therapy note (daily note)  -SR therapy note (daily note)  -HF      Subjective Information no complaints  -SR no complaints  -SR no complaints  -HF      Patient Observations alert;cooperative;agree to therapy  -SR alert;cooperative;agree to therapy  -SR alert;cooperative;agree to therapy  -HF      Patient Effort good  -SR good  -SR good  -HF      Symptoms Noted During/After Treatment none  -SR none  -SR --         Pain Scale: Numbers Pre/Post-Treatment    Pretreatment Pain Rating 0/10 - no pain  -SR 0/10 - no pain  -SR --      Posttreatment Pain Rating 0/10 - no pain  -SR 0/10 - no pain  -SR --            User Key  (r) = Recorded By, (t) = Taken By, (c) = Cosigned By    Initials Name Effective Dates    SR Minal Bennett CCC-SLP 11/10/22 -     HF Keila Haskins CCC-SLP 11/10/22 -                    EDUCATION    The patient has been educated in the following areas:       Cognitive Impairment.             SLP GOALS     Row Name 12/19/22 1200 12/19/22 1030 12/17/22 1100       Attention Goal 1 (SLP)    Improve Attention by Goal 1 (SLP) attending to task;80%;with minimal cues (75-90%)  -SR attending to task;80%;with minimal cues (75-90%)  -SR --    Time Frame (Attention Goal 1, SLP) by discharge  -SR by discharge  -SR --    Progress/Outcomes (Attention Goal 1, SLP) goal ongoing  -SR  "goal ongoing  -SR --    Comment (Attention Goal 1, SLP) Written directions; patient completed activity from previous session with 40% acc given no cues; 80% acc given min-mod cues.  -SR Patient followed \"if-then\" written directions targeting attention to detail, sustained, and selective attention. Patient said, \"I get ahead of myself sometimes, and I often miss information on the L side.\" ST provided visual of a blue bolded line on patient's L side to encourage her to scan. Activity incomplete due to time constraint. Patient completed 3/4 items given min cues. Will complete activity during next session.  -SR Alternating attention between 2 written exercises requiring attention-to-detail, sustained attention, selective attention (to prefered music). Patient alternated between these two tasks in relation to external stimulus with 75% accuracy and NO cues. Discussed using crocheting as a method to address her attention. HEP initiated to brittany her sister a towel agee.  -HF       Memory Skills Goal 1 (SLP)    Improve Memory Skills Through Goal 1 (SLP) -- recall details from a word list;visual memory task;listen to a paragraph and answer questions;use memory strategies;80%;with minimal cues (75-90%)  -SR --    Time Frame (Memory Skills Goal 1, SLP) -- by discharge  -SR --    Progress/Outcomes (Memory Skills Goal 1, SLP) -- good progress toward goal  -SR --    Comment (Memory Skills Goal 1, SLP) -- Using Constant Therapy amber, patient listened to voicemail and answered questions about the content during immediate recall activity with 90% acc given no cues. Each voicemail was played 2x each.  -SR --       Organizational Skills Goal 1 (SLP)    Comment (Thought Organization Skills Goal 1, SLP) -- -- Patient independently organized a check list/to-do list to complete HEP project (brittany towel agee for sister).  -HF       Functional Problem Solving Skills Goal 2 (SLP)    Comment (Problem Solving Goal 2, SLP) -- -- The " patient was 60% accurate with NO cues for problem solving task related to prescription label -- improved to 100% with MIN cues to review. She was less accurate for deduction task, requiring MOD-MAX supports (manipulatives, check lists, etc.) in order to complete.  -HF       Functional Math Skills Goal 1 (SLP)    Improve Functional Math Skills Through Goal 1 (SLP) complete word problems involving money;complete word problems involving time;complete checkbook task;complete functional math task;80%;with minimal cues (75-90%)  -SR -- --    Time Frame (Functional Math Skills Goal 1, SLP) by discharge  -SR -- --    Progress/Outcomes (Functional Math Skills Goal 1, SLP) goal ongoing  -SR -- --    Comment (Functional Math Skills Goal 1, SLP) Baking/cooking conversions; patient answered math-based scenerios using baking conversion chart with 25% acc given no cues; 75% acc given mod cues.  ST encouraged patient to take notes from the chart to aid in recall of information. Extended time to complete task due to prolonged response time.  -SR -- --       Right Hemisphere Function Goal 1 (SLP)    Improve Right Hemisphere Function Through Goal 1 (SLP) complete visuo-spatial activities (visual closure, trail making, mazes;80%;with minimal cues (75-90%)  -SR -- --    Time Frame (Right Hemisphere Function Goal 1, SLP) by discharge  -SR -- --    Progress/Outcomes (Right Hemisphere Function Goal 1, SLP) goal ongoing  -SR -- --    Comment (Right Hemisphere Function Goal 1, SLP) Visual scanning; using horizontal scanning technique, patient located 23/25 stimulus items given min cues. Accuracy increased with bolded line on L side of the page.  -SR -- --          User Key  (r) = Recorded By, (t) = Taken By, (c) = Cosigned By    Initials Name Provider Type    SR Minal Bennett, CCC-SLP Speech and Language Pathologist     Keila Haskins CCC-SLP Speech and Language Pathologist                            Time Calculation:        Time  Calculation- SLP     Row Name 12/19/22 1335 12/19/22 1142          Time Calculation- SLP    SLP Start Time 1220  -SR 1040  -SR     SLP Stop Time 1300  -SR 1100  -SR     SLP Time Calculation (min) 40 min  -SR 20 min  -SR           User Key  (r) = Recorded By, (t) = Taken By, (c) = Cosigned By    Initials Name Provider Type    SR Minal Bennett CCC-SLP Speech and Language Pathologist                  Therapy Charges for Today     Code Description Service Date Service Provider Modifiers Qty    69933171304 HC ST DEV OF COGN SKILLS INITIAL 15 MIN 12/19/2022 Minal Bennett CCC-SLP  1    [id number] HC ST DEV OF COGN SKILLS EACH ADDT'L 15 MIN 12/19/2022 Minal Bennett CCC-SLP  3                           SHARITA Flowers  12/19/2022

## 2022-12-19 NOTE — PROGRESS NOTES
[facility] Progress Note      Patient Name: Christie Avendaño  : 1965  MRN: [id number]  Primary Care Physician:  Tiffany Holloway MD  Date of admission: 2022    Subjective     Interval History:   Follow-up hyponatremia  Patient is feeling the same, denies any chest pain or shortness of air, no orthopnea or PND, no nausea or vomiting, no abdominal pain, she had occasional dysuria, no lower extremity edema, no lightheadedness.  Urine osmolality was 599 yesterday urine sodium and chloride were not done as ordered yesterday, uric acid yesterday was 5.9.    Review of Systems:   As noted above    Objective     Vitals:   Temp:  [97.3 °F (36.3 °C)-97.9 °F (36.6 °C)] 97.6 °F (36.4 °C)  Heart Rate:  [63-78] 63  Resp:  [18] 18  BP: ()/(48-80) 138/80    Intake/Output Summary (Last 24 hours) at 2022 0909  Last data filed at 2022 1740  Gross per 24 hour   Intake 240 ml   Output 300 ml   Net -60 ml       Physical Exam:    General Appearance: alert, oriented x 3, no acute distress   Skin: warm and dry  HEENT: oral mucosa normal, nonicteric sclera  Neck: supple, no JVD  Lungs: CTA, unlabored breathing effort  Heart: RRR, normal S1 and S2, no S3, no rub  Abdomen: soft, nontender, nondistended, normoactive  : no palpable bladder  Extremities: no edema, cyanosis or clubbing  Neuro: normal speech and mental status     Scheduled Meds:     [START ON 2022] buPROPion, 100 mg, Oral, Q12H  calcium 500 mg vitamin D 5 mcg (200 UT), 1 tablet, Oral, BID  dexamethasone, 4 mg, Oral, Q8H  enoxaparin, 40 mg, Subcutaneous, Q12H  escitalopram, 5 mg, Oral, Daily  gabapentin, 100 mg, Oral, TID  insulin glargine, 40 Units, Subcutaneous, QAM  insulin lispro, 0-14 Units, Subcutaneous, TID AC  insulin lispro, 8 Units, Subcutaneous, TID With Meals  latanoprost, 1 drop, Both Eyes, Nightly  levETIRAcetam, 500 mg, Oral, BID  levothyroxine, 150 mcg, Oral, Q AM  lisinopril, 5 mg, Oral,  Daily  magnesium oxide, 400 mg, Oral, Daily  ondansetron, 8 mg, Oral, Q24H  pantoprazole, 40 mg, Oral, Q AM  polyethylene glycol, 17 g, Oral, Daily  propranolol, 20 mg, Oral, Daily With Breakfast & Dinner  sulfamethoxazole-trimethoprim, 1 tablet, Oral, Once per day on Mon Wed Fri  temozolomide, 140 mg, Oral, Nightly   And  temozolomide, 20 mg, Oral, Nightly   And  temozolomide, 5 mg, Oral, Nightly  valACYclovir, 500 mg, Oral, Q24H      IV Meds:        Results Reviewed:   I have personally reviewed the results from the time of this admission to 12/19/2022 09:09 EST     Results from last 7 days   Lab Units 12/19/22  0552 12/18/22  0852 12/17/22  0542   SODIUM mmol/L 126* 125* 127*   POTASSIUM mmol/L 4.8 5.1 4.8   CHLORIDE mmol/L 91* 89* 92*   CO2 mmol/L 27.8 25.8 24.6   BUN mg/dL 30* 29* 24*   CREATININE mg/dL 0.82 0.86 0.76   CALCIUM mg/dL 9.4 9.2 9.4   BILIRUBIN mg/dL 0.6 0.7 0.7   ALK PHOS U/L 163* 159* 156*   ALT (SGPT) U/L 220* 244* 273*   AST (SGOT) U/L 34* 40* 44*   GLUCOSE mg/dL 190* 319* 167*       Estimated Creatinine Clearance: 92.3 mL/min (by C-G formula based on SCr of 0.82 mg/dL).    Results from last 7 days   Lab Units 12/19/22  0552   MAGNESIUM mg/dL 2.2   PHOSPHORUS mg/dL 3.1       Results from last 7 days   Lab Units 12/19/22  0552 12/18/22  1712   URIC ACID mg/dL 5.4 5.9*       Results from last 7 days   Lab Units 12/19/22  0552 12/15/22  0607   WBC 10*3/mm3 9.55 12.40*   HEMOGLOBIN g/dL 12.6 12.7   PLATELETS 10*3/mm3 175 181             Assessment / Plan     ASSESSMENT:  -Hyponatremia appears to be euvolemic with this history of multiple malignancies concern about SIADH especially with the stage IV glioblastoma.  But the patient also admitted increased water intake and has been on thyroid supplement after her thyroidectomy for her thyroid cancer also has been on SSRI.  The urine sodium and chloride were not done yesterday I will reorder them again has evidence of presence of ADH the uric acid is  somewhat elevated that would be against SIADH currently following fluid restriction, sodium today up to 126  -Prior right partial nephrectomy for renal cell cancer  -Adenocarcinoma of the lung status postresection  -Melanoma of the left heel status post resect  -Bilateral mastectomy because of BRCA2 mutation    PLAN:  -Continue fluid restriction  -Check random urine for sodium and chloride  -Surveillance labs    Thank you for involving us in the care of Christie THAD Avendaño.  Please feel free to call with any questions.    Golden Gonzalez MD  12/19/22  09:09 [facility]    [facility]  [phone]    Please note that portions of this note were completed with a voice recognition program.

## 2022-12-19 NOTE — PROGRESS NOTES
Inpatient Rehabilitation Functional Measures Assessment and Plan of Care    Plan of Care  Updated Problems/Interventions  Mobility    [OT] Toilet Transfers(Active)  Current Status(12/19/2022): CGA  Weekly Goal(12/26/2022): SBA  Discharge Goal: SBA    [OT] Tub/Shower Transfers(Active)  Current Status(12/19/2022): CGA/Min  Weekly Goal(12/27/2022): SBA  Discharge Goal: SBA        Self Care    [OT] Bathing(Active)  Current Status(12/19/2022): Min  Weekly Goal(12/26/2022): SBA/CGA  Discharge Goal: SBA/CGA    [OT] Dressing (Lower)(Active)  Current Status(12/19/2022): Min/Mod  Weekly Goal(12/27/2022): CGA/Min  Discharge Goal: CGA    [OT] Dressing (Upper)(Active)  Current Status(12/19/2022): Min  Weekly Goal(12/26/2022): SBA  Discharge Goal: SBA    [OT] Eating(Active)  Current Status(12/19/2022): set up  Weekly Goal(12/27/2022): set up  Discharge Goal: set up    [OT] Grooming(Active)  Current Status(12/19/2022): SBA/Min  Weekly Goal(12/26/2022): SETUP  Discharge Goal: SETUP    [OT] Toileting(Active)  Current Status(12/19/2022): Min  Weekly Goal(12/27/2022):  SBA/CGA  Discharge Goal: SBA/CGA    Functional Measures  DANDRE Eating:  Branch  DANDRE Grooming: Branch  DANDRE Bathing:  Branch  DANDRE Upper Body Dressing:  Branch  DANDRE Lower Body Dressing:  Branch  DANDRE Toileting:  Branch    DANDRE Bladder Management  Level of Assistance:  Branch  Frequency/Number of Accidents this Shift:  Branch    DANDRE Bowel Management  Level of Assistance: Branch  Frequency/Number of Accidents this Shift: Branch    DANDRE Bed/Chair/Wheelchair Transfer:  Branch  DANDRE Toilet Transfer:  Branch  DANDRE Tub/Shower Transfer:  Branch    Previously Documented Mode of Locomotion at Discharge: Field  DANDRE Expected Mode of Locomotion at Discharge: Branch  DANDRE Walk/Wheelchair:  Branch  DANDRE Stairs:  Branch    DANDRE Comprehension:  Branch  DANDRE Expression:  Branch  DANDRE Social Interaction:  Branch  DANDRE Problem Solving:  Branch  DANDRE Memory:  Branch    Therapy Mode Minutes  Occupational  Therapy: Branch  Physical Therapy: Branch  Speech Language Pathology:  Branch    Signed by: SHARON Vargas/CHERYL

## 2022-12-19 NOTE — THERAPY TREATMENT NOTE
Inpatient Rehabilitation - Physical Therapy Treatment Note       [facility]     Patient Name: Christie Avendaño  : 1965  MRN: [id number]    Today's Date: 2022                    Admit Date: 2022      Visit Dx:     ICD-10-CM ICD-9-CM   1. Impaired functional mobility, balance, gait, and endurance  Z74.09 V49.89   2. 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)   • Non-small cell lung cancer, right (HCC)   • Renal mass, right   • SIRS (systemic inflammatory response syndrome) (HCC)   • Hyperlipidemia   • Malignant melanoma of right lower extremity including hip (HCC)   • High grade glioma    • 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)   • Glioma (HCC)       Past Medical History:   Diagnosis Date   • Arthritis    • Asthma    • BRCA2 positive    • Diabetes mellitus (HCC)    • H/O Liver masses 2017    x3   • H/O Lung masses 2017    1 in right lower lobe and 1 in the left infrahilar location   • H/O Ovarian cyst    • H/O Right adrenal mass (CMS/HCC) 2017   • Lung cancer (HCC)    • Melanoma (HCC)     right foot   • PONV (postoperative nausea and vomiting)    • Thyroid disease        Past Surgical History:   Procedure Laterality Date   • APPENDECTOMY     • BRAIN BIOPSY Right 2022    Procedure: Right frontal stereotactic needle brain biopsy;  Surgeon: Manuel Thompson MD;  Location: [facility] MAIN OR;  Service: Neurosurgery;  Laterality: Right;   • BREAST IMPLANT SURGERY Bilateral    • CHOLECYSTECTOMY     • HYSTERECTOMY     •  MASTECTOMY Bilateral    • OVARIAN CYST SURGERY     • THORACOSCOPY VIDEO ASSISTED WITH LOBECTOMY Right 10/9/2020    Procedure: BRONCHOSCOPY, THORACOSCOPY VIDEO ASSISTED WITH ANTERIOR BASAL SEGMENTECTOMY, INTERCOSTAL NERVE BLOCK;  Surgeon: Flaca Crisostomo MD;  Location[facility] OR;  Service: Thoracic;  Laterality: Right;   • TONSILLECTOMY         PT ASSESSMENT (last 12 hours)     IRF PT Evaluation and Treatment     Row Name 12/19/22 1113          PT Time and Intention    Document Type daily treatment  -LB     Mode of Treatment physical therapy  -LB     Patient/Family/Caregiver Comments/Observations pt in wheelchair, NAD  -LB     Row Name 12/19/22 1113          General Information    Existing Precautions/Restrictions fall  -LB     Row Name 12/19/22 1113          Pain Assessment    Pretreatment Pain Rating 0/10 - no pain  -LB     Posttreatment Pain Rating 0/10 - no pain  -LB     Row Name 12/19/22 1113          Cognition/Psychosocial    Affect/Mental Status (Cognition) flat/blunted affect  -LB     Personal Safety Interventions fall prevention program maintained;gait belt;muscle strengthening facilitated;nonskid shoes/slippers when out of bed  -LB     Row Name 12/19/22 1113          Bed Mobility    Supine-Sit [place] (Bed Mobility) minimum assist (75% patient effort)  -LB     Sit-Supine [place] (Bed Mobility) minimum assist (75% patient effort)  -LB     Row Name 12/19/22 1113          Bed-Chair Transfer    Bed-Chair [place] (Transfers) verbal cues;moderate assist (50% patient effort)  -LB     Assistive Device (Bed-Chair Transfers) wheelchair  -LB     Row Name 12/19/22 1113          Chair-Bed Transfer    Chair-Bed [place] (Transfers) verbal cues;minimum assist (75% patient effort)  -LB     Assistive Device (Chair-Bed Transfers) wheelchair  -LB     Row Name 12/19/22 1113          Sit-Stand Transfer    Sit-Stand [place] (Transfers) verbal cues;contact guard  -LB     Assistive Device (Sit-Stand  Transfers) walker, front-wheeled;walker, 4-wheeled  -LB     Row Name 12/19/22 1113          Stand-Sit Transfer    Stand-Sit [place] (Transfers) verbal cues;contact guard  -LB     Assistive Device (Stand-Sit Transfers) walker, front-wheeled;wheelchair  -LB     Row Name 12/19/22 1113          Gait/Stairs (Locomotion)    [place] Level (Gait) contact guard;verbal cues  -LB     Assistive Device (Gait) walker, front-wheeled;walker, 4-wheeled  -LB     Distance in Feet (Gait) 80; 120  -LB     Pattern (Gait) step-through  -LB     Deviations/Abnormal Patterns (Gait) fabrizio decreased;left sided deviations;stride length decreased;base of support, narrow  -LB     Bilateral Gait Deviations forward flexed posture  -LB     Left Sided Gait Deviations leans left  decreased step length  -LB     Gait Assessment/Intervention vc for left foot clearance  -LB     [place] Level (Ramp) moderate assist (50% patient effort)  -LB     Assistive Device (Ramp) walker, 4-wheeled  -LB     Stairs Assessment/Intervention Min/CGA to ascend ramp; Mod to descend due to poor left foot placement  -LB     Row Name 12/19/22 1113          Safety Issues, Functional Mobility    Impairments Affecting Function (Mobility) balance;endurance/activity tolerance;coordination;motor control;sensation/sensory awareness;strength  -LB     Row Name 12/19/22 1113          Hip (Therapeutic Exercise)    Hip Strengthening (Therapeutic Exercise) left;heel slides;aBduction;aDduction;2 sets;10 repetitions  -LB     Row Name 12/19/22 1113          Knee (Therapeutic Exercise)    Knee Strengthening (Therapeutic Exercise) left;SAQ (short arc quad);2 sets;10 repetitions  -LB     Row Name 12/19/22 1113          Positioning and Restraints    Pre-Treatment Position sitting in chair/recliner  -LB     Post Treatment Position wheelchair  -LB     In Wheelchair patient within staff view  -LB           User Key  (r) = Recorded By, (t) = Taken By, (c) = Cosigned By    Initials  Name Provider Type    Maryanne Scott, PT Physical Therapist              Wound Right scalp Incision (Active)   Dressing Appearance open to air 12/18/22 2035   Closure Liquid skin adhesive 12/19/22 0821   Base dry;clean;scab 12/19/22 0821   Drainage Amount none 12/18/22 2035   Dressing Care open to air 12/19/22 0821     Physical Therapy Education     Title: PT OT SLP Therapies (In Progress)     Topic: Physical Therapy (In Progress)     Point: Mobility training (Done)     Learning Progress Summary           Patient Acceptance, E,TB, VU,NR by TRE at 12/19/2022 1122    Comment: ramp negogiation    Acceptance, E,TB,D, NR by KP at 12/17/2022 1047    Acceptance, E,D, VU,DU,NR by DP at 12/16/2022 0811    Acceptance, E,D, VU,DU by DP at 12/15/2022 1606    Acceptance, E,D, VU,DU,NR by DP at 12/15/2022 1147    Comment: transfer training on this date    Acceptance, E, NR by  at 12/13/2022 0923    Acceptance, E, VU by  at 12/12/2022 2327    Acceptance, E, VU,DU,NR by JK at 12/12/2022 1011    Acceptance, E, VU by  at 12/12/2022 0155    Acceptance, E, VU,NR by  at 12/10/2022 1035    Acceptance, E,D, VU,DU,NR by JK at 12/9/2022 0853    Acceptance, E,TB,D, VU,DU,NR by  at 12/8/2022 1420    Comment: Goals, POC    Acceptance, E, VU by  at 12/8/2022 1204   Significant Other Acceptance, E,TB,D, VU,DU,NR by  at 12/8/2022 1420    Comment: Goals, POC                   Point: Home exercise program (In Progress)     Learning Progress Summary           Patient Acceptance, E,TB,D, NR by TRACIE at 12/17/2022 1047    Acceptance, E,D, VU,DU,NR by DP at 12/16/2022 0811    Acceptance, E,D, VU,DU by DP at 12/15/2022 1606    Acceptance, E,D, VU,DU,NR by DP at 12/15/2022 1147    Comment: transfer training on this date    Acceptance, E, NR by  at 12/13/2022 0923    Acceptance, E, VU by WN at 12/12/2022 2327    Acceptance, E, VU by WN at 12/12/2022 0155    Acceptance, E, VU,NR by  at 12/10/2022 1035    Acceptance, E,D, VU,DU,NR by MIKHAIL at  12/9/2022 0853    Acceptance, E,TB,D, VU,DU,NR by KP at 12/8/2022 1420    Comment: Goals, POC    Acceptance, E, VU by CB at 12/8/2022 1204   Significant Other Acceptance, E,TB,D, VU,DU,NR by KP at 12/8/2022 1420    Comment: Goals, POC                   Point: Body mechanics (Done)     Learning Progress Summary           Patient Acceptance, E,D, VU,DU,NR by DP at 12/16/2022 0811    Acceptance, E,D, VU,DU by DP at 12/15/2022 1606    Acceptance, E,D, VU,DU,NR by DP at 12/15/2022 1147    Comment: transfer training on this date    Acceptance, E, NR by  at 12/13/2022 0923    Acceptance, E, VU by WN at 12/12/2022 2327    Acceptance, E, VU by WN at 12/12/2022 0155    Acceptance, E, VU,NR by  at 12/10/2022 1035    Acceptance, E, VU by CB at 12/8/2022 1204                   Point: Precautions (Done)     Learning Progress Summary           Patient Acceptance, E,D, VU,DU,NR by DP at 12/16/2022 0811    Acceptance, E,D, VU,DU by DP at 12/15/2022 1606    Acceptance, E,D, VU,DU,NR by DP at 12/15/2022 1147    Comment: transfer training on this date    Acceptance, E, NR by  at 12/13/2022 0923    Acceptance, E, VU by WN at 12/12/2022 2327    Acceptance, E, VU by WN at 12/12/2022 0155    Acceptance, E, VU,NR by  at 12/10/2022 1035    Acceptance, E, VU by CB at 12/8/2022 1204                               User Key     Initials Effective Dates Name Provider Type Discipline    LB 06/16/21 -  Maryanne Oleary, PT Physical Therapist PT     06/16/21 -  Radha Ramírez, PT Physical Therapist PT    JK 06/16/21 -  Samantha Thompson, PT Physical Therapist PT     06/16/21 -  Anita Serrano, PT Physical Therapist PT    WN 08/23/22 -  Doc Park, RN Registered Nurse Nurse    DP 08/24/21 -  Dillan Calderon, PT Physical Therapist PT    CB 11/10/22 -  Mackenzie Hopkins CCC-SLP Speech and Language Pathologist SLP                PT Recommendation and Plan                          Time Calculation:      PT Charges     Row Name 12/19/22 8890  12/19/22 1137          Time Calculation    Start Time 1430  -LB 1100  -LB     Stop Time 1500  -LB 1130  -LB     Time Calculation (min) 30 min  -LB 30 min  -LB     PT Received On 12/19/22  -LB 12/19/22  -LB     PT - Next Appointment 12/20/22  -LB 12/19/22  -LB           User Key  (r) = Recorded By, (t) = Taken By, (c) = Cosigned By    Initials Name Provider Type    Maryanne Scott PT Physical Therapist                Therapy Charges for Today     Code Description Service Date Service Provider Modifiers Qty    [id number]  PT NEUROMUSC RE EDUCATION EA 15 MIN 12/19/2022 Maryanne Oleary, PT GP 2    [id number]  PT THERAPEUTIC ACT EA 15 MIN 12/19/2022 Maryanne Oleary, PT GP 1    [id number]  PT THER PROC EA 15 MIN 12/19/2022 Maryanne Oleary, PT GP 1          Patient was wearing a face mask during this therapy encounter. Therapist used appropriate personal protective equipment including mask and gloves.  Mask used was standard procedure mask. Appropriate PPE was worn during the entire therapy session. Hand hygiene was completed before and after therapy session. Patient is not in enhanced droplet precautions.              Maryanne Oleary PT  12/19/2022

## 2022-12-19 NOTE — PROGRESS NOTES
LOS: 12 days   Patient Care Team:  Tiffany Holloway MD as PCP - General (Internal Medicine)  Mathew Collins Jr., MD as Consulting Physician (Hematology and Oncology)  Dorian Sabillon MD as Surgeon (General Surgery)  Thang Dumont, JOSEFA (Optometry)  Lamine Cantu DO as Referring Physician (Family Medicine)  Hali Rolle MD as Gynecologist (Gynecology)      HERON MAGANA  1965    Diagnoses    1. 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 Continued headache, mild improvement without radiation. Patient denies fever/chills/sob/chest pain and denies issue with sleep/appetite/bladder and bowels.     Objective     Vitals:    12/19/22 1320   BP: 97/61   Pulse: 74   Resp: 18   Temp: 98.3 °F (36.8 °C)   SpO2: 92%       PHYSICAL EXAM:   MENTAL STATUS -  AWAKE / ALERT  HEENT-right scalp incision with staples removed.  Clean dry and intact  SCLERAE ANICTERIC, CONJUNCTIVAE PINK, EARS UNREMARKABLE EXTERNALLY  LUNGS - CTA, NO WHEEZES, RALES OR RHONCHI  HEART- RRR, NO RUB, MURMUR, OR GALLOP  ABD - NORMOACTIVE BOWEL SOUNDS, SOFT, NT.     EXT - NO EDEMA OR CYANOSIS  NEURO -oriented       Speech was fluent with slightly slow rate of speech.  Extraocular movements intact.  No dysarthria.  MOTOR EXAM - RUE/RLE 5/5.   LUE/LLE 5/5.   Impaired motor control in the left upper extremity and left lower extremity,    MEDICATIONS  Scheduled Meds:[START ON 12/26/2022]  buPROPion, 100 mg, Oral, Q12H  calcium 500 mg vitamin D 5 mcg (200 UT), 1 tablet, Oral, BID  dexamethasone, 4 mg, Oral, Q8H  enoxaparin, 40 mg, Subcutaneous, Q12H  escitalopram, 5 mg, Oral, Daily  gabapentin, 100 mg, Oral, TID  insulin glargine, 40 Units, Subcutaneous, QAM  insulin lispro, 0-14 Units, Subcutaneous, TID AC  insulin lispro, 8 Units, Subcutaneous, TID With Meals  latanoprost, 1 drop, Both Eyes, Nightly  levETIRAcetam, 500 mg, Oral, BID  levothyroxine, 150 mcg, Oral, Q AM  lisinopril, 5 mg, Oral, Daily  magnesium oxide, 400 mg, Oral, Daily  ondansetron, 8 mg, Oral, Q24H  pantoprazole, 40 mg, Oral, Q AM  polyethylene glycol, 17 g, Oral, Daily  propranolol, 20 mg, Oral, Daily With Breakfast & Dinner  sulfamethoxazole-trimethoprim, 1 tablet, Oral, Once per day on Mon Wed Fri  temozolomide, 140 mg, Oral, Nightly   And  temozolomide, 20 mg, Oral, Nightly   And  temozolomide, 5 mg, Oral, Nightly  valACYclovir, 500 mg, Oral, Q24H      Continuous Infusions:   PRN Meds:.•  calcium carbonate  •  dextrose  •  dextrose  •  famotidine  •  glucagon (human recombinant)  •  influenza vaccine  •  nitroglycerin  •  ondansetron **OR** ondansetron  •  senna-docusate sodium      RESULTS  Glucose   Date/Time Value Ref Range Status   12/19/2022 1140 450 (C) 70 - 130 mg/dL Final     Comment:     RN Notified R and V Meter: [id number] : [id number] Milleryoana Clifton    12/19/2022 0633 216 (H) 70 - 130 mg/dL Final     Comment:     Meter: [id number] : [id number] [facility]   12/18/2022 2049 215 (H) 70 - 130 mg/dL Final     Comment:     Meter: [id number] : [id number] [facility]   12/18/2022 1600 307 (H) 70 - 130 mg/dL Final     Comment:     Meter: [id number] : [id number] Ye Ravi    12/18/2022 1146 206 (H) 70 - 130 mg/dL Final     Comment:     Meter: [id number] : [id number] Yuriy Lane CNA   12/18/2022 0622 227 (H) 70 - 130 mg/dL Final     Comment:     Meter: [id number] :  [id number] Paola Monroy NA   12/17/2022 2033 152 (H) 70 - 130 mg/dL Final     Comment:     Meter: [id number] : [id number] Paola Monroy NA   12/17/2022 1559 217 (H) 70 - 130 mg/dL Final     Comment:     Meter: [id number] : [id number] Christopher Don CNA     Results from last 7 days   Lab Units 12/19/22  0552 12/15/22  0607   WBC 10*3/mm3 9.55 12.40*   HEMOGLOBIN g/dL 12.6 12.7   HEMATOCRIT % 36.6 38.2   PLATELETS 10*3/mm3 175 181     Results from last 7 days   Lab Units 12/19/22  0552 12/18/22  0852 12/17/22  0542   SODIUM mmol/L 126* 125* 127*   POTASSIUM mmol/L 4.8 5.1 4.8   CHLORIDE mmol/L 91* 89* 92*   CO2 mmol/L 27.8 25.8 24.6   BUN mg/dL 30* 29* 24*   CREATININE mg/dL 0.82 0.86 0.76   CALCIUM mg/dL 9.4 9.2 9.4   BILIRUBIN mg/dL 0.6 0.7 0.7   ALK PHOS U/L 163* 159* 156*   ALT (SGPT) U/L 220* 244* 273*   AST (SGOT) U/L 34* 40* 44*   GLUCOSE mg/dL 190* 319* 167*       ASSESSMENT and PLAN    Glioma (HCC)    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  She reports intolerance to gabapentin, intolerances to opioids.  Tramadol comes up as contraindicated with history of anaphylactic secondary to morphine.  Valproate for headache control not an option with her liver changes  Dec 13: patient reports intolerance to gabapentin was drowsiness on 300 mg dose. Agreeable to try 100 mg tid for HA.   December 16-Propranolol added for baseline headache control but not able to receive first 2 doses today secondary to parameters to hold for pulse less than 60 or blood pressure less than 110.  Lisinopril dose decreased which may allow some range to receive propranolol.  Continues on low-dose gabapentin.  Has history of allergy with anaphylaxis to opioid-morphine-with hives and throat swelling  December 17-received first dose of  propanolol this morning  December 18-headache better on the weekend off of radiation therapy.  Has not received propranolol secondary to parameters since yesterday morning     Left side weakness/incoordination/impaired mobility     Radiation therapy/Temodar to start December 12, 2022  Decadron 4 mg every 8 hourly  Dec 9: Oncology aware of increase of liver enzymes. They are following along   December 12: Radiation therapy and Temodar initiating today  Dec 14: Temodar Radiation, fraction 3 today.   Dec 15: Today is day 4 of Temodar Radiation. Per oncology no clinical signs of progression.      Leukocytosis-steroid/stress response.  Incision healing.      Elevated LFTs  Dec 8: Labs significant for ALT 1049 (143 11/28),  (71 11/28), Alk phos 190 (96 11/28). Taking minimal tylenol and statin is a home med- discontinued. Consulted IM who also consulted pharmacy and GI. They also decreased valtrex to 1g daily (she was taking daily prophylactic valtrex 2g bid). Awaiting liver ultrasound. CPK normal.   Dec 9- Ongoing workup. Liver ultrasound- remarkable for a small hepatic cyst otherwise negative  Per GI -[ Obtain duplex hepatic ultrasound to further assess for thrombosis  Obtain GEMMA, IgG profile, ASMA, EBV, CMV, HSV, VZV to rule out autoimmune versus viral cause to elevation.].   Patient reviewed with internal medicine and medical oncology  December 12-transaminases elevated but trending better.   Portal hepatic duplex unremarkable  Dec 14: Continued decrease of LFTs.   December 17-continue to trend better  Dec 19: Alk phos 163(159 12/18), (244 12/18), AST 34(34 12/18), continued slow downward trend    Diabetes mellitus-Trulicity at home.  On Lantus/Humalog  December 13-Lantus increased to 28 units a.m.  On Humalog 8 units 3 times daily with meals  Dec 14: Continued elevation of blood sugars, will ask IM to provide insight.   Dec 15: Lantus increased to 35 daily yesterday with improved readings.   Dec 19:  First dose of lantus 40 today.  this am.      Hyponatremia   Dec 15: Na 128 today, 131 yesterday. IM ordered serum and urine osmolality and urine sodium. Deferring to nephrology consult to us. Will repeat Cmp in am.   Dec 16: Na 130 today, urine osmols 495 and urine Na 80. Continue to monitor.  Felt related to hyperglycemia per internal medicine note  December 17-sodium 127 today  December 18-sodium 125.  Nephrology evaluating.  Transitioning escitalopram to bupropion.  TSH low at 0.03  Dec 19: Nephrology following. Continue fluid restriction. Urine sodium and chloride not performed yesterday so reordered.     Seizure prophylaxis-Keppra     DVT prophylaxis-Lovenox/SCDs     GI prophylaxis-protein pump inhibitor  Add calcium and vitamin D with ongoing steroids     Chronic Valtrex-dose decreased to 1000 mg daily     Germline BRCA2 and GEORGES mutations.  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     Depression-Lexapro  December 18-transition to Lexapro 5 mg daily for 7 days then start bupropion 100 mg twice daily to possibly help with hyponatremia     Hypothyroidism-on replacement  December 18-TSH equals 0.030     Hypertension   Dec 16: Will decrease lisinopril to 10mg from 20mg due to addition of propranolol for headaches. Holding parameters in place for propranolol of SBP <110 and HR <60  December 18-blood pressure 84/48-had not received propranolol since yesterday morning.  Will decrease lisinopril to 5 mg daily to allow more range for giving propanolol for headache    TEAM CONF - DEC 13 - BED SBA. TRANSFERS MIN CTG. GAIT 160 FEET CTG. LEANS LEFT. TOILET TRANSFERS CTG. SHOWER TRANSFERS CTG MIN. LBD MIN. UBD MIN. BATH MIN. GROOMING SBA. TOILETING CTG MIN. IMPAIRED  ATTENTION.IMPAIRED EXECUTIVE FUNCTION. FATIGUES WITH COGNITIVE TASKS. CONTINENT BOWEL AND BLADDER. SCALP INCISION HEALING.   XRT/TEMODAR. ELEVATED TRANSAMINASES TREND BETTER.   ELOS - 2 WEEKS    Patient seen and evaluated with attending physician Dr. Araujo, please see attestation.     Gina Florence DO   Physical Medicine and Rehabilitation   PGY-4     During rounds, used appropriate personal protective equipment including mask and gloves.  Additional gown if indicated.  Mask used was standard procedure mask. Appropriate PPE was worn during the entire visit.  Hand hygiene was completed before and after.

## 2022-12-19 NOTE — PROGRESS NOTES
Inpatient Rehabilitation Plan of Care Note    Plan of Care  Care Plan Reviewed - No updates at this time.    Psychosocial    Performed Intervention(s)  Support/peer groups.  Verbalizes needs and concerns.  Medication.      Sphincter Control    Performed Intervention(s)  Offer restroom during hourly rounding.  Encourage fluid intake.  Monitor Is and Os.  Timed voids.  Encourage appropriate diet.      Safety    Performed Intervention(s)  Items w/i reach.  Safety rounds.  Bed and chair alarm. Blue armband  Falls precautions.    Signed by: Lo Lin RN

## 2022-12-20 ENCOUNTER — TREATMENT (OUTPATIENT)
Dept: RADIATION ONCOLOGY | Facility: HOSPITAL | Age: 57
End: 2022-12-20

## 2022-12-20 LAB
ALBUMIN SERPL-MCNC: 4 G/DL (ref 3.5–5.2)
ALBUMIN/GLOB SERPL: 1.7 G/DL
ALP SERPL-CCNC: 161 U/L (ref 39–117)
ALT SERPL W P-5'-P-CCNC: 193 U/L (ref 1–33)
ANION GAP SERPL CALCULATED.3IONS-SCNC: 8 MMOL/L (ref 5–15)
AST SERPL-CCNC: 32 U/L (ref 1–32)
BILIRUB SERPL-MCNC: 0.6 MG/DL (ref 0–1.2)
BUN SERPL-MCNC: 28 MG/DL (ref 6–20)
BUN/CREAT SERPL: 32.9 (ref 7–25)
CALCIUM SPEC-SCNC: 9.7 MG/DL (ref 8.6–10.5)
CHLORIDE SERPL-SCNC: 93 MMOL/L (ref 98–107)
CO2 SERPL-SCNC: 28 MMOL/L (ref 22–29)
CREAT SERPL-MCNC: 0.85 MG/DL (ref 0.57–1)
EGFRCR SERPLBLD CKD-EPI 2021: 79.5 ML/MIN/1.73
GLOBULIN UR ELPH-MCNC: 2.3 GM/DL
GLUCOSE BLDC GLUCOMTR-MCNC: 131 MG/DL (ref 70–130)
GLUCOSE BLDC GLUCOMTR-MCNC: 140 MG/DL (ref 70–130)
GLUCOSE BLDC GLUCOMTR-MCNC: 198 MG/DL (ref 70–130)
GLUCOSE BLDC GLUCOMTR-MCNC: 202 MG/DL (ref 70–130)
GLUCOSE SERPL-MCNC: 148 MG/DL (ref 65–99)
MAGNESIUM SERPL-MCNC: 2.3 MG/DL (ref 1.6–2.6)
PHOSPHATE SERPL-MCNC: 4 MG/DL (ref 2.5–4.5)
POTASSIUM SERPL-SCNC: 5.3 MMOL/L (ref 3.5–5.2)
PROT SERPL-MCNC: 6.3 G/DL (ref 6–8.5)
RAD ONC ARIA COURSE ID: NORMAL
RAD ONC ARIA COURSE INTENT: NORMAL
RAD ONC ARIA COURSE LAST TREATMENT DATE: NORMAL
RAD ONC ARIA COURSE START DATE: NORMAL
RAD ONC ARIA COURSE TREATMENT ELAPSED DAYS: 8
RAD ONC ARIA FIRST TREATMENT DATE: NORMAL
RAD ONC ARIA PLAN FRACTIONS TREATED TO DATE: 7
RAD ONC ARIA PLAN ID: NORMAL
RAD ONC ARIA PLAN PRESCRIBED DOSE PER FRACTION: 2 GY
RAD ONC ARIA PLAN PRIMARY REFERENCE POINT: NORMAL
RAD ONC ARIA PLAN TOTAL FRACTIONS PRESCRIBED: 23
RAD ONC ARIA PLAN TOTAL PRESCRIBED DOSE: 4600 CGY
RAD ONC ARIA REFERENCE POINT DOSAGE GIVEN TO DATE: 14 GY
RAD ONC ARIA REFERENCE POINT DOSAGE GIVEN TO DATE: 14.17 GY
RAD ONC ARIA REFERENCE POINT ID: NORMAL
RAD ONC ARIA REFERENCE POINT ID: NORMAL
RAD ONC ARIA REFERENCE POINT SESSION DOSAGE GIVEN: 2 GY
RAD ONC ARIA REFERENCE POINT SESSION DOSAGE GIVEN: 2.02 GY
SODIUM SERPL-SCNC: 129 MMOL/L (ref 136–145)
URATE SERPL-MCNC: 5.5 MG/DL (ref 2.4–5.7)

## 2022-12-20 PROCEDURE — 97130 THER IVNTJ EA ADDL 15 MIN: CPT

## 2022-12-20 PROCEDURE — 25010000002 TEMOZOLOMIDE 140 MG CAPSULE: Performed by: INTERNAL MEDICINE

## 2022-12-20 PROCEDURE — 25010000002 TEMOZOLOMIDE PER 5 MG: Performed by: INTERNAL MEDICINE

## 2022-12-20 PROCEDURE — 97129 THER IVNTJ 1ST 15 MIN: CPT

## 2022-12-20 PROCEDURE — 77386: CPT | Performed by: RADIOLOGY

## 2022-12-20 PROCEDURE — 83735 ASSAY OF MAGNESIUM: CPT | Performed by: INTERNAL MEDICINE

## 2022-12-20 PROCEDURE — 82962 GLUCOSE BLOOD TEST: CPT

## 2022-12-20 PROCEDURE — 97530 THERAPEUTIC ACTIVITIES: CPT

## 2022-12-20 PROCEDURE — 63710000001 DEXAMETHASONE PER 0.25 MG: Performed by: PHYSICAL MEDICINE & REHABILITATION

## 2022-12-20 PROCEDURE — 77386 CHG INTENSITY MODULATED RADIATION TX DLVR COMPLEX: CPT | Performed by: RADIOLOGY

## 2022-12-20 PROCEDURE — 97112 NEUROMUSCULAR REEDUCATION: CPT

## 2022-12-20 PROCEDURE — 63710000001 INSULIN LISPRO (HUMAN) PER 5 UNITS: Performed by: PHYSICAL MEDICINE & REHABILITATION

## 2022-12-20 PROCEDURE — 80053 COMPREHEN METABOLIC PANEL: CPT | Performed by: PHYSICAL MEDICINE & REHABILITATION

## 2022-12-20 PROCEDURE — 63710000001 ONDANSETRON PER 8 MG: Performed by: INTERNAL MEDICINE

## 2022-12-20 PROCEDURE — 84550 ASSAY OF BLOOD/URIC ACID: CPT | Performed by: INTERNAL MEDICINE

## 2022-12-20 PROCEDURE — 25010000002 ENOXAPARIN PER 10 MG: Performed by: PHYSICAL MEDICINE & REHABILITATION

## 2022-12-20 PROCEDURE — 84100 ASSAY OF PHOSPHORUS: CPT | Performed by: INTERNAL MEDICINE

## 2022-12-20 PROCEDURE — 63710000001 INSULIN LISPRO (HUMAN) PER 5 UNITS: Performed by: HOSPITALIST

## 2022-12-20 PROCEDURE — 97116 GAIT TRAINING THERAPY: CPT

## 2022-12-20 PROCEDURE — 97535 SELF CARE MNGMENT TRAINING: CPT

## 2022-12-20 RX ORDER — PROPRANOLOL HYDROCHLORIDE 20 MG/1
20 TABLET ORAL EVERY 8 HOURS SCHEDULED
Status: DISCONTINUED | OUTPATIENT
Start: 2022-12-20 | End: 2022-12-23 | Stop reason: HOSPADM

## 2022-12-20 RX ADMIN — LEVETIRACETAM 500 MG: 500 TABLET, FILM COATED ORAL at 08:06

## 2022-12-20 RX ADMIN — ENOXAPARIN SODIUM 40 MG: 100 INJECTION SUBCUTANEOUS at 10:00

## 2022-12-20 RX ADMIN — GABAPENTIN 100 MG: 100 CAPSULE ORAL at 22:09

## 2022-12-20 RX ADMIN — INSULIN GLARGINE-YFGN 40 UNITS: 100 INJECTION, SOLUTION SUBCUTANEOUS at 08:05

## 2022-12-20 RX ADMIN — DEXAMETHASONE 4 MG: 4 TABLET ORAL at 05:46

## 2022-12-20 RX ADMIN — CALCIUM CARBONATE-VITAMIN D TAB 500 MG-200 UNIT 1 TABLET: 500-200 TAB at 08:06

## 2022-12-20 RX ADMIN — TEMOZOLOMIDE 140 MG: 140 CAPSULE ORAL at 22:29

## 2022-12-20 RX ADMIN — MAGNESIUM OXIDE 400 MG (241.3 MG MAGNESIUM) TABLET 400 MG: TABLET at 08:06

## 2022-12-20 RX ADMIN — ESCITALOPRAM 5 MG: 5 TABLET, FILM COATED ORAL at 08:06

## 2022-12-20 RX ADMIN — DEXAMETHASONE 4 MG: 4 TABLET ORAL at 22:09

## 2022-12-20 RX ADMIN — LATANOPROST 1 DROP: 50 SOLUTION OPHTHALMIC at 22:09

## 2022-12-20 RX ADMIN — GABAPENTIN 100 MG: 100 CAPSULE ORAL at 17:20

## 2022-12-20 RX ADMIN — PANTOPRAZOLE SODIUM 40 MG: 40 TABLET, DELAYED RELEASE ORAL at 05:46

## 2022-12-20 RX ADMIN — VALACYCLOVIR HYDROCHLORIDE 500 MG: 500 TABLET, FILM COATED ORAL at 08:06

## 2022-12-20 RX ADMIN — PROPRANOLOL HYDROCHLORIDE 20 MG: 20 TABLET ORAL at 08:06

## 2022-12-20 RX ADMIN — LEVETIRACETAM 500 MG: 500 TABLET, FILM COATED ORAL at 22:10

## 2022-12-20 RX ADMIN — POLYETHYLENE GLYCOL 3350 17 G: 17 POWDER, FOR SOLUTION ORAL at 08:06

## 2022-12-20 RX ADMIN — LEVOTHYROXINE SODIUM 150 MCG: 0.15 TABLET ORAL at 05:46

## 2022-12-20 RX ADMIN — LISINOPRIL 5 MG: 5 TABLET ORAL at 08:06

## 2022-12-20 RX ADMIN — DEXAMETHASONE 4 MG: 4 TABLET ORAL at 13:21

## 2022-12-20 RX ADMIN — INSULIN LISPRO 8 UNITS: 100 INJECTION, SOLUTION INTRAVENOUS; SUBCUTANEOUS at 08:05

## 2022-12-20 RX ADMIN — TEMOZOLOMIDE 20 MG: 20 CAPSULE ORAL at 22:29

## 2022-12-20 RX ADMIN — CALCIUM CARBONATE-VITAMIN D TAB 500 MG-200 UNIT 1 TABLET: 500-200 TAB at 22:09

## 2022-12-20 RX ADMIN — ONDANSETRON HYDROCHLORIDE 8 MG: 8 TABLET, FILM COATED ORAL at 12:02

## 2022-12-20 RX ADMIN — INSULIN LISPRO 8 UNITS: 100 INJECTION, SOLUTION INTRAVENOUS; SUBCUTANEOUS at 17:19

## 2022-12-20 RX ADMIN — GABAPENTIN 100 MG: 100 CAPSULE ORAL at 08:06

## 2022-12-20 RX ADMIN — INSULIN LISPRO 8 UNITS: 100 INJECTION, SOLUTION INTRAVENOUS; SUBCUTANEOUS at 11:59

## 2022-12-20 RX ADMIN — INSULIN LISPRO 5 UNITS: 100 INJECTION, SOLUTION INTRAVENOUS; SUBCUTANEOUS at 11:59

## 2022-12-20 RX ADMIN — TEMOZOLOMIDE 5 MG: 5 CAPSULE ORAL at 22:29

## 2022-12-20 RX ADMIN — ENOXAPARIN SODIUM 40 MG: 100 INJECTION SUBCUTANEOUS at 22:10

## 2022-12-20 NOTE — PROGRESS NOTES
Case Management  Inpatient Rehabilitation Team Conference    Conference Date/Time: 12/20/2022 8:23:28 AM    Team Conference Attendees:  Lainey Conrad, Pharmacist  Jemma Christian, SHANNON Goel, OT  Melissa Blakely, SLP  Kiesha Bello, CTRS  Oralia Marie RD, LD  Lilly Cage, RN  Juana Rice, RN  Dougie Correa, Chaplain Wilian Allan, PT    Demographics            Age: 57Y            Gender: Female    Admission Date: 12/7/2022 4:40:00 PM  Rehabilitation Diagnosis:  High-grade glioma-3.2 cm ring-enhancing right  supratentorial mass-right thalamic-with mass-effect and left midline shift  Status post stereotactic brain biopsy  Past Medical History: Past Medical History:  DiagnosisDate  ?Arthritis?  ?Asthma?  ?BRCA2 positive?  ?Diabetes mellitus (HCC)?  ?H/O Liver [id number]  ?x3  ?H/O Lung [id number]  ?1 in right lower lobe and 1 in the left infrahilar location  ?H/O Ovarian cyst?  ?H/O Right adrenal mass (CMS/HCC)2017  ?Lung cancer (HCC)?  ?Melanoma (HCC)?  ?right foot  ?PONV (postoperative nausea and vomiting)?  ?Thyroid disease?    ?  ?  Surgical History  Past Surgical History:  ProcedureLateralityDate  ?APPENDECTOMY??  ?BRAIN IMJKKWBubso79/28/2022  ?Procedure: Right frontal stereotactic needle brain biopsy;  Surgeon: Manuel Thompson MD;  Location: [facility] OR;  Service: Neurosurgery;  Laterality: Right;  ?BREAST IMPLANT SURGERYBilateral?  ?CHOLECYSTECTOMY??  ?HYSTERECTOMY??  ?MASTECTOMYBilateral?  ?OVARIAN CYST SURGERY??  ?THORACOSCOPY VIDEO ASSISTED WITH [id number]/9/2020  ?Procedure: BRONCHOSCOPY, THORACOSCOPY VIDEO ASSISTED WITH ANTERIOR BASAL  SEGMENTECTOMY, INTERCOSTAL NERVE BLOCK;  Surgeon: Flaca Crisostomo MD;  Location: [facility] OR;  Service: Thoracic;  Laterality: Right;  ?TONSILLECTOMY??    ?      Plan of Care  Anticipated Discharge Date/Estimated Length of Stay: ELOS: 2 weeks  Anticipated Discharge Destination: Community discharge with  assistance  Discharge Plan : Patient lives with  in one story home; One step into  home with grab bar.  Family conference scheduled for 12/20 at 3:00 pm.  D/C plan is home with . He works nights at [employer]. Aunt can stay with  patient at night if needed.  Medical Necessity Expected Level Rationale: Goals are to achieve a level of  contact-guard supervision with  mobility and self-care and improved balance.  Rehabilitation prognosis indeterminate given her tumor and prognosis Medical  prognosis defer to oncology.  Intensity and Duration: an average of 3 hours/5 days per week  Medical Supervision and 24 Hour Rehab Nursing: x  Physical Therapy: x  PT Intensity/Duration: 1 hour / day, 5 days / week, for approximately 2 weeks  Occupational Therapy: x  OT Intensity/Duration: 1 hour / day, 5 days / week, for approximately 2 weeks  Speech and Language Therapy: x  SLP Intensity/Duration: 1 hour / day, 5 days / week, for approximately 2 weeks  Social Work: x  Therapeutic Recreation: x  Psychology: x  Registered Dietician: x  Updated (if changes indicated)    Anticipated Discharge Date/Estimated Length of Stay:   ELOS: 12/23    Based on the patient's medical and functional status, their prognosis and  expected level of functional improvement is: Goals are to achieve a level of  contact-guard supervision with  mobility and self-care and improved balance.  Rehabilitation prognosis indeterminate given her tumor and prognosis Medical  prognosis defer to oncology.      Interdisciplinary Problem/Goals/Status    All Rehab Problems:  Cognition    [ST] Attention(Active)  Current Status(12/19/2022): deficits for attn to details, complex reasoning,  memory, executive fxn, left visual spatial skills  Weekly Goal(12/26/2022): follow schedule indep; recall details from daily events  indep; completed therapy activities given min-mod cues  Discharge Goal: fxnl cognition for home with assist        Mobility    [OT] Toilet  Transfers(Active)  Current Status(12/19/2022): CGA  Weekly Goal(12/26/2022): SBA  Discharge Goal: SBA    [OT] Tub/Shower Transfers(Active)  Current Status(12/19/2022): CGA/Min  Weekly Goal(12/27/2022): SBA  Discharge Goal: SBA    [PT] Walk(Active)  Current Status(12/19/2022): 160 ft, CG to occ min, VC, rollator  Weekly Goal(12/27/2022): 160 ft, CG, rwx  Discharge Goal: 160 ft, Sup/CGA rollator    [PT] Bed/Chair/Wheelchair(Active)  Current Status(12/19/2022): Min/CGA, rwx  Weekly Goal(12/27/2022): CG, rwx  Discharge Goal: Sup/CGA, RWX    [PT] Bed Mobility(Active)  Current Status(12/19/2022): Min  Weekly Goal(12/27/2022): SBA  Discharge Goal: Mod Indep        Psychosocial    [RN] Coping/Adjustment(Active)  Current Status(12/20/2022): At risk for poor coping d/t recent medical  conditions.  Weekly Goal(12/22/2022): Identify progress in functional status.  Discharge Goal: Demonstrates healthy coping strategies.        Safety    [RN] Potential for Injury(Active)  Current Status(12/20/2022): At risk for falls d/t history of falls. 12/10 Pt  found on floor in BR, states she was trying to pull her pants up, did not call  for assist like she said she would. No apparent injury. Blue armband placed.  Weekly Goal(12/22/2022): Pt will use call light, no further attempts to transfer  self.  Discharge Goal: Patient and family will be aware of risk of fall and safety in  home setting.        Self Care    [OT] Bathing(Active)  Current Status(12/19/2022): Min  Weekly Goal(12/26/2022): SBA/CGA  Discharge Goal: SBA/CGA    [OT] Dressing (Lower)(Active)  Current Status(12/19/2022): Min/Mod  Weekly Goal(12/27/2022): CGA/Min  Discharge Goal: CGA    [OT] Dressing (Upper)(Active)  Current Status(12/19/2022): Min  Weekly Goal(12/26/2022): SBA  Discharge Goal: SBA    [OT] Eating(Active)  Current Status(12/19/2022): set up  Weekly Goal(12/27/2022): set up  Discharge Goal: set up    [OT] Grooming(Active)  Current Status(12/19/2022):  SBA/Min  Weekly Goal(12/26/2022): SETUP  Discharge Goal: SETUP    [OT] Toileting(Active)  Current Status(12/19/2022): Min  Weekly Goal(12/27/2022):  SBA/CGA  Discharge Goal: SBA/CGA        Sphincter Control    [RN] Bladder Management(Active)  Current Status(12/20/2022): Continent of bladder. Occasional urge incont.  Weekly Goal(12/22/2022): Remain continent of bladder.  Discharge Goal: Goes home continent of bladder.    [RN] Bowel Management(Active)  Current Status(12/20/2022): Continent of bowel.  Weekly Goal(12/22/2022): Remain continent of bowel.  Discharge Goal: Go home continent of bowel.        Comments: 12/13 SBA VC's for bed mob, amb 160' CGA - to occ Min A VC's and rwx.  CGA toilet transfer, CGA / Min A shower transfer.  Leans and patient unaware of  deficit. CGA / Min A toileting.  Deficits for attention to detail, complex  reasoning, memory, executive function, left visual spatial skills.  Continent of  bladder and bowel.    12/20 Min A bed mob, Min / CGA rwx transfers, amb 160' CG to occ Min A with VC's  and rollator.  CGA for toilet transfers, CGA / Min A shower transfers.  Family  conference today.  Min A bathing, cues for balance.  Min A toileting.  Deficits  for attention to detail, complex reasoning, memory, executive funtion, left  visual spatial skills.  Showing increased awareness of deficits.  Continent of  bowel and bladder, occasional urge incont.    Signed by: Lilly Cage RN    Physician CoSigned By: Mathew Araujo 12/20/2022 08:39:14

## 2022-12-20 NOTE — THERAPY TREATMENT NOTE
Inpatient Rehabilitation - Physical Therapy Treatment Note       [facility]     Patient Name: Christie Avendaño  : 1965  MRN: [id number]    Today's Date: 2022                    Admit Date: 2022      Visit Dx:     ICD-10-CM ICD-9-CM   1. Impaired functional mobility, balance, gait, and endurance  Z74.09 V49.89   2. 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)   • Non-small cell lung cancer, right (HCC)   • Renal mass, right   • SIRS (systemic inflammatory response syndrome) (HCC)   • Hyperlipidemia   • Malignant melanoma of right lower extremity including hip (HCC)   • High grade glioma    • 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)   • Glioma (HCC)       Past Medical History:   Diagnosis Date   • Arthritis    • Asthma    • BRCA2 positive    • Diabetes mellitus (HCC)    • H/O Liver masses 2017    x3   • H/O Lung masses 2017    1 in right lower lobe and 1 in the left infrahilar location   • H/O Ovarian cyst    • H/O Right adrenal mass (CMS/HCC) 2017   • Lung cancer (HCC)    • Melanoma (HCC)     right foot   • PONV (postoperative nausea and vomiting)    • Thyroid disease        Past Surgical History:   Procedure Laterality Date   • APPENDECTOMY     • BRAIN BIOPSY Right 2022    Procedure: Right frontal stereotactic needle brain biopsy;  Surgeon: Manuel Thompson MD;  Location: [facility] MAIN OR;  Service: Neurosurgery;  Laterality: Right;   • BREAST IMPLANT SURGERY Bilateral    • CHOLECYSTECTOMY     • HYSTERECTOMY     •  MASTECTOMY Bilateral    • OVARIAN CYST SURGERY     • THORACOSCOPY VIDEO ASSISTED WITH LOBECTOMY Right 10/9/2020    Procedure: BRONCHOSCOPY, THORACOSCOPY VIDEO ASSISTED WITH ANTERIOR BASAL SEGMENTECTOMY, INTERCOSTAL NERVE BLOCK;  Surgeon: Flaca Crisostomo MD;  Location: [facility] OR;  Service: Thoracic;  Laterality: Right;   • TONSILLECTOMY         PT ASSESSMENT (last 12 hours)     IRF PT Evaluation and Treatment     Row Name 12/20/22 1019          PT Time and Intention    Document Type daily treatment  -ST     Mode of Treatment physical therapy  -ST     Patient/Family/Caregiver Comments/Observations up in w/c from OT this AM, up in w/c at ns station upon arrival this PM  -ST     Row Name 12/20/22 1019          General Information    Patient Profile Reviewed yes  -ST     Existing Precautions/Restrictions fall  -     Row Name 12/20/22 1019          Pain Assessment    Pretreatment Pain Rating 0/10 - no pain  -ST     Posttreatment Pain Rating 0/10 - no pain  -     Row Name 12/20/22 1019          Cognition/Psychosocial    Affect/Mental Status (Cognition) flat/blunted affect  -ST     Orientation Status (Cognition) oriented x 4  -ST     Follows Commands (Cognition) follows one-step commands;follows two-step commands;delayed response/completion;increased processing time needed  -ST     Personal Safety Interventions fall prevention program maintained;gait belt;nonskid shoes/slippers when out of bed  -ST     Cognitive Function executive function deficit  -ST     Attention Deficit (Cognition) concentration;distractible in noisy environment  -ST     Executive Function Deficit (Cognition) organization/sequencing;problem-solving/reasoning  -     Row Name 12/20/22 1019          Sit-Stand Transfer    Sit-Stand [place] (Transfers) set up;verbal cues;contact guard  -ST     Assistive Device (Sit-Stand Transfers) wheelchair  -     Row Name 12/20/22 1019          Stand-Sit Transfer    Stand-Sit [place] (Transfers)  set up;verbal cues;contact guard  -ST     Assistive Device (Stand-Sit Transfers) wheelchair  -ST     Row Name 12/20/22 1019          Gait/Stairs (Locomotion)    [place] Level (Gait) contact guard;verbal cues  -ST     Assistive Device (Gait) walker, 4-wheeled;walker, front-wheeled  -ST     Distance in Feet (Gait) 80' x 2 this AM, 80'  then 40' x 2 this PM for turning practice  -ST     Pattern (Gait) step-through  -ST     Deviations/Abnormal Patterns (Gait) fabrizio decreased;left sided deviations;stride length decreased;base of support, narrow  -ST     Bilateral Gait Deviations forward flexed posture  -ST     Left Sided Gait Deviations leans left  decreased L foot clearance that is more frequent with fatigue  -ST     Right Sided Gait Deviations weight shift ability decreased  -ST     Gait Assessment/Intervention tactile cues through R hip for increased upright posture/weight shift to R. increased cueing required for L foot clearance in PM compared to AM  -ST     [place] Level (Stairs) minimum assist (75% patient effort);set up;verbal cues  -ST     Handrail Location (Stairs) both sides  -ST     Number of Steps (Stairs) 1 step x 3 trials  -ST     Ascending Technique (Stairs) step-to-step  -ST     Descending Technique (Stairs) step-to-step  -ST     Stairs, Safety Issues sequencing ability decreased;weight-shifting ability decreased  -ST     Stairs Assessment/Intervention one trial of 40' ambulation with 2ww d/c increased fatigue and L foot drag. Pt with increased difficulty turning but improved stability with straight ambulation.  -     Row Name 12/20/22 1019          Balance    Static Standing Balance verbal cues;contact guard  -ST     Dynamic Standing Balance verbal cues;contact guard;minimal assist  -ST     Comment, Balance facilitated sit to stand x 5 with blue foam square under R LE for increased L LE sensory input. used mirror for biofeedback with midline alignment  -     Row Name 12/20/22 1019           Advanced Stepping/Walking Interventions    Side Stepping (Stepping/Walking Interventions) 8' R,L x 2 at nicolette bar, CGA with VC for L UE/LE  -ST     Row Name 12/20/22 1019          Positioning and Restraints    Pre-Treatment Position sitting in chair/recliner  -ST     Post Treatment Position wheelchair  -ST     In Wheelchair sitting;encouraged to call for assist;exit alarm on;with other staff  at nsg station in AM, at family conference in PM  -ST           User Key  (r) = Recorded By, (t) = Taken By, (c) = Cosigned By    Initials Name Provider Type    Ariana Abreu, PT Physical Therapist              Wound Right scalp Incision (Active)   Dressing Appearance open to air 12/20/22 0800   Closure Liquid skin adhesive 12/20/22 0800   Base clean;dry;scab 12/20/22 0800   Periwound intact;dry 12/20/22 0800   Periwound Temperature warm 12/20/22 0800   Periwound Skin Turgor soft 12/20/22 0800   Drainage Amount none 12/20/22 0800   Dressing Care open to air 12/20/22 0800   Periwound Care dry periwound area maintained 12/20/22 0800     Physical Therapy Education     Title: PT OT SLP Therapies (In Progress)     Topic: Physical Therapy (In Progress)     Point: Mobility training (Done)     Learning Progress Summary           Patient Acceptance, E,TB, VU,NR by ST at 12/20/2022 0757    Acceptance, E,TB, VU,NR by LB at 12/19/2022 1122    Comment: ramp negogiation    Acceptance, E,TB,D, NR by KP at 12/17/2022 1047    Acceptance, E,D, VU,DU,NR by DP at 12/16/2022 0811    Acceptance, E,D, VU,DU by DP at 12/15/2022 1606    Acceptance, E,D, VU,DU,NR by DP at 12/15/2022 1147    Comment: transfer training on this date    Acceptance, E, NR by LH at 12/13/2022 0923    Acceptance, E, VU by WN at 12/12/2022 2327    Acceptance, E, VU,DU,NR by JAYSONK at 12/12/2022 1011    Acceptance, E, VU by WN at 12/12/2022 0155    Acceptance, E, VU,NR by  at 12/10/2022 1035    Acceptance, THAD CELAYA VU, DU,NR by MIKHAIL at 12/9/2022 0853    Acceptance, SHELLEY CELAYA D,  VU,DU,NR by KP at 12/8/2022 1420    Comment: Goals, POC    Acceptance, E, VU by CB at 12/8/2022 1204   Significant Other Acceptance, E,TB,D, VU,DU,NR by KP at 12/8/2022 1420    Comment: Goals, POC                   Point: Home exercise program (In Progress)     Learning Progress Summary           Patient Acceptance, E,TB,D, NR by KP at 12/17/2022 1047    Acceptance, E,D, VU,DU,NR by DP at 12/16/2022 0811    Acceptance, E,D, VU,DU by DP at 12/15/2022 1606    Acceptance, E,D, VU,DU,NR by DP at 12/15/2022 1147    Comment: transfer training on this date    Acceptance, E, NR by  at 12/13/2022 0923    Acceptance, E, VU by WN at 12/12/2022 2327    Acceptance, E, VU by WN at 12/12/2022 0155    Acceptance, E, VU,NR by  at 12/10/2022 1035    Acceptance, E,D, VU,DU,NR by JK at 12/9/2022 0853    Acceptance, E,TB,D, VU,DU,NR by  at 12/8/2022 1420    Comment: Goals, POC    Acceptance, E, VU by CB at 12/8/2022 1204   Significant Other Acceptance, E,TB,D, VU,DU,NR by  at 12/8/2022 1420    Comment: Goals, POC                   Point: Body mechanics (Done)     Learning Progress Summary           Patient Acceptance, E,TB, VU,NR by ST at 12/20/2022 0757    Acceptance, E,D, VU,DU,NR by DP at 12/16/2022 0811    Acceptance, E,D, VU,DU by DP at 12/15/2022 1606    Acceptance, E,D, VU,DU,NR by DP at 12/15/2022 1147    Comment: transfer training on this date    Acceptance, E, NR by  at 12/13/2022 0923    Acceptance, E, VU by WN at 12/12/2022 2327    Acceptance, E, VU by WN at 12/12/2022 0155    Acceptance, E, VU,NR by  at 12/10/2022 1035    Acceptance, E, VU by CB at 12/8/2022 1204                   Point: Precautions (Done)     Learning Progress Summary           Patient Acceptance, E,TB, VU,NR by ST at 12/20/2022 0757    Acceptance, E,D, VU,DU,NR by DP at 12/16/2022 0811    Acceptance, E,D, VU,DU by DP at 12/15/2022 1606    Acceptance, E,D, VU,DU,NR by DP at 12/15/2022 1147    Comment: transfer training on this date     Acceptance, E, NR by  at 12/13/2022 0923    Acceptance, E, VU by WN at 12/12/2022 2327    Acceptance, E, VU by WN at 12/12/2022 0155    Acceptance, E, VU,NR by  at 12/10/2022 1035    Acceptance, E, VU by CB at 12/8/2022 1204                               User Key     Initials Effective Dates Name Provider Type Discipline    LB 06/16/21 -  Maryanne Oleary, PT Physical Therapist PT    LH 06/16/21 -  Radha Ramírez, PT Physical Therapist PT    JK 06/16/21 -  Samantha Thompson, PT Physical Therapist PT    KP 06/16/21 -  Anita Serrano, PT Physical Therapist PT    WN 08/23/22 -  Doc Park, RN Registered Nurse Nurse    DP 08/24/21 -  Dillan Calderon, PT Physical Therapist PT    CB 11/10/22 -  Mackenzie Hopkins CCC-SLP Speech and Language Pathologist SLP    ST 09/22/22 -  Ariana Allan, PT Physical Therapist PT                PT Recommendation and Plan                          Time Calculation:      PT Charges     Row Name 12/20/22 1545 12/20/22 1152          Time Calculation    Start Time 1430  -ST 1000  -ST     Stop Time 1500  -ST 1030  -ST     Time Calculation (min) 30 min  -ST 30 min  -ST     PT Received On 12/20/22  -ST 12/20/22  -ST     PT - Next Appointment 12/21/22  -ST 12/21/22  -ST           User Key  (r) = Recorded By, (t) = Taken By, (c) = Cosigned By    Initials Name Provider Type    ST Ariana Allan, PT Physical Therapist                Therapy Charges for Today     Code Description Service Date Service Provider Modifiers Qty    28424724159 HC GAIT TRAINING EA 15 MIN 12/20/2022 Ariana Allan, PT GP 2    21781961960 HC PT NEUROMUSC RE EDUCATION EA 15 MIN 12/20/2022 Ariana Allan, PT GP 1    34037230812 HC PT THERAPEUTIC ACT EA 15 MIN 12/20/2022 Ariana Allan, PT GP 1                   Ariana Allan PT  12/20/2022

## 2022-12-20 NOTE — THERAPY TREATMENT NOTE
Inpatient Rehabilitation - Occupational Therapy Treatment Note    [facility]     Patient Name: Christie Avendaño  : 1965  MRN: [id number]    Today's Date: 2022                 Admit Date: 2022         ICD-10-CM ICD-9-CM   1. Impaired functional mobility, balance, gait, and endurance  Z74.09 V49.89   2. 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)   • Non-small cell lung cancer, right (HCC)   • Renal mass, right   • SIRS (systemic inflammatory response syndrome) (HCC)   • Hyperlipidemia   • Malignant melanoma of right lower extremity including hip (HCC)   • High grade glioma    • 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)   • Glioma (HCC)       Past Medical History:   Diagnosis Date   • Arthritis    • Asthma    • BRCA2 positive    • Diabetes mellitus (HCC)    • H/O Liver masses 2017    x3   • H/O Lung masses 2017    1 in right lower lobe and 1 in the left infrahilar location   • H/O Ovarian cyst    • H/O Right adrenal mass (CMS/HCC) 2017   • Lung cancer (HCC)    • Melanoma (HCC)     right foot   • PONV (postoperative nausea and vomiting)    • Thyroid disease        Past Surgical History:   Procedure Laterality Date   • APPENDECTOMY     • BRAIN BIOPSY Right 2022    Procedure: Right frontal stereotactic needle brain biopsy;  Surgeon: Manuel Thompson MD;  Location: [facility];  Service: Neurosurgery;  Laterality: Right;   • BREAST IMPLANT SURGERY Bilateral    • CHOLECYSTECTOMY     • HYSTERECTOMY     • MASTECTOMY  Bilateral    • OVARIAN CYST SURGERY     • THORACOSCOPY VIDEO ASSISTED WITH LOBECTOMY Right 10/9/2020    Procedure: BRONCHOSCOPY, THORACOSCOPY VIDEO ASSISTED WITH ANTERIOR BASAL SEGMENTECTOMY, INTERCOSTAL NERVE BLOCK;  Surgeon: Flaca Crisostomo MD;  Location: [facility] MAIN OR;  Service: Thoracic;  Laterality: Right;   • TONSILLECTOMY               IRF OT ASSESSMENT FLOWSHEET (last 12 hours)     IRF OT Evaluation and Treatment     Row Name 12/20/22 1100          OT Time and Intention    Document Type daily treatment  -CC     Mode of Treatment occupational therapy  -CC     Patient Effort good  -CC     Row Name 12/20/22 1100          Pain Assessment    Pretreatment Pain Rating 0/10 - no pain  -CC     Posttreatment Pain Rating 0/10 - no pain  -CC     Row Name 12/20/22 1100          Cognition/Psychosocial    Affect/Mental Status (Cognition) flat/blunted affect  -CC     Orientation Status (Cognition) oriented x 4  -CC     Follows Commands (Cognition) follows one-step commands;follows two-step commands;delayed response/completion;increased processing time needed  -CC     Personal Safety Interventions fall prevention program maintained;nonskid shoes/slippers when out of bed;gait belt  -CC     Attention Deficit (Cognition) concentration;distractible in noisy environment  -CC     Row Name 12/20/22 1100          Upper Body Dressing    [place] Level (Upper Body Dressing) upper body dressing skills;doff;don;pull over garment;bra/undergarment;clothes fastener management;minimum assist (75% or more patient effort)  -CC     Position (Upper Body Dressing) supported sitting  -CC     Set-up Assistance (Upper Body Dressing) obtain clothing  -CC     Comment (Upper Body Dressing) assist hooking bra  -CC     Row Name 12/20/22 1100          Lower Body Dressing    [place] Level (Lower Body Dressing) doff;don;pants/bottoms;socks;set up;verbal cues;minimum assist (75% patient effort)  -CC     Position (Lower Body Dressing) supported  sitting;supported standing  -     Set-up Assistance (Lower Body Dressing) obtain clothing  -CC     Row Name 12/20/22 1100          Grooming    [place] Level (Grooming) grooming skills;deodorant application;oral care regimen;wash face, hands;set up;standby assist  -CC     Position (Grooming) sink side;supported sitting  -CC     Comment (Grooming) assist braiding and pulligm up hair  -CC     Row Name 12/20/22 1100          Bed Mobility    Supine-Sit [place] (Bed Mobility) minimum assist (75% patient effort)  -CC     Row Name 12/20/22 1100          Bed-Chair Transfer    Bed-Chair [place] (Transfers) verbal cues;minimum assist (75% patient effort)  -     Assistive Device (Bed-Chair Transfers) wheelchair  -CC     Row Name 12/20/22 1100          Sit-Stand Transfer    Sit-Stand [place] (Transfers) set up;verbal cues;contact guard  -     Assistive Device (Sit-Stand Transfers) wheelchair  -     Row Name 12/20/22 1100          Stand-Sit Transfer    Stand-Sit [place] (Transfers) set up;verbal cues;contact guard  -CC     Assistive Device (Stand-Sit Transfers) wheelchair  -     Row Name 12/20/22 1100          Positioning and Restraints    Pre-Treatment Position sitting in chair/recliner  -CC     Post Treatment Position wheelchair  -CC     In Wheelchair sitting;with PT;exit alarm on  -           User Key  (r) = Recorded By, (t) = Taken By, (c) = Cosigned By    Initials Name Effective Dates    CC Yissel Goel, OTR 06/16/21 -                  Occupational Therapy Education     Title: PT OT SLP Therapies (In Progress)     Topic: Occupational Therapy (Done)     Point: ADL training (Done)     Description:   Instruct learner(s) on proper safety adaptation and remediation techniques during self care or transfers.   Instruct in proper use of assistive devices.              Learning Progress Summary           Patient Acceptance, E, VU by JACK at 12/12/2022 5543    Acceptance, E, VU by WN at  12/12/2022 0155    Acceptance, E, VU by CB at 12/8/2022 1204    Acceptance, E, VU,NR by CE at 12/8/2022 1107    Comment: fall prevention, DME including w/c and shower chair                   Point: Home exercise program (Done)     Description:   Instruct learner(s) on appropriate technique for monitoring, assisting and/or progressing therapeutic exercises/activities.              Learning Progress Summary           Patient Acceptance, E, VU by WN at 12/12/2022 2327    Acceptance, E, VU by WN at 12/12/2022 0155    Acceptance, E, VU by CB at 12/8/2022 1204    Acceptance, E, VU,NR by CE at 12/8/2022 1107    Comment: fall prevention, DME including w/c and shower chair                   Point: Precautions (Done)     Description:   Instruct learner(s) on prescribed precautions during self-care and functional transfers.              Learning Progress Summary           Patient Acceptance, E, VU by WN at 12/12/2022 2327    Acceptance, E, VU by WN at 12/12/2022 0155    Acceptance, E, VU by CB at 12/8/2022 1204    Acceptance, E, VU,NR by CE at 12/8/2022 1107    Comment: fall prevention, DME including w/c and shower chair                   Point: Body mechanics (Done)     Description:   Instruct learner(s) on proper positioning and spine alignment during self-care, functional mobility activities and/or exercises.              Learning Progress Summary           Patient Acceptance, E, VU by WN at 12/12/2022 2327    Acceptance, E, VU by WN at 12/12/2022 0155    Acceptance, E, VU by CB at 12/8/2022 1204    Acceptance, E, VU,NR by CE at 12/8/2022 1107    Comment: fall prevention, DME including w/c and shower chair                               User Key     Initials Effective Dates Name Provider Type Discipline    WN 08/23/22 -  Doc Park, RN Registered Nurse Nurse    CB 11/10/22 -  Mackenzie Hopkins CCC-SLP Speech and Language Pathologist SLP    CE 10/17/22 -  Portia Esqueda OT Occupational Therapist OT                     OT Recommendation and Plan                         Time Calculation:      Time Calculation- OT     Row Name 12/20/22 0930             Time Calculation- OT    OT Start Time 0930  -CC      OT Stop Time 1000  -CC      OT Time Calculation (min) 30 min  -CC            User Key  (r) = Recorded By, (t) = Taken By, (c) = Cosigned By    Initials Name Provider Type    CC Yissel Goel OTR Occupational Therapist              Therapy Charges for Today     Code Description Service Date Service Provider Modifiers Qty    13435655874 HC OT SELF CARE/MGMT/TRAIN EA 15 MIN 12/19/2022 Yissel Goel OTR GO 3    24149983156 HC OT NEUROMUSC RE EDUCATION EA 15 MIN 12/19/2022 Yissel Goel OTR GO 2    46297968207 HC OT SELF CARE/MGMT/TRAIN EA 15 MIN 12/20/2022 Yissel Goel OTR GO 2                   SHARON Vargas  12/20/2022

## 2022-12-20 NOTE — PROGRESS NOTES
Inpatient Rehabilitation Plan of Care Note    Plan of Care  Care Plan Reviewed - Updates as Follows    Psychosocial    [RN] Coping/Adjustment(Active)  Current Status(12/20/2022): At risk for poor coping d/t recent medical  conditions.  Weekly Goal(12/22/2022): Identify progress in functional status.  Discharge Goal: Demonstrates healthy coping strategies.    Performed Intervention(s)  Support/peer groups.  Verbalizes needs and concerns.  Medication.      Safety    [RN] Potential for Injury(Active)  Current Status(12/20/2022): At risk for falls d/t history of falls. 12/10 Pt  found on floor in BR, states she was trying to pull her pants up, did not call  for assist like she said she would. No apparent injury. Blue armband placed.  Weekly Goal(12/22/2022): Pt will use call light, no further attempts to transfer  self.  Discharge Goal: Patient and family will be aware of risk of fall and safety in  home setting.    Performed Intervention(s)  Items w/i reach.  Safety rounds.  Bed and chair alarm. Blue armband  Falls precautions.      Sphincter Control    [RN] Bladder Management(Active)  Current Status(12/20/2022): Continent of bladder. Occasional urge incont.  Weekly Goal(12/22/2022): Remain continent of bladder.  Discharge Goal: Goes home continent of bladder.    [RN] Bowel Management(Active)  Current Status(12/20/2022): Continent of bowel.  Weekly Goal(12/22/2022): Remain continent of bowel.  Discharge Goal: Go home continent of bowel.    Performed Intervention(s)  Offer restroom during hourly rounding.  Encourage fluid intake.  Monitor Is and Os.  Timed voids.  Encourage appropriate diet.    Signed by: Cierra Rivera RN

## 2022-12-20 NOTE — NURSING NOTE
Diabetes Education    Patient Name:  Christie Avendaño  YOB: 1965  MRN: [id number]  Admit Date:  12/7/2022    Attempted to see patient and spouse for 3pm scheduled education/training session.  Patient and spouse not available due to schedule change of FC.  Education/training need discussed briefly.  Staff RN to call to reschedule with diabetes educator.      Electronically signed by:  Jerrica Singleton RN, CLIF BISHOP  12/20/22 15:18 EST

## 2022-12-20 NOTE — PROGRESS NOTES
[facility] Progress Note      Patient Name: Christie Avendaño  : 1965  MRN: [id number]  Primary Care Physician:  Tiffany Holloway MD  Date of admission: 2022    Subjective     Interval History:   Follow-up hyponatremia    The patient is feeling better, denies any chest pain or shortness of air, no orthopnea or PND, no nausea or vomiting, no dysuria or gross hematuria, no bladder outlet symptoms, no lightheadedness.      Review of Systems:   As noted above    Objective     Vitals:   Temp:  [97.8 °F (36.6 °C)-98.3 °F (36.8 °C)] 97.8 °F (36.6 °C)  Heart Rate:  [60-74] 62  Resp:  [18-20] 20  BP: ()/(61-85) 161/85    Intake/Output Summary (Last 24 hours) at 2022 1009  Last data filed at 2022 0608  Gross per 24 hour   Intake --   Output 2200 ml   Net -2200 ml       Physical Exam:    General Appearance: alert, oriented x 3, no acute distress   Skin: warm and dry  HEENT: oral mucosa normal, nonicteric sclera  Neck: supple, no JVD  Lungs: CTA, unlabored breathing effort  Heart: RRR, normal S1 and S2, no S3, no rub  Abdomen: soft, nontender, nondistended, normoactive  : no palpable bladder  Extremities: no edema, cyanosis or clubbing  Neuro: normal speech and mental status     Scheduled Meds:     [START ON 2022] buPROPion, 100 mg, Oral, Q12H  calcium 500 mg vitamin D 5 mcg (200 UT), 1 tablet, Oral, BID  dexamethasone, 4 mg, Oral, Q8H  enoxaparin, 40 mg, Subcutaneous, Q12H  escitalopram, 5 mg, Oral, Daily  gabapentin, 100 mg, Oral, TID  insulin glargine, 40 Units, Subcutaneous, QAM  insulin lispro, 0-14 Units, Subcutaneous, TID AC  insulin lispro, 8 Units, Subcutaneous, TID With Meals  latanoprost, 1 drop, Both Eyes, Nightly  levETIRAcetam, 500 mg, Oral, BID  levothyroxine, 150 mcg, Oral, Q AM  lisinopril, 5 mg, Oral, Daily  magnesium oxide, 400 mg, Oral, Daily  ondansetron, 8 mg, Oral, Q24H  pantoprazole, 40 mg, Oral, Q AM  polyethylene glycol, 17 g, Oral,  Daily  propranolol, 20 mg, Oral, Q8H  sulfamethoxazole-trimethoprim, 1 tablet, Oral, Once per day on Mon Wed Fri  temozolomide, 140 mg, Oral, Nightly   And  temozolomide, 20 mg, Oral, Nightly   And  temozolomide, 5 mg, Oral, Nightly  valACYclovir, 500 mg, Oral, Q24H      IV Meds:        Results Reviewed:   I have personally reviewed the results from the time of this admission to 12/20/2022 10:09 EST     Results from last 7 days   Lab Units 12/20/22  0833 12/19/22  0552 12/18/22  0852   SODIUM mmol/L 129* 126* 125*   POTASSIUM mmol/L 5.3* 4.8 5.1   CHLORIDE mmol/L 93* 91* 89*   CO2 mmol/L 28.0 27.8 25.8   BUN mg/dL 28* 30* 29*   CREATININE mg/dL 0.85 0.82 0.86   CALCIUM mg/dL 9.7 9.4 9.2   BILIRUBIN mg/dL 0.6 0.6 0.7   ALK PHOS U/L 161* 163* 159*   ALT (SGPT) U/L 193* 220* 244*   AST (SGOT) U/L 32 34* 40*   GLUCOSE mg/dL 148* 190* 319*       Estimated Creatinine Clearance: 89.1 mL/min (by C-G formula based on SCr of 0.85 mg/dL).    Results from last 7 days   Lab Units 12/20/22  0833 12/19/22  0552   MAGNESIUM mg/dL 2.3 2.2   PHOSPHORUS mg/dL 4.0 3.1       Results from last 7 days   Lab Units 12/20/22  0833 12/19/22  0552 12/18/22  1712   URIC ACID mg/dL 5.5 5.4 5.9*       Results from last 7 days   Lab Units 12/19/22  0552 12/15/22  0607   WBC 10*3/mm3 9.55 12.40*   HEMOGLOBIN g/dL 12.6 12.7   PLATELETS 10*3/mm3 175 181             Assessment / Plan     ASSESSMENT:  -Hyponatremia appears to be euvolemic with this history of multiple malignancies concern about SIADH especially with the stage IV glioblastoma.  But the patient also admitted increased water intake and has been on thyroid supplement after her thyroidectomy for her thyroid cancer also has been on SSRI.  The urine sodium and chloride were not done yesterday I will reorder them again has evidence of presence of ADH the uric acid is somewhat elevated that would be against SIADH currently following fluid restriction, sodium today up to 129 improving with fluid  restriction the urine sodium and chloride all the other parameters suggestive of SIADH that will be tissue is elevated uric acid  Difficult to collect SIADH.  -Prior right partial nephrectomy for renal cell cancer  -Adenocarcinoma of the lung status postresection  -Melanoma of the left heel status post resect  -Bilateral mastectomy because of BRCA2 mutation  -Hyperkalemia, potassium 5.3 patient currently on lisinopril.    PLAN:  -Continue fluid restriction  -Discontinue lisinopril and reevaluate potassium level  -Surveillance labs    Thank you for involving us in the care of Christie Avendaño.  Please feel free to call with any questions.    Golden Gonzalez MD  12/20/22  10:09 [facility]    [facility]  [phone]    Please note that portions of this note were completed with a voice recognition program.

## 2022-12-20 NOTE — PROGRESS NOTES
LOS: 13 days   Patient Care Team:  Tiffany Holloway MD as PCP - General (Internal Medicine)  Mathew Collins Jr., MD as Consulting Physician (Hematology and Oncology)  Dorian Sabillon MD as Surgeon (General Surgery)  Thang Dumont, JOSEFA (Optometry)  Lamine Cantu DO as Referring Physician (Family Medicine)  Hali Rolle MD as Gynecologist (Gynecology)      HERON MAGANA  1965    Diagnoses    1. IMPAIRED FUNCTIONAL MOBILITY, BALANCE, GAIT, AND ENDURANCE       ADMITTING DIAGNOSIS:  High-grade glioma-3.2 cm ring-enhancing right supratentorial mass-right thalamic-with mass-effect and left midline shift  Status post stereotactic brain biopsy on November 28, 2022  Left side weakness/incoordination/impaired mobilityHigh-grade glioma-3.2 cm ring-enhancing right supratentorial mass-right thalamic-with mass-effect and left midline shift  Status post stereotactic brain biopsy on November 28, 2022  Left side weakness/incoordination/impaired mobility  Diabetes      Subjective   Continues to work on intentional left side more control on the left side.  Headache is better.  Feels propranolol has been helpful.  Continues with radiation therapy     Objective     Vitals:    12/20/22 0608   BP: 161/85   Pulse: 62   Resp: 20   Temp: 97.8 °F (36.6 °C)   SpO2: 99%       PHYSICAL EXAM:   MENTAL STATUS -  AWAKE / ALERT  HEENT-right scalp incision with staples removed.  Clean dry and intact  SCLERAE ANICTERIC, CONJUNCTIVAE PINK, EARS UNREMARKABLE EXTERNALLY  LUNGS - CTA, NO WHEEZES, RALES OR RHONCHI  HEART- RRR, NO RUB, MURMUR, OR GALLOP  ABD - NORMOACTIVE BOWEL SOUNDS, SOFT, NT.     EXT - NO EDEMA OR CYANOSIS  NEURO -oriented       Speech was fluent with slightly slow rate of speech.  Extraocular movements intact.  No dysarthria.  MOTOR EXAM - RUE/RLE 5/5.   LUE/LLE 5/5.   Impaired motor control in the left upper extremity and left lower extremity,    MEDICATIONS  Scheduled Meds:[START ON 12/26/2022] buPROPion, 100 mg,  Oral, Q12H  calcium 500 mg vitamin D 5 mcg (200 UT), 1 tablet, Oral, BID  dexamethasone, 4 mg, Oral, Q8H  enoxaparin, 40 mg, Subcutaneous, Q12H  escitalopram, 5 mg, Oral, Daily  gabapentin, 100 mg, Oral, TID  insulin glargine, 40 Units, Subcutaneous, QAM  insulin lispro, 0-14 Units, Subcutaneous, TID AC  insulin lispro, 8 Units, Subcutaneous, TID With Meals  latanoprost, 1 drop, Both Eyes, Nightly  levETIRAcetam, 500 mg, Oral, BID  levothyroxine, 150 mcg, Oral, Q AM  lisinopril, 5 mg, Oral, Daily  magnesium oxide, 400 mg, Oral, Daily  ondansetron, 8 mg, Oral, Q24H  pantoprazole, 40 mg, Oral, Q AM  polyethylene glycol, 17 g, Oral, Daily  propranolol, 20 mg, Oral, Q8H  sulfamethoxazole-trimethoprim, 1 tablet, Oral, Once per day on Mon Wed Fri  temozolomide, 140 mg, Oral, Nightly   And  temozolomide, 20 mg, Oral, Nightly   And  temozolomide, 5 mg, Oral, Nightly  valACYclovir, 500 mg, Oral, Q24H      Continuous Infusions:   PRN Meds:.•  calcium carbonate  •  dextrose  •  dextrose  •  famotidine  •  glucagon (human recombinant)  •  influenza vaccine  •  nitroglycerin  •  ondansetron **OR** ondansetron  •  senna-docusate sodium      RESULTS  Glucose   Date/Time Value Ref Range Status   12/20/2022 0618 140 (H) 70 - 130 mg/dL Final     Comment:     Meter: [id number] : [id number] Awafritz Pacheco NA   12/19/2022 2026 206 (H) 70 - 130 mg/dL Final     Comment:     Meter: [id number] : [id number] Nabeel Pikeid NA   12/19/2022 1627 141 (H) 70 - 130 mg/dL Final     Comment:     Meter: [id number] : [id number] Chente Fuentes RN   12/19/2022 1140 450 (C) 70 - 130 mg/dL Final     Comment:     RN Notified R and V Meter: [id number] : [id number] Paul Clifton NA   12/19/2022 0633 216 (H) 70 - 130 mg/dL Final     Comment:     Meter: [id number] : [id number] Narendra Casper [facility]   12/18/2022 2049 215 (H) 70 - 130 mg/dL Final     Comment:     Meter: [id number] : [id number] Narendra Casper [facility]   12/18/2022 1600 307 (H) 70  - 130 mg/dL Final     Comment:     Meter: [id number] : [id number] Ye ACUÑA   12/18/2022 1146 206 (H) 70 - 130 mg/dL Final     Comment:     Meter: [id number] : [id number] Yuriy Lane CNA     Results from last 7 days   Lab Units 12/19/22  0552 12/15/22  0607   WBC 10*3/mm3 9.55 12.40*   HEMOGLOBIN g/dL 12.6 12.7   HEMATOCRIT % 36.6 38.2   PLATELETS 10*3/mm3 175 181     Results from last 7 days   Lab Units 12/19/22  0552 12/18/22  0852 12/17/22  0542   SODIUM mmol/L 126* 125* 127*   POTASSIUM mmol/L 4.8 5.1 4.8   CHLORIDE mmol/L 91* 89* 92*   CO2 mmol/L 27.8 25.8 24.6   BUN mg/dL 30* 29* 24*   CREATININE mg/dL 0.82 0.86 0.76   CALCIUM mg/dL 9.4 9.2 9.4   BILIRUBIN mg/dL 0.6 0.7 0.7   ALK PHOS U/L 163* 159* 156*   ALT (SGPT) U/L 220* 244* 273*   AST (SGOT) U/L 34* 40* 44*   GLUCOSE mg/dL 190* 319* 167*       ASSESSMENT and PLAN    Glioma (HCC)    High-grade glioma-3.2 cm ring-enhancing right supratentorial mass-right thalamic-with mass-effect and left midline shift  Status post stereotactic brain biopsy on November 28, 2022  Radiation therapy adjusting schedule to the afternoon around 3 PM so as not to interfere with her other physical/occupational/speech therapy sessions    Headache-Dec 9: will add magnesium 400mg daily for headaches.  She reports intolerance to gabapentin, intolerances to opioids.  Tramadol comes up as contraindicated with history of anaphylactic secondary to morphine.  Valproate for headache control not an option with her liver changes  Dec 13: patient reports intolerance to gabapentin was drowsiness on 300 mg dose. Agreeable to try 100 mg tid for HA.   December 16-Propranolol added for baseline headache control but not able to receive first 2 doses today secondary to parameters to hold for pulse less than 60 or blood pressure less than 110.  Lisinopril dose decreased which may allow some range to receive propranolol.  Continues on low-dose gabapentin.  Has history of allergy  with anaphylaxis to opioid-morphine-with hives and throat swelling  December 17-received first dose of propanolol this morning  December 18-headache better on the weekend off of radiation therapy.  Has not received propranolol secondary to parameters since yesterday morning  Dec 19 - titrate up on propranolol to 20 mg q 8 hours.  Headache trending better.     Left side weakness/incoordination/impaired mobility     Radiation therapy/Temodar to start December 12, 2022  Decadron 4 mg every 8 hourly  Dec 9: Oncology aware of increase of liver enzymes. They are following along   December 12: Radiation therapy and Temodar initiating today  Dec 14: Temodar Radiation, fraction 3 today.   Dec 15: Today is day 4 of Temodar Radiation. Per oncology no clinical signs of progression.      Leukocytosis-steroid/stress response.  Incision healing.      Elevated LFTs  Dec 8: Labs significant for ALT 1049 (143 11/28),  (71 11/28), Alk phos 190 (96 11/28). Taking minimal tylenol and statin is a home med- discontinued. Consulted IM who also consulted pharmacy and GI. They also decreased valtrex to 1g daily (she was taking daily prophylactic valtrex 2g bid). Awaiting liver ultrasound. CPK normal.   Dec 9- Ongoing workup. Liver ultrasound- remarkable for a small hepatic cyst otherwise negative  Per GI -[ Obtain duplex hepatic ultrasound to further assess for thrombosis  Obtain GEMMA, IgG profile, ASMA, EBV, CMV, HSV, VZV to rule out autoimmune versus viral cause to elevation.].   Patient reviewed with internal medicine and medical oncology  December 12-transaminases elevated but trending better.   Portal hepatic duplex unremarkable  Dec 14: Continued decrease of LFTs.   December 17-continue to trend better  Dec 19: Alk phos 163(159 12/18), (244 12/18), AST 34(34 12/18), continued slow downward trend    Diabetes mellitus-Trulicity at home.  On Lantus/Humalog  December 13-Lantus increased to 28 units a.m.  On Humalog 8 units 3  times daily with meals  Dec 14: Continued elevation of blood sugars, will ask IM to provide insight.   Dec 15: Lantus increased to 35 daily yesterday with improved readings.   Dec 19: First dose of lantus 40 today.  this am.      Hyponatremia   Dec 15: Na 128 today, 131 yesterday. IM ordered serum and urine osmolality and urine sodium. Deferring to nephrology consult to us. Will repeat Cmp in am.   Dec 16: Na 130 today, urine osmols 495 and urine Na 80. Continue to monitor.  Felt related to hyperglycemia per internal medicine note  December 17-sodium 127 today  December 18-sodium 125.  Nephrology evaluating.  Transitioning escitalopram to bupropion.  TSH low at 0.03  Dec 19: Nephrology following. Continue fluid restriction. Urine sodium and chloride not performed yesterday so reordered.     Seizure prophylaxis-Keppra     DVT prophylaxis-Lovenox/SCDs     GI prophylaxis-protein pump inhibitor  Add calcium and vitamin D with ongoing steroids     Chronic Valtrex-dose decreased to 1000 mg daily     Germline BRCA2 and GEORGES mutations.  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     Depression-Lexapro  December 18-transition to Lexapro 5 mg daily for 7 days then start bupropion 100 mg twice daily to possibly help with hyponatremia     Hypothyroidism-on replacement  December 18-TSH equals 0.030     Hypertension   Dec 16: Will decrease lisinopril to 10mg from 20mg due to addition of propranolol for headaches. Holding parameters in place for propranolol of SBP <110 and HR <60  December 18-blood pressure 84/48-had not received propranolol since yesterday morning.  Will decrease lisinopril to 5 mg daily to allow more range for giving propanolol for headache  Dec 20 - BP higher in the  mornings but lower at noon time, continue Lisinopril at 5 mg daily for now and increase propranolol to 20 mg q 8 hours.     TEAM CONF - DEC 13 - BED SBA. TRANSFERS MIN CTG. GAIT 160 FEET CTG. LEANS LEFT. TOILET TRANSFERS CTG. SHOWER TRANSFERS CTG MIN. LBD MIN. UBD MIN. BATH MIN. GROOMING SBA. TOILETING CTG MIN. IMPAIRED ATTENTION.IMPAIRED EXECUTIVE FUNCTION. FATIGUES WITH COGNITIVE TASKS. CONTINENT BOWEL AND BLADDER. SCALP INCISION HEALING.   XRT/TEMODAR. ELEVATED TRANSAMINASES TREND BETTER.   ELOS - 2 WEEKS    TEAM CONF - DEC 20 - BED MIN. TRANSFERS MIN CTG GAIT 160 FEET CTG TO OCC MIN, ROLLATOR. TOILET TRANSFERS CTG. SHOWER TRANSFERS CTG MIN. BATH MIN. LBD MIN MOD. UBD MIN. EATING SET UP. GROOMING SBA MIN. TOILETING MIN. COORDINATION WORSE ON LEFT ARM AND INATTENTION LEFT ARM, VISUAL PERCEPTUAL DEFICITS. DEFICITS WITH ATTENTION TO DETAIL, COMPLEX REASONING. SKIN INTACT. SCALP INCISION HEALING.  ELOS - Friday AS WISHES HOME FOR HOLIDAY.              During rounds, used appropriate personal protective equipment including mask and gloves.  Additional gown if indicated.  Mask used was standard procedure mask. Appropriate PPE was worn during the entire visit.  Hand hygiene was completed before and after.

## 2022-12-20 NOTE — PROGRESS NOTES
Family conference held today with patient, , father, PT, OT, ST, NSG, and SW. Patient's daughters were on conference call. Discussed progress and d/c recommendations.  PT reported patient transfers patient at CG/Min assist level with transfers and walking with rolling walker. Patient does drag left leg a little bit in afternoon due to fatigue. PT stated morning sessions are usually better than afternoon. PT feels rolling walker vs rollator would be better to use at home as it slows her down some. Patient has practiced going up/down 2 steps. PT recommended patient use rolling walker at home and have CGA with all mobility at home.    OT reported patient fluctuates due to fatigue, but has had good day today. Patient transfers to shower chair with CG and verbal cues for left leg. Patient able to do bathing but does need assistance with washing hair and CGA if stands. Patient needs Min assist with UE dressing (bra) and verbal cues for left arm. OT reported most days patient can thread pants and requires Min assist to stand to pull up pants. OT has put elastic shoe laces in shoes. Patient was able to tie shoe today. Patien does grooming seated with SB/Min assist. OT has been working on fine motor coordination for left hand and strengthening. Patient tends to neglect left hand and needs verbal cues. OT has also worked on visual perceptual tasks. Patient needs cues to find things on the left. OT recommended patient have CGA also when standing at home for any self care tasks.     ST reported working on attention tasks to left. Working on complex reasoning, thought flexibility and organization tasks. Patient required mod cues for medicine management task and for problem solving tasks. Patient does okay with memory tasks. ST recommended assistance with medication and finance management tasks at home.     NSG reviewed medications. Diabetic educator to see patient and  prior to d/c. Patient on Lantus and insulin  with meals and sliding scale. Patient currently getting radiation M-F and taking oral chemo medication. Discussed appointments with radiation oncologist, oncologist, neurosurgery, and PCP. Patient continent of bowel and bladder. Discussed safety for home.     Team recommended home health PT, OT, ST. Discussed options--will refer to [facility].     Discussed equipment for home. Patient has rollator but PT to order regular rolling walker for use at home. Patient has shower seat. OT to order BSC. PT also recommended transport wheelchair.     scheduled to come in for family teaching on Thursday, 12/22 at 1:30 p.m. Will be here for afternoon therapies.      stated they are meeting today with Dr. Collins.  trying to plan ahead for care at home. Daughters will be home for [place] and plan to stay the week after.  plans to be off the first week of January and stated this will give them 2 weeks to see what they need and get other caregivers in place.   Discussed oncology social worker as resource after d/c. Will email Minal to see patient and family for follow up.     D/C plan is home with  on Friday, 12/23. Will assist with plans.

## 2022-12-21 ENCOUNTER — TREATMENT (OUTPATIENT)
Dept: RADIATION ONCOLOGY | Facility: HOSPITAL | Age: 57
End: 2022-12-21

## 2022-12-21 ENCOUNTER — RADIATION ONCOLOGY WEEKLY ASSESSMENT (OUTPATIENT)
Dept: RADIATION ONCOLOGY | Facility: HOSPITAL | Age: 57
End: 2022-12-21

## 2022-12-21 DIAGNOSIS — C71.9 GLIOMA: Primary | ICD-10-CM

## 2022-12-21 DIAGNOSIS — C71.9 HIGH GRADE GLIOMA NOT CLASSIFIABLE BY WHO CRITERIA: Primary | ICD-10-CM

## 2022-12-21 LAB
ALBUMIN SERPL-MCNC: 4 G/DL (ref 3.5–5.2)
ALBUMIN/GLOB SERPL: 2 G/DL
ALP SERPL-CCNC: 160 U/L (ref 39–117)
ALT SERPL W P-5'-P-CCNC: 203 U/L (ref 1–33)
ANION GAP SERPL CALCULATED.3IONS-SCNC: 8 MMOL/L (ref 5–15)
AST SERPL-CCNC: 43 U/L (ref 1–32)
BILIRUB SERPL-MCNC: 0.7 MG/DL (ref 0–1.2)
BUN SERPL-MCNC: 26 MG/DL (ref 6–20)
BUN/CREAT SERPL: 30.6 (ref 7–25)
CALCIUM SPEC-SCNC: 9.5 MG/DL (ref 8.6–10.5)
CHLORIDE SERPL-SCNC: 92 MMOL/L (ref 98–107)
CO2 SERPL-SCNC: 30 MMOL/L (ref 22–29)
CREAT SERPL-MCNC: 0.85 MG/DL (ref 0.57–1)
EGFRCR SERPLBLD CKD-EPI 2021: 79.5 ML/MIN/1.73
GLOBULIN UR ELPH-MCNC: 2 GM/DL
GLUCOSE BLDC GLUCOMTR-MCNC: 126 MG/DL (ref 70–130)
GLUCOSE BLDC GLUCOMTR-MCNC: 130 MG/DL (ref 70–130)
GLUCOSE BLDC GLUCOMTR-MCNC: 149 MG/DL (ref 70–130)
GLUCOSE BLDC GLUCOMTR-MCNC: 173 MG/DL (ref 70–130)
GLUCOSE SERPL-MCNC: 140 MG/DL (ref 65–99)
MAGNESIUM SERPL-MCNC: 2.3 MG/DL (ref 1.6–2.6)
PHOSPHATE SERPL-MCNC: 4.1 MG/DL (ref 2.5–4.5)
POTASSIUM SERPL-SCNC: 5.2 MMOL/L (ref 3.5–5.2)
PROT SERPL-MCNC: 6 G/DL (ref 6–8.5)
RAD ONC ARIA COURSE ID: NORMAL
RAD ONC ARIA COURSE INTENT: NORMAL
RAD ONC ARIA COURSE LAST TREATMENT DATE: NORMAL
RAD ONC ARIA COURSE START DATE: NORMAL
RAD ONC ARIA COURSE TREATMENT ELAPSED DAYS: 9
RAD ONC ARIA FIRST TREATMENT DATE: NORMAL
RAD ONC ARIA PLAN FRACTIONS TREATED TO DATE: 8
RAD ONC ARIA PLAN ID: NORMAL
RAD ONC ARIA PLAN PRESCRIBED DOSE PER FRACTION: 2 GY
RAD ONC ARIA PLAN PRIMARY REFERENCE POINT: NORMAL
RAD ONC ARIA PLAN TOTAL FRACTIONS PRESCRIBED: 23
RAD ONC ARIA PLAN TOTAL PRESCRIBED DOSE: 4600 CGY
RAD ONC ARIA REFERENCE POINT DOSAGE GIVEN TO DATE: 16 GY
RAD ONC ARIA REFERENCE POINT DOSAGE GIVEN TO DATE: 16.19 GY
RAD ONC ARIA REFERENCE POINT ID: NORMAL
RAD ONC ARIA REFERENCE POINT ID: NORMAL
RAD ONC ARIA REFERENCE POINT SESSION DOSAGE GIVEN: 2 GY
RAD ONC ARIA REFERENCE POINT SESSION DOSAGE GIVEN: 2.02 GY
SODIUM SERPL-SCNC: 130 MMOL/L (ref 136–145)
URATE SERPL-MCNC: 5.6 MG/DL (ref 2.4–5.7)

## 2022-12-21 PROCEDURE — 63710000001 INSULIN LISPRO (HUMAN) PER 5 UNITS: Performed by: HOSPITALIST

## 2022-12-21 PROCEDURE — 77336 RADIATION PHYSICS CONSULT: CPT | Performed by: RADIOLOGY

## 2022-12-21 PROCEDURE — 25010000002 ENOXAPARIN PER 10 MG: Performed by: PHYSICAL MEDICINE & REHABILITATION

## 2022-12-21 PROCEDURE — 97110 THERAPEUTIC EXERCISES: CPT

## 2022-12-21 PROCEDURE — 63710000001 INSULIN LISPRO (HUMAN) PER 5 UNITS: Performed by: INTERNAL MEDICINE

## 2022-12-21 PROCEDURE — 63710000001 ONDANSETRON PER 8 MG: Performed by: INTERNAL MEDICINE

## 2022-12-21 PROCEDURE — 25010000002 TEMOZOLOMIDE 140 MG CAPSULE: Performed by: INTERNAL MEDICINE

## 2022-12-21 PROCEDURE — 99232 SBSQ HOSP IP/OBS MODERATE 35: CPT | Performed by: INTERNAL MEDICINE

## 2022-12-21 PROCEDURE — 97530 THERAPEUTIC ACTIVITIES: CPT

## 2022-12-21 PROCEDURE — 77386 CHG INTENSITY MODULATED RADIATION TX DLVR COMPLEX: CPT | Performed by: RADIOLOGY

## 2022-12-21 PROCEDURE — 97535 SELF CARE MNGMENT TRAINING: CPT

## 2022-12-21 PROCEDURE — 80053 COMPREHEN METABOLIC PANEL: CPT | Performed by: PHYSICAL MEDICINE & REHABILITATION

## 2022-12-21 PROCEDURE — 63710000001 DEXAMETHASONE PER 0.25 MG: Performed by: PHYSICAL MEDICINE & REHABILITATION

## 2022-12-21 PROCEDURE — 97130 THER IVNTJ EA ADDL 15 MIN: CPT

## 2022-12-21 PROCEDURE — 77386: CPT | Performed by: RADIOLOGY

## 2022-12-21 PROCEDURE — 97112 NEUROMUSCULAR REEDUCATION: CPT

## 2022-12-21 PROCEDURE — 25010000002 TEMOZOLOMIDE PER 5 MG: Performed by: INTERNAL MEDICINE

## 2022-12-21 PROCEDURE — 83735 ASSAY OF MAGNESIUM: CPT | Performed by: INTERNAL MEDICINE

## 2022-12-21 PROCEDURE — 84550 ASSAY OF BLOOD/URIC ACID: CPT | Performed by: INTERNAL MEDICINE

## 2022-12-21 PROCEDURE — 97129 THER IVNTJ 1ST 15 MIN: CPT

## 2022-12-21 PROCEDURE — 82962 GLUCOSE BLOOD TEST: CPT

## 2022-12-21 PROCEDURE — 84100 ASSAY OF PHOSPHORUS: CPT | Performed by: INTERNAL MEDICINE

## 2022-12-21 RX ORDER — INSULIN LISPRO 100 [IU]/ML
9 INJECTION, SOLUTION INTRAVENOUS; SUBCUTANEOUS
Status: DISCONTINUED | OUTPATIENT
Start: 2022-12-21 | End: 2022-12-23 | Stop reason: HOSPADM

## 2022-12-21 RX ADMIN — INSULIN LISPRO 9 UNITS: 100 INJECTION, SOLUTION INTRAVENOUS; SUBCUTANEOUS at 12:02

## 2022-12-21 RX ADMIN — TEMOZOLOMIDE 140 MG: 140 CAPSULE ORAL at 21:36

## 2022-12-21 RX ADMIN — CALCIUM CARBONATE-VITAMIN D TAB 500 MG-200 UNIT 1 TABLET: 500-200 TAB at 21:34

## 2022-12-21 RX ADMIN — SULFAMETHOXAZOLE AND TRIMETHOPRIM 1 TABLET: 800; 160 TABLET ORAL at 08:09

## 2022-12-21 RX ADMIN — ENOXAPARIN SODIUM 40 MG: 100 INJECTION SUBCUTANEOUS at 21:34

## 2022-12-21 RX ADMIN — DEXAMETHASONE 4 MG: 4 TABLET ORAL at 05:11

## 2022-12-21 RX ADMIN — ESCITALOPRAM 5 MG: 5 TABLET, FILM COATED ORAL at 08:09

## 2022-12-21 RX ADMIN — ONDANSETRON HYDROCHLORIDE 8 MG: 8 TABLET, FILM COATED ORAL at 12:02

## 2022-12-21 RX ADMIN — INSULIN LISPRO 3 UNITS: 100 INJECTION, SOLUTION INTRAVENOUS; SUBCUTANEOUS at 12:02

## 2022-12-21 RX ADMIN — INSULIN GLARGINE-YFGN 40 UNITS: 100 INJECTION, SOLUTION SUBCUTANEOUS at 08:08

## 2022-12-21 RX ADMIN — INSULIN LISPRO 9 UNITS: 100 INJECTION, SOLUTION INTRAVENOUS; SUBCUTANEOUS at 08:08

## 2022-12-21 RX ADMIN — GABAPENTIN 100 MG: 100 CAPSULE ORAL at 08:09

## 2022-12-21 RX ADMIN — DEXAMETHASONE 4 MG: 4 TABLET ORAL at 21:34

## 2022-12-21 RX ADMIN — TEMOZOLOMIDE 5 MG: 5 CAPSULE ORAL at 21:36

## 2022-12-21 RX ADMIN — TEMOZOLOMIDE 20 MG: 20 CAPSULE ORAL at 21:35

## 2022-12-21 RX ADMIN — PROPRANOLOL HYDROCHLORIDE 20 MG: 20 TABLET ORAL at 05:11

## 2022-12-21 RX ADMIN — LEVETIRACETAM 500 MG: 500 TABLET, FILM COATED ORAL at 21:34

## 2022-12-21 RX ADMIN — LEVOTHYROXINE SODIUM 150 MCG: 0.15 TABLET ORAL at 05:11

## 2022-12-21 RX ADMIN — POLYETHYLENE GLYCOL 3350 17 G: 17 POWDER, FOR SOLUTION ORAL at 08:09

## 2022-12-21 RX ADMIN — GABAPENTIN 100 MG: 100 CAPSULE ORAL at 14:55

## 2022-12-21 RX ADMIN — DEXAMETHASONE 4 MG: 4 TABLET ORAL at 14:55

## 2022-12-21 RX ADMIN — LEVETIRACETAM 500 MG: 500 TABLET, FILM COATED ORAL at 08:09

## 2022-12-21 RX ADMIN — VALACYCLOVIR HYDROCHLORIDE 500 MG: 500 TABLET, FILM COATED ORAL at 08:09

## 2022-12-21 RX ADMIN — MAGNESIUM OXIDE 400 MG (241.3 MG MAGNESIUM) TABLET 400 MG: TABLET at 08:09

## 2022-12-21 RX ADMIN — LATANOPROST 1 DROP: 50 SOLUTION OPHTHALMIC at 21:34

## 2022-12-21 RX ADMIN — CALCIUM CARBONATE-VITAMIN D TAB 500 MG-200 UNIT 1 TABLET: 500-200 TAB at 08:09

## 2022-12-21 RX ADMIN — PROPRANOLOL HYDROCHLORIDE 20 MG: 20 TABLET ORAL at 21:34

## 2022-12-21 RX ADMIN — PANTOPRAZOLE SODIUM 40 MG: 40 TABLET, DELAYED RELEASE ORAL at 05:11

## 2022-12-21 RX ADMIN — ENOXAPARIN SODIUM 40 MG: 100 INJECTION SUBCUTANEOUS at 09:16

## 2022-12-21 RX ADMIN — GABAPENTIN 100 MG: 100 CAPSULE ORAL at 21:34

## 2022-12-21 NOTE — THERAPY TREATMENT NOTE
Inpatient Rehabilitation - Speech Language Pathology Treatment Note    [facility]     Patient Name: hCristie Avendaño  : 1965  MRN: [id number]    Today's Date: 2022                   Admit Date: 2022       Visit Dx:      ICD-10-CM ICD-9-CM   1. Impaired functional mobility, balance, gait, and endurance  Z74.09 V49.89   2. 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)   • Non-small cell lung cancer, right (HCC)   • Renal mass, right   • SIRS (systemic inflammatory response syndrome) (HCC)   • Hyperlipidemia   • Malignant melanoma of right lower extremity including hip (HCC)   • High grade glioma    • 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)   • Glioma (HCC)       Past Medical History:   Diagnosis Date   • Arthritis    • Asthma    • BRCA2 positive    • Diabetes mellitus (HCC)    • H/O Liver masses 2017    x3   • H/O Lung masses 2017    1 in right lower lobe and 1 in the left infrahilar location   • H/O Ovarian cyst    • H/O Right adrenal mass (CMS/HCC) 2017   • Lung cancer (HCC)    • Melanoma (HCC)     right foot   • PONV (postoperative nausea and vomiting)    • Thyroid disease        Past Surgical History:   Procedure Laterality Date   • APPENDECTOMY     • BRAIN BIOPSY Right 2022    Procedure: Right frontal stereotactic needle brain biopsy;  Surgeon: Manuel Thompson MD;  Location: [facility] MAIN OR;  Service: Neurosurgery;  Laterality: Right;   • BREAST IMPLANT SURGERY Bilateral    • CHOLECYSTECTOMY     • HYSTERECTOMY      • MASTECTOMY Bilateral    • OVARIAN CYST SURGERY     • THORACOSCOPY VIDEO ASSISTED WITH LOBECTOMY Right 10/9/2020    Procedure: BRONCHOSCOPY, THORACOSCOPY VIDEO ASSISTED WITH ANTERIOR BASAL SEGMENTECTOMY, INTERCOSTAL NERVE BLOCK;  Surgeon: Flaca Crisostomo MD;  Location: [facility];  Service: Thoracic;  Laterality: Right;   • TONSILLECTOMY         SLP Recommendation and Plan                                                            SLP EVALUATION (last 72 hours)     SLP SLC Evaluation     Row Name 12/21/22 1253 12/21/22 1045 12/20/22 1200 12/20/22 0830 12/19/22 1200       Communication Assessment/Intervention    Document Type therapy note (daily note)  -SL therapy note (daily note)  -SL therapy note (daily note)  -SR therapy note (daily note)  -SR therapy note (daily note)  -SR    Subjective Information complains of;fatigue  -SL no complaints  -SL no complaints  -SR no complaints  -SR no complaints  -SR    Patient Observations alert;cooperative  -SL alert;cooperative;agree to therapy  -SL alert;cooperative;agree to therapy  -SR alert;cooperative;agree to therapy  -SR alert;cooperative;agree to therapy  -SR    Patient Effort good  -SL good  -SL good  -SR good  -SR good  -SR    Symptoms Noted During/After Treatment fatigue  -SL none  -SL none  -SR none  -SR none  -SR       Pain Scale: Numbers Pre/Post-Treatment    Pretreatment Pain Rating -- -- 0/10 - no pain  -SR -- 0/10 - no pain  -SR    Posttreatment Pain Rating -- -- 0/10 - no pain  -SR -- 0/10 - no pain  -SR    Row Name 12/19/22 1030                   Communication Assessment/Intervention    Document Type therapy note (daily note)  -SR        Subjective Information no complaints  -SR        Patient Observations alert;cooperative;agree to therapy  -SR        Patient Effort good  -SR        Symptoms Noted During/After Treatment none  -SR           Pain Scale: Numbers Pre/Post-Treatment    Pretreatment Pain Rating 0/10 - no pain  -SR        Posttreatment  Pain Rating 0/10 - no pain  -SR              User Key  (r) = Recorded By, (t) = Taken By, (c) = Cosigned By    Initials Name Effective Dates    SL Jemma Bradley, MS CCC-[facility] 06/16/21 -     SR Minal Bennett, CCC-SLP 11/10/22 -                    EDUCATION    The patient has been educated in the following areas:       Cognitive Impairment.             SLP GOALS     Row Name 12/21/22 1254 12/21/22 1045 12/20/22 1200       Attention Goal 1 (SLP)    Progress/Outcomes (Attention Goal 1, SLP) continuing progress toward goal;goal ongoing  -SL -- --    Comment (Attention Goal 1, SLP) attn coding task - matching numbers to target symbol codes x2 stimulus sets- 100% indep  -SL -- --       Memory Skills Goal 1 (SLP)    Progress/Outcomes (Memory Skills Goal 1, SLP) good progress toward goal;goal ongoing  -SL -- --    Comment (Memory Skills Goal 1, SLP) visual recall of picture scenes- 90% indep;  picture to picture object associations- immediate recall- 100% indep  -SL -- --       Organizational Skills Goal 1 (SLP)    Progress/Outcomes (Thought Organization Skills Goal 1, SLP) goal ongoing  -SL -- --    Comment (Thought Organization Skills Goal 1, SLP) organizational activity involving filling in missing letters in words, given categories- 40% indep x2 categories  -SL -- --       Functional Problem Solving Skills Goal 1 (SLP)    Progress/Outcomes (Problem Solving Goal 1, SLP) goal ongoing  -SL -- --    Comment (Problem Solving Goal 1, SLP) sequencing of 6 sentences for stories- 66% indep;  answering ?'s re: order forms- 90% indep  -SL -- --       Executive Functional Skills Goal 1 (SLP)    Improve Executive Function Skills Goal 1 (SLP) -- -- identify strategies, strengths, limitations;organization/planning activity;90%;with minimal cues (75-90%)  -SR    Time Frame (Executive Function Skills Goal 1, SLP) -- -- by discharge  -SR    Progress/Outcomes (Executive Function Skills Goal 1, SLP) goal ongoing  - goal ongoing  - good  "progress toward goal;continuing progress toward goal  -SR    Comment (Executive Function Skills Goal 1, SLP) Provided pt with list of tx apps for use at home to continue cognitive stimulation and recovery  -SL thought flexibility task- altering words via 4 different constraints- difficulty noted for recalling constraint directives with frequent reminders and verbal cues required for task;  75% with min/mod cues  -SL Medication management; patient followed verbal directions and sorted 10 mock medications by day and frequency using pill organizer with 40% acc given no cues; 80% acc given mod cues. Required verbal cue x2 to scan to L side. SLP encouraged patient to have organizer at home to sort medications following discharge. Agreeable to supervision of medications/finances following d/c. Extended time to complete therapy task.  -SR    Row Name 12/20/22 0830 12/19/22 1200 12/19/22 1030       Attention Goal 1 (SLP)    Improve Attention by Goal 1 (SLP) attending to task;80%;with minimal cues (75-90%)  -SR attending to task;80%;with minimal cues (75-90%)  -SR attending to task;80%;with minimal cues (75-90%)  -SR    Time Frame (Attention Goal 1, SLP) by discharge  -SR by discharge  -SR by discharge  -SR    Progress/Outcomes (Attention Goal 1, SLP) goal ongoing  -SR goal ongoing  -SR goal ongoing  -SR    Comment (Attention Goal 1, SLP) Engaged in structured conversation with SLP during AM therapy session with background noise present. Attended to conversation with no redirection.  -SR Written directions; patient completed activity from previous session with 40% acc given no cues; 80% acc given min-mod cues.  -SR Patient followed \"if-then\" written directions targeting attention to detail, sustained, and selective attention. Patient said, \"I get ahead of myself sometimes, and I often miss information on the L side.\" ST provided visual of a blue bolded line on patient's L side to encourage her to scan. Activity incomplete " due to time constraint. Patient completed 3/4 items given min cues. Will complete activity during next session.  -SR       Memory Skills Goal 1 (SLP)    Improve Memory Skills Through Goal 1 (SLP) recall details from a word list;visual memory task;listen to a paragraph and answer questions;use memory strategies;80%;with minimal cues (75-90%)  -SR -- recall details from a word list;visual memory task;listen to a paragraph and answer questions;use memory strategies;80%;with minimal cues (75-90%)  -SR    Time Frame (Memory Skills Goal 1, SLP) by discharge  -SR -- by discharge  -SR    Progress/Outcomes (Memory Skills Goal 1, SLP) good progress toward goal  -SR -- good progress toward goal  -SR    Comment (Memory Skills Goal 1, SLP) Patient listened to short paragraph and answered inference based questions during immediate recall activity with 80% acc given no cues.  -SR -- Using Constant Therapy amber, patient listened to voicemail and answered questions about the content during immediate recall activity with 90% acc given no cues. Each voicemail was played 2x each.  -SR       Functional Math Skills Goal 1 (SLP)    Improve Functional Math Skills Through Goal 1 (SLP) complete word problems involving money;complete word problems involving time;complete checkbook task;complete functional math task;80%;with minimal cues (75-90%)  -SR complete word problems involving money;complete word problems involving time;complete checkbook task;complete functional math task;80%;with minimal cues (75-90%)  -SR --    Time Frame (Functional Math Skills Goal 1, SLP) by discharge  -SR by discharge  -SR --    Progress/Outcomes (Functional Math Skills Goal 1, SLP) goal ongoing  -SR goal ongoing  -SR --    Comment (Functional Math Skills Goal 1, SLP) Patient answered questions involving time/time management with 50% acc given no cues; 70% acc given min-mod cues.  -SR Baking/cooking conversions; patient answered math-based scenerios using baking  conversion chart with 25% acc given no cues; 75% acc given mod cues.  ST encouraged patient to take notes from the chart to aid in recall of information. Extended time to complete task due to prolonged response time.  -SR --       Right Hemisphere Function Goal 1 (SLP)    Improve Right Hemisphere Function Through Goal 1 (SLP) -- complete visuo-spatial activities (visual closure, trail making, mazes;80%;with minimal cues (75-90%)  -SR --    Time Frame (Right Hemisphere Function Goal 1, SLP) -- by discharge  -SR --    Progress/Outcomes (Right Hemisphere Function Goal 1, SLP) -- goal ongoing  -SR --    Comment (Right Hemisphere Function Goal 1, SLP) -- Visual scanning; using horizontal scanning technique, patient located 23/25 stimulus items given min cues. Accuracy increased with bolded line on L side of the page.  -SR --          User Key  (r) = Recorded By, (t) = Taken By, (c) = Cosigned By    Initials Name Provider Type    Jemma Kelley MS CCC-SLP Speech and Language Pathologist     Minal Bennett CCC-SLP Speech and Language Pathologist                            Time Calculation:        Time Calculation- SLP     Row Name 12/21/22 1327 12/21/22 1108          Time Calculation- [facility]    SLP Start Time 1300  -SL 1030  -     SLP Stop Time 1330  -SL 1100  -SL     SLP Time Calculation (min) 30 min  -SL 30 min  -           User Key  (r) = Recorded By, (t) = Taken By, (c) = Cosigned By    Initials Name Provider Type    Jemma Kelley MS CCC-SLP Speech and Language Pathologist                  Therapy Charges for Today     Code Description Service Date Service Provider Modifiers Qty    64094442741 HC ST DEV OF COGN SKILLS EACH ADDT'L 15 MIN 12/21/2022 Jemma Bradley MS CCC-SLP  1    94054923713 HC ST DEV OF COGN SKILLS INITIAL 15 MIN 12/21/2022 Jemma Bradley MS CCC-SLP  1    01588169477 HC ST DEV OF COGN SKILLS INITIAL 15 MIN 12/21/2022 Jemma Bradley MS CCC-SLP  1    82958954934 HC ST DEV OF COGN SKILLS EACH ADDT'L 15 MIN  12/21/2022 Jemma Bradley, MS CCC-SLP  1    [id number] [facility] DEV OF COGN SKILLS EACH ADDT'L 15 MIN 12/21/2022 Jemma Bradley, MS CCC-SLP  2                           Jemma Bradley, MS CCC-SLP  12/21/2022

## 2022-12-21 NOTE — PROGRESS NOTES
REASON FOR FOLLOWUP/CHIEF COMPLAINT:  High-grade glioma    HISTORY OF PRESENT ILLNESS:   No new problems.  No new neurological issues.  Tolerating chemoradiation fairly well.  Denies any headache, dizziness, visual changes or nausea/vomiting.  Undergoing rehab as per PM&R.    Past Medical History, Past surgical history, Social History, Family History have been reviewed and are without significant changes except as mentioned.    Review of Systems   Review of Systems   Constitutional: Positive for fatigue. Negative for activity change.   HENT: Negative for nosebleeds and trouble swallowing.    Respiratory: Negative for shortness of breath and wheezing.    Cardiovascular: Negative for chest pain and palpitations.   Gastrointestinal: Negative for constipation, diarrhea and nausea.   Genitourinary: Negative for dysuria and hematuria.   Musculoskeletal: Negative for arthralgias and myalgias.   Skin: Negative for rash and wound.   Neurological: Negative for seizures and syncope.   Hematological: Negative for adenopathy. Does not bruise/bleed easily.   Psychiatric/Behavioral: Negative for confusion.       Medications:  The current medication list was reviewed in the EMR    ALLERGIES:    Allergies   Allergen Reactions   • Morphine Anaphylaxis     Hives and throat swelling   • Peach [Prunus Persica] Anaphylaxis   • Penicillins Anaphylaxis   • Metformin Diarrhea              Vitals:    12/20/22 1321 12/20/22 1657 12/20/22 1915 12/21/22 0507   BP: 106/68 119/75 103/57 114/68   BP Location: Right arm Left arm Left arm Right arm   Patient Position: Sitting Lying Lying Lying   Pulse: 67 60 67 65   Resp: 16 16 18 18   Temp: 97.9 °F (36.6 °C) 98.6 °F (37 °C) 98 °F (36.7 °C) 97.6 °F (36.4 °C)   TempSrc: Oral Oral Oral Oral   SpO2: 97% 99% 97% 96%   Weight:       Height:         Physical Exam    CONSTITUTIONAL:  Vital signs reviewed.  No distress, looks comfortable.  EYES:  Conjunctivae and lids unremarkable.  EARS, NOSE,  MOUTH, THROAT:  Ears and nose appear unremarkable.  Lips, teeth, gums appear unremarkable.  RESPIRATORY:  Normal respiratory effort.  Equal chest movement bilaterally  CARDIOVASCULAR:  Normal heart rate.  No significant lower extremity edema.  GASTROINTESTINAL: Abdomen appears unremarkable.  Nondistended  NEURO: AAOx3, no focal deficits.  Appears to have equal strength all 4 extremities.  MUSCULOSKELETAL:  Unremarkable digits/nails.  No cyanosis or clubbing.  SKIN:  Warm.  No rashes.  PSYCHIATRIC:  Normal judgment and insight.  Normal mood and affect.        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  Intact MGMT expression  • Patient presented with recurrent falls and confusion.  • MRI on 11/22/2022 revealed a 3.2 cm ring-enhancing right supratentorial mass.  There was mass-effect and left midline shift.  • She was started on dexamethasone and Keppra.  • CT chest abdomen pelvis on 11/24/2022 revealed no evidence of progressive metastatic disease.  • S/p stereotactic brain biopsy on 11/28/2022.  • Preliminary pathology revealed high-grade glioma.  It was sent to [facility].  • Preliminary revealed high-grade glioma.  • Patient had radiation simulation on 12/2/2022.  • Plan is to start concurrent low-dose Temodar at 75 mg/m2 daily.   • She is on Decadron 4 mg p.o. every 8 hours.  • Started radiation with concurrent Temodar on 12/12/2022.  • Temodar does not require dose reduction due to the elevated liver enzymes.   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  This is associated with a worse prognosis and relative resistance to alkylating chemotherapy such as  temozolomide.  However, temozolomide and radiation remain the recommended treatments.  • 12/21/2022: Tolerating concurrent chemoradiation very well.  No major adverse effects.  Labs remained stable.  Continue treatment as per schedule.     *Germline BRCA2 and GEORGES mutations.  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  • Please see the note from Dr. Collins, the patient's outpatient oncologist from 12/4/2022.     *Seizure prophylaxis.  • Patient is on Keppra 500 mg p.o. twice daily.  • Patient is not having any symptoms consistent with seizures.     *Elevated liver enzymes.  • ALT was 143 and AST was 71 on 11/28/2022.  • ALT was 1049 and AST was 386 on 12/8/2022.  • Hepatic duplex, 12/12/2022 normal.  • LFTs continues to improve    *Hyponatremia.  Sodium was low at 126 on 12/19/2022.  ?  SIADH.  Defer to hospitalist service and rehab service.  Improved 230 on 12/21/2022.     PLAN:  · Temodar and radiation started 12/12/2022.  Tolerating it well.  Continue daily concurrent chemoradiation  · Patient met with Dr. Collins in the hospital yesterday and discussed upcoming appointment and follow-up plan.  · Has follow-up arranged in the office with Dr. Collins on 1/4/2023 and 2/22/2023 and CT CAP 1 week prior.  Patient states the hope is to be discharged from rehab before Beth.     Will follow periodically.  Reviewed rehab MD, nephrology and hospitalist notes.

## 2022-12-21 NOTE — THERAPY TREATMENT NOTE
Inpatient Rehabilitation - Physical Therapy Treatment Note       [facility]     Patient Name: Christie Avendaño  : 1965  MRN: [id number]    Today's Date: 2022                    Admit Date: 2022      Visit Dx:     ICD-10-CM ICD-9-CM   1. Impaired functional mobility, balance, gait, and endurance  Z74.09 V49.89   2. 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)   • Non-small cell lung cancer, right (HCC)   • Renal mass, right   • SIRS (systemic inflammatory response syndrome) (HCC)   • Hyperlipidemia   • Malignant melanoma of right lower extremity including hip (HCC)   • High grade glioma    • 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)   • Glioma (HCC)       Past Medical History:   Diagnosis Date   • Arthritis    • Asthma    • BRCA2 positive    • Diabetes mellitus (HCC)    • H/O Liver masses 2017    x3   • H/O Lung masses 2017    1 in right lower lobe and 1 in the left infrahilar location   • H/O Ovarian cyst    • H/O Right adrenal mass (CMS/HCC) 2017   • Lung cancer (HCC)    • Melanoma (HCC)     right foot   • PONV (postoperative nausea and vomiting)    • Thyroid disease        Past Surgical History:   Procedure Laterality Date   • APPENDECTOMY     • BRAIN BIOPSY Right 2022    Procedure: Right frontal stereotactic needle brain biopsy;  Surgeon: Manuel Thompson MD;  Location[facility] MAIN OR;  Service: Neurosurgery;  Laterality: Right;   • BREAST IMPLANT SURGERY Bilateral    • CHOLECYSTECTOMY     • HYSTERECTOMY     •  MASTECTOMY Bilateral    • OVARIAN CYST SURGERY     • THORACOSCOPY VIDEO ASSISTED WITH LOBECTOMY Right 10/9/2020    Procedure: BRONCHOSCOPY, THORACOSCOPY VIDEO ASSISTED WITH ANTERIOR BASAL SEGMENTECTOMY, INTERCOSTAL NERVE BLOCK;  Surgeon: Flaca Crisostomo MD;  Location: [facility] OR;  Service: Thoracic;  Laterality: Right;   • TONSILLECTOMY         PT ASSESSMENT (last 12 hours)     IRF PT Evaluation and Treatment     Row Name 12/21/22 0823          PT Time and Intention    Document Type daily treatment  -DP     Mode of Treatment individual therapy;physical therapy  -DP     Row Name 12/21/22 0823          General Information    Patient Profile Reviewed yes  -DP     Existing Precautions/Restrictions fall  -DP     Row Name 12/21/22 0823          Pain Assessment    Pretreatment Pain Rating 0/10 - no pain  -DP     Posttreatment Pain Rating 0/10 - no pain  -DP     Row Name 12/21/22 0823          Cognition/Psychosocial    Affect/Mental Status (Cognition) flat/blunted affect  -DP     Orientation Status (Cognition) oriented x 4  -DP     Follows Commands (Cognition) follows one-step commands;follows two-step commands;delayed response/completion;increased processing time needed  -DP     Personal Safety Interventions safety round/check completed;elopement precautions initiated;fall prevention program maintained;gait belt;muscle strengthening facilitated;nonskid shoes/slippers when out of bed;supervised activity  -DP     Row Name 12/21/22 0823          Bed Mobility    Comment, (Bed Mobility) UIC  -DP     Row Name 12/21/22 0823          Sit-Stand Transfer    Sit-Stand [place] (Transfers) set up;verbal cues;contact guard  -DP     Assistive Device (Sit-Stand Transfers) wheelchair  -DP     Row Name 12/21/22 0823          Stand-Sit Transfer    Stand-Sit [place] (Transfers) set up;verbal cues;contact guard  -DP     Assistive Device (Stand-Sit Transfers) wheelchair  -DP     Row Name 12/21/22 0823          Gait/Stairs  (Locomotion)    [place] Level (Gait) contact guard;verbal cues  -DP     Assistive Device (Gait) walker, 4-wheeled;walker, front-wheeled  -DP     Distance in Feet (Gait) 160'x1  -DP     Pattern (Gait) step-through  -DP     Deviations/Abnormal Patterns (Gait) fabrizio decreased;left sided deviations;stride length decreased;base of support, narrow  -DP     Bilateral Gait Deviations forward flexed posture  -DP     Left Sided Gait Deviations leans left  -DP     Right Sided Gait Deviations weight shift ability decreased  -DP     [place] Level (Stairs) minimum assist (75% patient effort);set up;verbal cues;maximum assist (25% patient effort)  -DP     Handrail Location (Stairs) both sides  -DP     Number of Steps (Stairs) 4 steps  in // bars  -DP     Stairs Assessment/Intervention Pt demonstrated increased fatigue today as pt was on 8inch step up in // bars and was unable to fully extend RLE and bring LLE up onto step and began to flex both knees in which PT held pt up with maxA as 1 person came to assist with standing pt and one came to place w/c under pt. Pt safely seated in w/c.   -DP     Row Name 12/21/22 0823          Safety Issues, Functional Mobility    Impairments Affecting Function (Mobility) balance;endurance/activity tolerance;coordination;motor control;sensation/sensory awareness;strength  -DP     Row Name 12/21/22 0823          Motor Skills    Therapeutic Exercise hip;knee;ankle  -DP     Row Name 12/21/22 0823          Hip (Therapeutic Exercise)    Hip (Therapeutic Exercise) strengthening exercise;isometric exercises  -DP     Hip Isometrics (Therapeutic Exercise) bilateral;aDduction;10 repetitions;3 second hold  -DP     Hip Strengthening (Therapeutic Exercise) bilateral;aBduction;marching while standing;red;resistance band;10 repetitions;2 sets;other (see comments)  -DP     Row Name 12/21/22 0823          Knee (Therapeutic Exercise)    Knee (Therapeutic Exercise) AROM (active range of motion)  -DP      Knee AROM (Therapeutic Exercise) bilateral;LAQ (long arc quad);sitting;10 repetitions  -DP     Knee Strengthening (Therapeutic Exercise) bilateral;LAQ (long arc quad);1 lb free weight;10 repetitions;hamstring curls;3 sets;other (see comments);red;resistance band  -DP     Row Name 12/21/22 0823          Positioning and Restraints    Pre-Treatment Position sitting in chair/recliner  -DP     Post Treatment Position wheelchair  -DP     In Wheelchair sitting;exit alarm on;with nsg  -DP           User Key  (r) = Recorded By, (t) = Taken By, (c) = Cosigned By    Initials Name Provider Type    Dillan Gallardo, PT Physical Therapist              Wound Right scalp Incision (Active)   Dressing Appearance open to air 12/21/22 0800   Closure Liquid skin adhesive 12/21/22 0800   Base clean;dry;scab 12/21/22 0800   Periwound intact;dry 12/21/22 0800   Periwound Temperature warm 12/21/22 0800   Periwound Skin Turgor soft 12/21/22 0800   Drainage Amount none 12/21/22 0800   Dressing Care open to air 12/21/22 0800   Periwound Care dry periwound area maintained 12/21/22 0800     Physical Therapy Education     Title: PT OT SLP Therapies (Done)     Topic: Physical Therapy (Done)     Point: Mobility training (Done)     Learning Progress Summary           Patient Acceptance, E,D, VU,DU by DP at 12/21/2022 1141    Acceptance, E,TB, VU,NR by ST at 12/20/2022 0757    Acceptance, E,TB, VU,NR by LB at 12/19/2022 1122    Comment: ramp negogiation    Acceptance, E,TB,D, NR by KP at 12/17/2022 1047    Acceptance, E,D, VU,DU,NR by DP at 12/16/2022 0811    Acceptance, E,D, VU,DU by DP at 12/15/2022 1606    Acceptance, E,D, VU,DU,NR by DP at 12/15/2022 1147    Comment: transfer training on this date    Acceptance, E, NR by LH at 12/13/2022 0923    Acceptance, E, VU by WN at 12/12/2022 2327    Acceptance, E, VU,DU,NR by JK at 12/12/2022 1011    Acceptance, EDAVID by JACK at 12/12/2022 0155    Acceptance, EDAVIDNR by  at 12/10/2022 1035     Acceptance, E,D, VU,DU,NR by JK at 12/9/2022 0853    Acceptance, E,TB,D, VU,DU,NR by KP at 12/8/2022 1420    Comment: Goals, POC    Acceptance, E, VU by CB at 12/8/2022 1204   Significant Other Acceptance, E,TB,D, VU,DU,NR by KP at 12/8/2022 1420    Comment: Goals, POC                   Point: Home exercise program (Done)     Learning Progress Summary           Patient Acceptance, E,D, VU,DU by DP at 12/21/2022 1141    Acceptance, E,TB,D, NR by KP at 12/17/2022 1047    Acceptance, E,D, VU,DU,NR by DP at 12/16/2022 0811    Acceptance, E,D, VU,DU by DP at 12/15/2022 1606    Acceptance, E,D, VU,DU,NR by DP at 12/15/2022 1147    Comment: transfer training on this date    Acceptance, E, NR by  at 12/13/2022 0923    Acceptance, E, VU by WN at 12/12/2022 2327    Acceptance, E, VU by WN at 12/12/2022 0155    Acceptance, E, VU,NR by  at 12/10/2022 1035    Acceptance, E,D, VU,DU,NR by JK at 12/9/2022 0853    Acceptance, E,TB,D, VU,DU,NR by KP at 12/8/2022 1420    Comment: Goals, POC    Acceptance, E, VU by CB at 12/8/2022 1204   Significant Other Acceptance, E,TB,D, VU,DU,NR by KP at 12/8/2022 1420    Comment: Goals, POC                   Point: Body mechanics (Done)     Learning Progress Summary           Patient Acceptance, E,D, VU,DU by DP at 12/21/2022 1141    Acceptance, E,TB, VU,NR by ST at 12/20/2022 0757    Acceptance, E,D, VU,DU,NR by DP at 12/16/2022 0811    Acceptance, E,D, VU,DU by DP at 12/15/2022 1606    Acceptance, E,D, VU,DU,NR by DP at 12/15/2022 1147    Comment: transfer training on this date    Acceptance, E, NR by  at 12/13/2022 0923    Acceptance, E, VU by WN at 12/12/2022 2327    Acceptance, E, VU by WN at 12/12/2022 0155    Acceptance, E, VU,NR by  at 12/10/2022 1035    Acceptance, E, VU by CB at 12/8/2022 1204                   Point: Precautions (Done)     Learning Progress Summary           Patient Acceptance, E,D, VU,DU by DP at 12/21/2022 1141    Acceptance, E,TB, VU,NR by ST at 12/20/2022  0757    Acceptance, E,D, VU,DU,NR by DP at 12/16/2022 0811    Acceptance, E,D, VU,DU by DP at 12/15/2022 1606    Acceptance, E,D, VU,DU,NR by DP at 12/15/2022 1147    Comment: transfer training on this date    Acceptance, E, NR by  at 12/13/2022 0923    Acceptance, E, VU by WN at 12/12/2022 2327    Acceptance, E, VU by WN at 12/12/2022 0155    Acceptance, E, VU,NR by  at 12/10/2022 1035    Acceptance, E, VU by CB at 12/8/2022 1204                               User Key     Initials Effective Dates Name Provider Type Discipline    LB 06/16/21 -  Maryanne Oleary, PT Physical Therapist PT    LH 06/16/21 -  Radha Ramírez, PT Physical Therapist PT    JK 06/16/21 -  Samantha Thompson, PT Physical Therapist PT    KP 06/16/21 -  Anita Serrano, PT Physical Therapist PT    WN 08/23/22 -  Doc Park RN Registered Nurse Nurse    DP 08/24/21 -  Dillan Calderon, PT Physical Therapist PT    CB 11/10/22 -  Mackenzie Hopkins CCC-SLP Speech and Language Pathologist SLP    ST 09/22/22 -  Ariana Allan, PT Physical Therapist PT                PT Recommendation and Plan                          Time Calculation:      PT Charges     Row Name 12/21/22 1531 12/21/22 1141          Time Calculation    Start Time 1450  -DP 1100  -DP     Stop Time 1520  -DP 1130  -DP     Time Calculation (min) 30 min  -DP 30 min  -DP     PT Received On -- 12/21/22  -DP     PT - Next Appointment -- 12/22/22  -DP        Time Calculation- PT    Total Timed Code Minutes- PT 30 minute(s)  -DP 30 minute(s)  -DP           User Key  (r) = Recorded By, (t) = Taken By, (c) = Cosigned By    Initials Name Provider Type    DP Dillan Calderon, PT Physical Therapist                Therapy Charges for Today     Code Description Service Date Service Provider Modifiers Qty    97564017964 HC PT THER PROC EA 15 MIN 12/21/2022 Dillan Calderon, PT GP 2    23807609868 HC PT THERAPEUTIC ACT EA 15 MIN 12/21/2022 Dillan Calderon, PT GP 2    90234584951 HC PT THER SUPP EA 15  MIN 12/21/2022 Dillan Calderon, PT GP 1              Patient was wearing a face mask during this therapy encounter. Therapist used appropriate personal protective equipment including mask and gloves.  Mask used was standard procedure mask. Appropriate PPE was worn during the entire therapy session. Hand hygiene was completed before and after therapy session. Patient is not in enhanced droplet precautions.         Dillan Calderon, PT  12/21/2022

## 2022-12-21 NOTE — PROGRESS NOTES
Physician Weekly Management Note    Diagnosis:     Diagnosis Plan   1. High grade glioma             RT Details:  fx 8/23 16Gy today    Notes on Treatment course, Films, Medical progress:  kps 80% doing ok, mild occasional headaches on decadron, moderate fatigue, cont on     Weekly Management:  Medication reviewed?   Yes  New medications given?   No  Problemlist reviewed?   Yes  Problem added?   No  Issues raised requiring referral to support services - task assigned to:  na    Technical aspects reviewed:  Weekly OBI approved?   Yes  Weekly port films approved?   Yes  Change requests noted on port film?   No  Patient setup and plan reviewed?   Yes    Chart Reviewed:  Continue current treatment plan?   Yes  Treatment plan change requested?   No  CBC reviewed?   Yes  Concurrent Chemo?   Yes    Objective     Toxicities:   Grade 1 fatigue     Review of Systems   Constitutional: Positive for fatigue.   HENT: Negative.    Neurological: Positive for weakness and light-headedness.   Psychiatric/Behavioral: Negative.           There were no vitals filed for this visit.    Current Status 8/24/2022   ECOG score 0       Physical Exam  Constitutional:       Appearance: Normal appearance.   Neurological:      Mental Status: She is alert.   Psychiatric:         Mood and Affect: Mood normal.             Problem Summary List    Diagnosis:     Diagnosis Plan   1. High grade glioma           Pathology:   gbm    Past Medical History:   Diagnosis Date   • Arthritis    • Asthma    • BRCA2 positive    • Diabetes mellitus (HCC)    • H/O Liver masses 2017    x3   • H/O Lung masses 2017    1 in right lower lobe and 1 in the left infrahilar location   • H/O Ovarian cyst    • H/O Right adrenal mass (CMS/HCC) 2017   • Lung cancer (HCC)    • Melanoma (HCC)     right foot   • PONV (postoperative nausea and vomiting)    • Thyroid disease          Past Surgical History:   Procedure Laterality Date   • APPENDECTOMY     • BRAIN BIOPSY Right  11/28/2022    Procedure: Right frontal stereotactic needle brain biopsy;  Surgeon: Manuel Thompson MD;  Location: [facility] MAIN OR;  Service: Neurosurgery;  Laterality: Right;   • BREAST IMPLANT SURGERY Bilateral    • CHOLECYSTECTOMY     • HYSTERECTOMY     • MASTECTOMY Bilateral    • OVARIAN CYST SURGERY     • THORACOSCOPY VIDEO ASSISTED WITH LOBECTOMY Right 10/9/2020    Procedure: BRONCHOSCOPY, THORACOSCOPY VIDEO ASSISTED WITH ANTERIOR BASAL SEGMENTECTOMY, INTERCOSTAL NERVE BLOCK;  Surgeon: Flaca Crisostomo MD;  Location: [facility] MAIN OR;  Service: Thoracic;  Laterality: Right;   • TONSILLECTOMY           Current Facility-Administered Medications on File Prior to Visit   Medication Dose Route Frequency Provider Last Rate Last Admin   • [START ON 12/26/2022] buPROPion (WELLBUTRIN) tablet 100 mg  100 mg Oral Q12H Mathew Araujo MD       • calcium 500 mg vitamin D 5 mcg (200 UT) per tablet 1 tablet  1 tablet Oral BID Mathew Araujo MD   1 tablet at 12/21/22 0809   • calcium carbonate (TUMS) chewable tablet 500 mg (200 mg elemental)  2 tablet Oral TID PRN Mathew Araujo MD       • dexamethasone (DECADRON) tablet 4 mg  4 mg Oral Q8H Mathew Araujo MD   4 mg at 12/21/22 0511   • dextrose (D50W) (25 g/50 mL) IV injection 25 g  25 g Intravenous Q15 Min PRN Mathew Araujo MD       • dextrose (GLUTOSE) oral gel 15 g  15 g Oral Q15 Min PRN Mathew Araujo MD       • Enoxaparin Sodium (LOVENOX) syringe 40 mg  40 mg Subcutaneous Q12H Mathew Araujo MD   40 mg at 12/20/22 2210   • escitalopram (LEXAPRO) tablet 5 mg  5 mg Oral Daily Mathew Araujo MD   5 mg at 12/21/22 0809   • famotidine (PEPCID) tablet 20 mg  20 mg Oral BID PRN Mathew Araujo MD       • gabapentin (NEURONTIN) capsule 100 mg  100 mg Oral TID Mathew Araujo MD   100 mg at 12/21/22 0809   • glucagon (human recombinant) (GLUCAGEN DIAGNOSTIC) injection 1 mg  1 mg Intramuscular Q15  Min PRN Mathew Araujo MD       • influenza vac split quad (FLUZONE,FLUARIX,AFLURIA,FLULAVAL) injection 0.5 mL  0.5 mL Intramuscular During Hospitalization Mathew Araujo MD       • insulin glargine (LANTUS, SEMGLEE) injection 40 Units  40 Units Subcutaneous QAM Mathew Araujo MD   40 Units at 12/21/22 0808   • insulin lispro (ADMELOG) injection 0-14 Units  0-14 Units Subcutaneous TID AC Mathew Dumont MD   5 Units at 12/20/22 1159   • insulin lispro (ADMELOG) injection 9 Units  9 Units Subcutaneous TID With Meals Trevin Horne MD   9 Units at 12/21/22 0808   • latanoprost (XALATAN) 0.005 % ophthalmic solution 1 drop  1 drop Both Eyes Nightly Mathew Araujo MD   1 drop at 12/20/22 2209   • levETIRAcetam (KEPPRA) tablet 500 mg  500 mg Oral BID Mathew Araujo MD   500 mg at 12/21/22 0809   • levothyroxine (SYNTHROID, LEVOTHROID) tablet 150 mcg  150 mcg Oral Q AM Mathew Araujo MD   150 mcg at 12/21/22 0511   • magnesium oxide (MAG-OX) tablet 400 mg  400 mg Oral Daily Gina Florence MD   400 mg at 12/21/22 0809   • nitroglycerin (NITROSTAT) SL tablet 0.4 mg  0.4 mg Sublingual Q5 Min PRN Mathew Araujo MD       • ondansetron (ZOFRAN) tablet 4 mg  4 mg Oral Q6H PRN Mathew Araujo MD        Or   • ondansetron (ZOFRAN) injection 4 mg  4 mg Intravenous Q6H PRN Mathew Araujo MD       • ondansetron (ZOFRAN) tablet 8 mg  8 mg Oral Q24H Laura Flores MD   8 mg at 12/20/22 1202   • pantoprazole (PROTONIX) EC tablet 40 mg  40 mg Oral Q AM Mathew Araujo MD   40 mg at 12/21/22 0511   • polyethylene glycol (MIRALAX) packet 17 g  17 g Oral Daily Mathew Araujo MD   17 g at 12/21/22 0809   • propranolol (INDERAL) tablet 20 mg  20 mg Oral Q8H Mathew Araujo MD   20 mg at 12/21/22 0511   • sennosides-docusate (PERICOLACE) 8.6-50 MG per tablet 1 tablet  1 tablet Oral Nightly PRN Mathew Araujo MD       •  sulfamethoxazole-trimethoprim (BACTRIM DS,SEPTRA DS) 800-160 MG per tablet 1 tablet  1 tablet Oral Once per day on Mon Wed Fri German Garcia II, MD   1 tablet at 12/21/22 0809   • temozolomide (TEMODAR) chemo capsule 140 mg  140 mg Oral Nightly German Garcia II, MD   140 mg at 12/20/22 2229    And   • temozolomide (TEMODAR) chemo capsule 20 mg  20 mg Oral Nightly German Garcia II, MD   20 mg at 12/20/22 2229    And   • temozolomide (TEMODAR) chemo capsule 5 mg  5 mg Oral Nightly German Garcai II, MD   5 mg at 12/20/22 2229   • valACYclovir (VALTREX) tablet 500 mg  500 mg Oral Q24H Mathew Dmuont MD   500 mg at 12/21/22 0809   • [DISCONTINUED] insulin lispro (ADMELOG) injection 8 Units  8 Units Subcutaneous TID With Meals Mathew Araujo MD   8 Units at 12/20/22 1719   • [DISCONTINUED] lisinopril (PRINIVIL,ZESTRIL) tablet 5 mg  5 mg Oral Daily Mathew Araujo MD   5 mg at 12/20/22 0806   • [DISCONTINUED] propranolol (INDERAL) tablet 20 mg  20 mg Oral Daily With Breakfast & Dinner Gina Florence MD   20 mg at 12/20/22 0806     Current Outpatient Medications on File Prior to Visit   Medication Sig Dispense Refill   • atorvastatin (LIPITOR) 40 MG tablet TAKE 1 TABLET BY MOUTH  EVERY NIGHT 90 tablet 3   • bimatoprost (LUMIGAN) 0.01 % ophthalmic drops Administer 1 drop to both eyes Every Night.     • Blood Glucose Monitoring Suppl (Accu-Chek Guide Me) w/Device kit 1 Device Daily. 1 kit 0   • Dulaglutide (Trulicity) 3 MG/0.5ML solution pen-injector Inject 3 mg under the skin into the appropriate area as directed 1 (One) Time Per Week.     • escitalopram (LEXAPRO) 10 MG tablet Take 1 tablet by mouth Daily. 90 tablet 1   • glucose blood (Accu-Chek Guide) test strip Use 1-2 daily to check blood sugars Dx diabetes E11.9 200 each 12   • levothyroxine (SYNTHROID, LEVOTHROID) 150 MCG tablet Take 150 mcg by mouth Daily.     • lisinopril (PRINIVIL,ZESTRIL) 20 MG tablet Take 20 mg by mouth Daily.     •  temozolomide (TEMODAR) 140 MG chemo capsule Take 1 capsule by mouth with 2 other temozolomide prescriptions for 165 mg total Daily for 42 doses. 42 capsule 0   • temozolomide (TEMODAR) 20 MG chemo capsule Take 1 capsule by mouth with 2 other temozolomide prescriptions for 165 mg total Daily for 42 doses. 42 capsule 0   • temozolomide (TEMODAR) 5 MG chemo capsule Take 1 capsule by mouth with 2 other temozolomide prescriptions for 165 mg total Daily for 42 doses. 42 capsule 0   • valACYclovir (VALTREX) 1000 MG tablet Take 2,000 mg by mouth 2 (Two) Times a Day.         Allergies   Allergen Reactions   • Morphine Anaphylaxis     Hives and throat swelling   • Peach [Prunus Persica] Anaphylaxis   • Penicillins Anaphylaxis   • Metformin Diarrhea         Referring Provider:    No referring provider defined for this encounter.    Oncologist:  No primary care provider on file.      Seen and approved by:  Mally Corrales MD  12/21/2022  Subjective     No ref. provider found

## 2022-12-21 NOTE — PROGRESS NOTES
LOS: 14 days   Patient Care Team:  Tiffany Holloway MD as PCP - General (Internal Medicine)  Mathew Collins Jr., MD as Consulting Physician (Hematology and Oncology)  Dorian Sabillon MD as Surgeon (General Surgery)  Thang Dumont, JOSEFA (Optometry)  Lamine Cantu DO as Referring Physician (Family Medicine)  Hali Rolle MD as Gynecologist (Gynecology)      HERON MAGANA  1965    Diagnoses    1. 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 Her headache continues to improve. Patient denies fever/chills/sob/chest pain and denies issue with sleep/appetite/bladder and bowels.        Objective     Vitals:    12/21/22 0507   BP: 114/68   Pulse: 65   Resp: 18   Temp: 97.6 °F (36.4 °C)   SpO2: 96%       PHYSICAL EXAM:   MENTAL STATUS -  AWAKE / ALERT  HEENT-right scalp incision with staples removed.  Clean dry and intact  SCLERAE ANICTERIC, CONJUNCTIVAE PINK, EARS UNREMARKABLE EXTERNALLY  LUNGS - CTA, NO WHEEZES, RALES OR RHONCHI  HEART- RRR, NO RUB, MURMUR, OR GALLOP  ABD - Nondistended     EXT - NO EDEMA OR CYANOSIS  NEURO -oriented       Speech was fluent with slightly slow rate of speech.  Extraocular movements intact.  No dysarthria.  MOTOR EXAM - RUE/RLE 5/5.   LUE/LLE 5/5.   Impaired motor control in the left upper extremity and left lower extremity,    MEDICATIONS  Scheduled Meds:[START ON 12/26/2022] buPROPion, 100 mg, Oral, Q12H  calcium 500 mg  vitamin D 5 mcg (200 UT), 1 tablet, Oral, BID  dexamethasone, 4 mg, Oral, Q8H  enoxaparin, 40 mg, Subcutaneous, Q12H  escitalopram, 5 mg, Oral, Daily  gabapentin, 100 mg, Oral, TID  insulin glargine, 40 Units, Subcutaneous, QAM  insulin lispro, 0-14 Units, Subcutaneous, TID AC  insulin lispro, 9 Units, Subcutaneous, TID With Meals  latanoprost, 1 drop, Both Eyes, Nightly  levETIRAcetam, 500 mg, Oral, BID  levothyroxine, 150 mcg, Oral, Q AM  magnesium oxide, 400 mg, Oral, Daily  ondansetron, 8 mg, Oral, Q24H  pantoprazole, 40 mg, Oral, Q AM  polyethylene glycol, 17 g, Oral, Daily  propranolol, 20 mg, Oral, Q8H  sulfamethoxazole-trimethoprim, 1 tablet, Oral, Once per day on Mon Wed Fri  temozolomide, 140 mg, Oral, Nightly   And  temozolomide, 20 mg, Oral, Nightly   And  temozolomide, 5 mg, Oral, Nightly  valACYclovir, 500 mg, Oral, Q24H      Continuous Infusions:   PRN Meds:.•  calcium carbonate  •  dextrose  •  dextrose  •  famotidine  •  glucagon (human recombinant)  •  influenza vaccine  •  nitroglycerin  •  ondansetron **OR** ondansetron  •  senna-docusate sodium      RESULTS  Glucose   Date/Time Value Ref Range Status   12/21/2022 0625 130 70 - 130 mg/dL Final     Comment:     Meter: [id number] : [id number] Joséal Eliana NA   12/20/2022 2035 198 (H) 70 - 130 mg/dL Final     Comment:     Meter: [id number] : [id number] Joséal Eliana NA   12/20/2022 1709 131 (H) 70 - 130 mg/dL Final     Comment:     Meter: [id number] : [id number] Torsten Ricoal NA   12/20/2022 1113 202 (H) 70 - 130 mg/dL Final     Comment:     Meter: [id number] : [id number] Torsten Mckeonntal NA   12/20/2022 0618 140 (H) 70 - 130 mg/dL Final     Comment:     Meter: [id number] : [id number] Nabeel Pacheco NA   12/19/2022 2026 206 (H) 70 - 130 mg/dL Final     Comment:     Meter: [id number] : [id number] Nabeel ACUÑA   12/19/2022 1627 141 (H) 70 - 130 mg/dL Final     Comment:     Meter: [id number] : [id number] Chente  Gina ESTEBAN   12/19/2022 1140 450 (C) 70 - 130 mg/dL Final     Comment:     RN Notified R and V Meter: [id number] : [id number] Paul ACUÑA     Results from last 7 days   Lab Units 12/19/22  0552 12/15/22  0607   WBC 10*3/mm3 9.55 12.40*   HEMOGLOBIN g/dL 12.6 12.7   HEMATOCRIT % 36.6 38.2   PLATELETS 10*3/mm3 175 181     Results from last 7 days   Lab Units 12/21/22  0544 12/20/22  0833 12/19/22  0552   SODIUM mmol/L 130* 129* 126*   POTASSIUM mmol/L 5.2 5.3* 4.8   CHLORIDE mmol/L 92* 93* 91*   CO2 mmol/L 30.0* 28.0 27.8   BUN mg/dL 26* 28* 30*   CREATININE mg/dL 0.85 0.85 0.82   CALCIUM mg/dL 9.5 9.7 9.4   BILIRUBIN mg/dL 0.7 0.6 0.6   ALK PHOS U/L 160* 161* 163*   ALT (SGPT) U/L 203* 193* 220*   AST (SGOT) U/L 43* 32 34*   GLUCOSE mg/dL 140* 148* 190*       ASSESSMENT and PLAN    Glioma (HCC)    High-grade glioma-3.2 cm ring-enhancing right supratentorial mass-right thalamic-with mass-effect and left midline shift  Status post stereotactic brain biopsy on November 28, 2022  Radiation therapy adjusting schedule to the afternoon around 3 PM so as not to interfere with her other physical/occupational/speech therapy sessions    Headache-Dec 9: will add magnesium 400mg daily for headaches.  She reports intolerance to gabapentin, intolerances to opioids.  Tramadol comes up as contraindicated with history of anaphylactic secondary to morphine.  Valproate for headache control not an option with her liver changes  Dec 13: patient reports intolerance to gabapentin was drowsiness on 300 mg dose. Agreeable to try 100 mg tid for HA.   December 16-Propranolol added for baseline headache control but not able to receive first 2 doses today secondary to parameters to hold for pulse less than 60 or blood pressure less than 110.  Lisinopril dose decreased which may allow some range to receive propranolol.  Continues on low-dose gabapentin.  Has history of allergy with anaphylaxis to opioid-morphine-with hives and throat  swelling  December 17-received first dose of propanolol this morning  December 18-headache better on the weekend off of radiation therapy.  Has not received propranolol secondary to parameters since yesterday morning  Dec 19 - titrate up on propranolol to 20 mg q 8 hours.  Headache trending better.     Left side weakness/incoordination/impaired mobility     Radiation therapy/Temodar to start December 12, 2022  Decadron 4 mg every 8 hourly  Dec 9: Oncology aware of increase of liver enzymes. They are following along   December 12: Radiation therapy and Temodar initiating today  Dec 14: Temodar Radiation, fraction 3 today.   Dec 15: Today is day 4 of Temodar Radiation. Per oncology no clinical signs of progression.      Leukocytosis-steroid/stress response.  Incision healing.      Elevated LFTs  Dec 8: Labs significant for ALT 1049 (143 11/28),  (71 11/28), Alk phos 190 (96 11/28). Taking minimal tylenol and statin is a home med- discontinued. Consulted IM who also consulted pharmacy and GI. They also decreased valtrex to 1g daily (she was taking daily prophylactic valtrex 2g bid). Awaiting liver ultrasound. CPK normal.   Dec 9- Ongoing workup. Liver ultrasound- remarkable for a small hepatic cyst otherwise negative  Per GI -[ Obtain duplex hepatic ultrasound to further assess for thrombosis  Obtain GEMMA, IgG profile, ASMA, EBV, CMV, HSV, VZV to rule out autoimmune versus viral cause to elevation.].   Patient reviewed with internal medicine and medical oncology  December 12-transaminases elevated but trending better.   Portal hepatic duplex unremarkable  Dec 14: Continued decrease of LFTs.   December 17-continue to trend better  Dec 19: Alk phos 163(159 12/18), (244 12/18), AST 34(34 12/18), continued slow downward trend  Dec 21: (193 yesterday) AST 43 (32 yesterday) continue to monitor.     Diabetes mellitus-Trulicity at home.  On Lantus/Humalog  December 13-Lantus increased to 28 units a.m.  On  "Humalog 8 units 3 times daily with meals  Dec 14: Continued elevation of blood sugars, will ask IM to provide insight.   Dec 15: Lantus increased to 35 daily yesterday with improved readings.   Dec 19: First dose of lantus 40 today.  this am  Dec 21: BS remain high, will ask IM to see and adjust.      Hyponatremia   Dec 15: Na 128 today, 131 yesterday. IM ordered serum and urine osmolality and urine sodium. Deferring to nephrology consult to us. Will repeat Cmp in am.   Dec 16: Na 130 today, urine osmols 495 and urine Na 80. Continue to monitor.  Felt related to hyperglycemia per internal medicine note  December 17-sodium 127 today  December 18-sodium 125.  Nephrology evaluating.  Transitioning escitalopram to bupropion.  TSH low at 0.03  Dec 19: Nephrology following. Continue fluid restriction. Urine sodium and chloride not performed yesterday so reordered.   Dec 21: Nephrology following \"All the parameters suggestive of SIADH except having elevated uric acid she is responding to fluid restriction, sodium today 130 and potassium 5.21\"    Seizure prophylaxis-Keppra     DVT prophylaxis-Lovenox/SCDs     GI prophylaxis-protein pump inhibitor  Add calcium and vitamin D with ongoing steroids     Chronic Valtrex-dose decreased to 1000 mg daily     Germline BRCA2 and GEORGES mutations.  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     Depression-Lexapro  December 18-transition to Lexapro 5 mg daily for 7 days then start bupropion 100 mg twice daily to possibly help with hyponatremia     Hypothyroidism-on replacement  December 18-TSH equals 0.030     Hypertension   Dec 16: Will decrease lisinopril to 10mg from 20mg due to addition of propranolol for headaches. Holding parameters in place " for propranolol of SBP <110 and HR <60  December 18-blood pressure 84/48-had not received propranolol since yesterday morning.  Will decrease lisinopril to 5 mg daily to allow more range for giving propanolol for headache  Dec 20 - BP higher in the mornings but lower at noon time, continue Lisinopril at 5 mg daily for now and increase propranolol to 20 mg q 8 hours.     TEAM CONF - DEC 13 - BED SBA. TRANSFERS MIN CTG. GAIT 160 FEET CTG. LEANS LEFT. TOILET TRANSFERS CTG. SHOWER TRANSFERS CTG MIN. LBD MIN. UBD MIN. BATH MIN. GROOMING SBA. TOILETING CTG MIN. IMPAIRED ATTENTION.IMPAIRED EXECUTIVE FUNCTION. FATIGUES WITH COGNITIVE TASKS. CONTINENT BOWEL AND BLADDER. SCALP INCISION HEALING.   XRT/TEMODAR. ELEVATED TRANSAMINASES TREND BETTER.   ELOS - 2 WEEKS    TEAM CONF - DEC 20 - BED MIN. TRANSFERS MIN CTG GAIT 160 FEET CTG TO OCC MIN, ROLLATOR. TOILET TRANSFERS CTG. SHOWER TRANSFERS CTG MIN. BATH MIN. LBD MIN MOD. UBD MIN. EATING SET UP. GROOMING SBA MIN. TOILETING MIN. COORDINATION WORSE ON LEFT ARM AND INATTENTION LEFT ARM, VISUAL PERCEPTUAL DEFICITS. DEFICITS WITH ATTENTION TO DETAIL, COMPLEX REASONING. SKIN INTACT. SCALP INCISION HEALING.  ELOS - Friday AS WISHES HOME FOR HOLIDAY.     Patient seen and evaluated with attending physician Dr. Araujo, please see attestation.     Gina Florence DO   Physical Medicine and Rehabilitation   PGY-4     During rounds, used appropriate personal protective equipment including mask and gloves.  Additional gown if indicated.  Mask used was standard procedure mask. Appropriate PPE was worn during the entire visit.  Hand hygiene was completed before and after.

## 2022-12-21 NOTE — PROGRESS NOTES
[facility] Progress Note      Patient Name: Chrisite Avendaño  : 1965  MRN: [id number]  Primary Care Physician:  Tiffany Holloway MD  Date of admission: 2022    Subjective     Interval History:   Follow-up hyponatremia    The patient is feeling better, denies any chest pain or shortness of air, no orthopnea or PND, no nausea or vomiting, no dysuria or gross hematuria, no bladder outlet symptoms, no lightheadedness.      Review of Systems:   As noted above    Objective     Vitals:   Temp:  [97.6 °F (36.4 °C)-98.6 °F (37 °C)] 97.6 °F (36.4 °C)  Heart Rate:  [60-67] 65  Resp:  [16-18] 18  BP: (103-119)/(57-75) 114/68    Intake/Output Summary (Last 24 hours) at 2022 0841  Last data filed at 2022 1935  Gross per 24 hour   Intake 360 ml   Output 1100 ml   Net -740 ml       Physical Exam:    General Appearance: alert, oriented x 3, no acute distress   Skin: warm and dry  HEENT: oral mucosa normal, nonicteric sclera  Neck: supple, no JVD  Lungs: CTA, unlabored breathing effort  Heart: RRR, normal S1 and S2, no S3, no rub  Abdomen: soft, nontender, nondistended, normoactive  : no palpable bladder  Extremities: no edema, cyanosis or clubbing  Neuro: normal speech and mental status     Scheduled Meds:     [START ON 2022] buPROPion, 100 mg, Oral, Q12H  calcium 500 mg vitamin D 5 mcg (200 UT), 1 tablet, Oral, BID  dexamethasone, 4 mg, Oral, Q8H  enoxaparin, 40 mg, Subcutaneous, Q12H  escitalopram, 5 mg, Oral, Daily  gabapentin, 100 mg, Oral, TID  insulin glargine, 40 Units, Subcutaneous, QAM  insulin lispro, 0-14 Units, Subcutaneous, TID AC  insulin lispro, 9 Units, Subcutaneous, TID With Meals  latanoprost, 1 drop, Both Eyes, Nightly  levETIRAcetam, 500 mg, Oral, BID  levothyroxine, 150 mcg, Oral, Q AM  magnesium oxide, 400 mg, Oral, Daily  ondansetron, 8 mg, Oral, Q24H  pantoprazole, 40 mg, Oral, Q AM  polyethylene glycol, 17 g, Oral, Daily  propranolol, 20 mg, Oral,  Q8H  sulfamethoxazole-trimethoprim, 1 tablet, Oral, Once per day on Mon Wed Fri  temozolomide, 140 mg, Oral, Nightly   And  temozolomide, 20 mg, Oral, Nightly   And  temozolomide, 5 mg, Oral, Nightly  valACYclovir, 500 mg, Oral, Q24H      IV Meds:        Results Reviewed:   I have personally reviewed the results from the time of this admission to 12/21/2022 08:41 EST     Results from last 7 days   Lab Units 12/21/22  0544 12/20/22  0833 12/19/22  0552   SODIUM mmol/L 130* 129* 126*   POTASSIUM mmol/L 5.2 5.3* 4.8   CHLORIDE mmol/L 92* 93* 91*   CO2 mmol/L 30.0* 28.0 27.8   BUN mg/dL 26* 28* 30*   CREATININE mg/dL 0.85 0.85 0.82   CALCIUM mg/dL 9.5 9.7 9.4   BILIRUBIN mg/dL 0.7 0.6 0.6   ALK PHOS U/L 160* 161* 163*   ALT (SGPT) U/L 203* 193* 220*   AST (SGOT) U/L 43* 32 34*   GLUCOSE mg/dL 140* 148* 190*       Estimated Creatinine Clearance: 89.1 mL/min (by C-G formula based on SCr of 0.85 mg/dL).    Results from last 7 days   Lab Units 12/21/22  0544 12/20/22  0833 12/19/22  0552   MAGNESIUM mg/dL 2.3 2.3 2.2   PHOSPHORUS mg/dL 4.1 4.0 3.1       Results from last 7 days   Lab Units 12/21/22  0544 12/20/22  0833 12/19/22  0552 12/18/22  1712   URIC ACID mg/dL 5.6 5.5 5.4 5.9*       Results from last 7 days   Lab Units 12/19/22  0552 12/15/22  0607   WBC 10*3/mm3 9.55 12.40*   HEMOGLOBIN g/dL 12.6 12.7   PLATELETS 10*3/mm3 175 181             Assessment / Plan     ASSESSMENT:  -Hyponatremia appears to be euvolemic with this history of multiple malignancies concern about SIADH especially with the stage IV glioblastoma.  All the parameters suggestive of SIADH except having elevated uric acid she is responding to fluid restriction, sodium today 130 and potassium 5.21  -Prior right partial nephrectomy for renal cell cancer  -Adenocarcinoma of the lung status postresection  -Melanoma of the left heel status post resect  -Bilateral mastectomy because of BRCA2 mutation  -Hyperkalemia, potassium 5.2, patient was on lisinopril  which was discontinued    PLAN:  -Continue fluid restriction  -Continue the same treatment  -Surveillance labs    I discussed the case with Dr. Araujo    Thank you for involving us in the care of Christie Avendaño.  Please feel free to call with any questions.    Golden Gonzalez MD  12/21/22  08:41 [facility]    [facility]  [phone]    Please note that portions of this note were completed with a voice recognition program.

## 2022-12-21 NOTE — NURSING NOTE
"Diabetes Education  Assessment/Teaching    Patient Name:  Christie Avendaño  YOB: 1965  MRN: [id number]  Admit Date:  12/7/2022      Assessment Date:  12/21/2022  Flowsheet Row Most Recent Value   General Information     Referral From: MD order. Meet with pt's spouse at bedside for insulin instructions ahead of dc.    Height 165.1 cm (65\")   Height Method Stated   Weight 110 kg (241 lb 6.5 oz)   Weight Method Bed scale   Diabetes History    What type of diabetes do you have? Type 2   Length of Diabetes Diagnosis -- 2-3 yr hx per spouse.   Current DM knowledge good   How would you rate your diabetes control? good   Education Preferences    Barriers to Learning -- no learning barriers noted at this time.   Assessment Topics    Taking Medication - Assessment Needs education -anticipate pt to dc home new to insulin.   Problem Solving - Assessment Needs education   Healthy Coping - Assessment Competent   Monitoring - Assessment Competent   DM Goals    Contact Plan Follow-up medical care          Flowsheet Row Most Recent Value   DM Education Needs    Meter Has own   Frequency of Testing -- advise pt's spouse to check a BG before administering insulin- to ensure BG is not low.    Medication Insulin names, Administration, Pen -instruct pt's spouse on Lantus, lispro names and correct use of insulin pen.   Problem Solving Hypoglycemia   Healthy Coping Appropriate   Discharge Plan Home, Follow-up with MD   Motivation Engaged   Teaching Method Explanation, Discussion, Demonstration   Patient Response Verbalized understanding      Other Comments:    Electronically signed by:  Mar Fonseca, RN, BSN  12/21/22 16:42 EST  "

## 2022-12-21 NOTE — THERAPY TREATMENT NOTE
Inpatient Rehabilitation - Occupational Therapy Treatment Note    [facility]     Patient Name: Christie Avendaño  : 1965  MRN: [id number]    Today's Date: 2022                 Admit Date: 2022         ICD-10-CM ICD-9-CM   1. Impaired functional mobility, balance, gait, and endurance  Z74.09 V49.89   2. 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)   • Non-small cell lung cancer, right (HCC)   • Renal mass, right   • SIRS (systemic inflammatory response syndrome) (HCC)   • Hyperlipidemia   • Malignant melanoma of right lower extremity including hip (HCC)   • High grade glioma    • 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)   • Glioma (HCC)       Past Medical History:   Diagnosis Date   • Arthritis    • Asthma    • BRCA2 positive    • Diabetes mellitus (HCC)    • H/O Liver masses 2017    x3   • H/O Lung masses 2017    1 in right lower lobe and 1 in the left infrahilar location   • H/O Ovarian cyst    • H/O Right adrenal mass (CMS/HCC) 2017   • Lung cancer (HCC)    • Melanoma (HCC)     right foot   • PONV (postoperative nausea and vomiting)    • Thyroid disease        Past Surgical History:   Procedure Laterality Date   • APPENDECTOMY     • BRAIN BIOPSY Right 2022    Procedure: Right frontal stereotactic needle brain biopsy;  Surgeon: Manuel Thompson MD;  Location: [facility];  Service: Neurosurgery;  Laterality: Right;   • BREAST IMPLANT SURGERY Bilateral    • CHOLECYSTECTOMY     • HYSTERECTOMY     • MASTECTOMY  Bilateral    • OVARIAN CYST SURGERY     • THORACOSCOPY VIDEO ASSISTED WITH LOBECTOMY Right 10/9/2020    Procedure: BRONCHOSCOPY, THORACOSCOPY VIDEO ASSISTED WITH ANTERIOR BASAL SEGMENTECTOMY, INTERCOSTAL NERVE BLOCK;  Surgeon: Flaca Crisostomo MD;  Location: [facility] MAIN OR;  Service: Thoracic;  Laterality: Right;   • TONSILLECTOMY               IRF OT ASSESSMENT FLOWSHEET (last 12 hours)     IRF OT Evaluation and Treatment     Row Name 12/21/22 1556 12/21/22 1325       OT Time and Intention    Document Type daily treatment  -CC daily treatment  -CC    Mode of Treatment occupational therapy  -CC occupational therapy  -CC    Patient Effort good  -CC good  -CC    Row Name 12/21/22 1556 12/21/22 1323       Pain Assessment    Pretreatment Pain Rating 0/10 - no pain  -CC 0/10 - no pain  -CC    Posttreatment Pain Rating 0/10 - no pain  -CC 0/10 - no pain  -CC    Row Name 12/21/22 1556          Cognition/Psychosocial    Affect/Mental Status (Cognition) flat/blunted affect  -CC     Orientation Status (Cognition) oriented x 4  -CC     Follows Commands (Cognition) follows one-step commands;follows two-step commands;delayed response/completion;increased processing time needed  -CC     Attention Deficit (Cognition) concentration;distractible in noisy environment  -CC     Row Name 12/21/22 1558          Vision Assessment/Intervention    Visual Processing Deficit nicolette-inattention/neglect, left;visual attention, left  -CC     Brain Injury Visual Assessment Battery for Adults (biVABA) pt required min vc w muli piece geometric puzzle in puzzle form.  -CC     Row Name 12/21/22 1322          Bathing    [place] Level (Bathing) bathing skills;upper body;lower body;contact guard assist;minimum assist (75% patient effort)  -     Assistive Device (Bathing) hand held shower spray hose;tub bench  -     Position (Bathing) supported sitting  -     Set-up Assistance (Bathing) adjust water temperature;obtain supplies  -     Row Name  12/21/22 1325          Upper Body Dressing    [place] Level (Upper Body Dressing) upper body dressing skills;doff;pull over garment;bra/undergarment;don;minimum assist (75% or more patient effort)  -CC     Position (Upper Body Dressing) supported sitting  -CC     Set-up Assistance (Upper Body Dressing) obtain clothing  -CC     Row Name 12/21/22 1325          Lower Body Dressing    [place] Level (Lower Body Dressing) doff;don;pants/bottoms;socks;set up;verbal cues;minimum assist (75% patient effort)  -CC     Position (Lower Body Dressing) supported sitting;supported standing  -CC     Set-up Assistance (Lower Body Dressing) obtain clothing  -CC     Row Name 12/21/22 1325          Grooming    [place] Level (Grooming) grooming skills;deodorant application;oral care regimen;wash face, hands;set up;standby assist  -CC     Position (Grooming) sink side;supported sitting  -CC     Set-up Assistance (Grooming) obtain supplies  -     Row Name 12/21/22 1325          Bed Mobility    Rolling Left [place] (Bed Mobility) standby assist  -CC     Supine-Sit [place] (Bed Mobility) minimum assist (75% patient effort)  -CC     Row Name 12/21/22 1325          Bed-Chair Transfer    Bed-Chair [place] (Transfers) verbal cues;minimum assist (75% patient effort)  -     Assistive Device (Bed-Chair Transfers) wheelchair  -CC     Row Name 12/21/22 1325          Sit-Stand Transfer    Sit-Stand [place] (Transfers) set up;verbal cues;contact guard  -CC     Assistive Device (Sit-Stand Transfers) wheelchair  -CC     Row Name 12/21/22 1325          Stand-Sit Transfer    Stand-Sit [place] (Transfers) set up;verbal cues;contact guard  -CC     Assistive Device (Stand-Sit Transfers) wheelchair  -CC     Row Name 12/21/22 1325          Shower Transfer    Type (Shower Transfer) stand pivot/stand step  -CC     [place] Level (Shower Transfer) contact guard  -CC     Assistive Device (Shower Transfer) grab bar,  tub/shower;tub bench  -CC     Row Name 12/21/22 1556          Motor Skills    Coordination fine motor deficit;left;upper extremity  -CC     Motor Control/Coordination Interventions fine motor manipulation/dexterity activities  pt rached for large pegs and placed in foam peg baord on incline table with vc to attend to L of board and rest breaks after every 5-6. Pt did scan to L better to locate pieces  -CC     Row Name 12/21/22 1556          Balance    Static Sitting Balance supervision  -CC     Row Name 12/21/22 1556 12/21/22 1325       Positioning and Restraints    Pre-Treatment Position sitting in chair/recliner  -CC in bed  -CC    Post Treatment Position wheelchair  -CC wheelchair  -CC    In Wheelchair sitting;with other staff;exit alarm on  RT  -CC sitting;with nsg;exit alarm on  -CC          User Key  (r) = Recorded By, (t) = Taken By, (c) = Cosigned By    Initials Name Effective Dates    CC Yissel Goel, OTR 06/16/21 -                  Occupational Therapy Education     Title: PT OT SLP Therapies (Done)     Topic: Occupational Therapy (Done)     Point: ADL training (Done)     Description:   Instruct learner(s) on proper safety adaptation and remediation techniques during self care or transfers.   Instruct in proper use of assistive devices.              Learning Progress Summary           Patient Acceptance, E, VU by CC at 12/20/2022 1610    Comment: Family conf w ppt, spouse, dgts and father. status, home needs, DME and follow up therapy discussed. 3-1 ordered. Pt has shower seat and grab bars.    Acceptance, E, VU by WN at 12/12/2022 2327    Acceptance, E, VU by WN at 12/12/2022 0155    Acceptance, E, VU by CB at 12/8/2022 1204    Acceptance, E, VU,NR by CE at 12/8/2022 1107    Comment: fall prevention, DME including w/c and shower chair                   Point: Home exercise program (Done)     Description:   Instruct learner(s) on appropriate technique for monitoring, assisting and/or progressing  therapeutic exercises/activities.              Learning Progress Summary           Patient Acceptance, E, VU by CC at 12/20/2022 1610    Comment: Family conf w ppt, spouse, dgts and father. status, home needs, DME and follow up therapy discussed. 3-1 ordered. Pt has shower seat and grab bars.    Acceptance, E, VU by WN at 12/12/2022 2327    Acceptance, E, VU by WN at 12/12/2022 0155    Acceptance, E, VU by CB at 12/8/2022 1204    Acceptance, E, VU,NR by CE at 12/8/2022 1107    Comment: fall prevention, DME including w/c and shower chair                   Point: Precautions (Done)     Description:   Instruct learner(s) on prescribed precautions during self-care and functional transfers.              Learning Progress Summary           Patient Acceptance, E, VU by CC at 12/20/2022 1610    Comment: Family conf w ppt, spouse, dgts and father. status, home needs, DME and follow up therapy discussed. 3-1 ordered. Pt has shower seat and grab bars.    Acceptance, E, VU by WN at 12/12/2022 2327    Acceptance, E, VU by WN at 12/12/2022 0155    Acceptance, E, VU by CB at 12/8/2022 1204    Acceptance, E, VU,NR by CE at 12/8/2022 1107    Comment: fall prevention, DME including w/c and shower chair                   Point: Body mechanics (Done)     Description:   Instruct learner(s) on proper positioning and spine alignment during self-care, functional mobility activities and/or exercises.              Learning Progress Summary           Patient Acceptance, E, VU by CC at 12/20/2022 1610    Comment: Family conf w ppt, spouse, dgts and father. status, home needs, DME and follow up therapy discussed. 3-1 ordered. Pt has shower seat and grab bars.    Acceptance, E, VU by WN at 12/12/2022 2327    Acceptance, E, VU by WN at 12/12/2022 0155    Acceptance, E, VU by CB at 12/8/2022 1204    Acceptance, E, VU,NR by CE at 12/8/2022 1107    Comment: fall prevention, DME including w/c and shower chair                               User Key      Initials Effective Dates Name Provider Type Discipline    CC 06/16/21 -  Yissel Goel OTR Occupational Therapist OT    WN 08/23/22 -  Doc Park, RN Registered Nurse Nurse    CB 11/10/22 -  Mackenzie Hopkins CCC-SLP Speech and Language Pathologist SLP    CE 10/17/22 -  Portia Esqueda OT Occupational Therapist OT                    OT Recommendation and Plan                         Time Calculation:      Time Calculation- OT     Row Name 12/21/22 1330 12/21/22 0930          Time Calculation- OT    OT Start Time 1330  -CC 0930  -CC     OT Stop Time 1400  -CC 1000  -CC     OT Time Calculation (min) 30 min  -CC 30 min  -CC           User Key  (r) = Recorded By, (t) = Taken By, (c) = Cosigned By    Initials Name Provider Type    CC Yissel Goel OTR Occupational Therapist              Therapy Charges for Today     Code Description Service Date Service Provider Modifiers Qty    62029006658 HC OT SELF CARE/MGMT/TRAIN EA 15 MIN 12/20/2022 Yissel Goel OTR GO 2    84437314361 HC OT NEUROMUSC RE EDUCATION EA 15 MIN 12/20/2022 Yissel Goel OTR GO 2    60817029515 HC OT SELF CARE/MGMT/TRAIN EA 15 MIN 12/21/2022 Yissel Goel OTR GO 2    43648720888 HC OT NEUROMUSC RE EDUCATION EA 15 MIN 12/21/2022 Yissel Goel OTR GO 2                   SHARON Vargas  12/21/2022

## 2022-12-21 NOTE — PROGRESS NOTES
[facility] Medicine Services  PROGRESS NOTE    Patient Name: Christie Avendaño  : 1965  MRN: [id number]    Date of Admission: 2022  Primary Care Physician: Tiffany Holloway MD    Subjective   Subjective     CC:  Follow-up consult for medical management    Subjective: Patient says she is doing well and that rehab is going well.  She denies any other new complaints.    Review of Systems  No current fevers or chills  No current shortness of breath or cough  No current nausea, vomiting, or diarrhea  No current chest pain or palpitations      Objective   Objective     Vital Signs:   Temp:  [97.6 °F (36.4 °C)-98.6 °F (37 °C)] 97.6 °F (36.4 °C)  Heart Rate:  [60-67] 65  Resp:  [16-18] 18  BP: (103-119)/(57-75) 114/68        Physical Exam:  Constitutional:Awake, alert  HENT: NCAT, mucous membranes moist, neck supple  Respiratory: No cough or wheezing, respiratory effort normal, nonlabored breathing   Cardiovascular: Rate is regular, normal radial pulses  Gastrointestinal:  soft, nontender, nondistended  Musculoskeletal: Normal musculature for age, no lower extremity edema, BMI  Psychiatric: Appropriate affect, cooperative, conversational  Neurologic: No slurred speech or facial droop, follows commands  Skin: No rashes or jaundice, warm      Results Reviewed:  Results from last 7 days   Lab Units 22  0552 12/15/22  0607   WBC 10*3/mm3 9.55 12.40*   HEMOGLOBIN g/dL 12.6 12.7   HEMATOCRIT % 36.6 38.2   PLATELETS 10*3/mm3 175 181     Results from last 7 days   Lab Units 22  0544 22  0833 22  0552   SODIUM mmol/L 130* 129* 126*   POTASSIUM mmol/L 5.2 5.3* 4.8   CHLORIDE mmol/L 92* 93* 91*   CO2 mmol/L 30.0* 28.0 27.8   BUN mg/dL 26* 28* 30*   CREATININE mg/dL 0.85 0.85 0.82   GLUCOSE mg/dL 140* 148* 190*   CALCIUM mg/dL 9.5 9.7 9.4   ALT (SGPT) U/L 203* 193* 220*   AST (SGOT) U/L 43* 32 34*     Estimated Creatinine Clearance: 89.1 mL/min (by C-G formula based on SCr of 0.85  mg/dL).    Microbiology Results Abnormal     None          Imaging Results (Last 24 Hours)     ** No results found for the last 24 hours. **              I have reviewed the medications:  Scheduled Meds:[START ON 12/26/2022] buPROPion, 100 mg, Oral, Q12H  calcium 500 mg vitamin D 5 mcg (200 UT), 1 tablet, Oral, BID  dexamethasone, 4 mg, Oral, Q8H  enoxaparin, 40 mg, Subcutaneous, Q12H  escitalopram, 5 mg, Oral, Daily  gabapentin, 100 mg, Oral, TID  insulin glargine, 40 Units, Subcutaneous, QAM  insulin lispro, 0-14 Units, Subcutaneous, TID AC  insulin lispro, 9 Units, Subcutaneous, TID With Meals  latanoprost, 1 drop, Both Eyes, Nightly  levETIRAcetam, 500 mg, Oral, BID  levothyroxine, 150 mcg, Oral, Q AM  magnesium oxide, 400 mg, Oral, Daily  ondansetron, 8 mg, Oral, Q24H  pantoprazole, 40 mg, Oral, Q AM  polyethylene glycol, 17 g, Oral, Daily  propranolol, 20 mg, Oral, Q8H  sulfamethoxazole-trimethoprim, 1 tablet, Oral, Once per day on Mon Wed Fri  temozolomide, 140 mg, Oral, Nightly   And  temozolomide, 20 mg, Oral, Nightly   And  temozolomide, 5 mg, Oral, Nightly  valACYclovir, 500 mg, Oral, Q24H      Continuous Infusions:   PRN Meds:.•  calcium carbonate  •  dextrose  •  dextrose  •  famotidine  •  glucagon (human recombinant)  •  influenza vaccine  •  nitroglycerin  •  ondansetron **OR** ondansetron  •  senna-docusate sodium    Assessment & Plan   Assessment & Plan     Active Hospital Problems    Diagnosis  POA   • **Glioma (HCC) [C71.9]  Yes      Resolved Hospital Problems   No resolved problems to display.        Brief Hospital Course to date:  Christie Avendaño is a 57 y.o. female with glioma.    Discussion/plan:  All medical issues are new under my management today.  Treatment plan discussed with the patient who is in agreement  Glucose reviewed.  Slight adjustment and prandial insulin today.  Continue basal and correction scale for now.  I think the most important thing to keep in mind is when at some point  steroids are tapered patient will need to have very careful monitoring of blood sugars and gradual decrease in insulin dosage to avoid risk of hypoglycemia.  I discussed this with the patient who says she is understanding.    Glioblastoma:  As per oncology.  Currently on dexamethasone and Keppra    History of cancers:  Managed by oncology.    Neuropathy: Neurontin.    Transaminitis:  Multiple possible etiologies.  Only mildly elevated currently.  Monitor intermittently.    Essential hypertension: Now off lisinopril.  Currently normotensive.  Avoid hypotension.    Hyponatremia: Management per nephrology.    DVT Prophylaxis: Lovenox        CODE STATUS:   Code Status and Medical Interventions:   Ordered at: 12/07/22 1704     Code Status (Patient has no pulse and is not breathing):    CPR (Attempt to Resuscitate)     Medical Interventions (Patient has pulse or is breathing):    Full Support       Trevin Horne MD  12/21/22

## 2022-12-21 NOTE — PROGRESS NOTES
Recreational Therapy Note    Patient Name: Christie Avendaño   MRN: [id number]    Therapeutic Recreation Eval and Treat (last 12 hours)     Therapeutic Recreation Eval & Treat     Row Name 12/21/22 1500       Therapeutic Recreation Participation    Recreation Therapy Participation arts/crafts  -TW    Art/Crafts other (see comments)  Holiday Ornament Craft  -TW    Objectives of Recreation Participation maintain;motivation and activity level through successful participation;sense of autonomy by choosing level of participation  -TW    Comment, Recreation Participation occ assist with stickers; used L to assist with craft  -TW    Recreation Therapy Summary of Participation active participation  -TW          User Key  (r) = Recorded By, (t) = Taken By, (c) = Cosigned By    Initials Name Provider Type    Kiesha Davila CTRS Recreational Therapist                  PAULA Dupree  12/21/2022

## 2022-12-21 NOTE — THERAPY TREATMENT NOTE
Inpatient Rehabilitation - Occupational Therapy Treatment Note    Psychiatric     Patient Name: Christie Avendaño  : 1965  MRN: [id number]    Today's Date: 2022                 Admit Date: 2022         ICD-10-CM ICD-9-CM   1. Impaired functional mobility, balance, gait, and endurance  Z74.09 V49.89   2. 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)   • Non-small cell lung cancer, right (HCC)   • Renal mass, right   • SIRS (systemic inflammatory response syndrome) (HCC)   • Hyperlipidemia   • Malignant melanoma of right lower extremity including hip (HCC)   • High grade glioma    • 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)   • Glioma (HCC)       Past Medical History:   Diagnosis Date   • Arthritis    • Asthma    • BRCA2 positive    • Diabetes mellitus (HCC)    • H/O Liver masses 2017    x3   • H/O Lung masses 2017    1 in right lower lobe and 1 in the left infrahilar location   • H/O Ovarian cyst    • H/O Right adrenal mass (CMS/HCC) 2017   • Lung cancer (HCC)    • Melanoma (HCC)     right foot   • PONV (postoperative nausea and vomiting)    • Thyroid disease        Past Surgical History:   Procedure Laterality Date   • APPENDECTOMY     • BRAIN BIOPSY Right 2022    Procedure: Right frontal stereotactic needle brain biopsy;  Surgeon: Manuel Thompson MD;  Location: [facility];  Service: Neurosurgery;  Laterality: Right;   • BREAST IMPLANT SURGERY Bilateral    • CHOLECYSTECTOMY     • HYSTERECTOMY     • MASTECTOMY  Bilateral    • OVARIAN CYST SURGERY     • THORACOSCOPY VIDEO ASSISTED WITH LOBECTOMY Right 10/9/2020    Procedure: BRONCHOSCOPY, THORACOSCOPY VIDEO ASSISTED WITH ANTERIOR BASAL SEGMENTECTOMY, INTERCOSTAL NERVE BLOCK;  Surgeon: Flaca Crisostomo MD;  Location: [facility] OR;  Service: Thoracic;  Laterality: Right;   • TONSILLECTOMY               IRF OT ASSESSMENT FLOWSHEET (last 12 hours)     IRF OT Evaluation and Treatment     Row Name 12/21/22 1325          OT Time and Intention    Document Type daily treatment  -CC     Mode of Treatment occupational therapy  -CC     Patient Effort good  -CC     Row Name 12/21/22 1325          Pain Assessment    Pretreatment Pain Rating 0/10 - no pain  -CC     Posttreatment Pain Rating 0/10 - no pain  -CC     Row Name 12/21/22 1325          Bathing    [place] Level (Bathing) bathing skills;upper body;lower body;contact guard assist;minimum assist (75% patient effort)  -CC     Assistive Device (Bathing) hand held shower spray hose;tub bench  -CC     Position (Bathing) supported sitting  -CC     Set-up Assistance (Bathing) adjust water temperature;obtain supplies  -CC     Row Name 12/21/22 1325          Upper Body Dressing    [place] Level (Upper Body Dressing) upper body dressing skills;doff;pull over garment;bra/undergarment;don;minimum assist (75% or more patient effort)  -CC     Position (Upper Body Dressing) supported sitting  -CC     Set-up Assistance (Upper Body Dressing) obtain clothing  -CC     Row Name 12/21/22 1325          Lower Body Dressing    [place] Level (Lower Body Dressing) doff;don;pants/bottoms;socks;set up;verbal cues;minimum assist (75% patient effort)  -CC     Position (Lower Body Dressing) supported sitting;supported standing  -CC     Set-up Assistance (Lower Body Dressing) obtain clothing  -CC     Row Name 12/21/22 1325          Grooming    [place] Level (Grooming) grooming skills;deodorant application;oral care regimen;wash face,  hands;set up;standby assist  -CC     Position (Grooming) sink side;supported sitting  -CC     Set-up Assistance (Grooming) obtain supplies  -CC     Row Name 12/21/22 1325          Bed Mobility    Rolling Left [place] (Bed Mobility) standby assist  -CC     Supine-Sit [place] (Bed Mobility) minimum assist (75% patient effort)  -CC     Row Name 12/21/22 1325          Bed-Chair Transfer    Bed-Chair [place] (Transfers) verbal cues;minimum assist (75% patient effort)  -     Assistive Device (Bed-Chair Transfers) wheelchair  -CC     Row Name 12/21/22 1325          Sit-Stand Transfer    Sit-Stand [place] (Transfers) set up;verbal cues;contact guard  -CC     Assistive Device (Sit-Stand Transfers) wheelchair  -CC     Row Name 12/21/22 1325          Stand-Sit Transfer    Stand-Sit [place] (Transfers) set up;verbal cues;contact guard  -CC     Assistive Device (Stand-Sit Transfers) wheelchair  -CC     Row Name 12/21/22 1325          Shower Transfer    Type (Shower Transfer) stand pivot/stand step  -CC     [place] Level (Shower Transfer) contact guard  -CC     Assistive Device (Shower Transfer) grab bar, tub/shower;tub bench  -CC     Row Name 12/21/22 1325          Positioning and Restraints    Pre-Treatment Position in bed  -CC     Post Treatment Position wheelchair  -CC     In Wheelchair sitting;with nsg;exit alarm on  -CC           User Key  (r) = Recorded By, (t) = Taken By, (c) = Cosigned By    Initials Name Effective Dates    CC Yissel Goel, OTR 06/16/21 -                  Occupational Therapy Education     Title: PT OT SLP Therapies (Done)     Topic: Occupational Therapy (Done)     Point: ADL training (Done)     Description:   Instruct learner(s) on proper safety adaptation and remediation techniques during self care or transfers.   Instruct in proper use of assistive devices.              Learning Progress Summary           Patient Acceptance, E, VU by CC at 12/20/2022 3470     Comment: Family conf w ppt, spouse, dgts and father. status, home needs, DME and follow up therapy discussed. 3-1 ordered. Pt has shower seat and grab bars.    Acceptance, E, VU by WN at 12/12/2022 2327    Acceptance, E, VU by WN at 12/12/2022 0155    Acceptance, E, VU by CB at 12/8/2022 1204    Acceptance, E, VU,NR by CE at 12/8/2022 1107    Comment: fall prevention, DME including w/c and shower chair                   Point: Home exercise program (Done)     Description:   Instruct learner(s) on appropriate technique for monitoring, assisting and/or progressing therapeutic exercises/activities.              Learning Progress Summary           Patient Acceptance, E, VU by CC at 12/20/2022 1610    Comment: Family conf w ppt, spouse, dgts and father. status, home needs, DME and follow up therapy discussed. 3-1 ordered. Pt has shower seat and grab bars.    Acceptance, E, VU by WN at 12/12/2022 2327    Acceptance, E, VU by WN at 12/12/2022 0155    Acceptance, E, VU by CB at 12/8/2022 1204    Acceptance, E, VU,NR by CE at 12/8/2022 1107    Comment: fall prevention, DME including w/c and shower chair                   Point: Precautions (Done)     Description:   Instruct learner(s) on prescribed precautions during self-care and functional transfers.              Learning Progress Summary           Patient Acceptance, E, VU by CC at 12/20/2022 1610    Comment: Family conf w ppt, spouse, dgts and father. status, home needs, DME and follow up therapy discussed. 3-1 ordered. Pt has shower seat and grab bars.    Acceptance, E, VU by WN at 12/12/2022 2327    Acceptance, E, VU by WN at 12/12/2022 0155    Acceptance, E, VU by CB at 12/8/2022 1204    Acceptance, E, VU,NR by CE at 12/8/2022 1107    Comment: fall prevention, DME including w/c and shower chair                   Point: Body mechanics (Done)     Description:   Instruct learner(s) on proper positioning and spine alignment during self-care, functional mobility activities  and/or exercises.              Learning Progress Summary           Patient Acceptance, E, VU by CC at 12/20/2022 1610    Comment: Family conf w ppt, spouse, dgts and father. status, home needs, DME and follow up therapy discussed. 3-1 ordered. Pt has shower seat and grab bars.    Acceptance, E, VU by WN at 12/12/2022 2327    Acceptance, E, VU by WN at 12/12/2022 0155    Acceptance, E, VU by CB at 12/8/2022 1204    Acceptance, E, VU,NR by CE at 12/8/2022 1107    Comment: fall prevention, DME including w/c and shower chair                               User Key     Initials Effective Dates Name Provider Type Discipline    CC 06/16/21 -  Yissel Goel OTR Occupational Therapist OT    WN 08/23/22 -  oDc Park, RN Registered Nurse Nurse    CB 11/10/22 -  Mackenzie Hopkins CCC-SLP Speech and Language Pathologist SLP    CE 10/17/22 -  Portia Esqueda OT Occupational Therapist OT                    OT Recommendation and Plan                         Time Calculation:      Time Calculation- OT     Row Name 12/21/22 0930             Time Calculation- OT    OT Start Time 0930  -CC      OT Stop Time 1000  -CC      OT Time Calculation (min) 30 min  -CC            User Key  (r) = Recorded By, (t) = Taken By, (c) = Cosigned By    Initials Name Provider Type     Yissel Goel OTR Occupational Therapist              Therapy Charges for Today     Code Description Service Date Service Provider Modifiers Qty    14634772652 HC OT SELF CARE/MGMT/TRAIN EA 15 MIN 12/20/2022 Yissel Goel OTR GO 2    71597952816 HC OT NEUROMUSC RE EDUCATION EA 15 MIN 12/20/2022 Yissel Goel OTR GO 2    37604792734 HC OT SELF CARE/MGMT/TRAIN EA 15 MIN 12/21/2022 Yissel Goel OTR GO 2                   SHARON Vargas  12/21/2022

## 2022-12-22 ENCOUNTER — TREATMENT (OUTPATIENT)
Dept: RADIATION ONCOLOGY | Facility: HOSPITAL | Age: 57
End: 2022-12-22

## 2022-12-22 LAB
ALBUMIN SERPL-MCNC: 3.8 G/DL (ref 3.5–5.2)
ALBUMIN/GLOB SERPL: 1.7 G/DL
ALP SERPL-CCNC: 164 U/L (ref 39–117)
ALT SERPL W P-5'-P-CCNC: 225 U/L (ref 1–33)
ANION GAP SERPL CALCULATED.3IONS-SCNC: 10.3 MMOL/L (ref 5–15)
AST SERPL-CCNC: 49 U/L (ref 1–32)
BILIRUB SERPL-MCNC: 0.6 MG/DL (ref 0–1.2)
BUN SERPL-MCNC: 29 MG/DL (ref 6–20)
BUN/CREAT SERPL: 33 (ref 7–25)
CALCIUM SPEC-SCNC: 9.5 MG/DL (ref 8.6–10.5)
CHLORIDE SERPL-SCNC: 94 MMOL/L (ref 98–107)
CO2 SERPL-SCNC: 27.7 MMOL/L (ref 22–29)
CREAT SERPL-MCNC: 0.88 MG/DL (ref 0.57–1)
DEPRECATED RDW RBC AUTO: 46.6 FL (ref 37–54)
EGFRCR SERPLBLD CKD-EPI 2021: 76.3 ML/MIN/1.73
ERYTHROCYTE [DISTWIDTH] IN BLOOD BY AUTOMATED COUNT: 16.3 % (ref 12.3–15.4)
GLOBULIN UR ELPH-MCNC: 2.3 GM/DL
GLUCOSE BLDC GLUCOMTR-MCNC: 157 MG/DL (ref 70–130)
GLUCOSE BLDC GLUCOMTR-MCNC: 179 MG/DL (ref 70–130)
GLUCOSE BLDC GLUCOMTR-MCNC: 230 MG/DL (ref 70–130)
GLUCOSE BLDC GLUCOMTR-MCNC: 247 MG/DL (ref 70–130)
GLUCOSE SERPL-MCNC: 172 MG/DL (ref 65–99)
HCT VFR BLD AUTO: 37.3 % (ref 34–46.6)
HGB BLD-MCNC: 12.4 G/DL (ref 12–15.9)
MAGNESIUM SERPL-MCNC: 2.3 MG/DL (ref 1.6–2.6)
MCH RBC QN AUTO: 27.3 PG (ref 26.6–33)
MCHC RBC AUTO-ENTMCNC: 33.2 G/DL (ref 31.5–35.7)
MCV RBC AUTO: 82 FL (ref 79–97)
PHOSPHATE SERPL-MCNC: 4 MG/DL (ref 2.5–4.5)
PLATELET # BLD AUTO: 169 10*3/MM3 (ref 140–450)
PMV BLD AUTO: 9.5 FL (ref 6–12)
POTASSIUM SERPL-SCNC: 5.1 MMOL/L (ref 3.5–5.2)
PROT SERPL-MCNC: 6.1 G/DL (ref 6–8.5)
RAD ONC ARIA COURSE ID: NORMAL
RAD ONC ARIA COURSE INTENT: NORMAL
RAD ONC ARIA COURSE LAST TREATMENT DATE: NORMAL
RAD ONC ARIA COURSE START DATE: NORMAL
RAD ONC ARIA COURSE TREATMENT ELAPSED DAYS: 10
RAD ONC ARIA FIRST TREATMENT DATE: NORMAL
RAD ONC ARIA PLAN FRACTIONS TREATED TO DATE: 9
RAD ONC ARIA PLAN ID: NORMAL
RAD ONC ARIA PLAN PRESCRIBED DOSE PER FRACTION: 2 GY
RAD ONC ARIA PLAN PRIMARY REFERENCE POINT: NORMAL
RAD ONC ARIA PLAN TOTAL FRACTIONS PRESCRIBED: 23
RAD ONC ARIA PLAN TOTAL PRESCRIBED DOSE: 4600 CGY
RAD ONC ARIA REFERENCE POINT DOSAGE GIVEN TO DATE: 18 GY
RAD ONC ARIA REFERENCE POINT DOSAGE GIVEN TO DATE: 18.22 GY
RAD ONC ARIA REFERENCE POINT ID: NORMAL
RAD ONC ARIA REFERENCE POINT ID: NORMAL
RAD ONC ARIA REFERENCE POINT SESSION DOSAGE GIVEN: 2 GY
RAD ONC ARIA REFERENCE POINT SESSION DOSAGE GIVEN: 2.02 GY
RBC # BLD AUTO: 4.55 10*6/MM3 (ref 3.77–5.28)
SODIUM SERPL-SCNC: 132 MMOL/L (ref 136–145)
URATE SERPL-MCNC: 5.6 MG/DL (ref 2.4–5.7)
WBC NRBC COR # BLD: 8.43 10*3/MM3 (ref 3.4–10.8)

## 2022-12-22 PROCEDURE — 97129 THER IVNTJ 1ST 15 MIN: CPT

## 2022-12-22 PROCEDURE — 80053 COMPREHEN METABOLIC PANEL: CPT | Performed by: PHYSICAL MEDICINE & REHABILITATION

## 2022-12-22 PROCEDURE — 77386 CHG INTENSITY MODULATED RADIATION TX DLVR COMPLEX: CPT | Performed by: RADIOLOGY

## 2022-12-22 PROCEDURE — 25010000002 TEMOZOLOMIDE PER 5 MG: Performed by: INTERNAL MEDICINE

## 2022-12-22 PROCEDURE — 97112 NEUROMUSCULAR REEDUCATION: CPT

## 2022-12-22 PROCEDURE — 97530 THERAPEUTIC ACTIVITIES: CPT

## 2022-12-22 PROCEDURE — 97110 THERAPEUTIC EXERCISES: CPT

## 2022-12-22 PROCEDURE — 84100 ASSAY OF PHOSPHORUS: CPT | Performed by: INTERNAL MEDICINE

## 2022-12-22 PROCEDURE — 85027 COMPLETE CBC AUTOMATED: CPT | Performed by: PHYSICAL MEDICINE & REHABILITATION

## 2022-12-22 PROCEDURE — 25010000002 ENOXAPARIN PER 10 MG: Performed by: PHYSICAL MEDICINE & REHABILITATION

## 2022-12-22 PROCEDURE — 97535 SELF CARE MNGMENT TRAINING: CPT

## 2022-12-22 PROCEDURE — 25010000002 TEMOZOLOMIDE 140 MG CAPSULE: Performed by: INTERNAL MEDICINE

## 2022-12-22 PROCEDURE — 63710000001 INSULIN LISPRO (HUMAN) PER 5 UNITS: Performed by: HOSPITALIST

## 2022-12-22 PROCEDURE — 82962 GLUCOSE BLOOD TEST: CPT

## 2022-12-22 PROCEDURE — 84550 ASSAY OF BLOOD/URIC ACID: CPT | Performed by: INTERNAL MEDICINE

## 2022-12-22 PROCEDURE — 97116 GAIT TRAINING THERAPY: CPT

## 2022-12-22 PROCEDURE — 99232 SBSQ HOSP IP/OBS MODERATE 35: CPT | Performed by: INTERNAL MEDICINE

## 2022-12-22 PROCEDURE — 83735 ASSAY OF MAGNESIUM: CPT | Performed by: INTERNAL MEDICINE

## 2022-12-22 PROCEDURE — 63710000001 INSULIN LISPRO (HUMAN) PER 5 UNITS: Performed by: INTERNAL MEDICINE

## 2022-12-22 PROCEDURE — 77386: CPT | Performed by: RADIOLOGY

## 2022-12-22 PROCEDURE — 63710000001 DEXAMETHASONE PER 0.25 MG: Performed by: PHYSICAL MEDICINE & REHABILITATION

## 2022-12-22 PROCEDURE — 63710000001 ONDANSETRON PER 8 MG: Performed by: INTERNAL MEDICINE

## 2022-12-22 PROCEDURE — 97130 THER IVNTJ EA ADDL 15 MIN: CPT

## 2022-12-22 RX ADMIN — DEXAMETHASONE 4 MG: 4 TABLET ORAL at 21:22

## 2022-12-22 RX ADMIN — INSULIN GLARGINE-YFGN 40 UNITS: 100 INJECTION, SOLUTION SUBCUTANEOUS at 08:57

## 2022-12-22 RX ADMIN — ESCITALOPRAM 5 MG: 5 TABLET, FILM COATED ORAL at 08:56

## 2022-12-22 RX ADMIN — LEVETIRACETAM 500 MG: 500 TABLET, FILM COATED ORAL at 21:22

## 2022-12-22 RX ADMIN — INSULIN LISPRO 5 UNITS: 100 INJECTION, SOLUTION INTRAVENOUS; SUBCUTANEOUS at 11:57

## 2022-12-22 RX ADMIN — PANTOPRAZOLE SODIUM 40 MG: 40 TABLET, DELAYED RELEASE ORAL at 05:43

## 2022-12-22 RX ADMIN — LATANOPROST 1 DROP: 50 SOLUTION OPHTHALMIC at 21:31

## 2022-12-22 RX ADMIN — INSULIN LISPRO 9 UNITS: 100 INJECTION, SOLUTION INTRAVENOUS; SUBCUTANEOUS at 17:52

## 2022-12-22 RX ADMIN — GABAPENTIN 100 MG: 100 CAPSULE ORAL at 08:56

## 2022-12-22 RX ADMIN — TEMOZOLOMIDE 140 MG: 140 CAPSULE ORAL at 21:24

## 2022-12-22 RX ADMIN — INSULIN LISPRO 3 UNITS: 100 INJECTION, SOLUTION INTRAVENOUS; SUBCUTANEOUS at 17:52

## 2022-12-22 RX ADMIN — GABAPENTIN 100 MG: 100 CAPSULE ORAL at 21:21

## 2022-12-22 RX ADMIN — DEXAMETHASONE 4 MG: 4 TABLET ORAL at 05:43

## 2022-12-22 RX ADMIN — GABAPENTIN 100 MG: 100 CAPSULE ORAL at 17:52

## 2022-12-22 RX ADMIN — PROPRANOLOL HYDROCHLORIDE 20 MG: 20 TABLET ORAL at 13:49

## 2022-12-22 RX ADMIN — MAGNESIUM OXIDE 400 MG (241.3 MG MAGNESIUM) TABLET 400 MG: TABLET at 08:56

## 2022-12-22 RX ADMIN — ENOXAPARIN SODIUM 40 MG: 100 INJECTION SUBCUTANEOUS at 21:21

## 2022-12-22 RX ADMIN — TEMOZOLOMIDE 20 MG: 20 CAPSULE ORAL at 21:25

## 2022-12-22 RX ADMIN — POLYETHYLENE GLYCOL 3350 17 G: 17 POWDER, FOR SOLUTION ORAL at 08:57

## 2022-12-22 RX ADMIN — ENOXAPARIN SODIUM 40 MG: 100 INJECTION SUBCUTANEOUS at 09:01

## 2022-12-22 RX ADMIN — PROPRANOLOL HYDROCHLORIDE 20 MG: 20 TABLET ORAL at 05:43

## 2022-12-22 RX ADMIN — INSULIN LISPRO 9 UNITS: 100 INJECTION, SOLUTION INTRAVENOUS; SUBCUTANEOUS at 08:57

## 2022-12-22 RX ADMIN — LEVETIRACETAM 500 MG: 500 TABLET, FILM COATED ORAL at 08:56

## 2022-12-22 RX ADMIN — VALACYCLOVIR HYDROCHLORIDE 500 MG: 500 TABLET, FILM COATED ORAL at 08:56

## 2022-12-22 RX ADMIN — PROPRANOLOL HYDROCHLORIDE 20 MG: 20 TABLET ORAL at 21:21

## 2022-12-22 RX ADMIN — INSULIN LISPRO 3 UNITS: 100 INJECTION, SOLUTION INTRAVENOUS; SUBCUTANEOUS at 08:57

## 2022-12-22 RX ADMIN — INSULIN LISPRO 9 UNITS: 100 INJECTION, SOLUTION INTRAVENOUS; SUBCUTANEOUS at 11:57

## 2022-12-22 RX ADMIN — TEMOZOLOMIDE 5 MG: 5 CAPSULE ORAL at 21:22

## 2022-12-22 RX ADMIN — DEXAMETHASONE 4 MG: 4 TABLET ORAL at 13:48

## 2022-12-22 RX ADMIN — CALCIUM CARBONATE-VITAMIN D TAB 500 MG-200 UNIT 1 TABLET: 500-200 TAB at 08:56

## 2022-12-22 RX ADMIN — ONDANSETRON HYDROCHLORIDE 8 MG: 8 TABLET, FILM COATED ORAL at 13:48

## 2022-12-22 RX ADMIN — LEVOTHYROXINE SODIUM 150 MCG: 0.15 TABLET ORAL at 05:43

## 2022-12-22 RX ADMIN — CALCIUM CARBONATE-VITAMIN D TAB 500 MG-200 UNIT 1 TABLET: 500-200 TAB at 21:21

## 2022-12-22 NOTE — PROGRESS NOTES
[facility] Medicine Services  PROGRESS NOTE    Patient Name: Christie Avendaño  : 1965  MRN: [id number]    Date of Admission: 2022  Primary Care Physician: Tiffany Holloway MD    Subjective   Subjective     CC:  Follow-up consult for medical management    Subjective: No new complaints reported.  Patient says she is feels fine.    Review of Systems  No current fevers or chills  No current shortness of breath or cough  No current nausea, vomiting, or diarrhea  No current chest pain or palpitations      Objective   Objective     Vital Signs:   Temp:  [97.7 °F (36.5 °C)-98.2 °F (36.8 °C)] 97.7 °F (36.5 °C)  Heart Rate:  [61-70] 61  Resp:  [16-18] 18  BP: (115-126)/(73-76) 115/76        Physical Exam:  Constitutional:Awake, alert  HENT: NCAT, mucous membranes moist, neck supple  Respiratory: No cough or wheezing, respiratory effort normal, nonlabored breathing   Musculoskeletal: Morbidly obese BMI is 40  Psychiatric: Calm, appropriate affect, cooperative, conversational  Neurologic: No slurred speech or facial droop  Skin: No rashes or jaundice, warm      Results Reviewed:  Results from last 7 days   Lab Units 22  0647 22  0552   WBC 10*3/mm3 8.43 9.55   HEMOGLOBIN g/dL 12.4 12.6   HEMATOCRIT % 37.3 36.6   PLATELETS 10*3/mm3 169 175     Results from last 7 days   Lab Units 22  0647 22  0544 22  0833   SODIUM mmol/L 132* 130* 129*   POTASSIUM mmol/L 5.1 5.2 5.3*   CHLORIDE mmol/L 94* 92* 93*   CO2 mmol/L 27.7 30.0* 28.0   BUN mg/dL 29* 26* 28*   CREATININE mg/dL 0.88 0.85 0.85   GLUCOSE mg/dL 172* 140* 148*   CALCIUM mg/dL 9.5 9.5 9.7   ALT (SGPT) U/L 225* 203* 193*   AST (SGOT) U/L 49* 43* 32     Estimated Creatinine Clearance: 86 mL/min (by C-G formula based on SCr of 0.88 mg/dL).    Microbiology Results Abnormal     None          Imaging Results (Last 24 Hours)     ** No results found for the last 24 hours. **              I have reviewed the medications:  Scheduled  Meds:[START ON 12/26/2022] buPROPion, 100 mg, Oral, Q12H  calcium 500 mg vitamin D 5 mcg (200 UT), 1 tablet, Oral, BID  dexamethasone, 4 mg, Oral, Q8H  enoxaparin, 40 mg, Subcutaneous, Q12H  escitalopram, 5 mg, Oral, Daily  gabapentin, 100 mg, Oral, TID  insulin glargine, 40 Units, Subcutaneous, QAM  insulin lispro, 0-14 Units, Subcutaneous, TID AC  insulin lispro, 9 Units, Subcutaneous, TID With Meals  latanoprost, 1 drop, Both Eyes, Nightly  levETIRAcetam, 500 mg, Oral, BID  levothyroxine, 150 mcg, Oral, Q AM  magnesium oxide, 400 mg, Oral, Daily  ondansetron, 8 mg, Oral, Q24H  pantoprazole, 40 mg, Oral, Q AM  polyethylene glycol, 17 g, Oral, Daily  propranolol, 20 mg, Oral, Q8H  sulfamethoxazole-trimethoprim, 1 tablet, Oral, Once per day on Mon Wed Fri  temozolomide, 140 mg, Oral, Nightly   And  temozolomide, 20 mg, Oral, Nightly   And  temozolomide, 5 mg, Oral, Nightly  valACYclovir, 500 mg, Oral, Q24H      Continuous Infusions:   PRN Meds:.•  calcium carbonate  •  dextrose  •  dextrose  •  famotidine  •  glucagon (human recombinant)  •  influenza vaccine  •  nitroglycerin  •  ondansetron **OR** ondansetron  •  senna-docusate sodium    Assessment & Plan   Assessment & Plan     Active Hospital Problems    Diagnosis  POA   • **Glioma (HCC) [C71.9]  Yes      Resolved Hospital Problems   No resolved problems to display.        Brief Hospital Course to date:  Christie Avendaño is a 57 y.o. female with glioma.    Discussion/plan:  Prandial insulin held last night and this is likely the reason glucose is somewhat higher this morning.  At this point I would recommend to continue this current basal, prandial and correction doses.  Again monitor very carefully and if steroids are decreased the insulin will also likely need to be decreased.  Internal medicine will sign off.  Please do not hesitate to contact our service if there is any issues with blood sugars or other concerns or questions.    Glioblastoma:  As per oncology.   Currently on dexamethasone and Keppra    History of cancers:  Managed by oncology.    Neuropathy: Neurontin.    Transaminitis:  Multiple possible etiologies.  Only mildly elevated currently.  Monitor intermittently.    Essential hypertension: Now off lisinopril.  Currently normotensive.  Avoid hypotension.    Hyponatremia: Management per nephrology.    DVT Prophylaxis: Lovenox        CODE STATUS:   Code Status and Medical Interventions:   Ordered at: 12/07/22 1704     Code Status (Patient has no pulse and is not breathing):    CPR (Attempt to Resuscitate)     Medical Interventions (Patient has pulse or is breathing):    Full Support       Trevin Horne MD  12/22/22

## 2022-12-22 NOTE — THERAPY DISCHARGE NOTE
Inpatient Rehabilitation - Speech Language Pathology Discharge Summary  [facility]       Patient Name: Christie Avendaño  : 1965  MRN: [id number]    Today's Date: 2022                   Admit Date: 2022      SLP Recommendation and Plan    The patient has made some progress towards therapy goals, however she continues to display impairments in the areas of attention, reasoning, memory, executive function, and  math skills. It is recommended that she receive supervision at home for medication management , financial tasks, and home management activities.  Continued therapy is recommended at the next level of care.    Visit Dx:    ICD-10-CM ICD-9-CM   1. Impaired functional mobility, balance, gait, and endurance  Z74.09 V49.89   2. High grade glioma   C71.9 191.9          Time Calculation- SLP     Row Name 22 1502 22 1100          Time Calculation- SLP    SLP Start Time 1430  -SL 1030  -SL     SLP Stop Time 1500  -SL 1100  -SL     SLP Time Calculation (min) 30 min  -SL 30 min  -SL           User Key  (r) = Recorded By, (t) = Taken By, (c) = Cosigned By    Initials Name Provider Type    Jemma Kelley MS CCC-SLP Speech and Language Pathologist                   SLP GOALS     Row Name 22 1436 22 1044 22 1254       Attention Goal 1 (SLP)    Progress/Outcomes (Attention Goal 1, SLP) -- goal ongoing  -SL continuing progress toward goal;goal ongoing  -SL    Comment (Attention Goal 1, SLP) -- word altering task- making specific letter changes/substitutions, etc - decreased attn to details when rewriting words with targeted changes- 20% indep  -SL attn coding task - matching numbers to target symbol codes x2 stimulus sets- 100% indep  -SL       Memory Skills Goal 1 (SLP)    Progress/Outcomes (Memory Skills Goal 1, SLP) goal ongoing  -SL -- good progress toward goal;goal ongoing  -SL    Comment (Memory Skills Goal 1, SLP) 24 item grid hidden word matching task- 25% indep;  24 item  hidden picture matching task-32% indep; spont recall of visual scanning strategy for awareness of left side- 100% indep  -SL -- visual recall of picture scenes- 90% indep;  picture to picture object associations- immediate recall- 100% indep  -SL       Organizational Skills Goal 1 (SLP)    Progress/Outcomes (Thought Organization Skills Goal 1, SLP) -- -- goal ongoing  -SL    Comment (Thought Organization Skills Goal 1, SLP) -- -- organizational activity involving filling in missing letters in words, given categories- 40% indep x2 categories  -SL       Reasoning Goal 1 (SLP)    Progress/Outcomes (Reasoning Goal 1, SLP) goal ongoing  -SL -- --    Comment (Reasoning Goal 1, SLP) letter placement- fill -in crossword puzzle task given specific letter choices- 75% with extra time required for task  -SL -- --       Functional Problem Solving Skills Goal 1 (SLP)    Progress/Outcomes (Problem Solving Goal 1, SLP) -- -- goal ongoing  -SL    Comment (Problem Solving Goal 1, SLP) -- -- sequencing of 6 sentences for stories- 66% indep;  answering ?'s re: order forms- 90% indep  -SL       Functional Math Skills Goal 1 (SLP)    Progress/Outcomes (Functional Math Skills Goal 1, SLP) -- goal ongoing  -SL --    Comment (Functional Math Skills Goal 1, SLP) -- simple math calculations relating to car wash rates- 0% indep; mod/max cues required  -SL --       Executive Functional Skills Goal 1 (SLP)    Progress/Outcomes (Executive Function Skills Goal 1, SLP) -- -- goal ongoing  -SL    Comment (Executive Function Skills Goal 1, SLP) -- -- Provided pt with list of tx apps for use at home to continue cognitive stimulation and recovery  -SL       Right Hemisphere Function Goal 1 (SLP)    Progress/Outcomes (Right Hemisphere Function Goal 1, SLP) continuing progress toward goal;goal ongoing  -SL -- --    Comment (Right Hemisphere Function Goal 1, SLP) visual scanning to left during fxnl ipad activities-100% indep;  pt spont recalled need to  look for edges and scan all the way to left without any verbal or visual cues;  -SL -- --    Row Name 12/21/22 1045 12/20/22 1200 12/20/22 0830       Attention Goal 1 (SLP)    Improve Attention by Goal 1 (SLP) -- -- attending to task;80%;with minimal cues (75-90%)  -SR    Time Frame (Attention Goal 1, SLP) -- -- by discharge  -SR    Progress/Outcomes (Attention Goal 1, SLP) -- -- goal ongoing  -SR    Comment (Attention Goal 1, SLP) -- -- Engaged in structured conversation with SLP during AM therapy session with background noise present. Attended to conversation with no redirection.  -SR       Memory Skills Goal 1 (SLP)    Improve Memory Skills Through Goal 1 (SLP) -- -- recall details from a word list;visual memory task;listen to a paragraph and answer questions;use memory strategies;80%;with minimal cues (75-90%)  -SR    Time Frame (Memory Skills Goal 1, SLP) -- -- by discharge  -SR    Progress/Outcomes (Memory Skills Goal 1, SLP) -- -- good progress toward goal  -SR    Comment (Memory Skills Goal 1, SLP) -- -- Patient listened to short paragraph and answered inference based questions during immediate recall activity with 80% acc given no cues.  -SR       Functional Math Skills Goal 1 (SLP)    Improve Functional Math Skills Through Goal 1 (SLP) -- -- complete word problems involving money;complete word problems involving time;complete checkbook task;complete functional math task;80%;with minimal cues (75-90%)  -SR    Time Frame (Functional Math Skills Goal 1, SLP) -- -- by discharge  -SR    Progress/Outcomes (Functional Math Skills Goal 1, SLP) -- -- goal ongoing  -SR    Comment (Functional Math Skills Goal 1, SLP) -- -- Patient answered questions involving time/time management with 50% acc given no cues; 70% acc given min-mod cues.  -SR       Executive Functional Skills Goal 1 (SLP)    Improve Executive Function Skills Goal 1 (SLP) -- identify strategies, strengths, limitations;organization/planning  activity;90%;with minimal cues (75-90%)  -SR --    Time Frame (Executive Function Skills Goal 1, SLP) -- by discharge  -SR --    Progress/Outcomes (Executive Function Skills Goal 1, SLP) goal ongoing  -SL good progress toward goal;continuing progress toward goal  -SR --    Comment (Executive Function Skills Goal 1, SLP) thought flexibility task- altering words via 4 different constraints- difficulty noted for recalling constraint directives with frequent reminders and verbal cues required for task;  75% with min/mod cues  -SL Medication management; patient followed verbal directions and sorted 10 mock medications by day and frequency using pill organizer with 40% acc given no cues; 80% acc given mod cues. Required verbal cue x2 to scan to L side. SLP encouraged patient to have organizer at home to sort medications following discharge. Agreeable to supervision of medications/finances following d/c. Extended time to complete therapy task.  -SR --          User Key  (r) = Recorded By, (t) = Taken By, (c) = Cosigned By    Initials Name Provider Type    Jemma Kelley MS CCC-SLP Speech and Language Pathologist    SR Minal Bennett CCC-SLP Speech and Language Pathologist                  Therapy Charges for Today     Code Description Service Date Service Provider Modifiers Qty    78715158600 HC ST DEV OF COGN SKILLS EACH ADDT'L 15 MIN 12/21/2022 Jemma Bradley MS CCC-SLP  1    51661940550 HC ST DEV OF COGN SKILLS INITIAL 15 MIN 12/21/2022 Jemma Bradley MS CCC-SLP  1    04337718066 HC ST DEV OF COGN SKILLS INITIAL 15 MIN 12/21/2022 Jemma Bradley MS CCC-SLP  1    20394974190 HC ST DEV OF COGN SKILLS EACH ADDT'L 15 MIN 12/21/2022 Jemma Bradley MS CCC-SLP  1    53271226900 HC ST DEV OF COGN SKILLS EACH ADDT'L 15 MIN 12/21/2022 Jemma Bradley MS CCC-SLP  2    96803434008 HC ST DEV OF COGN SKILLS EACH ADDT'L 15 MIN 12/22/2022 Jemma Bradley MS CCC-SLP  1    58648875893 HC ST DEV OF COGN SKILLS INITIAL 15 MIN 12/22/2022 Jemma Bradley MS  CCC-SLP  1    [id number] [facility] DEV OF COGN SKILLS EACH ADDT'L 15 MIN 12/22/2022 Jemma Bradley, MS CCC-SLP  2                   Jemma Bradley, MS CCC-SLP  12/22/2022

## 2022-12-22 NOTE — PROGRESS NOTES
Inpatient Rehabilitation Plan of Care Note    Plan of Care  Care Plan Reviewed - No updates at this time.    Psychosocial    [RN] Coping/Adjustment(Active)  Current Status(12/22/2022): At risk for poor coping d/t recent medical  conditions.  Weekly Goal(12/22/2022): Identify progress in functional status.  Discharge Goal: Demonstrates healthy coping strategies.    Performed Intervention(s)  Support/peer groups.  Verbalizes needs and concerns.  Medication.      Safety    [RN] Potential for Injury(Active)  Current Status(12/22/2022): At risk for falls d/t history of falls. 12/10 Pt  found on floor in [place], states she was trying to pull her pants up, did not call  for assist like she said she would. No apparent injury. Blue armband placed.  Weekly Goal(12/27/2022): Pt will use call light, no further attempts to transfer  self.  Discharge Goal: Patient and family will be aware of risk of fall and safety in  home setting.    Performed Intervention(s)  Items w/i reach.  Safety rounds.  Bed and chair alarm. Blue armband  Falls precautions.      Sphincter Control    [RN] Bladder Management(Active)  Current Status(12/22/2022): Continent of bladder. Occasional urge incont.  Weekly Goal(12/27/2022): Remain continent of bladder.  Discharge Goal: Goes home continent of bladder.    [RN] Bowel Management(Active)  Current Status(12/22/2022): Continent of bowel.  Weekly Goal(12/27/2022): Remain continent of bowel.  Discharge Goal: Go home continent of bowel.    Performed Intervention(s)  Offer restroom during hourly rounding.  Encourage fluid intake.  Monitor Is and Os.  Timed voids.  Encourage appropriate diet.    Signed by: Melissa Wilson RN

## 2022-12-22 NOTE — PROGRESS NOTES
"Section C. BIMS  Brief Interview for Mental Status (BIMS) was conducted.  Repetition of Three Words: Three words  Able to report correct year: Correct  Able to report correct month: Accurate within 5 days  Able to report correct day of the week: Correct  Able to recall \"sock\": Yes, after cuing  Able to recall \"blue\": Yes, no cue required  Able to recall \"bed\": Yes, no cue required    BIMS SUMMARY SCORE: 14 Cognitively intact    Section C. Signs and Symptoms of Delirium (from [facility])  Branch    Signed by: Jemma Bradley, SLP    "

## 2022-12-22 NOTE — PROGRESS NOTES
LOS: 15 days   Patient Care Team:  Tiffany Holloway MD as PCP - General (Internal Medicine)  Mathew Collins Jr., MD as Consulting Physician (Hematology and Oncology)  Dorian Sabillon MD as Surgeon (General Surgery)  Thang Dumont, JOSEFA (Optometry)  Lamine Cantu DO as Referring Physician (Family Medicine)  Hali Rolle MD as Gynecologist (Gynecology)      HERON MAGANA  1965    Diagnoses    1. 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 Her headache continues to improve. Also feels her left hand is improving. Very excited to go home for [place] as both of her children are trying to come home. Patient denies fever/chills/sob/chest pain and denies issue with sleep/appetite/bladder and bowels.        Objective     Vitals:    12/22/22 0542   BP: 115/76   Pulse: 61   Resp: 18   Temp: 97.7 °F (36.5 °C)   SpO2: 97%       PHYSICAL EXAM:   MENTAL STATUS -  AWAKE / ALERT  HEENT-right scalp incision with staples removed.  Clean dry and intact  SCLERAE ANICTERIC, CONJUNCTIVAE PINK, EARS UNREMARKABLE EXTERNALLY  LUNGS - CTA, NO WHEEZES, RALES OR RHONCHI  HEART- RRR, NO RUB, MURMUR, OR GALLOP  ABD - Nondistended     EXT - NO EDEMA OR CYANOSIS  NEURO -oriented       Speech was fluent with slightly slow rate of speech.  Extraocular movements intact.  No dysarthria.  MOTOR EXAM - RUE/RLE 5/5.   LUE/LLE 5/5.   Impaired motor control in the left upper  extremity and left lower extremity,    MEDICATIONS  Scheduled Meds:[START ON 12/26/2022] buPROPion, 100 mg, Oral, Q12H  calcium 500 mg vitamin D 5 mcg (200 UT), 1 tablet, Oral, BID  dexamethasone, 4 mg, Oral, Q8H  enoxaparin, 40 mg, Subcutaneous, Q12H  escitalopram, 5 mg, Oral, Daily  gabapentin, 100 mg, Oral, TID  insulin glargine, 40 Units, Subcutaneous, QAM  insulin lispro, 0-14 Units, Subcutaneous, TID AC  insulin lispro, 9 Units, Subcutaneous, TID With Meals  latanoprost, 1 drop, Both Eyes, Nightly  levETIRAcetam, 500 mg, Oral, BID  levothyroxine, 150 mcg, Oral, Q AM  magnesium oxide, 400 mg, Oral, Daily  ondansetron, 8 mg, Oral, Q24H  pantoprazole, 40 mg, Oral, Q AM  polyethylene glycol, 17 g, Oral, Daily  propranolol, 20 mg, Oral, Q8H  sulfamethoxazole-trimethoprim, 1 tablet, Oral, Once per day on Mon Wed Fri  temozolomide, 140 mg, Oral, Nightly   And  temozolomide, 20 mg, Oral, Nightly   And  temozolomide, 5 mg, Oral, Nightly  valACYclovir, 500 mg, Oral, Q24H      Continuous Infusions:   PRN Meds:.•  calcium carbonate  •  dextrose  •  dextrose  •  famotidine  •  glucagon (human recombinant)  •  influenza vaccine  •  nitroglycerin  •  ondansetron **OR** ondansetron  •  senna-docusate sodium      RESULTS  Glucose   Date/Time Value Ref Range Status   12/22/2022 1146 247 (H) 70 - 130 mg/dL Final     Comment:     Meter: [id number] : [id number] Paul Clifton NA   12/22/2022 0618 179 (H) 70 - 130 mg/dL Final     Comment:     Meter: [id number] : [id number] Nabeel Pacheco NA   12/21/2022 2005 149 (H) 70 - 130 mg/dL Final     Comment:     Meter: [id number] : [id number] Nabeel Pacheco NA   12/21/2022 1702 126 70 - 130 mg/dL Final     Comment:     Meter: [id number] : [id number] Torsten Bates NA   12/21/2022 1151 173 (H) 70 - 130 mg/dL Final     Comment:     Meter: [id number] : [id number] Torsten ACUÑA   12/21/2022 0625 130 70 - 130 mg/dL Final     Comment:     Meter: [id number] :  [id number] Paola Eliana NA   12/20/2022 2035 198 (H) 70 - 130 mg/dL Final     Comment:     Meter: [id number] : [id number] Paola Monroy NA   12/20/2022 1709 131 (H) 70 - 130 mg/dL Final     Comment:     Meter: [id number] : [id number] Torsten ACUÑA     Results from last 7 days   Lab Units 12/22/22  0647 12/19/22  0552   WBC 10*3/mm3 8.43 9.55   HEMOGLOBIN g/dL 12.4 12.6   HEMATOCRIT % 37.3 36.6   PLATELETS 10*3/mm3 169 175     Results from last 7 days   Lab Units 12/22/22  0647 12/21/22  0544 12/20/22  0833   SODIUM mmol/L 132* 130* 129*   POTASSIUM mmol/L 5.1 5.2 5.3*   CHLORIDE mmol/L 94* 92* 93*   CO2 mmol/L 27.7 30.0* 28.0   BUN mg/dL 29* 26* 28*   CREATININE mg/dL 0.88 0.85 0.85   CALCIUM mg/dL 9.5 9.5 9.7   BILIRUBIN mg/dL 0.6 0.7 0.6   ALK PHOS U/L 164* 160* 161*   ALT (SGPT) U/L 225* 203* 193*   AST (SGOT) U/L 49* 43* 32   GLUCOSE mg/dL 172* 140* 148*       ASSESSMENT and PLAN    Glioma (HCC)    High-grade glioma-3.2 cm ring-enhancing right supratentorial mass-right thalamic-with mass-effect and left midline shift  Status post stereotactic brain biopsy on November 28, 2022  Radiation therapy adjusting schedule to the afternoon around 3 PM so as not to interfere with her other physical/occupational/speech therapy sessions    Headache-Dec 9: will add magnesium 400mg daily for headaches.  She reports intolerance to gabapentin, intolerances to opioids.  Tramadol comes up as contraindicated with history of anaphylactic secondary to morphine.  Valproate for headache control not an option with her liver changes  Dec 13: patient reports intolerance to gabapentin was drowsiness on 300 mg dose. Agreeable to try 100 mg tid for HA.   December 16-Propranolol added for baseline headache control but not able to receive first 2 doses today secondary to parameters to hold for pulse less than 60 or blood pressure less than 110.  Lisinopril dose decreased which may allow some range to receive propranolol.  Continues on  low-dose gabapentin.  Has history of allergy with anaphylaxis to opioid-morphine-with hives and throat swelling  December 17-received first dose of propanolol this morning  December 18-headache better on the weekend off of radiation therapy.  Has not received propranolol secondary to parameters since yesterday morning  Dec 19 - titrate up on propranolol to 20 mg q 8 hours.  Headache trending better.     Left side weakness/incoordination/impaired mobility     Radiation therapy/Temodar to start December 12, 2022  Decadron 4 mg every 8 hourly  Dec 9: Oncology aware of increase of liver enzymes. They are following along   December 12: Radiation therapy and Temodar initiating today  Dec 14: Temodar Radiation, fraction 3 today.   Dec 15: Today is day 4 of Temodar Radiation. Per oncology no clinical signs of progression.      Leukocytosis-steroid/stress response.  Incision healing.      Elevated LFTs  Dec 8: Labs significant for ALT 1049 (143 11/28),  (71 11/28), Alk phos 190 (96 11/28). Taking minimal tylenol and statin is a home med- discontinued. Consulted IM who also consulted pharmacy and GI. They also decreased valtrex to 1g daily (she was taking daily prophylactic valtrex 2g bid). Awaiting liver ultrasound. CPK normal.   Dec 9- Ongoing workup. Liver ultrasound- remarkable for a small hepatic cyst otherwise negative  Per GI -[ Obtain duplex hepatic ultrasound to further assess for thrombosis  Obtain GEMMA, IgG profile, ASMA, EBV, CMV, HSV, VZV to rule out autoimmune versus viral cause to elevation.].   Patient reviewed with internal medicine and medical oncology  December 12-transaminases elevated but trending better.   Portal hepatic duplex unremarkable  Dec 14: Continued decrease of LFTs.   December 17-continue to trend better  Dec 19: Alk phos 163(159 12/18), (244 12/18), AST 34(34 12/18), continued slow downward trend  Dec 21: (193 yesterday) AST 43 (32 yesterday) continue to monitor.  "    Diabetes mellitus-Trulicity at home.  On Lantus/Humalog  December 13-Lantus increased to 28 units a.m.  On Humalog 8 units 3 times daily with meals  Dec 14: Continued elevation of blood sugars, will ask IM to provide insight.   Dec 15: Lantus increased to 35 daily yesterday with improved readings.   Dec 19: First dose of lantus 40 today.  this am  Dec 21: BS remain high, will ask IM to see and adjust.   Dec 22: BS improved. Elevated BS this morning attributed by IM to be due to held prandial insulin last night. They are signing off.      Hyponatremia   Dec 15: Na 128 today, 131 yesterday. IM ordered serum and urine osmolality and urine sodium. Deferring to nephrology consult to us. Will repeat Cmp in am.   Dec 16: Na 130 today, urine osmols 495 and urine Na 80. Continue to monitor.  Felt related to hyperglycemia per internal medicine note  December 17-sodium 127 today  December 18-sodium 125.  Nephrology evaluating.  Transitioning escitalopram to bupropion.  TSH low at 0.03  Dec 19: Nephrology following. Continue fluid restriction. Urine sodium and chloride not performed yesterday so reordered.   Dec 21: Nephrology following \"All the parameters suggestive of SIADH except having elevated uric acid she is responding to fluid restriction, sodium today 130 and potassium 5.21\"  Dec 22: Na 132. No change from nephrology     Seizure prophylaxis-Keppra     DVT prophylaxis-Lovenox/SCDs     GI prophylaxis-protein pump inhibitor  Add calcium and vitamin D with ongoing steroids     Chronic Valtrex-dose decreased to 1000 mg daily     Germline BRCA2 and GEORGES mutations.  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     Depression-Lexapro  December 18-transition to Lexapro 5 mg daily for 7 " days then start bupropion 100 mg twice daily to possibly help with hyponatremia     Hypothyroidism-on replacement  December 18-TSH equals 0.030     Hypertension   Dec 16: Will decrease lisinopril to 10mg from 20mg due to addition of propranolol for headaches. Holding parameters in place for propranolol of SBP <110 and HR <60  December 18-blood pressure 84/48-had not received propranolol since yesterday morning.  Will decrease lisinopril to 5 mg daily to allow more range for giving propanolol for headache  Dec 20 - BP higher in the mornings but lower at noon time, continue Lisinopril at 5 mg daily for now and increase propranolol to 20 mg q 8 hours.     TEAM [facility] - DEC 13 - BED SBA. TRANSFERS MIN CTG. GAIT 160 FEET CTG. LEANS LEFT. TOILET TRANSFERS CTG. SHOWER TRANSFERS CTG MIN. LBD MIN. UBD MIN. BATH MIN. GROOMING SBA. TOILETING CTG MIN. IMPAIRED ATTENTION.IMPAIRED EXECUTIVE FUNCTION. FATIGUES WITH COGNITIVE TASKS. CONTINENT BOWEL AND BLADDER. SCALP INCISION HEALING.   XRT/TEMODAR. ELEVATED TRANSAMINASES TREND BETTER.   [facility] - 2 WEEKS    TEAM [facility] - DEC 20 - BED MIN. TRANSFERS MIN CTG GAIT 160 FEET CTG TO OCC MIN, ROLLATOR. TOILET TRANSFERS CTG. SHOWER TRANSFERS CTG MIN. BATH MIN. LBD MIN MOD. UBD MIN. EATING SET UP. GROOMING SBA MIN. TOILETING MIN. COORDINATION WORSE ON LEFT ARM AND INATTENTION LEFT ARM, VISUAL PERCEPTUAL DEFICITS. DEFICITS WITH ATTENTION TO DETAIL, COMPLEX REASONING. SKIN INTACT. SCALP INCISION HEALING.  [facility] - Friday AS WISHES HOME FOR HOLIDAY.     Patient discussed with attending physician Dr. Araujo, please see attestation.     Gina Florence DO   Physical Medicine and Rehabilitation   PGY-4     During rounds, used appropriate personal protective equipment including mask and gloves.  Additional gown if indicated.  Mask used was standard procedure mask. Appropriate PPE was worn during the entire visit.  Hand hygiene was completed before and after.

## 2022-12-22 NOTE — PROGRESS NOTES
REASON FOR FOLLOWUP/CHIEF COMPLAINT:  High-grade glioma    HISTORY OF PRESENT ILLNESS:   No new problems.  No new neurological issues.  Tolerating chemoradiation fairly well.  Denies any headache, dizziness, visual changes or nausea/vomiting.  Undergoing daily rehab as per PM&R.    Past Medical History, Past surgical history, Social History, Family History have been reviewed and are without significant changes except as mentioned.    Review of Systems   Review of Systems   Constitutional: Positive for fatigue. Negative for activity change.   HENT: Negative for nosebleeds and trouble swallowing.    Respiratory: Negative for shortness of breath and wheezing.    Cardiovascular: Negative for chest pain and palpitations.   Gastrointestinal: Negative for constipation, diarrhea and nausea.   Genitourinary: Negative for dysuria and hematuria.   Musculoskeletal: Negative for arthralgias and myalgias.   Skin: Negative for rash and wound.   Neurological: Negative for seizures and syncope.   Hematological: Negative for adenopathy. Does not bruise/bleed easily.   Psychiatric/Behavioral: Negative for confusion.       Medications:  The current medication list was reviewed in the EMR    ALLERGIES:    Allergies   Allergen Reactions   • Morphine Anaphylaxis     Hives and throat swelling   • Peach [Prunus Persica] Anaphylaxis   • Penicillins Anaphylaxis   • Metformin Diarrhea              Vitals:    12/21/22 0507 12/21/22 1718 12/21/22 2001 12/22/22 0542   BP: 114/68 126/75 119/73 115/76   BP Location: Right arm Left arm Left arm Left arm   Patient Position: Lying Sitting Lying Lying   Pulse: 65 70 65 61   Resp: 18 16 18 18   Temp: 97.6 °F (36.4 °C) 98 °F (36.7 °C) 98.2 °F (36.8 °C) 97.7 °F (36.5 °C)   TempSrc: Oral Oral Oral Oral   SpO2: 96% 98% 96% 97%   Weight:       Height:         Physical Exam    CONSTITUTIONAL:  Vital signs reviewed.  No distress, looks comfortable.  EYES:  Conjunctivae and lids unremarkable.  EARS,  NOSE, MOUTH, THROAT:  Ears and nose appear unremarkable.  Lips, teeth, gums appear unremarkable.  RESPIRATORY:  Normal respiratory effort.  Equal chest movement bilaterally  CARDIOVASCULAR:  Normal heart rate.  No significant lower extremity edema.  GASTROINTESTINAL: Abdomen appears unremarkable.  Nondistended  NEURO: AAOx3, no focal deficits.  Appears to have equal strength all 4 extremities.  MUSCULOSKELETAL:  Unremarkable digits/nails.  No cyanosis or clubbing.  SKIN:  Warm.  No rashes.  Surgical scar on right frontal head noted.  PSYCHIATRIC:  Normal judgment and insight.  Normal mood and affect.        RECENT LABS:  WBC   Date Value Ref Range Status   12/22/2022 8.43 3.40 - 10.80 10*3/mm3 Final     Hemoglobin   Date Value Ref Range Status   12/22/2022 12.4 12.0 - 15.9 g/dL Final     Platelets   Date Value Ref Range Status   12/22/2022 169 140 - 450 10*3/mm3 Final       ASSESSMENT/PLAN:  Christie Avendaño 4411/1   *Glioblastoma, IDH 1 R132H negative, CNS WHO grade 4.  Intact MGMT expression  • Patient presented with recurrent falls and confusion.  • MRI on 11/22/2022 revealed a 3.2 cm ring-enhancing right supratentorial mass.  There was mass-effect and left midline shift.  • She was started on dexamethasone and Keppra.  • CT chest abdomen pelvis on 11/24/2022 revealed no evidence of progressive metastatic disease.  • S/p stereotactic brain biopsy on 11/28/2022.  • Preliminary pathology revealed high-grade glioma.  It was sent to [facility].  • Preliminary revealed high-grade glioma.  • Patient had radiation simulation on 12/2/2022.  • Plan is to start concurrent low-dose Temodar at 75 mg/m2 daily.   • She is on Decadron 4 mg p.o. every 8 hours.  • Started radiation with concurrent Temodar on 12/12/2022.  • Temodar does not require dose reduction due to the elevated liver enzymes.   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  This is associated with a worse prognosis and relative  resistance to alkylating chemotherapy such as temozolomide.  However, temozolomide and radiation remain the recommended treatments.  • 12/22/2022: Tolerating concurrent chemoradiation very well.  No major adverse effects.  Labs remained stable.  Continue treatment as per schedule.     *Germline BRCA2 and GEORGES mutations.  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  • Please see the note from Dr. Collins, the patient's outpatient oncologist from 12/4/2022.     *Seizure prophylaxis.  • Patient is on Keppra 500 mg p.o. twice daily.  • Patient is not having any symptoms consistent with seizures.     *Elevated liver enzymes.  • ALT was 143 and AST was 71 on 11/28/2022.  • ALT was 1049 and AST was 386 on 12/8/2022.  • Hepatic duplex, 12/12/2022 normal.  • LFTs continues to improve    *Hyponatremia.  Sodium was low at 126 on 12/19/2022.  ?  SIADH.  Defer to hospitalist service and rehab service.  Improved to 132 on 12/22/2022.     PLAN:  · Temodar and radiation started 12/12/2022.  Tolerating it well.  Continue daily concurrent chemoradiation  · Patient met with Dr. Collins in the hospital on 12/20/22 and discussed upcoming appointment and follow-up plan.  · Has follow-up arranged in the office with Dr. Collins on 1/4/2023 and 2/22/2023 and CT CAP 1 week prior.  Patient states the hope is to be discharged from rehab before [facility].     Will follow periodically.  Reviewed rehab MD, nephrology and hospitalist notes.

## 2022-12-22 NOTE — THERAPY TREATMENT NOTE
Inpatient Rehabilitation - Speech Language Pathology Treatment Note    [facility]     Patient Name: Christie Avendaño  : 1965  MRN: [id number]    Today's Date: 2022                   Admit Date: 2022       Visit Dx:      ICD-10-CM ICD-9-CM   1. Impaired functional mobility, balance, gait, and endurance  Z74.09 V49.89   2. 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)   • Non-small cell lung cancer, right (HCC)   • Renal mass, right   • SIRS (systemic inflammatory response syndrome) (HCC)   • Hyperlipidemia   • Malignant melanoma of right lower extremity including hip (HCC)   • High grade glioma    • 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)   • Glioma (HCC)       Past Medical History:   Diagnosis Date   • Arthritis    • Asthma    • BRCA2 positive    • Diabetes mellitus (HCC)    • H/O Liver masses 2017    x3   • H/O Lung masses 2017    1 in right lower lobe and 1 in the left infrahilar location   • H/O Ovarian cyst    • H/O Right adrenal mass (CMS/HCC) 2017   • Lung cancer (HCC)    • Melanoma (HCC)     right foot   • PONV (postoperative nausea and vomiting)    • Thyroid disease        Past Surgical History:   Procedure Laterality Date   • APPENDECTOMY     • BRAIN BIOPSY Right 2022    Procedure: Right frontal stereotactic needle brain biopsy;  Surgeon: Manuel Thompson MD;  Location: [facility] MAIN OR;  Service: Neurosurgery;  Laterality: Right;   • BREAST IMPLANT SURGERY Bilateral    • CHOLECYSTECTOMY     • HYSTERECTOMY      • MASTECTOMY Bilateral    • OVARIAN CYST SURGERY     • THORACOSCOPY VIDEO ASSISTED WITH LOBECTOMY Right 10/9/2020    Procedure: BRONCHOSCOPY, THORACOSCOPY VIDEO ASSISTED WITH ANTERIOR BASAL SEGMENTECTOMY, INTERCOSTAL NERVE BLOCK;  Surgeon: Flaca Crisostomo MD;  Location: [facility];  Service: Thoracic;  Laterality: Right;   • TONSILLECTOMY         SLP Recommendation and Plan                                                            SLP EVALUATION (last 72 hours)     SLP SLC Evaluation     Row Name 12/22/22 1436 12/22/22 1043 12/21/22 1253 12/21/22 1045 12/20/22 1200       Communication Assessment/Intervention    Document Type therapy note (daily note)  -SL therapy note (daily note)  -SL therapy note (daily note)  -SL therapy note (daily note)  -SL therapy note (daily note)  -SR    Subjective Information complains of;fatigue  -SL complains of;fatigue  -SL complains of;fatigue  -SL no complaints  -SL no complaints  -SR    Patient Observations cooperative;agree to therapy  -SL alert;cooperative;agree to therapy  -SL alert;cooperative  -SL alert;cooperative;agree to therapy  -SL alert;cooperative;agree to therapy  -SR    Patient Effort good  -SL good  -SL good  -SL good  -SL good  -SR    Symptoms Noted During/After Treatment fatigue  -SL fatigue  -SL fatigue  -SL none  -SL none  -SR       Pain Scale: Numbers Pre/Post-Treatment    Pretreatment Pain Rating -- -- -- -- 0/10 - no pain  -SR    Posttreatment Pain Rating -- -- -- -- 0/10 - no pain  -SR    Row Name 12/20/22 0830                   Communication Assessment/Intervention    Document Type therapy note (daily note)  -SR        Subjective Information no complaints  -SR        Patient Observations alert;cooperative;agree to therapy  -SR        Patient Effort good  -SR        Symptoms Noted During/After Treatment none  -SR              User Key  (r) = Recorded By, (t) = Taken By, (c) = Cosigned By    Initials Name Effective Dates    Jemma Kelley MS CCC-SLP  06/16/21 -     SR Minal Bennett, CCC-SLP 11/10/22 -                    EDUCATION    The patient has been educated in the following areas:       Cognitive Impairment.             [facility] GOALS     Row Name 12/22/22 1436 12/22/22 1044 12/21/22 1254       Attention Goal 1 (SLP)    Progress/Outcomes (Attention Goal 1, SLP) -- goal ongoing  -SL continuing progress toward goal;goal ongoing  -SL    Comment (Attention Goal 1, SLP) -- word altering task- making specific letter changes/substitutions, etc - decreased attn to details when rewriting words with targeted changes- 20% indep  -SL attn coding task - matching numbers to target symbol codes x2 stimulus sets- 100% indep  -SL       Memory Skills Goal 1 (SLP)    Progress/Outcomes (Memory Skills Goal 1, SLP) goal ongoing  -SL -- good progress toward goal;goal ongoing  -SL    Comment (Memory Skills Goal 1, SLP) 24 item grid hidden word matching task- 25% indep;  24 item hidden picture matching task-32% indep; spont recall of visual scanning strategy for awareness of left side- 100% indep  -SL -- visual recall of picture scenes- 90% indep;  picture to picture object associations- immediate recall- 100% indep  -SL       Organizational Skills Goal 1 (SLP)    Progress/Outcomes (Thought Organization Skills Goal 1, SLP) -- -- goal ongoing  -SL    Comment (Thought Organization Skills Goal 1, SLP) -- -- organizational activity involving filling in missing letters in words, given categories- 40% indep x2 categories  -SL       Reasoning Goal 1 (SLP)    Progress/Outcomes (Reasoning Goal 1, SLP) goal ongoing  -SL -- --    Comment (Reasoning Goal 1, SLP) letter placement- fill -in crossword puzzle task given specific letter choices- 75% with extra time required for task  -SL -- --       Functional Problem Solving Skills Goal 1 (SLP)    Progress/Outcomes (Problem Solving Goal 1, SLP) -- -- goal ongoing  -SL    Comment (Problem Solving Goal 1, SLP) -- -- sequencing of 6 sentences for  stories- 66% indep;  answering ?'s re: order forms- 90% indep  -SL       Functional Math Skills Goal 1 (SLP)    Progress/Outcomes (Functional Math Skills Goal 1, SLP) -- goal ongoing  -SL --    Comment (Functional Math Skills Goal 1, SLP) -- simple math calculations relating to car wash rates- 0% indep; mod/max cues required  -SL --       Executive Functional Skills Goal 1 (SLP)    Progress/Outcomes (Executive Function Skills Goal 1, SLP) -- -- goal ongoing  -SL    Comment (Executive Function Skills Goal 1, SLP) -- -- Provided pt with list of tx apps for use at home to continue cognitive stimulation and recovery  -SL       Right Hemisphere Function Goal 1 (SLP)    Progress/Outcomes (Right Hemisphere Function Goal 1, SLP) continuing progress toward goal;goal ongoing  -SL -- --    Comment (Right Hemisphere Function Goal 1, SLP) visual scanning to left during fxnl ipad activities-100% indep;  pt spont recalled need to look for edges and scan all the way to left without any verbal or visual cues;  -SL -- --    Row Name 12/21/22 1045 12/20/22 1200 12/20/22 0830       Attention Goal 1 (SLP)    Improve Attention by Goal 1 (SLP) -- -- attending to task;80%;with minimal cues (75-90%)  -SR    Time Frame (Attention Goal 1, SLP) -- -- by discharge  -SR    Progress/Outcomes (Attention Goal 1, SLP) -- -- goal ongoing  -SR    Comment (Attention Goal 1, SLP) -- -- Engaged in structured conversation with SLP during AM therapy session with background noise present. Attended to conversation with no redirection.  -SR       Memory Skills Goal 1 (SLP)    Improve Memory Skills Through Goal 1 (SLP) -- -- recall details from a word list;visual memory task;listen to a paragraph and answer questions;use memory strategies;80%;with minimal cues (75-90%)  -SR    Time Frame (Memory Skills Goal 1, SLP) -- -- by discharge  -SR    Progress/Outcomes (Memory Skills Goal 1, SLP) -- -- good progress toward goal  -SR    Comment (Memory Skills Goal 1,  SLP) -- -- Patient listened to short paragraph and answered inference based questions during immediate recall activity with 80% acc given no cues.  -SR       Functional Math Skills Goal 1 (SLP)    Improve Functional Math Skills Through Goal 1 (SLP) -- -- complete word problems involving money;complete word problems involving time;complete checkbook task;complete functional math task;80%;with minimal cues (75-90%)  -SR    Time Frame (Functional Math Skills Goal 1, SLP) -- -- by discharge  -SR    Progress/Outcomes (Functional Math Skills Goal 1, SLP) -- -- goal ongoing  -SR    Comment (Functional Math Skills Goal 1, SLP) -- -- Patient answered questions involving time/time management with 50% acc given no cues; 70% acc given min-mod cues.  -SR       Executive Functional Skills Goal 1 (SLP)    Improve Executive Function Skills Goal 1 (SLP) -- identify strategies, strengths, limitations;organization/planning activity;90%;with minimal cues (75-90%)  -SR --    Time Frame (Executive Function Skills Goal 1, SLP) -- by discharge  -SR --    Progress/Outcomes (Executive Function Skills Goal 1, SLP) goal ongoing  -SL good progress toward goal;continuing progress toward goal  -SR --    Comment (Executive Function Skills Goal 1, SLP) thought flexibility task- altering words via 4 different constraints- difficulty noted for recalling constraint directives with frequent reminders and verbal cues required for task;  75% with min/mod cues  -SL Medication management; patient followed verbal directions and sorted 10 mock medications by day and frequency using pill organizer with 40% acc given no cues; 80% acc given mod cues. Required verbal cue x2 to scan to L side. SLP encouraged patient to have organizer at home to sort medications following discharge. Agreeable to supervision of medications/finances following d/c. Extended time to complete therapy task.  -SR --          User Key  (r) = Recorded By, (t) = Taken By, (c) = Cosigned By     Initials Name Provider Type    Jemma Kelley MS CCC-SLP Speech and Language Pathologist     Minal Bennett, CCC-SLP Speech and Language Pathologist                            Time Calculation:        Time Calculation- SLP     Row Name 12/22/22 1502 12/22/22 1100          Time Calculation- SLP    SLP Start Time 1430  -SL 1030  -SL     SLP Stop Time 1500  -SL 1100  -SL     SLP Time Calculation (min) 30 min  -SL 30 min  -SL           User Key  (r) = Recorded By, (t) = Taken By, (c) = Cosigned By    Initials Name Provider Type    Jemma Kelley MS CCC-SLP Speech and Language Pathologist                  Therapy Charges for Today     Code Description Service Date Service Provider Modifiers Qty    29970577475 HC ST DEV OF COGN SKILLS EACH ADDT'L 15 MIN 12/21/2022 Jemma Bradley MS CCC-SLP  1    [id number] HC ST DEV OF COGN SKILLS INITIAL 15 MIN 12/21/2022 Jemma Bradley MS CCC-SLP  1    [id number] HC ST DEV OF COGN SKILLS INITIAL 15 MIN 12/21/2022 Jemma Bradley MS CCC-SLP  1    [id number] HC ST DEV OF COGN SKILLS EACH ADDT'L 15 MIN 12/21/2022 Jemma Bradley MS CCC-SLP  1    [id number] HC ST DEV OF COGN SKILLS EACH ADDT'L 15 MIN 12/21/2022 Jemma Bradley MS CCC-SLP  2    [id number] HC ST DEV OF COGN SKILLS EACH ADDT'L 15 MIN 12/22/2022 Jemma Bradley MS CCC-SLP  1    [id number] HC ST DEV OF COGN SKILLS INITIAL 15 MIN 12/22/2022 Jemma Bradley MS CCC-SLP  1    [id number] HC ST DEV OF COGN SKILLS EACH ADDT'L 15 MIN 12/22/2022 Jemma Bradley MS CCC-SLP  2                           Jemma Bradley MS CCC-SLP  12/22/2022

## 2022-12-22 NOTE — PROGRESS NOTES
Inpatient Rehabilitation Discharge  Section A. Transportation  Issues Due to Lack of Transportation:  No    Section A. Medication List  Subsequent Provider:  06 - Home under care of organized home health service  organization  Medication List to Subsequent Provider at Discharge:  Yes - Current reconciled  medication list provided to the subsequent provider  Route(s) of Medication List Transmission to Subsequent Provider:  Electronic  Health Record  Medication List to Patient:  Not applicable.  Patient discharged to a subsequent  provider as defined in 44D    Signed by: SHANNON Bonilla

## 2022-12-22 NOTE — PROGRESS NOTES
SECTION GG      Mobility Performance Discharge:     Roll Left and Right: [place] provides verbal cues and/or touching/steadying  and/or contact guard assistance as patient completes activity. Assistance may be  provided throughout the activity or intermittently.   Sit to Lying: [place] provides verbal cues and/or touching/steadying and/or  contact guard assistance as patient completes activity. Assistance may be  provided throughout the activity or intermittently.   Lying to Sitting on Side of Bed: [place] provides verbal cues and/or  touching/steadying and/or contact guard assistance as patient completes  activity. Assistance may be provided throughout the activity or intermittently.   Sit to Stand: [place] provides verbal cues and/or touching/steadying and/or  contact guard assistance as patient completes activity. Assistance may be  provided throughout the activity or intermittently.   Chair/Bed to Chair Transfer: [place] provides verbal cues and/or  touching/steadying and/or contact guard assistance as patient completes  activity. Assistance may be provided throughout the activity or intermittently.   Car Transfer: [place] provides verbal cues and/or touching/steadying and/or  contact guard assistance as patient completes activity. Assistance may be  provided throughout the activity or intermittently.   Walk 10 Feet:   [place] provides verbal cues and/or touching/steadying and/or  contact guard assistance as patient completes activity. Assistance may be  provided throughout the activity or intermittently.  Walk 50 Feet with 2 Turns:   [place] provides verbal cues and/or  touching/steadying and/or contact guard assistance as patient completes  activity. Assistance may be provided throughout the activity or intermittently.  Walk 150 Feet:   [place] does less than half the effort. [place] lifts, holds or  supports trunk or limbs but provides less than half the effort.  Walking 10 Feet on Uneven Surfaces:   [place] does less  than half the effort.  [place] lifts, holds or supports trunk or limbs but provides less than half the  effort.  1 Step Over Curb or Up/Down Stair:   [place] does less than half the effort.  [place] lifts, holds or supports trunk or limbs but provides less than half the  effort.  4 Steps Up and Down, With/Without Rail:   [place] does less than half the effort.  [place] lifts, holds or supports trunk or limbs but provides less than half the  effort.  12 Steps Up and Down, With/Without Rail:   Not attempted due to medical or  safety concerns.  Picking up an Object:   Not attempted due to medical or safety concerns. Uses  Wheelchair and/or Scooter: No    Section J. Health Conditions (Pain):  Pain Interference with Therapy Activities:   Rarely or not at all  Pain Interference with Day-to-Day Activities:   Occasionally    Signed by: Ariana Allan,

## 2022-12-22 NOTE — PROGRESS NOTES
Talked with  by phone as he did not attend scheduled family teaching this afternoon. He stated he forgot and overslept. He will plan to come in the morning after he gets off work and stay until patient is d/c. Discussed OT plan to do teaching with him at 9:30 a.m. Discussed home health services to be provided by [facility]. He stated they did leave him a message about verifying address, etc. Encouraged him to call them back to let them know what number to call to schedule their in home visits. Informed  that patient will have radiation treatment tomorrow morning at 7:45 a.m. [employer] delivered rolling walker and BSC to patient's room today.  Patient scheduled to d/c home with  tomorrow, 12/23. Daughters will be coming home this weekend and will be here next week to help with patient at home. Will assist with any further plans.

## 2022-12-22 NOTE — THERAPY DISCHARGE NOTE
Inpatient Rehabilitation - IRF Occupational Therapy Treatment Note/Discharge  [facility]     Patient Name: Christie Avendaño  : 1965  MRN: [id number]  Today's Date: 2022               Admit Date: 2022       ICD-10-CM ICD-9-CM   1. Impaired functional mobility, balance, gait, and endurance  Z74.09 V49.89   2. 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)   • Non-small cell lung cancer, right (HCC)   • Renal mass, right   • SIRS (systemic inflammatory response syndrome) (HCC)   • Hyperlipidemia   • Malignant melanoma of right lower extremity including hip (HCC)   • High grade glioma    • 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)   • Glioma (HCC)     Past Medical History:   Diagnosis Date   • Arthritis    • Asthma    • BRCA2 positive    • Diabetes mellitus (HCC)    • H/O Liver masses 2017    x3   • H/O Lung masses 2017    1 in right lower lobe and 1 in the left infrahilar location   • H/O Ovarian cyst    • H/O Right adrenal mass (CMS/HCC) 2017   • Lung cancer (HCC)    • Melanoma (HCC)     right foot   • PONV (postoperative nausea and vomiting)    • Thyroid disease      Past Surgical History:   Procedure Laterality Date   • APPENDECTOMY     • BRAIN BIOPSY Right 2022    Procedure: Right frontal stereotactic needle brain biopsy;  Surgeon: Manuel Thompson MD;  Location: [facility] MAIN OR;  Service: Neurosurgery;  Laterality: Right;   • BREAST IMPLANT SURGERY Bilateral    • CHOLECYSTECTOMY     • HYSTERECTOMY     • MASTECTOMY  Bilateral    • OVARIAN CYST SURGERY     • THORACOSCOPY VIDEO ASSISTED WITH LOBECTOMY Right 10/9/2020    Procedure: BRONCHOSCOPY, THORACOSCOPY VIDEO ASSISTED WITH ANTERIOR BASAL SEGMENTECTOMY, INTERCOSTAL NERVE BLOCK;  Surgeon: Flaca Crisostomo MD;  Location: [facility] OR;  Service: Thoracic;  Laterality: Right;   • TONSILLECTOMY         IRF OT ASSESSMENT FLOWSHEET (last 12 hours)     IRF OT Evaluation and Treatment     Row Name 12/22/22 1626          OT Time and Intention    Document Type discharge evaluation;daily treatment  -CC     Mode of Treatment occupational therapy  -CC     Patient Effort good  -CC     Symptoms Noted During/After Treatment fatigue  -CC     Evaluation/Treatment Not Performed --  spouse did not show for family teaching  -CC     Row Name 12/22/22 1626          General Information    Patient Profile Reviewed yes  -CC     Row Name 12/22/22 1626          Living Environment    Current Living Arrangements home  -CC     Home Accessibility wheelchair accessible  -CC     People in Home spouse  -CC     Name(s) of People in Home Jayesh  -CC     Primary Care Provided by self  -CC     Row Name 12/22/22 1626          Home Main Entrance    Number of Stairs, Main Entrance none  -CC     Stair Railings, Main Entrance none  -CC     Row Name 12/22/22 1626          Home Use of Assistive/Adaptive Equipment    Equipment Currently Used at Home shower chair;commode  has shower seat. 3-1 ordered  -CC     Row Name 12/22/22 1626          Pain Assessment    Pretreatment Pain Rating 0/10 - no pain  -CC     Posttreatment Pain Rating 0/10 - no pain  -CC     Row Name 12/22/22 1626          Cognition/Psychosocial    Affect/Mental Status (Cognition) flat/blunted affect  -CC     Orientation Status (Cognition) oriented x 4  -CC     Follows Commands (Cognition) follows one-step commands;follows two-step commands;delayed response/completion;increased processing time needed  -CC     Personal Safety Interventions fall prevention  program maintained;gait belt;nonskid shoes/slippers when out of bed  -     Attention Deficit (Cognition) concentration;distractible in noisy environment;distractible in quiet environment;alternating attention;divided attention  -     Row Name 12/22/22 1626          Range of Motion (ROM)    Range of Motion left upper extremity  -     Row Name 12/22/22 1626          Range of Motion Comprehensive    General Range of Motion upper extremity range of motion deficits identified  -     Row Name 12/22/22 1626          General Upper Extremity Assessment (Range of Motion)    Upper Extremity: Range of Motion RUE ROM WFL;shoulder, left: UE ROM  -     Row Name 12/22/22 1626          Left Shoulder, Range of Motion    Left Shoulder, Range of Motion flexion;aBduction  -[facility] Name 12/22/22 1626          Flexion, Left Shoulder (ROM)    AROM: Left Shoulder Flexion other (see comments)  -     AROM Deficit: Left Shoulder Flexion 3/4  -CC     PROM: Left Shoulder Flexion within functional limits  -     Row Name 12/22/22 1626          ABduction, Left Shoulder (ROM)    AROM: Left Shoulder ABduction other (see comments)  -     AROM Deficit: Left Shoulder ABduction 3/4  -CC     PROM: Left Shoulder ABduction within functional limits  -     Row Name 12/22/22 1626          Strength (Manual Muscle Testing)    Strength (Manual Muscle Testing) left upper extremity  -     Comment, Left Hand (Dynamometer Testing) 24  -CC     Comment, Right Hand (Dynamometer Testing) 54  -CC     Left Hand: Lateral (Key) Pinch Strength (Pinch Dynamometer Testing) 10  -CC     Left Hand: [place] (Brian) Pinch Strength (Pinch Dynamometer Testing) 9  -CC     Right Hand: Lateral (Key) Pinch Strength (Pinch Dynamometer Testing) 13  -CC     Right Hand: [place] (Brian) Pinch Strength (Pinch Dynamometer Testing) 10  -CC     Row Name 12/22/22 1626          Strength Comprehensive (MMT)    General Manual Muscle Testing (MMT) Assessment upper  extremity strength deficits identified  -     Row Name 12/22/22 1626          Left Shoulder (Manual Muscle Testing)    Left Shoulder Manual Muscle Testing (MMT) flexion  -     MMT: Flexion, Left Shoulder flexion  -     MMT, Gross Movement: Left Shoulder Flexion (4-/5) good minus  -[facility] Name 12/22/22 1626          Left Elbow/Forearm (Manual Muscle Testing)    Left Elbow/Forearm Manual Muscle Testing (MMT) flexion;extension  -     MMT: Flexion, Left Elbow flexion  -     MMT, Gross Movement: Left Elbow Flexion (4-/5) good minus  -[facility] Name 12/22/22 1626          Left Wrist (Manual Muscle Testing)    Left Wrist Manual Muscle Testing (MMT) flexion  -     MMT, Gross Movement: Left Wrist Flexion (4-/5) good minus  -     MMT, Gross Movement: Left Wrist Extension (4-/5) good minus  -[facility] Name 12/22/22 1626          Sensory    Additional Documentation Sensory Assessment (Somatosensory) (Group)  -[facility] Name 12/22/22 1626          Vision Assessment/Intervention    Visual Impairment/Limitations corrective lenses full-time  -     Visual Processing Deficit nicolette-inattention/neglect, left;visual attention, left  -[facility] Name 12/22/22 1626          Sensory Assessment (Somatosensory)    Sensory Assessment (Somatosensory) UE sensation intact  light touch, steognosis  -[facility] Name 12/22/22 1626          Bathing    [place] Level (Bathing) bathing skills;lower body;upper body;contact guard assist;minimum assist (75% patient effort)  -     Assistive Device (Bathing) grab bar/tub rail;hand held shower spray hose;shower chair  -     Position (Bathing) supported sitting  -     Set-up Assistance (Bathing) adjust water temperature;obtain supplies  -[facility] Name 12/22/22 1626          Upper Body Dressing    [place] Level (Upper Body Dressing) upper body dressing skills;doff;don;bra/undergarment;pull over garment;minimum assist (75% or more patient effort);clothes fastener  management  -     Position (Upper Body Dressing) supported sitting  -     Set-up Assistance (Upper Body Dressing) obtain clothing  -CC     Comment (Upper Body Dressing) nicolette dressing., dep to hook bra  -     Row Name 12/22/22 1626          Lower Body Dressing    [place] Level (Lower Body Dressing) doff;don;pants/bottoms;shoes/slippers;socks;minimum assist (75% patient effort)  -     Position (Lower Body Dressing) supported sitting;supported standing  -     Set-up Assistance (Lower Body Dressing) obtain clothing  -CC     Comment (Lower Body Dressing) elasic laces, bit able to tie laces loosly  -     Row Name 12/22/22 1626          Grooming    [place] Level (Grooming) grooming skills  -     Position (Grooming) supported sitting;sink side  -     Set-up Assistance (Grooming) obtain supplies  -     Row Name 12/22/22 1626          Toileting    [place] Level (Toileting) toileting skills;adjust/manage clothing;perform perineal hygiene;minimum assist (75% patient effort)  -     Assistive Device Use (Toileting) raised toilet seat;commode chair  -     Position (Toileting) supported sitting;supported standing  -     Set-up Assistance (Toileting) obtain supplies  -     Row Name 12/22/22 1626          Self-Feeding    [place] Level (Self-Feeding) set up;feeding skills  -     Row Name 12/22/22 1626          Bed Mobility    Rolling Left [place] (Bed Mobility) standby assist  -     Scooting/Bridging [place] (Bed Mobility) standby assist  -     Row Name 12/22/22 1626          Bed-Chair Transfer    Bed-Chair [place] (Transfers) set up;verbal cues;contact guard  -     Assistive Device (Bed-Chair Transfers) wheelchair  -     Row Name 12/22/22 1626          Sit-Stand Transfer    Sit-Stand [place] (Transfers) set up;verbal cues;contact guard  -CC     Assistive Device (Sit-Stand Transfers) wheelchair  -     Row Name 12/22/22 1626          Stand-Sit Transfer     Stand-Sit [place] (Transfers) set up;contact guard  -     Assistive Device (Stand-Sit Transfers) wheelchair  -CC     Row Name 12/22/22 1626          Toilet Transfer    Type (Toilet Transfer) sit-stand;stand-sit  -     [place] Level (Toilet Transfer) contact guard  -     Assistive Device (Toilet Transfer) commode, 3-in-1;walker, front-wheeled  -CC     Row Name 12/22/22 1626          Shower Transfer    Type (Shower Transfer) stand pivot/stand step  -CC     [place] Level (Shower Transfer) contact guard;verbal cues;nonverbal cues (demo/gesture)  -     Assistive Device (Shower Transfer) shower chair;grab bar, tub/shower  -CC     Row Name 12/22/22 1626          Motor Skills    Coordination fine motor deficit;gross motor deficit;left;upper extremity  -     Results, 9 Hole Peg Test of Fine Motor Coordination RUE 38 LUE 78 Box blocks RUE 36 LUE 23  -CC     Functional Endurance fair  -     Neuromuscular Function --  LUE weakness, decreased coordination  -     Motor Control/Coordination Interventions --  review of HEP for coordination AROM  -CC     Row Name 12/22/22 1626          Balance    Static Sitting Balance supervision  -     Static Standing Balance contact guard;verbal cues;non-verbal cues (demo/gesture)  vc for leaning L  -CC     Row Name 12/22/22 1626          Positioning and Restraints    Pre-Treatment Position sitting in chair/recliner  -     Post Treatment Position other  -     Other Position --  with transport for radiation  -     Row Name 12/22/22 1626          Transfer Goal 1 (OT-IRF)    Activity/Assistive Device (Transfer Goal 1, OT-IRF) toilet;walker, rolling  -     [place] Level (Transfer Goal 1, OT-IRF) supervision required  -     Progress/Outcomes (Transfer Goal 1, OT-IRF) goal not met  -     Row Name 12/22/22 1626          Transfer Goal 2 (OT-IRF)    Activity/Assistive Device (Transfer Goal 2, OT-IRF) walk-in shower;shower chair  -     [place] Level  (Transfer Goal 2, OT-IRF) supervision required;set-up required  -CC     Progress/Outcomes (Transfer Goal 2, OT-IRF) goal not met  -CC     Row Name 12/22/22 1626          Bathing Goal 1 (OT-IRF)    Activity/Device (Bathing Goal 1, OT-IRF) bathing skills, all  -CC     [place] Level (Bathing Goal 1, OT-IRF) supervision required  -CC     Progress/Outcomes (Bathing Goal 1, OT-IRF) goal not met  -CC     Row Name 12/22/22 1626          UB Dressing Goal 1 (OT-IRF)    Activity/Device (UB Dressing Goal 1, OT-IRF) upper body dressing  -CC     [place] (UB Dress Goal 1, OT-IRF) supervision required  -CC     Progress/Outcomes (UB Dressing Goal 1, OT-IRF) goal not met  -CC     Row Name 12/22/22 1626          LB Dressing Goal 1 (OT-IRF)    Activity/Device (LB Dressing Goal 1, OT-IRF) lower body dressing  -CC     [place] (LB Dressing Goal 1, OT-IRF) standby assist  -CC     Progress/Outcomes (LB Dressing Goal 1, OT-IRF) goal not met  -CC     Row Name 12/22/22 1626          LB Dressing Goal 2 (OT-IRF)    Activity/Device (LB Dressing Goal 2, OT-IRF) lower body dressing  -CC     [place] (LB Dressing Goal 2, OT-IRF) minimum assist (75% or more patient effort)  -CC     Progress/Outcomes (LB Dressing Goal 2, OT-IRF) goal met  -CC     Row Name 12/22/22 1626          Grooming Goal 1 (OT-IRF)    Activity/Device (Grooming Goal 1, OT-IRF) grooming skills, all  -CC     [place] (Grooming Goal 1, OT-IRF) supervision required  -CC     Progress/Outcomes (Grooming Goal 1, OT-IRF) goal met  -CC     Row Name 12/22/22 1626          Toileting Goal 1 (OT-IRF)    Activity/Device (Toileting Goal 1, OT-IRF) toileting skills, all  -CC     [place] Level (Toileting Goal 1, OT-IRF) supervision required  -CC     Progress/Outcomes (Toileting Goal 1, OT-IRF) goal not met  -CC     Row Name 12/22/22 1626          Strength Goal 1 (OT-IRF)    Time Frame (Strength Goal 1, OT-IRF) long-term goal (LTG);2 weeks  -CC     Strategies/Barriers  (Strength Goal 1, OT-IRF) Patient will increase LUE strength by half grade in prep for ADLs.  -     Progress/Outcomes (Strength Goal 1, OT-IRF) goal met  -     Row Name 12/22/22 1626          Balance Goal 1 (OT)    Progress/Outcomes (Balance Goal 1, OT) goal not met  -     Row Name 12/22/22 1626          Coordination Goal 1 (OT)    Progress/Outcomes (Coordination Goal 1, OT) goal not met  -     Row Name 12/22/22 1626          Coordination Goal 2 (OT)    Progress/Outcomes (Coordination Goal 2, OT) goal met  -     Row Name 12/22/22 1626          Discharge Summary (OT)    Outcomes Achieved Upon Discharge (OT) goals partially achieved prior to discharge;all goals met within established timeframes  -     Discharge Summary Statement (OT) Pt D/C home w  and 24 hour supervision recommended. Pt has shower seat. 3-1 ordered.  -           User Key  (r) = Recorded By, (t) = Taken By, (c) = Cosigned By    Initials Name Effective Dates    CC Yissel Goel, OTR 06/16/21 -               Wound Right scalp Incision (Active)   Dressing Appearance open to air 12/21/22 2130   Closure Liquid skin adhesive 12/22/22 0851   Base dry;clean;scab 12/22/22 0851   Dressing Care open to air 12/22/22 0851       Occupational Therapy Education     Title: PT OT SLP Therapies (Done)     Topic: Occupational Therapy (Done)     Point: ADL training (Done)     Description:   Instruct learner(s) on proper safety adaptation and remediation techniques during self care or transfers.   Instruct in proper use of assistive devices.              Learning Progress Summary           Patient Acceptance, E, VU by  at 12/20/2022 1610    Comment: Family conf w ppt, spouse, dgts and father. status, home needs, DME and follow up therapy discussed. 3-1 ordered. Pt has shower seat and grab bars.    Acceptance, E, VU by WN at 12/12/2022 2327    Acceptance, E, VU by WN at 12/12/2022 0155    Acceptance, E, VU by CB at 12/8/2022 1204    Acceptance, E, VU,NR  by CE at 12/8/2022 1107    Comment: fall prevention, DME including w/c and shower chair                   Point: Home exercise program (Done)     Description:   Instruct learner(s) on appropriate technique for monitoring, assisting and/or progressing therapeutic exercises/activities.              Learning Progress Summary           Patient Acceptance, E, VU by CC at 12/20/2022 1610    Comment: Family conf w ppt, spouse, dgts and father. status, home needs, DME and follow up therapy discussed. 3-1 ordered. Pt has shower seat and grab bars.    Acceptance, E, VU by WN at 12/12/2022 2327    Acceptance, E, VU by WN at 12/12/2022 0155    Acceptance, E, VU by CB at 12/8/2022 1204    Acceptance, E, VU,NR by CE at 12/8/2022 1107    Comment: fall prevention, DME including w/c and shower chair                   Point: Precautions (Done)     Description:   Instruct learner(s) on prescribed precautions during self-care and functional transfers.              Learning Progress Summary           Patient Acceptance, E, VU by CC at 12/20/2022 1610    Comment: Family conf w ppt, spouse, dgts and father. status, home needs, DME and follow up therapy discussed. 3-1 ordered. Pt has shower seat and grab bars.    Acceptance, E, VU by WN at 12/12/2022 2327    Acceptance, E, VU by WN at 12/12/2022 0155    Acceptance, E, VU by CB at 12/8/2022 1204    Acceptance, E, VU,NR by CE at 12/8/2022 1107    Comment: fall prevention, DME including w/c and shower chair                   Point: Body mechanics (Done)     Description:   Instruct learner(s) on proper positioning and spine alignment during self-care, functional mobility activities and/or exercises.              Learning Progress Summary           Patient Acceptance, E, VU by CC at 12/20/2022 1610    Comment: Family conf w ppt, spouse, dgts and father. status, home needs, DME and follow up therapy discussed. 3-1 ordered. Pt has shower seat and grab bars.    Acceptance, E, VU by WN at  12/12/2022 2327    Acceptance, E, VU by WN at 12/12/2022 0155    Acceptance, E, VU by CB at 12/8/2022 1204    Acceptance, E, VU,NR by CE at 12/8/2022 1107    Comment: fall prevention, DME including w/c and shower chair                               User Key     Initials Effective Dates Name Provider Type Discipline    CC 06/16/21 -  Yissel Goel OTR Occupational Therapist OT    WN 08/23/22 -  Doc Park, RN Registered Nurse Nurse    CB 11/10/22 -  Mackenzie Hopkins CCC-SLP Speech and Language Pathologist SLP    CE 10/17/22 -  Portia Esqueda OT Occupational Therapist OT                OT Recommendation and Plan  Anticipated Discharge Disposition (OT): home with assist  Planned Therapy Interventions (OT): activity tolerance training, BADL retraining, functional balance retraining, IADL retraining, occupation/activity based interventions, patient/caregiver education/training, strengthening exercise, ROM/therapeutic exercise, transfer/mobility retraining           OT IRF GOALS     Row Name 12/22/22 1626 12/16/22 1538          Transfer Goal 1 (OT-IRF)    Activity/Assistive Device (Transfer Goal 1, OT-IRF) toilet;walker, rolling  -CC --     [place] Level (Transfer Goal 1, OT-IRF) supervision required  -CC --     Progress/Outcomes (Transfer Goal 1, OT-IRF) goal not met  -CC continuing progress toward goal  -CC        Transfer Goal 2 (OT-IRF)    Activity/Assistive Device (Transfer Goal 2, OT-IRF) walk-in shower;shower chair  -CC --     [place] Level (Transfer Goal 2, OT-IRF) supervision required;set-up required  -CC --     Progress/Outcomes (Transfer Goal 2, OT-IRF) goal not met  -CC continuing progress toward goal  -CC        Bathing Goal 1 (OT-IRF)    Activity/Device (Bathing Goal 1, OT-IRF) bathing skills, all  -CC --     [place] Level (Bathing Goal 1, OT-IRF) supervision required  -CC --     Progress/Outcomes (Bathing Goal 1, OT-IRF) goal not met  -CC continuing progress toward goal  -CC         UB Dressing Goal 1 (OT-IRF)    Activity/Device (UB Dressing Goal 1, OT-IRF) upper body dressing  -CC --     [place] (UB Dress Goal 1, OT-IRF) supervision required  -CC --     Progress/Outcomes (UB Dressing Goal 1, OT-IRF) goal not met  -CC continuing progress toward goal  -CC        LB Dressing Goal 1 (OT-IRF)    Activity/Device (LB Dressing Goal 1, OT-IRF) lower body dressing  -CC --     [place] (LB Dressing Goal 1, OT-IRF) standby assist  -CC --     Progress/Outcomes (LB Dressing Goal 1, OT-IRF) goal not met  -CC continuing progress toward goal  -CC        LB Dressing Goal 2 (OT-IRF)    Activity/Device (LB Dressing Goal 2, OT-IRF) lower body dressing  -CC --     [place] (LB Dressing Goal 2, OT-IRF) minimum assist (75% or more patient effort)  -CC --     Progress/Outcomes (LB Dressing Goal 2, OT-IRF) goal met  -CC goal met  -CC        Grooming Goal 1 (OT-IRF)    Activity/Device (Grooming Goal 1, OT-IRF) grooming skills, all  -CC --     [place] (Grooming Goal 1, OT-IRF) supervision required  -CC --     Progress/Outcomes (Grooming Goal 1, OT-IRF) goal met  -CC goal met  -CC        Toileting Goal 1 (OT-IRF)    Activity/Device (Toileting Goal 1, OT-IRF) toileting skills, all  -CC --     [place] Level (Toileting Goal 1, OT-IRF) supervision required  -CC --     Progress/Outcomes (Toileting Goal 1, OT-IRF) goal not met  -CC continuing progress toward goal  -CC        Strength Goal 1 (OT-IRF)    Time Frame (Strength Goal 1, OT-IRF) long-term goal (LTG);2 weeks  -CC --     Strategies/Barriers (Strength Goal 1, OT-IRF) Patient will increase LUE strength by half grade in prep for ADLs.  -CC --     Progress/Outcomes (Strength Goal 1, OT-IRF) goal met  -CC continuing progress toward goal  -CC        Balance Goal 1 (OT)    Progress/Outcomes (Balance Goal 1, OT) goal not met  -CC continuing progress toward goal  -CC           User Key  (r) = Recorded By, (t) = Taken By, (c) = Cosigned By    Initials  Name Provider Type    Yissel Tamayo OTR Occupational Therapist                    Time Calculation:    Time Calculation- OT     Row Name 12/22/22 1500 12/22/22 1345 12/22/22 0930       Time Calculation- OT    OT Start Time 1500  -CC 1345  -CC 0930  -CC    OT Stop Time 1545  -CC 1400  -CC 1000  -CC    OT Time Calculation (min) 45 min  -CC 15 min  -CC 30 min  -CC          User Key  (r) = Recorded By, (t) = Taken By, (c) = Cosigned By    Initials Name Provider Type    Yissel Tamayo OTR Occupational Therapist                Therapy Charges for Today     Code Description Service Date Service Provider Modifiers Qty    08010168336 HC OT SELF CARE/MGMT/TRAIN EA 15 MIN 12/21/2022 Yissel Goel OTR GO 2    92830737434 HC OT NEUROMUSC RE EDUCATION EA 15 MIN 12/21/2022 Yissel Goel OTR GO 2    14092048188 HC OT SELF CARE/MGMT/TRAIN EA 15 MIN 12/22/2022 Yissel Goel OTR GO 4    19129112067 HC OT NEUROMUSC RE EDUCATION EA 15 MIN 12/22/2022 Yissel Goel OTR GO 2               OT Discharge Summary  Discharge Destination: Home with assist, Home with home health    SHARON Vargas  12/22/2022

## 2022-12-22 NOTE — PROGRESS NOTES
[facility] Progress Note      Patient Name: Christie Avendaño  : 1965  MRN: [id number]  Primary Care Physician:  Tiffany Holloway MD  Date of admission: 2022    Subjective     Interval History:   Follow-up hyponatremia    The patient is feeling better, denies any chest pain or shortness of air, no orthopnea or PND, no nausea or vomiting, no dysuria or gross hematuria, no bladder outlet symptoms, no lightheadedness.      Review of Systems:   As noted above    Objective     Vitals:   Temp:  [97.7 °F (36.5 °C)-98.2 °F (36.8 °C)] 97.7 °F (36.5 °C)  Heart Rate:  [61-70] 61  Resp:  [16-18] 18  BP: (115-126)/(73-76) 115/76    Intake/Output Summary (Last 24 hours) at 2022 1147  Last data filed at 2022 0548  Gross per 24 hour   Intake 300 ml   Output 850 ml   Net -550 ml       Physical Exam:    General Appearance: alert, oriented x 3, no acute distress   Skin: warm and dry  HEENT: oral mucosa normal, nonicteric sclera  Neck: supple, no JVD  Lungs: CTA, unlabored breathing effort  Heart: RRR, normal S1 and S2, no S3, no rub  Abdomen: soft, nontender, nondistended, normoactive  : no palpable bladder  Extremities: no edema, cyanosis or clubbing  Neuro: normal speech and mental status     Scheduled Meds:     [START ON 2022] buPROPion, 100 mg, Oral, Q12H  calcium 500 mg vitamin D 5 mcg (200 UT), 1 tablet, Oral, BID  dexamethasone, 4 mg, Oral, Q8H  enoxaparin, 40 mg, Subcutaneous, Q12H  escitalopram, 5 mg, Oral, Daily  gabapentin, 100 mg, Oral, TID  insulin glargine, 40 Units, Subcutaneous, QAM  insulin lispro, 0-14 Units, Subcutaneous, TID AC  insulin lispro, 9 Units, Subcutaneous, TID With Meals  latanoprost, 1 drop, Both Eyes, Nightly  levETIRAcetam, 500 mg, Oral, BID  levothyroxine, 150 mcg, Oral, Q AM  magnesium oxide, 400 mg, Oral, Daily  ondansetron, 8 mg, Oral, Q24H  pantoprazole, 40 mg, Oral, Q AM  polyethylene glycol, 17 g, Oral, Daily  propranolol, 20 mg, Oral,  Q8H  sulfamethoxazole-trimethoprim, 1 tablet, Oral, Once per day on Mon Wed Fri  temozolomide, 140 mg, Oral, Nightly   And  temozolomide, 20 mg, Oral, Nightly   And  temozolomide, 5 mg, Oral, Nightly  valACYclovir, 500 mg, Oral, Q24H      IV Meds:        Results Reviewed:   I have personally reviewed the results from the time of this admission to 12/22/2022 11:47 EST     Results from last 7 days   Lab Units 12/22/22  0647 12/21/22  0544 12/20/22  0833   SODIUM mmol/L 132* 130* 129*   POTASSIUM mmol/L 5.1 5.2 5.3*   CHLORIDE mmol/L 94* 92* 93*   CO2 mmol/L 27.7 30.0* 28.0   BUN mg/dL 29* 26* 28*   CREATININE mg/dL 0.88 0.85 0.85   CALCIUM mg/dL 9.5 9.5 9.7   BILIRUBIN mg/dL 0.6 0.7 0.6   ALK PHOS U/L 164* 160* 161*   ALT (SGPT) U/L 225* 203* 193*   AST (SGOT) U/L 49* 43* 32   GLUCOSE mg/dL 172* 140* 148*       Estimated Creatinine Clearance: 86 mL/min (by C-G formula based on SCr of 0.88 mg/dL).    Results from last 7 days   Lab Units 12/22/22  0647 12/21/22  0544 12/20/22  0833   MAGNESIUM mg/dL 2.3 2.3 2.3   PHOSPHORUS mg/dL 4.0 4.1 4.0       Results from last 7 days   Lab Units 12/22/22  0647 12/21/22  0544 12/20/22  0833 12/19/22  0552 12/18/22  1712   URIC ACID mg/dL 5.6 5.6 5.5 5.4 5.9*       Results from last 7 days   Lab Units 12/22/22  0647 12/19/22  0552   WBC 10*3/mm3 8.43 9.55   HEMOGLOBIN g/dL 12.4 12.6   PLATELETS 10*3/mm3 169 175             Assessment / Plan     ASSESSMENT:  -Hyponatremia appears to be euvolemic with this history of multiple malignancies concern about SIADH especially with the stage IV glioblastoma.  All the parameters suggestive of SIADH except having elevated uric acid she is responding to fluid restriction, sodium today up to 132, potassium 5.1  -Prior right partial nephrectomy for renal cell cancer  -Adenocarcinoma of the lung status postresection  -Melanoma of the left heel status post resect  -Bilateral mastectomy because of BRCA2 mutation  -Hyperkalemia, potassium 5.1, patient  was on lisinopril which was discontinued    PLAN:  -Continue fluid restriction  -Continue the same treatment  -Surveillance labs    I discussed the case with the patient.    Thank you for involving us in the care of Christie Avendaño.  Please feel free to call with any questions.    Golden Gonzalez MD  12/22/22  11:47 [facility]    [facility]  [phone]    Please note that portions of this note were completed with a voice recognition program.

## 2022-12-22 NOTE — THERAPY DISCHARGE NOTE
Inpatient Rehabilitation - Physical Therapy Treatment Note/Discharge  [facility]     Patient Name: Christie Avendaño  : 1965  MRN: [id number]  Today's Date: 2022                Admit Date: 2022    Visit Dx:    ICD-10-CM ICD-9-CM   1. Impaired functional mobility, balance, gait, and endurance  Z74.09 V49.89   2. 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)   • Non-small cell lung cancer, right (HCC)   • Renal mass, right   • SIRS (systemic inflammatory response syndrome) (HCC)   • Hyperlipidemia   • Malignant melanoma of right lower extremity including hip (HCC)   • High grade glioma    • 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)   • Glioma (HCC)     Past Medical History:   Diagnosis Date   • Arthritis    • Asthma    • BRCA2 positive    • Diabetes mellitus (HCC)    • H/O Liver masses 2017    x3   • H/O Lung masses 2017    1 in right lower lobe and 1 in the left infrahilar location   • H/O Ovarian cyst    • H/O Right adrenal mass (CMS/HCC) 2017   • Lung cancer (HCC)    • Melanoma (HCC)     right foot   • PONV (postoperative nausea and vomiting)    • Thyroid disease      Past Surgical History:   Procedure Laterality Date   • APPENDECTOMY     • BRAIN BIOPSY Right 2022    Procedure: Right frontal stereotactic needle brain biopsy;  Surgeon: Manuel Thompson MD;  Location: [facility] OR;  Service: Neurosurgery;  Laterality: Right;   • BREAST IMPLANT SURGERY Bilateral    • CHOLECYSTECTOMY     • HYSTERECTOMY     • MASTECTOMY  Bilateral    • OVARIAN CYST SURGERY     • THORACOSCOPY VIDEO ASSISTED WITH LOBECTOMY Right 10/9/2020    Procedure: BRONCHOSCOPY, THORACOSCOPY VIDEO ASSISTED WITH ANTERIOR BASAL SEGMENTECTOMY, INTERCOSTAL NERVE BLOCK;  Surgeon: Flaca Crisostomo MD;  Location: [facility] OR;  Service: Thoracic;  Laterality: Right;   • TONSILLECTOMY         PT ASSESSMENT (last 12 hours)     IRF PT Evaluation and Treatment     Row Name 12/22/22 1123          PT Time and Intention    Document Type daily treatment;discharge evaluation  -ST     Mode of Treatment physical therapy  -ST     Patient/Family/Caregiver Comments/Observations up in w/c from SLP this AM, up in w/c from OT this PM  -     Row Name 12/22/22 1123          General Information    Patient Profile Reviewed yes  -ST     Existing Precautions/Restrictions fall  -     Row Name 12/22/22 1123          Pain Assessment    Pretreatment Pain Rating 0/10 - no pain  -ST     Posttreatment Pain Rating 0/10 - no pain  -     Row Name 12/22/22 1123          Cognition/Psychosocial    Affect/Mental Status (Cognition) flat/blunted affect  -     Orientation Status (Cognition) oriented x 4  -ST     Follows Commands (Cognition) follows one-step commands;follows two-step commands;delayed response/completion;increased processing time needed  -ST     Personal Safety Interventions fall prevention program maintained;gait belt;nonskid shoes/slippers when out of bed  -ST     Attention Deficit (Cognition) concentration;distractible in noisy environment  -     Executive Function Deficit (Cognition) organization/sequencing;problem-solving/reasoning  -     Row Name 12/22/22 1123          Mobility    Advanced Gait Activity rough/uneven surfaces  -     Row Name 12/22/22 1123          Bed Mobility    Rolling Left [place] (Bed Mobility) standby assist  -ST     Rolling Right [place] (Bed Mobility) modified independence  -ST     Supine-Sit [place] (Bed Mobility) set up;verbal  cues;contact guard  -ST     Sit-Supine [place] (Bed Mobility) set up;verbal cues;contact guard  -ST     Row Name 12/22/22 1123          Bed-Chair Transfer    Bed-Chair [place] (Transfers) set up;verbal cues;contact guard  -ST     Assistive Device (Bed-Chair Transfers) wheelchair  -ST     Row Name 12/22/22 1123          Chair-Bed Transfer    Chair-Bed [place] (Transfers) set up;verbal cues;contact guard  -ST     Assistive Device (Chair-Bed Transfers) wheelchair  -ST     Comment, (Chair-Bed Transfer) stand pivot, vc for L foot awareness  -ST     Row Name 12/22/22 1123          Sit-Stand Transfer    Sit-Stand [place] (Transfers) set up;verbal cues;contact guard  -ST     Assistive Device (Sit-Stand Transfers) wheelchair;walker, front-wheeled  -ST     Row Name 12/22/22 1123          Stand-Sit Transfer    Stand-Sit [place] (Transfers) set up;verbal cues;contact guard  -ST     Assistive Device (Stand-Sit Transfers) wheelchair;walker, front-wheeled  -ST     Row Name 12/22/22 1123          Car Transfer    Type (Car Transfer) sit-stand;stand-sit  -ST     [place] Level (Car Transfer) set up;verbal cues;contact guard  -ST     Assistive Device (Car Transfer) wheelchair  -ST     Row Name 12/22/22 1123          Gait/Stairs (Locomotion)    [place] Level (Gait) set up;verbal cues;contact guard;minimum assist (75% patient effort)  -ST     Assistive Device (Gait) walker, front-wheeled  -ST     Distance in Feet (Gait) 160' this AM  -ST     Pattern (Gait) step-through  -ST     Deviations/Abnormal Patterns (Gait) fabrizio decreased;left sided deviations;stride length decreased;base of support, narrow  -ST     Bilateral Gait Deviations forward flexed posture  -ST     Left Sided Gait Deviations leans left  -ST     Right Sided Gait Deviations weight shift ability decreased  -ST     Gait Assessment/Intervention VC for L foot clearance. tactile cues for midline alignment. improving ability to stand and rest  when she feels herself getting tired  -ST     [place] Level (Stairs) set up;verbal cues;minimum assist (75% patient effort)  -ST     Handrail Location (Stairs) both sides  -ST     Number of Steps (Stairs) 4  -ST     Ascending Technique (Stairs) step-to-step  -ST     Descending Technique (Stairs) step-to-step  -ST     Stairs, Safety Issues sequencing ability decreased;balance decreased during turns;weight-shifting ability decreased  -ST     Row Name 12/22/22 1123          Rough/Uneven Surface Gait Skills (Mobility)    [place], Gait on Rough/Uneven Surface (Mobility) tactile cues;verbal cues;minimal assist, 75% or more patient effort  -ST     Assistive Device (Rough/Uneven Surface Gait) walker, front-wheeled  -     Distance in Feet (Rough/Uneven Surface Gait) 8' over red mat  -ST     Comment, Gait Rough/Uneven Surface (Mobility) VC for foot clearance  -     Row Name 12/22/22 1123          Hip (Therapeutic Exercise)    Hip Strengthening (Therapeutic Exercise) bilateral;sitting;marching while seated;aBduction;resistance band;red;10 repetitions;2 sets  -ST     Row Name 12/22/22 1123          Knee (Therapeutic Exercise)    Knee Strengthening (Therapeutic Exercise) bilateral;sitting;LAQ (long arc quad);hamstring curls;resistance band;red;10 repetitions;2 sets  -ST     Row Name 12/22/22 1123          Ankle (Therapeutic Exercise)    Ankle Strengthening (Therapeutic Exercise) bilateral;sitting;dorsiflexion;plantarflexion;10 repetitions;2 sets  -ST     Row Name 12/22/22 1123          Positioning and Restraints    Pre-Treatment Position sitting in chair/recliner  -ST     Post Treatment Position wheelchair  -ST     In Wheelchair sitting;encouraged to call for assist;exit alarm on;with other staff  [place] [facility] [place]  -ST     Row Name 12/22/22 1123          Therapy Plan Review/Discharge Plan (PT)    Anticipated Equipment Needs at Discharge (PT Eval) front wheeled walker  -ST     Anticipated Discharge Disposition  (PT) home with 24/7 care;home with home health  -ST     Row Name 12/22/22 1123          Bed Mobility Goal 1 (PT-IRF)    Activity/Assistive Device (Bed Mobility Goal 1, PT-IRF) bed mobility activities, all  -ST     [place] Level (Bed Mobility Goal 1, PT-IRF) modified independence  -ST     Progress/Outcomes (Bed Mobility Goal 1, PT-IRF) good progress toward goal  -ST     Row Name 12/22/22 1123          Transfer Goal 1 (PT-IRF)    Activity/Assistive Device (Transfer Goal 1, PT-IRF) all transfers;walker, rolling  -ST     [place] Level (Transfer Goal 1, PT-IRF) supervision required  -ST     Progress/Outcomes (Transfer Goal 1, PT-IRF) good progress toward goal  -ST     Row Name 12/22/22 1123          Gait/Walking Locomotion Goal 1 (PT-IRF)    Activity/Assistive Device (Gait/Walking Locomotion Goal 1, PT-IRF) gait (walking locomotion);walker, rolling;walker, 4 wheeled  -ST     Gait/Walking Locomotion Distance Goal 1 (PT-IRF) 150  -ST     [place] Level (Gait/Walking Locomotion Goal 1, PT-IRF) standby assist  -ST     Progress/Outcomes (Gait/Walking Locomotion Goal 1, PT-IRF) good progress toward goal  -ST     Row Name 12/22/22 1123          Stairs Goal 1 (PT-IRF)    Activity/Assistive Device (Stairs Goal 1, PT-IRF) stairs, all skills;walker, rolling  -ST     Number of Stairs (Stairs Goal 1, PT-IRF) 1  -ST     [place] Level (Stairs Goal 1, PT-IRF) minimum assist (75% or more patient effort);verbal cues required  -ST     Progress/Outcomes (Stairs Goal 1, PT-IRF) goal met  -ST     Row Name 12/22/22 1123          Discharge Summary (PT)    Outcomes Achieved/Progress Made Upon Discharge (PT) progress was made toward goals;goals partially achieved prior to discharge  -ST     Transfer to Another Level of Care or Facility (PT) home with assist recommended;home with home health recommended  -ST     Discharge Summary Statement (PT) Pt has made good progress during admission. currently functioning at [facility] to min A  level. Pt to continue with home PT for further functional progress at home with assist.  -ST           User Key  (r) = Recorded By, (t) = Taken By, (c) = Cosigned By    Initials Name Provider Type    Ariana Abreu PT Physical Therapist                Physical Therapy Education     Title: PT OT SLP Therapies (Done)     Topic: Physical Therapy (Done)     Point: Mobility training (Done)     Learning Progress Summary           Patient Acceptance, E,TB, VU,NR by ST at 12/22/2022 1534    Acceptance, E,D, VU,DU by DP at 12/21/2022 1141    Acceptance, E,TB, VU,NR by ST at 12/20/2022 0757    Acceptance, E,TB, VU,NR by LB at 12/19/2022 1122    Comment: ramp negogiation    Acceptance, E,TB,D, NR by KP at 12/17/2022 1047    Acceptance, E,D, VU,DU,NR by DP at 12/16/2022 0811    Acceptance, E,D, VU,DU by DP at 12/15/2022 1606    Acceptance, E,D, VU,DU,NR by DP at 12/15/2022 1147    Comment: transfer training on this date    Acceptance, E, NR by  at 12/13/2022 0923    Acceptance, E, VU by  at 12/12/2022 2327    Acceptance, E, VU,DU,NR by JK at 12/12/2022 1011    Acceptance, E, VU by WN at 12/12/2022 0155    Acceptance, E, VU,NR by  at 12/10/2022 1035    Acceptance, E,D, VU,DU,NR by JK at 12/9/2022 0853    Acceptance, E,TB,D, VU,DU,NR by  at 12/8/2022 1420    Comment: Goals, POC    Acceptance, E, VU by  at 12/8/2022 1204   Significant Other Acceptance, E,TB,D, VU,DU,NR by KP at 12/8/2022 1420    Comment: Goals, POC                   Point: Home exercise program (Done)     Learning Progress Summary           Patient Acceptance, E,TB, VU,NR by ST at 12/22/2022 1534    Acceptance, E,D, VU,DU by DP at 12/21/2022 1141    Acceptance, E,TB,D, NR by KP at 12/17/2022 1047    Acceptance, E,D, VU,DU,NR by DP at 12/16/2022 0811    Acceptance, E,D, VU,DU by DP at 12/15/2022 1606    Acceptance, E,D, VU,DU,NR by DP at 12/15/2022 1147    Comment: transfer training on this date    Acceptance, E, NR by  at 12/13/2022 0927     Acceptance, E, VU by WN at 12/12/2022 2327    Acceptance, E, VU by WN at 12/12/2022 0155    Acceptance, E, VU,NR by  at 12/10/2022 1035    Acceptance, E,D, VU,DU,NR by JK at 12/9/2022 0853    Acceptance, E,TB,D, VU,DU,NR by KP at 12/8/2022 1420    Comment: Goals, POC    Acceptance, E, VU by CB at 12/8/2022 1204   Significant Other Acceptance, E,TB,D, VU,DU,NR by KP at 12/8/2022 1420    Comment: Goals, POC                   Point: Body mechanics (Done)     Learning Progress Summary           Patient Acceptance, E,TB, VU,NR by ST at 12/22/2022 1534    Acceptance, E,D, VU,DU by DP at 12/21/2022 1141    Acceptance, E,TB, VU,NR by ST at 12/20/2022 0757    Acceptance, E,D, VU,DU,NR by DP at 12/16/2022 0811    Acceptance, E,D, VU,DU by DP at 12/15/2022 1606    Acceptance, E,D, VU,DU,NR by DP at 12/15/2022 1147    Comment: transfer training on this date    Acceptance, E, NR by  at 12/13/2022 0923    Acceptance, E, VU by WN at 12/12/2022 2327    Acceptance, E, VU by WN at 12/12/2022 0155    Acceptance, E, VU,NR by  at 12/10/2022 1035    Acceptance, E, VU by LIEN at 12/8/2022 1204                   Point: Precautions (Done)     Learning Progress Summary           Patient Acceptance, E,TB, VU,NR by ST at 12/22/2022 1534    Acceptance, E,D, VU,DU by DP at 12/21/2022 1141    Acceptance, E,TB, VU,NR by ST at 12/20/2022 0757    Acceptance, E,D, VU,DU,NR by DP at 12/16/2022 0811    Acceptance, E,D, VU,DU by DP at 12/15/2022 1606    Acceptance, E,D, VU,DU,NR by DP at 12/15/2022 1147    Comment: transfer training on this date    Acceptance, E, NR by  at 12/13/2022 0923    Acceptance, E, VU by WN at 12/12/2022 2327    Acceptance, E, VU by WN at 12/12/2022 0155    Acceptance, E, VU,NR by  at 12/10/2022 1035    Acceptance, E, VU by  at 12/8/2022 1204                               User Key     Initials Effective Dates Name Provider Type Discipline     06/16/21 -  Maryanne Oleary, PT Physical Therapist PT     06/16/21 -  Darrell  Radha REDDY, PT Physical Therapist PT    JK 06/16/21 -  Samantha Thompson, PT Physical Therapist PT    KP 06/16/21 -  Anita Serrano, PT Physical Therapist PT    WN 08/23/22 -  Doc Park, RN Registered Nurse Nurse    DP 08/24/21 -  Dillan Calderon, PT Physical Therapist PT    CB 11/10/22 -  Mackenzie Hopkins CCC-SLP Speech and Language Pathologist SLP    ST 09/22/22 -  Ariana Allan, PT Physical Therapist PT                PT Recommendation and Plan     Anticipated Equipment Needs at Discharge (PT Eval): front wheeled walker            Time Calculation:    PT Charges     Row Name 12/22/22 1526 12/22/22 1149          Time Calculation    Start Time 1400  -ST 1100  -ST     Stop Time 1430  -ST 1130  -ST     Time Calculation (min) 30 min  -ST 30 min  -ST     PT Received On 12/22/22  -ST 12/22/22  -ST     PT - Next Appointment 12/23/22  -ST 12/23/22  -ST     PT Goal Re-Cert Due Date -- 12/29/22  -ST           User Key  (r) = Recorded By, (t) = Taken By, (c) = Cosigned By    Initials Name Provider Type    ST Ariana Allan, PT Physical Therapist                Therapy Charges for Today     Code Description Service Date Service Provider Modifiers Qty    28198496872 HC GAIT TRAINING EA 15 MIN 12/22/2022 Ariana Allan, PT GP 1    32227697044 HC PT THERAPEUTIC ACT EA 15 MIN 12/22/2022 Ariana Allan, PT GP 2    92685158481 HC PT THER PROC EA 15 MIN 12/22/2022 Ariana Allan, PT GP 1                    Ariana Allan, PT  12/22/2022

## 2022-12-23 ENCOUNTER — SPECIALTY PHARMACY (OUTPATIENT)
Dept: PHARMACY | Facility: HOSPITAL | Age: 57
End: 2022-12-23

## 2022-12-23 ENCOUNTER — TELEPHONE (OUTPATIENT)
Dept: ONCOLOGY | Facility: CLINIC | Age: 57
End: 2022-12-23

## 2022-12-23 ENCOUNTER — TRANSCRIBE ORDERS (OUTPATIENT)
Dept: HOME HEALTH SERVICES | Facility: HOME HEALTHCARE | Age: 57
End: 2022-12-23

## 2022-12-23 ENCOUNTER — HOME HEALTH ADMISSION (OUTPATIENT)
Dept: HOME HEALTH SERVICES | Facility: HOME HEALTHCARE | Age: 57
End: 2022-12-23
Payer: COMMERCIAL

## 2022-12-23 VITALS
BODY MASS INDEX: 40.22 KG/M2 | HEIGHT: 65 IN | DIASTOLIC BLOOD PRESSURE: 72 MMHG | SYSTOLIC BLOOD PRESSURE: 116 MMHG | OXYGEN SATURATION: 100 % | HEART RATE: 68 BPM | WEIGHT: 241.4 LBS | RESPIRATION RATE: 16 BRPM | TEMPERATURE: 97.9 F

## 2022-12-23 DIAGNOSIS — G93.89 BRAIN MASS: ICD-10-CM

## 2022-12-23 DIAGNOSIS — C71.9 GLIOMA OF BRAIN: Primary | ICD-10-CM

## 2022-12-23 LAB
ALBUMIN SERPL-MCNC: 3.8 G/DL (ref 3.5–5.2)
ALBUMIN/GLOB SERPL: 1.7 G/DL
ALP SERPL-CCNC: 157 U/L (ref 39–117)
ALT SERPL W P-5'-P-CCNC: 189 U/L (ref 1–33)
ANION GAP SERPL CALCULATED.3IONS-SCNC: 9 MMOL/L (ref 5–15)
AST SERPL-CCNC: 29 U/L (ref 1–32)
BILIRUB SERPL-MCNC: 0.6 MG/DL (ref 0–1.2)
BUN SERPL-MCNC: 25 MG/DL (ref 6–20)
BUN/CREAT SERPL: 32.1 (ref 7–25)
CALCIUM SPEC-SCNC: 9.2 MG/DL (ref 8.6–10.5)
CHLORIDE SERPL-SCNC: 93 MMOL/L (ref 98–107)
CO2 SERPL-SCNC: 27 MMOL/L (ref 22–29)
CREAT SERPL-MCNC: 0.78 MG/DL (ref 0.57–1)
EGFRCR SERPLBLD CKD-EPI 2021: 88.2 ML/MIN/1.73
GLOBULIN UR ELPH-MCNC: 2.2 GM/DL
GLUCOSE BLDC GLUCOMTR-MCNC: 154 MG/DL (ref 70–130)
GLUCOSE BLDC GLUCOMTR-MCNC: 266 MG/DL (ref 70–130)
GLUCOSE SERPL-MCNC: 196 MG/DL (ref 65–99)
MAGNESIUM SERPL-MCNC: 2.2 MG/DL (ref 1.6–2.6)
PHOSPHATE SERPL-MCNC: 3.2 MG/DL (ref 2.5–4.5)
POTASSIUM SERPL-SCNC: 4.7 MMOL/L (ref 3.5–5.2)
PROT SERPL-MCNC: 6 G/DL (ref 6–8.5)
SODIUM SERPL-SCNC: 129 MMOL/L (ref 136–145)
URATE SERPL-MCNC: 5.1 MG/DL (ref 2.4–5.7)

## 2022-12-23 PROCEDURE — 80053 COMPREHEN METABOLIC PANEL: CPT | Performed by: PHYSICAL MEDICINE & REHABILITATION

## 2022-12-23 PROCEDURE — 63710000001 DEXAMETHASONE PER 0.25 MG: Performed by: PHYSICAL MEDICINE & REHABILITATION

## 2022-12-23 PROCEDURE — 84550 ASSAY OF BLOOD/URIC ACID: CPT | Performed by: INTERNAL MEDICINE

## 2022-12-23 PROCEDURE — 84100 ASSAY OF PHOSPHORUS: CPT | Performed by: INTERNAL MEDICINE

## 2022-12-23 PROCEDURE — 83735 ASSAY OF MAGNESIUM: CPT | Performed by: INTERNAL MEDICINE

## 2022-12-23 PROCEDURE — 97530 THERAPEUTIC ACTIVITIES: CPT

## 2022-12-23 PROCEDURE — 82962 GLUCOSE BLOOD TEST: CPT

## 2022-12-23 PROCEDURE — 97535 SELF CARE MNGMENT TRAINING: CPT

## 2022-12-23 PROCEDURE — 63710000001 INSULIN LISPRO (HUMAN) PER 5 UNITS: Performed by: HOSPITALIST

## 2022-12-23 PROCEDURE — 63710000001 INSULIN LISPRO (HUMAN) PER 5 UNITS: Performed by: INTERNAL MEDICINE

## 2022-12-23 RX ORDER — PROPRANOLOL HYDROCHLORIDE 20 MG/1
20 TABLET ORAL EVERY 8 HOURS SCHEDULED
Qty: 90 TABLET | Refills: 1 | Status: SHIPPED | OUTPATIENT
Start: 2022-12-23

## 2022-12-23 RX ORDER — LEVETIRACETAM 500 MG/1
500 TABLET ORAL 2 TIMES DAILY
Qty: 180 TABLET | Refills: 0 | Status: SHIPPED | OUTPATIENT
Start: 2022-12-23

## 2022-12-23 RX ORDER — VALACYCLOVIR HYDROCHLORIDE 500 MG/1
500 TABLET, FILM COATED ORAL
Qty: 30 TABLET | Refills: 0 | Status: SHIPPED | OUTPATIENT
Start: 2022-12-24 | End: 2023-01-26 | Stop reason: SDUPTHER

## 2022-12-23 RX ORDER — POLYETHYLENE GLYCOL 3350 17 G/17G
17 POWDER, FOR SOLUTION ORAL DAILY
Qty: 238 G | Refills: 0 | Status: SHIPPED | OUTPATIENT
Start: 2022-12-24 | End: 2023-02-20

## 2022-12-23 RX ORDER — FAMOTIDINE 20 MG/1
20 TABLET, FILM COATED ORAL 2 TIMES DAILY PRN
Start: 2022-12-23 | End: 2023-01-03

## 2022-12-23 RX ORDER — PANTOPRAZOLE SODIUM 40 MG/1
40 TABLET, DELAYED RELEASE ORAL
Qty: 90 TABLET | Refills: 0 | Status: SHIPPED | OUTPATIENT
Start: 2022-12-24

## 2022-12-23 RX ORDER — CALCIUM CARBONATE/VITAMIN D3 500 MG-10
1 TABLET ORAL 2 TIMES DAILY
Qty: 180 TABLET | Refills: 0 | Status: SHIPPED | OUTPATIENT
Start: 2022-12-23

## 2022-12-23 RX ORDER — INSULIN LISPRO 100 [IU]/ML
9 INJECTION, SOLUTION INTRAVENOUS; SUBCUTANEOUS
Qty: 15 ML | Refills: 1 | Status: SHIPPED | OUTPATIENT
Start: 2022-12-23

## 2022-12-23 RX ORDER — BUPROPION HYDROCHLORIDE 100 MG/1
100 TABLET ORAL EVERY 12 HOURS SCHEDULED
Status: CANCELLED | OUTPATIENT
Start: 2022-12-23

## 2022-12-23 RX ORDER — ESCITALOPRAM OXALATE 5 MG/1
5 TABLET ORAL DAILY
Qty: 2 TABLET | Refills: 0 | Status: SHIPPED | OUTPATIENT
Start: 2022-12-24 | End: 2023-01-11 | Stop reason: SDUPTHER

## 2022-12-23 RX ORDER — BUPROPION HYDROCHLORIDE 100 MG/1
100 TABLET ORAL EVERY 12 HOURS SCHEDULED
Qty: 60 TABLET | Refills: 1 | Status: SHIPPED | OUTPATIENT
Start: 2022-12-26 | End: 2023-01-03

## 2022-12-23 RX ORDER — BUPROPION HYDROCHLORIDE 100 MG/1
100 TABLET ORAL EVERY 12 HOURS SCHEDULED
Status: DISCONTINUED | OUTPATIENT
Start: 2022-12-26 | End: 2022-12-23 | Stop reason: HOSPADM

## 2022-12-23 RX ORDER — TEMOZOLOMIDE 140 MG/1
140 CAPSULE ORAL DAILY
Qty: 12 CAPSULE | Refills: 0 | Status: SHIPPED | OUTPATIENT
Start: 2022-12-23 | End: 2023-01-16 | Stop reason: SDUPTHER

## 2022-12-23 RX ORDER — ONDANSETRON HYDROCHLORIDE 8 MG/1
8 TABLET, FILM COATED ORAL EVERY 24 HOURS
Qty: 30 TABLET | Refills: 1 | Status: SHIPPED | OUTPATIENT
Start: 2022-12-23 | End: 2023-01-16 | Stop reason: SDUPTHER

## 2022-12-23 RX ORDER — INSULIN GLARGINE 100 [IU]/ML
40 INJECTION, SOLUTION SUBCUTANEOUS EVERY MORNING
Qty: 15 ML | Refills: 1 | Status: SHIPPED | OUTPATIENT
Start: 2022-12-24

## 2022-12-23 RX ORDER — CALCIUM CARBONATE 200(500)MG
2 TABLET,CHEWABLE ORAL 3 TIMES DAILY PRN
Start: 2022-12-23 | End: 2023-01-03

## 2022-12-23 RX ORDER — GABAPENTIN 100 MG/1
100 CAPSULE ORAL 3 TIMES DAILY
Qty: 90 CAPSULE | Refills: 1 | Status: SHIPPED | OUTPATIENT
Start: 2022-12-23

## 2022-12-23 RX ORDER — DEXAMETHASONE 4 MG/1
4 TABLET ORAL EVERY 8 HOURS SCHEDULED
Qty: 90 TABLET | Refills: 0 | Status: SHIPPED | OUTPATIENT
Start: 2022-12-23 | End: 2023-01-26 | Stop reason: SDUPTHER

## 2022-12-23 RX ORDER — SULFAMETHOXAZOLE AND TRIMETHOPRIM 800; 160 MG/1; MG/1
1 TABLET ORAL 3 TIMES WEEKLY
Qty: 32 TABLET | Refills: 0 | Status: SHIPPED | OUTPATIENT
Start: 2022-12-26 | End: 2023-01-04

## 2022-12-23 RX ORDER — TEMOZOLOMIDE 5 MG/1
5 CAPSULE ORAL DAILY
Qty: 12 CAPSULE | Refills: 0 | Status: SHIPPED | OUTPATIENT
Start: 2022-12-23 | End: 2023-01-16 | Stop reason: SDUPTHER

## 2022-12-23 RX ORDER — TEMOZOLOMIDE 20 MG/1
20 CAPSULE ORAL DAILY
Qty: 12 CAPSULE | Refills: 0 | Status: SHIPPED | OUTPATIENT
Start: 2022-12-23 | End: 2023-01-16 | Stop reason: SDUPTHER

## 2022-12-23 RX ADMIN — CALCIUM CARBONATE-VITAMIN D TAB 500 MG-200 UNIT 1 TABLET: 500-200 TAB at 08:13

## 2022-12-23 RX ADMIN — SULFAMETHOXAZOLE AND TRIMETHOPRIM 1 TABLET: 800; 160 TABLET ORAL at 08:14

## 2022-12-23 RX ADMIN — INSULIN LISPRO 9 UNITS: 100 INJECTION, SOLUTION INTRAVENOUS; SUBCUTANEOUS at 12:15

## 2022-12-23 RX ADMIN — DEXAMETHASONE 4 MG: 4 TABLET ORAL at 05:07

## 2022-12-23 RX ADMIN — VALACYCLOVIR HYDROCHLORIDE 500 MG: 500 TABLET, FILM COATED ORAL at 08:14

## 2022-12-23 RX ADMIN — PANTOPRAZOLE SODIUM 40 MG: 40 TABLET, DELAYED RELEASE ORAL at 05:07

## 2022-12-23 RX ADMIN — INSULIN GLARGINE-YFGN 40 UNITS: 100 INJECTION, SOLUTION SUBCUTANEOUS at 08:13

## 2022-12-23 RX ADMIN — MAGNESIUM OXIDE 400 MG (241.3 MG MAGNESIUM) TABLET 400 MG: TABLET at 08:14

## 2022-12-23 RX ADMIN — POLYETHYLENE GLYCOL 3350 17 G: 17 POWDER, FOR SOLUTION ORAL at 08:14

## 2022-12-23 RX ADMIN — INSULIN LISPRO 8 UNITS: 100 INJECTION, SOLUTION INTRAVENOUS; SUBCUTANEOUS at 12:15

## 2022-12-23 RX ADMIN — ESCITALOPRAM 5 MG: 5 TABLET, FILM COATED ORAL at 08:13

## 2022-12-23 RX ADMIN — GABAPENTIN 100 MG: 100 CAPSULE ORAL at 08:38

## 2022-12-23 RX ADMIN — LEVOTHYROXINE SODIUM 150 MCG: 0.15 TABLET ORAL at 05:07

## 2022-12-23 RX ADMIN — PROPRANOLOL HYDROCHLORIDE 20 MG: 20 TABLET ORAL at 05:07

## 2022-12-23 RX ADMIN — INSULIN LISPRO 3 UNITS: 100 INJECTION, SOLUTION INTRAVENOUS; SUBCUTANEOUS at 08:13

## 2022-12-23 RX ADMIN — INSULIN LISPRO 9 UNITS: 100 INJECTION, SOLUTION INTRAVENOUS; SUBCUTANEOUS at 08:12

## 2022-12-23 RX ADMIN — LEVETIRACETAM 500 MG: 500 TABLET, FILM COATED ORAL at 08:14

## 2022-12-23 NOTE — THERAPY TREATMENT NOTE
Inpatient Rehabilitation - Occupational Therapy Treatment Note    [facility]     Patient Name: Christie Avendaño  : 1965  MRN: [id number]    Today's Date: 2022                 Admit Date: 2022         ICD-10-CM ICD-9-CM   1. Impaired functional mobility, balance, gait, and endurance  Z74.09 V49.89   2. High grade glioma   C71.9 191.9   3. 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)   • Non-small cell lung cancer, right (HCC)   • Renal mass, right   • SIRS (systemic inflammatory response syndrome) (HCC)   • Hyperlipidemia   • Malignant melanoma of right lower extremity including hip (HCC)   • High grade glioma    • 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)   • Glioma (HCC)       Past Medical History:   Diagnosis Date   • Arthritis    • Asthma    • BRCA2 positive    • Diabetes mellitus (HCC)    • H/O Liver masses 2017    x3   • H/O Lung masses 2017    1 in right lower lobe and 1 in the left infrahilar location   • H/O Ovarian cyst    • H/O Right adrenal mass (CMS/HCC) 2017   • Lung cancer (HCC)    • Melanoma (HCC)     right foot   • PONV (postoperative nausea and vomiting)    • Thyroid disease        Past Surgical History:   Procedure Laterality Date   • APPENDECTOMY     • BRAIN BIOPSY Right 2022    Procedure: Right frontal stereotactic needle brain biopsy;  Surgeon: Manuel Thompson MD;  Location: [facility] MAIN OR;  Service: Neurosurgery;  Laterality: Right;   • BREAST IMPLANT SURGERY Bilateral    • CHOLECYSTECTOMY     •  HYSTERECTOMY     • MASTECTOMY Bilateral    • OVARIAN CYST SURGERY     • THORACOSCOPY VIDEO ASSISTED WITH LOBECTOMY Right 10/9/2020    Procedure: BRONCHOSCOPY, THORACOSCOPY VIDEO ASSISTED WITH ANTERIOR BASAL SEGMENTECTOMY, INTERCOSTAL NERVE BLOCK;  Surgeon: Flaca Crisostomo MD;  Location: [facility] OR;  Service: Thoracic;  Laterality: Right;   • TONSILLECTOMY               IRF OT ASSESSMENT FLOWSHEET (last 12 hours)     IRF OT Evaluation and Treatment     Row Name 12/23/22 1459          OT Time and Intention    Document Type daily treatment  addendum to D/C for teaching this date. Refer to D/C note for status.  -CC     Mode of Treatment occupational therapy  -CC     Row Name 12/23/22 1459          Pain Assessment    Pretreatment Pain Rating 0/10 - no pain  -CC     Posttreatment Pain Rating 0/10 - no pain  -CC     Row Name 12/23/22 1459          Cognition/Psychosocial    Affect/Mental Status (Cognition) flat/blunted affect  -     Orientation Status (Cognition) oriented x 4  -CC     Personal Safety Interventions fall prevention program maintained;gait belt;nonskid shoes/slippers when out of bed  -CC     Row Name 12/23/22 1450          Bathing    Custer Level (Bathing) bathing skills;lower body;upper body;contact guard assist;minimum assist (75% patient effort)  -CC     Assistive Device (Bathing) grab bar/tub rail;hand held shower spray hose;shower chair  -CC     Position (Bathing) supported sitting;supported standing  -CC     Set-up Assistance (Bathing) adjust water temperature;obtain supplies  -CC     Row Name 12/23/22 1459          Upper Body Dressing    Custer Level (Upper Body Dressing) upper body dressing skills;doff;don;minimum assist (75% or more patient effort)  -CC     Position (Upper Body Dressing) supported sitting  -CC     Set-up Assistance (Upper Body Dressing) obtain clothing  -CC     Row Name 12/23/22 1451          Lower Body Dressing    Custer Level (Lower Body Dressing)  doff;don;pants/bottoms;shoes/slippers;socks;minimum assist (75% patient effort)  -CC     Position (Lower Body Dressing) supported sitting;supported standing  -CC     Row Name 12/23/22 1459          Grooming    Warners Level (Grooming) grooming skills;oral care regimen;wash face, hands;standby assist  -CC     Position (Grooming) supported sitting;sink side  -CC     Row Name 12/23/22 1459          Bed Mobility    Supine-Sit Warners (Bed Mobility) contact guard  -CC     Row Name 12/23/22 1459          Sit-Stand Transfer    Sit-Stand [place]arners (Transfers) set up;verbal cues;standby assist  -CC     Assistive Device (Sit-Stand Transfers) wheelchair  -CC     Row Name 12/23/22 1459          Stand-Sit Transfer    Stand-Sit [place]arners (Transfers) set up;verbal cues;standby assist  -CC     Assistive Device (Stand-Sit Transfers) wheelchair  -CC     Row Name 12/23/22 1459          Shower Transfer    Type (Shower Transfer) stand pivot/stand step  -CC     Warners Level (Shower Transfer) contact guard  -CC     Assistive Device (Shower Transfer) shower chair;grab bar, tub/shower  -CC     Row Name 12/23/22 1459          Balance    Static Sitting Balance supervision  -CC     Static Standing Balance contact guard  -CC     Row Name 12/23/22 1459          Positioning and Restraints    Pre-Treatment Position in bed  -CC     Post Treatment Position wheelchair  -CC     In Wheelchair exit alarm on;with PT;sitting  -CC           User Key  (r) = Recorded By, (t) = Taken By, (c) = Cosigned By    Initials Name Effective Dates    CC Yissel Goel, OTR 06/16/21 -                  Occupational Therapy Education     Title: PT OT SLP Therapies (Resolved)     Topic: Occupational Therapy (Resolved)     Point: ADL training (Resolved)     Description:   Instruct learner(s) on proper safety adaptation and remediation techniques during self care or transfers.   Instruct in proper use of assistive devices.              Learning  Progress Summary           Patient Acceptance, E,TB,D, VU,DU by CC at 12/23/2022 1456    Comment: teaching w spouse for shower and commode tsf, self care skills and UE HEP. Home safety and need for 24 hour assist discussed. Pt issued 3-1 commode. Pt has shower seat w back.    Acceptance, E, VU by WN at 12/23/2022 0155    Acceptance, E, VU by CC at 12/20/2022 1610    Comment: Family conf w ppt, spouse, dgts and father. status, home needs, DME and follow up therapy discussed. 3-1 ordered. Pt has shower seat and grab bars.    Acceptance, E, VU by WN at 12/12/2022 2327    Acceptance, E, VU by WN at 12/12/2022 0155    Acceptance, E, VU by CB at 12/8/2022 1204    Acceptance, E, VU,NR by CE at 12/8/2022 1107    Comment: fall prevention, DME including w/c and shower chair   Family Acceptance, E,TB,D, VU,DU by CC at 12/23/2022 1456    Comment: teaching w spouse for shower and commode tsf, self care skills and UE HEP. Home safety and need for 24 hour assist discussed. Pt issued 3-1 commode. Pt has shower seat w back.                   Point: Home exercise program (Resolved)     Description:   Instruct learner(s) on appropriate technique for monitoring, assisting and/or progressing therapeutic exercises/activities.              Learning Progress Summary           Patient Acceptance, E,TB,D, VU,DU by CC at 12/23/2022 1456    Comment: teaching w spouse for shower and commode tsf, self care skills and UE HEP. Home safety and need for 24 hour assist discussed. Pt issued 3-1 commode. Pt has shower seat w back.    Acceptance, E, VU by WN at 12/23/2022 0155    Acceptance, E, VU by CC at 12/20/2022 1610    Comment: Family conf w ppt, spouse, dgts and father. status, home needs, DME and follow up therapy discussed. 3-1 ordered. Pt has shower seat and grab bars.    Acceptance, E, VU by WN at 12/12/2022 2327    Acceptance, E, VU by WN at 12/12/2022 0155    Acceptance, E, VU by CB at 12/8/2022 1204    Acceptance, E, VU,NR by CE at  12/8/2022 1107    Comment: fall prevention, DME including w/c and shower chair   Family Acceptance, E,TB,D, VU,DU by CC at 12/23/2022 1456    Comment: teaching w spouse for shower and commode tsf, self care skills and UE HEP. Home safety and need for 24 hour assist discussed. Pt issued 3-1 commode. Pt has shower seat w back.                   Point: Precautions (Resolved)     Description:   Instruct learner(s) on prescribed precautions during self-care and functional transfers.              Learning Progress Summary           Patient Acceptance, E,TB,D, VU,DU by CC at 12/23/2022 1456    Comment: teaching w spouse for shower and commode tsf, self care skills and UE HEP. Home safety and need for 24 hour assist discussed. Pt issued 3-1 commode. Pt has shower seat w back.    Acceptance, E, VU by WN at 12/23/2022 0155    Acceptance, E, VU by CC at 12/20/2022 1610    Comment: Family conf w ppt, spouse, dgts and father. status, home needs, DME and follow up therapy discussed. 3-1 ordered. Pt has shower seat and grab bars.    Acceptance, E, VU by WN at 12/12/2022 2327    Acceptance, E, VU by WN at 12/12/2022 0155    Acceptance, E, VU by CB at 12/8/2022 1204    Acceptance, E, VU,NR by CE at 12/8/2022 1107    Comment: fall prevention, DME including w/c and shower chair   Family Acceptance, E,TB,D, VU,DU by CC at 12/23/2022 1456    Comment: teaching w spouse for shower and commode tsf, self care skills and UE HEP. Home safety and need for 24 hour assist discussed. Pt issued 3-1 commode. Pt has shower seat w back.                   Point: Body mechanics (Resolved)     Description:   Instruct learner(s) on proper positioning and spine alignment during self-care, functional mobility activities and/or exercises.              Learning Progress Summary           Patient Acceptance, E,TB,D, VU,DU by CC at 12/23/2022 1456    Comment: teaching w spouse for shower and commode tsf, self care skills and UE HEP. Home safety and need for 24  hour assist discussed. Pt issued 3-1 commode. Pt has shower seat w back.    Acceptance, E, VU by WN at 12/23/2022 0155    Acceptance, E, VU by CC at 12/20/2022 1610    Comment: Family conf w ppt, spouse, dgts and father. status, home needs, DME and follow up therapy discussed. 3-1 ordered. Pt has shower seat and grab bars.    Acceptance, E, VU by WN at 12/12/2022 2327    Acceptance, E, VU by WN at 12/12/2022 0155    Acceptance, E, VU by CB at 12/8/2022 1204    Acceptance, E, VU,NR by CE at 12/8/2022 1107    Comment: fall prevention, DME including w/c and shower chair   Family Acceptance, E,TB,D, VU,DU by CC at 12/23/2022 1456    Comment: teaching w spouse for shower and commode tsf, self care skills and UE HEP. Home safety and need for 24 hour assist discussed. Pt issued 3-1 commode. Pt has shower seat w back.                               User Key     Initials Effective Dates Name Provider Type Discipline     06/16/21 -  Yissel Goel OTR Occupational Therapist OT    WN 08/23/22 -  Doc Park, RN Registered Nurse Nurse     11/10/22 -  Mackenzie Hopkins CCC-SLP Speech and Language Pathologist SLP    CE 10/17/22 -  Portia Esqueda OT Occupational Therapist OT                    OT Recommendation and Plan                         Time Calculation:      Time Calculation- OT     Row Name 12/23/22 0900             Time Calculation- OT    OT Start Time 0900  -CC      OT Stop Time 0930  -CC      OT Time Calculation (min) 30 min  -            User Key  (r) = Recorded By, (t) = Taken By, (c) = Cosigned By    Initials Name Provider Type     Yissel Goel OTR Occupational Therapist              Therapy Charges for Today     Code Description Service Date Service Provider Modifiers Qty    66754227851 HC OT SELF CARE/MGMT/TRAIN EA 15 MIN 12/22/2022 Yissel Goel OTR GO 4    35744820470 HC OT NEUROMUSC RE EDUCATION EA 15 MIN 12/22/2022 Yissel Goel OTR GO 2    97262282692 HC OT SELF CARE/MGMT/TRAIN EA  15 MIN 12/23/2022 Yissel Goel, OTR GO 2                   Yissel Goel, OTR  12/23/2022

## 2022-12-23 NOTE — PROGRESS NOTES
[facility] Progress Note      Patient Name: Christie Avendaño  : 1965  MRN: [id number]  Primary Care Physician:  Tiffany Holloway MD  Date of admission: 2022    Subjective     Interval History:   Follow-up hyponatremia    The patient is feeling better, denies any chest pain or shortness of air, no orthopnea or PND, no nausea or vomiting, no dysuria or gross hematuria, no bladder outlet symptoms, no lightheadedness.      Review of Systems:   As noted above    Objective     Vitals:   Temp:  [97.9 °F (36.6 °C)-98.2 °F (36.8 °C)] 97.9 °F (36.6 °C)  Heart Rate:  [59-78] 68  Resp:  [16-18] 16  BP: (116-154)/(63-87) 116/72    Intake/Output Summary (Last 24 hours) at 2022 1113  Last data filed at 2022 0829  Gross per 24 hour   Intake 540 ml   Output 400 ml   Net 140 ml       Physical Exam:    General Appearance: alert, oriented x 3, no acute distress   Skin: warm and dry  HEENT: oral mucosa normal, nonicteric sclera  Neck: supple, no JVD  Lungs: CTA, unlabored breathing effort  Heart: RRR, normal S1 and S2, no S3, no rub  Abdomen: soft, nontender, nondistended, normoactive  : no palpable bladder  Extremities: no edema, cyanosis or clubbing  Neuro: normal speech and mental status     Scheduled Meds:     [START ON 2022] buPROPion, 100 mg, Oral, Q12H  calcium 500 mg vitamin D 5 mcg (200 UT), 1 tablet, Oral, BID  dexamethasone, 4 mg, Oral, Q8H  escitalopram, 5 mg, Oral, Daily  gabapentin, 100 mg, Oral, TID  insulin glargine, 40 Units, Subcutaneous, QAM  insulin lispro, 0-14 Units, Subcutaneous, TID AC  insulin lispro, 9 Units, Subcutaneous, TID With Meals  latanoprost, 1 drop, Both Eyes, Nightly  levETIRAcetam, 500 mg, Oral, BID  levothyroxine, 150 mcg, Oral, Q AM  magnesium oxide, 400 mg, Oral, Daily  ondansetron, 8 mg, Oral, Q24H  pantoprazole, 40 mg, Oral, Q AM  polyethylene glycol, 17 g, Oral, Daily  propranolol, 20 mg, Oral, Q8H  sulfamethoxazole-trimethoprim, 1  tablet, Oral, Once per day on Mon Wed Fri  temozolomide, 140 mg, Oral, Nightly   And  temozolomide, 20 mg, Oral, Nightly   And  temozolomide, 5 mg, Oral, Nightly  valACYclovir, 500 mg, Oral, Q24H      IV Meds:        Results Reviewed:   I have personally reviewed the results from the time of this admission to 12/23/2022 11:13 EST     Results from last 7 days   Lab Units 12/23/22  0825 12/22/22  0647 12/21/22  0544   SODIUM mmol/L 129* 132* 130*   POTASSIUM mmol/L 4.7 5.1 5.2   CHLORIDE mmol/L 93* 94* 92*   CO2 mmol/L 27.0 27.7 30.0*   BUN mg/dL 25* 29* 26*   CREATININE mg/dL 0.78 0.88 0.85   CALCIUM mg/dL 9.2 9.5 9.5   BILIRUBIN mg/dL 0.6 0.6 0.7   ALK PHOS U/L 157* 164* 160*   ALT (SGPT) U/L 189* 225* 203*   AST (SGOT) U/L 29 49* 43*   GLUCOSE mg/dL 196* 172* 140*       Estimated Creatinine Clearance: 97.1 mL/min (by C-G formula based on SCr of 0.78 mg/dL).    Results from last 7 days   Lab Units 12/23/22  0825 12/22/22  0647 12/21/22  0544   MAGNESIUM mg/dL 2.2 2.3 2.3   PHOSPHORUS mg/dL 3.2 4.0 4.1       Results from last 7 days   Lab Units 12/23/22  0825 12/22/22  0647 12/21/22  0544 12/20/22  0833 12/19/22  0552 12/18/22  1712   URIC ACID mg/dL 5.1 5.6 5.6 5.5 5.4 5.9*       Results from last 7 days   Lab Units 12/22/22  0647 12/19/22  0552   WBC 10*3/mm3 8.43 9.55   HEMOGLOBIN g/dL 12.4 12.6   PLATELETS 10*3/mm3 169 175             Assessment / Plan     ASSESSMENT:  -Hyponatremia appears to be euvolemic with this history of multiple malignancies concern about SIADH especially with the stage IV glioblastoma.  All the parameters suggestive of SIADH except having elevated uric acid she is responding to fluid restriction, sodium today is 129, potassium is normal  -Prior right partial nephrectomy for renal cell cancer  -Adenocarcinoma of the lung status postresection  -Melanoma of the left heel status post resect  -Bilateral mastectomy because of BRCA2 mutation  -Hyperkalemia, resolved    PLAN:  -Continue fluid  restriction  -To have a close follow-up with PCP and to have her BMP checked weekly and I will be happy to see her at the discretion of her PCP if needed      I discussed the case with the patient and her     Thank you for involving us in the care of Christie Avendaño.  Please feel free to call with any questions.    Golden Gonzalez MD  12/23/22  11:13 [facility]    [facility]  [phone]    Please note that portions of this note were completed with a voice recognition program.

## 2022-12-23 NOTE — TELEPHONE ENCOUNTER
Returned call to Yuly, , and confirmed that Dr. Collins does want patient to keep upcoming appointments. Yuly v/u and will inform patient.

## 2022-12-23 NOTE — PROGRESS NOTES
I sent Gera an in-basket message to send the refill of Temodar prescriptions to [facility].  I also asked her to put in a coordination so I will check on the status of the first fill of the Temodar for the pt.

## 2022-12-23 NOTE — DISCHARGE SUMMARY
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  On Lantus/Humalog  Seizure prophylaxis-Keppra  DVT prophylaxis-Lovenox/SCDs  GI prophylaxis-protein pump inhibitor  Chronic Valtrex  Germline BRCA2 and GEORGES mutations.  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  Depression-Lexapro changing to Bupropion with hyponatremia  Hyponatremia - 1200 cc fluid restriction  Hypothyroidism-on replacement  Headache - propranolol/ magnesium  Hypertension - lisinopril changed to propranolol for headache as well    HISTORY OF PRESENT ILLNESS:    Patient is a 57-year-old female previously dependent with history  of multiple cancers including papillary thyroid cancer, bilateral breast cancer, clear-cell renal cancer, left lung carcinoid tumor, bilateral non-small cell lung cancer, and melanoma of the heel, BRCA2 positive, GEORGES mutations, asthma,  , diabetes mellitus, who presented to [facility] after developing left-sided weakness and clumsiness and impairment with her gait.  Also had some headache and forgetfulness.  She had multiple falls.  She had recent COVID-19 in mid November but had recovered.  She had MRI of the brain with a right cerebral hemisphere rim-enhancing mass and was admitted for further evaluation.  She underwent stereotactic  brain biopsy on November 28, 2022.  Findings for high-grade glioma.  Pathology was sent out to [facility] for further study.  She has been seen by radiation oncology and medical oncology.  Current plan is to start radiation therapy with concurrent Temodar on December 12.  She has staples in place at the right cranial incision which is healing.  She is on Decadron 4 mg every 8 hours.  On Keppra for seizure prophylaxis.  She takes Valtrex chronically.  Patient was reviewed with internal medicine service and neurosurgery service and okay to start Lovenox at this point for DVT prophylaxis.  Also utilizing SCDs.     She describes some headache.  Denies any vision changes.  No swallowing changes.  She feels she is doing fairly well with her cognition.  She continues with left-sided weakness and incoordination and impairment with her gait.  With Occupational Therapy for mobility contact-guard.  Left upper extremity weaker than the previous occupational therapy session today.  She stood with contact-guard assist.  Minimum assist at times due to left lateral drift and left lower extremity weakness.  On December 5 ambulated 65 feet minimal assist, rolling walker, ataxic, decreased stride length, more noticeable lean to the left hip patient fatigued.  Blanchard catheter was removed 2 days ago and she is voiding without complaints.  No bladder complaints.  Swallowing okay.     Given her functional impairments and comorbidities she is now admitted for acute inpatient rehabilitation    HOSPITAL COURSE:    Patient participated in a comprehensive acute inpatient rehabilitation program.     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  She reports intolerance to gabapentin, intolerances to opioids.  Tramadol comes up as contraindicated with history of anaphylactic secondary to morphine.  Valproate for headache control not an option with her liver changes  Dec 13: patient reports intolerance to gabapentin was drowsiness on 300 mg dose. Agreeable to try 100 mg tid for HA.   December 16-Propranolol added for baseline headache control but not able to receive first 2 doses today secondary to parameters to hold for pulse less than 60 or blood pressure less than 110.  Lisinopril dose decreased which may allow some range to receive propranolol.  Continues on low-dose gabapentin.  Has history of allergy with anaphylaxis to opioid-morphine-with hives and throat swelling  December 17-received first dose of propanolol this morning  December 18-headache better on the weekend off of radiation therapy.  Has not received propranolol secondary to parameters since yesterday morning  Dec 19 - titrate up on propranolol to 20 mg q 8 hours.  Headache trending better.     Left side weakness/incoordination/impaired mobility     Radiation therapy/Temodar to start December 12, 2022  Decadron 4 mg every 8 hourly  Dec 9: Oncology aware of increase of liver enzymes. They are following along   December 12: Radiation therapy and Temodar initiating today  Dec 14: Temodar Radiation, fraction 3 today.   Dec 15: Today is day 4 of Temodar Radiation. Per oncology no clinical signs of progression.      Leukocytosis-steroid/stress response.  Incision healing.       Elevated LFTs  Dec 8: Labs significant for ALT 1049 (143 11/28),  (71 11/28), Alk phos 190 (96 11/28). Taking minimal tylenol and statin is a home med- discontinued. Consulted IM who also consulted pharmacy and GI. They also decreased valtrex to 1g daily (she was taking daily prophylactic valtrex  "2g bid). Awaiting liver ultrasound. CPK normal.   Dec 9- Ongoing workup. Liver ultrasound- remarkable for a small hepatic cyst otherwise negative  Per GI -[ Obtain duplex hepatic ultrasound to further assess for thrombosis  Obtain GEMMA, IgG profile, ASMA, EBV, CMV, HSV, VZV to rule out autoimmune versus viral cause to elevation.].   Patient reviewed with internal medicine and medical oncology  December 12-transaminases elevated but trending better.   Portal hepatic duplex unremarkable  Dec 14: Continued decrease of LFTs.   December 17-continue to trend better  Dec 19: Alk phos 163(159 12/18), (244 12/18), AST 34(34 12/18), continued slow downward trend  Dec 21: (193 yesterday) AST 43 (32 yesterday) continue to monitor.      Diabetes mellitus-Trulicity at home.  On Lantus/Humalog  December 13-Lantus increased to 28 units a.m.  On Humalog 8 units 3 times daily with meals  Dec 14: Continued elevation of blood sugars, will ask IM to provide insight.   Dec 15: Lantus increased to 35 daily yesterday with improved readings.   Dec 19: First dose of lantus 40 today.  this am  Dec 21: BS remain high, will ask IM to see and adjust.   Dec 22: BS improved. Elevated BS this morning attributed by IM to be due to held prandial insulin last night. They are signing off.      Hyponatremia   Dec 15: Na 128 today, 131 yesterday. IM ordered serum and urine osmolality and urine sodium. Deferring to nephrology consult to us. Will repeat Cmp in am.   Dec 16: Na 130 today, urine osmols 495 and urine Na 80. Continue to monitor.  Felt related to hyperglycemia per internal medicine note  December 17-sodium 127 today  December 18-sodium 125.  Nephrology evaluating.  Transitioning escitalopram to bupropion.  TSH low at 0.03  Dec 19: Nephrology following. Continue fluid restriction. Urine sodium and chloride not performed yesterday so reordered.   Dec 21: Nephrology following \"All the parameters suggestive of SIADH except having " "elevated uric acid she is responding to fluid restriction, sodium today 130 and potassium 5.21\"  Dec 22: Na 132. No change from nephrology   Dec 23 - Hyponatremia appears to be euvolemic with this history of multiple malignancies concern about SIADH especially with the stage IV glioblastoma.  All the parameters suggestive of SIADH except having elevated uric acid she is responding to fluid restriction, sodium today is 129, potassium is normal   To have  follow-up with PCP and to have her BMP checked weekly    Seizure prophylaxis-Keppra     DVT prophylaxis-Lovenox/SCDs     GI prophylaxis-protein pump inhibitor  Add calcium and vitamin D with ongoing steroids     Chronic Valtrex-dose decreased to 500 mg daily     Germline BRCA2 and GEORGES mutations.  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     Depression-Lexapro  December 18-transition to Lexapro 5 mg daily for 7 days then start bupropion 100 mg twice daily to possibly help with hyponatremia     Hypothyroidism-on replacement  December 18-TSH equals 0.030     Hypertension   Dec 16: Will decrease lisinopril to 10mg from 20mg due to addition of propranolol for headaches. Holding parameters in place for propranolol of SBP <110 and HR <60  December 18-blood pressure 84/48-had not received propranolol since yesterday morning.  Will decrease lisinopril to 5 mg daily to allow more range for giving propanolol for headache  Dec 20 - BP higher in the mornings but lower at noon time, continue Lisinopril at 5 mg daily for now and increase propranolol to 20 mg q 8 hours  Dec 21 - lisinopril discontinued  Dec 23 - home on Propranolol. Home health nursing to follow.      TEAM CONF - DEC 13 - BED SBA. TRANSFERS MIN CTG. GAIT 160 FEET CTG. LEANS LEFT. " TOILET TRANSFERS CTG. SHOWER TRANSFERS CTG MIN. LBD MIN. UBD MIN. BATH MIN. GROOMING SBA. TOILETING CTG MIN. IMPAIRED ATTENTION.IMPAIRED EXECUTIVE FUNCTION. FATIGUES WITH COGNITIVE TASKS. CONTINENT BOWEL AND BLADDER. SCALP INCISION HEALING.   XRT/TEMODAR. ELEVATED TRANSAMINASES TREND BETTER.   ELOS - 2 WEEKS     TEAM CONF - DEC 20 - BED MIN. TRANSFERS MIN CTG GAIT 160 FEET CTG TO OCC MIN, ROLLATOR. TOILET TRANSFERS CTG. SHOWER TRANSFERS CTG MIN. BATH MIN. LBD MIN MOD. UBD MIN. EATING SET UP. GROOMING SBA MIN. TOILETING MIN. COORDINATION WORSE ON LEFT ARM AND INATTENTION LEFT ARM, VISUAL PERCEPTUAL DEFICITS. DEFICITS WITH ATTENTION TO DETAIL, COMPLEX REASONING. SKIN INTACT. SCALP INCISION HEALING.  ELOS - Friday AS WISHES HOME FOR HOLIDAY.     DISPOSITION - DEC 23- REQUESTS DISCHARGE HOME BEFORE THE HOLIDAY. FAMILY TEACHING COMPLETED. MEDS AND FOLLOW UP REVIEWED. HOME TODAY WITH HOME HEALTH PT, OT, SLP, NURSING TO MONITOR BP AND DM AND LAB DRAW WITH WEEKLY BMP    Physical Exam near the time of discharge:    MENTAL STATUS -  AWAKE / ALERT  HEENT-right scalp incision with staples removed.  Clean dry and intact  SCLERAE ANICTERIC, CONJUNCTIVAE PINK, EARS UNREMARKABLE EXTERNALLY  LUNGS - CTA, NO WHEEZES, RALES OR RHONCHI  HEART- RRR, NO RUB, MURMUR, OR GALLOP  ABD - Nondistended     EXT - NO EDEMA OR CYANOSIS  NEURO -oriented       Speech was fluent with slightly slow rate of speech.  Extraocular movements intact.  No dysarthria.  MOTOR EXAM - RUE/RLE 5/5.   LUE/LLE 5/5.   Impaired motor control in the left upper extremity and left lower extremity,     RESULTS:  Glucose   Date/Time Value Ref Range Status   12/23/2022 1106 266 (H) 70 - 130 mg/dL Final     Comment:     Meter: [id number] : [id number] Ye Ravi NA   12/23/2022 0616 154 (H) 70 - 130 mg/dL Final     Comment:     Meter: [id number] : [id number] Mackeyens Abbottjean paul SHAFFER   12/22/2022 2049 230 (H) 70 - 130 mg/dL Final     Comment:     Meter: [id number]  : [id number] Narendra Casper CNA   12/22/2022 1647 157 (H) 70 - 130 mg/dL Final     Comment:     Meter: [id number] : [id number] Paul Clifton NA   12/22/2022 1146 247 (H) 70 - 130 mg/dL Final     Comment:     Meter: [id number] : [id number] Paul Clifton NA   12/22/2022 0618 179 (H) 70 - 130 mg/dL Final     Comment:     Meter: [id number] : [id number] Nabeel Pacheco NA   12/21/2022 2005 149 (H) 70 - 130 mg/dL Final     Comment:     Meter: [id number] : [id number] Nabeel Pacheco NA   12/21/2022 1702 126 70 - 130 mg/dL Final     Comment:     Meter: [id number] : [id number] Torsten Mckeonntjaciel ACUÑA     Results from last 7 days   Lab Units 12/22/22  0647 12/19/22  0552   WBC 10*3/mm3 8.43 9.55   HEMOGLOBIN g/dL 12.4 12.6   HEMATOCRIT % 37.3 36.6   PLATELETS 10*3/mm3 169 175     Results from last 7 days   Lab Units 12/23/22  0825 12/22/22  0647 12/21/22  0544   SODIUM mmol/L 129* 132* 130*   POTASSIUM mmol/L 4.7 5.1 5.2   CHLORIDE mmol/L 93* 94* 92*   CO2 mmol/L 27.0 27.7 30.0*   BUN mg/dL 25* 29* 26*   CREATININE mg/dL 0.78 0.88 0.85   CALCIUM mg/dL 9.2 9.5 9.5   BILIRUBIN mg/dL 0.6 0.6 0.7   ALK PHOS U/L 157* 164* 160*   ALT (SGPT) U/L 189* 225* 203*   AST (SGOT) U/L 29 49* 43*   GLUCOSE mg/dL 196* 172* 140*              Discharge Medications      New Medications      Instructions Start Date   BD Pen Needle Keila 2nd Gen 32G X 4 MM misc  Generic drug: Insulin Pen Needle   inject insulin subcutaneous via pens up to 4 times daily.      buPROPion 100 MG tablet  Commonly known as: WELLBUTRIN   100 mg, Oral, Every 12 Hours Scheduled   Start Date: December 26, 2022     calcium carbonate 500 MG chewable tablet  Commonly known as: TUMS   2 tablets, Oral, 3 Times Daily PRN      dexamethasone 4 MG tablet  Commonly known as: DECADRON   4 mg, Oral, Every 8 Hours Scheduled      famotidine 20 MG tablet  Commonly known as: PEPCID   20 mg, Oral, 2 Times Daily PRN      gabapentin 100 MG capsule  Commonly known as:  NEURONTIN   100 mg, Oral, 3 Times Daily      HumaLOG KwikPen 100 UNIT/ML solution pen-injector  Generic drug: Insulin Lispro (1 Unit Dial)   9 Units, Subcutaneous, 3 Times Daily With Meals      Lantus SoloStar 100 UNIT/ML injection pen  Generic drug: Insulin Glargine   40 Units, Subcutaneous, Every Morning   Start Date: December 24, 2022     levETIRAcetam 500 MG tablet  Commonly known as: KEPPRA   500 mg, Oral, 2 Times Daily      magnesium oxide 400 MG tablet  Commonly known as: MAG-OX   400 mg, Oral, Daily   Start Date: December 24, 2022     ondansetron 8 MG tablet  Commonly known as: ZOFRAN   Take 1 tablet by mouth Daily 2 hours before radiation. Take Zofran 1 hour before Temodar. Take Temodar 1 hour before radiation therapy.      Oyster Shell Calcium/D3 500-10 MG-MCG tablet  Generic drug: Calcium Carb-Cholecalciferol   1 tablet, Oral, 2 Times Daily      pantoprazole 40 MG EC tablet  Commonly known as: PROTONIX   40 mg, Oral, Every Early Morning   Start Date: December 24, 2022     polyethylene glycol 17 GM/SCOOP powder  Commonly known as: MIRALAX   17 g, Oral, Daily   Start Date: December 24, 2022     propranolol 20 MG tablet  Commonly known as: INDERAL   Take 1 tablet by mouth Every 8 (Eight) Hours. Hold if Heart Rate <60 or SBP <110      sulfamethoxazole-trimethoprim 800-160 MG per tablet  Commonly known as: BACTRIM DS,SEPTRA DS   1 tablet, Oral, 3 Times Weekly, Total 90 days   Start Date: December 26, 2022     temozolomide 5 MG chemo capsule  Commonly known as: TEMODAR  Notes to patient: Take on an empty stomach; swallow whole;    5 mg, Oral, Daily, Take 3 tablets total on Days D1-42      temozolomide 20 MG chemo capsule  Commonly known as: TEMODAR   20 mg, Oral, Daily, Take 3 tablets total on Days D1-42      temozolomide 140 MG chemo capsule  Commonly known as: TEMODAR   140 mg, Oral, Daily, Take 3 tablets total on Days D1-42         Changes to Medications      Instructions Start Date   escitalopram 5 MG  tablet  Commonly known as: LEXAPRO  What changed:   · medication strength  · how much to take   5 mg, Oral, Daily   Start Date: December 24, 2022     valACYclovir 500 MG tablet  Commonly known as: VALTREX  What changed:   · medication strength  · how much to take  · when to take this  · additional instructions  · Another medication with the same name was removed. Continue taking this medication, and follow the directions you see here.   500 mg, Oral, Every 24 Hours Scheduled, Duration and refills as directed by Primary Care   Start Date: December 24, 2022        Continue These Medications      Instructions Start Date   Accu-Chek Guide Me w/Device kit   1 Device, Does not apply, Daily      Accu-Chek Guide test strip  Generic drug: glucose blood   Use 1-2 daily to check blood sugars Dx diabetes E11.9      bimatoprost 0.01 % ophthalmic drops  Commonly known as: LUMIGAN   1 drop, Both Eyes, Nightly      levothyroxine 150 MCG tablet  Commonly known as: SYNTHROID, LEVOTHROID   150 mcg, Oral, Daily         Stop These Medications    atorvastatin 40 MG tablet  Commonly known as: LIPITOR     lisinopril 20 MG tablet  Commonly known as: PRINIVIL,ZESTRIL     Trulicity 3 MG/0.5ML solution pen-injector  Generic drug: Dulaglutide          [facility] Phone Fax Address Order                  Mathew Araujo MD  Physical Medicine and Rehabilitation [phone]     [address] [place] [address] [address] [facility] [address]     Next Steps: Follow up on 1/25/2023   Instructions: appointment on 1/25 @ 830 am    Manuel Thompson MD  Neurosurgery [phone]     [address] [facility] [address] [facility] [address]     Next Steps: Follow up   Instructions: no follow up needed per sami in office per Lamine Savage MD  Pulmonary Disease [phone]     [address] [facility] [address] [facility] [address]     Next Steps: Follow up on 2/9/2023   Instructions: At 8:45 A.M.    Jewel  Tiffany MATTHEW MD PCP - General Internal Medicine [phone]     [address] [facility] [address] [facility] [address]     Next Steps: Follow up on 1/30/2023   Instructions: 12/30 1030 am with iman ryan    [facility]  Home Health Services [phone]   [address] [facility] [place] [address]     Instructions: You are scheduled to receive home PT, OT, ST. They will call to schedule in home visits.    Mathew Collins Jr., MD Consulting Physician     Hematology and Oncology Oncology Hematology [phone]     [address] [facility] [address] [facility] [address]     Next Steps: Follow up on 1/4/2023   Instructions: Dr. Collins on 1/4/2023 and 2/22/2023 and CT CAP 1 week prior. 1/4/2023 8:00 AM Mathew Collins Jr., MD [facility] ONC [facility]    Juan Marr MD  Radiation Oncology [phone]     [address] [facility] [address] [facility] [address]     Next Steps: Follow up   Instructions: 12/28/2022 9:00 AM Juan Marr MD [facility] RADIATION ONCOLOGY    Radha Ray APRN      Hematology and Oncology Oncology Hematology [phone]     [address] [facility] [address]     Next Steps: Follow up on 12/27/2022   Instructions: Please arrive at 2:00 on Tuesday 12/27/22 for labs, then follow up with ARNP at 2:40. Appointment location is at  [place] [address] [phone]     Follow up with Primary Care Physician for adjustment in insulin regimen as needed and also particularly when steroids start to taper  Follow up with Primary Care Physician for adjustment in blood pressure medications as needed. Propranolol is used for headache as well as blood pressure.  Magnesium 400 mg daily is also for headaches    No driving.    Transitioning from Lexapro to Bupropion due to low sodium. Last dose Lexapro Heath Dec 25. Start Bupropion on Monday Dec 26.     Diet - Consistent Carb. Fluid restriction 1200 cc per day (due to low sodium)     Dx:   Brain tumor with left side weakness. Home health PT, OT, SLP and nursing for monitoring of blood pressure and diabetes mellitus.BMP once a week to monitor sodium level - results to Primary Care. On 1200 cc fluid restriction.     Steroids as directed by Oncology. Do not stop Decadron abruptly    1.  Patient to be given Zofran 1 hour before Temodar.  2.  Temodar is to be given 1 hour before radiation therapy.       Medication refills per Oncology or Primary Care      >30 on discharge    Mathew Araujo MD

## 2022-12-23 NOTE — THERAPY TREATMENT NOTE
Inpatient Rehabilitation - Physical Therapy Treatment Note       [facility]     Patient Name: Christie Avendaño  : 1965  MRN: [id number]    Today's Date: 2022                    Admit Date: 2022      Visit Dx:     ICD-10-CM ICD-9-CM   1. Impaired functional mobility, balance, gait, and endurance  Z74.09 V49.89   2. High grade glioma   C71.9 191.9   3. 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)   • Non-small cell lung cancer, right (HCC)   • Renal mass, right   • SIRS (systemic inflammatory response syndrome) (HCC)   • Hyperlipidemia   • Malignant melanoma of right lower extremity including hip (HCC)   • High grade glioma    • 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)   • Glioma (HCC)       Past Medical History:   Diagnosis Date   • Arthritis    • Asthma    • BRCA2 positive    • Diabetes mellitus (HCC)    • H/O Liver masses 2017    x3   • H/O Lung masses 2017    1 in right lower lobe and 1 in the left infrahilar location   • H/O Ovarian cyst    • H/O Right adrenal mass (CMS/HCC) 2017   • Lung cancer (HCC)    • Melanoma (HCC)     right foot   • PONV (postoperative nausea and vomiting)    • Thyroid disease        Past Surgical History:   Procedure Laterality Date   • APPENDECTOMY     • BRAIN BIOPSY Right 2022    Procedure: Right frontal stereotactic needle brain biopsy;  Surgeon: Manuel Thompson MD;  Location: [facility] OR;  Service: Neurosurgery;  Laterality: Right;   • BREAST IMPLANT SURGERY Bilateral    •  CHOLECYSTECTOMY     • HYSTERECTOMY     • MASTECTOMY Bilateral    • OVARIAN CYST SURGERY     • THORACOSCOPY VIDEO ASSISTED WITH LOBECTOMY Right 10/9/2020    Procedure: BRONCHOSCOPY, THORACOSCOPY VIDEO ASSISTED WITH ANTERIOR BASAL SEGMENTECTOMY, INTERCOSTAL NERVE BLOCK;  Surgeon: Flaca Crisostomo MD;  Location: [facility] OR;  Service: Thoracic;  Laterality: Right;   • TONSILLECTOMY         PT ASSESSMENT (last 12 hours)     IRF PT Evaluation and Treatment     Row Name 12/23/22 1005          PT Time and Intention    Document Type daily treatment  -ST     Mode of Treatment physical therapy  -ST     Patient/Family/Caregiver Comments/Observations up in w/c with OT upon arrival,  present for family teaching  -     Row Name 12/23/22 1005          General Information    Patient Profile Reviewed yes  -ST     General Observations of Patient addendum to d/c for family teaching  -     Existing Precautions/Restrictions fall  -ST     Row Name 12/23/22 1005          Pain Assessment    Pretreatment Pain Rating 0/10 - no pain  -ST     Posttreatment Pain Rating 0/10 - no pain  -     Row Name 12/23/22 1005          Cognition/Psychosocial    Affect/Mental Status (Cognition) flat/blunted affect  -ST     Orientation Status (Cognition) oriented x 4  -ST     Follows Commands (Cognition) follows one-step commands;follows two-step commands;delayed response/completion;increased processing time needed  -ST     Personal Safety Interventions gait belt;fall prevention program maintained;nonskid shoes/slippers when out of bed  -ST     Row Name 12/23/22 1005          Sit-Stand Transfer    Sit-Stand Ida (Transfers) set up;verbal cues;standby assist  -ST     Assistive Device (Sit-Stand Transfers) wheelchair;walker, front-wheeled  -ST     Row Name 12/23/22 1005          Stand-Sit Transfer    Stand-Sit Ida (Transfers) set up;verbal cues;standby assist  -ST     Assistive Device (Stand-Sit Transfers) wheelchair;walker,  front-wheeled  -ST     Row Name 12/23/22 1005          Car Transfer    Type (Car Transfer) sit-stand;stand-sit  -ST     [place] Level (Car Transfer) set up;verbal cues;contact guard  -ST     Assistive Device (Car Transfer) walker, front-wheeled;wheelchair  -ST     Row Name 12/23/22 1005          Gait/Stairs (Locomotion)    [place] Level (Gait) set up;verbal cues;standby assist;contact guard  -ST     Assistive Device (Gait) walker, front-wheeled  -ST     Distance in Feet (Gait) 160'  -ST     Pattern (Gait) step-through  -ST     Deviations/Abnormal Patterns (Gait) fabrizio decreased;left sided deviations;stride length decreased;base of support, narrow  -ST     Bilateral Gait Deviations forward flexed posture  -ST     Left Sided Gait Deviations leans left  -ST     Right Sided Gait Deviations weight shift ability decreased  -ST     Gait Assessment/Intervention CGA at one instance with need for VC for L foot clearance  -ST     [place] Level (Stairs) set up;verbal cues;contact guard  -ST     Handrail Location (Stairs) both sides  -ST     Number of Steps (Stairs) 4  -ST     Ascending Technique (Stairs) step-to-step  -ST     Descending Technique (Stairs) step-to-step  -ST     Stairs, Safety Issues sequencing ability decreased;weight-shifting ability decreased;balance decreased during turns  -ST     Row Name 12/23/22 1005          Positioning and Restraints    Pre-Treatment Position sitting in chair/recliner  -ST     Post Treatment Position wheelchair  -ST     In Wheelchair sitting;call light within reach;encouraged to call for assist;exit alarm on;with family/caregiver  -ST           User Key  (r) = Recorded By, (t) = Taken By, (c) = Cosigned By    Initials Name Provider Type    Ariana Abreu PT Physical Therapist              Wound Right scalp Incision (Active)   Dressing Appearance open to air 12/23/22 0820   Closure Approximated;Liquid skin adhesive 12/23/22 0820   Base clean;dry 12/23/22 0820    Periwound intact;dry 12/23/22 0820   Periwound Temperature warm 12/23/22 0820   Periwound Skin Turgor soft 12/23/22 0820   Drainage Amount none 12/23/22 0820   Dressing Care open to air 12/23/22 0820     Physical Therapy Education     Title: PT OT SLP Therapies (Resolved)     Topic: Physical Therapy (Resolved)     Point: Mobility training (Resolved)     Learning Progress Summary           Patient Acceptance, E,TB, VU,NR by ST at 12/23/2022 0817    Acceptance, E, VU by WN at 12/23/2022 0155    Acceptance, E,TB, VU,NR by ST at 12/22/2022 1534    Acceptance, E,D, VU,DU by DP at 12/21/2022 1141    Acceptance, E,TB, VU,NR by ST at 12/20/2022 0757    Acceptance, E,TB, VU,NR by LB at 12/19/2022 1122    Comment: ramp negogiation    Acceptance, E,TB,D, NR by KP at 12/17/2022 1047    Acceptance, E,D, VU,DU,NR by DP at 12/16/2022 0811    Acceptance, E,D, VU,DU by DP at 12/15/2022 1606    Acceptance, E,D, VU,DU,NR by DP at 12/15/2022 1147    Comment: transfer training on this date    Acceptance, E, NR by  at 12/13/2022 0923    Acceptance, E, VU by WN at 12/12/2022 2327    Acceptance, E, VU,DU,NR by JK at 12/12/2022 1011    Acceptance, E, VU by WN at 12/12/2022 0155    Acceptance, E, VU,NR by  at 12/10/2022 1035    Acceptance, E,D, VU,DU,NR by JK at 12/9/2022 0853    Acceptance, E,TB,D, VU,DU,NR by KP at 12/8/2022 1420    Comment: Goals, POC    Acceptance, E, VU by CB at 12/8/2022 1204   Significant Other Acceptance, E,TB,D, VU,DU,NR by KP at 12/8/2022 1420    Comment: Goals, POC                   Point: Home exercise program (Resolved)     Learning Progress Summary           Patient Acceptance, E,TB, VU,NR by ST at 12/23/2022 0817    Acceptance, E, VU by WN at 12/23/2022 0155    Acceptance, E,TB, VU,NR by ST at 12/22/2022 1534    Acceptance, E,D, VU,DU by DP at 12/21/2022 1141    Acceptance, E,TB,D, NR by KP at 12/17/2022 1047    Acceptance, E,D, VU,DU,NR by DP at 12/16/2022 0811    Acceptance, E,D, VU,DU by DP at  12/15/2022 1606    Acceptance, E,D, VU,DU,NR by DP at 12/15/2022 1147    Comment: transfer training on this date    Acceptance, E, NR by  at 12/13/2022 0923    Acceptance, E, VU by WN at 12/12/2022 2327    Acceptance, E, VU by WN at 12/12/2022 0155    Acceptance, E, VU,NR by  at 12/10/2022 1035    Acceptance, E,D, VU,DU,NR by JK at 12/9/2022 0853    Acceptance, E,TB,D, VU,DU,NR by KP at 12/8/2022 1420    Comment: Goals, POC    Acceptance, E, VU by CB at 12/8/2022 1204   Significant Other Acceptance, E,TB,D, VU,DU,NR by KP at 12/8/2022 1420    Comment: Goals, POC                   Point: Body mechanics (Resolved)     Learning Progress Summary           Patient Acceptance, E,TB, VU,NR by ST at 12/23/2022 0817    Acceptance, E, VU by WN at 12/23/2022 0155    Acceptance, E,TB, VU,NR by ST at 12/22/2022 1534    Acceptance, E,D, VU,DU by DP at 12/21/2022 1141    Acceptance, E,TB, VU,NR by ST at 12/20/2022 0757    Acceptance, E,D, VU,DU,NR by DP at 12/16/2022 0811    Acceptance, E,D, VU,DU by DP at 12/15/2022 1606    Acceptance, E,D, VU,DU,NR by DP at 12/15/2022 1147    Comment: transfer training on this date    Acceptance, E, NR by LH at 12/13/2022 0923    Acceptance, E, VU by WN at 12/12/2022 2327    Acceptance, E, VU by WN at 12/12/2022 0155    Acceptance, E, VU,NR by  at 12/10/2022 1035    Acceptance, E, VU by CB at 12/8/2022 1204                   Point: Precautions (Resolved)     Learning Progress Summary           Patient Acceptance, E,TB, VU,NR by ST at 12/23/2022 0817    Acceptance, E, VU by WN at 12/23/2022 0155    Acceptance, E,TB, VU,NR by ST at 12/22/2022 1534    Acceptance, E,D, VU,DU by DP at 12/21/2022 1141    Acceptance, E,TB, VU,NR by ST at 12/20/2022 0757    Acceptance, E,D, VU,DU,NR by DP at 12/16/2022 0811    Acceptance, E,D, VU,DU by DP at 12/15/2022 1606    Acceptance, E,D, VU,DU,NR by DP at 12/15/2022 1147    Comment: transfer training on this date    Acceptance, E, NR by  at 12/13/2022 0933     Acceptance, E, VU by WN at 12/12/2022 2327    Acceptance, E, VU by WN at 12/12/2022 0155    Acceptance, E, VU,NR by  at 12/10/2022 1035    Acceptance, E, VU by CB at 12/8/2022 1204                               User Key     Initials Effective Dates Name Provider Type Discipline    LB 06/16/21 -  Maryanne Oleary, PT Physical Therapist PT    LH 06/16/21 -  Radha Ramírez, PT Physical Therapist PT    JK 06/16/21 -  Samantha Thompson, PT Physical Therapist PT    KP 06/16/21 -  Anita Serrano, PT Physical Therapist PT    WN 08/23/22 -  Doc Park, RN Registered Nurse Nurse    DP 08/24/21 -  Dillan Calderon, PT Physical Therapist PT    CB 11/10/22 -  Mackenzie Hopkins CCC-SLP Speech and Language Pathologist SLP    ST 09/22/22 -  Ariana Allan, PT Physical Therapist PT                PT Recommendation and Plan       Anticipated Equipment Needs at Discharge (PT Eval): front wheeled walker                  Time Calculation:      PT Charges     Row Name 12/23/22 1135             Time Calculation    Start Time 1000  -ST      Stop Time 1030  -ST      Time Calculation (min) 30 min  -ST      PT Received On 12/23/22  -ST      PT - Next Appointment 12/24/22  -ST            User Key  (r) = Recorded By, (t) = Taken By, (c) = Cosigned By    Initials Name Provider Type    ST Ariana Allan, PT Physical Therapist                Therapy Charges for Today     Code Description Service Date Service Provider Modifiers Qty    09064838041 HC GAIT TRAINING EA 15 MIN 12/22/2022 Ariana Allan, PT GP 1    16239662399 HC PT THERAPEUTIC ACT EA 15 MIN 12/22/2022 Ariana Allan, PT GP 2    04227563428 HC PT THER PROC EA 15 MIN 12/22/2022 Ariana Allan, PT GP 1    47497963865 HC PT THERAPEUTIC ACT EA 15 MIN 12/23/2022 Ariana Allan, PT GP 2                   Ariana Allan, PT  12/23/2022

## 2022-12-23 NOTE — DISCHARGE INSTRUCTIONS
Follow up with Primary Care Physician for adjustment in insulin regimen as needed and also particularly when steroids start to taper  Follow up with Primary Care Physician for adjustment in blood pressure medications as needed. Propranolol is used for headache as well as blood pressure.  Magnesium 400 mg daily is also for headaches    No driving.    Transitioning from Lexapro to Bupropion due to low sodium. Last dose Lexapro Heath Dec 25. Start Bupropion on Monday Dec 26.     Diet - Consistent Carb. Fluid restriction 1200 cc per day (due to low sodium)     Dx:  Brain tumor with left side weakness. Home health PT, OT, SLP and nursing for monitoring of blood pressure and diabetes mellitus.BMP once a week to monitor sodium level - results to Primary Care. On 1200 cc fluid restriction.     Steroids as directed by Oncology. Do not stop Decadron abruptly    1.  Patient to be given Zofran 1 hour before Temodar.  2.  Temodar is to be given 1 hour before radiation therapy.       Medication refills per Oncology or Primary Care

## 2022-12-23 NOTE — PROGRESS NOTES
SECTION GG      Self Care Performance Discharge:   Oral Hygiene: [place] sets up or cleans up; patient completes activity. [place]  assists only prior to or following the activity.   Toileting Hygiene: : [place] does less than half the effort. [place] lifts, holds  or supports trunk or limbs but provides less than half the effort.   Shower/Bathe Self: [place] provides verbal cues and/or touching/steadying and/or  contact guard assistance as patient completes activity.   Upper Body Dressing: [place] provides verbal cues and/or touching/steadying  and/or contact guard assistance as patient completes activity.   Lower Body Dressing: [place] does less than half the effort. [place] lifts, holds  or supports trunk or limbs but provides less than half the effort.   Putting On/Taking Off Footwear: [place] provides verbal cues and/or  touching/steadying and/or contact guard assistance as patient completes  activity.    Mobility Toilet Transfer Discharge: [place] does less than half the effort.  [place] lifts, holds or supports trunk or limbs but provides less than half the  effort.    Signed by: Yissel Goel OTR/CHERYL

## 2022-12-23 NOTE — PROGRESS NOTES
Patient is discharging today .  Patient will be a client at our [place] office. Spoke to spouse who is agreeable to home health and has no current home health .Face sheet information is correct . Will transcribe home health orders for SN,PT,OT, ST . Noted weekly BMP to monitor sodium with results to PCP / Dr Tiffany Holloway. Noted fluid restriction 1200 cc/ day . Thank you !

## 2022-12-23 NOTE — PROGRESS NOTES
SECTION GG    Eating Performance Discharge: [place] sets up or cleans up; patient completes  activity. [place] assists only prior to or following the activity.    Section B. Health Literacy  Frequency of Needing Assistance Reading:  Never    Section D. Mood  Presence of little interest or pleasure in doing things:   Yes  Frequency of having little interest or pleasure in doing things:   Never or 1  day  Presence of feeling down, depressed, or hopeless:   Yes  Frequency of feeling down, depressed, or hopeless:   2-6 days (several days)   Interview Ended. Above responses do not meet criteria to continue  Total Severity Score:   1    Section D. Social Isolation  Frequency of Feeling Lonely or Isolated:  Rarely    Section J. Health Conditions (Pain Effect on Sleep)  Pain Effect on Sleep:   Rarely or not at all    Signed by: Christie Majano RN

## 2022-12-23 NOTE — PROGRESS NOTES
Inpatient Rehabilitation Plan of Care Note    Plan of Care  Care Plan Reviewed - Updates as Follows    Psychosocial    [RN] Coping/Adjustment(Active)  Current Status(12/22/2022): At risk for poor coping d/t recent medical  conditions.  Weekly Goal(12/24/2022): Identify progress in functional status.  Discharge Goal: Demonstrates healthy coping strategies.    Performed Intervention(s)  Support/peer groups.  Verbalizes needs and concerns.  Medication.      Sphincter Control    Performed Intervention(s)  Offer restroom during hourly rounding.  Encourage fluid intake.  Monitor Is and Os.  Timed voids.  Encourage appropriate diet.      Safety    Performed Intervention(s)  Items w/i reach.  Safety rounds.  Bed and chair alarm. Blue armband  Falls precautions.    Signed by: Christie Majano RN

## 2022-12-24 ENCOUNTER — HOME CARE VISIT (OUTPATIENT)
Dept: HOME HEALTH SERVICES | Facility: HOME HEALTHCARE | Age: 57
End: 2022-12-24
Payer: COMMERCIAL

## 2022-12-24 ENCOUNTER — READMISSION MANAGEMENT (OUTPATIENT)
Dept: CALL CENTER | Facility: HOSPITAL | Age: 57
End: 2022-12-24

## 2022-12-24 VITALS
HEART RATE: 68 BPM | SYSTOLIC BLOOD PRESSURE: 112 MMHG | TEMPERATURE: 97.6 F | OXYGEN SATURATION: 96 % | DIASTOLIC BLOOD PRESSURE: 62 MMHG

## 2022-12-24 PROCEDURE — G0299 HHS/HOSPICE OF RN EA 15 MIN: HCPCS

## 2022-12-24 NOTE — OUTREACH NOTE
Prep Survey    Flowsheet Row Responses   [facility] patient discharged from? [place]   Is LACE score < 7 ? No   Eligibility [facility]   Date of Admission 12/07/22   Date of Discharge 12/23/22   Discharge Disposition Home-Health Care Svc   Discharge diagnosis Glioma   Does the patient have one of the following disease processes/diagnoses(primary or secondary)? Other   Does the patient have Home health ordered? Yes   What is the Home health agency?   [facility]   Is there a DME ordered? Yes   What DME was ordered? received rolling walker and BSC   Prep survey completed? Yes          MYRIAM CALVO - Registered Nurse

## 2022-12-24 NOTE — HOME HEALTH
"Patient has been diagnosed with a fast growing frontal lobe neaoplasm. She reports surgical intervention is not an option and she is pursuing chemo and radiation. She reports radiation is scheduled 5 days a week for 6 weeks and labs are drawn at radiation visits. COLLETTE DENIS is admitting patient for medication teachding, and potential need for lab draws when not being done at physicians office.   Patients states her primary goal is to work with HH \"to get back to her old self and be independant to cook, bake, and jeffery the grandkids and going to [employer] and the store or get up and go pee by myself\""

## 2022-12-26 ENCOUNTER — TRANSITIONAL CARE MANAGEMENT TELEPHONE ENCOUNTER (OUTPATIENT)
Dept: CALL CENTER | Facility: HOSPITAL | Age: 57
End: 2022-12-26

## 2022-12-26 NOTE — OUTREACH NOTE
Call Center TCM Note    Flowsheet Row Responses   [facility] patient discharged from? [place]   Does the patient have one of the following disease processes/diagnoses(primary or secondary)? Other   TCM attempt successful? Yes   Call start time 1053   Call end time 1104   Discharge diagnosis Glioma   Meds reviewed with patient/caregiver? Yes   Is the patient having any side effects they believe may be caused by any medication additions or changes? No   Does the patient have all medications ordered at discharge? Yes   Is the patient taking all medications as directed (includes completed medication regime)? Yes   Comments Patient has a hospital followup scheduled with Codi Humphreys (this was previously scheduled), and I could not find availability with Dr Holloway.    Does the patient have an appointment with their PCP within 7 days of discharge? Yes   What is the Home health agency?   [facility]   Has home health visited the patient within 72 hours of discharge? Yes   Psychosocial issues? No   Did the patient receive a copy of their discharge instructions? Yes   Nursing interventions Reviewed instructions with patient   What is the patient's perception of their health status since discharge? Same  [Patient is weak, and has fallen at home. Family with her and attentive. No injuries. ]   Is the patient/caregiver able to teach back signs and symptoms related to disease process for when to call PCP? Yes   Is the patient/caregiver able to teach back signs and symptoms related to disease process for when to call 911? Yes   Is the patient/caregiver able to teach back the hierarchy of who to call/visit for symptoms/problems? PCP, Specialist, Home health nurse, Urgent Care, ED, 911 Yes   If the patient is a current smoker, are they able to teach back resources for cessation? Not a smoker   TCM call completed? Yes   Call end time 1104   Would this patient benefit from a Referral to [facility] Social Work? No   Is the patient  interested in additional calls from an ambulatory ?  NOTE:  applies to high risk patients requiring additional follow-up. No          Sohail Garcia RN    12/26/2022, 11:04 EST

## 2022-12-27 ENCOUNTER — APPOINTMENT (OUTPATIENT)
Dept: RADIATION ONCOLOGY | Facility: HOSPITAL | Age: 57
End: 2022-12-27
Payer: COMMERCIAL

## 2022-12-27 ENCOUNTER — LAB (OUTPATIENT)
Dept: LAB | Facility: HOSPITAL | Age: 57
End: 2022-12-27

## 2022-12-27 ENCOUNTER — OFFICE VISIT (OUTPATIENT)
Dept: ONCOLOGY | Facility: CLINIC | Age: 57
End: 2022-12-27

## 2022-12-27 ENCOUNTER — HOME CARE VISIT (OUTPATIENT)
Dept: HOME HEALTH SERVICES | Facility: HOME HEALTHCARE | Age: 57
End: 2022-12-27
Payer: COMMERCIAL

## 2022-12-27 VITALS
RESPIRATION RATE: 16 BRPM | WEIGHT: 220 LBS | BODY MASS INDEX: 36.65 KG/M2 | SYSTOLIC BLOOD PRESSURE: 151 MMHG | OXYGEN SATURATION: 95 % | TEMPERATURE: 97.5 F | HEART RATE: 68 BPM | HEIGHT: 65 IN | DIASTOLIC BLOOD PRESSURE: 92 MMHG

## 2022-12-27 DIAGNOSIS — C71.9 GLIOBLASTOMA: Primary | ICD-10-CM

## 2022-12-27 DIAGNOSIS — C71.9 GLIOMA: ICD-10-CM

## 2022-12-27 DIAGNOSIS — E11.65 TYPE 2 DIABETES MELLITUS WITH HYPERGLYCEMIA, WITH LONG-TERM CURRENT USE OF INSULIN: ICD-10-CM

## 2022-12-27 DIAGNOSIS — C71.9 HIGH GRADE GLIOMA NOT CLASSIFIABLE BY WHO CRITERIA: ICD-10-CM

## 2022-12-27 DIAGNOSIS — Z79.4 TYPE 2 DIABETES MELLITUS WITH HYPERGLYCEMIA, WITH LONG-TERM CURRENT USE OF INSULIN: ICD-10-CM

## 2022-12-27 LAB
ALBUMIN SERPL-MCNC: 3.9 G/DL (ref 3.5–5.2)
ALBUMIN/GLOB SERPL: 1.8 G/DL (ref 1.1–2.4)
ALP SERPL-CCNC: 164 U/L (ref 38–116)
ALT SERPL W P-5'-P-CCNC: 254 U/L (ref 0–33)
ANION GAP SERPL CALCULATED.3IONS-SCNC: 15.4 MMOL/L (ref 5–15)
AST SERPL-CCNC: 38 U/L (ref 0–32)
BASOPHILS # BLD AUTO: 0.02 10*3/MM3 (ref 0–0.2)
BASOPHILS NFR BLD AUTO: 0.2 % (ref 0–1.5)
BILIRUB SERPL-MCNC: 0.3 MG/DL (ref 0.2–1.2)
BUN SERPL-MCNC: 30 MG/DL (ref 6–20)
BUN/CREAT SERPL: 46.2 (ref 7.3–30)
CALCIUM SPEC-SCNC: 9.4 MG/DL (ref 8.5–10.2)
CHLORIDE SERPL-SCNC: 96 MMOL/L (ref 98–107)
CO2 SERPL-SCNC: 21.6 MMOL/L (ref 22–29)
CREAT SERPL-MCNC: 0.65 MG/DL (ref 0.6–1.1)
DEPRECATED RDW RBC AUTO: 49 FL (ref 37–54)
EGFRCR SERPLBLD CKD-EPI 2021: 102.2 ML/MIN/1.73
EOSINOPHIL # BLD AUTO: 0 10*3/MM3 (ref 0–0.4)
EOSINOPHIL NFR BLD AUTO: 0 % (ref 0.3–6.2)
ERYTHROCYTE [DISTWIDTH] IN BLOOD BY AUTOMATED COUNT: 16.2 % (ref 12.3–15.4)
GLOBULIN UR ELPH-MCNC: 2.2 GM/DL (ref 1.8–3.5)
GLUCOSE SERPL-MCNC: 437 MG/DL (ref 74–124)
HCT VFR BLD AUTO: 37 % (ref 34–46.6)
HGB BLD-MCNC: 11.8 G/DL (ref 12–15.9)
IMM GRANULOCYTES # BLD AUTO: 0.51 10*3/MM3 (ref 0–0.05)
IMM GRANULOCYTES NFR BLD AUTO: 4.4 % (ref 0–0.5)
LYMPHOCYTES # BLD AUTO: 0.87 10*3/MM3 (ref 0.7–3.1)
LYMPHOCYTES NFR BLD AUTO: 7.5 % (ref 19.6–45.3)
MCH RBC QN AUTO: 26.8 PG (ref 26.6–33)
MCHC RBC AUTO-ENTMCNC: 31.9 G/DL (ref 31.5–35.7)
MCV RBC AUTO: 84.1 FL (ref 79–97)
MONOCYTES # BLD AUTO: 1 10*3/MM3 (ref 0.1–0.9)
MONOCYTES NFR BLD AUTO: 8.7 % (ref 5–12)
NEUTROPHILS NFR BLD AUTO: 79.2 % (ref 42.7–76)
NEUTROPHILS NFR BLD AUTO: 9.15 10*3/MM3 (ref 1.7–7)
NRBC BLD AUTO-RTO: 0 /100 WBC (ref 0–0.2)
PLATELET # BLD AUTO: 218 10*3/MM3 (ref 140–450)
PMV BLD AUTO: 8.9 FL (ref 6–12)
POTASSIUM SERPL-SCNC: 4.6 MMOL/L (ref 3.5–4.7)
PROT SERPL-MCNC: 6.1 G/DL (ref 6.3–8)
RAD ONC ARIA COURSE ID: NORMAL
RAD ONC ARIA COURSE INTENT: NORMAL
RAD ONC ARIA COURSE LAST TREATMENT DATE: NORMAL
RAD ONC ARIA COURSE START DATE: NORMAL
RAD ONC ARIA COURSE TREATMENT ELAPSED DAYS: 15
RAD ONC ARIA FIRST TREATMENT DATE: NORMAL
RAD ONC ARIA PLAN FRACTIONS TREATED TO DATE: 10
RAD ONC ARIA PLAN ID: NORMAL
RAD ONC ARIA PLAN PRESCRIBED DOSE PER FRACTION: 2 GY
RAD ONC ARIA PLAN PRIMARY REFERENCE POINT: NORMAL
RAD ONC ARIA PLAN TOTAL FRACTIONS PRESCRIBED: 23
RAD ONC ARIA PLAN TOTAL PRESCRIBED DOSE: 4600 CGY
RAD ONC ARIA REFERENCE POINT DOSAGE GIVEN TO DATE: 20 GY
RAD ONC ARIA REFERENCE POINT DOSAGE GIVEN TO DATE: 20.24 GY
RAD ONC ARIA REFERENCE POINT ID: NORMAL
RAD ONC ARIA REFERENCE POINT ID: NORMAL
RAD ONC ARIA REFERENCE POINT SESSION DOSAGE GIVEN: 2 GY
RAD ONC ARIA REFERENCE POINT SESSION DOSAGE GIVEN: 2.02 GY
RBC # BLD AUTO: 4.4 10*6/MM3 (ref 3.77–5.28)
SODIUM SERPL-SCNC: 133 MMOL/L (ref 134–145)
WBC NRBC COR # BLD: 11.55 10*3/MM3 (ref 3.4–10.8)

## 2022-12-27 PROCEDURE — 99215 OFFICE O/P EST HI 40 MIN: CPT | Performed by: NURSE PRACTITIONER

## 2022-12-27 PROCEDURE — 77386 CHG INTENSITY MODULATED RADIATION TX DLVR COMPLEX: CPT | Performed by: STUDENT IN AN ORGANIZED HEALTH CARE EDUCATION/TRAINING PROGRAM

## 2022-12-27 PROCEDURE — 77386: CPT | Performed by: STUDENT IN AN ORGANIZED HEALTH CARE EDUCATION/TRAINING PROGRAM

## 2022-12-27 PROCEDURE — 80053 COMPREHEN METABOLIC PANEL: CPT

## 2022-12-27 PROCEDURE — 85025 COMPLETE CBC W/AUTO DIFF WBC: CPT

## 2022-12-27 PROCEDURE — 36415 COLL VENOUS BLD VENIPUNCTURE: CPT

## 2022-12-27 RX ORDER — SODIUM FLUORIDE1.1%, POTASSIUM NITRATE 5% 5.8; 57.5 MG/ML; MG/ML
GEL, DENTIFRICE DENTAL
COMMUNITY
Start: 2022-11-03 | End: 2023-01-20

## 2022-12-27 NOTE — PROGRESS NOTES
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  She thereafter was discharged from rehab home on 12/23/2022.  Patient initially had messaged into our office reporting increased falls recently that were concerning.  She was sent for a stat MRI of the brain with findings of a 3.2 cm rim-enhancing right supratentorial mass causing leftward midline shift.  Biopsy was performed 11/28/2022 showing high-grade glioma.  Additional pathology from [facility] showed grade IV glioblastoma.  She was placed on IV Keppra and dexamethasone thereafter changed to oral.  Tumor was nonresectable secondary to location.  Plan was for initiation of radiation along with Temodar which was initiated 12/12/2022.  She continues oral dexamethasone 4 mg every 8 hours.    Patient reports she has been tired since being home from the hospital.  She denies headaches, pain, nausea.  She has been weak and has had 2 instances where her caregiver has not been able to stabilize her enough to stop a fall.  Fortunately however they have been able to help lower her to the ground.  1 person is typically not able to get her up but it will take 2.  Her  is concerned as they will be having additional family members not around as much's when the holidays are over.  The patient has been set up for occupational therapy,  "physical therapy, and speech therapy which will start coming towards the end of this week.  She denies any known difficulty related to the Temodar.      Objective   Vital Signs:   /92   Pulse 68   Temp 97.5 °F (36.4 °C) (Temporal)   Resp 16   Ht 165.1 cm (65\")   Wt 99.8 kg (220 lb)   SpO2 95%   BMI 36.61 kg/m²     Physical Exam  Constitutional:       Appearance: She is well-developed.   Eyes:      Extraocular Movements: Extraocular movements intact.      Conjunctiva/sclera: Conjunctivae normal.   Cardiovascular:      Rate and Rhythm: Normal rate and regular rhythm.      Heart sounds: No murmur heard.    No friction rub. No gallop.   Pulmonary:      Effort: No respiratory distress.      Breath sounds: Normal breath sounds.   Abdominal:      General: Bowel sounds are normal. There is no distension.      Palpations: Abdomen is soft.      Tenderness: There is no abdominal tenderness.   Musculoskeletal:      Comments: Seated in wheelchair   Lymphadenopathy:      Head:      Right side of head: No submandibular adenopathy.      Cervical: No cervical adenopathy.      Upper Body:      Right upper body: No supraclavicular adenopathy.      Left upper body: No supraclavicular adenopathy.   Skin:     General: Skin is warm and dry.      Findings: No rash.   Neurological:      Mental Status: She is alert and oriented to person, place, and time.   Psychiatric:         Behavior: Behavior normal.            Result Review :        Assessment and Plan  *Glioblastoma, IDH 1 R132H negative, CNS WHO grade 4.  Intact MGMT expression  • Patient presented with recurrent falls and confusion.  • MRI on 11/22/2022 revealed a 3.2 cm ring-enhancing right supratentorial mass.  There was mass-effect and left midline shift.  • She was started on dexamethasone and Keppra.  • CT chest abdomen pelvis on 11/24/2022 revealed no evidence of progressive metastatic disease.  • S/p stereotactic brain biopsy on 11/28/2022.  • Preliminary " pathology revealed high-grade glioma.  It was sent to [facility].  • Preliminary revealed high-grade glioma.  • Patient had radiation simulation on 12/2/2022.  • Plan is to start concurrent low-dose Temodar at 75 mg/m2 daily.   • She is on Decadron 4 mg p.o. every 8 hours.  • Started radiation with concurrent Temodar on 12/12/2022.  • Temodar does not require dose reduction due to the elevated liver enzymes.   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  This is associated with a worse prognosis and relative resistance to alkylating chemotherapy such as temozolomide.  However, temozolomide and radiation remain the recommended treatments.  • 12/22/2022: Tolerating concurrent chemoradiation very well.  No major adverse effects.  Labs remained stable.  Continue treatment as per schedule  • 12/27/2022 patient was discharged from rehabilitation 12/23/2022 after admission 12/7/2022  • 12/27/2022 patient continues on chemoradiation with Temodar.  She denies any difficulties with the Temodar aside from fatigue with treatment in general.  She does continue with weakness and has home health coming later this week to include physical therapy, Occupational Therapy, and speech therapy.  She continues on dexamethasone 4 mg every 8 hours.  *BRCA2 mutation (c.5073dupA) and GEORGES mutation (c.5073dup):  · Strong family history of malignancy with a mother who had thyroid cancer and also had breast cancer at age 73 with subsequent liver metastases.  She was found to be BRCA2 positive and prompted the patient's own testing.  The patient's maternal grandmother had ovarian cancer in her mid 80s, maternal grandfather and paternal grandfather both had leukemia of unknown type at an older age.  Her maternal aunt had colon cancer over the age of 50, maternal aunt had breast cancer in her 80s, and her father had cholangiocarcinoma.    · The patient underwent testing on 7/27/2016 showing positive BRCA2 mutation  (c.5073dupA)   · The patient did undergo KAVYA/BSO in 1992 secondary to hemorrhage.  · The patient did undergo bilateral mastectomies 1/26/2017 with findings of bilateral DCIS (discussed below).  · Patient has undergone previous colonoscopy on 11/28/2017.  · Given the unusual nature of the patient's malignancies, referral to genetics clinic for panel testing performed 11/10/2020 with re-identification of BRCA2 mutation (c.5073dupA) and new identification of GEORGES mutation (c.5073dup).  · We will continue surveillance with repeat colonoscopy at a 5-year interval due next in late 2022 and we will refer the patient back to her gastroenterologist Dr. Sabillon.  *Stage I (sD9yFaYv) papillary thyroid cancer:  · Status post total thyroidectomy with Dr. Maher in [place] on 10/15/2010.  Pathology showed papillary thyroid cancer involving the left thyroid and isthmus, multifocal with 2 areas measuring 0.9 and 1.2 cm.  No evidence of capsular invasion, no extrathyroidal extension, no lymphovascular invasion, negative margins.  · Postsurgical scan showed uptake in the thyroid bed and the patient underwent treatment with I-131.    · She has had no evidence of recurrent disease.    · Her subsequent hypothyroidism has been managed by endocrinology (Dr. Subhash Michael) in [place], currently receiving levothyroxine 150 mcg daily.  · The patient continues routine follow-up with endocrinology, was seen last on 8/8/2022  *Bilateral DCIS  · As above, patient has underlying BRCA2 mutation  · 8/5/2016 was found to have a right breast intraductal papilloma with fibrocystic change and microcalcifications with usual ductal hyperplasia and columnar cell change in 3 separate locations on biopsy.  · On 8/24/2016 she underwent excisional biopsy of an intraductal papilloma from the right breast.    · She subsequently underwent on 1/26/2017 bilateral mastectomy.  On the right there was a large intraductal papilloma with usual ductal  hyperplasia, atypical hyperplasia, DCIS measuring 3 mm which was low-grade, ER positive (98%), WI positive (85%).  On the left there were multiple intraductal papillomas with atypical ductal hyperplasia.  There was DCIS measuring 6 mm, low-grade, ER positive (99%), WI positive (98%).  *Stage I (zL7iQpZ1) clear-cell renal cell carcinoma:  · Incidental finding on CT scan 1/15/2020 of enhancing left renal lesions x2, largest 2.4 cm  · Resection with [facility]. Pool ([facility]) on 5/15/2020 with left partial nephrectomy.  Pathology showed clear cell renal cell carcinoma, Jeanne grade 2, 2 foci measuring 1.5 and 2.1 cm.  The renal parenchymal margin was focally positive.  · Patient reports that Dr. Pool felt that he required additional margin tissue in the area of focal positivity and did not recommend re-resection.  · Most recent CT 8/17/2022 showed no evidence of recurrent disease  · We are continuing with every 6-month monitoring of CT scans for surveillance currently.  We reviewed today her most recent CT scan from 8/17/2022 which shows no evidence of disease recurrence.  Repeat CT in 6 months prior to her return visit.  5. Stage IIB typical carcinoid of the left lung (uJ3tV4P8):  · PET scan 12/13/2017 with hypermetabolic 2.5 cm left lower lobe nodule with SUV 5.7.    · CT-guided biopsy on 1/9/2018 revealed a left lower lobe carcinoid with spindle cell features, no necrosis, no mitoses.  · Notation of normal chromogranin A 1/23/2018 of 35  · Follow-up CT on 1/24/2018 showed multiple bilateral pulmonary nodules including a 2.1 cm left lower lobe nodule (previously 2.6), 7 mm left lower lobe nodule, 1 cm right lower lobe nodule, right adrenal 1.5 cm nodule consistent with adenoma, and hepatic cysts.    · On 2/28/2018, the patient underwent a left lower lobectomy.  Pathology revealed a 2.3 cm left upper lobe (hilar) carcinoid, typical with spindle cell features, Ki-67 of 3.68%.  There were 2 of 8 peribronchial  lymph nodes involved with carcinoid with lymphatic invasion, stage IIB (hU6lwV7T1).  There was also evidence of adenocarcinoma (see below).  *(Stage IA1 (mB1lM7Dm)  left lower lobe non-small cell lung cancer (adenocarcinoma with lipidic features):  · The patient is a never smoker  · PET scan 12/13/2017 with hypermetabolic 2.5 cm left lower lobe nodule with SUV 5.7.    · CT-guided biopsy on 1/9/2018 revealed a left lower lobe carcinoid with spindle cell features, no necrosis, no mitoses.    · Follow-up CT on 1/24/2018 showed multiple bilateral pulmonary nodules including a 2.1 cm left lower lobe nodule (previously 2.6), 7 mm left lower lobe nodule, 1 cm right lower lobe nodule, right adrenal 1.5 cm nodule consistent with adenoma, and hepatic cysts.    · On 2/28/2018, the patient underwent a left lower lobectomy.  Pathology revealed a 2.3 cm left upper lobe (hilar) carcinoid, typical with spindle cell features, Ki-67 of 3.68%.  There were 2 of 8 peribronchial lymph nodes involved with carcinoid with lymphatic invasion, stage IIB (sW5hgH2G1).  There was a 0.9 cm adenocarcinoma, well differentiated with lipidic features, no visceral pleural invasion, bronchoalveolar atypical adenomatous hyperplasia at the parenchymal margin and 1 microscopic focus (less than 1 mm).  0/8 lymph nodes involved with adenocarcinoma. PDL1 <1% TPS, positive EGF receptor mutation (exon 19 deletion, [id number]), negative ALK/ROS1 rearrangement, negative BRAF, ER negative (2%), DC 0, HER-2/laura 1+, Ki-67 3%. Stage IA1 (pO1duH9K7).  · Patient was placed on Femara following surgery by Dr. Juana Pelayo.  Femara subsequently discontinued in early August 2020.  · Subsequent follow-up scans with stable findings until scan on 1/15/2020 noted 2 enhancing left renal lesions, largest 2.4 cm and there was also an enlarging right lower lobe nodule up to 1.0 x 1.2 cm.  In the interval, patient did undergo resection of renal cell carcinoma (discussed  above).    · CT scan on 7/1/2020 showed slow increase in right lower lobe nodule up to 1.2 cm.  Felt to represent new primary lung cancer (see below).   *Stage IA2 (jU1otU3Y2) right lower lobe non-small cell lung cancer (adenocarcinoma with lipidic growth pattern):   · CT scan on 7/1/2020 showed slow increase in right lower lobe nodule up to 1.2 cm.  · CT-guided biopsy of the right lower lobe nodule on 7/31/2020 with adenocarcinoma with bland lipidic growth pattern of pulmonary origin (TTF-1 and CK7 positive). PDL1 TPS <1%, EGFR mutated, exon 20 insertion (possibly indicating lack of response to TKI's). ALK/ROS1 rearrangement negative and BRAF V600 mutation negative.  · Staging MRI brain 8/12/2020 with no evidence of metastatic disease  · Staging PET scan 8/12/2020 with no significant activity in the biopsied lesion 1.2 cm right lower lobe (SUV 1.5), no evidence of hilar, mediastinal uptake nor distant metastatic disease.  · Given the difference in EGFR mutation between the patient's 2 lung cancers, it is felt that she has 2 separate primary lesions.    · Right VATS with anterior basal segmentectomy on 10/9/2020 with pathology showing invasive well to moderately differentiated adenocarcinoma with acinar predominant growth measuring 1.4 cm, negative margins, no pleural or lymphovascular invasion, negative lymph nodes x5.  · Follow-up CT chest 3/8/2021 showed postoperative change, no evidence of disease recurrence.    · We are continuing with every 6-month monitoring with CT chest (in addition to CT abdomen and pelvis in regards to her renal cell carcinoma).  We reviewed her most recent CT scan from 8/17/2022 which continues to show no evidence of recurrent disease.  We did discuss today that after 5 years of every 6-month CT scans we will at least consider whether we could substitute a scan that has less radiation exposure such as a low-dose CT in combination with abdominal MRI.  For now we will continue on 6-month  CT scans with neck study prior to return visit.  *Melanoma right heel stage IA (cY6sTrP9)  · Patient underwent excision of right heel melanoma 6/4/2021, 0.6 mm superficial spreading melanoma with margin positive for melanoma in situ.  · Wide local excision 6/30/2021 with no residual melanoma identified  · Patient has had difficulty with healing of the wound and has required 3 separate skin grafts, the most recent performed 4 weeks ago with ongoing slow healing.  · The patient's wound in the right heel did finally heal in late 2021.    · Patient continues routine follow-up with dermatology every 3 months.  *Incidental CT findings  · Notation of a number of incidental CT findings on scan from 8/17/2022 including 1.6 cm right adrenal nodule, C7 focal osteolysis, both unchanged compared to 3/8/2021 and apparent benign findings.  We will continue to monitor on subsequent scans.  · Notation on CT scan 8/17/2022 of short segment narrowing of the proximal left internal carotid artery resulting in moderate stenosis.  Findings were not well evaluated.  We will therefore proceed with carotid ultrasound and I will refer the patient to vascular surgery for further evaluation  *Anemia:  · Patient has undergone previous colonoscopy on 11/28/2017.  · Hemoglobin in high 10 to low 11 range with low normal MCV  · Anemia evaluation 8/20/2020 with iron 44, ferritin 119.3, iron saturation 13%, TIBC 351, folate 12.2, B12 392.  · Unclear whether patient may have anemia secondary to chronic disease.  · With low iron saturation, initiated oral iron daily on 9/15/2020.  Patient however did not begin oral iron until 11/11/2020.  · Labs on 11/11/2020 with hemoglobin 11.4, iron studies showing iron 54, ferritin 112, iron saturation 14%, TIBC 395.  · Labs on 1/7/2021 with hemoglobin 11.9, iron studies with iron 50, ferritin 109.5, iron saturation 12%, TIBC 420.  Patient with significant GI side effects from oral iron with diarrhea, iron  discontinued.  · Labs on 9/14/2021 showed hemoglobin that declined to 11.4 of unclear etiology.  Additional labs showed iron 44, ferritin 137.6, iron saturation 13 %, TIBC 346, folate 11.3, B12 394.   · Labs 8/24/2022 show iron saturation 13%, ferritin 106, TIBC 351  · Hemoglobin today 11.8.  *Depression  · The patient was experiencing significant exacerbation of depressive symptoms in fall 2021 as she was coping with her mother's illness with metastatic breast cancer.  Patient's mother was living with her on hospice care for a period of time and did pass away in late 2021.  · Patient was referred back to supportive oncology clinic.  She reports that this has been very helpful for her.  She is continuing currently on Lexapro 10 mg daily.  · 12/27/2022 patient reports she is continuing on Lexapro 5 mg daily in addition to Wellbutrin 100 mg daily.  She does have follow-up with Merle Newsome in early January.  Patient thinks overall she is managing well given her recent new diagnoses.  *Obesity and diabetes mellitus  · Patient motivated to lose weight, was referred to oncology nutrition and was seen on 11/10/2020  · Patient has been successful in weight loss with alteration in her diet and improvement in her activity level.  Improvement in hemoglobin A1c down to 6.3 on 12/1/2022.  · Slight increase in hemoglobin A1c on last check 8/10/2022 at 6.9.  · Glucose today was 437 in the office and this was called to the patient as she had already left.   reports she had not taken her midday insulin since they had had scheduled changes.  They will monitor this closely at home.  Patient is on oral dexamethasone as well.  *COVID-19 vaccination and infection  · Patient did finally agreed to COVID-19 vaccination, received Moderna vaccine in September and October 2021.  · Patient did contract COVID-19 infection testing positive on 1/14/2022.  Fortunately symptoms were minimal and patient recovered without difficulty.  · We  did discuss pursuit of COVID-19 booster injection  *Seizure prophylaxis  · Patient is on Keppra 500 mg p.o. twice daily  · Patient is not having symptoms consistent with seizures  *Elevated liver enzymes  · LFTs elevated during hospitalization hepatic duplex 12/12/2022 normal.  LFTs continue to improve with ALT 1049 and  on 12/8/2022.  12/23/2022 AST normal at 29 and ALT trending down at 189.  · 12/27/2022  and AST 38.  *Hyponatremia during hospitalization  · Sodium low at 126 on 12/19/2022.  Deferred to hospitalist service.  12/22/2022 improved to 132.  · 12/27/2022 sodium level today 133 continue to monitor  *Prophylaxis  · Bactrim DS 1 p.o. 3 times weekly  · Keppra  · Valtrex 500 mg daily     Plan:  1. Patient continues Temodar and radiation  2. Patient continues on oral dexamethasone 4 mg every 8 hours, wean per radiation oncology.  3. Patient continues on Keppra  4. Continues on Bactrim DS 1 p.o. 3 times weekly  5. Continues Valtrex 500 mg daily  6. Return to the office on 1/4/2023 for review by Dr. Collins  7. Follow-up with radiation oncology as scheduled  8. Patient has been called to report elevated glucose today, they will monitor her blood sugar closely at home this evening.      Records reviewed from recent hospitalization and subsequent rehabilitation.I spent 45 minutes caring for Christie on this date of service. This time includes time spent by me in the following activities: preparing for the visit, reviewing tests, obtaining and/or reviewing a separately obtained history, performing a medically appropriate examination and/or evaluation, counseling and educating the patient/family/caregiver, ordering medications, tests, or procedures, documenting information in the medical record and independently interpreting results and communicating that information with the patient/family/caregiver.

## 2022-12-27 NOTE — CASE COMMUNICATION
reported patients schedule very busty today and ST suarez also today as well, requested OT erick to be moved to Friday this week

## 2022-12-28 LAB
RAD ONC ARIA COURSE ID: NORMAL
RAD ONC ARIA COURSE INTENT: NORMAL
RAD ONC ARIA COURSE LAST TREATMENT DATE: NORMAL
RAD ONC ARIA COURSE START DATE: NORMAL
RAD ONC ARIA COURSE TREATMENT ELAPSED DAYS: 16
RAD ONC ARIA FIRST TREATMENT DATE: NORMAL
RAD ONC ARIA PLAN FRACTIONS TREATED TO DATE: 11
RAD ONC ARIA PLAN ID: NORMAL
RAD ONC ARIA PLAN PRESCRIBED DOSE PER FRACTION: 2 GY
RAD ONC ARIA PLAN PRIMARY REFERENCE POINT: NORMAL
RAD ONC ARIA PLAN TOTAL FRACTIONS PRESCRIBED: 23
RAD ONC ARIA PLAN TOTAL PRESCRIBED DOSE: 4600 CGY
RAD ONC ARIA REFERENCE POINT DOSAGE GIVEN TO DATE: 22 GY
RAD ONC ARIA REFERENCE POINT DOSAGE GIVEN TO DATE: 22.27 GY
RAD ONC ARIA REFERENCE POINT ID: NORMAL
RAD ONC ARIA REFERENCE POINT ID: NORMAL
RAD ONC ARIA REFERENCE POINT SESSION DOSAGE GIVEN: 2 GY
RAD ONC ARIA REFERENCE POINT SESSION DOSAGE GIVEN: 2.02 GY

## 2022-12-28 PROCEDURE — 77386: CPT | Performed by: STUDENT IN AN ORGANIZED HEALTH CARE EDUCATION/TRAINING PROGRAM

## 2022-12-28 PROCEDURE — 77427 RADIATION TX MANAGEMENT X5: CPT | Performed by: STUDENT IN AN ORGANIZED HEALTH CARE EDUCATION/TRAINING PROGRAM

## 2022-12-28 PROCEDURE — 77386 CHG INTENSITY MODULATED RADIATION TX DLVR COMPLEX: CPT | Performed by: STUDENT IN AN ORGANIZED HEALTH CARE EDUCATION/TRAINING PROGRAM

## 2022-12-29 ENCOUNTER — RADIATION ONCOLOGY WEEKLY ASSESSMENT (OUTPATIENT)
Dept: RADIATION ONCOLOGY | Facility: HOSPITAL | Age: 57
End: 2022-12-29
Payer: MEDICARE

## 2022-12-29 ENCOUNTER — TELEPHONE (OUTPATIENT)
Dept: INTERNAL MEDICINE | Facility: CLINIC | Age: 57
End: 2022-12-29

## 2022-12-29 ENCOUNTER — HOME CARE VISIT (OUTPATIENT)
Dept: HOME HEALTH SERVICES | Facility: HOME HEALTHCARE | Age: 57
End: 2022-12-29
Payer: COMMERCIAL

## 2022-12-29 VITALS
DIASTOLIC BLOOD PRESSURE: 77 MMHG | TEMPERATURE: 97.7 F | OXYGEN SATURATION: 96 % | RESPIRATION RATE: 18 BRPM | HEART RATE: 65 BPM | SYSTOLIC BLOOD PRESSURE: 134 MMHG

## 2022-12-29 VITALS
OXYGEN SATURATION: 94 % | HEART RATE: 71 BPM | SYSTOLIC BLOOD PRESSURE: 138 MMHG | WEIGHT: 221 LBS | BODY MASS INDEX: 36.78 KG/M2 | DIASTOLIC BLOOD PRESSURE: 91 MMHG

## 2022-12-29 DIAGNOSIS — C71.9 GLIOBLASTOMA MULTIFORME: Primary | ICD-10-CM

## 2022-12-29 LAB
RAD ONC ARIA COURSE ID: NORMAL
RAD ONC ARIA COURSE INTENT: NORMAL
RAD ONC ARIA COURSE LAST TREATMENT DATE: NORMAL
RAD ONC ARIA COURSE START DATE: NORMAL
RAD ONC ARIA COURSE TREATMENT ELAPSED DAYS: 17
RAD ONC ARIA FIRST TREATMENT DATE: NORMAL
RAD ONC ARIA PLAN FRACTIONS TREATED TO DATE: 12
RAD ONC ARIA PLAN ID: NORMAL
RAD ONC ARIA PLAN PRESCRIBED DOSE PER FRACTION: 2 GY
RAD ONC ARIA PLAN PRIMARY REFERENCE POINT: NORMAL
RAD ONC ARIA PLAN TOTAL FRACTIONS PRESCRIBED: 23
RAD ONC ARIA PLAN TOTAL PRESCRIBED DOSE: 4600 CGY
RAD ONC ARIA REFERENCE POINT DOSAGE GIVEN TO DATE: 24 GY
RAD ONC ARIA REFERENCE POINT DOSAGE GIVEN TO DATE: 24.29 GY
RAD ONC ARIA REFERENCE POINT ID: NORMAL
RAD ONC ARIA REFERENCE POINT ID: NORMAL
RAD ONC ARIA REFERENCE POINT SESSION DOSAGE GIVEN: 2 GY
RAD ONC ARIA REFERENCE POINT SESSION DOSAGE GIVEN: 2.02 GY

## 2022-12-29 PROCEDURE — G0153 HHCP-SVS OF S/L PATH,EA 15MN: HCPCS

## 2022-12-29 PROCEDURE — 77386: CPT | Performed by: RADIOLOGY

## 2022-12-29 PROCEDURE — 77386 CHG INTENSITY MODULATED RADIATION TX DLVR COMPLEX: CPT | Performed by: RADIOLOGY

## 2022-12-29 PROCEDURE — FACE2FACE: Performed by: RADIOLOGY

## 2022-12-29 NOTE — TELEPHONE ENCOUNTER
Patient was  Discharged from [facility] and is needing a hospital follow up with Dr. Holloway, patient would like to be seen around 10:30 am to 11 am any day, patient has radiation in the mornings, please advise?  [phone]

## 2022-12-30 ENCOUNTER — SPECIALTY PHARMACY (OUTPATIENT)
Dept: PHARMACY | Facility: HOSPITAL | Age: 57
End: 2022-12-30

## 2022-12-30 ENCOUNTER — HOME CARE VISIT (OUTPATIENT)
Dept: HOME HEALTH SERVICES | Facility: HOME HEALTHCARE | Age: 57
End: 2022-12-30
Payer: COMMERCIAL

## 2022-12-30 ENCOUNTER — TREATMENT (OUTPATIENT)
Dept: RADIATION ONCOLOGY | Facility: HOSPITAL | Age: 57
End: 2022-12-30

## 2022-12-30 LAB
RAD ONC ARIA COURSE ID: NORMAL
RAD ONC ARIA COURSE INTENT: NORMAL
RAD ONC ARIA COURSE LAST TREATMENT DATE: NORMAL
RAD ONC ARIA COURSE START DATE: NORMAL
RAD ONC ARIA COURSE TREATMENT ELAPSED DAYS: 18
RAD ONC ARIA FIRST TREATMENT DATE: NORMAL
RAD ONC ARIA PLAN FRACTIONS TREATED TO DATE: 13
RAD ONC ARIA PLAN ID: NORMAL
RAD ONC ARIA PLAN PRESCRIBED DOSE PER FRACTION: 2 GY
RAD ONC ARIA PLAN PRIMARY REFERENCE POINT: NORMAL
RAD ONC ARIA PLAN TOTAL FRACTIONS PRESCRIBED: 23
RAD ONC ARIA PLAN TOTAL PRESCRIBED DOSE: 4600 CGY
RAD ONC ARIA REFERENCE POINT DOSAGE GIVEN TO DATE: 26 GY
RAD ONC ARIA REFERENCE POINT DOSAGE GIVEN TO DATE: 26.31 GY
RAD ONC ARIA REFERENCE POINT ID: NORMAL
RAD ONC ARIA REFERENCE POINT ID: NORMAL
RAD ONC ARIA REFERENCE POINT SESSION DOSAGE GIVEN: 2 GY
RAD ONC ARIA REFERENCE POINT SESSION DOSAGE GIVEN: 2.02 GY

## 2022-12-30 PROCEDURE — 77427 RADIATION TX MANAGEMENT X5: CPT | Performed by: RADIOLOGY

## 2022-12-30 PROCEDURE — 77386: CPT | Performed by: RADIOLOGY

## 2022-12-30 PROCEDURE — 77336 RADIATION PHYSICS CONSULT: CPT | Performed by: RADIOLOGY

## 2022-12-30 NOTE — PROGRESS NOTES
I called [facility] to verify status of the Temodar prescriptions.  The refills were due according to a refill coordination that I deleted out of the system today.  The prescriptions were ready to be scheduled. They were $36 total.  However, pt had to call and schedule the delivery.  I called Christie to alert her.  She v/u.  I sent her a message via [employer] with the information to schedule the delivery.    Statement Selected

## 2023-01-01 ENCOUNTER — APPOINTMENT (OUTPATIENT)
Dept: RADIATION ONCOLOGY | Facility: HOSPITAL | Age: 58
End: 2023-01-01
Payer: COMMERCIAL

## 2023-01-03 ENCOUNTER — HOME CARE VISIT (OUTPATIENT)
Dept: HOME HEALTH SERVICES | Facility: HOME HEALTHCARE | Age: 58
End: 2023-01-03
Payer: COMMERCIAL

## 2023-01-03 ENCOUNTER — OFFICE VISIT (OUTPATIENT)
Dept: INTERNAL MEDICINE | Facility: CLINIC | Age: 58
End: 2023-01-03
Payer: MEDICARE

## 2023-01-03 ENCOUNTER — TREATMENT (OUTPATIENT)
Dept: RADIATION ONCOLOGY | Facility: HOSPITAL | Age: 58
End: 2023-01-03
Payer: COMMERCIAL

## 2023-01-03 VITALS
TEMPERATURE: 97.3 F | HEART RATE: 75 BPM | HEIGHT: 65 IN | WEIGHT: 224 LBS | OXYGEN SATURATION: 96 % | BODY MASS INDEX: 37.32 KG/M2

## 2023-01-03 VITALS
SYSTOLIC BLOOD PRESSURE: 110 MMHG | DIASTOLIC BLOOD PRESSURE: 60 MMHG | OXYGEN SATURATION: 96 % | HEART RATE: 82 BPM | TEMPERATURE: 97.8 F

## 2023-01-03 DIAGNOSIS — E11.65 TYPE 2 DIABETES MELLITUS WITH HYPERGLYCEMIA, WITHOUT LONG-TERM CURRENT USE OF INSULIN: ICD-10-CM

## 2023-01-03 DIAGNOSIS — C71.9 HIGH GRADE GLIOMA NOT CLASSIFIABLE BY WHO CRITERIA: ICD-10-CM

## 2023-01-03 DIAGNOSIS — I10 ESSENTIAL HYPERTENSION: Primary | ICD-10-CM

## 2023-01-03 DIAGNOSIS — F33.1 MAJOR DEPRESSIVE DISORDER, RECURRENT EPISODE, MODERATE: ICD-10-CM

## 2023-01-03 PROBLEM — G93.5 MIDLINE SHIFT OF BRAIN WITH BRAIN COMPRESSION (HCC): Status: RESOLVED | Noted: 2022-11-23 | Resolved: 2023-01-03

## 2023-01-03 PROBLEM — R65.10 SIRS (SYSTEMIC INFLAMMATORY RESPONSE SYNDROME): Status: RESOLVED | Noted: 2018-11-01 | Resolved: 2023-01-03

## 2023-01-03 LAB
ALBUMIN SERPL-MCNC: 4.1 G/DL (ref 3.5–5.2)
ALBUMIN/GLOB SERPL: 2.7 G/DL
ALP SERPL-CCNC: 145 U/L (ref 39–117)
ALT SERPL-CCNC: 243 U/L (ref 1–33)
AST SERPL-CCNC: 27 U/L (ref 1–32)
BASOPHILS # BLD AUTO: 0.02 10*3/MM3 (ref 0–0.2)
BASOPHILS NFR BLD AUTO: 0.2 % (ref 0–1.5)
BILIRUB SERPL-MCNC: 0.3 MG/DL (ref 0–1.2)
BUN SERPL-MCNC: 31 MG/DL (ref 6–20)
BUN/CREAT SERPL: 37.8 (ref 7–25)
CALCIUM SERPL-MCNC: 8.9 MG/DL (ref 8.6–10.5)
CHLORIDE SERPL-SCNC: 96 MMOL/L (ref 98–107)
CO2 SERPL-SCNC: 21.4 MMOL/L (ref 22–29)
CREAT SERPL-MCNC: 0.82 MG/DL (ref 0.57–1)
EGFRCR SERPLBLD CKD-EPI 2021: 83 ML/MIN/1.73
EOSINOPHIL # BLD AUTO: 0 10*3/MM3 (ref 0–0.4)
EOSINOPHIL NFR BLD AUTO: 0 % (ref 0.3–6.2)
ERYTHROCYTE [DISTWIDTH] IN BLOOD BY AUTOMATED COUNT: 17.5 % (ref 12.3–15.4)
GLOBULIN SER CALC-MCNC: 1.5 GM/DL
GLUCOSE SERPL-MCNC: 370 MG/DL (ref 65–99)
HCT VFR BLD AUTO: 37.4 % (ref 34–46.6)
HGB BLD-MCNC: 12.2 G/DL (ref 12–15.9)
IMM GRANULOCYTES # BLD AUTO: 0.24 10*3/MM3 (ref 0–0.05)
IMM GRANULOCYTES NFR BLD AUTO: 2.2 % (ref 0–0.5)
LYMPHOCYTES # BLD AUTO: 0.43 10*3/MM3 (ref 0.7–3.1)
LYMPHOCYTES NFR BLD AUTO: 4 % (ref 19.6–45.3)
MCH RBC QN AUTO: 27.8 PG (ref 26.6–33)
MCHC RBC AUTO-ENTMCNC: 32.6 G/DL (ref 31.5–35.7)
MCV RBC AUTO: 85.2 FL (ref 79–97)
MONOCYTES # BLD AUTO: 0.63 10*3/MM3 (ref 0.1–0.9)
MONOCYTES NFR BLD AUTO: 5.8 % (ref 5–12)
NEUTROPHILS # BLD AUTO: 9.48 10*3/MM3 (ref 1.7–7)
NEUTROPHILS NFR BLD AUTO: 87.8 % (ref 42.7–76)
NRBC BLD AUTO-RTO: 0 /100 WBC (ref 0–0.2)
PLATELET # BLD AUTO: 212 10*3/MM3 (ref 140–450)
POTASSIUM SERPL-SCNC: 4.4 MMOL/L (ref 3.5–5.2)
PROT SERPL-MCNC: 5.6 G/DL (ref 6–8.5)
RAD ONC ARIA COURSE ID: NORMAL
RAD ONC ARIA COURSE INTENT: NORMAL
RAD ONC ARIA COURSE LAST TREATMENT DATE: NORMAL
RAD ONC ARIA COURSE START DATE: NORMAL
RAD ONC ARIA COURSE TREATMENT ELAPSED DAYS: 22
RAD ONC ARIA FIRST TREATMENT DATE: NORMAL
RAD ONC ARIA PLAN FRACTIONS TREATED TO DATE: 14
RAD ONC ARIA PLAN ID: NORMAL
RAD ONC ARIA PLAN PRESCRIBED DOSE PER FRACTION: 2 GY
RAD ONC ARIA PLAN PRIMARY REFERENCE POINT: NORMAL
RAD ONC ARIA PLAN TOTAL FRACTIONS PRESCRIBED: 23
RAD ONC ARIA PLAN TOTAL PRESCRIBED DOSE: 4600 CGY
RAD ONC ARIA REFERENCE POINT DOSAGE GIVEN TO DATE: 28 GY
RAD ONC ARIA REFERENCE POINT DOSAGE GIVEN TO DATE: 28.34 GY
RAD ONC ARIA REFERENCE POINT ID: NORMAL
RAD ONC ARIA REFERENCE POINT ID: NORMAL
RAD ONC ARIA REFERENCE POINT SESSION DOSAGE GIVEN: 2 GY
RAD ONC ARIA REFERENCE POINT SESSION DOSAGE GIVEN: 2.02 GY
RBC # BLD AUTO: 4.39 10*6/MM3 (ref 3.77–5.28)
SODIUM SERPL-SCNC: 133 MMOL/L (ref 136–145)
WBC # BLD AUTO: 10.8 10*3/MM3 (ref 3.4–10.8)

## 2023-01-03 PROCEDURE — 1159F MED LIST DOCD IN RCRD: CPT | Performed by: INTERNAL MEDICINE

## 2023-01-03 PROCEDURE — 1160F RVW MEDS BY RX/DR IN RCRD: CPT | Performed by: INTERNAL MEDICINE

## 2023-01-03 PROCEDURE — 77014 CHG CT GUIDANCE RADIATION THERAPY FLDS PLACEMENT: CPT | Performed by: RADIOLOGY

## 2023-01-03 PROCEDURE — 77386: CPT | Performed by: RADIOLOGY

## 2023-01-03 PROCEDURE — G0152 HHCP-SERV OF OT,EA 15 MIN: HCPCS

## 2023-01-03 PROCEDURE — 99215 OFFICE O/P EST HI 40 MIN: CPT | Performed by: INTERNAL MEDICINE

## 2023-01-03 NOTE — HOME HEALTH
INITIAL OT EVALUATION      Pt is 58 y/o  admitted to home health on  12/24/22  . Pt is receiving SN/PT  Referred to OT due to hospital stay at [facility] due to  inoperable brain cancer-high grade glioma right supratentorial mass-right thalamic-with mass-effect and left midline shift. causing L side body weakness/incoord.  Pt went to [facility] inpatient rehab in [place], [place] focusing on LUE/LLE strenthening.    Pt sitting in recliner. Pt's  present.   Pt PCP Dr Jewel garnerued 3 anitacid meds today, list updated  medcations. Pt had a fall today coming in from garage from [place]. Pt was sitting on [place] and CTS knees gave out and she had an assisted fall and lowered  to her knees,  and father caught her, no injuries.  Fell also, Beth wearing slide on slippers going to  bathroom  at night, assisted to the floor no injuries.    Pt getting chemo pill daily and radiation to head M-F usually 9:30 am at [facility].  After 3 pm is better for HH visits   PMH:12/2/2022 Frontal lobe brain CA with midline shift-metatasis from melanoma R foot, also to R hip  BRCA2 Postive, 2017 NSCLC, renal CA, Liver CA, R brest cancer     Social/Environment: Pt lives in ranch with .  Pt works 7 pm-7 am at [employer]. Pt's father comes at night to stay with pt.  Aunt comes over [facility] to assist pt.      PLOF:  Pt was Indep with all ADL/IADL's.  Fx mob Indep without AD.     Current Status:  Mod Barthel Index: 53/100; Pt needs max A with self care, due to weakness huber on L side and poor endurance; fx transfers toilet mod A for CTS, tub transfer bench- mod A with BLE's mod cues for techique.  risks for falls-high 2 recent. Pt has five more weeks of radiation which she receives 5Xday.     Cognition: Pt oriented X 3 and partial to details of all extensive medical hx; Pt  reports memory difficulties.     DME:BSC,  tub transfer bench, GBs in tub,HH toilet with one GB,  rollator, sleeps in recliner for last several years     Pt’s  Goals:  Getting endurance and strength back for walking.      OT Eval Recommendations: DME  another GB in tub , BSC for night     Skilled OT recommended [id number] wks for ADL's, fx transfers, BUE's strengthening, fx endurnace pt/CG ed and training.  Pt in agreement with POC.      Rehab Potential  good for goals   Tx: Pt issued yellow theraband, performed 3 simple ex's 1. Overhaed pulls 2. Chest pulls 3. Elbow flex/ext 10 x 1 reps with min cues for techique and proper breathing, Also, ed on BUE's AAA/ROM ex's  Pt ed to perform daily 5-10 x 1 reps,  only as tolerated  and father also present verbalized understanding. TUb bench transfer adjusted height and direction, pt performed with min A for LLE, mod A CTS  and mod cues,  verbalzied understanding of techiques.      Plan for next visit BUE's HEP, theraputty L hand

## 2023-01-03 NOTE — PROGRESS NOTES
Chief Complaint  Hospital Follow Up Visit (Pt stated was emitted to hospital on 11-22-22, DX was a brain tumor.)    Subjective        Christie Avendaño presents to [facility]  History of Present Illness pt admitted to hosp with confusion, off balance- found to have high grade glioma.  She started therapy in the hospital and went to rehab.  She is home now.  Several issues have gotten better- she is eating better with some improvement in reflux sx. BS is now better - @ 140 over last 24 hours.  Has been hard with the decadron.    having to help with everything, bathroom, etc.  He is still going to work every night at [employer].   She does not want to continue Wellbutrin, wants to go back to just escitalopram prn as she was doing before- this was added to try to help with the hyponatremia.    She is getting radiation 5 days a week and taking chemo pills.  Has good meds to treat side effects.   Now on insulin due to elevated LFTs.  They are giving her scheduled as written.     Objective   Vital Signs:  Pulse 75   Temp 97.3 °F (36.3 °C)   Ht 165.1 cm (65\")   Wt 102 kg (224 lb)   SpO2 96%   BMI 37.28 kg/m²   Estimated body mass index is 37.28 kg/m² as calculated from the following:    Height as of this encounter: 165.1 cm (65\").    Weight as of this encounter: 102 kg (224 lb).          Physical Exam  Constitutional:       General: She is not in acute distress.     Appearance: Normal appearance.   Cardiovascular:      Rate and Rhythm: Normal rate and regular rhythm.   Pulmonary:      Effort: Pulmonary effort is normal.        Result Review :  The following data was reviewed by: Tiffany Holloway MD on 01/03/2023:  Common labs    Common Labs 12/23/22 12/23/22 12/27/22 12/27/22 1/3/23 1/3/23    0825 0825 1357 1357 1030 1030   Glucose  196 (A)  437 (A)  370 (A)   BUN  25 (A)  30 (A)  31 (A)   Creatinine  0.78  0.65  0.82   Sodium  129 (A)  133 (A)  133 (A)   Potassium  4.7  4.6  4.4   Chloride   93 (A)  96 (A)  96 (A)   Calcium  9.2  9.4  8.9   Total Protein      5.6 (A)   Albumin  3.80  3.9  4.1   Total Bilirubin  0.6  0.3  0.3   Alkaline Phosphatase  157 (A)  164 (A)  145 (A)   AST (SGOT)  29  38 (A)  27   ALT (SGPT)  189 (A)  254 (A)  243 (A)   WBC   11.55 (A)  10.80    Hemoglobin   11.8 (A)  12.2    Hematocrit   37.0  37.4    Platelets   218  212    Uric Acid 5.1        (A) Abnormal value            Data reviewed: Recent hospitalization notes [facility]          Assessment and Plan   Diagnoses and all orders for this visit:    1. Essential hypertension (Primary)  Comments:  check labs today, has been ok in hospital.   Orders:  -     CBC & Differential  -     Comprehensive Metabolic Panel    2. High grade glioma   Comments:  reviewed notes- family asked about other opinion for cure- explained prognosis, disease.  They will further d/w Dr. Collins.  Would not rec change in therapy now.    3. Type 2 diabetes mellitus with hyperglycemia, without long-term current use of insulin (HCC)  Comments:  will try to gte Free Style skinny- sampled today    4. Major depressive disorder, recurrent episode, moderate (HCC)  Comments:  she is appropriately reacting to the current diagnosis- excellent family support- alter meds as above- readdress as indicated.            I spent 42 minutes caring for Christie on this date of service. This time includes time spent by me in the following activities:preparing for the visit, reviewing tests, performing a medically appropriate examination and/or evaluation  and counseling and educating the patient/family/caregiver  Follow Up   Return for Lab Today.  Patient was given instructions and counseling regarding her condition or for health maintenance advice. Please see specific information pulled into the AVS if appropriate.

## 2023-01-04 ENCOUNTER — OFFICE VISIT (OUTPATIENT)
Dept: ONCOLOGY | Facility: CLINIC | Age: 58
End: 2023-01-04
Payer: MEDICARE

## 2023-01-04 ENCOUNTER — TREATMENT (OUTPATIENT)
Dept: RADIATION ONCOLOGY | Facility: HOSPITAL | Age: 58
End: 2023-01-04
Payer: COMMERCIAL

## 2023-01-04 ENCOUNTER — APPOINTMENT (OUTPATIENT)
Dept: LAB | Facility: HOSPITAL | Age: 58
End: 2023-01-04
Payer: COMMERCIAL

## 2023-01-04 ENCOUNTER — SPECIALTY PHARMACY (OUTPATIENT)
Dept: ONCOLOGY | Facility: HOSPITAL | Age: 58
End: 2023-01-04
Payer: COMMERCIAL

## 2023-01-04 ENCOUNTER — APPOINTMENT (OUTPATIENT)
Dept: ONCOLOGY | Facility: HOSPITAL | Age: 58
End: 2023-01-04
Payer: COMMERCIAL

## 2023-01-04 ENCOUNTER — READMISSION MANAGEMENT (OUTPATIENT)
Dept: CALL CENTER | Facility: HOSPITAL | Age: 58
End: 2023-01-04
Payer: COMMERCIAL

## 2023-01-04 VITALS
WEIGHT: 224.87 LBS | OXYGEN SATURATION: 95 % | TEMPERATURE: 97.1 F | RESPIRATION RATE: 16 BRPM | SYSTOLIC BLOOD PRESSURE: 131 MMHG | DIASTOLIC BLOOD PRESSURE: 81 MMHG | HEIGHT: 65 IN | HEART RATE: 82 BPM | BODY MASS INDEX: 37.47 KG/M2

## 2023-01-04 VITALS
HEIGHT: 65 IN | DIASTOLIC BLOOD PRESSURE: 81 MMHG | BODY MASS INDEX: 37.62 KG/M2 | SYSTOLIC BLOOD PRESSURE: 131 MMHG | TEMPERATURE: 97.1 F | HEART RATE: 82 BPM | OXYGEN SATURATION: 95 % | WEIGHT: 225.8 LBS | RESPIRATION RATE: 16 BRPM

## 2023-01-04 DIAGNOSIS — E11.65 TYPE 2 DIABETES MELLITUS WITH HYPERGLYCEMIA, WITHOUT LONG-TERM CURRENT USE OF INSULIN: Primary | ICD-10-CM

## 2023-01-04 DIAGNOSIS — C71.9 GLIOBLASTOMA MULTIFORME: Primary | ICD-10-CM

## 2023-01-04 PROBLEM — C34.91 NON-SMALL CELL LUNG CANCER, RIGHT: Status: RESOLVED | Noted: 2020-08-04 | Resolved: 2023-01-04

## 2023-01-04 LAB
RAD ONC ARIA COURSE ID: NORMAL
RAD ONC ARIA COURSE INTENT: NORMAL
RAD ONC ARIA COURSE LAST TREATMENT DATE: NORMAL
RAD ONC ARIA COURSE START DATE: NORMAL
RAD ONC ARIA COURSE TREATMENT ELAPSED DAYS: 23
RAD ONC ARIA FIRST TREATMENT DATE: NORMAL
RAD ONC ARIA PLAN FRACTIONS TREATED TO DATE: 15
RAD ONC ARIA PLAN ID: NORMAL
RAD ONC ARIA PLAN PRESCRIBED DOSE PER FRACTION: 2 GY
RAD ONC ARIA PLAN PRIMARY REFERENCE POINT: NORMAL
RAD ONC ARIA PLAN TOTAL FRACTIONS PRESCRIBED: 23
RAD ONC ARIA PLAN TOTAL PRESCRIBED DOSE: 4600 CGY
RAD ONC ARIA REFERENCE POINT DOSAGE GIVEN TO DATE: 30 GY
RAD ONC ARIA REFERENCE POINT DOSAGE GIVEN TO DATE: 30.36 GY
RAD ONC ARIA REFERENCE POINT ID: NORMAL
RAD ONC ARIA REFERENCE POINT ID: NORMAL
RAD ONC ARIA REFERENCE POINT SESSION DOSAGE GIVEN: 2 GY
RAD ONC ARIA REFERENCE POINT SESSION DOSAGE GIVEN: 2.02 GY

## 2023-01-04 PROCEDURE — 77386: CPT | Performed by: STUDENT IN AN ORGANIZED HEALTH CARE EDUCATION/TRAINING PROGRAM

## 2023-01-04 PROCEDURE — 99215 OFFICE O/P EST HI 40 MIN: CPT | Performed by: INTERNAL MEDICINE

## 2023-01-04 PROCEDURE — 77014 CHG CT GUIDANCE RADIATION THERAPY FLDS PLACEMENT: CPT | Performed by: STUDENT IN AN ORGANIZED HEALTH CARE EDUCATION/TRAINING PROGRAM

## 2023-01-04 PROCEDURE — G0463 HOSPITAL OUTPT CLINIC VISIT: HCPCS

## 2023-01-04 RX ORDER — FLASH GLUCOSE SENSOR
KIT MISCELLANEOUS
Qty: 3 EACH | Refills: 5 | Status: SHIPPED | OUTPATIENT
Start: 2023-01-04

## 2023-01-04 RX ORDER — DAPSONE 100 MG/1
100 TABLET ORAL DAILY
Qty: 30 TABLET | Refills: 1 | Status: SHIPPED | OUTPATIENT
Start: 2023-01-04 | End: 2023-01-12 | Stop reason: SDUPTHER

## 2023-01-04 NOTE — PROGRESS NOTES
[facility] Pharmacy Encounter - Temodar    Met with patient today while he was in the office, face-to-face. Reinforced education that was previously provided. Gave patient a chance to ask questions. Chemo consents and collaborative care aggreement were signed at today's visit.  Patient expressed understanding and had no additional questions at this time.      Kamala Bush RPH  1/4/2023  11:13 EST

## 2023-01-04 NOTE — OUTREACH NOTE
Medical Week 2 Survey    Flowsheet Row Responses   [facility] patient discharged from? [facility]   Does the patient have one of the following disease processes/diagnoses(primary or secondary)? Other   Week 2 attempt successful? No   Unsuccessful attempts Attempt 1  [presently at [facility]]          KANDACE BALLESTEROS - Registered Nurse

## 2023-01-05 ENCOUNTER — TREATMENT (OUTPATIENT)
Dept: RADIATION ONCOLOGY | Facility: HOSPITAL | Age: 58
End: 2023-01-05
Payer: COMMERCIAL

## 2023-01-05 ENCOUNTER — HOME CARE VISIT (OUTPATIENT)
Dept: HOME HEALTH SERVICES | Facility: HOME HEALTHCARE | Age: 58
End: 2023-01-05
Payer: COMMERCIAL

## 2023-01-05 ENCOUNTER — RADIATION ONCOLOGY WEEKLY ASSESSMENT (OUTPATIENT)
Dept: RADIATION ONCOLOGY | Facility: HOSPITAL | Age: 58
End: 2023-01-05
Payer: MEDICARE

## 2023-01-05 VITALS
OXYGEN SATURATION: 96 % | BODY MASS INDEX: 37.31 KG/M2 | HEART RATE: 75 BPM | SYSTOLIC BLOOD PRESSURE: 134 MMHG | DIASTOLIC BLOOD PRESSURE: 82 MMHG | WEIGHT: 224.2 LBS

## 2023-01-05 DIAGNOSIS — C71.9 GLIOBLASTOMA MULTIFORME: Primary | ICD-10-CM

## 2023-01-05 LAB
RAD ONC ARIA COURSE ID: NORMAL
RAD ONC ARIA COURSE INTENT: NORMAL
RAD ONC ARIA COURSE LAST TREATMENT DATE: NORMAL
RAD ONC ARIA COURSE START DATE: NORMAL
RAD ONC ARIA COURSE TREATMENT ELAPSED DAYS: 24
RAD ONC ARIA FIRST TREATMENT DATE: NORMAL
RAD ONC ARIA PLAN FRACTIONS TREATED TO DATE: 16
RAD ONC ARIA PLAN ID: NORMAL
RAD ONC ARIA PLAN PRESCRIBED DOSE PER FRACTION: 2 GY
RAD ONC ARIA PLAN PRIMARY REFERENCE POINT: NORMAL
RAD ONC ARIA PLAN TOTAL FRACTIONS PRESCRIBED: 23
RAD ONC ARIA PLAN TOTAL PRESCRIBED DOSE: 4600 CGY
RAD ONC ARIA REFERENCE POINT DOSAGE GIVEN TO DATE: 32 GY
RAD ONC ARIA REFERENCE POINT DOSAGE GIVEN TO DATE: 32.39 GY
RAD ONC ARIA REFERENCE POINT ID: NORMAL
RAD ONC ARIA REFERENCE POINT ID: NORMAL
RAD ONC ARIA REFERENCE POINT SESSION DOSAGE GIVEN: 2 GY
RAD ONC ARIA REFERENCE POINT SESSION DOSAGE GIVEN: 2.02 GY

## 2023-01-05 PROCEDURE — 77386: CPT | Performed by: STUDENT IN AN ORGANIZED HEALTH CARE EDUCATION/TRAINING PROGRAM

## 2023-01-05 PROCEDURE — 77386 CHG INTENSITY MODULATED RADIATION TX DLVR COMPLEX: CPT | Performed by: STUDENT IN AN ORGANIZED HEALTH CARE EDUCATION/TRAINING PROGRAM

## 2023-01-05 NOTE — PROGRESS NOTES
Radiation Oncology  On-Treatment Note      Patient: Christie Avendaño    MRN: [id number]    Attending Physician: No care team member to display     Diagnosis:     ICD-10-CM ICD-9-CM   1. Glioblastoma multiforme (HCC)  C71.9 191.9       Radiation Therapy Visit:  Continue radiation therapy, Dosimetry plan remains acceptable, Films reviewed and remains acceptable, Pain assessed, Pain management planned, Radiation dose schedule reviewed and remains acceptable, Radiation technique remains acceptable and Symptoms within expected range    Radiation Treatments     Active   Plans   [id number]   Most recent treatment: Dose planned: 200 cGy (fraction 16 on 1/5/2023)   Total: Dose planned: 4,600 cGy (23 fractions)   Elapsed Days: 24      Reference Points   GBM Initial   Most recent treatment: Dose given: 200 cGy (on 1/5/2023)   Total: Dose given: 3,200 cGy   Elapsed Days: 24      calc Initial   Most recent treatment: Dose given: 202 cGy (on 1/5/2023)   Total: Dose given: 3,239 cGy   Elapsed Days: 24                    Physical Examination:  Vitals: Blood pressure 134/82, pulse 75, weight 102 kg (224 lb 3.2 oz), SpO2 96 %, not currently breastfeeding.  Vitals:    01/05/23 1050   BP: 134/82   Pulse: 75   SpO2: 96%   Weight: 102 kg (224 lb 3.2 oz)     Pain Score    01/05/23 1050   PainSc: 0-No pain       Capable of only limited selfcare; confined to bed or chair more than 50% of waking hours = 3    We examined the relevant areas: yes  Findings are within the expected range for this stage of treatment: yes  -------------------------------------------------------------------------------------------------------------------    ACTION ITEMS:  Patient tolerating treatment well and as expected for this stage in their treatment and Continue radiation therapy as planned    Estimated Completion Date: 1/16/2023      Juan Marr MD  Radiation Oncology

## 2023-01-05 NOTE — PROGRESS NOTES
Patient Name: Christie Avendaño Date: 2022       : 1965        MRN #: [id number] Diagnosis: GBM                  RADIATION ON TREATMENT VISIT NOTE - BRAIN    Treatment Summary    [x] Concurrent Chemo   Regimen: Temodar      Treatment Site Ref. ID Energy Dose/Fx (cGy) #Fx Dose Correction (cGy) Total Dose (cGy) Start Date End Date Elapsed Days   [id number] GBM Initial 6X 200  0 2,400 2022         General:           Review of Systems      [x] No new complaints [] Headache   []AM [] PM [] Seizure activity  [] Memory loss [] Gait disturbance [] Nausea  [] Vomiting [] Speech changes [] Visual changes  [] Weakness [] Skin itching [] Hair loss  [] Skin soreness with pain  [x] Fatigue,  severity: 1 Relief w/ Rest  [x] Pain,  severity: 0, location:   Steroid regimen: discussed taper  Anti-seizure regimen:   Pain regimen:    Skin regimen: Aquaphor     Comments/Notes:   No new headaches or symptoms.  No seizures    KPS: 80%       Vital Signs: /91   Pulse 71   Wt 100 kg (221 lb)   LMP  (LMP Unknown)   SpO2 94%   BMI 36.78 kg/m²     Weight:   Wt Readings from Last 3 Encounters:   23 102 kg (224 lb 3.2 oz)   23 102 kg (224 lb 13.9 oz)   23 102 kg (225 lb 12.8 oz)       Medication:   Current Outpatient Medications:   •  bimatoprost (LUMIGAN) 0.01 % ophthalmic drops, Administer 1 drop to both eyes Every Night., Disp: , Rfl:   •  Blood Glucose Monitoring Suppl (Accu-Chek Guide Me) w/Device kit, 1 Device Daily., Disp: 1 kit, Rfl: 0  •  Calcium Carb-Cholecalciferol (Oyster Shell Calcium/D3) 500-10 MG-MCG tablet, Take 1 tablet by mouth 2 (Two) Times a Day., Disp: 180 tablet, Rfl: 0  •  Continuous Blood Gluc Sensor (FreeStyle Lynn Sensor System), Every 10 (Ten) Days., Disp: 3 each, Rfl: 5  •  dapsone 100 MG tablet, Take 1 tablet by mouth Daily., Disp: 30 tablet, Rfl: 1  •  dexamethasone (DECADRON) 4 MG tablet, Take 1 tablet by mouth Every 8 (Eight) Hours., Disp: 90 tablet, Rfl: 0  •   escitalopram (LEXAPRO) 5 MG tablet, Take 1 tablet by mouth Daily for 2 doses., Disp: 2 tablet, Rfl: 0  •  gabapentin (NEURONTIN) 100 MG capsule, Take 1 capsule by mouth 3 (Three) Times a Day., Disp: 90 capsule, Rfl: 1  •  glucose blood (Accu-Chek Guide) test strip, Use 1-2 daily to check blood sugars Dx diabetes E11.9, Disp: 200 each, Rfl: 12  •  Insulin Glargine (Lantus SoloStar) 100 UNIT/ML injection pen, Inject 40 Units under the skin into the appropriate area as directed Every Morning., Disp: 15 mL, Rfl: 1  •  Insulin Lispro, 1 Unit Dial, (HumaLOG KwikPen) 100 UNIT/ML solution pen-injector, Inject 9 Units under the skin into the appropriate area as directed 3 (Three) Times a Day With Meals., Disp: 15 mL, Rfl: 1  •  Insulin Pen Needle 32G X 4 MM misc, inject insulin subcutaneous via pens up to 4 times daily., Disp: 100 each, Rfl: 0  •  levETIRAcetam (KEPPRA) 500 MG tablet, Take 1 tablet by mouth 2 (Two) Times a Day., Disp: 180 tablet, Rfl: 0  •  levothyroxine (SYNTHROID, LEVOTHROID) 150 MCG tablet, Take 150 mcg by mouth Daily., Disp: , Rfl:   •  magnesium oxide (MAG-OX) 400 tablet tablet, Take 1 tablet by mouth Daily., Disp: 90 tablet, Rfl: 0  •  ondansetron (ZOFRAN) 8 MG tablet, Take 1 tablet by mouth Daily 2 hours before radiation. Take Zofran 1 hour before Temodar. Take Temodar 1 hour before radiation therapy., Disp: 30 tablet, Rfl: 1  •  pantoprazole (PROTONIX) 40 MG EC tablet, Take 1 tablet by mouth Every Morning., Disp: 90 tablet, Rfl: 0  •  polyethylene glycol (MIRALAX) 17 GM/SCOOP powder, Take 17 g by mouth Daily., Disp: 238 g, Rfl: 0  •  propranolol (INDERAL) 20 MG tablet, Take 1 tablet by mouth Every 8 (Eight) Hours. Hold if Heart Rate <60 or SBP <110, Disp: 90 tablet, Rfl: 1  •  Sodium Fluoride 5000 Sensitive 1.1-5 % gel, , Disp: , Rfl:   •  temozolomide (TEMODAR) 140 MG chemo capsule, Take 1 capsule by mouth with 2 other temozolomide prescriptions for 165 mg total Daily. Take 3 tablets total on Days  D1-42, Disp: 12 capsule, Rfl: 0  •  temozolomide (TEMODAR) 20 MG chemo capsule, Take 1 capsule by mouth with 2 other temozolomide prescriptions for 165 mg total Daily. Take 3 tablets total on Days D1-42, Disp: 12 capsule, Rfl: 0  •  temozolomide (TEMODAR) 5 MG chemo capsule, Take 1 capsule by mouth with 2 other temozolomide prescriptions for 165 mg total Daily. Take 3 tablets total on Days D1-42, Disp: 12 capsule, Rfl: 0  •  valACYclovir (VALTREX) 500 MG tablet, Take 1 tablet by mouth Daily for 30 doses. Duration and refills as directed by Primary Care  Indications: PROPHYLAXIS, Disp: 30 tablet, Rfl: 0       LABS (Reviewed):  Hematology WBC   Date Value Ref Range Status   01/03/2023 10.80 3.40 - 10.80 10*3/mm3 Final   11/01/2018 3.17 (L) 4.5 - 11.0 10*3/uL Final     RBC   Date Value Ref Range Status   01/03/2023 4.39 3.77 - 5.28 10*6/mm3 Final   11/01/2018 3.99 (L) 4.0 - 5.2 10*6/uL Final     Hemoglobin   Date Value Ref Range Status   01/03/2023 12.2 12.0 - 15.9 g/dL Final   12/27/2022 11.8 (L) 12.0 - 15.9 g/dL Final   11/01/2018 10.0 (L) 12.0 - 16.0 g/dL Final     Hematocrit   Date Value Ref Range Status   01/03/2023 37.4 34.0 - 46.6 % Final   12/27/2022 37.0 34.0 - 46.6 % Final   11/01/2018 30.9 (L) 36.0 - 46.0 % Final     Platelets   Date Value Ref Range Status   01/03/2023 212 140 - 450 10*3/mm3 Final   12/27/2022 218 140 - 450 10*3/mm3 Final   11/01/2018 98 (L) 140 - 440 10*3/uL Final      Chemistry   Lab Results   Component Value Date    GLUCOSE 370 (H) 01/03/2023    BUN 31 (H) 01/03/2023    CREATININE 0.82 01/03/2023    EGFRIFNONA 92 12/01/2021    EGFRIFAFRI 106 12/01/2021    BCR 37.8 (H) 01/03/2023    K 4.4 01/03/2023    CO2 21.4 (L) 01/03/2023    CALCIUM 8.9 01/03/2023    PROTENTOTREF 5.6 (L) 01/03/2023    ALBUMIN 4.1 01/03/2023    LABIL2 2.7 01/03/2023    AST 27 01/03/2023     (H) 01/03/2023         Physical Exam:         Neurology: [x] CNII-XII grossly intact    [x] Strength: stable exam    []  Reflexes:     [] Encephalopathy:     [] Memory impairment:     [] Neuropathy: motor/sensory:   Auditory/Ear: [] Otitis, external ear (non-infectious):   Ocular/Visual: [] PERRLA/EOMI:   Skin:  stGstrstastdstest:st st1st Comments/Notes: CTAB  RRR    [x] Review of labs, images, dosimetry, dose delivered, & treatment parameters.    Comments:     [x] Patient treatment setup reviewed.    Comments:     Recommendations: continue XRT as planned  Will be boosting the tumor; discussed high risk nearby areas of brainstem and optic chiasm.  Patient and family wanting to be aggressive.    [x] Continue RT  [] Change RT Plan [] Hold RT, length:        Approved by:     Mathew Sam MD

## 2023-01-06 ENCOUNTER — HOME CARE VISIT (OUTPATIENT)
Dept: HOME HEALTH SERVICES | Facility: HOME HEALTHCARE | Age: 58
End: 2023-01-06
Payer: COMMERCIAL

## 2023-01-06 ENCOUNTER — TREATMENT (OUTPATIENT)
Dept: RADIATION ONCOLOGY | Facility: HOSPITAL | Age: 58
End: 2023-01-06
Payer: COMMERCIAL

## 2023-01-06 LAB
RAD ONC ARIA COURSE ID: NORMAL
RAD ONC ARIA COURSE INTENT: NORMAL
RAD ONC ARIA COURSE LAST TREATMENT DATE: NORMAL
RAD ONC ARIA COURSE START DATE: NORMAL
RAD ONC ARIA COURSE TREATMENT ELAPSED DAYS: 25
RAD ONC ARIA FIRST TREATMENT DATE: NORMAL
RAD ONC ARIA PLAN FRACTIONS TREATED TO DATE: 17
RAD ONC ARIA PLAN ID: NORMAL
RAD ONC ARIA PLAN PRESCRIBED DOSE PER FRACTION: 2 GY
RAD ONC ARIA PLAN PRIMARY REFERENCE POINT: NORMAL
RAD ONC ARIA PLAN TOTAL FRACTIONS PRESCRIBED: 23
RAD ONC ARIA PLAN TOTAL PRESCRIBED DOSE: 4600 CGY
RAD ONC ARIA REFERENCE POINT DOSAGE GIVEN TO DATE: 34 GY
RAD ONC ARIA REFERENCE POINT DOSAGE GIVEN TO DATE: 34.41 GY
RAD ONC ARIA REFERENCE POINT ID: NORMAL
RAD ONC ARIA REFERENCE POINT ID: NORMAL
RAD ONC ARIA REFERENCE POINT SESSION DOSAGE GIVEN: 2 GY
RAD ONC ARIA REFERENCE POINT SESSION DOSAGE GIVEN: 2.02 GY

## 2023-01-06 PROCEDURE — 77386: CPT | Performed by: RADIOLOGY

## 2023-01-06 PROCEDURE — 77386 CHG INTENSITY MODULATED RADIATION TX DLVR COMPLEX: CPT | Performed by: RADIOLOGY

## 2023-01-08 DIAGNOSIS — G93.89 BRAIN MASS: ICD-10-CM

## 2023-01-09 ENCOUNTER — OFFICE VISIT (OUTPATIENT)
Dept: ONCOLOGY | Facility: CLINIC | Age: 58
End: 2023-01-09
Payer: MEDICARE

## 2023-01-09 ENCOUNTER — TREATMENT (OUTPATIENT)
Dept: RADIATION ONCOLOGY | Facility: HOSPITAL | Age: 58
End: 2023-01-09
Payer: COMMERCIAL

## 2023-01-09 ENCOUNTER — LAB (OUTPATIENT)
Dept: LAB | Facility: HOSPITAL | Age: 58
End: 2023-01-09
Payer: COMMERCIAL

## 2023-01-09 ENCOUNTER — OFFICE VISIT (OUTPATIENT)
Dept: PSYCHIATRY | Facility: HOSPITAL | Age: 58
End: 2023-01-09
Payer: MEDICARE

## 2023-01-09 VITALS
RESPIRATION RATE: 16 BRPM | HEIGHT: 65 IN | WEIGHT: 226.3 LBS | HEART RATE: 80 BPM | OXYGEN SATURATION: 94 % | BODY MASS INDEX: 37.7 KG/M2 | DIASTOLIC BLOOD PRESSURE: 74 MMHG | TEMPERATURE: 97.5 F | SYSTOLIC BLOOD PRESSURE: 108 MMHG

## 2023-01-09 DIAGNOSIS — C71.9 GLIOBLASTOMA MULTIFORME: ICD-10-CM

## 2023-01-09 DIAGNOSIS — C73 PAPILLARY THYROID CARCINOMA: ICD-10-CM

## 2023-01-09 DIAGNOSIS — Z79.899 HIGH RISK MEDICATION USE: ICD-10-CM

## 2023-01-09 DIAGNOSIS — F33.1 MAJOR DEPRESSIVE DISORDER, RECURRENT EPISODE, MODERATE: ICD-10-CM

## 2023-01-09 DIAGNOSIS — Z15.01 BRCA2 GENE MUTATION POSITIVE IN FEMALE: ICD-10-CM

## 2023-01-09 DIAGNOSIS — C71.9 HIGH GRADE GLIOMA NOT CLASSIFIABLE BY WHO CRITERIA: ICD-10-CM

## 2023-01-09 DIAGNOSIS — Z15.09 BRCA2 GENE MUTATION POSITIVE IN FEMALE: ICD-10-CM

## 2023-01-09 DIAGNOSIS — C71.9 GLIOBLASTOMA MULTIFORME: Primary | ICD-10-CM

## 2023-01-09 DIAGNOSIS — C64.2 RENAL CELL CARCINOMA OF LEFT KIDNEY: ICD-10-CM

## 2023-01-09 DIAGNOSIS — D64.9 NORMOCYTIC ANEMIA: ICD-10-CM

## 2023-01-09 DIAGNOSIS — Z15.02 BRCA2 GENE MUTATION POSITIVE IN FEMALE: ICD-10-CM

## 2023-01-09 DIAGNOSIS — C34.91 NON-SMALL CELL LUNG CANCER, RIGHT: ICD-10-CM

## 2023-01-09 DIAGNOSIS — F43.21 GRIEF: Primary | ICD-10-CM

## 2023-01-09 LAB
ALBUMIN SERPL-MCNC: 3.8 G/DL (ref 3.5–5.2)
ALBUMIN/GLOB SERPL: 1.9 G/DL (ref 1.1–2.4)
ALP SERPL-CCNC: 110 U/L (ref 38–116)
ALT SERPL W P-5'-P-CCNC: 99 U/L (ref 0–33)
ANION GAP SERPL CALCULATED.3IONS-SCNC: 13.2 MMOL/L (ref 5–15)
AST SERPL-CCNC: 24 U/L (ref 0–32)
BASOPHILS # BLD AUTO: 0.01 10*3/MM3 (ref 0–0.2)
BASOPHILS NFR BLD AUTO: 0.1 % (ref 0–1.5)
BILIRUB SERPL-MCNC: 0.7 MG/DL (ref 0.2–1.2)
BUN SERPL-MCNC: 30 MG/DL (ref 6–20)
BUN/CREAT SERPL: 48.4 (ref 7.3–30)
CALCIUM SPEC-SCNC: 8.9 MG/DL (ref 8.5–10.2)
CHLORIDE SERPL-SCNC: 98 MMOL/L (ref 98–107)
CO2 SERPL-SCNC: 23.8 MMOL/L (ref 22–29)
CREAT SERPL-MCNC: 0.62 MG/DL (ref 0.6–1.1)
DEPRECATED RDW RBC AUTO: 59.5 FL (ref 37–54)
EGFRCR SERPLBLD CKD-EPI 2021: 103.4 ML/MIN/1.73
EOSINOPHIL # BLD AUTO: 0.02 10*3/MM3 (ref 0–0.4)
EOSINOPHIL NFR BLD AUTO: 0.2 % (ref 0.3–6.2)
ERYTHROCYTE [DISTWIDTH] IN BLOOD BY AUTOMATED COUNT: 19 % (ref 12.3–15.4)
FERRITIN SERPL-MCNC: 438.7 NG/ML (ref 11–207)
GLOBULIN UR ELPH-MCNC: 2 GM/DL (ref 1.8–3.5)
GLUCOSE SERPL-MCNC: 372 MG/DL (ref 74–124)
HCT VFR BLD AUTO: 38.7 % (ref 34–46.6)
HGB BLD-MCNC: 12.4 G/DL (ref 12–15.9)
IMM GRANULOCYTES # BLD AUTO: 0.18 10*3/MM3 (ref 0–0.05)
IMM GRANULOCYTES NFR BLD AUTO: 1.8 % (ref 0–0.5)
IRON 24H UR-MRATE: 109 MCG/DL (ref 37–145)
IRON SATN MFR SERPL: 33 % (ref 14–48)
LYMPHOCYTES # BLD AUTO: 0.77 10*3/MM3 (ref 0.7–3.1)
LYMPHOCYTES NFR BLD AUTO: 7.9 % (ref 19.6–45.3)
MCH RBC QN AUTO: 28.2 PG (ref 26.6–33)
MCHC RBC AUTO-ENTMCNC: 32 G/DL (ref 31.5–35.7)
MCV RBC AUTO: 88 FL (ref 79–97)
MONOCYTES # BLD AUTO: 0.89 10*3/MM3 (ref 0.1–0.9)
MONOCYTES NFR BLD AUTO: 9.1 % (ref 5–12)
NEUTROPHILS NFR BLD AUTO: 7.88 10*3/MM3 (ref 1.7–7)
NEUTROPHILS NFR BLD AUTO: 80.9 % (ref 42.7–76)
NRBC BLD AUTO-RTO: 0.3 /100 WBC (ref 0–0.2)
PLATELET # BLD AUTO: 146 10*3/MM3 (ref 140–450)
PMV BLD AUTO: 8.7 FL (ref 6–12)
POTASSIUM SERPL-SCNC: 3.9 MMOL/L (ref 3.5–4.7)
PROT SERPL-MCNC: 5.8 G/DL (ref 6.3–8)
RAD ONC ARIA COURSE ID: NORMAL
RAD ONC ARIA COURSE INTENT: NORMAL
RAD ONC ARIA COURSE LAST TREATMENT DATE: NORMAL
RAD ONC ARIA COURSE START DATE: NORMAL
RAD ONC ARIA COURSE TREATMENT ELAPSED DAYS: 28
RAD ONC ARIA FIRST TREATMENT DATE: NORMAL
RAD ONC ARIA PLAN FRACTIONS TREATED TO DATE: 18
RAD ONC ARIA PLAN ID: NORMAL
RAD ONC ARIA PLAN PRESCRIBED DOSE PER FRACTION: 2 GY
RAD ONC ARIA PLAN PRIMARY REFERENCE POINT: NORMAL
RAD ONC ARIA PLAN TOTAL FRACTIONS PRESCRIBED: 23
RAD ONC ARIA PLAN TOTAL PRESCRIBED DOSE: 4600 CGY
RAD ONC ARIA REFERENCE POINT DOSAGE GIVEN TO DATE: 36 GY
RAD ONC ARIA REFERENCE POINT DOSAGE GIVEN TO DATE: 36.44 GY
RAD ONC ARIA REFERENCE POINT ID: NORMAL
RAD ONC ARIA REFERENCE POINT ID: NORMAL
RAD ONC ARIA REFERENCE POINT SESSION DOSAGE GIVEN: 2 GY
RAD ONC ARIA REFERENCE POINT SESSION DOSAGE GIVEN: 2.02 GY
RBC # BLD AUTO: 4.4 10*6/MM3 (ref 3.77–5.28)
SODIUM SERPL-SCNC: 135 MMOL/L (ref 134–145)
TIBC SERPL-MCNC: 333 MCG/DL (ref 249–505)
TRANSFERRIN SERPL-MCNC: 238 MG/DL (ref 200–360)
WBC NRBC COR # BLD: 9.75 10*3/MM3 (ref 3.4–10.8)

## 2023-01-09 PROCEDURE — 80053 COMPREHEN METABOLIC PANEL: CPT

## 2023-01-09 PROCEDURE — 84466 ASSAY OF TRANSFERRIN: CPT

## 2023-01-09 PROCEDURE — 77386: CPT | Performed by: STUDENT IN AN ORGANIZED HEALTH CARE EDUCATION/TRAINING PROGRAM

## 2023-01-09 PROCEDURE — 83540 ASSAY OF IRON: CPT

## 2023-01-09 PROCEDURE — 77336 RADIATION PHYSICS CONSULT: CPT | Performed by: RADIOLOGY

## 2023-01-09 PROCEDURE — 99214 OFFICE O/P EST MOD 30 MIN: CPT | Performed by: NURSE PRACTITIONER

## 2023-01-09 PROCEDURE — 82728 ASSAY OF FERRITIN: CPT

## 2023-01-09 PROCEDURE — 77386 CHG INTENSITY MODULATED RADIATION TX DLVR COMPLEX: CPT | Performed by: STUDENT IN AN ORGANIZED HEALTH CARE EDUCATION/TRAINING PROGRAM

## 2023-01-09 PROCEDURE — 36415 COLL VENOUS BLD VENIPUNCTURE: CPT

## 2023-01-09 PROCEDURE — 85025 COMPLETE CBC W/AUTO DIFF WBC: CPT

## 2023-01-09 RX ORDER — TEMOZOLOMIDE 20 MG/1
CAPSULE ORAL
Qty: 12 CAPSULE | Refills: 0 | OUTPATIENT
Start: 2023-01-09

## 2023-01-09 RX ORDER — TEMOZOLOMIDE 140 MG/1
CAPSULE ORAL
Qty: 12 CAPSULE | Refills: 0 | OUTPATIENT
Start: 2023-01-09

## 2023-01-09 RX ORDER — TEMOZOLOMIDE 5 MG/1
CAPSULE ORAL
Qty: 12 CAPSULE | Refills: 0 | OUTPATIENT
Start: 2023-01-09

## 2023-01-09 NOTE — PROGRESS NOTES
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  Overall she is tolerating treatment fairly well.  She denies any issues with nausea.  She does have a persistent headache which has been an ongoing thing for her.  She does feel like it seems little bit worse after her radiation treatments.  She continues on dexamethasone 4 mg twice daily, and is tolerating this well.  She states her blood sugars have been in the 200 range.  She continues to ambulate around the house with the use of a Rollator.    Objective   Vital Signs:   /74   Pulse 80   Temp 97.5 °F (36.4 °C) (Temporal)   Resp 16   Ht 165 cm (64.96\")   Wt 103 kg (226 lb 4.8 oz)   SpO2 94%   BMI 37.70 kg/m²     Physical Exam  Vitals reviewed.   Constitutional:       General: She is not in acute distress.     Appearance: Normal appearance. She is well-developed.      Comments: Seated in wheelchair   HENT:      Head: Normocephalic and atraumatic.      Mouth/Throat:      Pharynx: No oropharyngeal exudate.   Eyes:      Pupils: Pupils are equal, round, and reactive to light.   Cardiovascular:      Rate and Rhythm: Normal rate and regular rhythm.      Heart sounds: Normal heart sounds. No murmur heard.  Pulmonary:      Effort: Pulmonary effort is normal. No respiratory distress.      Breath sounds: Normal breath  sounds. No wheezing, rhonchi or rales.   Abdominal:      General: Bowel sounds are normal. There is no distension.      Palpations: Abdomen is soft.   Musculoskeletal:         General: Normal range of motion.      Cervical back: Normal range of motion.   Skin:     General: Skin is warm and dry.      Findings: No rash.   Neurological:      Mental Status: She is alert and oriented to person, place, and time.      Motor: Weakness (left upper and lower extremity) present.            Result Review :   CBC & Differential (01/09/2023 10:18)  Comprehensive Metabolic Panel (01/09/2023 10:18)  Ferritin (01/09/2023 10:18)  Iron Profile (01/09/2023 10:18)     Assessment and Plan  1. Glioblastoma multiforme  · Patient presented with falls and confusion, left upper and lower extremity weakness  · MRI brain 11/22/2022 with 3.2 cm rim-enhancing right supratentorial mass in the right thalamus and internal capsule with mass-effect and leftward midline shift  · Initiated dexamethasone 4 mg p.o. every 8 hours seizure prophylaxis with Keppra 500 mg twice daily  · Patient was seen by neurosurgery and was not a candidate for resection due to location.  · CT chest abdomen pelvis 11/24/2022 showed no evidence of source for potential metastatic disease  · Stereotactic brain biopsy 11/28/2022 with glioblastoma, IDH1 R132H negative, grade 4, MGM T promoter unmethylated  · On 12/12/2022, patient initiated concurrent chemoradiation with low-dose Temodar 75 mg/m² (165 mg) daily  · Patient is continuing on concurrent chemoradiation for glioblastoma, tolerating treatment relatively well.  She has some ongoing difficulty with fatigue.  Overall, the patient has improved in terms of her left-sided weakness, has been able to ambulate at home with the use of a Rollator although does continue with some significant limitations due to her left-sided weakness and some cognitive effects.  I did discuss with the patient and her father today plans to continue  concurrent chemoradiation which will be completed on 1/16/2022.  2 weeks after we will perform MRI brain to assess status of disease and I will see her shortly thereafter.  We will discuss potential initiation of maintenance Temodar pending the MRI result.  · Seen 1/9/2023 continuing on Temodar 165 mg daily, tolerating well.  2. BRCA2 mutation (c.5073dupA) and GEORGES mutation (c.5073dup):  · Strong family history of malignancy with a mother who had thyroid cancer and also had breast cancer at age 73 with subsequent liver metastases.  She was found to be BRCA2 positive and prompted the patient's own testing.  The patient's maternal grandmother had ovarian cancer in her mid 80s, maternal grandfather and paternal grandfather both had leukemia of unknown type at an older age.  Her maternal aunt had colon cancer over the age of 50, maternal aunt had breast cancer in her 80s, and her father had cholangiocarcinoma.    · The patient underwent testing on 7/27/2016 showing positive BRCA2 mutation (c.5073dupA)   · The patient did undergo KAVYA/BSO in 1992 secondary to hemorrhage.  · The patient did undergo bilateral mastectomies 1/26/2017 with findings of bilateral DCIS (discussed below).  · Patient has undergone previous colonoscopy on 11/28/2017.  · Given the unusual nature of the patient's malignancies, referral to genetics clinic for panel testing performed 11/10/2020 with re-identification of BRCA2 mutation (c.5073dupA) and new identification of GEORGES mutation (c.5073dup).  · Previous plan to continue surveillance with repeat colonoscopy at a 5-year interval that would have been due late 2022.  Plans subsequently deferred due to subsequent diagnosis of glioblastoma multiforme.  2. Stage I (pO2sFlCy) papillary thyroid cancer:  · Status post total thyroidectomy with Dr. Maher in [place] on 10/15/2010.  Pathology showed papillary thyroid cancer involving the left thyroid and isthmus, multifocal with 2 areas measuring 0.9 and  1.2 cm.  No evidence of capsular invasion, no extrathyroidal extension, no lymphovascular invasion, negative margins.  · Postsurgical scan showed uptake in the thyroid bed and the patient underwent treatment with I-131.    · She has had no evidence of recurrent disease.    · Her subsequent hypothyroidism has been managed by endocrinology (Dr. Subhash Michael) in [place], currently receiving levothyroxine 150 mcg daily.  · The patient has continued routine follow-up with endocrinology, was seen last on 8/8/2022  3. Bilateral DCIS  · As above, patient has underlying BRCA2 mutation  · 8/5/2016 was found to have a right breast intraductal papilloma with fibrocystic change and microcalcifications with usual ductal hyperplasia and columnar cell change in 3 separate locations on biopsy.  · On 8/24/2016 she underwent excisional biopsy of an intraductal papilloma from the right breast.    · She subsequently underwent on 1/26/2017 bilateral mastectomy.  On the right there was a large intraductal papilloma with usual ductal hyperplasia, atypical hyperplasia, DCIS measuring 3 mm which was low-grade, ER positive (98%), SC positive (85%).  On the left there were multiple intraductal papillomas with atypical ductal hyperplasia.  There was DCIS measuring 6 mm, low-grade, ER positive (99%), SC positive (98%).  4. Stage I (sG9bOhM1) clear-cell renal cell carcinoma:  · Incidental finding on CT scan 1/15/2020 of enhancing left renal lesions x2, largest 2.4 cm  · Resection with Dr. Pool ([facility]) on 5/15/2020 with left partial nephrectomy.  Pathology showed clear cell renal cell carcinoma, Jeanne grade 2, 2 foci measuring 1.5 and 2.1 cm.  The renal parenchymal margin was focally positive.  · Patient reports that Dr. Pool felt that he required additional margin tissue in the area of focal positivity and did not recommend re-resection.  · CT 8/17/2022 showed no evidence of recurrent disease  · CT chest abdomen  pelvis 11/25/2022 with stable chronic findings.  · Patient had been undergoing every 6-month monitoring of CT scans for surveillance.  We will not plan further routine CT monitoring in light of the diagnosis of glioblastoma.  5. 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    · CT-guided biopsy on 1/9/2018 revealed a left lower lobe carcinoid with spindle cell features, no necrosis, no mitoses.  · Notation of normal chromogranin A 1/23/2018 of 35  · Follow-up CT on 1/24/2018 showed multiple bilateral pulmonary nodules including a 2.1 cm left lower lobe nodule (previously 2.6), 7 mm left lower lobe nodule, 1 cm right lower lobe nodule, right adrenal 1.5 cm nodule consistent with adenoma, and hepatic cysts.    · On 2/28/2018, the patient underwent a left lower lobectomy.  Pathology revealed a 2.3 cm left upper lobe (hilar) carcinoid, typical with spindle cell features, Ki-67 of 3.68%.  There were 2 of 8 peribronchial lymph nodes involved with carcinoid with lymphatic invasion, stage IIB ([id number]).  There was also evidence of adenocarcinoma (see below).  6. Stage IA1 (iV2tL1Cx)  left lower lobe non-small cell lung cancer (adenocarcinoma with lipidic features):  · The patient is a never smoker  · PET scan 12/13/2017 with hypermetabolic 2.5 cm left lower lobe nodule with SUV 5.7.    · CT-guided biopsy on 1/9/2018 revealed a left lower lobe carcinoid with spindle cell features, no necrosis, no mitoses.    · Follow-up CT on 1/24/2018 showed multiple bilateral pulmonary nodules including a 2.1 cm left lower lobe nodule (previously 2.6), 7 mm left lower lobe nodule, 1 cm right lower lobe nodule, right adrenal 1.5 cm nodule consistent with adenoma, and hepatic cysts.    · On 2/28/2018, the patient underwent a left lower lobectomy.  Pathology revealed a 2.3 cm left upper lobe (hilar) carcinoid, typical with spindle cell  features, Ki-67 of 3.68%.  There were 2 of 8 peribronchial lymph nodes involved with carcinoid with lymphatic invasion, stage IIB (sU6wiP8R0).  There was a 0.9 cm adenocarcinoma, well differentiated with lipidic features, no visceral pleural invasion, bronchoalveolar atypical adenomatous hyperplasia at the parenchymal margin and 1 microscopic focus (less than 1 mm).  0/8 lymph nodes involved with adenocarcinoma. PDL1 <1% TPS, positive EGF receptor mutation (exon 19 deletion, [id number]), negative ALK/ROS1 rearrangement, negative BRAF, ER negative (2%), OK 0, HER-2/laura 1+, Ki-67 3%. Stage IA1 (lV2onR7Q2).  · Patient was placed on Femara following surgery by Dr. Juana Pelayo.  Femara subsequently discontinued in early August 2020.  · Subsequent follow-up scans with stable findings until scan on 1/15/2020 noted 2 enhancing left renal lesions, largest 2.4 cm and there was also an enlarging right lower lobe nodule up to 1.0 x 1.2 cm.  In the interval, patient did undergo resection of renal cell carcinoma (discussed above).    · CT scan on 7/1/2020 showed slow increase in right lower lobe nodule up to 1.2 cm.  Felt to represent new primary lung cancer (see below).   7. Stage IA2 (zA8gvF1L1) right lower lobe non-small cell lung cancer (adenocarcinoma with lipidic growth pattern):   · CT scan on 7/1/2020 showed slow increase in right lower lobe nodule up to 1.2 cm.  · CT-guided biopsy of the right lower lobe nodule on 7/31/2020 with adenocarcinoma with bland lipidic growth pattern of pulmonary origin (TTF-1 and CK7 positive). PDL1 TPS <1%, EGFR mutated, exon 20 insertion (possibly indicating lack of response to TKI's). ALK/ROS1 rearrangement negative and BRAF V600 mutation negative.  · Staging MRI brain 8/12/2020 with no evidence of metastatic disease  · Staging PET scan 8/12/2020 with no significant activity in the biopsied lesion 1.2 cm right lower lobe (SUV 1.5), no evidence of hilar, mediastinal uptake nor distant  metastatic disease.  · Given the difference in EGFR mutation between the patient's 2 lung cancers, it is felt that she has 2 separate primary lesions.    · Right VATS with anterior basal segmentectomy on 10/9/2020 with pathology showing invasive well to moderately differentiated adenocarcinoma with acinar predominant growth measuring 1.4 cm, negative margins, no pleural or lymphovascular invasion, negative lymph nodes x5.  · Follow-up CT chest 3/8/2021 showed postoperative change, no evidence of disease recurrence.    · CT 8/17/2022 showed no evidence of recurrent disease.   · CT chest abdomen pelvis 11/25/2022 with stable chronic findings.  · Patient had been undergoing every 6-month monitoring of CT scans for surveillance.  We will not plan further routine CT monitoring in light of the diagnosis of glioblastoma.  8. Melanoma right heel stage IA (nM1wErE4)  · Patient underwent excision of right heel melanoma 6/4/2021, 0.6 mm superficial spreading melanoma with margin positive for melanoma in situ.  · Wide local excision 6/30/2021 with no residual melanoma identified  · Patient has had difficulty with healing of the wound and has required 3 separate skin grafts, the most recent performed 4 weeks ago with ongoing slow healing.  · The patient's wound in the right heel did finally heal in late 2021.    · Patient had been continuing follow-up with dermatology every 3 months however this is likely not necessary in the setting of recent diagnosis of glioblastoma.  9. Incidental CT findings  · Notation of a number of incidental CT findings on scan from 8/17/2022 including 1.6 cm right adrenal nodule, C7 focal osteolysis, both unchanged compared to 3/8/2021 and apparent benign findings.    · Notation on CT scan 8/17/2022 of short segment narrowing of the proximal left internal carotid artery resulting in moderate stenosis.  Findings were not well evaluated.    · Carotid ultrasound 9/1/2022 was normal  · CT chest abdomen  pelvis with stable findings on 11/25/2022.  No plans for any further routine CT monitoring of these issues.  10. Anemia:  · Patient has undergone previous colonoscopy on 11/28/2017.  · Hemoglobin in high 10 to low 11 range with low normal MCV  · Anemia evaluation 8/20/2020 with iron 44, ferritin 119.3, iron saturation 13%, TIBC 351, folate 12.2, B12 392.  · Unclear whether patient may have anemia secondary to chronic disease.  · With low iron saturation, initiated oral iron daily on 9/15/2020.  Patient however did not begin oral iron until 11/11/2020.  · Labs on 11/11/2020 with hemoglobin 11.4, iron studies showing iron 54, ferritin 112, iron saturation 14%, TIBC 395.  · Labs on 1/7/2021 with hemoglobin 11.9, iron studies with iron 50, ferritin 109.5, iron saturation 12%, TIBC 420.  Patient with significant GI side effects from oral iron with diarrhea, iron discontinued.  · Labs on 9/14/2021 showed hemoglobin that declined to 11.4 of unclear etiology.  Additional labs showed iron 44, ferritin 137.6, iron saturation 13 %, TIBC 346, folate 11.3, B12 394.   · Labs on 8/24/2022 showed hemoglobin normal at 12.0.  Labs show iron 46, ferritin 106, iron saturation 13%, TIBC 351.    · Hemoglobin currently stable 1/9/2023 at 12.4, repeat ferritin today for 38.7, iron saturation 33%, TIBC 333.  9. Depression  · The patient was experiencing significant exacerbation of depressive symptoms in fall 2021 as she was coping with her mother's illness with metastatic breast cancer.  Patient's mother was living with her on hospice care for a period of time and did pass away in late 2021.  · Patient was referred back to supportive oncology clinic which had been very helpful for her  · Patient had been receiving Lexapro, dose decreased from 10 down to 5 mg daily which was better tolerated  · Patient with recent diagnosis of glioblastoma, scheduled to be seen by supportive oncology on 1/11/2023.  At this time, patient appears to be coping  with the situation reasonably well.  10. PCP prophylaxis  · Patient continuing on Bactrim DS 1 p.o. Monday Wednesday Friday  · With persistent elevation in ALT at 243, concerned that this is related to Bactrim and we will discontinue Bactrim and transition to oral dapsone 100 mg daily.  11. GI prophylaxis  · Continue Protonix 40 mg daily  12. Seizure prophylaxis  · Continue Keppra 500 mg twice daily  13. Elevated LFTs  · LFTs prior to hospitalization in November 2022 were normal  · Significant increase in transaminases noted on 12/8/2022 with ALT 1049, , normal total bilirubin 0.6  · Viral hepatitis panel negative 12/8/2022, negative GEMMA and smooth muscle antibody 12/9/2022  · Gradual improvement in LFT's.  · Today, ALT remains elevated and stable at 243, AST normal at 27, normal bilirubin 0.3. Unclear if persistent elevation in LFT's may be related to bactrim, will discontinue bactrim and switch to dapsone as above. Continue to monitor LFT's.     Plan:  1. Continue low-dose Temodar 75 mg/m² (165 mg) daily.  2. Patient will complete treatment on 1/16/2022.  3. Continue dexamethasone 4 mg twice daily.  4. Continue dapsone 100 mg daily for PCP prophylaxis.  5. Continue Keppra 500 mg twice daily for seizure prophylaxis.  6. Continue Protonix 40 mg daily for GI prophylaxis.  7. Continue home physical, occupational, and speech therapy.  8. Continue to follow with supportive oncology clinic.  9. Return in 1 week on 1/16/2022 for follow-up with nurse practitioner with repeat labs reevaluation.  That will be the final day of her radiation treatment.  At that time we will discontinue her Temodar.  10. On 1/30/2022 MRI brain  11. MD visit later that week with CBC, CMP and we will review MRI brain results and discuss potential pursuit of subsequent maintenance Temodar.    Patient is on a high risk medication requiring close monitoring for toxicity.      Radha Ray, APRN  01/09/2023

## 2023-01-10 ENCOUNTER — TREATMENT (OUTPATIENT)
Dept: RADIATION ONCOLOGY | Facility: HOSPITAL | Age: 58
End: 2023-01-10
Payer: COMMERCIAL

## 2023-01-10 ENCOUNTER — HOME CARE VISIT (OUTPATIENT)
Dept: HOME HEALTH SERVICES | Facility: HOME HEALTHCARE | Age: 58
End: 2023-01-10
Payer: COMMERCIAL

## 2023-01-10 ENCOUNTER — READMISSION MANAGEMENT (OUTPATIENT)
Dept: CALL CENTER | Facility: HOSPITAL | Age: 58
End: 2023-01-10
Payer: COMMERCIAL

## 2023-01-10 LAB
RAD ONC ARIA COURSE ID: NORMAL
RAD ONC ARIA COURSE INTENT: NORMAL
RAD ONC ARIA COURSE LAST TREATMENT DATE: NORMAL
RAD ONC ARIA COURSE START DATE: NORMAL
RAD ONC ARIA COURSE TREATMENT ELAPSED DAYS: 29
RAD ONC ARIA FIRST TREATMENT DATE: NORMAL
RAD ONC ARIA PLAN FRACTIONS TREATED TO DATE: 19
RAD ONC ARIA PLAN ID: NORMAL
RAD ONC ARIA PLAN PRESCRIBED DOSE PER FRACTION: 2 GY
RAD ONC ARIA PLAN PRIMARY REFERENCE POINT: NORMAL
RAD ONC ARIA PLAN TOTAL FRACTIONS PRESCRIBED: 23
RAD ONC ARIA PLAN TOTAL PRESCRIBED DOSE: 4600 CGY
RAD ONC ARIA REFERENCE POINT DOSAGE GIVEN TO DATE: 38 GY
RAD ONC ARIA REFERENCE POINT DOSAGE GIVEN TO DATE: 38.46 GY
RAD ONC ARIA REFERENCE POINT ID: NORMAL
RAD ONC ARIA REFERENCE POINT ID: NORMAL
RAD ONC ARIA REFERENCE POINT SESSION DOSAGE GIVEN: 2 GY
RAD ONC ARIA REFERENCE POINT SESSION DOSAGE GIVEN: 2.02 GY

## 2023-01-10 PROCEDURE — 77427 RADIATION TX MANAGEMENT X5: CPT | Performed by: RADIOLOGY

## 2023-01-10 PROCEDURE — 77386: CPT | Performed by: RADIOLOGY

## 2023-01-10 PROCEDURE — 77386 CHG INTENSITY MODULATED RADIATION TX DLVR COMPLEX: CPT | Performed by: RADIOLOGY

## 2023-01-10 PROCEDURE — G0151 HHCP-SERV OF PT,EA 15 MIN: HCPCS

## 2023-01-10 NOTE — PROGRESS NOTES
Supportive Oncology Services  In Person Session    Subjective  Patient ID: Christie Avendaño is a 57 y.o. female is seen face to face in the [facility] ([facility]) Clinic.    CC: Grief surrounding yet another cancer diagnosis, GBM    HPI: Interval history reviewed; patient continues with relatively stable affective and anxiety disorders, despite significant change in medical history including diagnosis of GBM.  Patient is seen alongside father, with her permission.  Both reviewed diagnosis following periods of confusion, fatigue, negative instances while driving.  Patient is no longer driving and understands risks.  Currently undergoing radiation and Temodar.  Patient and father report coping as well as to be expected, taking diagnosis in stride.  Patient remains positive, acknowledging severity of diagnosis alongside repeat cancer experience with hope for response to current treatment.  Patient denies significant disruption in anxiety, sleep, mood, etc.  Denies perceptual disturbance.  Does confirm fatigue.  Continues to report enjoyment of brittany. Currently continues on Lexapro at reduced dose of 5 mg daily.  Gabapentin 100 mg 3 times daily per neurology.    Advanced directives reviewed with patient; she reports having discussed this to some degree with  and father, although acknowledges importance of finalizing.    Objective   Mental Status Exam  Appearance:  clean and casually dressed, appropriate  Attitude toward clinician:  cooperative and agreeable   Speech:    Rate:  regular rate and rhythm   Volume:  normal  Motor:  no abnormal movements present  Mood: Hopeful  Affect:  blunted and mood congruent  Thought Processes:  linear, logical, and goal directed  Thought Content:  normal  Suicidal Thoughts:  absent  Homicidal Thoughts:  absent  Perceptual Disturbance: no perceptual disturbance  Attention and Concentration:  good  Insight and Judgement:  fair  Memory:  memory appears to be  intact    Review of Systems   Constitutional: Positive for activity change and fatigue.   Musculoskeletal: Positive for gait problem.   Psychiatric/Behavioral: Negative for dysphoric mood and sleep disturbance. The patient is not nervous/anxious.      Medications Reviewed:  Lexapro 5 mg daily  Gabapentin 100 mg 3 times daily per neurology    Diagnoses and all orders for this visit:    1. Grief (Primary)    2. Major depressive disorder, recurrent episode, moderate (HCC)    Plan of Care  Supportive counseling provided to pt and father regarding additional cancer diagnosis. Considered importance of advanced directives and have offered support, ability to have full conversation with family. Will assist with connection to [facility] if needed; pt and father voice understanding of importance. Supported pt in sx mgmt strategies. Acknowledged impact on , children, and father. Support offered, with resources for FFL and Gildas provided. Will continue with close follow up, next visit scheduled in two weeks.    I spent 32 minutes caring for Christie on this date of service. This time includes time spent by me in the following activities: preparing for the visit, obtaining and/or reviewing a separately obtained history, performing a medically appropriate examination and/or evaluation, counseling and educating the patient/family/caregiver and documenting information in the medical record.

## 2023-01-10 NOTE — OUTREACH NOTE
Medical Week 3 Survey    Flowsheet Row Responses   [facility] patient discharged from? [place]   Does the patient have one of the following disease processes/diagnoses(primary or secondary)? Other   Week 3 attempt successful? No   Unsuccessful attempts Attempt 1          KAREN HERMOSILLO - Registered Nurse

## 2023-01-11 ENCOUNTER — RADIATION ONCOLOGY WEEKLY ASSESSMENT (OUTPATIENT)
Dept: RADIATION ONCOLOGY | Facility: HOSPITAL | Age: 58
End: 2023-01-11
Payer: MEDICARE

## 2023-01-11 ENCOUNTER — TREATMENT (OUTPATIENT)
Dept: RADIATION ONCOLOGY | Facility: HOSPITAL | Age: 58
End: 2023-01-11
Payer: COMMERCIAL

## 2023-01-11 VITALS
SYSTOLIC BLOOD PRESSURE: 123 MMHG | DIASTOLIC BLOOD PRESSURE: 81 MMHG | WEIGHT: 223.4 LBS | HEART RATE: 84 BPM | BODY MASS INDEX: 37.22 KG/M2 | OXYGEN SATURATION: 98 %

## 2023-01-11 VITALS
TEMPERATURE: 97.8 F | SYSTOLIC BLOOD PRESSURE: 128 MMHG | DIASTOLIC BLOOD PRESSURE: 70 MMHG | HEART RATE: 81 BPM | RESPIRATION RATE: 18 BRPM | OXYGEN SATURATION: 92 %

## 2023-01-11 DIAGNOSIS — C71.9 GLIOBLASTOMA MULTIFORME: Primary | ICD-10-CM

## 2023-01-11 LAB
RAD ONC ARIA COURSE ID: NORMAL
RAD ONC ARIA COURSE INTENT: NORMAL
RAD ONC ARIA COURSE LAST TREATMENT DATE: NORMAL
RAD ONC ARIA COURSE START DATE: NORMAL
RAD ONC ARIA COURSE TREATMENT ELAPSED DAYS: 30
RAD ONC ARIA FIRST TREATMENT DATE: NORMAL
RAD ONC ARIA PLAN FRACTIONS TREATED TO DATE: 20
RAD ONC ARIA PLAN ID: NORMAL
RAD ONC ARIA PLAN PRESCRIBED DOSE PER FRACTION: 2 GY
RAD ONC ARIA PLAN PRIMARY REFERENCE POINT: NORMAL
RAD ONC ARIA PLAN TOTAL FRACTIONS PRESCRIBED: 23
RAD ONC ARIA PLAN TOTAL PRESCRIBED DOSE: 4600 CGY
RAD ONC ARIA REFERENCE POINT DOSAGE GIVEN TO DATE: 40 GY
RAD ONC ARIA REFERENCE POINT DOSAGE GIVEN TO DATE: 40.48 GY
RAD ONC ARIA REFERENCE POINT ID: NORMAL
RAD ONC ARIA REFERENCE POINT ID: NORMAL
RAD ONC ARIA REFERENCE POINT SESSION DOSAGE GIVEN: 2 GY
RAD ONC ARIA REFERENCE POINT SESSION DOSAGE GIVEN: 2.02 GY

## 2023-01-11 PROCEDURE — 77386: CPT | Performed by: RADIOLOGY

## 2023-01-11 PROCEDURE — 77386 CHG INTENSITY MODULATED RADIATION TX DLVR COMPLEX: CPT | Performed by: RADIOLOGY

## 2023-01-11 RX ORDER — ESCITALOPRAM OXALATE 10 MG/1
10 TABLET ORAL DAILY
Qty: 90 TABLET | Refills: 3 | OUTPATIENT
Start: 2023-01-11

## 2023-01-11 RX ORDER — ESCITALOPRAM OXALATE 5 MG/1
5 TABLET ORAL DAILY
Qty: 90 TABLET | Refills: 0 | Status: SHIPPED | OUTPATIENT
Start: 2023-01-11 | End: 2023-01-16 | Stop reason: SDUPTHER

## 2023-01-11 NOTE — PROGRESS NOTES
Radiation Oncology  On-Treatment Note      Patient: Christie Avendaño    MRN: [id number]    Attending Physician: Juan Marr MD     Diagnosis:     ICD-10-CM ICD-9-CM   1. Glioblastoma multiforme (HCC)  C71.9 191.9       Radiation Therapy Visit:  Continue radiation therapy, Dosimetry plan remains acceptable, Films reviewed and remains acceptable, Pain assessed, Pain management planned, Radiation dose schedule reviewed and remains acceptable, Radiation technique remains acceptable and Symptoms within expected range    Radiation Treatments     Active   Plans   [id number]   Most recent treatment: Dose planned: 200 cGy (fraction 20 on 1/11/2023)   Total: Dose planned: 4,600 cGy (23 fractions)   Elapsed Days: 30      Reference Points   GBM Initial   Most recent treatment: Dose given: 200 cGy (on 1/11/2023)   Total: Dose given: 4,000 cGy   Elapsed Days: 30      calc Initial   Most recent treatment: Dose given: 202 cGy (on 1/11/2023)   Total: Dose given: 4,048 cGy   Elapsed Days: 30                    Physical Examination:  Vitals: Blood pressure 123/81, pulse 84, weight 101 kg (223 lb 6.4 oz), SpO2 98 %, not currently breastfeeding.  Vitals:    01/11/23 1006   BP: 123/81   Pulse: 84   SpO2: 98%   Weight: 101 kg (223 lb 6.4 oz)     Pain Score    01/11/23 1006   PainSc: 0-No pain       Capable of only limited selfcare; confined to bed or chair more than 50% of waking hours = 3    We examined the relevant areas: yes  Findings are within the expected range for this stage of treatment: yes  -------------------------------------------------------------------------------------------------------------------    ACTION ITEMS:  Patient tolerating treatment well and as expected for this stage in their treatment and Continue radiation therapy as planned    Patient having 4/10 headaches over her resection site following treatment, lasting for 1 hour.  Patient is still on steroids and antiseizure medication.    Estimated Completion Date:  1/16/2023      Juan Marr MD  Radiation Oncology

## 2023-01-11 NOTE — HOME HEALTH
Ms. Avendaño is a 57-year old patient with diagnosis of malignant neoplasm of frontal tono who presents with generalized weakness and decline in functional mobility with multiple history of falls. Patient lives in one-story home with spouse who works 3rd shift but her dad and other family members assist round the clock. She is currently on daily radiation therapy that would end on 1/16/23.    Patient presents with 3+/5 gross strength in ble and currently has no home exercise program in place. Functionally, patient requires cga for sit to stand from recliener chair and toilet. Able to ambulate about 60ft with rollator walker, min/cga and verbal/visual cues for increase step length to facilitate continous steps. Patient will require further skilled PT services for therapeutic/home exercise program to [facility], transfer teaching and gait training. INFLAMMATION/PAIN

## 2023-01-12 ENCOUNTER — HOME CARE VISIT (OUTPATIENT)
Dept: HOME HEALTH SERVICES | Facility: HOME HEALTHCARE | Age: 58
End: 2023-01-12
Payer: COMMERCIAL

## 2023-01-12 ENCOUNTER — TREATMENT (OUTPATIENT)
Dept: RADIATION ONCOLOGY | Facility: HOSPITAL | Age: 58
End: 2023-01-12
Payer: COMMERCIAL

## 2023-01-12 VITALS
TEMPERATURE: 98.7 F | OXYGEN SATURATION: 98 % | HEART RATE: 71 BPM | SYSTOLIC BLOOD PRESSURE: 113 MMHG | DIASTOLIC BLOOD PRESSURE: 66 MMHG

## 2023-01-12 LAB
RAD ONC ARIA COURSE ID: NORMAL
RAD ONC ARIA COURSE INTENT: NORMAL
RAD ONC ARIA COURSE LAST TREATMENT DATE: NORMAL
RAD ONC ARIA COURSE START DATE: NORMAL
RAD ONC ARIA COURSE TREATMENT ELAPSED DAYS: 31
RAD ONC ARIA FIRST TREATMENT DATE: NORMAL
RAD ONC ARIA PLAN FRACTIONS TREATED TO DATE: 21
RAD ONC ARIA PLAN ID: NORMAL
RAD ONC ARIA PLAN PRESCRIBED DOSE PER FRACTION: 2 GY
RAD ONC ARIA PLAN PRIMARY REFERENCE POINT: NORMAL
RAD ONC ARIA PLAN TOTAL FRACTIONS PRESCRIBED: 23
RAD ONC ARIA PLAN TOTAL PRESCRIBED DOSE: 4600 CGY
RAD ONC ARIA REFERENCE POINT DOSAGE GIVEN TO DATE: 42 GY
RAD ONC ARIA REFERENCE POINT DOSAGE GIVEN TO DATE: 42.51 GY
RAD ONC ARIA REFERENCE POINT ID: NORMAL
RAD ONC ARIA REFERENCE POINT ID: NORMAL
RAD ONC ARIA REFERENCE POINT SESSION DOSAGE GIVEN: 2 GY
RAD ONC ARIA REFERENCE POINT SESSION DOSAGE GIVEN: 2.02 GY

## 2023-01-12 PROCEDURE — 77386 CHG INTENSITY MODULATED RADIATION TX DLVR COMPLEX: CPT | Performed by: STUDENT IN AN ORGANIZED HEALTH CARE EDUCATION/TRAINING PROGRAM

## 2023-01-12 PROCEDURE — 77386: CPT | Performed by: STUDENT IN AN ORGANIZED HEALTH CARE EDUCATION/TRAINING PROGRAM

## 2023-01-12 PROCEDURE — G0153 HHCP-SVS OF S/L PATH,EA 15MN: HCPCS

## 2023-01-12 PROCEDURE — G0151 HHCP-SERV OF PT,EA 15 MIN: HCPCS

## 2023-01-12 RX ORDER — DAPSONE 100 MG/1
100 TABLET ORAL DAILY
Qty: 90 TABLET | Refills: 0 | OUTPATIENT
Start: 2023-01-12 | End: 2023-01-18

## 2023-01-13 ENCOUNTER — HOME CARE VISIT (OUTPATIENT)
Dept: HOME HEALTH SERVICES | Facility: HOME HEALTHCARE | Age: 58
End: 2023-01-13
Payer: COMMERCIAL

## 2023-01-13 ENCOUNTER — TREATMENT (OUTPATIENT)
Dept: RADIATION ONCOLOGY | Facility: HOSPITAL | Age: 58
End: 2023-01-13
Payer: COMMERCIAL

## 2023-01-13 ENCOUNTER — READMISSION MANAGEMENT (OUTPATIENT)
Dept: CALL CENTER | Facility: HOSPITAL | Age: 58
End: 2023-01-13
Payer: COMMERCIAL

## 2023-01-13 VITALS
DIASTOLIC BLOOD PRESSURE: 70 MMHG | HEART RATE: 88 BPM | RESPIRATION RATE: 18 BRPM | TEMPERATURE: 97.9 F | SYSTOLIC BLOOD PRESSURE: 120 MMHG | OXYGEN SATURATION: 98 %

## 2023-01-13 LAB
RAD ONC ARIA COURSE ID: NORMAL
RAD ONC ARIA COURSE INTENT: NORMAL
RAD ONC ARIA COURSE LAST TREATMENT DATE: NORMAL
RAD ONC ARIA COURSE START DATE: NORMAL
RAD ONC ARIA COURSE TREATMENT ELAPSED DAYS: 32
RAD ONC ARIA FIRST TREATMENT DATE: NORMAL
RAD ONC ARIA PLAN FRACTIONS TREATED TO DATE: 22
RAD ONC ARIA PLAN ID: NORMAL
RAD ONC ARIA PLAN PRESCRIBED DOSE PER FRACTION: 2 GY
RAD ONC ARIA PLAN PRIMARY REFERENCE POINT: NORMAL
RAD ONC ARIA PLAN TOTAL FRACTIONS PRESCRIBED: 23
RAD ONC ARIA PLAN TOTAL PRESCRIBED DOSE: 4600 CGY
RAD ONC ARIA REFERENCE POINT DOSAGE GIVEN TO DATE: 44 GY
RAD ONC ARIA REFERENCE POINT DOSAGE GIVEN TO DATE: 44.53 GY
RAD ONC ARIA REFERENCE POINT ID: NORMAL
RAD ONC ARIA REFERENCE POINT ID: NORMAL
RAD ONC ARIA REFERENCE POINT SESSION DOSAGE GIVEN: 2 GY
RAD ONC ARIA REFERENCE POINT SESSION DOSAGE GIVEN: 2.02 GY

## 2023-01-13 PROCEDURE — 77386 CHG INTENSITY MODULATED RADIATION TX DLVR COMPLEX: CPT | Performed by: STUDENT IN AN ORGANIZED HEALTH CARE EDUCATION/TRAINING PROGRAM

## 2023-01-13 PROCEDURE — 77386: CPT | Performed by: STUDENT IN AN ORGANIZED HEALTH CARE EDUCATION/TRAINING PROGRAM

## 2023-01-13 NOTE — HOME HEALTH
Patient is complying with illustrated home exercise program between PT visits. Still working on improving sit to stand and gait skills especially constant cues for lt foot clearance to reduce shuffling and tripping with risk for falls.    Plan for next visit: Strengthening exs to ble, transfer teaching and gait training

## 2023-01-13 NOTE — OUTREACH NOTE
Medical Week 3 Survey    Flowsheet Row Responses   [facility] patient discharged from? [facility]   Does the patient have one of the following disease processes/diagnoses(primary or secondary)? Other   Week 3 attempt successful? Yes   Call start time 0955   Call end time 1001   Discharge diagnosis Glioma   Person spoke with today (if not patient) and relationship patient   Meds reviewed with patient/caregiver? Yes   Does the patient have all medications ordered at discharge? Yes   Is the patient taking all medications as directed (includes completed medication regime)? Yes   Medication comments Denies any medication issues or needs.    Does the patient have a primary care provider?  Yes   Comments regarding PCP PCP Dr Holloway. Patient reports that she has had f/u with PCP   Has the patient kept scheduled appointments due by today? Yes   What is the Home health agency?   [facility]   Has home health visited the patient within 72 hours of discharge? Yes   Psychosocial issues? No   Did the patient receive a copy of their discharge instructions? Yes   What is the patient's perception of their health status since discharge? Improving   Is the patient/caregiver able to teach back signs and symptoms related to disease process for when to call PCP? Yes   Is the patient/caregiver able to teach back signs and symptoms related to disease process for when to call 911? Yes   Is the patient/caregiver able to teach back the hierarchy of who to call/visit for symptoms/problems? PCP, Specialist, Home health nurse, Urgent Care, ED, 911 Yes   If the patient is a current smoker, are they able to teach back resources for cessation? Not a smoker   Week 3 Call Completed? Yes   Wrap up additional comments Patient denies any questions or needs today.           KAREN HERMOSILLO - Registered Nurse

## 2023-01-16 ENCOUNTER — LAB (OUTPATIENT)
Dept: LAB | Facility: HOSPITAL | Age: 58
End: 2023-01-16
Payer: COMMERCIAL

## 2023-01-16 ENCOUNTER — SPECIALTY PHARMACY (OUTPATIENT)
Dept: PHARMACY | Facility: HOSPITAL | Age: 58
End: 2023-01-16
Payer: COMMERCIAL

## 2023-01-16 ENCOUNTER — OFFICE VISIT (OUTPATIENT)
Dept: ONCOLOGY | Facility: CLINIC | Age: 58
End: 2023-01-16
Payer: COMMERCIAL

## 2023-01-16 ENCOUNTER — TREATMENT (OUTPATIENT)
Dept: RADIATION ONCOLOGY | Facility: HOSPITAL | Age: 58
End: 2023-01-16
Payer: COMMERCIAL

## 2023-01-16 VITALS
DIASTOLIC BLOOD PRESSURE: 87 MMHG | BODY MASS INDEX: 37.85 KG/M2 | SYSTOLIC BLOOD PRESSURE: 131 MMHG | OXYGEN SATURATION: 90 % | WEIGHT: 227.2 LBS | RESPIRATION RATE: 16 BRPM | HEART RATE: 75 BPM | TEMPERATURE: 97.1 F | HEIGHT: 65 IN

## 2023-01-16 DIAGNOSIS — C71.9 GLIOBLASTOMA MULTIFORME: ICD-10-CM

## 2023-01-16 DIAGNOSIS — E11.65 TYPE 2 DIABETES MELLITUS WITH HYPERGLYCEMIA, WITH LONG-TERM CURRENT USE OF INSULIN: ICD-10-CM

## 2023-01-16 DIAGNOSIS — Z79.899 HIGH RISK MEDICATION USE: ICD-10-CM

## 2023-01-16 DIAGNOSIS — Z79.4 TYPE 2 DIABETES MELLITUS WITH HYPERGLYCEMIA, WITH LONG-TERM CURRENT USE OF INSULIN: ICD-10-CM

## 2023-01-16 DIAGNOSIS — G93.89 BRAIN MASS: ICD-10-CM

## 2023-01-16 DIAGNOSIS — C71.9 GLIOBLASTOMA MULTIFORME: Primary | ICD-10-CM

## 2023-01-16 LAB
ALBUMIN SERPL-MCNC: 4 G/DL (ref 3.5–5.2)
ALBUMIN/GLOB SERPL: 1.7 G/DL (ref 1.1–2.4)
ALP SERPL-CCNC: 112 U/L (ref 38–116)
ALT SERPL W P-5'-P-CCNC: 72 U/L (ref 0–33)
ANION GAP SERPL CALCULATED.3IONS-SCNC: 14.8 MMOL/L (ref 5–15)
AST SERPL-CCNC: 17 U/L (ref 0–32)
BASOPHILS # BLD AUTO: 0.01 10*3/MM3 (ref 0–0.2)
BASOPHILS NFR BLD AUTO: 0.1 % (ref 0–1.5)
BILIRUB SERPL-MCNC: 1 MG/DL (ref 0.2–1.2)
BUN SERPL-MCNC: 26 MG/DL (ref 6–20)
BUN/CREAT SERPL: 54.2 (ref 7.3–30)
CALCIUM SPEC-SCNC: 9.6 MG/DL (ref 8.5–10.2)
CHLORIDE SERPL-SCNC: 99 MMOL/L (ref 98–107)
CO2 SERPL-SCNC: 24.2 MMOL/L (ref 22–29)
CREAT SERPL-MCNC: 0.48 MG/DL (ref 0.6–1.1)
DEPRECATED RDW RBC AUTO: 72.2 FL (ref 37–54)
EGFRCR SERPLBLD CKD-EPI 2021: 109.9 ML/MIN/1.73
EOSINOPHIL # BLD AUTO: 0 10*3/MM3 (ref 0–0.4)
EOSINOPHIL NFR BLD AUTO: 0 % (ref 0.3–6.2)
ERYTHROCYTE [DISTWIDTH] IN BLOOD BY AUTOMATED COUNT: 22.8 % (ref 12.3–15.4)
GLOBULIN UR ELPH-MCNC: 2.3 GM/DL (ref 1.8–3.5)
GLUCOSE SERPL-MCNC: 229 MG/DL (ref 74–124)
HCT VFR BLD AUTO: 37 % (ref 34–46.6)
HGB BLD-MCNC: 11.9 G/DL (ref 12–15.9)
IMM GRANULOCYTES # BLD AUTO: 0.38 10*3/MM3 (ref 0–0.05)
IMM GRANULOCYTES NFR BLD AUTO: 4 % (ref 0–0.5)
LYMPHOCYTES # BLD AUTO: 0.8 10*3/MM3 (ref 0.7–3.1)
LYMPHOCYTES NFR BLD AUTO: 8.4 % (ref 19.6–45.3)
MCH RBC QN AUTO: 29.3 PG (ref 26.6–33)
MCHC RBC AUTO-ENTMCNC: 32.2 G/DL (ref 31.5–35.7)
MCV RBC AUTO: 91.1 FL (ref 79–97)
MONOCYTES # BLD AUTO: 0.71 10*3/MM3 (ref 0.1–0.9)
MONOCYTES NFR BLD AUTO: 7.4 % (ref 5–12)
NEUTROPHILS NFR BLD AUTO: 7.68 10*3/MM3 (ref 1.7–7)
NEUTROPHILS NFR BLD AUTO: 80.1 % (ref 42.7–76)
NRBC BLD AUTO-RTO: 1.6 /100 WBC (ref 0–0.2)
PLATELET # BLD AUTO: 116 10*3/MM3 (ref 140–450)
PMV BLD AUTO: 8.8 FL (ref 6–12)
POTASSIUM SERPL-SCNC: 4.4 MMOL/L (ref 3.5–4.7)
PROT SERPL-MCNC: 6.3 G/DL (ref 6.3–8)
RAD ONC ARIA COURSE ID: NORMAL
RAD ONC ARIA COURSE INTENT: NORMAL
RAD ONC ARIA COURSE LAST TREATMENT DATE: NORMAL
RAD ONC ARIA COURSE START DATE: NORMAL
RAD ONC ARIA COURSE TREATMENT ELAPSED DAYS: 35
RAD ONC ARIA FIRST TREATMENT DATE: NORMAL
RAD ONC ARIA PLAN FRACTIONS TREATED TO DATE: 23
RAD ONC ARIA PLAN ID: NORMAL
RAD ONC ARIA PLAN PRESCRIBED DOSE PER FRACTION: 2 GY
RAD ONC ARIA PLAN PRIMARY REFERENCE POINT: NORMAL
RAD ONC ARIA PLAN TOTAL FRACTIONS PRESCRIBED: 23
RAD ONC ARIA PLAN TOTAL PRESCRIBED DOSE: 4600 CGY
RAD ONC ARIA REFERENCE POINT DOSAGE GIVEN TO DATE: 46 GY
RAD ONC ARIA REFERENCE POINT DOSAGE GIVEN TO DATE: 46.56 GY
RAD ONC ARIA REFERENCE POINT ID: NORMAL
RAD ONC ARIA REFERENCE POINT ID: NORMAL
RAD ONC ARIA REFERENCE POINT SESSION DOSAGE GIVEN: 2 GY
RAD ONC ARIA REFERENCE POINT SESSION DOSAGE GIVEN: 2.02 GY
RBC # BLD AUTO: 4.06 10*6/MM3 (ref 3.77–5.28)
SODIUM SERPL-SCNC: 138 MMOL/L (ref 134–145)
WBC NRBC COR # BLD: 9.58 10*3/MM3 (ref 3.4–10.8)

## 2023-01-16 PROCEDURE — 36415 COLL VENOUS BLD VENIPUNCTURE: CPT

## 2023-01-16 PROCEDURE — 85025 COMPLETE CBC W/AUTO DIFF WBC: CPT

## 2023-01-16 PROCEDURE — 77386: CPT | Performed by: STUDENT IN AN ORGANIZED HEALTH CARE EDUCATION/TRAINING PROGRAM

## 2023-01-16 PROCEDURE — 80053 COMPREHEN METABOLIC PANEL: CPT

## 2023-01-16 PROCEDURE — 99214 OFFICE O/P EST MOD 30 MIN: CPT | Performed by: NURSE PRACTITIONER

## 2023-01-16 PROCEDURE — 77386 CHG INTENSITY MODULATED RADIATION TX DLVR COMPLEX: CPT | Performed by: STUDENT IN AN ORGANIZED HEALTH CARE EDUCATION/TRAINING PROGRAM

## 2023-01-16 PROCEDURE — 77336 RADIATION PHYSICS CONSULT: CPT | Performed by: RADIOLOGY

## 2023-01-16 RX ORDER — TEMOZOLOMIDE 5 MG/1
5 CAPSULE ORAL DAILY
Qty: 7 CAPSULE | Refills: 0 | Status: SHIPPED | OUTPATIENT
Start: 2023-01-16 | End: 2023-02-14

## 2023-01-16 RX ORDER — ESCITALOPRAM OXALATE 5 MG/1
5 TABLET ORAL DAILY
Qty: 90 TABLET | Refills: 0 | Status: SHIPPED | OUTPATIENT
Start: 2023-01-16

## 2023-01-16 RX ORDER — ONDANSETRON HYDROCHLORIDE 8 MG/1
8 TABLET, FILM COATED ORAL EVERY 24 HOURS
Qty: 30 TABLET | Refills: 1 | Status: SHIPPED | OUTPATIENT
Start: 2023-01-16

## 2023-01-16 RX ORDER — LISINOPRIL 20 MG/1
TABLET ORAL
COMMUNITY
Start: 2023-01-14 | End: 2023-01-20

## 2023-01-16 RX ORDER — TEMOZOLOMIDE 20 MG/1
20 CAPSULE ORAL DAILY
Qty: 7 CAPSULE | Refills: 0 | Status: SHIPPED | OUTPATIENT
Start: 2023-01-16 | End: 2023-02-23

## 2023-01-16 RX ORDER — TEMOZOLOMIDE 140 MG/1
140 CAPSULE ORAL DAILY
Qty: 7 CAPSULE | Refills: 0 | Status: SHIPPED | OUTPATIENT
Start: 2023-01-16 | End: 2023-02-14

## 2023-01-16 NOTE — PROGRESS NOTES
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  Her nausea is well controlled and she is actually been out of her ondansetron for a couple of days and then okay.  We will get this refilled for her today however.  She continues on dexamethasone 4 mg twice daily which she tolerates well aside from her glucose being very elevated.  Her lows have still been greater than 200 and ranging up to 325 or higher she reports today.  We will request she follow-up with Dr. Holloway regarding management of this since dexamethasone will be continued for at least the next week.  She is reporting intermittent blurred vision in her left eye.  She denies double vision.  She does continue with headaches at a level 4 out of 10 that are also intermittent in nature and unchanged.  She denies any other changes and one could relate her blurred vision intermittently to her blood sugar.  This is discussed with Dr. Collins today and we will continue to monitor her symptoms.  Her  does report she is snoring at night.  Patient denies any shortness of breath during the day.  She has intermittent nonproductive cough which her  reports she rarely has.  She continues to ambulate around the house with the use of a Rollator.    Objective   Vital Signs:   /87   Pulse 75   Temp 97.1 °F (36.2  "°C) (Temporal)   Resp 16   Ht 165 cm (64.96\")   Wt 103 kg (227 lb 3.2 oz)   SpO2 90%   BMI 37.85 kg/m²     Physical Exam  Vitals reviewed.   Constitutional:       General: She is not in acute distress.     Appearance: Normal appearance. She is well-developed.      Comments: Seated in wheelchair   HENT:      Head: Normocephalic and atraumatic.   Eyes:      Extraocular Movements: Extraocular movements intact.      Conjunctiva/sclera: Conjunctivae normal.   Cardiovascular:      Rate and Rhythm: Normal rate and regular rhythm.      Heart sounds: Normal heart sounds. No murmur heard.  Pulmonary:      Effort: Pulmonary effort is normal. No respiratory distress.      Breath sounds: Normal breath sounds. No wheezing, rhonchi or rales.   Abdominal:      General: Bowel sounds are normal. There is no distension.      Palpations: Abdomen is soft.   Musculoskeletal:         General: Normal range of motion.      Cervical back: Normal range of motion.   Skin:     General: Skin is warm and dry.      Findings: No rash.   Neurological:      Mental Status: She is alert and oriented to person, place, and time.      Motor: Weakness (left upper and lower extremity) present.            Result Review : Labs reviewed today include CBC and CMP     Assessment and Plan  1. Glioblastoma multiforme  · Patient presented with falls and confusion, left upper and lower extremity weakness  · MRI brain 11/22/2022 with 3.2 cm rim-enhancing right supratentorial mass in the right thalamus and internal capsule with mass-effect and leftward midline shift  · Initiated dexamethasone 4 mg p.o. every 8 hours seizure prophylaxis with Keppra 500 mg twice daily  · Patient was seen by neurosurgery and was not a candidate for resection due to location.  · CT chest abdomen pelvis 11/24/2022 showed no evidence of source for potential metastatic disease  · Stereotactic brain biopsy 11/28/2022 with glioblastoma, IDH1 R132H negative, grade 4, MGM T promoter " unmethylated  · On 12/12/2022, patient initiated concurrent chemoradiation with low-dose Temodar 75 mg/m² (165 mg) daily  · Patient is continuing on concurrent chemoradiation for glioblastoma, tolerating treatment relatively well.  She has some ongoing difficulty with fatigue.  Overall, the patient has improved in terms of her left-sided weakness, has been able to ambulate at home with the use of a Rollator although does continue with some significant limitations due to her left-sided weakness and some cognitive effects.  I did discuss with the patient and her father today plans to continue concurrent chemoradiation which will be completed on 1/16/2022.  2 weeks after we will perform MRI brain to assess status of disease and I will see her shortly thereafter.  We will discuss potential initiation of maintenance Temodar pending the MRI result.  · Seen 1/9/2023 continuing on Temodar 165 mg daily, tolerating well.  · Patient returns 1/16/2023 in follow-up continuing on Temodar 165 mg daily along with radiation.  She reports today radiation is planned an additional 7 doses and therefore she will not be completed until 1/25/2023.  Her blood sugar has been uncontrolled on the dexamethasone we will request she follows up with primary care regarding management.  She reports her lows have still been over 200 over the past week.  She is reporting some new blurry vision in the left eye that is intermittent in nature.  She denies any other changes, no double vision.  This is discussed with Dr. Collins today we will continue to monitor as this could be related to her blood sugar.  She does have an MRI planned following completion of chemoradiation we will push this back 1 week since radiation has been extended.  We will have her return next week for review by nurse practitioner with repeat labs and close monitoring.  2. BRCA2 mutation (c.5073dupA) and GEORGES mutation (c.5073dup):  · Strong family history of malignancy with a mother  who had thyroid cancer and also had breast cancer at age 73 with subsequent liver metastases.  She was found to be BRCA2 positive and prompted the patient's own testing.  The patient's maternal grandmother had ovarian cancer in her mid 80s, maternal grandfather and paternal grandfather both had leukemia of unknown type at an older age.  Her maternal aunt had colon cancer over the age of 50, maternal aunt had breast cancer in her 80s, and her father had cholangiocarcinoma.    · The patient underwent testing on 7/27/2016 showing positive BRCA2 mutation (c.5073dupA)   · The patient did undergo KAVYA/BSO in 1992 secondary to hemorrhage.  · The patient did undergo bilateral mastectomies 1/26/2017 with findings of bilateral DCIS (discussed below).  · Patient has undergone previous colonoscopy on 11/28/2017.  · Given the unusual nature of the patient's malignancies, referral to genetics clinic for panel testing performed 11/10/2020 with re-identification of BRCA2 mutation (c.5073dupA) and new identification of GEORGES mutation (c.5073dup).  · Previous plan to continue surveillance with repeat colonoscopy at a 5-year interval that would have been due late 2022.  Plans subsequently deferred due to subsequent diagnosis of glioblastoma multiforme.  2. Stage I (yO7cEmPs) papillary thyroid cancer:  · Status post total thyroidectomy with Dr. Maher in [place] on 10/15/2010.  Pathology showed papillary thyroid cancer involving the left thyroid and isthmus, multifocal with 2 areas measuring 0.9 and 1.2 cm.  No evidence of capsular invasion, no extrathyroidal extension, no lymphovascular invasion, negative margins.  · Postsurgical scan showed uptake in the thyroid bed and the patient underwent treatment with I-131.    · She has had no evidence of recurrent disease.    · Her subsequent hypothyroidism has been managed by endocrinology (Dr. Subhash Michael) in [place], currently receiving levothyroxine 150 mcg daily.  · The patient  has continued routine follow-up with endocrinology, was seen last on 8/8/2022  3. Bilateral DCIS  · As above, patient has underlying BRCA2 mutation  · 8/5/2016 was found to have a right breast intraductal papilloma with fibrocystic change and microcalcifications with usual ductal hyperplasia and columnar cell change in 3 separate locations on biopsy.  · On 8/24/2016 she underwent excisional biopsy of an intraductal papilloma from the right breast.    · She subsequently underwent on 1/26/2017 bilateral mastectomy.  On the right there was a large intraductal papilloma with usual ductal hyperplasia, atypical hyperplasia, DCIS measuring 3 mm which was low-grade, ER positive (98%), TN positive (85%).  On the left there were multiple intraductal papillomas with atypical ductal hyperplasia.  There was DCIS measuring 6 mm, low-grade, ER positive (99%), TN positive (98%).  4. Stage I (aW8tAoR8) clear-cell renal cell carcinoma:  · Incidental finding on CT scan 1/15/2020 of enhancing left renal lesions x2, largest 2.4 cm  · Resection with [facility]. Pool ([facility]) on 5/15/2020 with left partial nephrectomy.  Pathology showed clear cell renal cell carcinoma, Jeanne grade 2, 2 foci measuring 1.5 and 2.1 cm.  The renal parenchymal margin was focally positive.  · Patient reports that Dr. Pool felt that he required additional margin tissue in the area of focal positivity and did not recommend re-resection.  · CT 8/17/2022 showed no evidence of recurrent disease  · CT chest abdomen pelvis 11/25/2022 with stable chronic findings.  · Patient had been undergoing every 6-month monitoring of CT scans for surveillance.  We will not plan further routine CT monitoring in light of the diagnosis of glioblastoma.  5. 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    · CT-guided biopsy on 1/9/2018 revealed a left lower lobe carcinoid with  spindle cell features, no necrosis, no mitoses.  · Notation of normal chromogranin A 1/23/2018 of 35  · Follow-up CT on 1/24/2018 showed multiple bilateral pulmonary nodules including a 2.1 cm left lower lobe nodule (previously 2.6), 7 mm left lower lobe nodule, 1 cm right lower lobe nodule, right adrenal 1.5 cm nodule consistent with adenoma, and hepatic cysts.    · On 2/28/2018, the patient underwent a left lower lobectomy.  Pathology revealed a 2.3 cm left upper lobe (hilar) carcinoid, typical with spindle cell features, Ki-67 of 3.68%.  There were 2 of 8 peribronchial lymph nodes involved with carcinoid with lymphatic invasion, stage IIB ([id number]).  There was also evidence of adenocarcinoma (see below).  6. Stage IA1 (jS1xT2Mm)  left lower lobe non-small cell lung cancer (adenocarcinoma with lipidic features):  · The patient is a never smoker  · PET scan 12/13/2017 with hypermetabolic 2.5 cm left lower lobe nodule with SUV 5.7.    · CT-guided biopsy on 1/9/2018 revealed a left lower lobe carcinoid with spindle cell features, no necrosis, no mitoses.    · Follow-up CT on 1/24/2018 showed multiple bilateral pulmonary nodules including a 2.1 cm left lower lobe nodule (previously 2.6), 7 mm left lower lobe nodule, 1 cm right lower lobe nodule, right adrenal 1.5 cm nodule consistent with adenoma, and hepatic cysts.    · On 2/28/2018, the patient underwent a left lower lobectomy.  Pathology revealed a 2.3 cm left upper lobe (hilar) carcinoid, typical with spindle cell features, Ki-67 of 3.68%.  There were 2 of 8 peribronchial lymph nodes involved with carcinoid with lymphatic invasion, stage IIB (wO0rfV6S2).  There was a 0.9 cm adenocarcinoma, well differentiated with lipidic features, no visceral pleural invasion, bronchoalveolar atypical adenomatous hyperplasia at the parenchymal margin and 1 microscopic focus (less than 1 mm).  0/8 lymph nodes involved with adenocarcinoma. PDL1 <1% TPS, positive EGF receptor  mutation (exon 19 deletion, [id number]), negative ALK/ROS1 rearrangement, negative BRAF, ER negative (2%), NM 0, HER-2/laura 1+, Ki-67 3%. Stage IA1 (hN3pkZ7G9).  · Patient was placed on Femara following surgery by Dr. Juana Pelayo.  Femara subsequently discontinued in early August 2020.  · Subsequent follow-up scans with stable findings until scan on 1/15/2020 noted 2 enhancing left renal lesions, largest 2.4 cm and there was also an enlarging right lower lobe nodule up to 1.0 x 1.2 cm.  In the interval, patient did undergo resection of renal cell carcinoma (discussed above).    · CT scan on 7/1/2020 showed slow increase in right lower lobe nodule up to 1.2 cm.  Felt to represent new primary lung cancer (see below).   7. Stage IA2 (oH9eqM0D9) right lower lobe non-small cell lung cancer (adenocarcinoma with lipidic growth pattern):   · CT scan on 7/1/2020 showed slow increase in right lower lobe nodule up to 1.2 cm.  · CT-guided biopsy of the right lower lobe nodule on 7/31/2020 with adenocarcinoma with bland lipidic growth pattern of pulmonary origin (TTF-1 and CK7 positive). PDL1 TPS <1%, EGFR mutated, exon 20 insertion (possibly indicating lack of response to TKI's). ALK/ROS1 rearrangement negative and BRAF V600 mutation negative.  · Staging MRI brain 8/12/2020 with no evidence of metastatic disease  · Staging PET scan 8/12/2020 with no significant activity in the biopsied lesion 1.2 cm right lower lobe (SUV 1.5), no evidence of hilar, mediastinal uptake nor distant metastatic disease.  · Given the difference in EGFR mutation between the patient's 2 lung cancers, it is felt that she has 2 separate primary lesions.    · Right VATS with anterior basal segmentectomy on 10/9/2020 with pathology showing invasive well to moderately differentiated adenocarcinoma with acinar predominant growth measuring 1.4 cm, negative margins, no pleural or lymphovascular invasion, negative lymph nodes x5.  · Follow-up CT chest  3/8/2021 showed postoperative change, no evidence of disease recurrence.    · CT 8/17/2022 showed no evidence of recurrent disease.   · CT chest abdomen pelvis 11/25/2022 with stable chronic findings.  · Patient had been undergoing every 6-month monitoring of CT scans for surveillance.  We will not plan further routine CT monitoring in light of the diagnosis of glioblastoma.  8. Melanoma right heel stage IA (rJ4vGcS3)  · Patient underwent excision of right heel melanoma 6/4/2021, 0.6 mm superficial spreading melanoma with margin positive for melanoma in situ.  · Wide local excision 6/30/2021 with no residual melanoma identified  · Patient has had difficulty with healing of the wound and has required 3 separate skin grafts, the most recent performed 4 weeks ago with ongoing slow healing.  · The patient's wound in the right heel did finally heal in late 2021.    · Patient had been continuing follow-up with dermatology every 3 months however this is likely not necessary in the setting of recent diagnosis of glioblastoma.  9. Incidental CT findings  · Notation of a number of incidental CT findings on scan from 8/17/2022 including 1.6 cm right adrenal nodule, C7 focal osteolysis, both unchanged compared to 3/8/2021 and apparent benign findings.    · Notation on CT scan 8/17/2022 of short segment narrowing of the proximal left internal carotid artery resulting in moderate stenosis.  Findings were not well evaluated.    · Carotid ultrasound 9/1/2022 was normal  · CT chest abdomen pelvis with stable findings on 11/25/2022.  No plans for any further routine CT monitoring of these issues.  10. Anemia:  · Patient has undergone previous colonoscopy on 11/28/2017.  · Hemoglobin in high 10 to low 11 range with low normal MCV  · Anemia evaluation 8/20/2020 with iron 44, ferritin 119.3, iron saturation 13%, TIBC 351, folate 12.2, B12 392.  · Unclear whether patient may have anemia secondary to chronic disease.  · With low iron  saturation, initiated oral iron daily on 9/15/2020.  Patient however did not begin oral iron until 11/11/2020.  · Labs on 11/11/2020 with hemoglobin 11.4, iron studies showing iron 54, ferritin 112, iron saturation 14%, TIBC 395.  · Labs on 1/7/2021 with hemoglobin 11.9, iron studies with iron 50, ferritin 109.5, iron saturation 12%, TIBC 420.  Patient with significant GI side effects from oral iron with diarrhea, iron discontinued.  · Labs on 9/14/2021 showed hemoglobin that declined to 11.4 of unclear etiology.  Additional labs showed iron 44, ferritin 137.6, iron saturation 13 %, TIBC 346, folate 11.3, B12 394.   · Labs on 8/24/2022 showed hemoglobin normal at 12.0.  Labs show iron 46, ferritin 106, iron saturation 13%, TIBC 351.    · Hemoglobin currently stable 1/9/2023 at 12.4, repeat ferritin today for 38.7, iron saturation 33%, TIBC 333.  · Hemoglobin today declined slightly to 11.9  9. Depression  · The patient was experiencing significant exacerbation of depressive symptoms in fall 2021 as she was coping with her mother's illness with metastatic breast cancer.  Patient's mother was living with her on hospice care for a period of time and did pass away in late 2021.  · Patient was referred back to supportive oncology clinic which had been very helpful for her  · Patient had been receiving Lexapro, dose decreased from 10 down to 5 mg daily which was better tolerated  · Patient with recent diagnosis of glioblastoma, scheduled to be seen by supportive oncology on 1/11/2023.  At this time, patient appears to be coping with the situation reasonably well.  10. PCP prophylaxis  · Patient continuing on Bactrim DS 1 p.o. Monday Wednesday Friday  · With persistent elevation in ALT at 243, concerned that this is related to Bactrim and we will discontinue Bactrim and transition to oral dapsone 100 mg daily.  11. GI prophylaxis  · Continue Protonix 40 mg daily  12. Seizure prophylaxis  · Continue Keppra 500 mg twice  daily  13. Elevated LFTs  · LFTs prior to hospitalization in November 2022 were normal  · Significant increase in transaminases noted on 12/8/2022 with ALT 1049, , normal total bilirubin 0.6  · Viral hepatitis panel negative 12/8/2022, negative GEMMA and smooth muscle antibody 12/9/2022  · Gradual improvement in LFT's.  · 1/3/2023 ALT remains elevated and stable at 243, AST normal at 27, normal bilirubin 0.3. Unclear if persistent elevation in LFT's may be related to bactrim, will discontinue bactrim and switch to dapsone as above.  · 1/9/2023 ALT improved at 99  · 1/16/2023 ALT improved to 72 AST remains normal at 17     Plan:  1. Continue low-dose Temodar 75 mg/m² (165 mg) daily until radiation complete.  2. Ondansetron refilled today  3. Patient to follow-up with primary care regarding elevated glucose, message sent to Dr. Hollwoay  4. MTM notified of additional Temodar needed due to radiation being extended  5. Patient will complete treatment on 1/25/2022.  6. Continue dexamethasone 4 mg twice daily.  7. Continue dapsone 100 mg daily for PCP prophylaxis.  8. Continue Keppra 500 mg twice daily for seizure prophylaxis.  9. Continue Protonix 40 mg daily for GI prophylaxis.  10. Continue home physical, occupational, and speech therapy.  11. Continue to follow with supportive oncology clinic.  12. Return in 1 week for NP follow-up with labs  13. Patient back MRI of the brain x1 week  14. MD visit will be delayed x1 week as well with CBC, CMP and we will review MRI brain results and discuss potential pursuit of subsequent maintenance Temodar.    Patient is on a high risk medication requiring close monitoring for toxicity.      Kasey Hughes, APRN  01/16/2023

## 2023-01-16 NOTE — PROGRESS NOTES
Kasey Hughes, Kamala Matos, [facility]  Radiation added 7 more days on to this patient's treatment plan, she will need more Temodar

## 2023-01-17 ENCOUNTER — HOME CARE VISIT (OUTPATIENT)
Dept: HOME HEALTH SERVICES | Facility: HOME HEALTHCARE | Age: 58
End: 2023-01-17
Payer: COMMERCIAL

## 2023-01-17 LAB
RAD ONC ARIA COURSE ID: NORMAL
RAD ONC ARIA COURSE INTENT: NORMAL
RAD ONC ARIA COURSE LAST TREATMENT DATE: NORMAL
RAD ONC ARIA COURSE START DATE: NORMAL
RAD ONC ARIA COURSE TREATMENT ELAPSED DAYS: 36
RAD ONC ARIA FIRST TREATMENT DATE: NORMAL
RAD ONC ARIA PLAN FRACTIONS TREATED TO DATE: 1
RAD ONC ARIA PLAN ID: NORMAL
RAD ONC ARIA PLAN PRESCRIBED DOSE PER FRACTION: 2 GY
RAD ONC ARIA PLAN PRIMARY REFERENCE POINT: NORMAL
RAD ONC ARIA PLAN TOTAL FRACTIONS PRESCRIBED: 7
RAD ONC ARIA PLAN TOTAL PRESCRIBED DOSE: 1400 CGY
RAD ONC ARIA REFERENCE POINT DOSAGE GIVEN TO DATE: 2 GY
RAD ONC ARIA REFERENCE POINT ID: NORMAL
RAD ONC ARIA REFERENCE POINT SESSION DOSAGE GIVEN: 2 GY

## 2023-01-17 PROCEDURE — 77427 RADIATION TX MANAGEMENT X5: CPT | Performed by: STUDENT IN AN ORGANIZED HEALTH CARE EDUCATION/TRAINING PROGRAM

## 2023-01-17 PROCEDURE — G0151 HHCP-SERV OF PT,EA 15 MIN: HCPCS

## 2023-01-17 PROCEDURE — G0153 HHCP-SVS OF S/L PATH,EA 15MN: HCPCS

## 2023-01-17 PROCEDURE — 77386 CHG INTENSITY MODULATED RADIATION TX DLVR COMPLEX: CPT | Performed by: STUDENT IN AN ORGANIZED HEALTH CARE EDUCATION/TRAINING PROGRAM

## 2023-01-17 PROCEDURE — 77386: CPT | Performed by: STUDENT IN AN ORGANIZED HEALTH CARE EDUCATION/TRAINING PROGRAM

## 2023-01-17 NOTE — CASE COMMUNICATION
Pt's father reports pt radation tx continuing for another 7 days, requested OT to begin after radiation is finshed.

## 2023-01-18 ENCOUNTER — RADIATION ONCOLOGY WEEKLY ASSESSMENT (OUTPATIENT)
Dept: RADIATION ONCOLOGY | Facility: HOSPITAL | Age: 58
End: 2023-01-18
Payer: MEDICARE

## 2023-01-18 ENCOUNTER — DOCUMENTATION (OUTPATIENT)
Dept: RADIATION ONCOLOGY | Facility: HOSPITAL | Age: 58
End: 2023-01-18

## 2023-01-18 ENCOUNTER — APPOINTMENT (OUTPATIENT)
Dept: GENERAL RADIOLOGY | Facility: HOSPITAL | Age: 58
End: 2023-01-18
Payer: MEDICARE

## 2023-01-18 ENCOUNTER — APPOINTMENT (OUTPATIENT)
Dept: CT IMAGING | Facility: HOSPITAL | Age: 58
End: 2023-01-18
Payer: MEDICARE

## 2023-01-18 ENCOUNTER — TREATMENT (OUTPATIENT)
Dept: RADIATION ONCOLOGY | Facility: HOSPITAL | Age: 58
End: 2023-01-18
Payer: COMMERCIAL

## 2023-01-18 ENCOUNTER — HOSPITAL ENCOUNTER (EMERGENCY)
Facility: HOSPITAL | Age: 58
Discharge: HOME | End: 2023-01-18
Attending: EMERGENCY MEDICINE | Admitting: EMERGENCY MEDICINE
Payer: MEDICARE

## 2023-01-18 VITALS
HEART RATE: 89 BPM | TEMPERATURE: 97.7 F | DIASTOLIC BLOOD PRESSURE: 90 MMHG | RESPIRATION RATE: 18 BRPM | SYSTOLIC BLOOD PRESSURE: 125 MMHG | OXYGEN SATURATION: 98 %

## 2023-01-18 VITALS
HEIGHT: 65 IN | BODY MASS INDEX: 37.09 KG/M2 | SYSTOLIC BLOOD PRESSURE: 121 MMHG | DIASTOLIC BLOOD PRESSURE: 73 MMHG | OXYGEN SATURATION: 90 % | TEMPERATURE: 97.6 F | HEART RATE: 72 BPM | WEIGHT: 222.6 LBS | RESPIRATION RATE: 18 BRPM

## 2023-01-18 VITALS
DIASTOLIC BLOOD PRESSURE: 83 MMHG | OXYGEN SATURATION: 89 % | BODY MASS INDEX: 37.09 KG/M2 | HEART RATE: 82 BPM | SYSTOLIC BLOOD PRESSURE: 126 MMHG | WEIGHT: 222.6 LBS

## 2023-01-18 VITALS
RESPIRATION RATE: 18 BRPM | DIASTOLIC BLOOD PRESSURE: 64 MMHG | TEMPERATURE: 98.3 F | SYSTOLIC BLOOD PRESSURE: 118 MMHG | HEART RATE: 76 BPM | OXYGEN SATURATION: 84 %

## 2023-01-18 DIAGNOSIS — E87.20 LACTIC ACID INCREASED: ICD-10-CM

## 2023-01-18 DIAGNOSIS — C71.9 GLIOBLASTOMA: ICD-10-CM

## 2023-01-18 DIAGNOSIS — B34.2 CORONAVIRUS INFECTION: ICD-10-CM

## 2023-01-18 DIAGNOSIS — C71.9 GLIOBLASTOMA MULTIFORME: Primary | ICD-10-CM

## 2023-01-18 DIAGNOSIS — D74.9 METHEMOGLOBINEMIA: Primary | ICD-10-CM

## 2023-01-18 LAB
ALBUMIN SERPL-MCNC: 3.8 G/DL (ref 3.5–5.2)
ALBUMIN/GLOB SERPL: 1.8 G/DL
ALP SERPL-CCNC: 118 U/L (ref 39–117)
ALT SERPL W P-5'-P-CCNC: 92 U/L (ref 1–33)
ANION GAP SERPL CALCULATED.3IONS-SCNC: 10.5 MMOL/L (ref 5–15)
APTT PPP: <20 SECONDS (ref 22.7–35.4)
ARTERIAL PATENCY WRIST A: POSITIVE
AST SERPL-CCNC: 16 U/L (ref 1–32)
ATMOSPHERIC PRESS: 750.8 MMHG
B PARAPERT DNA SPEC QL NAA+PROBE: NOT DETECTED
B PERT DNA SPEC QL NAA+PROBE: NOT DETECTED
BASE EXCESS BLDA CALC-SCNC: 2.3 MMOL/L (ref 0–2)
BASOPHILS # BLD AUTO: 0.01 10*3/MM3 (ref 0–0.2)
BASOPHILS NFR BLD AUTO: 0.1 % (ref 0–1.5)
BDY SITE: ABNORMAL
BILIRUB SERPL-MCNC: 1.4 MG/DL (ref 0–1.2)
BUN SERPL-MCNC: 26 MG/DL (ref 6–20)
BUN/CREAT SERPL: 37.7 (ref 7–25)
C PNEUM DNA NPH QL NAA+NON-PROBE: NOT DETECTED
CALCIUM SPEC-SCNC: 8.9 MG/DL (ref 8.6–10.5)
CHLORIDE SERPL-SCNC: 98 MMOL/L (ref 98–107)
CO2 SERPL-SCNC: 27.5 MMOL/L (ref 22–29)
CREAT SERPL-MCNC: 0.69 MG/DL (ref 0.57–1)
D DIMER PPP FEU-MCNC: 0.8 MCGFEU/ML (ref 0–0.58)
D-LACTATE SERPL-SCNC: 2.7 MMOL/L (ref 0.5–2)
DEPRECATED RDW RBC AUTO: 70.4 FL (ref 37–54)
EGFRCR SERPLBLD CKD-EPI 2021: 100.7 ML/MIN/1.73
EOSINOPHIL # BLD AUTO: 0 10*3/MM3 (ref 0–0.4)
EOSINOPHIL NFR BLD AUTO: 0 % (ref 0.3–6.2)
ERYTHROCYTE [DISTWIDTH] IN BLOOD BY AUTOMATED COUNT: 23.4 % (ref 12.3–15.4)
FLUAV SUBTYP SPEC NAA+PROBE: NOT DETECTED
FLUBV RNA ISLT QL NAA+PROBE: NOT DETECTED
GAS FLOW AIRWAY: 2 LPM
GLOBULIN UR ELPH-MCNC: 2.1 GM/DL
GLUCOSE SERPL-MCNC: 265 MG/DL (ref 65–99)
HADV DNA SPEC NAA+PROBE: NOT DETECTED
HCO3 BLDA-SCNC: 26.4 MMOL/L (ref 22–28)
HCOV 229E RNA SPEC QL NAA+PROBE: NOT DETECTED
HCOV HKU1 RNA SPEC QL NAA+PROBE: DETECTED
HCOV NL63 RNA SPEC QL NAA+PROBE: NOT DETECTED
HCOV OC43 RNA SPEC QL NAA+PROBE: NOT DETECTED
HCT VFR BLD AUTO: 34.1 % (ref 34–46.6)
HGB BLD-MCNC: 11.4 G/DL (ref 12–15.9)
HMPV RNA NPH QL NAA+NON-PROBE: NOT DETECTED
HPIV1 RNA ISLT QL NAA+PROBE: NOT DETECTED
HPIV2 RNA SPEC QL NAA+PROBE: NOT DETECTED
HPIV3 RNA NPH QL NAA+PROBE: NOT DETECTED
HPIV4 P GENE NPH QL NAA+PROBE: NOT DETECTED
IMM GRANULOCYTES # BLD AUTO: 0.4 10*3/MM3 (ref 0–0.05)
IMM GRANULOCYTES NFR BLD AUTO: 4.1 % (ref 0–0.5)
INR PPP: 0.92 (ref 0.9–1.1)
LYMPHOCYTES # BLD AUTO: 0.76 10*3/MM3 (ref 0.7–3.1)
LYMPHOCYTES NFR BLD AUTO: 7.8 % (ref 19.6–45.3)
M PNEUMO IGG SER IA-ACNC: NOT DETECTED
MCH RBC QN AUTO: 29.2 PG (ref 26.6–33)
MCHC RBC AUTO-ENTMCNC: 33.4 G/DL (ref 31.5–35.7)
MCV RBC AUTO: 87.2 FL (ref 79–97)
METHGB BLD QL: 10.6 % (ref 0–1.5)
MODALITY: ABNORMAL
MONOCYTES # BLD AUTO: 0.82 10*3/MM3 (ref 0.1–0.9)
MONOCYTES NFR BLD AUTO: 8.4 % (ref 5–12)
NEUTROPHILS NFR BLD AUTO: 7.73 10*3/MM3 (ref 1.7–7)
NEUTROPHILS NFR BLD AUTO: 79.6 % (ref 42.7–76)
NRBC BLD AUTO-RTO: 2 /100 WBC (ref 0–0.2)
NT-PROBNP SERPL-MCNC: 183 PG/ML (ref 0–900)
PCO2 BLDA: 38.4 MM HG (ref 35–45)
PH BLDA: 7.45 PH UNITS (ref 7.35–7.45)
PLATELET # BLD AUTO: 89 10*3/MM3 (ref 140–450)
PMV BLD AUTO: 9 FL (ref 6–12)
PO2 BLDA: 180.3 MM HG (ref 80–100)
POTASSIUM SERPL-SCNC: 4.3 MMOL/L (ref 3.5–5.2)
PROCALCITONIN SERPL-MCNC: 0.1 NG/ML (ref 0–0.25)
PROT SERPL-MCNC: 5.9 G/DL (ref 6–8.5)
PROTHROMBIN TIME: 12.5 SECONDS (ref 11.7–14.2)
QT INTERVAL: 363 MS
RAD ONC ARIA COURSE ID: NORMAL
RAD ONC ARIA COURSE INTENT: NORMAL
RAD ONC ARIA COURSE LAST TREATMENT DATE: NORMAL
RAD ONC ARIA COURSE START DATE: NORMAL
RAD ONC ARIA COURSE TREATMENT ELAPSED DAYS: 37
RAD ONC ARIA FIRST TREATMENT DATE: NORMAL
RAD ONC ARIA PLAN FRACTIONS TREATED TO DATE: 2
RAD ONC ARIA PLAN ID: NORMAL
RAD ONC ARIA PLAN PRESCRIBED DOSE PER FRACTION: 2 GY
RAD ONC ARIA PLAN PRIMARY REFERENCE POINT: NORMAL
RAD ONC ARIA PLAN TOTAL FRACTIONS PRESCRIBED: 7
RAD ONC ARIA PLAN TOTAL PRESCRIBED DOSE: 1400 CGY
RAD ONC ARIA REFERENCE POINT DOSAGE GIVEN TO DATE: 4 GY
RAD ONC ARIA REFERENCE POINT ID: NORMAL
RAD ONC ARIA REFERENCE POINT SESSION DOSAGE GIVEN: 2 GY
RBC # BLD AUTO: 3.91 10*6/MM3 (ref 3.77–5.28)
RHINOVIRUS RNA SPEC NAA+PROBE: NOT DETECTED
RSV RNA NPH QL NAA+NON-PROBE: NOT DETECTED
SAO2 % BLDCOA: 99.7 % (ref 92–99)
SARS-COV-2 RNA NPH QL NAA+NON-PROBE: NOT DETECTED
SODIUM SERPL-SCNC: 136 MMOL/L (ref 136–145)
TOTAL RATE: 16 BREATHS/MINUTE
TROPONIN T SERPL-MCNC: <0.01 NG/ML (ref 0–0.03)
WBC NRBC COR # BLD: 9.72 10*3/MM3 (ref 3.4–10.8)

## 2023-01-18 PROCEDURE — 77386 CHG INTENSITY MODULATED RADIATION TX DLVR COMPLEX: CPT | Performed by: RADIOLOGY

## 2023-01-18 PROCEDURE — 85610 PROTHROMBIN TIME: CPT | Performed by: EMERGENCY MEDICINE

## 2023-01-18 PROCEDURE — 71045 X-RAY EXAM CHEST 1 VIEW: CPT

## 2023-01-18 PROCEDURE — 94799 UNLISTED PULMONARY SVC/PX: CPT

## 2023-01-18 PROCEDURE — 99284 EMERGENCY DEPT VISIT MOD MDM: CPT

## 2023-01-18 PROCEDURE — 85730 THROMBOPLASTIN TIME PARTIAL: CPT | Performed by: EMERGENCY MEDICINE

## 2023-01-18 PROCEDURE — 0202U NFCT DS 22 TRGT SARS-COV-2: CPT | Performed by: EMERGENCY MEDICINE

## 2023-01-18 PROCEDURE — 36415 COLL VENOUS BLD VENIPUNCTURE: CPT

## 2023-01-18 PROCEDURE — 93010 ELECTROCARDIOGRAM REPORT: CPT | Performed by: INTERNAL MEDICINE

## 2023-01-18 PROCEDURE — 83880 ASSAY OF NATRIURETIC PEPTIDE: CPT | Performed by: EMERGENCY MEDICINE

## 2023-01-18 PROCEDURE — 85025 COMPLETE CBC W/AUTO DIFF WBC: CPT | Performed by: EMERGENCY MEDICINE

## 2023-01-18 PROCEDURE — 82803 BLOOD GASES ANY COMBINATION: CPT

## 2023-01-18 PROCEDURE — 80053 COMPREHEN METABOLIC PANEL: CPT | Performed by: EMERGENCY MEDICINE

## 2023-01-18 PROCEDURE — 77386: CPT | Performed by: RADIOLOGY

## 2023-01-18 PROCEDURE — 85379 FIBRIN DEGRADATION QUANT: CPT | Performed by: EMERGENCY MEDICINE

## 2023-01-18 PROCEDURE — 84145 PROCALCITONIN (PCT): CPT | Performed by: EMERGENCY MEDICINE

## 2023-01-18 PROCEDURE — 36600 WITHDRAWAL OF ARTERIAL BLOOD: CPT

## 2023-01-18 PROCEDURE — 71275 CT ANGIOGRAPHY CHEST: CPT

## 2023-01-18 PROCEDURE — 94761 N-INVAS EAR/PLS OXIMETRY MLT: CPT

## 2023-01-18 PROCEDURE — 87040 BLOOD CULTURE FOR BACTERIA: CPT | Performed by: EMERGENCY MEDICINE

## 2023-01-18 PROCEDURE — 93005 ELECTROCARDIOGRAM TRACING: CPT | Performed by: EMERGENCY MEDICINE

## 2023-01-18 PROCEDURE — 83605 ASSAY OF LACTIC ACID: CPT | Performed by: EMERGENCY MEDICINE

## 2023-01-18 PROCEDURE — 84484 ASSAY OF TROPONIN QUANT: CPT | Performed by: EMERGENCY MEDICINE

## 2023-01-18 PROCEDURE — 83050 HGB METHEMOGLOBIN QUAN: CPT | Performed by: EMERGENCY MEDICINE

## 2023-01-18 PROCEDURE — 0 IOPAMIDOL PER 1 ML: Performed by: EMERGENCY MEDICINE

## 2023-01-18 RX ORDER — SODIUM CHLORIDE 0.9 % (FLUSH) 0.9 %
10 SYRINGE (ML) INJECTION AS NEEDED
Status: DISCONTINUED | OUTPATIENT
Start: 2023-01-18 | End: 2023-01-18 | Stop reason: HOSPADM

## 2023-01-18 RX ADMIN — SODIUM CHLORIDE 500 ML: 9 INJECTION, SOLUTION INTRAVENOUS at 14:54

## 2023-01-18 RX ADMIN — IOPAMIDOL 95 ML: 755 INJECTION, SOLUTION INTRAVENOUS at 15:53

## 2023-01-18 NOTE — DISCHARGE INSTRUCTIONS
Discontinue the use of your dapsone.  Return to the emergency department or call your primary care physician/oncologist if you experience increasing shortness of breath, cough, fever or coughing up blood.    Return to the emergency department with worsening symptoms, uncontrolled pain, inability to tolerate oral liquids, fever greater than 105°F not controlled by Tylenol and ibuprofen or as needed with emergent concerns.

## 2023-01-18 NOTE — PROGRESS NOTES
Received a call from Dr. Jose M Pettit.  We discussed the case over the phone.  I reviewed the patient's chart.  Patient does not qualify for admission to the hospital.  She does not have any complaints but they did send her over because home health saw cyanosis.  She does have ABG showing PO2 quite elevated despite being cyanotic, therefore code oximetry ABG was ordered showing methemoglobin level of 10.6.  According to clinical guidelines the patient does not need any methylene blue.  The treatment of choice is discontinuation of dapsone as the offending agent.  No need to follow-up in the office with pulmonary.  I advised Dr. Pettit to send message to her oncologist who prescribes dapsone to inform him that she will be told to stop dapsone.    Electronically signed by Juan Jose Iniguez MD, 01/18/23, 2:43 PM EST.

## 2023-01-18 NOTE — PROGRESS NOTES
Radiation Oncology  On-Treatment Note      Patient: Christie Avendaño    MRN: [id number]    Attending Physician: Juan Marr MD     Diagnosis:     ICD-10-CM ICD-9-CM   1. Glioblastoma multiforme (HCC)  C71.9 191.9       Radiation Therapy Visit:  Continue radiation therapy, Dosimetry plan remains acceptable, Films reviewed and remains acceptable, Pain assessed, Pain management planned, Radiation dose schedule reviewed and remains acceptable, Radiation technique remains acceptable and Symptoms within expected range    Radiation Treatments     Active   Plans   [id number]   Most recent treatment: Dose planned: 200 cGy (fraction 2 on 1/18/2023)   Total: Dose planned: 1,400 cGy (7 fractions)   Elapsed Days: 37      Reference Points   Boost   Most recent treatment: Dose given: 200 cGy (on 1/18/2023)   Total: Dose given: 400 cGy   Elapsed Days: 37      GBM Initial   Most recent treatment: Dose given: 200 cGy (on 1/16/2023)   Total: Dose given: 4,600 cGy   Elapsed Days: 35      calc Initial   Most recent treatment: Dose given: 202 cGy (on 1/16/2023)   Total: Dose given: 4,656 cGy   Elapsed Days: 35                    Physical Examination:  Vitals: Blood pressure 126/83, pulse 82, weight 101 kg (222 lb 9.6 oz), SpO2 (!) 89 %, not currently breastfeeding.  Vitals:    01/18/23 1246   BP: 126/83   Pulse: 82   SpO2: (!) 89%   Weight: 101 kg (222 lb 9.6 oz)     Pain Score    01/18/23 1246   PainSc: 0-No pain       Capable of only limited selfcare; confined to bed or chair more than 50% of waking hours = 3    We examined the relevant areas: yes  Findings are within the expected range for this stage of treatment: yes  -------------------------------------------------------------------------------------------------------------------    ACTION ITEMS:  Continue radiation therapy as planned    Patient  has had no appreciable clinical change in terms of her symptoms, however over the past week her oxygen saturations have decreased  significantly without obvious source.  In the past she has consistently saturated 95 to 97%.  She is currently on pneumocystis prophylaxis with, but is in theory at increased risk for pneumonia. Given her malignancy and immobility she is also at risk for DVT.  We recommended emergency department evaluation with possible CT angiogram.  Patient expressed understanding and her father was going to drive her to the emergency department for further evaluation.    Estimated Completion Date: 1/25/2023      Juan Marr MD  Radiation Oncology

## 2023-01-18 NOTE — HOME HEALTH
"Met patient taking a nap stating she was tired post radiation therapy and the associated trip. Her oxygen sat was 84% at rest in room air and did not rise beyond 88% with activity. All other vitals were wnl with no dizziness and no confusion noted. Her MDs were notified via communication noted.    Good improvement in transfer and gait skills but she reported still needing cga for sit to stand occasionally and still \"drag\" lt foot on few occasions     Plan for next visit: Strengthening exs to ble, transfer teaching and gait training."

## 2023-01-18 NOTE — ED TRIAGE NOTES
Pt via PV from CBC with low O2 sats; pt reports being in the upper 80s on room air there and in the low 80s at home yesterday when the home health nurse visited.   Pt has hx of lung cancer with partial L lung removal; hx of brain cancer and LUE and LLE paralysis d/t biospy  Pt has no complaints at this time     All triage performed with this RN wearing appropriate PPE.  Pt placed in mask upon arrival to ED.

## 2023-01-18 NOTE — ED PROVIDER NOTES
" EMERGENCY DEPARTMENT ENCOUNTER    Room Number:  37/37  Date of encounter:  1/18/2023  PCP: Tiffany Holloway MD  Historian: Patient    Patient was placed in face mask during triage process. Patient was wearing facemask when I entered the room and throughout our encounter. I wore full protective equipment throughout this patient encounter including a face mask, eye protection, and gloves. Hand hygiene was performed before donning protective equipment and again following doffing of PPE after leaving the room.    HPI:  Chief Complaint: \" I was told my oxygen was low\"  A complete HPI/ROS/PMH/PSH/SH/FH are unobtainable due to: N/A   Context: Christie Avendaño is a 57 y.o. female who is undergoing chemo and radiation for glioblastoma presents to the ED after she was noted to be hypoxemic by home health today.  Patient denies any chest pain, shortness of breath, cough, hemoptysis, fevers.  She is rather weak but is able to get around the house with a walker and has not noticed any increasing symptoms.  No black or bloody stools reported.  Patient has no prior history of coronary artery disease or pulmonary disease other than prior history of carcinoid tumor of the lung.  She does have a history of right renal mass, malignant melanoma, and ductal carcinoma in situ right breast.      MEDICAL HISTORY REVIEW  EMR reviewed:    Oncology office note 1/16/2023-reviewed    PAST MEDICAL HISTORY  Active Ambulatory Problems     Diagnosis Date Noted   • Carcinoid tumor of left lung 08/10/2020   • BRCA2 gene mutation positive in female 08/10/2020   • Papillary thyroid carcinoma (HCC) 08/10/2020   • Renal cell carcinoma of left kidney (HCC) 08/10/2020   • Essential hypertension 08/10/2020   • Postoperative hypothyroidism 08/10/2020   • Normocytic anemia 08/20/2020   • Type 2 diabetes mellitus with hyperglycemia, without long-term current use of insulin (HCC) 08/25/2019   • Neoplasm of right breast, primary tumor staging category Tis: ductal " carcinoma in situ (DCIS) 02/02/2017   • Renal mass, right 05/15/2020   • Hyperlipidemia 08/03/2017   • Malignant melanoma of right lower extremity including hip (HCC) 08/03/2021   • High grade glioma  11/22/2022   • Glioblastoma multiforme (HCC) 12/07/2022     Resolved Ambulatory Problems     Diagnosis Date Noted   • Non-small cell lung cancer, right (HCC) 08/04/2020   • SIRS (systemic inflammatory response syndrome) (HCC) 11/01/2018   • 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) 11/23/2022     Past Medical History:   Diagnosis Date   • Arthritis    • Asthma    • BRCA2 positive    • Diabetes mellitus (HCC)    • H/O Liver masses 2017   • H/O Lung masses 2017   • H/O Ovarian cyst    • H/O Right adrenal mass (CMS/HCC) 2017   • Lung cancer (HCC)    • Melanoma (HCC)    • PONV (postoperative nausea and vomiting)    • Thyroid disease          PAST SURGICAL HISTORY  Past Surgical History:   Procedure Laterality Date   • APPENDECTOMY     • BRAIN BIOPSY Right 11/28/2022    Procedure: Right frontal stereotactic needle brain biopsy;  Surgeon: Manuel Thompson MD;  Location: [facility];  Service: Neurosurgery;  Laterality: Right;   • BREAST IMPLANT SURGERY Bilateral    • CHOLECYSTECTOMY     • HYSTERECTOMY     • MASTECTOMY Bilateral    • OVARIAN CYST SURGERY     • THORACOSCOPY VIDEO ASSISTED WITH LOBECTOMY Right 10/9/2020    Procedure: BRONCHOSCOPY, THORACOSCOPY VIDEO ASSISTED WITH ANTERIOR BASAL SEGMENTECTOMY, INTERCOSTAL NERVE BLOCK;  Surgeon: Flaca Crisostomo MD;  Location: [facility] OR;  Service: Thoracic;  Laterality: Right;   • TONSILLECTOMY           FAMILY HISTORY  Family History   Problem Relation Age of Onset   • Cancer Father         cholangiocarcinoma   • Liver cancer Father    • Breast cancer Mother    • Thyroid cancer Mother 73   • Hypertension Mother    • Leukemia Paternal Grandfather         60-80, unknown type   • Uterine cancer Maternal Grandmother    • Ovarian cancer Maternal Grandmother  80   • Leukemia Maternal Grandfather         60-80, unknown type   • Colon cancer Maternal Aunt         over age of 50   • Breast cancer Maternal Aunt         80's   • Colon cancer Maternal Aunt         colon   • Breast cancer Paternal Cousin    • Diabetes Other    • Malig Hyperthermia Neg Hx          SOCIAL HISTORY  Social History     Socioeconomic History   • Marital status:      Spouse name: Jayesh   • Number of children: 2   • Years of education: High School   Tobacco Use   • Smoking status: Never   • Smokeless tobacco: Never   Vaping Use   • Vaping Use: Never used   Substance and Sexual Activity   • Alcohol use: Yes     Comment: Rare   • Drug use: Never   • Sexual activity: Yes     Partners: Male     Birth control/protection: Surgical         ALLERGIES  Morphine, Peach [prunus persica], Penicillins, and Metformin        REVIEW OF SYSTEMS  Review of Systems     All systems reviewed and negative except for those discussed in HPI.       PHYSICAL EXAM    I have reviewed the triage vital signs and nursing notes.    ED Triage Vitals [01/18/23 1330]   Temp Pulse Resp BP SpO2   97.6 °F (36.4 °C) -- -- -- (!) 88 %      Temp src Heart Rate Source Patient Position BP Location FiO2 (%)   Tympanic -- -- -- --       Physical Exam    Physical Exam   Constitutional: No distress.  Chronically ill-appearing but not overtly toxic.  HENT:  Head: Normocephalic and atraumatic.   Oropharynx: Mucous membranes are moist.   Eyes: No scleral icterus. No conjunctival pallor.  Neck: Painless range of motion noted. Neck supple.   Cardiovascular: Normal rate, regular rhythm and intact distal pulses.  Pulmonary/Chest: No respiratory distress. There are no wheezes, no rhonchi, and no rales.  No tachypnea or increased work of breathing.  Abdominal: Soft. There is no tenderness. There is no rebound and no guarding.   Musculoskeletal: Moves all extremities equally. There is no pedal edema or calf tenderness.   Neurological: Alert.  Baseline  strength and sensation noted.   Skin: Skin is pink, warm, and dry. No pallor.   Psychiatric: Mood and affect normal.   Nursing note and vitals reviewed.    LAB RESULTS  Recent Results (from the past 24 hour(s))   Rad Onc Aria Session Summary    Collection Time: 01/18/23 12:30 PM   Result Value Ref Range    Course ID C1 GBM     Course Intent Curative     Course Start Date 12/6/2022  3:59 PM     Course First Treatment Date 12/12/2022 12:56 PM     Course Last Treatment Date 1/18/2023 12:29 PM     Course Elapsed Days 37     Reference Point ID Boost     Reference Point Dosage Given to Date 4 Gy    Reference Point Session Dosage Given 2 Gy    Plan ID [id number]     Plan Fractions Treated to Date 2     Plan Total Fractions Prescribed 7     Plan Prescribed Dose Per Fraction 2 Gy    Plan Total Prescribed Dose 1,400 cGy    Plan Primary Reference Point Boost    ECG 12 Lead Dyspnea    Collection Time: 01/18/23  1:43 PM   Result Value Ref Range    QT Interval 363 ms   Comprehensive Metabolic Panel    Collection Time: 01/18/23  1:49 PM    Specimen: Blood   Result Value Ref Range    Glucose 265 (H) 65 - 99 mg/dL    BUN 26 (H) 6 - 20 mg/dL    Creatinine 0.69 0.57 - 1.00 mg/dL    Sodium 136 136 - 145 mmol/L    Potassium 4.3 3.5 - 5.2 mmol/L    Chloride 98 98 - 107 mmol/L    CO2 27.5 22.0 - 29.0 mmol/L    Calcium 8.9 8.6 - 10.5 mg/dL    Total Protein 5.9 (L) 6.0 - 8.5 g/dL    Albumin 3.8 3.5 - 5.2 g/dL    ALT (SGPT) 92 (H) 1 - 33 U/L    AST (SGOT) 16 1 - 32 U/L    Alkaline Phosphatase 118 (H) 39 - 117 U/L    Total Bilirubin 1.4 (H) 0.0 - 1.2 mg/dL    Globulin 2.1 gm/dL    A/G Ratio 1.8 g/dL    BUN/Creatinine Ratio 37.7 (H) 7.0 - 25.0    Anion Gap 10.5 5.0 - 15.0 mmol/L    eGFR 100.7 >60.0 mL/min/1.73   Protime-INR    Collection Time: 01/18/23  1:49 PM    Specimen: Blood   Result Value Ref Range    Protime 12.5 11.7 - 14.2 Seconds    INR 0.92 0.90 - 1.10   aPTT    Collection Time: 01/18/23  1:49 PM    Specimen: Blood   Result Value Ref  Range    PTT <20.0 (L) 22.7 - 35.4 seconds   BNP    Collection Time: 01/18/23  1:49 PM    Specimen: Blood   Result Value Ref Range    proBNP 183.0 0.0 - 900.0 pg/mL   D-dimer, Quantitative    Collection Time: 01/18/23  1:49 PM    Specimen: Blood   Result Value Ref Range    D-Dimer, Quantitative 0.80 (H) 0.00 - 0.58 MCGFEU/mL   Troponin    Collection Time: 01/18/23  1:49 PM    Specimen: Blood   Result Value Ref Range    Troponin T <0.010 0.000 - 0.030 ng/mL   Lactic Acid, Plasma    Collection Time: 01/18/23  1:49 PM    Specimen: Blood   Result Value Ref Range    Lactate 2.7 (C) 0.5 - 2.0 mmol/L   Procalcitonin    Collection Time: 01/18/23  1:49 PM    Specimen: Blood   Result Value Ref Range    Procalcitonin 0.10 0.00 - 0.25 ng/mL   CBC Auto Differential    Collection Time: 01/18/23  1:49 PM    Specimen: Blood   Result Value Ref Range    WBC 9.72 3.40 - 10.80 10*3/mm3    RBC 3.91 3.77 - 5.28 10*6/mm3    Hemoglobin 11.4 (L) 12.0 - 15.9 g/dL    Hematocrit 34.1 34.0 - 46.6 %    MCV 87.2 79.0 - 97.0 fL    MCH 29.2 26.6 - 33.0 pg    MCHC 33.4 31.5 - 35.7 g/dL    RDW 23.4 (H) 12.3 - 15.4 %    RDW-SD 70.4 (H) 37.0 - 54.0 fl    MPV 9.0 6.0 - 12.0 fL    Platelets 89 (L) 140 - 450 10*3/mm3    Neutrophil % 79.6 (H) 42.7 - 76.0 %    Lymphocyte % 7.8 (L) 19.6 - 45.3 %    Monocyte % 8.4 5.0 - 12.0 %    Eosinophil % 0.0 (L) 0.3 - 6.2 %    Basophil % 0.1 0.0 - 1.5 %    Immature Grans % 4.1 (H) 0.0 - 0.5 %    Neutrophils, Absolute 7.73 (H) 1.70 - 7.00 10*3/mm3    Lymphocytes, Absolute 0.76 0.70 - 3.10 10*3/mm3    Monocytes, Absolute 0.82 0.10 - 0.90 10*3/mm3    Eosinophils, Absolute 0.00 0.00 - 0.40 10*3/mm3    Basophils, Absolute 0.01 0.00 - 0.20 10*3/mm3    Immature Grans, Absolute 0.40 (H) 0.00 - 0.05 10*3/mm3    nRBC 2.0 (H) 0.0 - 0.2 /100 WBC   Blood Gas, Arterial -    Collection Time: 01/18/23  1:55 PM    Specimen: Arterial Blood   Result Value Ref Range    Site Arterial: right radial     Alexis's Test Positive     pH, Arterial  7.446 7.350 - 7.450 pH units    pCO2, Arterial 38.4 35.0 - 45.0 mm Hg    pO2, Arterial 180.3 (H) 80.0 - 100.0 mm Hg    HCO3, Arterial 26.4 22.0 - 28.0 mmol/L    Base Excess, Arterial 2.3 (H) 0.0 - 2.0 mmol/L    O2 Saturation Calculated 99.7 (H) 92.0 - 99.0 %    Barometric Pressure for Blood Gas 750.8 mmHg    Modality Cannula     Flow Rate 2 lpm    Rate 16 Breaths/minute   Respiratory Panel PCR w/COVID-19(SARS-CoV-2) [facility] In-House, NP Swab in UTM/VTM, 3-4 HR TAT - Swab, Nasopharynx    Collection Time: 01/18/23  2:08 PM    Specimen: Nasopharynx; Swab   Result Value Ref Range    ADENOVIRUS, PCR Not Detected Not Detected    Coronavirus 229E Not Detected Not Detected    Coronavirus HKU1 Detected (A) Not Detected    Coronavirus NL63 Not Detected Not Detected    Coronavirus OC43 Not Detected Not Detected    COVID19 Not Detected Not Detected - Ref. Range    Human Metapneumovirus Not Detected Not Detected    Human Rhinovirus/Enterovirus Not Detected Not Detected    Influenza A PCR Not Detected Not Detected    Influenza B PCR Not Detected Not Detected    Parainfluenza Virus 1 Not Detected Not Detected    Parainfluenza Virus 2 Not Detected Not Detected    Parainfluenza Virus 3 Not Detected Not Detected    Parainfluenza Virus 4 Not Detected Not Detected    RSV, PCR Not Detected Not Detected    Bordetella pertussis pcr Not Detected Not Detected    Bordetella parapertussis PCR Not Detected Not Detected    Chlamydophila pneumoniae PCR Not Detected Not Detected    Mycoplasma pneumo by PCR Not Detected Not Detected   Methemoglobin    Collection Time: 01/18/23  2:20 PM    Specimen: Arm, Right; Blood   Result Value Ref Range    Methemoglobin 10.60 (H) 0.00 - 1.50 %       Ordered the above labs and independently reviewed the results.        RADIOLOGY  XR Chest 1 View    Result Date: 1/18/2023  XR CHEST 1 VW-  HISTORY: Female who is 57 years-old,  hypoxia  TECHNIQUE: Frontal views of the chest  COMPARISON:  11/22/2022  FINDINGS: Heart size is borderline. Pulmonary vasculature is unremarkable. Old granulomatous disease is apparent. No focal pulmonary consolidation, pleural effusion, or pneumothorax. No acute osseous process.      No focal pulmonary consolidation. Borderline heart size. Follow-up as clinical indications persist.  This report was finalized on 1/18/2023 2:38 PM by Dr. Jaspal Snow M.D.        I ordered the above noted radiological studies. Reviewed by me and discussed with radiologist.  See dictation for official radiology interpretation.      PROCEDURES    Procedures        MEDICATIONS GIVEN IN ER    Medications   sodium chloride 0.9 % flush 10 mL (has no administration in time range)   sodium chloride 0.9 % bolus 500 mL (500 mL Intravenous New Bag 1/18/23 1454)   iopamidol (ISOVUE-370) 76 % injection 100 mL (95 mL Intravenous Given 1/18/23 1553)         PROGRESS, DATA ANALYSIS, CONSULTS, AND MEDICAL DECISION MAKING    My differential diagnosis for dyspnea/acute hypoxemic respiratory failure includes but is not limited to:  Asthma, COPD, pneumonia, pulmonary embolus, acute respiratory distress syndrome, pneumothorax, pleural effusion, pulmonary fibrosis, congestive heart failure, myocardial infarction, DKA, uremia, acidosis, sepsis, anemia, drug related, hyperventilation, CNS disease      All labs have been independently reviewed by me.  All radiology studies have been reviewed by me and discussed with radiologist dictating the report.   EKG's independently viewed and interpreted by me.  Discussion below represents my analysis of pertinent findings related to patient's condition, differential diagnosis, treatment plan and final disposition.      ED Course as of 01/18/23 1614   Wed Jan 18, 2023   1348 EKG           EKG time: 1343  Rhythm/Rate: Sinus, 75  P waves and DE: BOGDAN within normal limits  QRS, axis: Narrow complex  ST and T waves: No STEMI; QTC within normal limits    Interpreted  Contemporaneously by me, independently viewed  Comparison: 11/22/2022   [RS]   1353 Certainly high-level concern for pulmonary embolism causing the patient's symptoms though she is not tachycardic and has no chest discomfort.  Her lungs sound clear on auscultation.  She is agreeable with plan for laboratory and radiologic evaluation including ABG and probable CT angiography of the chest. [RS]   1400 pH, Arterial: 7.446 [RS]   1401 pCO2, Arterial: 38.4 [RS]   1401 pO2, Arterial(!): 180.3 [RS]   1401 O2 Saturation Calculated(!): 99.7 [RS]   1401 ABG suggest that peripheral pulse oximetry is not accurate on this patient to assess her arterial oxygenation.  We will move arm pulse oximetry to her forehead and continue evaluation. [RS]   1431 Procalcitonin: 0.10 [RS]   1431 Troponin T: <0.010 [RS]   1431 proBNP: 183.0 [RS]   1431 WBC: 9.72 [RS]   1431 Hemoglobin(!): 11.4 [RS]   1431 Glucose(!): 265 [RS]   1431 BUN(!): 26 [RS]   1431 Creatinine: 0.69 [RS]   1431 Sodium: 136 [RS]   1431 Potassium: 4.3 [RS]   1431 ALT (SGPT)(!): 92 [RS]   1431 AST (SGOT): 16 [RS]   1431 Total Bilirubin(!): 1.4 [RS]   1431 Methemoglobin(!): 10.60  Plan to discuss case with pulmonology. [RS]   1432 Personally reviewed the patient's single view portable chest x-ray concomitant with treatment.  Right infrahilar abnormality.  No appreciable pneumothorax [RS]   1443 CONSULT        Provider: Dr. Iniguez-pulmonology    Discussion: Reviewed patient history, ED presentation and evaluation.  Dapsone is causing methemoglobinemia.  Patient's level not high enough to warrant immediate intervention such as methylene blue.  No supplemental oxygen required at this time.  Cessation of dapsone recommended as well as consultation with oncology.    Agreeable c treatment and planned disposition.         [RS]   1444 Lactate(!!): 2.7  Likely secondary to the respiratory dysfunction caused by the methemoglobinemia. [RS]   1455 CONSULT        Provider:   Collins-oncology    Discussion: Reviewed patient history, ED presentation and evaluation including consultation with pulmonology and recommendation to discontinue dapsone.  He agrees with plan to get the CT angiography of the chest while she is here as well as plan to discontinue dapsone and have her follow-up closely in the outpatient office assuming remaining testing is unremarkable.    Agreeable c treatment and planned disposition.         [RS]   1519 Monocytes Absolute: 0.82 [RS]   1519 Coronavirus HKU1(!): Detected [RS]   1519 COVID19: Not Detected [RS]   1519 Influenza A PCR: Not Detected [RS]   1519 Influenza B PCR: Not Detected [RS]   1613 Patient still resting comfortably.  Reviewed all labs to this point and plan to discontinue dapsone and discontinue supplemental oxygen.  We discussed pending CT angiography of the chest and if negative, she is agreeable with plan for discharge home.  My partner Dr. Mcdonough is agreeable to follow-up on that final result. [RS]      ED Course User Index  [RS] Jose M Pettit MD       AS OF 16:14 EST VITALS:    BP - 119/72  HR - 71  TEMP - 97.6 °F (36.4 °C) (Tympanic)  O2 SATS - 92%        DIAGNOSIS  Final diagnoses:   Methemoglobinemia   Coronavirus infection-not COVID-19   Lactic acid increased   Glioblastoma (HCC)         DISPOSITION  DISCHARGE    Patient discharged in stable condition.    Reviewed implications of results, diagnosis, meds, responsibility to follow up, warning signs and symptoms of possible worsening, potential complications and reasons to return to ER.    Patient/Family voiced understanding of above instructions.    Discussed plan for discharge, as there is no emergent indication for admission. Patient referred to primary care provider for regular health maintenance. Pt/family is agreeable and understands need for follow up and possible repeat testing.  Pt is aware that discharge does not mean that nothing is wrong but it indicates no emergency is present that  requires admission and they must continue care with follow-up as given below or physician of their choice.     FOLLOW-UP  Tiffany Holloway MD  [address] [facility] [address]  [place] [address]  [phone]    Schedule an appointment as soon as possible for a visit   As needed    Mathew Collins Jr., MD  [address]  [place] [address]  [phone]    Schedule an appointment as soon as possible for a visit in 1 week           Medication List      Stop    dapsone 100 MG tablet               Jose M Pettit MD  01/18/23 5073

## 2023-01-19 ENCOUNTER — HOME CARE VISIT (OUTPATIENT)
Dept: HOME HEALTH SERVICES | Facility: HOME HEALTHCARE | Age: 58
End: 2023-01-19
Payer: COMMERCIAL

## 2023-01-19 ENCOUNTER — TREATMENT (OUTPATIENT)
Dept: RADIATION ONCOLOGY | Facility: HOSPITAL | Age: 58
End: 2023-01-19
Payer: COMMERCIAL

## 2023-01-19 VITALS — HEART RATE: 74 BPM | DIASTOLIC BLOOD PRESSURE: 79 MMHG | SYSTOLIC BLOOD PRESSURE: 120 MMHG

## 2023-01-19 LAB
RAD ONC ARIA COURSE ID: NORMAL
RAD ONC ARIA COURSE INTENT: NORMAL
RAD ONC ARIA COURSE LAST TREATMENT DATE: NORMAL
RAD ONC ARIA COURSE START DATE: NORMAL
RAD ONC ARIA COURSE TREATMENT ELAPSED DAYS: 38
RAD ONC ARIA FIRST TREATMENT DATE: NORMAL
RAD ONC ARIA PLAN FRACTIONS TREATED TO DATE: 3
RAD ONC ARIA PLAN ID: NORMAL
RAD ONC ARIA PLAN PRESCRIBED DOSE PER FRACTION: 2 GY
RAD ONC ARIA PLAN PRIMARY REFERENCE POINT: NORMAL
RAD ONC ARIA PLAN TOTAL FRACTIONS PRESCRIBED: 7
RAD ONC ARIA PLAN TOTAL PRESCRIBED DOSE: 1400 CGY
RAD ONC ARIA REFERENCE POINT DOSAGE GIVEN TO DATE: 6 GY
RAD ONC ARIA REFERENCE POINT ID: NORMAL
RAD ONC ARIA REFERENCE POINT SESSION DOSAGE GIVEN: 2 GY

## 2023-01-19 PROCEDURE — 77386: CPT | Performed by: RADIOLOGY

## 2023-01-19 PROCEDURE — 77386 CHG INTENSITY MODULATED RADIATION TX DLVR COMPLEX: CPT | Performed by: RADIOLOGY

## 2023-01-19 PROCEDURE — G0153 HHCP-SVS OF S/L PATH,EA 15MN: HCPCS

## 2023-01-19 PROCEDURE — G0151 HHCP-SERV OF PT,EA 15 MIN: HCPCS

## 2023-01-20 ENCOUNTER — HOME CARE VISIT (OUTPATIENT)
Dept: HOME HEALTH SERVICES | Facility: HOME HEALTHCARE | Age: 58
End: 2023-01-20
Payer: COMMERCIAL

## 2023-01-20 ENCOUNTER — TREATMENT (OUTPATIENT)
Dept: RADIATION ONCOLOGY | Facility: HOSPITAL | Age: 58
End: 2023-01-20
Payer: COMMERCIAL

## 2023-01-20 VITALS
TEMPERATURE: 96.7 F | OXYGEN SATURATION: 88 % | HEART RATE: 74 BPM | RESPIRATION RATE: 16 BRPM | DIASTOLIC BLOOD PRESSURE: 88 MMHG | SYSTOLIC BLOOD PRESSURE: 138 MMHG

## 2023-01-20 LAB
RAD ONC ARIA COURSE ID: NORMAL
RAD ONC ARIA COURSE INTENT: NORMAL
RAD ONC ARIA COURSE LAST TREATMENT DATE: NORMAL
RAD ONC ARIA COURSE START DATE: NORMAL
RAD ONC ARIA COURSE TREATMENT ELAPSED DAYS: 39
RAD ONC ARIA FIRST TREATMENT DATE: NORMAL
RAD ONC ARIA PLAN FRACTIONS TREATED TO DATE: 4
RAD ONC ARIA PLAN ID: NORMAL
RAD ONC ARIA PLAN PRESCRIBED DOSE PER FRACTION: 2 GY
RAD ONC ARIA PLAN PRIMARY REFERENCE POINT: NORMAL
RAD ONC ARIA PLAN TOTAL FRACTIONS PRESCRIBED: 7
RAD ONC ARIA PLAN TOTAL PRESCRIBED DOSE: 1400 CGY
RAD ONC ARIA REFERENCE POINT DOSAGE GIVEN TO DATE: 8 GY
RAD ONC ARIA REFERENCE POINT ID: NORMAL
RAD ONC ARIA REFERENCE POINT SESSION DOSAGE GIVEN: 2 GY

## 2023-01-20 PROCEDURE — 77386: CPT | Performed by: RADIOLOGY

## 2023-01-20 PROCEDURE — 77386 CHG INTENSITY MODULATED RADIATION TX DLVR COMPLEX: CPT | Performed by: RADIOLOGY

## 2023-01-20 PROCEDURE — G0299 HHS/HOSPICE OF RN EA 15 MIN: HCPCS

## 2023-01-21 NOTE — HOME HEALTH
THE FOLLOWING INSTRUCTIONS WERE REVIEWED UPON DISCHARGE:      Call your doctor if you develop a new or worsening symptom    Continue to take the medications prescribed by your doctor. A medication list is attached. Be sure to keep them informed of all medications you take including over the counter herbal, vitamins/minerals and the ones you take only when needed.    Follow the diabetic diet as ordered by your doctor.     Continue with home program as instructed by therapist (handouts given).    Other notes/instructions: Continue to monitor oxygen levels    Instructions given to Patient and Family 407    Instructions provided per Visit

## 2023-01-23 ENCOUNTER — OFFICE VISIT (OUTPATIENT)
Dept: PSYCHIATRY | Facility: HOSPITAL | Age: 58
End: 2023-01-23
Payer: MEDICARE

## 2023-01-23 ENCOUNTER — TREATMENT (OUTPATIENT)
Dept: RADIATION ONCOLOGY | Facility: HOSPITAL | Age: 58
End: 2023-01-23
Payer: COMMERCIAL

## 2023-01-23 VITALS
HEART RATE: 78 BPM | DIASTOLIC BLOOD PRESSURE: 60 MMHG | SYSTOLIC BLOOD PRESSURE: 110 MMHG | RESPIRATION RATE: 18 BRPM | OXYGEN SATURATION: 86 % | TEMPERATURE: 97.9 F

## 2023-01-23 DIAGNOSIS — F43.23 ADJUSTMENT DISORDER WITH MIXED ANXIETY AND DEPRESSED MOOD: ICD-10-CM

## 2023-01-23 DIAGNOSIS — F43.21 GRIEF: Primary | ICD-10-CM

## 2023-01-23 DIAGNOSIS — F41.1 GENERALIZED ANXIETY DISORDER: ICD-10-CM

## 2023-01-23 LAB
BACTERIA SPEC AEROBE CULT: NORMAL
BACTERIA SPEC AEROBE CULT: NORMAL
RAD ONC ARIA COURSE ID: NORMAL
RAD ONC ARIA COURSE INTENT: NORMAL
RAD ONC ARIA COURSE LAST TREATMENT DATE: NORMAL
RAD ONC ARIA COURSE START DATE: NORMAL
RAD ONC ARIA COURSE TREATMENT ELAPSED DAYS: 42
RAD ONC ARIA FIRST TREATMENT DATE: NORMAL
RAD ONC ARIA PLAN FRACTIONS TREATED TO DATE: 5
RAD ONC ARIA PLAN ID: NORMAL
RAD ONC ARIA PLAN PRESCRIBED DOSE PER FRACTION: 2 GY
RAD ONC ARIA PLAN PRIMARY REFERENCE POINT: NORMAL
RAD ONC ARIA PLAN TOTAL FRACTIONS PRESCRIBED: 7
RAD ONC ARIA PLAN TOTAL PRESCRIBED DOSE: 1400 CGY
RAD ONC ARIA REFERENCE POINT DOSAGE GIVEN TO DATE: 10 GY
RAD ONC ARIA REFERENCE POINT ID: NORMAL
RAD ONC ARIA REFERENCE POINT SESSION DOSAGE GIVEN: 2 GY

## 2023-01-23 PROCEDURE — 77336 RADIATION PHYSICS CONSULT: CPT | Performed by: RADIOLOGY

## 2023-01-23 PROCEDURE — 77386 CHG INTENSITY MODULATED RADIATION TX DLVR COMPLEX: CPT | Performed by: RADIOLOGY

## 2023-01-23 PROCEDURE — 77386: CPT | Performed by: RADIOLOGY

## 2023-01-23 PROCEDURE — 99214 OFFICE O/P EST MOD 30 MIN: CPT | Performed by: NURSE PRACTITIONER

## 2023-01-23 NOTE — HOME HEALTH
PT Reassessment completed. Patient is performing sit to stand consistently with sba and without physical assistance of family members between PT visits and also able to ambulate this visit on rolling walker showing slow and sometimes discontinous steps but with ability to clear lt foot at all times.    Plan for next visit: Continue therapeutic/home exercise program to ble, tranfer teaching and gait training with rollator walker    O

## 2023-01-23 NOTE — PROGRESS NOTES
[facility]  In Person Session    Subjective  Patient ID: Christie Avendaño is a 57 y.o. female is seen face to face in the [facility]. Pt is seen in company of father at her request/ permission.    CC: Fatigue    HPI:   Interval history reviewed; pt continues with temodar alongside radiation, hopeful for completion of radiation this Wednesday. Only significant SE related to treatment plan seems to be fatigue. Pt reports improving sleep at night, less intrusive nocturia. Continues to engage regularly with PT/ OT, some improvement in mobility, ability to get up out of chair on her own. Walks regularly with use of rollator, someone close by for safety. Pt denies pain, GI upset. Does endorse some headaches, blurred vision, with acceptable interventions per medical team. Denies crocheting as much as usual, citing reduced interest and left sided weakness as primary reasons. Would like to get back to this. Pt reports enjoyment being with grandchildren this past weekend. Finds this to be restorative and healing. Hopeful for more frequent visits with them. Continues to report and appreciate support from  and father. Continues to appreciate stable mood and anxiety sx on low dose lexapro. No additional needs at this time.    Has discussed advanced care planning with ; has plans to discuss with .     Objective   Mental Status Exam  Appearance:  clean and casually dressed, appropriate  Attitude toward clinician:  cooperative and agreeable   Speech:    Rate:  regular rate and rhythm   Volume:  normal  Motor:  no abnormal movements present  Mood:  Accepting, hopeful  Affect:  mood congruent  Thought Processes:  linear, logical, and goal directed  Thought Content:  normal  Suicidal Thoughts:  absent  Homicidal Thoughts:  absent  Perceptual Disturbance: no perceptual disturbance  Attention and Concentration:  good  Insight and Judgement:  good  Memory:  memory  appears to be intact    Review of Systems   Constitutional: Positive for activity change and fatigue. Negative for appetite change and unexpected weight change.   Psychiatric/Behavioral: Negative for agitation and dysphoric mood. The patient is not nervous/anxious.      Medications Reviewed:  lexapro 5 mg daily    Diagnoses and all orders for this visit:    1. Grief (Primary)    2. Adjustment disorder with mixed anxiety and depressed mood    3. Generalized anxiety disorder    Plan of Care  Pt continuse with stable mood, anxiety sx alongside various oncologic comorbidities. Continues lexapro as written.  Supported non pharmacological stree reduction techniques, specifically brittany as able.  Considered fatigue identification and mgmt, use of best time for necessary and desirable activityies.  Supported completion of advanced directives as discussed.    I spent 32 minutes caring for Christie on this date of service. This time includes time spent by me in the following activities: preparing for the visit, obtaining and/or reviewing a separately obtained history, performing a medically appropriate examination and/or evaluation, counseling and educating the patient/family/caregiver, ordering medications, tests, or procedures and documenting information in the medical record.

## 2023-01-24 ENCOUNTER — HOME CARE VISIT (OUTPATIENT)
Dept: HOME HEALTH SERVICES | Facility: HOME HEALTHCARE | Age: 58
End: 2023-01-24
Payer: COMMERCIAL

## 2023-01-24 ENCOUNTER — TREATMENT (OUTPATIENT)
Dept: RADIATION ONCOLOGY | Facility: HOSPITAL | Age: 58
End: 2023-01-24
Payer: COMMERCIAL

## 2023-01-24 VITALS — DIASTOLIC BLOOD PRESSURE: 83 MMHG | HEART RATE: 75 BPM | TEMPERATURE: 98.2 F | SYSTOLIC BLOOD PRESSURE: 118 MMHG

## 2023-01-24 LAB
RAD ONC ARIA COURSE ID: NORMAL
RAD ONC ARIA COURSE INTENT: NORMAL
RAD ONC ARIA COURSE LAST TREATMENT DATE: NORMAL
RAD ONC ARIA COURSE START DATE: NORMAL
RAD ONC ARIA COURSE TREATMENT ELAPSED DAYS: 43
RAD ONC ARIA FIRST TREATMENT DATE: NORMAL
RAD ONC ARIA PLAN FRACTIONS TREATED TO DATE: 6
RAD ONC ARIA PLAN ID: NORMAL
RAD ONC ARIA PLAN PRESCRIBED DOSE PER FRACTION: 2 GY
RAD ONC ARIA PLAN PRIMARY REFERENCE POINT: NORMAL
RAD ONC ARIA PLAN TOTAL FRACTIONS PRESCRIBED: 7
RAD ONC ARIA PLAN TOTAL PRESCRIBED DOSE: 1400 CGY
RAD ONC ARIA REFERENCE POINT DOSAGE GIVEN TO DATE: 12 GY
RAD ONC ARIA REFERENCE POINT ID: NORMAL
RAD ONC ARIA REFERENCE POINT SESSION DOSAGE GIVEN: 2 GY

## 2023-01-24 PROCEDURE — G0151 HHCP-SERV OF PT,EA 15 MIN: HCPCS

## 2023-01-24 PROCEDURE — 77386: CPT | Performed by: RADIOLOGY

## 2023-01-24 PROCEDURE — 77386 CHG INTENSITY MODULATED RADIATION TX DLVR COMPLEX: CPT | Performed by: RADIOLOGY

## 2023-01-24 PROCEDURE — G0153 HHCP-SVS OF S/L PATH,EA 15MN: HCPCS

## 2023-01-25 ENCOUNTER — RADIATION ONCOLOGY WEEKLY ASSESSMENT (OUTPATIENT)
Dept: RADIATION ONCOLOGY | Facility: HOSPITAL | Age: 58
End: 2023-01-25
Payer: MEDICARE

## 2023-01-25 ENCOUNTER — TELEPHONE (OUTPATIENT)
Dept: ONCOLOGY | Facility: CLINIC | Age: 58
End: 2023-01-25
Payer: COMMERCIAL

## 2023-01-25 VITALS
DIASTOLIC BLOOD PRESSURE: 84 MMHG | WEIGHT: 225 LBS | SYSTOLIC BLOOD PRESSURE: 126 MMHG | OXYGEN SATURATION: 94 % | HEART RATE: 77 BPM | BODY MASS INDEX: 37.49 KG/M2

## 2023-01-25 VITALS
OXYGEN SATURATION: 93 % | RESPIRATION RATE: 18 BRPM | SYSTOLIC BLOOD PRESSURE: 128 MMHG | TEMPERATURE: 97.6 F | DIASTOLIC BLOOD PRESSURE: 76 MMHG | HEART RATE: 73 BPM

## 2023-01-25 DIAGNOSIS — G93.89 BRAIN MASS: ICD-10-CM

## 2023-01-25 DIAGNOSIS — C71.9 GLIOBLASTOMA MULTIFORME: Primary | ICD-10-CM

## 2023-01-25 PROCEDURE — 77386 CHG INTENSITY MODULATED RADIATION TX DLVR COMPLEX: CPT | Performed by: RADIOLOGY

## 2023-01-25 PROCEDURE — 77386: CPT | Performed by: RADIOLOGY

## 2023-01-25 NOTE — TELEPHONE ENCOUNTER
Caller: Jayesh Avendaño    Relationship: Emergency Contact    Best call back number: [phone]    What is the best time to reach you: ANYTIME BEFORE 10AM OR AFTER 3PM     Who are you requesting to speak with (clinical staff, provider,  specific staff member): DR GRIFFIN OR HIS NURSE         What was the call regarding:   HERON WAS RELEASED FROM THE HOSPITAL DAY BEFORE [facility], AND GIVEN 16 DIFFERENT MEDICATIONS. HAS RUN OUT OF THOSE AND SOME HAVE CHANGED,  HAVING ISSUES WITH [facility] GETTING REFILLED      WOULD LIKE IF COULD GO THROUGH CURRENT MED LIST AND SEE WHICH ONES DR GRIFFIN WOULD LIKE HER TO BE ON AND GO FROM THERE TO GET REFILLS     TODAY WAS LAST DAY FOR HER RADIATION TREATMENT WITH DR ALLEN.     PLEASE CALL TO DISCUSS     Do you require a callback: YES

## 2023-01-25 NOTE — TELEPHONE ENCOUNTER
Returned call to patient's spouse with the information from Dr. Collins, that she should continue on the current doses of medications, if he needs refills, Dr. Collins will refill.  He does not want her to run out of any of her medications.  He will call me back tomorrow if he needs any assistance with getting refills of the medications that we discussed today.

## 2023-01-25 NOTE — PROGRESS NOTES
Physician Weekly Management Note    Diagnosis:     Diagnosis Plan   1. Glioblastoma multiforme (HCC)            RT Details:  fx 30/30 brain/gbm 60/60Gy today    Notes on Treatment course, Films, Medical progress:  Completes tx today, kps 80%  thinks she is on decadron once daily and dr. Collins has been tapering.  Nurse patrick will call the patient and her  at home later this afternoon to confirm the dose and we will be sure she is on tapering scheduled. Follow upw concepcion jones in 4 weeks.    Weekly Management:  Medication reviewed?   Yes  New medications given?   No  Problemlist reviewed?   Yes  Problem added?   No  Issues raised requiring referral to support services - task assigned to:  vivi    Technical aspects reviewed:  Weekly OBI approved?   Yes  Weekly port films approved?   Yes  Change requests noted on port film?   No  Patient setup and plan reviewed?   Yes    Chart Reviewed:  Continue current treatment plan?   Yes  Treatment plan change requested?   No  CBC reviewed?   Yes  Concurrent Chemo?   Yes    Objective     Toxicities:   fatigue     Review of Systems   Constitutional: Positive for fatigue.   Neurological: Positive for weakness, light-headedness and headaches.   Psychiatric/Behavioral: Positive for confusion.          There were no vitals filed for this visit.    Current Status 1/16/2023   ECOG score 2       Physical Exam  Constitutional:       Appearance: She is obese.   Neurological:      General: No focal deficit present.      Mental Status: She is alert.   Psychiatric:         Mood and Affect: Mood normal.             Problem Summary List    Diagnosis:     Diagnosis Plan   1. Glioblastoma multiforme (HCC)          Pathology:   [facility]    Past Medical History:   Diagnosis Date   • Arthritis    • Asthma    • BRCA2 positive    • Diabetes mellitus (HCC)    • H/O Liver masses 2017    x3   • H/O Lung masses 2017    1 in right lower lobe and 1 in the left infrahilar location   • H/O Ovarian cyst    •  H/O Right adrenal mass (CMS/HCC) 2017   • Lung cancer (HCC)    • Melanoma (HCC)     right foot   • PONV (postoperative nausea and vomiting)    • Thyroid disease          Past Surgical History:   Procedure Laterality Date   • APPENDECTOMY     • BRAIN BIOPSY Right 11/28/2022    Procedure: Right frontal stereotactic needle brain biopsy;  Surgeon: Manuel Thompson MD;  Location: [facility] OR;  Service: Neurosurgery;  Laterality: Right;   • BREAST IMPLANT SURGERY Bilateral    • CHOLECYSTECTOMY     • HYSTERECTOMY     • MASTECTOMY Bilateral    • OVARIAN CYST SURGERY     • THORACOSCOPY VIDEO ASSISTED WITH LOBECTOMY Right 10/9/2020    Procedure: BRONCHOSCOPY, THORACOSCOPY VIDEO ASSISTED WITH ANTERIOR BASAL SEGMENTECTOMY, INTERCOSTAL NERVE BLOCK;  Surgeon: Flaca Crisostomo MD;  Location: [facility] OR;  Service: Thoracic;  Laterality: Right;   • TONSILLECTOMY           Current Outpatient Medications on File Prior to Visit   Medication Sig Dispense Refill   • bimatoprost (LUMIGAN) 0.01 % ophthalmic drops Administer 1 drop to both eyes Every Night.     • Blood Glucose Monitoring Suppl (Accu-Chek Guide Me) w/Device kit 1 Device Daily. 1 kit 0   • Calcium Carb-Cholecalciferol (Oyster Shell Calcium/D3) 500-10 MG-MCG tablet Take 1 tablet by mouth 2 (Two) Times a Day. 180 tablet 0   • Continuous Blood Gluc Sensor ([employer] Sensor System) Every 10 (Ten) Days. 3 each 5   • dexamethasone (DECADRON) 4 MG tablet Take 1 tablet by mouth Every 8 (Eight) Hours. 90 tablet 0   • escitalopram (LEXAPRO) 5 MG tablet Take 1 tablet by mouth Daily. 90 tablet 0   • gabapentin (NEURONTIN) 100 MG capsule Take 1 capsule by mouth 3 (Three) Times a Day. 90 capsule 1   • glucose blood (Accu-Chek Guide) test strip Use 1-2 daily to check blood sugars Dx diabetes E11.9 200 each 12   • Insulin Glargine (Lantus SoloStar) 100 UNIT/ML injection pen Inject 40 Units under the skin into the appropriate area as directed Every Morning. 15 mL 1   •  Insulin Lispro, 1 Unit Dial, (HumaLOG KwikPen) 100 UNIT/ML solution pen-injector Inject 9 Units under the skin into the appropriate area as directed 3 (Three) Times a Day With Meals. 15 mL 1   • Insulin Pen Needle 32G X 4 MM misc inject insulin subcutaneous via pens up to 4 times daily. 100 each 0   • levETIRAcetam (KEPPRA) 500 MG tablet Take 1 tablet by mouth 2 (Two) Times a Day. 180 tablet 0   • levothyroxine (SYNTHROID, LEVOTHROID) 150 MCG tablet Take 150 mcg by mouth Daily.     • magnesium oxide (MAG-OX) 400 tablet tablet Take 1 tablet by mouth Daily. 90 tablet 0   • ondansetron (ZOFRAN) 8 MG tablet Take 1 tablet by mouth Daily 2 hours before radiation. Take Zofran 1 hour before Temodar. Take Temodar 1 hour before radiation therapy. 30 tablet 1   • pantoprazole (PROTONIX) 40 MG EC tablet Take 1 tablet by mouth Every Morning. 90 tablet 0   • polyethylene glycol (MIRALAX) 17 GM/SCOOP powder Take 17 g by mouth Daily. 238 g 0   • propranolol (INDERAL) 20 MG tablet Take 1 tablet by mouth Every 8 (Eight) Hours. Hold if Heart Rate <60 or SBP <110 90 tablet 1   • temozolomide (TEMODAR) 140 MG chemo capsule Take 1 capsule by mouth with 2 other temozolomide prescriptions for 165 mg total Daily. Take 3 tablets total for additional 7 days 7 capsule 0   • temozolomide (TEMODAR) 20 MG chemo capsule Take 1 capsule by mouth with 2 other temozolomide prescriptions for 165 mg total Daily. Take 3 tablets total for additional 7 days 7 capsule 0   • temozolomide (TEMODAR) 5 MG chemo capsule Take 1 capsule by mouth with 2 other temozolomide prescriptions for 165 mg total Daily. Take 3 tablets total for additional 7 days 7 capsule 0     No current facility-administered medications on file prior to visit.       Allergies   Allergen Reactions   • Morphine Anaphylaxis     Hives and throat swelling   • Peach [Prunus Persica] Anaphylaxis   • Penicillins Anaphylaxis   • Metformin Diarrhea         Referring Provider:    No referring provider  defined for this encounter.    Oncologist:  No primary care provider on file.      Seen and approved by:  Mally Corrales MD  01/25/2023  Subjective     No ref. provider found    Cancer Staging   No matching staging information was found for the patient.    I am writing to let you know   has recently completed the radiation therapy portion of treatment for cancer.  Her treatment course is summarized below:    Site Treated:         Dates Of Treatment:      Total Dose and Treatment Technique:     a total dose of     Patient Tolerance and Response to Treatment:      tolerated her  treatments well.   had no breaks in the course of treatment.   also developed mild to moderate fatigue near the completion        Status of Tumor/Patient at Completion of Therapy:      Disposition:  Follow up 4 weeks or sooner if necessary.  I have also placed a referral to the Survivorship clinic.

## 2023-01-25 NOTE — TELEPHONE ENCOUNTER
Returned call to spouse and reviewed every medication with him.  Any medication that Dr. Araujo ordered as her Rehab MD should be obtained through her PCP. She should be continued on all of the medications listed on her medication list.  She has completed Radiation and he needs to know what her Decadron dose should be going forward and what her Temodar dosing should be.  She is currently taking Dexamethasone 4 mg bid and Temodar total dose of 165 mg daily.  Needs directions if medications are to change and will need scripts sent to [facility].

## 2023-01-25 NOTE — HOME HEALTH
Skill/education provided: Doing remarkably well on transfer needing distant supervision and no longer demonstrates multiple attempts. Also showing good improvement in gait skills by showing good lt foot clearance without tripping on lt foot. Patient instructed to begin short distances ambulation during the day without being followed by family but still continue to need sba of a family member first thing in the morning and at night. Patient/family stated good understanding.    Plan for next visit: Therapeutic/home exercise program to ble, transfer teaching and gait training

## 2023-01-26 ENCOUNTER — HOME CARE VISIT (OUTPATIENT)
Dept: HOME HEALTH SERVICES | Facility: HOME HEALTHCARE | Age: 58
End: 2023-01-26
Payer: COMMERCIAL

## 2023-01-26 VITALS
HEART RATE: 71 BPM | DIASTOLIC BLOOD PRESSURE: 78 MMHG | TEMPERATURE: 98.5 F | SYSTOLIC BLOOD PRESSURE: 130 MMHG | OXYGEN SATURATION: 97 %

## 2023-01-26 LAB
RAD ONC ARIA COURSE ID: NORMAL
RAD ONC ARIA COURSE INTENT: NORMAL
RAD ONC ARIA COURSE LAST TREATMENT DATE: NORMAL
RAD ONC ARIA COURSE START DATE: NORMAL
RAD ONC ARIA COURSE TREATMENT ELAPSED DAYS: 44
RAD ONC ARIA FIRST TREATMENT DATE: NORMAL
RAD ONC ARIA PLAN FRACTIONS TREATED TO DATE: 7
RAD ONC ARIA PLAN ID: NORMAL
RAD ONC ARIA PLAN PRESCRIBED DOSE PER FRACTION: 2 GY
RAD ONC ARIA PLAN PRIMARY REFERENCE POINT: NORMAL
RAD ONC ARIA PLAN TOTAL FRACTIONS PRESCRIBED: 7
RAD ONC ARIA PLAN TOTAL PRESCRIBED DOSE: 1400 CGY
RAD ONC ARIA REFERENCE POINT DOSAGE GIVEN TO DATE: 14 GY
RAD ONC ARIA REFERENCE POINT ID: NORMAL
RAD ONC ARIA REFERENCE POINT SESSION DOSAGE GIVEN: 2 GY

## 2023-01-26 PROCEDURE — G0151 HHCP-SERV OF PT,EA 15 MIN: HCPCS

## 2023-01-26 PROCEDURE — G0153 HHCP-SVS OF S/L PATH,EA 15MN: HCPCS

## 2023-01-26 RX ORDER — VALACYCLOVIR HYDROCHLORIDE 500 MG/1
500 TABLET, FILM COATED ORAL
Qty: 30 TABLET | Refills: 0 | Status: SHIPPED | OUTPATIENT
Start: 2023-01-26 | End: 2023-02-20 | Stop reason: ALTCHOICE

## 2023-01-26 RX ORDER — DEXAMETHASONE 4 MG/1
4 TABLET ORAL EVERY 8 HOURS SCHEDULED
Qty: 90 TABLET | Refills: 0 | Status: SHIPPED | OUTPATIENT
Start: 2023-01-26

## 2023-01-26 RX ORDER — ONDANSETRON HYDROCHLORIDE 8 MG/1
8 TABLET, FILM COATED ORAL EVERY 24 HOURS
Qty: 30 TABLET | Refills: 1 | Status: SHIPPED | OUTPATIENT
Start: 2023-01-26 | End: 2023-02-08 | Stop reason: SDUPTHER

## 2023-01-26 RX ORDER — PEN NEEDLE, DIABETIC 32GX 5/32"
NEEDLE, DISPOSABLE MISCELLANEOUS
Qty: 100 EACH | Refills: 0 | Status: SHIPPED | OUTPATIENT
Start: 2023-01-26

## 2023-01-26 NOTE — HOME HEALTH
Routine Visit Note:    Skill/education provided:thera ex, transfer training, balance and gait training.     Patient/caregiver response:tolerated with report of fatigue    Plan for next visit: thera ex, transfer training, balance and gait training.

## 2023-01-27 ENCOUNTER — HOME CARE VISIT (OUTPATIENT)
Dept: HOME HEALTH SERVICES | Facility: HOME HEALTHCARE | Age: 58
End: 2023-01-27
Payer: COMMERCIAL

## 2023-01-27 VITALS
OXYGEN SATURATION: 98 % | SYSTOLIC BLOOD PRESSURE: 129 MMHG | DIASTOLIC BLOOD PRESSURE: 89 MMHG | HEART RATE: 77 BPM | TEMPERATURE: 97.7 F

## 2023-01-27 VITALS
HEART RATE: 82 BPM | SYSTOLIC BLOOD PRESSURE: 110 MMHG | TEMPERATURE: 98.2 F | DIASTOLIC BLOOD PRESSURE: 70 MMHG | OXYGEN SATURATION: 97 %

## 2023-01-27 PROCEDURE — G0152 HHCP-SERV OF OT,EA 15 MIN: HCPCS

## 2023-01-31 ENCOUNTER — HOME CARE VISIT (OUTPATIENT)
Dept: HOME HEALTH SERVICES | Facility: HOME HEALTHCARE | Age: 58
End: 2023-01-31
Payer: COMMERCIAL

## 2023-01-31 PROCEDURE — G0151 HHCP-SERV OF PT,EA 15 MIN: HCPCS

## 2023-02-01 ENCOUNTER — HOME CARE VISIT (OUTPATIENT)
Dept: HOME HEALTH SERVICES | Facility: HOME HEALTHCARE | Age: 58
End: 2023-02-01
Payer: COMMERCIAL

## 2023-02-01 VITALS
SYSTOLIC BLOOD PRESSURE: 110 MMHG | DIASTOLIC BLOOD PRESSURE: 58 MMHG | TEMPERATURE: 97.7 F | OXYGEN SATURATION: 97 % | HEART RATE: 88 BPM | RESPIRATION RATE: 18 BRPM

## 2023-02-01 DIAGNOSIS — C71.9 GLIOBLASTOMA MULTIFORME: Primary | ICD-10-CM

## 2023-02-01 PROCEDURE — G0152 HHCP-SERV OF OT,EA 15 MIN: HCPCS

## 2023-02-01 NOTE — HOME HEALTH
Patient met sit to stand goal, ambulating short distances indoor without assistive device of a family as instructed and continue to demonstrate good lt foot clearance on rollator walker.    Plan for next visit: Possible PT discharge next visit if no fresh complaint. Patient/family verbalized good understanding.

## 2023-02-02 ENCOUNTER — HOME CARE VISIT (OUTPATIENT)
Dept: HOME HEALTH SERVICES | Facility: HOME HEALTHCARE | Age: 58
End: 2023-02-02
Payer: COMMERCIAL

## 2023-02-02 VITALS
OXYGEN SATURATION: 98 % | HEART RATE: 78 BPM | SYSTOLIC BLOOD PRESSURE: 120 MMHG | TEMPERATURE: 98 F | RESPIRATION RATE: 17 BRPM | DIASTOLIC BLOOD PRESSURE: 68 MMHG

## 2023-02-02 VITALS
SYSTOLIC BLOOD PRESSURE: 118 MMHG | HEART RATE: 79 BPM | RESPIRATION RATE: 18 BRPM | TEMPERATURE: 98.1 F | DIASTOLIC BLOOD PRESSURE: 85 MMHG | OXYGEN SATURATION: 96 %

## 2023-02-02 PROCEDURE — G0153 HHCP-SVS OF S/L PATH,EA 15MN: HCPCS

## 2023-02-02 PROCEDURE — G0151 HHCP-SERV OF PT,EA 15 MIN: HCPCS

## 2023-02-03 NOTE — HOME HEALTH
Patient sitting up in chair.  Pleasant and cooperative.  No new complaints.  Voiced understanding of dc but would like to continue at  later date due to inconvenience of outpatient.    Patient demonstrates good understanding of all previous teaching anf HEP    DC OT services

## 2023-02-06 VITALS
DIASTOLIC BLOOD PRESSURE: 62 MMHG | SYSTOLIC BLOOD PRESSURE: 118 MMHG | HEART RATE: 67 BPM | OXYGEN SATURATION: 98 % | RESPIRATION RATE: 18 BRPM | TEMPERATURE: 97.7 F

## 2023-02-06 NOTE — HOME HEALTH
Routine Visit Note:  Patient is independent and compliant with illustrated home exercise program to ble. Functionally, she is independent in sit to stand and able to ambulate safely and independently indoor with rollator walker.    Plan for next visit: All goals met and patient/family agreed with PT discharge.

## 2023-02-07 ENCOUNTER — HOSPITAL ENCOUNTER (OUTPATIENT)
Dept: MRI IMAGING | Facility: HOSPITAL | Age: 58
Discharge: HOME | End: 2023-02-07
Admitting: INTERNAL MEDICINE
Payer: COMMERCIAL

## 2023-02-07 ENCOUNTER — HOME CARE VISIT (OUTPATIENT)
Dept: HOME HEALTH SERVICES | Facility: HOME HEALTHCARE | Age: 58
End: 2023-02-07
Payer: COMMERCIAL

## 2023-02-07 DIAGNOSIS — C71.9 GLIOBLASTOMA MULTIFORME: ICD-10-CM

## 2023-02-07 LAB
CREAT BLDA-MCNC: 0.5 MG/DL (ref 0.6–1.3)
EGFRCR SERPLBLD CKD-EPI 2021: 108.9 ML/MIN/1.73

## 2023-02-07 PROCEDURE — 70553 MRI BRAIN STEM W/O & W/DYE: CPT

## 2023-02-07 PROCEDURE — A9579 GAD-BASE MR CONTRAST NOS,1ML: HCPCS | Performed by: INTERNAL MEDICINE

## 2023-02-07 PROCEDURE — 82565 ASSAY OF CREATININE: CPT

## 2023-02-07 PROCEDURE — 25010000002 GADOTERIDOL PER 1 ML: Performed by: INTERNAL MEDICINE

## 2023-02-07 RX ORDER — DAPSONE 100 MG/1
100 TABLET ORAL DAILY
Qty: 90 TABLET | Refills: 3 | Status: SHIPPED | OUTPATIENT
Start: 2023-02-07 | End: 2023-02-08

## 2023-02-07 RX ADMIN — GADOTERIDOL 20 ML: 279.3 INJECTION, SOLUTION INTRAVENOUS at 16:26

## 2023-02-07 NOTE — PROGRESS NOTES
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  She does report taking the Temodar for 3 additional days after completion of radiation and therefore stopped this on 1/28/2023.  She has had some ongoing fatigue.  She has had chronic difficulty with neurologic complications from her disease with weakness mainly in the left upper extremity and to a lesser extent the left lower extremity.  Her  strength is improved however her dexterity in the left hand is still significantly compromised limiting her abilities and this is quite frustrating for her.  She is in a wheelchair today, is able to ambulate with use of a walker at home although her balance is affected and she has had some minor falls.  She always ambulates with assistance.  She does try to make 3-4 laps around the house per day.  She has noticed some weakness in her upper legs, likely related to ongoing steroid use.  She does continue on dexamethasone 4 mg twice daily.  Appetite is stable, weight is increased to 232 pounds.  She does note some minimal epistaxis in the mornings when she blows her nose.  She does have some ongoing confusion.  She reports being oriented to person and place but has difficulty with time, particularly knowing the day and this has been an ongoing issue.  She had developed hypoxia of unclear etiology and was sent to the emergency department  "on 1/18/2023 and ultimately was found to have evidence of methemoglobinemia from dapsone.  Methemoglobin level was elevated at 10.6.  Dapsone was discontinued and the patient's hypoxia resolved.  At that time, she did also have evidence of a non-COVID-19 coronavirus infection.      Objective   Vital Signs:   /76   Pulse 82   Temp 97.1 °F (36.2 °C) (Temporal)   Resp 18   Ht 165.1 cm (65\")   Wt 106 kg (232 lb 9.6 oz)   SpO2 98%   BMI 38.71 kg/m²     Physical Exam  Constitutional:       Appearance: She is well-developed.      Comments: Patient is in a wheelchair today.  Cushingoid features   Eyes:      Conjunctiva/sclera: Conjunctivae normal.   Neck:      Thyroid: No thyromegaly.   Cardiovascular:      Rate and Rhythm: Normal rate and regular rhythm.      Heart sounds: No murmur heard.    No friction rub. No gallop.   Pulmonary:      Effort: No respiratory distress.      Breath sounds: Normal breath sounds.   Abdominal:      General: Bowel sounds are normal. There is no distension.      Palpations: Abdomen is soft.      Tenderness: There is no abdominal tenderness.   Lymphadenopathy:      Head:      Right side of head: No submandibular adenopathy.      Cervical: No cervical adenopathy.      Upper Body:      Right upper body: No supraclavicular adenopathy.      Left upper body: No supraclavicular adenopathy.   Skin:     General: Skin is warm and dry.      Findings: No rash.   Neurological:      Mental Status: She is alert and oriented to person, place, and time.      Cranial Nerves: No cranial nerve deficit.      Motor: No abnormal muscle tone.      Comments: Slight weakness noted in the left upper and lower extremities although  strength in the left upper extremity improved compared to last exam.   Psychiatric:         Behavior: Behavior normal.            Result Review : Reviewed CBC, CMP from today.  Reviewed emergency department records from 1/18/2023 including CT angiogram chest, methemoglobin " level.  Reviewed radiation oncology records.       Assessment and Plan  1. Glioblastoma multiforme  · Patient presented with falls and confusion, left upper and lower extremity weakness  · MRI brain 11/22/2022 with 3.2 cm rim-enhancing right supratentorial mass in the right thalamus and internal capsule with mass-effect and leftward midline shift  · Initiated dexamethasone 4 mg p.o. every 8 hours seizure prophylaxis with Keppra 500 mg twice daily  · Patient was seen by neurosurgery and was not a candidate for resection due to location.  · CT chest abdomen pelvis 11/24/2022 showed no evidence of source for potential metastatic disease  · Stereotactic brain biopsy 11/28/2022 with glioblastoma, IDH1 R132H negative, grade 4, MGMT promoter unmethylated  · On 12/12/2022, patient initiated concurrent chemoradiation with low-dose Temodar 75 mg/m² (165 mg) daily  · Patient completed radiation therapy on 1/25/2023 and discontinued Temodar on 1/28/2023 (took for 3 additional days after radiation stopped).  · MRI brain 2/7/2023 with decrease in mass right thalamus and internal capsule from 3.0 x 2.8 x 3.2 cm down to 2.8 x 2.5 x 3.1 cm.  Slight decrease in mass effect and surrounding edema.  Evidence of hemorrhage within the mass.  Mass effect on right lateral ventricle with 4 mm right to left midline shift unchanged.  · Patient returns today having completed her concurrent chemoradiation as noted above and with follow-up MRI brain to review.  Certainly we are performing the MRI at an early interval after completion of treatment and are not capturing the full effect of her radiation therapy.  Nevertheless there is some improvement in the size of the mass and the surrounding vasogenic edema.  There is some degree of hemorrhage within the mass related to treatment effect.  Patient is relatively stable clinically.  She has had some cumulative fatigue on treatment which will hopefully improve in the coming weeks.  She has some  ongoing limitations in regards to her mobility and dexterity due to weakness that persists in her left upper and lower extremities although this has improved somewhat over time.  We discussed plans to proceed to full dose maintenance Temodar at 150 mg/m² days 1-5 on a 28-day cycle.  We would not anticipate beginning treatment until she is at least 4 weeks out from completion of her concurrent chemoradiation.  We do have a significant degree of pancytopenia evident today which will be discussed further below and we will need to allow time for this to recover before beginning treatment as well.  I did discuss with the patient and with her ex- in the office today goals of care.  We discussed that we would hope to at least maintain stability of her glioblastoma for a period of time on maintenance Temodar and is unclear how effective this may be given the unmethylated MGMT promoter status.  Nevertheless it is standard therapy and I believe she is a candidate to proceed.  Patient indicates that she does wish to proceed with further treatment of her disease and does not wish to pursue palliative care at this time.  The patient is currently receiving dexamethasone 4 mg twice daily.  She is developing cushingoid features and I believe is experiencing some degree of steroid-induced myopathy in her upper legs.  We will attempt tapering dexamethasone to 4 mg in the morning and 2 mg in the evening for 1 week and then decrease to 4 mg daily.  We will discuss further Decadron taper in 2 weeks when she returns.  2. BRCA2 mutation (c.[id number]) and GEORGES mutation (c.5073dup):  · Strong family history of malignancy with a mother who had thyroid cancer and also had breast cancer at age 73 with subsequent liver metastases.  She was found to be BRCA2 positive and prompted the patient's own testing.  The patient's maternal grandmother had ovarian cancer in her mid 80s, maternal grandfather and paternal grandfather both had leukemia  of unknown type at an older age.  Her maternal aunt had colon cancer over the age of 50, maternal aunt had breast cancer in her 80s, and her father had cholangiocarcinoma.    · The patient underwent testing on 7/27/2016 showing positive BRCA2 mutation (c.5073dupA)   · The patient did undergo KAVYA/BSO in 1992 secondary to hemorrhage.  · The patient did undergo bilateral mastectomies 1/26/2017 with findings of bilateral DCIS (discussed below).  · Patient has undergone previous colonoscopy on 11/28/2017.  · Given the unusual nature of the patient's malignancies, referral to genetics clinic for panel testing performed 11/10/2020 with re-identification of BRCA2 mutation (c.5073dupA) and new identification of GEORGES mutation (c.5073dup).  · Previous plan to continue surveillance with repeat colonoscopy at a 5-year interval that would have been due late 2022.  Plans subsequently deferred due to subsequent diagnosis of glioblastoma multiforme.  2. Stage I ([id number]) papillary thyroid cancer:  · Status post total thyroidectomy with Dr. Maher in [place] on 10/15/2010.  Pathology showed papillary thyroid cancer involving the left thyroid and isthmus, multifocal with 2 areas measuring 0.9 and 1.2 cm.  No evidence of capsular invasion, no extrathyroidal extension, no lymphovascular invasion, negative margins.  · Postsurgical scan showed uptake in the thyroid bed and the patient underwent treatment with I-131.    · She has had no evidence of recurrent disease.    · Her subsequent hypothyroidism has been managed by endocrinology (Dr. Subhash Michael) in [place], currently receiving levothyroxine 150 mcg daily.  · The patient was following routinely with endocrinology, was seen last on 8/8/2022  3. Bilateral DCIS  · As above, patient has underlying BRCA2 mutation  · 8/5/2016 was found to have a right breast intraductal papilloma with fibrocystic change and microcalcifications with usual ductal hyperplasia and columnar cell  change in 3 separate locations on biopsy.  · On 8/24/2016 she underwent excisional biopsy of an intraductal papilloma from the right breast.    · She subsequently underwent on 1/26/2017 bilateral mastectomy.  On the right there was a large intraductal papilloma with usual ductal hyperplasia, atypical hyperplasia, DCIS measuring 3 mm which was low-grade, ER positive (98%), AL positive (85%).  On the left there were multiple intraductal papillomas with atypical ductal hyperplasia.  There was DCIS measuring 6 mm, low-grade, ER positive (99%), AL positive (98%).  4. Stage I ([id number]) clear-cell renal cell carcinoma:  · Incidental finding on CT scan 1/15/2020 of enhancing left renal lesions x2, largest 2.4 cm  · Resection with Dr. Pool ([facility]) on 5/15/2020 with left partial nephrectomy.  Pathology showed clear cell renal cell carcinoma, Jeanne grade 2, 2 foci measuring 1.5 and 2.1 cm.  The renal parenchymal margin was focally positive.  · Patient reports that Dr. Pool felt that he required additional margin tissue in the area of focal positivity and did not recommend re-resection.  · CT 8/17/2022 showed no evidence of recurrent disease  · CT chest abdomen pelvis 11/25/2022 with stable chronic findings.  · Patient had been undergoing every 6-month monitoring of CT scans for surveillance.  We will not plan further routine CT monitoring in light of the diagnosis of glioblastoma.  5. 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    · CT-guided biopsy on 1/9/2018 revealed a left lower lobe carcinoid with spindle cell features, no necrosis, no mitoses.  · Notation of normal chromogranin A 1/23/2018 of 35  · Follow-up CT on 1/24/2018 showed multiple bilateral pulmonary nodules including a 2.1 cm left lower lobe nodule (previously 2.6), 7 mm left lower lobe nodule, 1 cm right lower lobe nodule, right adrenal 1.5  cm nodule consistent with adenoma, and hepatic cysts.    · On 2/28/2018, the patient underwent a left lower lobectomy.  Pathology revealed a 2.3 cm left upper lobe (hilar) carcinoid, typical with spindle cell features, Ki-67 of 3.68%.  There were 2 of 8 peribronchial lymph nodes involved with carcinoid with lymphatic invasion, stage IIB ([id number]).  There was also evidence of adenocarcinoma (see below).  6. Stage IA1 (iS5qS9Cf)  left lower lobe non-small cell lung cancer (adenocarcinoma with lipidic features):  · The patient is a never smoker  · PET scan 12/13/2017 with hypermetabolic 2.5 cm left lower lobe nodule with SUV 5.7.    · CT-guided biopsy on 1/9/2018 revealed a left lower lobe carcinoid with spindle cell features, no necrosis, no mitoses.    · Follow-up CT on 1/24/2018 showed multiple bilateral pulmonary nodules including a 2.1 cm left lower lobe nodule (previously 2.6), 7 mm left lower lobe nodule, 1 cm right lower lobe nodule, right adrenal 1.5 cm nodule consistent with adenoma, and hepatic cysts.    · On 2/28/2018, the patient underwent a left lower lobectomy.  Pathology revealed a 2.3 cm left upper lobe (hilar) carcinoid, typical with spindle cell features, Ki-67 of 3.68%.  There were 2 of 8 peribronchial lymph nodes involved with carcinoid with lymphatic invasion, stage IIB ([id number]).  There was a 0.9 cm adenocarcinoma, well differentiated with lipidic features, no visceral pleural invasion, bronchoalveolar atypical adenomatous hyperplasia at the parenchymal margin and 1 microscopic focus (less than 1 mm).  0/8 lymph nodes involved with adenocarcinoma. PDL1 <1% TPS, positive EGF receptor mutation (exon 19 deletion, [id number]), negative ALK/ROS1 rearrangement, negative BRAF, ER negative (2%), TN 0, HER-2/laura 1+, Ki-67 3%. Stage IA1 ([id number]).  · Patient was placed on Femara following surgery by Dr. Juana Pelayo.  Femara subsequently discontinued in early August 2020.  · Subsequent follow-up  scans with stable findings until scan on 1/15/2020 noted 2 enhancing left renal lesions, largest 2.4 cm and there was also an enlarging right lower lobe nodule up to 1.0 x 1.2 cm.  In the interval, patient did undergo resection of renal cell carcinoma (discussed above).    · CT scan on 7/1/2020 showed slow increase in right lower lobe nodule up to 1.2 cm.  Felt to represent new primary lung cancer (see below).   7. Stage IA2 ([id number]) right lower lobe non-small cell lung cancer (adenocarcinoma with lipidic growth pattern):   · CT scan on 7/1/2020 showed slow increase in right lower lobe nodule up to 1.2 cm.  · CT-guided biopsy of the right lower lobe nodule on 7/31/2020 with adenocarcinoma with bland lipidic growth pattern of pulmonary origin (TTF-1 and CK7 positive). PDL1 TPS <1%, EGFR mutated, exon 20 insertion (possibly indicating lack of response to TKI's). ALK/ROS1 rearrangement negative and BRAF V600 mutation negative.  · Staging MRI brain 8/12/2020 with no evidence of metastatic disease  · Staging PET scan 8/12/2020 with no significant activity in the biopsied lesion 1.2 cm right lower lobe (SUV 1.5), no evidence of hilar, mediastinal uptake nor distant metastatic disease.  · Given the difference in EGFR mutation between the patient's 2 lung cancers, it is felt that she has 2 separate primary lesions.    · Right VATS with anterior basal segmentectomy on 10/9/2020 with pathology showing invasive well to moderately differentiated adenocarcinoma with acinar predominant growth measuring 1.4 cm, negative margins, no pleural or lymphovascular invasion, negative lymph nodes x5.  · Follow-up CT chest 3/8/2021 showed postoperative change, no evidence of disease recurrence.    · CT 8/17/2022 showed no evidence of recurrent disease.   · CT chest abdomen pelvis 11/25/2022 with stable chronic findings.  · Patient had been undergoing every 6-month monitoring of CT scans for surveillance.  We will not plan further routine  CT monitoring in light of the diagnosis of glioblastoma.  8. Melanoma right heel stage IA ([id number])  · Patient underwent excision of right heel melanoma 6/4/2021, 0.6 mm superficial spreading melanoma with margin positive for melanoma in situ.  · Wide local excision 6/30/2021 with no residual melanoma identified  · Patient has had difficulty with healing of the wound and has required 3 separate skin grafts, the most recent performed 4 weeks ago with ongoing slow healing.  · The patient's wound in the right heel did finally heal in late 2021.    · Patient had been continuing follow-up with dermatology every 3 months however this is likely not necessary in the setting of recent diagnosis of glioblastoma.  9. Incidental CT findings  · Notation of a number of incidental CT findings on scan from 8/17/2022 including 1.6 cm right adrenal nodule, C7 focal osteolysis, both unchanged compared to 3/8/2021 and apparent benign findings.    · Notation on CT scan 8/17/2022 of short segment narrowing of the proximal left internal carotid artery resulting in moderate stenosis.  Findings were not well evaluated.    · Carotid ultrasound 9/1/2022 was normal  · CT chest abdomen pelvis with stable findings on 11/25/2022.  No plans for any further routine CT monitoring of these issues.  10. Anemia:  · Patient has undergone previous colonoscopy on 11/28/2017.  · Hemoglobin in high 10 to low 11 range with low normal MCV  · Anemia evaluation 8/20/2020 with iron 44, ferritin 119.3, iron saturation 13%, TIBC 351, folate 12.2, B12 392.  · Unclear whether patient may have anemia secondary to chronic disease.  · With low iron saturation, initiated oral iron daily on 9/15/2020.  Patient however did not begin oral iron until 11/11/2020.  · Labs on 11/11/2020 with hemoglobin 11.4, iron studies showing iron 54, ferritin 112, iron saturation 14%, TIBC 395.  · Labs on 1/7/2021 with hemoglobin 11.9, iron studies with iron 50, ferritin 109.5, iron  saturation 12%, TIBC 420.  Patient with significant GI side effects from oral iron with diarrhea, iron discontinued.  · Labs on 9/14/2021 showed hemoglobin that declined to 11.4 of unclear etiology.  Additional labs showed iron 44, ferritin 137.6, iron saturation 13 %, TIBC 346, folate 11.3, B12 394.   · Labs on 8/24/2022 showed hemoglobin normal at 12.0.  Labs show iron 46, ferritin 106, iron saturation 13%, TIBC 351.    · Hemoglobin today has declined abruptly from 11.4 on 11/18/2023 down to 8.6 today with MCV dramatically elevated at 120.  This is in the setting of pancytopenia with WBC 2.79 and platelet count of 37,000.  We will obtain additional labs today including iron panel, ferritin, B12, folate, IPF, reticulocyte count.  Suspect that pancytopenia (and anemia) is related to either cumulative effects from recent Temodar or potential hematologic effects from previous treatment with dapsone.  Either way, we will need to allow these effects to resolve over time.  We will recheck CBC in 1 week when she returns for NP visit and again in 2 weeks when I see her in follow-up.  9. Acute pancytopenia  · Labs today with dramatic change, on 1/18/2023, WBC 9.72 with hemoglobin 11.4 and platelet count 89,000.  Today, WBC 2.79 with hemoglobin 8.6, platelet count 37,000, .3.  · CBC was repeated and verified today  · Etiology of the acute pancytopenia unclear, suspect either cumulative effects from low-dose Temodar (completed on 1/28/2023) or possibly hematologic effects from recent dapsone use (discontinued on 1/18/2023 due to methemoglobinemia).  · We will check additional labs today with iron panel, ferritin, B12, folate, IPF, reticulocyte count.  · Recheck CBC in 1 week when patient returns for NP visit and in 2 weeks when I see her back for follow-up.  10. Depression  · The patient was experiencing significant exacerbation of depressive symptoms in fall 2021 as she was coping with her mother's illness with  metastatic breast cancer.  Patient's mother was living with her on hospice care for a period of time and did pass away in late 2021.  · Patient was referred back to supportive oncology clinic which had been very helpful for her  · Patient had been receiving Lexapro, dose decreased from 10 down to 5 mg daily which was better tolerated  · Patient is continuing follow-up with supportive oncology, due to be seen next on 2/22/2023  11. PCP prophylaxis  · Patient previously receiving Bactrim DS 1 p.o. Monday Wednesday Friday  · With persistent elevation in LFTs, transition from Bactrim to dapsone on 1/3/2023  · Patient developed methemoglobinemia from dapsone, discontinued on 1/18/2023  · Patient has now completed her course of concurrent chemoradiation with dapsone.  Although patient does have ongoing lymphopenia, I am reluctant to add in anything else currently for PCP prophylaxis.  We will consider use of monthly inhaled pentamadine pending her counts when she returns in a few weeks.  12. GI prophylaxis  · Continue Protonix 40 mg daily  13. Seizure prophylaxis  · Continue Keppra 500 mg twice daily  14. Elevated LFTs  · LFTs prior to hospitalization in November 2022 were normal  · Significant increase in transaminases noted on 12/8/2022 with ALT 1049, , normal total bilirubin 0.6  · Viral hepatitis panel negative 12/8/2022, negative GEMMA and smooth muscle antibody 12/9/2022  · Gradual improvement in LFT's.  · On 1/3/2023, ALT remained elevated and stable at 243, AST normal at 27, normal bilirubin 0.3.  Unclear if possibly related to Bactrim and therefore Bactrim discontinued and switched to dapsone  · Today, LFTs have normalized.  15. 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  · Patient developed unexplained hypoxia and was seen in the emergency department on 1/18/2023.  After extensive evaluation, methemoglobin was found to be elevated at 10.6 and  therefore patient felt to have dapsone induced methemoglobinemia, dapsone discontinued.  · Hypoxia resolved     Plan:  1. The patient has completed her course of concurrent chemoradiation (radiation completed 1/25/2023 and discontinued Temodar on 1/28/2023).  2. Additional labs today with iron panel, ferritin, B12, folate, IPF, reticulocyte count  3. Decrease dexamethasone dose from 4 mg twice daily to 4 mg in the morning and 2 mg in the evening x7 days and then 4 mg daily.  4. Patient remains off of Bactrim prophylaxis due to elevated LFTs  5. Patient remains off dapsone due to methemoglobinemia  6. Consider potential future use of inhaled pentamadine for PCP prophylaxis  7. Continue Keppra 500 mg twice daily for seizure prophylaxis  8. Continue Protonix 40 mg daily for GI prophylaxis  9. Patient is continuing on home physical, occupational, and speech therapy  10. Patient continues follow-up in supportive oncology clinic due to be seen next on 2/22/2023.  11. Education with pharmacy staff for upcoming maintenance Temodar 150 mg/m² days 1-5 on a 28-day cycle  12. In 1 week NP visit with CBC, CMP  13. In 2 weeks MD visit with CBC, CMP.  We will assess patient's overall status, reassess her pancytopenia and discuss timeframe to begin maintenance Temodar.      I did spend 60 minutes in total time caring for the patient today, time spent in review of records, face-to-face consultation, coordination of care, placement of orders, completion of documentation.

## 2023-02-08 ENCOUNTER — LAB (OUTPATIENT)
Dept: LAB | Facility: HOSPITAL | Age: 58
End: 2023-02-08
Payer: COMMERCIAL

## 2023-02-08 ENCOUNTER — OFFICE VISIT (OUTPATIENT)
Dept: SURGERY | Facility: CLINIC | Age: 58
End: 2023-02-08
Payer: COMMERCIAL

## 2023-02-08 ENCOUNTER — OFFICE VISIT (OUTPATIENT)
Dept: ONCOLOGY | Facility: CLINIC | Age: 58
End: 2023-02-08
Payer: COMMERCIAL

## 2023-02-08 VITALS
BODY MASS INDEX: 38.32 KG/M2 | TEMPERATURE: 97.7 F | DIASTOLIC BLOOD PRESSURE: 71 MMHG | SYSTOLIC BLOOD PRESSURE: 117 MMHG | WEIGHT: 230 LBS | HEIGHT: 65 IN | OXYGEN SATURATION: 98 % | HEART RATE: 75 BPM

## 2023-02-08 VITALS
HEIGHT: 65 IN | SYSTOLIC BLOOD PRESSURE: 114 MMHG | DIASTOLIC BLOOD PRESSURE: 76 MMHG | RESPIRATION RATE: 18 BRPM | OXYGEN SATURATION: 98 % | TEMPERATURE: 97.1 F | BODY MASS INDEX: 38.75 KG/M2 | WEIGHT: 232.6 LBS | HEART RATE: 82 BPM

## 2023-02-08 DIAGNOSIS — D61.818 PANCYTOPENIA: Primary | ICD-10-CM

## 2023-02-08 DIAGNOSIS — C34.91 ADENOCARCINOMA OF RIGHT LUNG: Primary | ICD-10-CM

## 2023-02-08 DIAGNOSIS — C71.9 GLIOBLASTOMA MULTIFORME: ICD-10-CM

## 2023-02-08 LAB
ALBUMIN SERPL-MCNC: 3.5 G/DL (ref 3.5–5.2)
ALBUMIN/GLOB SERPL: 1.8 G/DL (ref 1.1–2.4)
ALP SERPL-CCNC: 88 U/L (ref 38–116)
ALT SERPL W P-5'-P-CCNC: 32 U/L (ref 0–33)
ANION GAP SERPL CALCULATED.3IONS-SCNC: 13.3 MMOL/L (ref 5–15)
AST SERPL-CCNC: 12 U/L (ref 0–32)
BASOPHILS # BLD AUTO: 0 10*3/MM3 (ref 0–0.2)
BASOPHILS NFR BLD AUTO: 0 % (ref 0–1.5)
BILIRUB SERPL-MCNC: 0.5 MG/DL (ref 0.2–1.2)
BUN SERPL-MCNC: 18 MG/DL (ref 6–20)
BUN/CREAT SERPL: 31.6 (ref 7.3–30)
CALCIUM SPEC-SCNC: 8.7 MG/DL (ref 8.5–10.2)
CHLORIDE SERPL-SCNC: 103 MMOL/L (ref 98–107)
CO2 SERPL-SCNC: 24.7 MMOL/L (ref 22–29)
CREAT SERPL-MCNC: 0.57 MG/DL (ref 0.6–1.1)
DEPRECATED RDW RBC AUTO: 70.9 FL (ref 37–54)
EGFRCR SERPLBLD CKD-EPI 2021: 105.5 ML/MIN/1.73
EOSINOPHIL # BLD AUTO: 0 10*3/MM3 (ref 0–0.4)
EOSINOPHIL NFR BLD AUTO: 0 % (ref 0.3–6.2)
ERYTHROCYTE [DISTWIDTH] IN BLOOD BY AUTOMATED COUNT: 15.9 % (ref 12.3–15.4)
FERRITIN SERPL-MCNC: 547.8 NG/ML (ref 11–207)
GLOBULIN UR ELPH-MCNC: 1.9 GM/DL (ref 1.8–3.5)
GLUCOSE SERPL-MCNC: 271 MG/DL (ref 74–124)
HCT VFR BLD AUTO: 26.7 % (ref 34–46.6)
HGB BLD-MCNC: 8.6 G/DL (ref 12–15.9)
HGB RETIC QN AUTO: 36.5 PG (ref 29.8–36.1)
IMM GRANULOCYTES # BLD AUTO: 0.03 10*3/MM3 (ref 0–0.05)
IMM GRANULOCYTES NFR BLD AUTO: 1.1 % (ref 0–0.5)
IMM RETICS NFR: 24.4 % (ref 3–15.8)
IRON 24H UR-MRATE: 102 MCG/DL (ref 37–145)
IRON SATN MFR SERPL: 32 % (ref 14–48)
LYMPHOCYTES # BLD AUTO: 0.32 10*3/MM3 (ref 0.7–3.1)
LYMPHOCYTES NFR BLD AUTO: 11.5 % (ref 19.6–45.3)
MCH RBC QN AUTO: 38.7 PG (ref 26.6–33)
MCHC RBC AUTO-ENTMCNC: 32.2 G/DL (ref 31.5–35.7)
MCV RBC AUTO: 120.3 FL (ref 79–97)
MONOCYTES # BLD AUTO: 0.2 10*3/MM3 (ref 0.1–0.9)
MONOCYTES NFR BLD AUTO: 7.2 % (ref 5–12)
NEUTROPHILS NFR BLD AUTO: 2.24 10*3/MM3 (ref 1.7–7)
NEUTROPHILS NFR BLD AUTO: 80.2 % (ref 42.7–76)
NRBC BLD AUTO-RTO: 0 /100 WBC (ref 0–0.2)
PLATELET # BLD AUTO: 37 10*3/MM3 (ref 140–450)
PLATELETS.RETICULATED NFR BLD AUTO: 3.5 % (ref 0.9–6.5)
PMV BLD AUTO: 9.7 FL (ref 6–12)
POTASSIUM SERPL-SCNC: 4.3 MMOL/L (ref 3.5–4.7)
PROT SERPL-MCNC: 5.4 G/DL (ref 6.3–8)
RBC # BLD AUTO: 2.22 10*6/MM3 (ref 3.77–5.28)
RETICS # AUTO: 0.17 10*6/MM3 (ref 0.02–0.13)
RETICS/RBC NFR AUTO: 7.74 % (ref 0.7–1.9)
SODIUM SERPL-SCNC: 141 MMOL/L (ref 134–145)
TIBC SERPL-MCNC: 321 MCG/DL (ref 249–505)
TRANSFERRIN SERPL-MCNC: 229 MG/DL (ref 200–360)
WBC NRBC COR # BLD: 2.79 10*3/MM3 (ref 3.4–10.8)

## 2023-02-08 PROCEDURE — 84466 ASSAY OF TRANSFERRIN: CPT | Performed by: INTERNAL MEDICINE

## 2023-02-08 PROCEDURE — 85025 COMPLETE CBC W/AUTO DIFF WBC: CPT

## 2023-02-08 PROCEDURE — 99215 OFFICE O/P EST HI 40 MIN: CPT | Performed by: INTERNAL MEDICINE

## 2023-02-08 PROCEDURE — 99213 OFFICE O/P EST LOW 20 MIN: CPT | Performed by: THORACIC SURGERY (CARDIOTHORACIC VASCULAR SURGERY)

## 2023-02-08 PROCEDURE — 36415 COLL VENOUS BLD VENIPUNCTURE: CPT

## 2023-02-08 PROCEDURE — 83540 ASSAY OF IRON: CPT | Performed by: INTERNAL MEDICINE

## 2023-02-08 PROCEDURE — 85046 RETICYTE/HGB CONCENTRATE: CPT | Performed by: INTERNAL MEDICINE

## 2023-02-08 PROCEDURE — 80053 COMPREHEN METABOLIC PANEL: CPT

## 2023-02-08 PROCEDURE — 82728 ASSAY OF FERRITIN: CPT | Performed by: INTERNAL MEDICINE

## 2023-02-08 PROCEDURE — 85055 RETICULATED PLATELET ASSAY: CPT | Performed by: INTERNAL MEDICINE

## 2023-02-08 RX ORDER — ATORVASTATIN CALCIUM 40 MG/1
TABLET, FILM COATED ORAL
COMMUNITY
Start: 2023-02-08 | End: 2023-02-08

## 2023-02-08 RX ORDER — VALACYCLOVIR HYDROCHLORIDE 1 G/1
TABLET, FILM COATED ORAL
COMMUNITY
Start: 2023-02-08 | End: 2023-02-08 | Stop reason: SDUPTHER

## 2023-02-08 NOTE — PROGRESS NOTES
"Chief Complaint  Follow-up (Hospital follow up CTA Chest, history of lung cancer)    Subjective        Christie Avendaño presents to [facility] THORACIC SURGERY  History of Present Illness   Ms. Avendaño is a pleasant 57-year-old lady status post right lower lobe segmentectomy in 10/2020 for a stage I non-small cell lung cancer. She presents in follow-up. Of note, she has been diagnosed with glioblastoma multiforme and is undergoing treatment. She had a CT of the chest. She is accompanied by an adult male who contributes to her care.    The patient states she is feeling fairly well. She reports that she was getting treatment in [place] but she has completed her treatment. The adult male states that the patient had an MRI in [place] on 02/07/2023. The patient notes that she has beat 7 different cancers and she is going to beat this one.    Objective   Vital Signs:  /71 (BP Location: Left arm, Patient Position: Sitting, Cuff Size: Large Adult)   Pulse 75   Temp 97.7 °F (36.5 °C) (Infrared)   Ht 165.1 cm (65\")   Wt 104 kg (230 lb)   SpO2 98%   BMI 38.27 kg/m²   Estimated body mass index is 38.27 kg/m² as calculated from the following:    Height as of this encounter: 165.1 cm (65\").    Weight as of this encounter: 104 kg (230 lb).             Physical Exam  Vitals and nursing note reviewed.   Constitutional:       Appearance: She is well-developed.   HENT:      Head: Normocephalic and atraumatic.      Nose: Nose normal.   Eyes:      Conjunctiva/sclera: Conjunctivae normal.   Cardiovascular:      Rate and Rhythm: Normal rate.   Pulmonary:      Effort: Pulmonary effort is normal.   Abdominal:      Palpations: Abdomen is soft.   Musculoskeletal:      Cervical back: Neck supple.   Skin:     General: Skin is warm and dry.   Neurological:      Mental Status: She is alert and oriented to person, place, and time.   Psychiatric:         Behavior: Behavior normal.         Thought Content: Thought content " normal.         Judgment: Judgment normal.        Result Review :        I have independently reviewed the CT of the chest performed on 01/18/2023, which demonstrates some ground-glass opacity in the mid and lower lungs bilaterally. No new lung nodules. No mediastinal or hilar lymphadenopathy. No pleural pericardial effusion.         Assessment and Plan   Diagnoses and all orders for this visit:    1. Adenocarcinoma of right lung (HCC) (Primary)  -     CT Chest Without Contrast; Future    Ms. Avendaño is a pleasant 57-year-old lady with a history of multiple lung cancers the most recent being a stage 1, T1bN0 adenocarcinoma of the right lower lobe resected in 2020. She has no evidence of recurrent disease on her most recent CT scan. We will plan a CT of the chest in 4 months for continued surveillance.      I spent 20 minutes caring for Christie on this date of service. This time includes time spent by me in the following activities:preparing for the visit, reviewing tests, obtaining and/or reviewing a separately obtained history, performing a medically appropriate examination and/or evaluation , counseling and educating the patient/family/caregiver, ordering medications, tests, or procedures, documenting information in the medical record and independently interpreting results and communicating that information with the patient/family/caregiver  Follow Up   No follow-ups on file.   The patient will follow up in 4 months with a CT scan.  Patient was given instructions and counseling regarding her condition or for health maintenance advice. Please see specific information pulled into the AVS if appropriate.     Transcribed from ambient dictation for Flaca Crisostomo MD by Keily Méndez.  02/08/23   12:26 EST    Patient or patient representative verbalized consent to the visit recording.  I have personally performed the services described in this document as transcribed by the above individual, and it is both accurate and  complete.

## 2023-02-09 ENCOUNTER — TELEPHONE (OUTPATIENT)
Dept: ONCOLOGY | Facility: CLINIC | Age: 58
End: 2023-02-09
Payer: COMMERCIAL

## 2023-02-09 ENCOUNTER — HOME CARE VISIT (OUTPATIENT)
Dept: HOME HEALTH SERVICES | Facility: HOME HEALTHCARE | Age: 58
End: 2023-02-09
Payer: COMMERCIAL

## 2023-02-09 VITALS
SYSTOLIC BLOOD PRESSURE: 104 MMHG | HEART RATE: 80 BPM | DIASTOLIC BLOOD PRESSURE: 76 MMHG | OXYGEN SATURATION: 90 % | TEMPERATURE: 97.7 F

## 2023-02-09 DIAGNOSIS — E53.8 B12 DEFICIENCY: Primary | ICD-10-CM

## 2023-02-09 LAB
FOLATE SERPL-MCNC: 10.1 NG/ML
VIT B12 SERPL-MCNC: 226 PG/ML (ref 232–1245)

## 2023-02-09 PROCEDURE — G0153 HHCP-SVS OF S/L PATH,EA 15MN: HCPCS

## 2023-02-09 RX ORDER — CYANOCOBALAMIN 1000 UG/ML
1000 INJECTION, SOLUTION INTRAMUSCULAR; SUBCUTANEOUS ONCE
Status: CANCELLED | OUTPATIENT
Start: 2023-02-09

## 2023-02-09 NOTE — TELEPHONE ENCOUNTER
Attempted to reach patient with updates following her office visit with Dr. Collins yesterday. Left message asking patient to return call, direct callback number provided.

## 2023-02-10 ENCOUNTER — HOSPITAL ENCOUNTER (OUTPATIENT)
Dept: INFUSION THERAPY | Facility: HOSPITAL | Age: 58
Discharge: HOME | End: 2023-02-10
Admitting: INTERNAL MEDICINE
Payer: MEDICARE

## 2023-02-10 VITALS
TEMPERATURE: 97.3 F | RESPIRATION RATE: 16 BRPM | HEART RATE: 69 BPM | OXYGEN SATURATION: 99 % | DIASTOLIC BLOOD PRESSURE: 79 MMHG | SYSTOLIC BLOOD PRESSURE: 127 MMHG

## 2023-02-10 DIAGNOSIS — E53.8 B12 DEFICIENCY: Primary | ICD-10-CM

## 2023-02-10 PROCEDURE — 96372 THER/PROPH/DIAG INJ SC/IM: CPT

## 2023-02-10 PROCEDURE — 25010000002 CYANOCOBALAMIN PER 1000 MCG: Performed by: INTERNAL MEDICINE

## 2023-02-10 RX ORDER — CYANOCOBALAMIN 1000 UG/ML
1000 INJECTION, SOLUTION INTRAMUSCULAR; SUBCUTANEOUS ONCE
Status: COMPLETED | OUTPATIENT
Start: 2023-02-10 | End: 2023-02-10

## 2023-02-10 RX ORDER — CYANOCOBALAMIN 1000 UG/ML
1000 INJECTION, SOLUTION INTRAMUSCULAR; SUBCUTANEOUS ONCE
Status: CANCELLED | OUTPATIENT
Start: 2023-02-10

## 2023-02-10 RX ADMIN — CYANOCOBALAMIN 1000 MCG: 1000 INJECTION, SOLUTION INTRAMUSCULAR; SUBCUTANEOUS at 15:13

## 2023-02-11 ENCOUNTER — HOSPITAL ENCOUNTER (OUTPATIENT)
Dept: INFUSION THERAPY | Facility: HOSPITAL | Age: 58
Discharge: HOME | End: 2023-02-11
Admitting: INTERNAL MEDICINE
Payer: MEDICARE

## 2023-02-11 DIAGNOSIS — E53.8 B12 DEFICIENCY: Primary | ICD-10-CM

## 2023-02-11 PROCEDURE — 25010000002 CYANOCOBALAMIN PER 1000 MCG: Performed by: INTERNAL MEDICINE

## 2023-02-11 PROCEDURE — 96372 THER/PROPH/DIAG INJ SC/IM: CPT

## 2023-02-11 RX ORDER — CYANOCOBALAMIN 1000 UG/ML
1000 INJECTION, SOLUTION INTRAMUSCULAR; SUBCUTANEOUS ONCE
Status: CANCELLED | OUTPATIENT
Start: 2023-02-11

## 2023-02-11 RX ORDER — CYANOCOBALAMIN 1000 UG/ML
1000 INJECTION, SOLUTION INTRAMUSCULAR; SUBCUTANEOUS ONCE
Status: COMPLETED | OUTPATIENT
Start: 2023-02-11 | End: 2023-02-11

## 2023-02-11 RX ADMIN — CYANOCOBALAMIN 1000 MCG: 1000 INJECTION, SOLUTION INTRAMUSCULAR at 09:01

## 2023-02-12 ENCOUNTER — HOSPITAL ENCOUNTER (OUTPATIENT)
Dept: INFUSION THERAPY | Facility: HOSPITAL | Age: 58
Discharge: HOME | End: 2023-02-12
Admitting: INTERNAL MEDICINE
Payer: COMMERCIAL

## 2023-02-12 DIAGNOSIS — E53.8 B12 DEFICIENCY: Primary | ICD-10-CM

## 2023-02-12 PROCEDURE — 25010000002 CYANOCOBALAMIN PER 1000 MCG: Performed by: INTERNAL MEDICINE

## 2023-02-12 PROCEDURE — 96372 THER/PROPH/DIAG INJ SC/IM: CPT

## 2023-02-12 RX ORDER — CYANOCOBALAMIN 1000 UG/ML
1000 INJECTION, SOLUTION INTRAMUSCULAR; SUBCUTANEOUS ONCE
Status: COMPLETED | OUTPATIENT
Start: 2023-02-12 | End: 2023-02-12

## 2023-02-12 RX ORDER — CYANOCOBALAMIN 1000 UG/ML
1000 INJECTION, SOLUTION INTRAMUSCULAR; SUBCUTANEOUS ONCE
Status: CANCELLED | OUTPATIENT
Start: 2023-02-12

## 2023-02-12 RX ADMIN — CYANOCOBALAMIN 1000 MCG: 1000 INJECTION, SOLUTION INTRAMUSCULAR at 09:04

## 2023-02-13 ENCOUNTER — TELEPHONE (OUTPATIENT)
Dept: ONCOLOGY | Facility: CLINIC | Age: 58
End: 2023-02-13
Payer: COMMERCIAL

## 2023-02-13 ENCOUNTER — HOSPITAL ENCOUNTER (OUTPATIENT)
Dept: INFUSION THERAPY | Facility: HOSPITAL | Age: 58
Discharge: HOME | End: 2023-02-13
Admitting: INTERNAL MEDICINE
Payer: MEDICARE

## 2023-02-13 ENCOUNTER — APPOINTMENT (OUTPATIENT)
Dept: CT IMAGING | Facility: HOSPITAL | Age: 58
End: 2023-02-13

## 2023-02-13 VITALS
SYSTOLIC BLOOD PRESSURE: 117 MMHG | RESPIRATION RATE: 18 BRPM | HEART RATE: 74 BPM | TEMPERATURE: 97.2 F | OXYGEN SATURATION: 98 % | DIASTOLIC BLOOD PRESSURE: 76 MMHG

## 2023-02-13 DIAGNOSIS — I10 ESSENTIAL HYPERTENSION: ICD-10-CM

## 2023-02-13 DIAGNOSIS — E78.49 OTHER HYPERLIPIDEMIA: ICD-10-CM

## 2023-02-13 DIAGNOSIS — D05.11 NEOPLASM OF RIGHT BREAST, PRIMARY TUMOR STAGING CATEGORY TIS: DUCTAL CARCINOMA IN SITU (DCIS): ICD-10-CM

## 2023-02-13 DIAGNOSIS — D64.9 NORMOCYTIC ANEMIA: ICD-10-CM

## 2023-02-13 DIAGNOSIS — E89.0 POSTOPERATIVE HYPOTHYROIDISM: ICD-10-CM

## 2023-02-13 DIAGNOSIS — N28.89 RENAL MASS, RIGHT: ICD-10-CM

## 2023-02-13 DIAGNOSIS — D3A.090 CARCINOID TUMOR OF LEFT LUNG: ICD-10-CM

## 2023-02-13 DIAGNOSIS — Z15.01 BRCA2 GENE MUTATION POSITIVE IN FEMALE: ICD-10-CM

## 2023-02-13 DIAGNOSIS — E53.8 B12 DEFICIENCY: Primary | ICD-10-CM

## 2023-02-13 DIAGNOSIS — D61.818 PANCYTOPENIA: ICD-10-CM

## 2023-02-13 DIAGNOSIS — C71.9 HIGH GRADE GLIOMA NOT CLASSIFIABLE BY WHO CRITERIA: ICD-10-CM

## 2023-02-13 DIAGNOSIS — E11.65 TYPE 2 DIABETES MELLITUS WITH HYPERGLYCEMIA, WITHOUT LONG-TERM CURRENT USE OF INSULIN: ICD-10-CM

## 2023-02-13 DIAGNOSIS — C73 PAPILLARY THYROID CARCINOMA: ICD-10-CM

## 2023-02-13 DIAGNOSIS — Z15.09 BRCA2 GENE MUTATION POSITIVE IN FEMALE: ICD-10-CM

## 2023-02-13 DIAGNOSIS — C71.9 GLIOBLASTOMA MULTIFORME: ICD-10-CM

## 2023-02-13 DIAGNOSIS — Z15.02 BRCA2 GENE MUTATION POSITIVE IN FEMALE: ICD-10-CM

## 2023-02-13 DIAGNOSIS — C64.2 RENAL CELL CARCINOMA OF LEFT KIDNEY: ICD-10-CM

## 2023-02-13 DIAGNOSIS — C43.71 MALIGNANT MELANOMA OF RIGHT LOWER EXTREMITY INCLUDING HIP: ICD-10-CM

## 2023-02-13 LAB
ALBUMIN SERPL-MCNC: 3.8 G/DL (ref 3.5–5.2)
ALBUMIN UR-MCNC: 2.6 MG/DL
ALBUMIN/GLOB SERPL: 2.1 G/DL
ALP SERPL-CCNC: 88 U/L (ref 39–117)
ALT SERPL W P-5'-P-CCNC: 28 U/L (ref 1–33)
ANION GAP SERPL CALCULATED.3IONS-SCNC: 11 MMOL/L (ref 5–15)
AST SERPL-CCNC: 9 U/L (ref 1–32)
BILIRUB SERPL-MCNC: 0.6 MG/DL (ref 0–1.2)
BUN SERPL-MCNC: 21 MG/DL (ref 6–20)
BUN/CREAT SERPL: 42.9 (ref 7–25)
CALCIUM SPEC-SCNC: 8.9 MG/DL (ref 8.6–10.5)
CHLORIDE SERPL-SCNC: 101 MMOL/L (ref 98–107)
CHOLEST SERPL-MCNC: 209 MG/DL (ref 0–200)
CO2 SERPL-SCNC: 27 MMOL/L (ref 22–29)
CREAT SERPL-MCNC: 0.49 MG/DL (ref 0.57–1)
CREAT UR-MCNC: 114.3 MG/DL
EGFRCR SERPLBLD CKD-EPI 2021: 109.4 ML/MIN/1.73
GLOBULIN UR ELPH-MCNC: 1.8 GM/DL
GLUCOSE SERPL-MCNC: 195 MG/DL (ref 65–99)
HBA1C MFR BLD: 4.5 % (ref 3.5–5.6)
HDLC SERPL-MCNC: 48 MG/DL (ref 40–60)
LDLC SERPL CALC-MCNC: 128 MG/DL (ref 0–100)
LDLC/HDLC SERPL: 2.59 {RATIO}
MICROALBUMIN/CREAT UR: 22.7 MG/G
POTASSIUM SERPL-SCNC: 4.5 MMOL/L (ref 3.5–5.2)
PROT SERPL-MCNC: 5.6 G/DL (ref 6–8.5)
SODIUM SERPL-SCNC: 139 MMOL/L (ref 136–145)
TRIGL SERPL-MCNC: 184 MG/DL (ref 0–150)
VLDLC SERPL-MCNC: 33 MG/DL (ref 5–40)

## 2023-02-13 PROCEDURE — 80061 LIPID PANEL: CPT | Performed by: INTERNAL MEDICINE

## 2023-02-13 PROCEDURE — 36415 COLL VENOUS BLD VENIPUNCTURE: CPT

## 2023-02-13 PROCEDURE — 80053 COMPREHEN METABOLIC PANEL: CPT | Performed by: INTERNAL MEDICINE

## 2023-02-13 PROCEDURE — 83036 HEMOGLOBIN GLYCOSYLATED A1C: CPT | Performed by: INTERNAL MEDICINE

## 2023-02-13 PROCEDURE — 25010000002 CYANOCOBALAMIN PER 1000 MCG: Performed by: INTERNAL MEDICINE

## 2023-02-13 PROCEDURE — 82570 ASSAY OF URINE CREATININE: CPT | Performed by: INTERNAL MEDICINE

## 2023-02-13 PROCEDURE — 96372 THER/PROPH/DIAG INJ SC/IM: CPT

## 2023-02-13 PROCEDURE — 82043 UR ALBUMIN QUANTITATIVE: CPT | Performed by: INTERNAL MEDICINE

## 2023-02-13 RX ORDER — CYANOCOBALAMIN 1000 UG/ML
1000 INJECTION, SOLUTION INTRAMUSCULAR; SUBCUTANEOUS ONCE
Status: COMPLETED | OUTPATIENT
Start: 2023-02-13 | End: 2023-02-13

## 2023-02-13 RX ORDER — CYANOCOBALAMIN 1000 UG/ML
1000 INJECTION, SOLUTION INTRAMUSCULAR; SUBCUTANEOUS ONCE
Status: CANCELLED | OUTPATIENT
Start: 2023-02-13

## 2023-02-13 RX ADMIN — CYANOCOBALAMIN 1000 MCG: 1000 INJECTION, SOLUTION INTRAMUSCULAR; SUBCUTANEOUS at 10:24

## 2023-02-13 NOTE — TELEPHONE ENCOUNTER
Caller: VOLODYMYR    Relationship:  SCHEDULING    Best call back number: [phone]    What is the best time to reach you: ASAP    Who are you requesting to speak with (clinical staff, provider,  specific staff member): CLINICAL      What was the call regarding: VOLODYMYR CALLED, SAYS PT IS AT [place] FOR CT SCAN, BUT THE 2/13 CT SCAN APPT HAD BEEN CANCELED.  ALSO, THERE WAS A 2/15 CT SCAN THAT HAD BEEN CANCELED WITH A NOTE THAT IT WAS NOT NEEDED.  PLEASE CALL VOLODYMYR BACK TO ADVISE IF DR. GRIFFIN STILL WANTS THIS SCAN TO BE DONE, OR IF IT IS NOT NEEDED, SO THAT SHE CAN GET IT SCHEDULED IF IT IS STILL NEEDED.    Do you require a callback: YES

## 2023-02-14 ENCOUNTER — HOSPITAL ENCOUNTER (OUTPATIENT)
Dept: INFUSION THERAPY | Facility: HOSPITAL | Age: 58
Discharge: HOME | End: 2023-02-14
Admitting: INTERNAL MEDICINE
Payer: MEDICARE

## 2023-02-14 ENCOUNTER — TELEPHONE (OUTPATIENT)
Dept: INTERNAL MEDICINE | Facility: CLINIC | Age: 58
End: 2023-02-14
Payer: COMMERCIAL

## 2023-02-14 ENCOUNTER — HOME CARE VISIT (OUTPATIENT)
Dept: HOME HEALTH SERVICES | Facility: HOME HEALTHCARE | Age: 58
End: 2023-02-14
Payer: COMMERCIAL

## 2023-02-14 VITALS
DIASTOLIC BLOOD PRESSURE: 68 MMHG | SYSTOLIC BLOOD PRESSURE: 107 MMHG | HEART RATE: 83 BPM | RESPIRATION RATE: 24 BRPM | OXYGEN SATURATION: 97 % | TEMPERATURE: 97.1 F

## 2023-02-14 DIAGNOSIS — E53.8 B12 DEFICIENCY: Primary | ICD-10-CM

## 2023-02-14 PROCEDURE — 96372 THER/PROPH/DIAG INJ SC/IM: CPT

## 2023-02-14 PROCEDURE — 25010000002 CYANOCOBALAMIN PER 1000 MCG: Performed by: INTERNAL MEDICINE

## 2023-02-14 PROCEDURE — G0153 HHCP-SVS OF S/L PATH,EA 15MN: HCPCS

## 2023-02-14 RX ORDER — CYANOCOBALAMIN 1000 UG/ML
1000 INJECTION, SOLUTION INTRAMUSCULAR; SUBCUTANEOUS ONCE
Status: CANCELLED | OUTPATIENT
Start: 2023-02-14

## 2023-02-14 RX ORDER — CYANOCOBALAMIN 1000 UG/ML
1000 INJECTION, SOLUTION INTRAMUSCULAR; SUBCUTANEOUS ONCE
Status: COMPLETED | OUTPATIENT
Start: 2023-02-14 | End: 2023-02-14

## 2023-02-14 RX ADMIN — CYANOCOBALAMIN 1000 MCG: 1000 INJECTION, SOLUTION INTRAMUSCULAR; SUBCUTANEOUS at 15:08

## 2023-02-14 NOTE — TELEPHONE ENCOUNTER
[facility] need a Verbal to continue care, (order will be expiring soon) please advise [phone]

## 2023-02-14 NOTE — HOME HEALTH
Patient progressig with speech therapy next visit patient will be due a recertification which ST will do on 2/21/23 ST contacted DR Holloway and got verbal orders to contiue with treatment from her medical assistance [employer] to continue with treatment

## 2023-02-15 ENCOUNTER — SPECIALTY PHARMACY (OUTPATIENT)
Dept: PHARMACY | Facility: HOSPITAL | Age: 58
End: 2023-02-15
Payer: COMMERCIAL

## 2023-02-15 ENCOUNTER — SPECIALTY PHARMACY (OUTPATIENT)
Dept: ONCOLOGY | Facility: HOSPITAL | Age: 58
End: 2023-02-15
Payer: MEDICARE

## 2023-02-15 ENCOUNTER — LAB (OUTPATIENT)
Dept: LAB | Facility: HOSPITAL | Age: 58
End: 2023-02-15
Payer: MEDICARE

## 2023-02-15 ENCOUNTER — OFFICE VISIT (OUTPATIENT)
Dept: ONCOLOGY | Facility: CLINIC | Age: 58
End: 2023-02-15
Payer: MEDICARE

## 2023-02-15 VITALS
OXYGEN SATURATION: 97 % | HEART RATE: 79 BPM | RESPIRATION RATE: 16 BRPM | SYSTOLIC BLOOD PRESSURE: 120 MMHG | TEMPERATURE: 97.7 F | DIASTOLIC BLOOD PRESSURE: 71 MMHG

## 2023-02-15 VITALS
TEMPERATURE: 97.3 F | SYSTOLIC BLOOD PRESSURE: 104 MMHG | HEART RATE: 76 BPM | HEIGHT: 65 IN | WEIGHT: 226.7 LBS | DIASTOLIC BLOOD PRESSURE: 69 MMHG | RESPIRATION RATE: 18 BRPM | OXYGEN SATURATION: 97 % | BODY MASS INDEX: 37.77 KG/M2

## 2023-02-15 DIAGNOSIS — E53.8 B12 DEFICIENCY: ICD-10-CM

## 2023-02-15 DIAGNOSIS — C71.9 GLIOBLASTOMA MULTIFORME: Primary | ICD-10-CM

## 2023-02-15 DIAGNOSIS — D61.818 PANCYTOPENIA: ICD-10-CM

## 2023-02-15 DIAGNOSIS — C71.9 GLIOBLASTOMA MULTIFORME: ICD-10-CM

## 2023-02-15 LAB
ALBUMIN SERPL-MCNC: 3.8 G/DL (ref 3.5–5.2)
ALBUMIN/GLOB SERPL: 2 G/DL (ref 1.1–2.4)
ALP SERPL-CCNC: 97 U/L (ref 38–116)
ALT SERPL W P-5'-P-CCNC: 35 U/L (ref 0–33)
ANION GAP SERPL CALCULATED.3IONS-SCNC: 14.3 MMOL/L (ref 5–15)
AST SERPL-CCNC: 13 U/L (ref 0–32)
BASOPHILS # BLD AUTO: 0.01 10*3/MM3 (ref 0–0.2)
BASOPHILS NFR BLD AUTO: 0.4 % (ref 0–1.5)
BILIRUB SERPL-MCNC: 0.6 MG/DL (ref 0.2–1.2)
BUN SERPL-MCNC: 27 MG/DL (ref 6–20)
BUN/CREAT SERPL: 54 (ref 7.3–30)
CALCIUM SPEC-SCNC: 9.1 MG/DL (ref 8.5–10.2)
CHLORIDE SERPL-SCNC: 98 MMOL/L (ref 98–107)
CO2 SERPL-SCNC: 24.7 MMOL/L (ref 22–29)
CREAT SERPL-MCNC: 0.5 MG/DL (ref 0.6–1.1)
DEPRECATED RDW RBC AUTO: 68 FL (ref 37–54)
EGFRCR SERPLBLD CKD-EPI 2021: 108.9 ML/MIN/1.73
EOSINOPHIL # BLD AUTO: 0 10*3/MM3 (ref 0–0.4)
EOSINOPHIL NFR BLD AUTO: 0 % (ref 0.3–6.2)
ERYTHROCYTE [DISTWIDTH] IN BLOOD BY AUTOMATED COUNT: 15.7 % (ref 12.3–15.4)
GLOBULIN UR ELPH-MCNC: 1.9 GM/DL (ref 1.8–3.5)
GLUCOSE SERPL-MCNC: 299 MG/DL (ref 74–124)
HCT VFR BLD AUTO: 29.2 % (ref 34–46.6)
HGB BLD-MCNC: 9.4 G/DL (ref 12–15.9)
IMM GRANULOCYTES # BLD AUTO: 0.05 10*3/MM3 (ref 0–0.05)
IMM GRANULOCYTES NFR BLD AUTO: 1.9 % (ref 0–0.5)
LYMPHOCYTES # BLD AUTO: 0.48 10*3/MM3 (ref 0.7–3.1)
LYMPHOCYTES NFR BLD AUTO: 18.4 % (ref 19.6–45.3)
MCH RBC QN AUTO: 38.1 PG (ref 26.6–33)
MCHC RBC AUTO-ENTMCNC: 32.2 G/DL (ref 31.5–35.7)
MCV RBC AUTO: 118.2 FL (ref 79–97)
MONOCYTES # BLD AUTO: 0.23 10*3/MM3 (ref 0.1–0.9)
MONOCYTES NFR BLD AUTO: 8.8 % (ref 5–12)
NEUTROPHILS NFR BLD AUTO: 1.84 10*3/MM3 (ref 1.7–7)
NEUTROPHILS NFR BLD AUTO: 70.5 % (ref 42.7–76)
NRBC BLD AUTO-RTO: 1.1 /100 WBC (ref 0–0.2)
PLATELET # BLD AUTO: 36 10*3/MM3 (ref 140–450)
PMV BLD AUTO: 10.1 FL (ref 6–12)
POTASSIUM SERPL-SCNC: 4.6 MMOL/L (ref 3.5–4.7)
PROT SERPL-MCNC: 5.7 G/DL (ref 6.3–8)
RBC # BLD AUTO: 2.47 10*6/MM3 (ref 3.77–5.28)
SODIUM SERPL-SCNC: 137 MMOL/L (ref 134–145)
WBC NRBC COR # BLD: 2.61 10*3/MM3 (ref 3.4–10.8)

## 2023-02-15 PROCEDURE — 80053 COMPREHEN METABOLIC PANEL: CPT

## 2023-02-15 PROCEDURE — 85025 COMPLETE CBC W/AUTO DIFF WBC: CPT

## 2023-02-15 PROCEDURE — 36415 COLL VENOUS BLD VENIPUNCTURE: CPT

## 2023-02-15 PROCEDURE — 99214 OFFICE O/P EST MOD 30 MIN: CPT | Performed by: NURSE PRACTITIONER

## 2023-02-15 NOTE — PROGRESS NOTES
[facility] Hematology/Oncology Treatment Plan Summary    Name: Christie Avendaño  [facility]# [id number]  MD: Dr. Collins    Diagnosis: glioblastoma     Goal of chemotherapy: disease control    Treatment Medication(s) / Frequency and Dosing    1. Temodar 150 mg/m2 d1-5 every 28 days    Number of cycles: until disease progression or unacceptable toxicity    Starting on: at least 4 weeks after completion of radiation -- per MD discretion      Follow-up Testing to be determined after TBD cycles by MD.     Items for home use: Acetaminophen or Tylenol (for fever and/or pain)    Rx written for: [x] Nausea    [] Pre-Chemo   ondansetron 8 mg by mouth every 8 hours as needed for nausea      Completing Pharmacist: Kamala Bush RPH             Date/time: 02/15/2023 08:46 EST    Please note: You will be seen by a provider frequently with your treatment plan. This plan may change depending on many factors, if so, this will be discussed with you by your physician.  Last update 12/2020.       Counseling Provided:  - Side effects reviewed with patient including: decreased WBC/increased risk for infection , decreased platelets/increased risk for bleed, decreased hemoglobin, fatigue, nausea/vomiting and headache. Additional side effects covered in written handout.   - Reviewed proper administration: Discussed if the patient is handling their own medications, then they need to wash their hands properly after touching the medication. If a caregiver is assisting with handling medication, need to wear disposal gloves and wash hands properly afterwards. Patient was counseled to take Temodar on an empty stomach, at the same time each day . Discussed to take at bedtime. Additional precautions: none  - Provided information on prior storage of medication: Store medication safe away from children and pets, away from light, and at room temperature. If you use a pill box, use a separate pill box from other medication.   - Provided information on  safe handling of soiled linen and proper flush technique and reviewed proper disposal of medication.   - Reviewed what to do in the event of a missed dose: Do not take an extra dose or two doses at one time. Simply take your next dose at the regularly scheduled time.  - Reviewed expected goals/outcomes, contraindications and safety precautions, including when to seek medical care.  - Provided information on pregnancy considerations - women should not become pregnant and men should not get a partner pregnant while taking; men and women of childbearing age and potential should use effective contraception during and after therapy.    Provided patient with:   Education sheets about the medication, 24-hour clinic phone number and my contact information and instructions to call should additional questions arise.     Completed medication reconciliation today to assess for drug interactions.   Reviewed concomitant medications, allergies, labs, comorbidities/medical history, and immunization history.   Drug-drug interactions noted: no significant drug interactions noted.   Advised pt to call the clinic if any new medications are started so we can assess for drug-drug interactions     Wrap-up:  Discussed aforementioned material with patient in person, face-to-face, in clinic.   Chemo consents/CCA previously signed  Medication availability: patient is aware [facility] will be contacting them to arrange delivery  Patient and her , present in today's appointment, expressed understanding.  Patient demonstrates ability to self-administer medication. No barriers to adherence identified.  All questions and concerns addressed.     Kamala Bush RPH  2/15/2023  08:46 EST

## 2023-02-15 NOTE — PROGRESS NOTES
Temodar must be filled at [facility].  I called the help desk and got the estimated total price for all three strengths.  The cost would be $30 for all three strengths.

## 2023-02-15 NOTE — PROGRESS NOTES
TREATMENT  PREPARATION    Christie Avenadño  [id number]  1965    Chief Complaint: Treatment preparation and needs assessment    History of present illness:  Christie Avendaño is a 57 y.o. year old female who is here today for treatment preparation and needs assessment.  The patient has been diagnosed with   Encounter Diagnoses   Name Primary?   • Glioblastoma multiforme (HCC) Yes   • B12 deficiency    • Pancytopenia (HCC)     and is scheduled to begin treatment with:     Oncology History:    Oncology/Hematology History   Glioblastoma multiforme (HCC)   12/7/2022 Initial Diagnosis    Glioblastoma multiforme (HCC)     2/27/2023 Biopsy    [facility] Temozolomide--Days 1-5  Plan Provider: Mathew Collins Jr., MD  Treatment goal: Control  Line of treatment: Maintenance         The current medication list and allergy list were reviewed and reconciled.     Past Medical History, Past Surgical History, Social History, Family History have been reviewed and are without significant changes except as mentioned.    Physical Exam:    Vitals:    02/15/23 0911   BP: 104/69   Pulse: 76   Resp: 18   Temp: 97.3 °F (36.3 °C)   SpO2: 97%     Vitals:    02/15/23 0911   PainSc: 0-No pain        ECOG score: 0             Physical Exam  HENT:      Head: Normocephalic and atraumatic.   Eyes:      Extraocular Movements: Extraocular movements intact.      Conjunctiva/sclera: Conjunctivae normal.   Pulmonary:      Effort: Pulmonary effort is normal. No respiratory distress.   Neurological:      General: No focal deficit present.      Mental Status: She is alert and oriented to person, place, and time.   Psychiatric:         Mood and Affect: Mood normal.         Behavior: Behavior normal.           NEEDS ASSESSMENTS    Genetics  The patient's new diagnosis and family history have been reviewed for genetic counseling needs. A genetic referral is not recommended.     Psychosocial and Barriers to care  The patient has completed a PHQ-9 Depression Screening  and the Distress Thermometer (DT) today.  PHQ-9 results show PHQ-2 Total Score:   PHQ-9 Total Score: PHQ-9 Total Score:       The patient scored their distress today as   on a scale of 0-10 with 0 being no distress and 10 being extreme distress. Problems marked by the patient as being an issue for them within the last week include   .      Results were reviewed along with psychosocial resources offered by our cancer center.  Our Supportive Oncology team will be flagged for a score of 4 or above, and a same day call will be made for a score of 9 or 10.  A mental health referral is offered at that time. Patients who score less than 4 have been educated on our support services and can be referred to our  upon request.  The patient is already established with supportive oncology.    Nutrition  The patient has completed the malnutrition screening today. They scored Malnutrition Screening Tool  Have you recently lost weight without trying?  If yes, how much weight have you lost?: 0--> No  Have you been eating poorly because of a decreased appetite?: 0--> No  MST score: 0   with a score of 0-1 meaning not at risk in a score of 2 or greater meaning at risk.  Patients with a score of 3 or higher will be referred to our oncology dietitian for support. Patients beginning at risk treatment regimens or who have dietary concerns will also be referred to our oncology dietitian. NOT APPLICABLE    Functional Assessment  Persons who are age 70 or greater will be screened for qualification of a comprehensive geriatric assessment by our survivorship nurse practitioner.  Older adults with cancer face unique challenges. These may include an increased risk of drug reactions, financial burdens, and caregiver stress. The patient scored   . Patients scoring 14 or lower will referred for an older adult functional assessment with the survivorship advanced practice registered nurse to ensure all needed support is provided as  "patients plan for their treatments. NOT APPLICABLE    Intravenous Access Assessment  The patient and I discussed planned intravenous chemo/biotherapy as well as other IV treatments that are often needed throughout the course of treatment. These may include, but are not limited to blood transfusions, antibiotics, and IV hydration. Discussed that depending on selected treatment and vein assessment, patient may require venous access device (VAD) which could include but not limited to a Mediport or PICC line. Risks and benefits of VADs reviewed. The patient will be treated via oral route.    Reproductive/Sexual Activity   People should avoid becoming pregnant and should not get a partner pregnant while undergoing chemo/biotherapy.  People of childbearing age should use effective contraception during active therapy. The best recommendation for all people is to use a barrier method for a minimum of 1 week after the last infusion of chemo/biotherapy to prevent your partner being exposed to byproducts from treatment medications in bodily fluids. Effective contraception should be discussed with your oncology team to make sure it is safe to take based on your diagnosis. Possible options include oral contraceptives, barrier methods. Chemo/biotherapy can change your ability to reproduce children in the future.  There are options for fertility preservation. NOT APPLICABLE    Advanced Care Planning  Advance Care Planning   The patient and I discussed advanced care planning, \"Conversations that Matter\".   This service is offered for development of advance directives with a certified ACP facilitator.  The patient does not have an up-to-date advanced directive. This document is not on file with our office. The patient is not interested in an appointment with one of our facilitators to create or update their advanced directives.     Smoking cessation  Tobacco Use: Low Risk    • Smoking Tobacco Use: Never   • Smokeless Tobacco Use: " Never   • Passive Exposure: Not on file       Patient and I discussed their tobacco use history. Referral will not be made for smoking cessation.      Palliative Care  When appropriate, the patient and I discussed the availability palliative care services and when appropriate Hospice care. Palliative care is not the same as Hospice care which was explained to the patient.  The patient is not interested in additional information from our  on these services.     Survivorship   When appropriate, we discussed that we will refer the patient to survivorship clinic to discuss next steps following completion of planned treatment.  Reviewed this visit will include assessment of your physical, psychological, functional, and spiritual needs as a survivor and the need at attend this visit when scheduled.    TREATMENT EDUCATION    Today I met with the patient to discuss the chemo/biotherapy regimen recommended for treatment of Glioblastoma multiforme (HCC)    B12 deficiency    Pancytopenia (HCC)  .  The patient was given explanation of treatment premed side effects including office policy that prohibits patients to drive if sedating medications are administered, MD explanation given regarding benefits, side effects, toxicities and goals of treatment.  The patient received a Chemotherapy/Biotherapy Plan Summary including diagnosis and explanation of specific treatment plan.    SIDE EFFECTS:  Common side effects were discussed with the patient and/or significant other.  Discussion included where applicable hair loss/discoloration, anemia/fatigue, infection/chills/fever, appetite, bleeding risk/precautions, constipation, diarrhea, mouth sores, taste alteration, loss of appetite, nausea/vomiting, peripheral neuropathy, skin/nail changes, rash, muscle aches/weakness, photosensitivity, weight gain/loss, hearing loss, dizziness, menopausal symptoms, menstrual irregularity, sterility, high blood pressure, heart damage,  liver damage, lung damage, kidney damage, DVT/PE risk, fluid retention, pleural/pericardial effusion, somnolence, electrolyte/LFT imbalance, vein exercises and/or the possible need for vascular access/port placement.  The patient was advised that although uncommon, leakage of an infused medication from the vein or venous access device may lead to skin breakdown and/or other tissue damage.  The patient was advised that he/she may have pain, bleeding, and/or bruising from the insertion of a needle in their vein or venous access device (port).  The patient was further advised that, in spite of proper technique, infection with redness and irritation may rarely occur at the site where the needle was inserted.  The patient was advised that if complications occur, additional medical treatment is available.  Finally, where applicable we have reviewed rare but potential immune mediated side effects including shortness of breath, cough, chest pain (pneumonitis), abdominal pain, diarrhea (colitis), thyroiditis (hypothyroid or hyperthyroid), hepatitis and liver dysfunction, nephritis and renal dysfunction.    Discussion also included side effects specific to drugs in the treatment plan, specifically:    Treatment Plans     Name Type Plan Dates Plan Provider         Active    [facility] Temozolomide--Days 1-5 ONCOLOGY TREATMENT 2  2/26/2023 - Present Mathew Collins Jr., MD                    Questions answered and additional information discussed on topics including:  Anemia, Thrombocytopenia, Neutropenia, Nutrition and appetite changes, Constipation, Diarrhea, Nausea & vomiting, Mouth sores, Organ toxicities, Home care and Oral medication handling and disposal       Assessment and Plan:    Diagnoses and all orders for this visit:    1. Glioblastoma multiforme (HCC) (Primary)    2. B12 deficiency    3. Pancytopenia (HCC)      No orders of the defined types were placed in this encounter.        1. The patient and I have reviewed their  diagnosis and scheduled treatment plan. Needs assessment was completed where applicable including genetics, psychosocial needs, barriers to care, VAD evaluation, advanced care planning, survivorship, and palliative care services where indicated. Referrals have been ordered as appropriate based upon evaluation today and patient desires.   2. Chemo/biotherapy teaching was completed today and consent obtained. See separate documentation for further details.  3. Adequate time was given to answer questions.  Patient made aware of their care team members and contact information if they have questions or problems throughout the treatment course.  4. Discussion held and written information provided describing frequency of office visits and ongoing monitoring throughout the treatment plan.     5. Reviewed with patient any prescribed medication sent to pharmacy.  Education provided regarding proper storage, safe handling, and proper disposal of unused medication.  6. Proper handling of body fluids and waste discussed and written information provided.  7. If appropriate, patient had pretreatment labs drawn today.    Learning assessment completed at initial patient encounter. See separate flowsheet. Chemo/biotherapy education comprehension assessed at today's visit.    I spent 36 minutes caring for Christie on this date of service. This time includes time spent by me in the following activities: preparing for the visit, reviewing tests, obtaining and/or reviewing a separately obtained history, counseling and educating the patient/family/caregiver, referring and communicating with other health care professionals, documenting information in the medical record and care coordination.     Honey Figueroa, APRN   02/15/23

## 2023-02-15 NOTE — PROGRESS NOTES
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 She did mistakenly take an additional 3 days of Temodar after completion of XRT but stopped as of 1/28/2023. She has had worsening pancytopenia, ? Related to Temodar versus dapsone versus recently discovered B12 deficiency. We did start her on B12 injections daily x5, completed yesterday.  We discussed today giving her an additional injection next week and then likely moving to oral replacement thereafter.    She continues with thrombocytopenia which is stable but still quite low at 36,000.  Hemoglobin has slightly improved at 9.4.  She is doing okay, denying any new concerns other than ongoing fatigue.  She has chronic difficulty with neurologic complications from her disease with weakness mainly in the left upper extremity and to a lesser extent the left lower extremity.      She did meet with [facility] pharmacy today to discuss maintenance Temodar. Patient does not yet have the medication and understands we will not start this until her counts have improved.    She denies other concerns at this time.    Objective   Vital Signs:   /69   Pulse 76   Temp 97.3 °F (36.3 °C) (Temporal)   Resp 18   Ht 165.1 cm (65\")   Wt 103 kg (226 lb 11.2 oz)   SpO2 97%   BMI 37.72 kg/m²     Physical Exam  Constitutional:       Appearance: She is well-developed.      " Comments: Patient is in a wheelchair today.  Cushingoid features   Eyes:      Conjunctiva/sclera: Conjunctivae normal.   Neck:      Thyroid: No thyromegaly.   Cardiovascular:      Rate and Rhythm: Normal rate and regular rhythm.      Heart sounds: No murmur heard.    No friction rub. No gallop.   Pulmonary:      Effort: No respiratory distress.      Breath sounds: Normal breath sounds.   Abdominal:      General: Bowel sounds are normal. There is no distension.      Palpations: Abdomen is soft.      Tenderness: There is no abdominal tenderness.   Lymphadenopathy:      Head:      Right side of head: No submandibular adenopathy.      Cervical: No cervical adenopathy.      Upper Body:      Right upper body: No supraclavicular adenopathy.      Left upper body: No supraclavicular adenopathy.   Skin:     General: Skin is warm and dry.      Findings: No rash.   Neurological:      Mental Status: She is alert and oriented to person, place, and time.      Cranial Nerves: No cranial nerve deficit.      Motor: No abnormal muscle tone.      Comments: Slight weakness noted in the left upper and lower extremities although  strength in the left upper extremity improved compared to last exam.   Psychiatric:         Behavior: Behavior normal.        I have reexamined the patient and the results are consistent with the previously documented exam. Honey Figueroa, APRN     Result Review :  Results from last 7 days   Lab Units 02/15/23  0848 02/08/23  1554   WBC 10*3/mm3 2.61* 2.79*   NEUTROS ABS 10*3/mm3 1.84 2.24   HEMOGLOBIN g/dL 9.4* 8.6*   HEMATOCRIT % 29.2* 26.7*   PLATELETS 10*3/mm3 36* 37*     Results from last 7 days   Lab Units 02/13/23  1052 02/08/23  1647 02/08/23  1554   SODIUM mmol/L 139  --  141   POTASSIUM mmol/L 4.5  --  4.3   CHLORIDE mmol/L 101  --  103   CO2 mmol/L 27.0  --  24.7   BUN mg/dL 21*  --  18   CREATININE mg/dL 0.49*  --  0.57*   CALCIUM mg/dL 8.9  --  8.7   ALBUMIN g/dL 3.8  --  3.5   BILIRUBIN  mg/dL 0.6  --  0.5   ALK PHOS U/L 88  --  88   ALT (SGPT) U/L 28  --  32   AST (SGOT) U/L 9  --  12   GLUCOSE mg/dL 195*  --  271*   FERRITIN ng/mL  --  547.80*  --    IRON mcg/dL  --  102  --    TIBC mcg/dL  --  321  --                 Assessment and Plan  1. Glioblastoma multiforme  · Patient presented with falls and confusion, left upper and lower extremity weakness  · MRI brain 11/22/2022 with 3.2 cm rim-enhancing right supratentorial mass in the right thalamus and internal capsule with mass-effect and leftward midline shift  · Initiated dexamethasone 4 mg p.o. every 8 hours seizure prophylaxis with Keppra 500 mg twice daily  · Patient was seen by neurosurgery and was not a candidate for resection due to location.  · CT chest abdomen pelvis 11/24/2022 showed no evidence of source for potential metastatic disease  · Stereotactic brain biopsy 11/28/2022 with glioblastoma, IDH1 R132H negative, grade 4, MGMT promoter unmethylated  · On 12/12/2022, patient initiated concurrent chemoradiation with low-dose Temodar 75 mg/m² (165 mg) daily  · Patient completed radiation therapy on 1/25/2023 and discontinued Temodar on 1/28/2023 (took for 3 additional days after radiation stopped).  · MRI brain 2/7/2023 with decrease in mass right thalamus and internal capsule from 3.0 x 2.8 x 3.2 cm down to 2.8 x 2.5 x 3.1 cm. Slight decrease in mass effect and surrounding edema.  Evidence of hemorrhage within the mass.  Mass effect on right lateral ventricle with 4 mm right to left midline shift unchanged.  · Patient seen back today in close follow-up after completing concurrent chemoradiation 1/25/2023.  She did take a few additional days of Temodar accidentally, stopping as of 1/28/2023.  When we saw her back last week she was noted to have worsening pancytopenia,?  Related to Temodar which would be unusual versus dapsone versus recently discovered B12 deficiency.  We did start her on a course of B12 injections x5, completed  yesterday.  We discussed giving her an additional 1 injection next week and then likely moving oral thereafter.  Today her counts are stable to somewhat improved.  Platelet count remains low at 36,000, hemoglobin 9.4, WBC 2.6.  She continues with ongoing fatigue, stable.  We do plan once counts recovered to initiate maintenance Temodar at 150 mg/m² days 1-5 on a 28-day cycle.  We would not anticipate beginning treatment until she is at least 4 weeks out from completion of her concurrent chemoradiation.  We are attempting dexamethasone taper, today to move down to 4 mg daily. We will discuss further Decadron taper in 2 weeks when she returns.  2. BRCA2 mutation (c.[id number]) and GEORGES mutation (c.5073dup):  · Strong family history of malignancy with a mother who had thyroid cancer and also had breast cancer at age 73 with subsequent liver metastases.  She was found to be BRCA2 positive and prompted the patient's own testing.  The patient's maternal grandmother had ovarian cancer in her mid 80s, maternal grandfather and paternal grandfather both had leukemia of unknown type at an older age.  Her maternal aunt had colon cancer over the age of 50, maternal aunt had breast cancer in her 80s, and her father had cholangiocarcinoma.    · The patient underwent testing on 7/27/2016 showing positive BRCA2 mutation (c.5073dupA)   · The patient did undergo KAVYA/BSO in 1992 secondary to hemorrhage.  · The patient did undergo bilateral mastectomies 1/26/2017 with findings of bilateral DCIS (discussed below).  · Patient has undergone previous colonoscopy on 11/28/2017.  · Given the unusual nature of the patient's malignancies, referral to genetics clinic for panel testing performed 11/10/2020 with re-identification of BRCA2 mutation ([id number].5073dupA) and new identification of GEORGES mutation (c.5073dup).  · Previous plan to continue surveillance with repeat colonoscopy at a 5-year interval that would have been due late 2022.  Plans  subsequently deferred due to subsequent diagnosis of glioblastoma multiforme.  2. Stage I ([id number]) papillary thyroid cancer:  · Status post total thyroidectomy with Dr. Maher in [place] on 10/15/2010.  Pathology showed papillary thyroid cancer involving the left thyroid and isthmus, multifocal with 2 areas measuring 0.9 and 1.2 cm.  No evidence of capsular invasion, no extrathyroidal extension, no lymphovascular invasion, negative margins.  · Postsurgical scan showed uptake in the thyroid bed and the patient underwent treatment with I-131.    · She has had no evidence of recurrent disease.    · Her subsequent hypothyroidism has been managed by endocrinology (Dr. Subhash Michael) in [place], currently receiving levothyroxine 150 mcg daily.  · The patient was following routinely with endocrinology, was seen last on 8/8/2022  3. Bilateral DCIS  · As above, patient has underlying BRCA2 mutation  · 8/5/2016 was found to have a right breast intraductal papilloma with fibrocystic change and microcalcifications with usual ductal hyperplasia and columnar cell change in 3 separate locations on biopsy.  · On 8/24/2016 she underwent excisional biopsy of an intraductal papilloma from the right breast.    · She subsequently underwent on 1/26/2017 bilateral mastectomy.  On the right there was a large intraductal papilloma with usual ductal hyperplasia, atypical hyperplasia, DCIS measuring 3 mm which was low-grade, ER positive (98%), OH positive (85%).  On the left there were multiple intraductal papillomas with atypical ductal hyperplasia.  There was DCIS measuring 6 mm, low-grade, ER positive (99%), OH positive (98%).  4. Stage I ([id number]) clear-cell renal cell carcinoma:  · Incidental finding on CT scan 1/15/2020 of enhancing left renal lesions x2, largest 2.4 cm  · Resection with Dr. Pool ([facility]) on 5/15/2020 with left partial nephrectomy.  Pathology showed clear cell renal cell carcinoma, Jeanne  grade 2, 2 foci measuring 1.5 and 2.1 cm.  The renal parenchymal margin was focally positive.  · Patient reports that Dr. Pool felt that he required additional margin tissue in the area of focal positivity and did not recommend re-resection.  · CT 8/17/2022 showed no evidence of recurrent disease  · CT chest abdomen pelvis 11/25/2022 with stable chronic findings.  · Patient had been undergoing every 6-month monitoring of CT scans for surveillance.  We will not plan further routine CT monitoring in light of the diagnosis of glioblastoma.  5. 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    · CT-guided biopsy on 1/9/2018 revealed a left lower lobe carcinoid with spindle cell features, no necrosis, no mitoses.  · Notation of normal chromogranin A 1/23/2018 of 35  · Follow-up CT on 1/24/2018 showed multiple bilateral pulmonary nodules including a 2.1 cm left lower lobe nodule (previously 2.6), 7 mm left lower lobe nodule, 1 cm right lower lobe nodule, right adrenal 1.5 cm nodule consistent with adenoma, and hepatic cysts.   · On 2/28/2018, the patient underwent a left lower lobectomy.  Pathology revealed a 2.3 cm left upper lobe (hilar) carcinoid, typical with spindle cell features, Ki-67 of 3.68%.  There were 2 of 8 peribronchial lymph nodes involved with carcinoid with lymphatic invasion, stage IIB ([id number]).  There was also evidence of adenocarcinoma (see below).  6. Stage IA1 (vL5cW8Yp)  left lower lobe non-small cell lung cancer (adenocarcinoma with lipidic features):  · The patient is a never smoker  · PET scan 12/13/2017 with hypermetabolic 2.5 cm left lower lobe nodule with SUV 5.7.    · CT-guided biopsy on 1/9/2018 revealed a left lower lobe carcinoid with spindle cell features, no necrosis, no mitoses.    · Follow-up CT on 1/24/2018 showed multiple bilateral pulmonary nodules including a 2.1 cm left lower  lobe nodule (previously 2.6), 7 mm left lower lobe nodule, 1 cm right lower lobe nodule, right adrenal 1.5 cm nodule consistent with adenoma, and hepatic cysts.   · On 2/28/2018, the patient underwent a left lower lobectomy.  Pathology revealed a 2.3 cm left upper lobe (hilar) carcinoid, typical with spindle cell features, Ki-67 of 3.68%.  There were 2 of 8 peribronchial lymph nodes involved with carcinoid with lymphatic invasion, stage IIB ([id number]).  There was a 0.9 cm adenocarcinoma, well differentiated with lipidic features, no visceral pleural invasion, bronchoalveolar atypical adenomatous hyperplasia at the parenchymal margin and 1 microscopic focus (less than 1 mm).  0/8 lymph nodes involved with adenocarcinoma. PDL1 <1% TPS, positive EGF receptor mutation (exon 19 deletion, [id number]), negative ALK/ROS1 rearrangement, negative BRAF, ER negative (2%), NE 0, HER-2/laura 1+, Ki-67 3%. Stage IA1 ([id number]).  · Patient was placed on Femara following surgery by Dr. Juana Pelayo.  Femara subsequently discontinued in early August 2020.  · Subsequent follow-up scans with stable findings until scan on 1/15/2020 noted 2 enhancing left renal lesions, largest 2.4 cm and there was also an enlarging right lower lobe nodule up to 1.0 x 1.2 cm.  In the interval, patient did undergo resection of renal cell carcinoma (discussed above).    · CT scan on 7/1/2020 showed slow increase in right lower lobe nodule up to 1.2 cm.  Felt to represent new primary lung cancer (see below).   7. Stage IA2 (qH4byP6F5) right lower lobe non-small cell lung cancer (adenocarcinoma with lipidic growth pattern):   · CT scan on 7/1/2020 showed slow increase in right lower lobe nodule up to 1.2 cm.  · CT-guided biopsy of the right lower lobe nodule on 7/31/2020 with adenocarcinoma with bland lipidic growth pattern of pulmonary origin (TTF-1 and CK7 positive). PDL1 TPS <1%, EGFR mutated, exon 20 insertion (possibly indicating lack of response to  TKI's). ALK/ROS1 rearrangement negative and BRAF V600 mutation negative.  · Staging MRI brain 8/12/2020 with no evidence of metastatic disease  · Staging PET scan 8/12/2020 with no significant activity in the biopsied lesion 1.2 cm right lower lobe (SUV 1.5), no evidence of hilar, mediastinal uptake nor distant metastatic disease.  · Given the difference in EGFR mutation between the patient's 2 lung cancers, it is felt that she has 2 separate primary lesions.    · Right VATS with anterior basal segmentectomy on 10/9/2020 with pathology showing invasive well to moderately differentiated adenocarcinoma with acinar predominant growth measuring 1.4 cm, negative margins, no pleural or lymphovascular invasion, negative lymph nodes x5.  · Follow-up CT chest 3/8/2021 showed postoperative change, no evidence of disease recurrence.    · CT 8/17/2022 showed no evidence of recurrent disease.   · CT chest abdomen pelvis 11/25/2022 with stable chronic findings.  · Patient had been undergoing every 6-month monitoring of CT scans for surveillance.  We will not plan further routine CT monitoring in light of the diagnosis of glioblastoma.  8. Melanoma right heel stage IA ([id number])  · Patient underwent excision of right heel melanoma 6/4/2021, 0.6 mm superficial spreading melanoma with margin positive for melanoma in situ.  · Wide local excision 6/30/2021 with no residual melanoma identified  · Patient has had difficulty with healing of the wound and has required 3 separate skin grafts, the most recent performed 4 weeks ago with ongoing slow healing.  · The patient's wound in the right heel did finally heal in late 2021.    · Patient had been continuing follow-up with dermatology every 3 months however this is likely not necessary in the setting of recent diagnosis of glioblastoma.  9. Incidental CT findings  · Notation of a number of incidental CT findings on scan from 8/17/2022 including 1.6 cm right adrenal nodule, C7 focal  osteolysis, both unchanged compared to 3/8/2021 and apparent benign findings.    · Notation on CT scan 8/17/2022 of short segment narrowing of the proximal left internal carotid artery resulting in moderate stenosis.  Findings were not well evaluated.    · Carotid ultrasound 9/1/2022 was normal  · CT chest abdomen pelvis with stable findings on 11/25/2022.  No plans for any further routine CT monitoring of these issues.  10. Anemia:  · Patient has undergone previous colonoscopy on 11/28/2017.  · Hemoglobin in high 10 to low 11 range with low normal MCV  · Anemia evaluation 8/20/2020 with iron 44, ferritin 119.3, iron saturation 13%, TIBC 351, folate 12.2, B12 392.  · Unclear whether patient may have anemia secondary to chronic disease.  · With low iron saturation, initiated oral iron daily on 9/15/2020.  Patient however did not begin oral iron until 11/11/2020.  · Labs on 11/11/2020 with hemoglobin 11.4, iron studies showing iron 54, ferritin 112, iron saturation 14%, TIBC 395.  · Labs on 1/7/2021 with hemoglobin 11.9, iron studies with iron 50, ferritin 109.5, iron saturation 12%, TIBC 420.  Patient with significant GI side effects from oral iron with diarrhea, iron discontinued.  · Labs on 9/14/2021 showed hemoglobin that declined to 11.4 of unclear etiology.  Additional labs showed iron 44, ferritin 137.6, iron saturation 13 %, TIBC 346, folate 11.3, B12 394.   · Labs on 8/24/2022 showed hemoglobin normal at 12.0.  Labs show iron 46, ferritin 106, iron saturation 13%, TIBC 351.    · 2/8/2023 Hemoglobin declined abruptly from 11.4 on 11/18/2023 down to 8.6 today with MCV dramatically elevated at 120.  This is in the setting of pancytopenia with WBC 2.79 and platelet count of 37,000. Additional labs obtained including iron panel, ferritin, B12, folate, IPF, reticulocyte count.  Suspect that pancytopenia (and anemia) is related to either cumulative effects from recent Temodar or potential hematologic effects from  previous treatment with dapsone.  Either way, we will need to allow these effects to resolve over time.    · Patient found to be B12 deficient with B12 level of 226.  Patient started on B12 injections x5, completed 2/14/2023.  Plan to give an additional injection in 1 week and then likely begin oral replacement thereafter.  9. Acute pancytopenia  · Labs today with dramatic change, on 1/18/2023, WBC 9.72 with hemoglobin 11.4 and platelet count 89,000.  Today, WBC 2.79 with hemoglobin 8.6, platelet count 37,000, .3.  · CBC was repeated and verified today  · Etiology of the acute pancytopenia unclear, suspect either cumulative effects from low-dose Temodar (completed on 1/28/2023) or possibly hematologic effects from recent dapsone use (discontinued on 1/18/2023 due to methemoglobinemia).  · As outlined above patient was found to be B12 deficient with B12 injections initiated.  · Repeat labs today show stable WBC of 2.6, ANC 1.8, slightly improved hemoglobin of 9.4, and continued thrombocytopenia with platelet count of 36,000 (stable).  Continue close monitoring.  10. Depression  · The patient was experiencing significant exacerbation of depressive symptoms in fall 2021 as she was coping with her mother's illness with metastatic breast cancer.  Patient's mother was living with her on hospice care for a period of time and did pass away in late 2021.  · Patient was referred back to supportive oncology clinic which had been very helpful for her  · Patient had been receiving Lexapro, dose decreased from 10 down to 5 mg daily which was better tolerated  · Patient is continuing follow-up with supportive oncology, due to be seen next on 2/22/2023  11. PCP prophylaxis  · Patient previously receiving Bactrim DS 1 p.o. Monday Wednesday Friday  · With persistent elevation in LFTs, transition from Bactrim to dapsone on 1/3/2023  · Patient developed methemoglobinemia from dapsone, discontinued on 1/18/2023  · Patient has now  completed her course of concurrent chemoradiation with dapsone.  Although patient does have ongoing lymphopenia, I am reluctant to add in anything else currently for PCP prophylaxis.  We will consider use of monthly inhaled pentamadine pending her counts when she returns in a few weeks.  12. GI prophylaxis  · Continue Protonix 40 mg daily  13. Seizure prophylaxis  · Continue Keppra 500 mg twice daily  14. Elevated LFTs  · LFTs prior to hospitalization in November 2022 were normal  · Significant increase in transaminases noted on 12/8/2022 with ALT 1049, , normal total bilirubin 0.6  · Viral hepatitis panel negative 12/8/2022, negative GEMMA and smooth muscle antibody 12/9/2022  · Gradual improvement in LFT's.  · On 1/3/2023, ALT remained elevated and stable at 243, AST normal at 27, normal bilirubin 0.3.  Unclear if possibly related to Bactrim and therefore Bactrim discontinued and switched to dapsone  · 2/13/2023 LFTs have normalized.  15. 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  · Patient developed unexplained hypoxia and was seen in the emergency department on 1/18/2023.  After extensive evaluation, methemoglobin was found to be elevated at 10.6 and therefore patient felt to have dapsone induced methemoglobinemia, dapsone discontinued.  · Hypoxia resolved     Plan:  1. The patient has completed her course of concurrent chemoradiation (radiation completed 1/25/2023 and discontinued Temodar on 1/28/2023).  2. Decrease dexamethasone dose from 4 mg in the morning and 2 mg in the evening to 4 mg daily.  3. Patient remains off of Bactrim prophylaxis due to elevated LFTs  4. Patient remains off dapsone due to methemoglobinemia  5. Consider potential future use of inhaled pentamadine for PCP prophylaxis  6. Continue Keppra 500 mg twice daily for seizure prophylaxis  7. Continue Protonix 40 mg daily for GI prophylaxis  8. Patient is continuing on home  physical, occupational, and speech therapy  9. Patient continues follow-up in supportive oncology clinic due to be seen next on 2/22/2023.  10. Education today with pharmacy staff for upcoming maintenance Temodar 150 mg/m² days 1-5 on a 28-day cycle  11. In 1 week MD visit with CBC, CMP.  We will assess patient's overall status, reassess her pancytopenia and discuss timeframe to begin maintenance Temodar.  Plan B12 injection this day and thereafter likely moved to just oral replacement.      I spent 36 minutes caring for Christie on this date of service. This time includes time spent by me in the following activities: preparing for the visit, reviewing tests, obtaining and/or reviewing a separately obtained history, counseling and educating the patient/family/caregiver, documenting information in the medical record and care coordination

## 2023-02-16 ENCOUNTER — HOME CARE VISIT (OUTPATIENT)
Dept: HOME HEALTH SERVICES | Facility: HOME HEALTHCARE | Age: 58
End: 2023-02-16
Payer: COMMERCIAL

## 2023-02-16 RX ORDER — TEMOZOLOMIDE 20 MG/1
60 CAPSULE ORAL DAILY
Qty: 15 CAPSULE | Refills: 5 | Status: SHIPPED | OUTPATIENT
Start: 2023-02-26 | End: 2023-02-23

## 2023-02-16 RX ORDER — TEMOZOLOMIDE 5 MG/1
5 CAPSULE ORAL DAILY
Qty: 5 CAPSULE | Refills: 5 | Status: SHIPPED | OUTPATIENT
Start: 2023-02-26 | End: 2023-02-23

## 2023-02-16 RX ORDER — TEMOZOLOMIDE 250 MG/1
250 CAPSULE ORAL DAILY
Qty: 5 CAPSULE | Refills: 5 | Status: SHIPPED | OUTPATIENT
Start: 2023-02-26 | End: 2023-02-23

## 2023-02-17 ENCOUNTER — SPECIALTY PHARMACY (OUTPATIENT)
Dept: PHARMACY | Facility: HOSPITAL | Age: 58
End: 2023-02-17
Payer: COMMERCIAL

## 2023-02-17 NOTE — PROGRESS NOTES
Called [facility] and spoke with Concetta [facility].  She verified the start date (2/27/23) and the cycle days (taking days 1-5 of a 28 day cycle) She will finish processing and then schedule delivery.  The total cost for all 3 prescriptions was less than $30.

## 2023-02-20 ENCOUNTER — SPECIALTY PHARMACY (OUTPATIENT)
Dept: PHARMACY | Facility: HOSPITAL | Age: 58
End: 2023-02-20
Payer: COMMERCIAL

## 2023-02-20 ENCOUNTER — OFFICE VISIT (OUTPATIENT)
Dept: INTERNAL MEDICINE | Facility: CLINIC | Age: 58
End: 2023-02-20
Payer: COMMERCIAL

## 2023-02-20 DIAGNOSIS — E78.49 OTHER HYPERLIPIDEMIA: ICD-10-CM

## 2023-02-20 DIAGNOSIS — J40 BRONCHITIS: ICD-10-CM

## 2023-02-20 DIAGNOSIS — Z00.00 MEDICARE ANNUAL WELLNESS VISIT, SUBSEQUENT: ICD-10-CM

## 2023-02-20 DIAGNOSIS — C71.9 GLIOBLASTOMA MULTIFORME: ICD-10-CM

## 2023-02-20 DIAGNOSIS — E11.65 TYPE 2 DIABETES MELLITUS WITH HYPERGLYCEMIA, WITHOUT LONG-TERM CURRENT USE OF INSULIN: ICD-10-CM

## 2023-02-20 DIAGNOSIS — E87.1 HYPONATREMIA: Primary | ICD-10-CM

## 2023-02-20 PROCEDURE — 1160F RVW MEDS BY RX/DR IN RCRD: CPT | Performed by: INTERNAL MEDICINE

## 2023-02-20 PROCEDURE — G0439 PPPS, SUBSEQ VISIT: HCPCS | Performed by: INTERNAL MEDICINE

## 2023-02-20 PROCEDURE — 1159F MED LIST DOCD IN RCRD: CPT | Performed by: INTERNAL MEDICINE

## 2023-02-20 PROCEDURE — 1126F AMNT PAIN NOTED NONE PRSNT: CPT | Performed by: INTERNAL MEDICINE

## 2023-02-20 PROCEDURE — 1125F AMNT PAIN NOTED PAIN PRSNT: CPT | Performed by: INTERNAL MEDICINE

## 2023-02-20 PROCEDURE — 1170F FXNL STATUS ASSESSED: CPT | Performed by: INTERNAL MEDICINE

## 2023-02-20 PROCEDURE — 99214 OFFICE O/P EST MOD 30 MIN: CPT | Performed by: INTERNAL MEDICINE

## 2023-02-20 RX ORDER — DOXYCYCLINE HYCLATE 100 MG/1
100 CAPSULE ORAL 2 TIMES DAILY
Qty: 14 CAPSULE | Refills: 0 | Status: SHIPPED | OUTPATIENT
Start: 2023-02-20

## 2023-02-20 RX ORDER — ALBUTEROL SULFATE 90 UG/1
2 AEROSOL, METERED RESPIRATORY (INHALATION) EVERY 4 HOURS PRN
Qty: 8 G | Refills: 1 | Status: SHIPPED | OUTPATIENT
Start: 2023-02-20

## 2023-02-20 NOTE — PROGRESS NOTES
The ABCs of the Annual Wellness Visit  Subsequent Medicare Wellness Visit    Subjective    Christie Avendaño is a 57 y.o. female who presents for a Subsequent Medicare Wellness Visit.    The following portions of the patient's history were reviewed and   updated as appropriate: allergies, current medications, past family history, past medical history, past social history, past surgical history and problem list.    Compared to one year ago, the patient feels her physical   health is worse.    Compared to one year ago, the patient feels her mental   health is worse.    Recent Hospitalizations:  This patient has had a [facility] admission record on file within the last 365 days.    Current Medical Providers:  Patient Care Team:  Tiffany Holloway MD as PCP - General (Internal Medicine)  Mathew Collins Jr., MD as Consulting Physician (Hematology and Oncology)  Dorian Sabillon MD as Surgeon (General Surgery)  Thang Dumont OD (Optometry)  Lamine Cantu DO as Referring Physician (Family Medicine)  Hali Rolle MD as Gynecologist (Gynecology)  Juan Marr MD as Consulting Physician (Radiation Oncology)  Flaca Crisostomo MD as Surgeon (Thoracic Surgery)  Juana Pelayo MD as Referring Physician (Internal Medicine)    Outpatient Medications Prior to Visit   Medication Sig Dispense Refill   • Blood Glucose Monitoring Suppl (Accu-Chek Guide Me) w/Device kit 1 Device Daily. 1 kit 0   • Calcium Carb-Cholecalciferol (Oyster Shell Calcium/D3) 500-10 MG-MCG tablet Take 1 tablet by mouth 2 (Two) Times a Day. 180 tablet 0   • Continuous Blood Gluc Sensor (FreeStyle Lynn Sensor System) Every 10 (Ten) Days. 3 each 5   • dexamethasone (DECADRON) 4 MG tablet Take 1 tablet by mouth Every 8 (Eight) Hours. (Patient taking differently: Take 2 mg by mouth 2 (Two) Times a Day With Meals.) 90 tablet 0   • escitalopram (LEXAPRO) 5 MG tablet Take 1 tablet by mouth Daily. 90 tablet 0   • gabapentin (NEURONTIN) 100 MG capsule  Take 1 capsule by mouth 3 (Three) Times a Day. 90 capsule 1   • glucose blood (Accu-Chek Guide) test strip Use 1-2 daily to check blood sugars Dx diabetes E11.9 200 each 12   • Insulin Lispro, 1 Unit Dial, (HumaLOG KwikPen) 100 UNIT/ML solution pen-injector Inject 9 Units under the skin into the appropriate area as directed 3 (Three) Times a Day With Meals. 15 mL 1   • Insulin Pen Needle (BD Pen Needle Keila 2nd Gen) 32G X 4 MM misc Use as directed with insulin up to four times daily 100 each 0   • levETIRAcetam (KEPPRA) 500 MG tablet Take 1 tablet by mouth 2 (Two) Times a Day. 180 tablet 0   • levothyroxine (SYNTHROID, LEVOTHROID) 150 MCG tablet Take 150 mcg by mouth Daily.     • magnesium oxide (MAG-OX) 400 tablet tablet Take 1 tablet by mouth Daily. 90 tablet 0   • ondansetron (ZOFRAN) 8 MG tablet Take 1 tablet by mouth Daily 2 hours before radiation. Take Zofran 1 hour before Temodar. Take Temodar 1 hour before radiation therapy. 30 tablet 1   • pantoprazole (PROTONIX) 40 MG EC tablet Take 1 tablet by mouth Every Morning. 90 tablet 0   • propranolol (INDERAL) 20 MG tablet Take 1 tablet by mouth Every 8 (Eight) Hours. Hold if Heart Rate <60 or SBP <110 90 tablet 1   • Insulin Glargine (Lantus SoloStar) 100 UNIT/ML injection pen Inject 40 Units under the skin into the appropriate area as directed Every Morning. 15 mL 1   • polyethylene glycol (MIRALAX) 17 GM/SCOOP powder Take 17 g by mouth Daily. 238 g 0   • temozolomide (TEMODAR) 20 MG chemo capsule Take 3 capsules by mouth with 2 other temozolomide prescriptions for 315 mg total Daily for 5 days. Take on days 1-5 of each cycle. 15 capsule 5   • temozolomide (TEMODAR) 250 MG chemo capsule Take 1 capsule by mouth with 2 other temozolomide prescriptions for 315 mg total Daily for 5 days. Take on days 1-5 of each cycle. 5 capsule 5   • temozolomide (Temodar) 5 MG chemo capsule Take 1 capsule by mouth with 2 other temozolomide prescriptions for 315 mg total Daily for  "5 days. Take on days 1-5 of each cycle. 5 capsule 5   • valACYclovir (VALTREX) 500 MG tablet Take 1 tablet by mouth Daily for 30 doses. Duration and refills as directed by Primary Care  Indications: PROPHYLAXIS 30 tablet 0     No facility-administered medications prior to visit.       No opioid medication identified on active medication list. I have reviewed chart for other potential  high risk medication/s and harmful drug interactions in the elderly.          Aspirin is not on active medication list.  Aspirin use is not indicated based on review of current medical condition/s. Risk of harm outweighs potential benefits.  .    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  ACP discussion was held with the patient during this visit. Patient does not have an advance directive, declines further assistance.     Objective    Vitals:    02/20/23 1404   BP: 102/54   Pulse: 84   Weight: 105 kg (231 lb)   Height: 165.1 cm (65\")     Estimated body mass index is 38.44 kg/m² as calculated from the following:    Height as of this encounter: 165.1 cm (65\").    Weight as of this encounter: 105 kg (231 lb).    Class 2 Severe Obesity (BMI >=35 and <=39.9). Obesity-related health conditions include the following: hypertension and " diabetes mellitus. Obesity is unchanged. BMI is is above average; BMI management plan is completed. We discussed medical contraindication.      Does the patient have evidence of cognitive impairment? No    Lab Results   Component Value Date    TRIG 184 (H) 02/13/2023    HDL 48 02/13/2023     (H) 02/13/2023    VLDL 33 02/13/2023    HGBA1C 4.5 02/13/2023    HGBA1C 7.70 (H) 12/15/2022        HEALTH RISK ASSESSMENT    Smoking Status:  Social History     Tobacco Use   Smoking Status Never   Smokeless Tobacco Never     Alcohol Consumption:  Social History     Substance and Sexual Activity   Alcohol Use Yes    Comment: Rare     Fall Risk Screen:    STEADI Fall Risk Assessment was completed, and patient is at MODERATE risk for falls. Assessment completed on:2/20/2023    Depression Screening:  PHQ-2/PHQ-9 Depression Screening 2/20/2023   Little Interest or Pleasure in Doing Things 0-->not at all   Feeling Down, Depressed or Hopeless 0-->not at all   PHQ-9: Brief Depression Severity Measure Score 0       Health Habits and Functional and Cognitive Screening:  Functional & Cognitive Status 2/20/2023   Do you have difficulty preparing food and eating? No   Do you have difficulty bathing yourself, getting dressed or grooming yourself? No   Do you have difficulty using the toilet? No   Do you have difficulty moving around from place to place? No   Do you have trouble with steps or getting out of a bed or a chair? No   Current Diet Well Balanced Diet   Dental Exam Up to date   Eye Exam Up to date   Exercise (times per week) 0 times per week   Current Exercises Include No Regular Exercise   Do you need help using the phone?  No   Are you deaf or do you have serious difficulty hearing?  No   Do you need help with transportation? Yes   Do you need help shopping? Yes   Do you need help preparing meals?  Yes   Do you need help with housework?  Yes   Do you need help with laundry? Yes   Do you need help taking your medications?  Yes   Do you need help managing money? Yes   Do you ever drive or ride in a car without wearing a seat belt? No   Have you felt unusual stress, anger or loneliness in the last month? No   Who do you live with? Spouse   If you need help, do you have trouble finding someone available to you? No   Have you been bothered in the last four weeks by sexual problems? No   Do you have difficulty concentrating, remembering or making decisions? Yes       Age-appropriate Screening Schedule:  Refer to the list below for future screening recommendations based on patient's age, sex and/or medical conditions. Orders for these recommended tests are listed in the plan section. The patient has been provided with a written plan.    Health Maintenance   Topic Date Due   • Hepatitis B (1 of 3 - 3-dose series) Never done   • ZOSTER VACCINE (1 of 2) Never done   • COVID-19 Vaccine (3 - Moderna risk series) 11/16/2021   • TDAP/TD VACCINES (2 - Td or Tdap) 04/26/2022   • INFLUENZA VACCINE  08/01/2022   • DIABETIC FOOT EXAM  12/08/2022   • Pneumococcal Vaccine 0-64 (3 - PCV) 04/13/2023   • DIABETIC EYE EXAM  08/12/2023   • HEMOGLOBIN A1C  08/13/2023   • LIPID PANEL  02/13/2024   • URINE MICROALBUMIN  02/13/2024   • ANNUAL WELLNESS VISIT  02/20/2024   • COLORECTAL CANCER SCREENING  11/28/2027   • HEPATITIS C SCREENING  Completed   • PAP SMEAR  Discontinued                CMS Preventative Services Quick Reference  Risk Factors Identified During Encounter  Immunizations Discussed/Encouraged: Shingrix and COVID19  The above risks/problems have been discussed with the patient.  Pertinent information has been shared with the patient in the After Visit Summary.  An After Visit Summary and PPPS were made available to the patient.    Follow Up:   Next Medicare Wellness visit to be scheduled in 1 year.       Additional E&M Note during same encounter follows:  Patient has multiple medical problems which are significant and separately identifiable that  "require additional work above and beyond the Medicare Wellness Visit.      Chief Complaint  Medicare Wellness-subsequent    Subjective        HPI  Christie Avendaño is also being seen today for GBM- has been seeing Dr. Collins and rad onc- they have stated 12-18 months life expectancy. She is tolerating the treatments well.  Needs to have 24 hour care because mobility is a problem.   She does have a productive cough of green sputum. She is in the middle of the cycle between chemo doses.  She does have some wheezes- no inhaler. She was on abx in January when she was found to be hypoxic-   She checks her BS with CGM- stress can bring it up. 1  Review of Systems   HENT: Negative for congestion and trouble swallowing.    Respiratory: Positive for cough and wheezing. Negative for shortness of breath.    Cardiovascular: Negative for leg swelling.   Gastrointestinal: Negative for abdominal pain.   Genitourinary: Negative for difficulty urinating.   Musculoskeletal: Negative for arthralgias.   Psychiatric/Behavioral: The patient is not nervous/anxious.        Objective   Vital Signs:  /54   Pulse 84   Ht 165.1 cm (65\")   Wt 105 kg (231 lb)   BMI 38.44 kg/m²     Physical Exam  Constitutional:       General: She is not in acute distress.     Appearance: Normal appearance.   Cardiovascular:      Rate and Rhythm: Normal rate and regular rhythm.   Pulmonary:      Effort: Pulmonary effort is normal. No respiratory distress.      Breath sounds: No wheezing or rhonchi.          The following data was reviewed by: Tiffany Holloway MD on 02/20/2023:  Common labs    Common Labs 2/13/23 2/13/23 2/13/23 2/13/23 2/15/23 2/15/23 2/22/23 2/22/23    1052 1052 1052 1331 0848 0848 1417 1417   Glucose 195 (A)     299 (A)  218 (A)   BUN 21 (A)     27 (A)  24 (A)   Creatinine 0.49 (A)     0.50 (A)  0.42 (A)   Sodium 139     137  142   Potassium 4.5     4.6  4.4   Chloride 101     98  104   Calcium 8.9     9.1  9.1   Albumin 3.8     3.8  3.8 "   Total Bilirubin 0.6     0.6  0.3   Alkaline Phosphatase 88     97  98   AST (SGOT) 9     13  11   ALT (SGPT) 28     35 (A)  38 (A)   WBC     2.61 (A)  3.13 (A)    Hemoglobin     9.4 (A)  9.4 (A)    Hematocrit     29.2 (A)  30.2 (A)    Platelets     36 (A)  48 (A)    Total Cholesterol    209 (A)       Triglycerides    184 (A)       HDL Cholesterol    48       LDL Cholesterol     128 (A)       Hemoglobin A1C  4.5         Microalbumin, Urine   2.6        (A) Abnormal value            Data reviewed: Consultant notes onc notes           Assessment and Plan   Diagnoses and all orders for this visit:    1. Hyponatremia (Primary)  Comments:  reviewed hosp notes- likely SIADH- can drink up to 3 bottles of water/day, she doesn't do much for fear of this and difficulty getting to bathroom. encouraged    2. Type 2 diabetes mellitus with hyperglycemia, without long-term current use of insulin (HCC)  Comments:  A1C makes no sense- no changes made- monitor changes in CGM.     3. Other hyperlipidemia  Comments:  labs reviewed, I think she's ok to stay off statin    4. Bronchitis  Comments:  will treat with abx and inhaler- will need repeat CT - d/w Dr. Collins about who is to order.     5. Glioblastoma multiforme (HCC)  Comments:  discussion with pt and  about expectations, etc.     6. Medicare annual wellness visit, subsequent  Comments:  refuses further vaccines- labs reviewed, close onc f/u.  No changes made today.     Other orders  -     doxycycline (VIBRAMYCIN) 100 MG capsule; Take 1 capsule by mouth 2 (Two) Times a Day.  Dispense: 14 capsule; Refill: 0  -     albuterol sulfate  (90 Base) MCG/ACT inhaler; Inhale 2 puffs Every 4 (Four) Hours As Needed for Wheezing.  Dispense: 8 g; Refill: 1             Follow Up   Return in about 6 months (around 8/20/2023) for Recheck.  Patient was given instructions and counseling regarding her condition or for health maintenance advice. Please see specific information pulled  into the AVS if appropriate.

## 2023-02-21 ENCOUNTER — HOME CARE VISIT (OUTPATIENT)
Dept: HOME HEALTH SERVICES | Facility: HOME HEALTHCARE | Age: 58
End: 2023-02-21
Payer: COMMERCIAL

## 2023-02-21 PROCEDURE — G0153 HHCP-SVS OF S/L PATH,EA 15MN: HCPCS

## 2023-02-21 NOTE — PROGRESS NOTES
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  She has remained on dexamethasone however has recently tapered her dose, on 2/8/2023 decreased from 4 mg twice daily down to 4 mg in the morning and 2 mg in the evening.  On 2/15/2023, we decreased further to 2 mg twice daily.  Patient today notes that she has overall done poorly since her last visit.  She has not experienced any improvement in her fatigue, instead reporting worsening of her overall status with increase in fatigue, further decrease in her mobility, not able to ambulate any significant distance any longer at home.  She finds it increasingly difficult to attend medical appointments outside of the home.  She also developed increase in cough few days ago, no fever, no dyspnea on exertion.  She was seen by her PCP Dr. Holloway yesterday and was given a prescription for doxycycline and albuterol.  Patient is accompanied today again by her ex- who has been trying to provide care for her at home which has become increasingly stressful for him as well given her increasing needs.      Objective   Vital Signs:   /84   Pulse 75   Temp 96.9 °F (36.1 °C) (Temporal)   Resp 18   Ht 165.1 cm (65\")   Wt 107 kg (235 lb)   SpO2 95%   BMI 39.11 kg/m²     Physical Exam  Constitutional:       Appearance: She is well-developed.      Comments: Patient is in a wheelchair today.  Cushingoid features   Eyes:    "   Conjunctiva/sclera: Conjunctivae normal.   Neck:      Thyroid: No thyromegaly.   Cardiovascular:      Rate and Rhythm: Normal rate and regular rhythm.      Heart sounds: No murmur heard.    No friction rub. No gallop.   Pulmonary:      Effort: No respiratory distress.      Breath sounds: Normal breath sounds.   Abdominal:      General: Bowel sounds are normal. There is no distension.      Palpations: Abdomen is soft.      Tenderness: There is no abdominal tenderness.   Lymphadenopathy:      Head:      Right side of head: No submandibular adenopathy.      Cervical: No cervical adenopathy.      Upper Body:      Right upper body: No supraclavicular adenopathy.      Left upper body: No supraclavicular adenopathy.   Skin:     General: Skin is warm and dry.      Findings: No rash.   Neurological:      Mental Status: She is alert and oriented to person, place, and time.      Cranial Nerves: No cranial nerve deficit.      Motor: No abnormal muscle tone.      Comments: Weakness in the left upper and lower extremities   Psychiatric:         Behavior: Behavior normal.            Result Review : Reviewed CBC, CMP from today.  Reviewed records from supportive oncology as well as PCP.       Assessment and Plan  1. Glioblastoma multiforme  · Patient presented with falls and confusion, left upper and lower extremity weakness  · MRI brain 11/22/2022 with 3.2 cm rim-enhancing right supratentorial mass in the right thalamus and internal capsule with mass-effect and leftward midline shift  · Initiated dexamethasone 4 mg p.o. every 8 hours seizure prophylaxis with Keppra 500 mg twice daily  · Patient was seen by neurosurgery and was not a candidate for resection due to location.  · CT chest abdomen pelvis 11/24/2022 showed no evidence of source for potential metastatic disease  · Stereotactic brain biopsy 11/28/2022 with glioblastoma, IDH1 R132H negative, grade 4, MGMT promoter unmethylated  · On 12/12/2022, patient initiated  concurrent chemoradiation with low-dose Temodar 75 mg/m² (165 mg) daily  · Patient completed radiation therapy on 1/25/2023 and discontinued Temodar on 1/28/2023 (took for 3 additional days after radiation stopped).  · MRI brain 2/7/2023 with decrease in mass right thalamus and internal capsule from 3.0 x 2.8 x 3.2 cm down to 2.8 x 2.5 x 3.1 cm.  Slight decrease in mass effect and surrounding edema.  Evidence of hemorrhage within the mass.  Mass effect on right lateral ventricle with 4 mm right to left midline shift unchanged.  · Patient with gradual decline in overall status following completion of concurrent chemoradiation.  Given the patient's compromised ECOG performance status, ongoing pancytopenia (see below), and unmethylated MGMT status, she was ultimately felt to be a poor candidate for maintenance Temodar.  Ultimately transitioned to palliative/supportive care with home hospice involvement on 2/22/2023.  · The patient returns today in follow-up to reassess her status and consider whether to proceed with maintenance Temodar as we had discussed at the last visit.  Rather than seeing improvement in her overall status, she unfortunately appears to have worsened in terms of her mobility, fatigue.  Etiology of this is unclear.  Nevertheless, at this point the patient appears to be a poor candidate for further treatment with single agent maintenance Temodar with increased risk for side effects from therapy.  She also has reduced potential benefit from Temodar given her MGMT unmethylated status.  We had extensive discussion today regarding goals of care.  With the reduced potential for response to Temodar and increased risk of side effects, I have recommended that we forego further active treatment for her disease and pursue palliative/supportive care alone.  The patient and her ex- agree.  It has been increasingly difficult for her to attend medical visits and we will try to manage things at home.  We  discussed consulting hospice for home care and we did review benefits of hospice at home.  They do agree and referral will be placed.  We did discuss the potential to increase her Decadron dose which might help with her fatigue however for now they would like to continue at 2 mg twice daily.  We will not plan further in person visit in the office however we will schedule a telephone visit in 3 to 4 weeks.  2. BRCA2 mutation (c.[id number]) and GEORGES mutation (c.5073dup):  · Strong family history of malignancy with a mother who had thyroid cancer and also had breast cancer at age 73 with subsequent liver metastases.  She was found to be BRCA2 positive and prompted the patient's own testing.  The patient's maternal grandmother had ovarian cancer in her mid 80s, maternal grandfather and paternal grandfather both had leukemia of unknown type at an older age.  Her maternal aunt had colon cancer over the age of 50, maternal aunt had breast cancer in her 80s, and her father had cholangiocarcinoma.    · The patient underwent testing on 7/27/2016 showing positive BRCA2 mutation ([id number].[id number])   · The patient did undergo KAVYA/BSO in 1992 secondary to hemorrhage.  · The patient did undergo bilateral mastectomies 1/26/2017 with findings of bilateral DCIS (discussed below).  · Patient has undergone previous colonoscopy on 11/28/2017.  · Given the unusual nature of the patient's malignancies, referral to genetics clinic for panel testing performed 11/10/2020 with re-identification of BRCA2 mutation ([id number].[id number]) and new identification of GEORGES mutation ([id number].5073dup).  · Previous plan to continue surveillance with repeat colonoscopy at a 5-year interval that would have been due late 2022.  Plans subsequently deferred due to subsequent diagnosis of glioblastoma multiforme.  2. Stage I (jC1aZyUa) papillary thyroid cancer:  · Status post total thyroidectomy with Dr. Maher in [place] on 10/15/2010.  Pathology showed papillary thyroid cancer  involving the left thyroid and isthmus, multifocal with 2 areas measuring 0.9 and 1.2 cm.  No evidence of capsular invasion, no extrathyroidal extension, no lymphovascular invasion, negative margins.  · Postsurgical scan showed uptake in the thyroid bed and the patient underwent treatment with I-131.    · She has had no evidence of recurrent disease.    · Her subsequent hypothyroidism has been managed by endocrinology (Dr. Subhash Michael) in [place], currently receiving levothyroxine 150 mcg daily.  · The patient was following routinely with endocrinology, was seen last on 8/8/2022  3. Bilateral DCIS  · As above, patient has underlying BRCA2 mutation  · 8/5/2016 was found to have a right breast intraductal papilloma with fibrocystic change and microcalcifications with usual ductal hyperplasia and columnar cell change in 3 separate locations on biopsy.  · On 8/24/2016 she underwent excisional biopsy of an intraductal papilloma from the right breast.    · She subsequently underwent on 1/26/2017 bilateral mastectomy.  On the right there was a large intraductal papilloma with usual ductal hyperplasia, atypical hyperplasia, DCIS measuring 3 mm which was low-grade, ER positive (98%), CO positive (85%).  On the left there were multiple intraductal papillomas with atypical ductal hyperplasia.  There was DCIS measuring 6 mm, low-grade, ER positive (99%), CO positive (98%).  4. Stage I ([id number]) clear-cell renal cell carcinoma:  · Incidental finding on CT scan 1/15/2020 of enhancing left renal lesions x2, largest 2.4 cm  · Resection with Dr. Pool ([facility]) on 5/15/2020 with left partial nephrectomy.  Pathology showed clear cell renal cell carcinoma, Jeanne grade 2, 2 foci measuring 1.5 and 2.1 cm.  The renal parenchymal margin was focally positive.  · Patient reports that Dr. Pool felt that he required additional margin tissue in the area of focal positivity and did not recommend re-resection.  · CT  8/17/2022 showed no evidence of recurrent disease  · CT chest abdomen pelvis 11/25/2022 with stable chronic findings.  · Patient had been undergoing every 6-month monitoring of CT scans for surveillance however no plans for further CT monitoring in light of diagnosis of glioblastoma and subsequent transition to palliative/supportive care with hospice.  5. 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    · CT-guided biopsy on 1/9/2018 revealed a left lower lobe carcinoid with spindle cell features, no necrosis, no mitoses.  · Notation of normal chromogranin A 1/23/2018 of 35  · Follow-up CT on 1/24/2018 showed multiple bilateral pulmonary nodules including a 2.1 cm left lower lobe nodule (previously 2.6), 7 mm left lower lobe nodule, 1 cm right lower lobe nodule, right adrenal 1.5 cm nodule consistent with adenoma, and hepatic cysts.    · On 2/28/2018, the patient underwent a left lower lobectomy.  Pathology revealed a 2.3 cm left upper lobe (hilar) carcinoid, typical with spindle cell features, Ki-67 of 3.68%.  There were 2 of 8 peribronchial lymph nodes involved with carcinoid with lymphatic invasion, stage IIB ([id number]).  There was also evidence of adenocarcinoma (see below).  6. Stage IA1 (dL8sT8Fp)  left lower lobe non-small cell lung cancer (adenocarcinoma with lipidic features):  · The patient is a never smoker  · PET scan 12/13/2017 with hypermetabolic 2.5 cm left lower lobe nodule with SUV 5.7.    · CT-guided biopsy on 1/9/2018 revealed a left lower lobe carcinoid with spindle cell features, no necrosis, no mitoses.    · Follow-up CT on 1/24/2018 showed multiple bilateral pulmonary nodules including a 2.1 cm left lower lobe nodule (previously 2.6), 7 mm left lower lobe nodule, 1 cm right lower lobe nodule, right adrenal 1.5 cm nodule consistent with adenoma, and hepatic cysts.    · On 2/28/2018, the patient  underwent a left lower lobectomy.  Pathology revealed a 2.3 cm left upper lobe (hilar) carcinoid, typical with spindle cell features, Ki-67 of 3.68%.  There were 2 of 8 peribronchial lymph nodes involved with carcinoid with lymphatic invasion, stage IIB ([id number]).  There was a 0.9 cm adenocarcinoma, well differentiated with lipidic features, no visceral pleural invasion, bronchoalveolar atypical adenomatous hyperplasia at the parenchymal margin and 1 microscopic focus (less than 1 mm).  0/8 lymph nodes involved with adenocarcinoma. PDL1 <1% TPS, positive EGF receptor mutation (exon 19 deletion, [id number]), negative ALK/ROS1 rearrangement, negative BRAF, ER negative (2%), MS 0, HER-2/laura 1+, Ki-67 3%. Stage IA1 (cT9qsS7G2).  · Patient was placed on Femara following surgery by Dr. Juana Pelayo.  Femara subsequently discontinued in early August 2020.  · Subsequent follow-up scans with stable findings until scan on 1/15/2020 noted 2 enhancing left renal lesions, largest 2.4 cm and there was also an enlarging right lower lobe nodule up to 1.0 x 1.2 cm.  In the interval, patient did undergo resection of renal cell carcinoma (discussed above).    · CT scan on 7/1/2020 showed slow increase in right lower lobe nodule up to 1.2 cm.  Felt to represent new primary lung cancer (see below).   7. Stage IA2 ([id number]) right lower lobe non-small cell lung cancer (adenocarcinoma with lipidic growth pattern):   · CT scan on 7/1/2020 showed slow increase in right lower lobe nodule up to 1.2 cm.  · CT-guided biopsy of the right lower lobe nodule on 7/31/2020 with adenocarcinoma with bland lipidic growth pattern of pulmonary origin (TTF-1 and CK7 positive). PDL1 TPS <1%, EGFR mutated, exon 20 insertion (possibly indicating lack of response to TKI's). ALK/ROS1 rearrangement negative and BRAF V600 mutation negative.  · Staging MRI brain 8/12/2020 with no evidence of metastatic disease  · Staging PET scan 8/12/2020 with no  significant activity in the biopsied lesion 1.2 cm right lower lobe (SUV 1.5), no evidence of hilar, mediastinal uptake nor distant metastatic disease.  · Given the difference in EGFR mutation between the patient's 2 lung cancers, it is felt that she has 2 separate primary lesions.    · Right VATS with anterior basal segmentectomy on 10/9/2020 with pathology showing invasive well to moderately differentiated adenocarcinoma with acinar predominant growth measuring 1.4 cm, negative margins, no pleural or lymphovascular invasion, negative lymph nodes x5.  · Follow-up CT chest 3/8/2021 showed postoperative change, no evidence of disease recurrence.    · CT 8/17/2022 showed no evidence of recurrent disease.   · CT chest abdomen pelvis 11/25/2022 with stable chronic findings.  · Patient had been undergoing every 6-month monitoring of CT scans for surveillance.  We will not plan further routine CT monitoring in light of the diagnosis of glioblastoma.  8. Melanoma right heel stage IA (uA7sDnG2)  · Patient underwent excision of right heel melanoma 6/4/2021, 0.6 mm superficial spreading melanoma with margin positive for melanoma in situ.  · Wide local excision 6/30/2021 with no residual melanoma identified  · Patient has had difficulty with healing of the wound and has required 3 separate skin grafts, the most recent performed 4 weeks ago with ongoing slow healing.  · The patient's wound in the right heel did finally heal in late 2021.    · Patient had been continuing follow-up with dermatology every 3 months however this is likely not necessary in the setting of diagnosis of glioblastoma.  9. Incidental CT findings  · Notation of a number of incidental CT findings on scan from 8/17/2022 including 1.6 cm right adrenal nodule, C7 focal osteolysis, both unchanged compared to 3/8/2021 and apparent benign findings.    · Notation on CT scan 8/17/2022 of short segment narrowing of the proximal left internal carotid artery resulting  in moderate stenosis.  Findings were not well evaluated.    · Carotid ultrasound 9/1/2022 was normal  · CT chest abdomen pelvis with stable findings on 11/25/2022.  No plans for any further routine CT monitoring of these issues.  10. Anemia:  · Patient has undergone previous colonoscopy on 11/28/2017.  · Hemoglobin in high 10 to low 11 range with low normal MCV  · Anemia evaluation 8/20/2020 with iron 44, ferritin 119.3, iron saturation 13%, TIBC 351, folate 12.2, B12 392.  · Unclear whether patient may have anemia secondary to chronic disease.  · With low iron saturation, initiated oral iron daily on 9/15/2020.  Patient however did not begin oral iron until 11/11/2020.  · Labs on 11/11/2020 with hemoglobin 11.4, iron studies showing iron 54, ferritin 112, iron saturation 14%, TIBC 395.  · Labs on 1/7/2021 with hemoglobin 11.9, iron studies with iron 50, ferritin 109.5, iron saturation 12%, TIBC 420.  Patient with significant GI side effects from oral iron with diarrhea, iron discontinued.  · Labs on 9/14/2021 showed hemoglobin that declined to 11.4 of unclear etiology.  Additional labs showed iron 44, ferritin 137.6, iron saturation 13 %, TIBC 346, folate 11.3, B12 394.   · Labs on 8/24/2022 showed hemoglobin normal at 12.0.  Labs show iron 46, ferritin 106, iron saturation 13%, TIBC 351.    · Hemoglobin on 2/8/2023 declined abruptly from 11.4 on 11/18/2023 down to 8.6 today with MCV dramatically elevated at 120.  This was in the setting of pancytopenia with WBC 2.79 and platelet count of 37,000.  Additional labs on 2/8/2023 with iron 102, ferritin 547.8, iron saturation 32%, TIBC 321, folate 10.1, B12 226.    · Patient initiated B12 replacement with B12 injections 1000 mcg IM daily x5 with subsequent plans for weekly B12 x4 followed by monthly B12 injections.  · Patient returns today with hemoglobin stable at 9.4.  As we are transitioning to palliative/supportive care alone, we will not plan further B12  replacement after today's dose.  9. Acute pancytopenia  · Labs on 2/8/2023 with dramatic change, on 1/18/2023, WBC 9.72 with hemoglobin 11.4 and platelet count 89,000.  Today, WBC 2.79 with hemoglobin 8.6, platelet count 37,000, .3.  · CBC was repeated and verified  · Etiology of the acute pancytopenia unclear, suspect either cumulative effects from low-dose Temodar (completed on 1/28/2023) or possibly hematologic effects from recent dapsone use (discontinued on 1/18/2023 due to methemoglobinemia).  · Additional labs on 2/8/2023 with iron 102, ferritin 547.8, iron saturation 32%, TIBC 321, folate 10.1, B12 226.    · Patient initiated B12 replacement with B12 injections 1000 mcg IM daily x5 with subsequent plans for weekly B12 x4 followed by monthly B12 injections.  · Today, counts have improved slightly with WBC 3.13, ANC 2.13, hemoglobin 9.4, platelet count 48,000 with MCV that remains dramatically elevated at 118.  With persistent pancytopenia, patient is not a candidate for Temodar as noted above.  Plans given her overall decline to transition to palliative/supportive care with hospice involvement and therefore we will not be monitoring further routine laboratory studies.  10. Depression  · The patient was experiencing significant exacerbation of depressive symptoms in fall 2021 as she was coping with her mother's illness with metastatic breast cancer.  Patient's mother was living with her on hospice care for a period of time and did pass away in late 2021.  · Patient was referred back to supportive oncology clinic which had been very helpful for her  · Patient had been receiving Lexapro, dose decreased from 10 down to 5 mg daily which was better tolerated  · Patient has continued follow-up with supportive oncology, seen earlier today.  11. PCP prophylaxis  · Patient previously receiving Bactrim DS 1 p.o. Monday Wednesday Friday  · With persistent elevation in LFTs, transition from Bactrim to dapsone on  1/3/2023  · Patient developed methemoglobinemia from dapsone, discontinued on 1/18/2023  · Patient completed her course of concurrent chemoradiation with Temodar.  Although patient had ongoing lymphopenia, reluctant to add in anything else currently for PCP prophylaxis.    12. GI prophylaxis  · Continue Protonix 40 mg daily  13. Seizure prophylaxis  · Continue Keppra 500 mg twice daily  14. Elevated LFTs  · LFTs prior to hospitalization in November 2022 were normal  · Significant increase in transaminases noted on 12/8/2022 with ALT 1049, , normal total bilirubin 0.6  · Viral hepatitis panel negative 12/8/2022, negative GEMMA and smooth muscle antibody 12/9/2022  · Gradual improvement in LFT's.  · On 1/3/2023, ALT remained elevated and stable at 243, AST normal at 27, normal bilirubin 0.3.  Unclear if possibly related to Bactrim and therefore Bactrim discontinued and switched to dapsone  · Today, LFTs with minimal elevation in ALT at 38  15. 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  · Patient developed unexplained hypoxia and was seen in the emergency department on 1/18/2023.  After extensive evaluation, methemoglobin was found to be elevated at 10.6 and therefore patient felt to have dapsone induced methemoglobinemia, dapsone discontinued.  · Hypoxia resolved     Plan:  1. With continued decline in performance status, persistent pancytopenia, patient is not a candidate for further chemotherapy with single agent Temodar.  2. Recommendation to forego further active treatment for her malignancy and to proceed with palliative/supportive care with home hospice involvement.  Patient and family agree and we will send referral for hospice care at home.  3. Continue dexamethasone 2 mg twice daily, consider increasing back to 4 mg twice daily if severe fatigue persists  4. Continue Keppra 500 mg twice daily for seizure prophylaxis  5. Continue Protonix 40 mg  daily for GI prophylaxis  6. We will tentatively schedule follow-up telephone visit in 3 to 4 weeks.      I did spend 60 minutes in total time caring for the patient today, time spent in review of records, face-to-face consultation, coordination of care, placement of orders, completion of documentation.

## 2023-02-22 ENCOUNTER — OFFICE VISIT (OUTPATIENT)
Dept: PSYCHIATRY | Facility: HOSPITAL | Age: 58
End: 2023-02-22
Payer: MEDICARE

## 2023-02-22 ENCOUNTER — HOME CARE VISIT (OUTPATIENT)
Dept: HOME HEALTH SERVICES | Facility: HOME HEALTHCARE | Age: 58
End: 2023-02-22
Payer: COMMERCIAL

## 2023-02-22 ENCOUNTER — LAB (OUTPATIENT)
Dept: LAB | Facility: HOSPITAL | Age: 58
End: 2023-02-22
Payer: MEDICARE

## 2023-02-22 ENCOUNTER — INFUSION (OUTPATIENT)
Dept: ONCOLOGY | Facility: HOSPITAL | Age: 58
End: 2023-02-22
Payer: MEDICARE

## 2023-02-22 ENCOUNTER — OFFICE VISIT (OUTPATIENT)
Dept: ONCOLOGY | Facility: CLINIC | Age: 58
End: 2023-02-22
Payer: MEDICARE

## 2023-02-22 VITALS
DIASTOLIC BLOOD PRESSURE: 84 MMHG | OXYGEN SATURATION: 95 % | HEART RATE: 75 BPM | HEIGHT: 65 IN | RESPIRATION RATE: 18 BRPM | BODY MASS INDEX: 39.15 KG/M2 | WEIGHT: 235 LBS | SYSTOLIC BLOOD PRESSURE: 120 MMHG | TEMPERATURE: 96.9 F

## 2023-02-22 DIAGNOSIS — C71.9 GLIOBLASTOMA MULTIFORME: ICD-10-CM

## 2023-02-22 DIAGNOSIS — F41.1 GENERALIZED ANXIETY DISORDER: ICD-10-CM

## 2023-02-22 DIAGNOSIS — C71.9 GLIOBLASTOMA MULTIFORME: Primary | ICD-10-CM

## 2023-02-22 DIAGNOSIS — F43.21 GRIEF: Primary | ICD-10-CM

## 2023-02-22 DIAGNOSIS — E53.8 B12 DEFICIENCY: Primary | ICD-10-CM

## 2023-02-22 LAB
ALBUMIN SERPL-MCNC: 3.8 G/DL (ref 3.5–5.2)
ALBUMIN/GLOB SERPL: 2.1 G/DL (ref 1.1–2.4)
ALP SERPL-CCNC: 98 U/L (ref 38–116)
ALT SERPL W P-5'-P-CCNC: 38 U/L (ref 0–33)
ANION GAP SERPL CALCULATED.3IONS-SCNC: 12.6 MMOL/L (ref 5–15)
AST SERPL-CCNC: 11 U/L (ref 0–32)
BASOPHILS # BLD AUTO: 0 10*3/MM3 (ref 0–0.2)
BASOPHILS NFR BLD AUTO: 0 % (ref 0–1.5)
BILIRUB SERPL-MCNC: 0.3 MG/DL (ref 0.2–1.2)
BUN SERPL-MCNC: 24 MG/DL (ref 6–20)
BUN/CREAT SERPL: 57.1 (ref 7.3–30)
CALCIUM SPEC-SCNC: 9.1 MG/DL (ref 8.5–10.2)
CHLORIDE SERPL-SCNC: 104 MMOL/L (ref 98–107)
CO2 SERPL-SCNC: 25.4 MMOL/L (ref 22–29)
CREAT SERPL-MCNC: 0.42 MG/DL (ref 0.6–1.1)
DEPRECATED RDW RBC AUTO: 65.9 FL (ref 37–54)
EGFRCR SERPLBLD CKD-EPI 2021: 113.5 ML/MIN/1.73
EOSINOPHIL # BLD AUTO: 0 10*3/MM3 (ref 0–0.4)
EOSINOPHIL NFR BLD AUTO: 0 % (ref 0.3–6.2)
ERYTHROCYTE [DISTWIDTH] IN BLOOD BY AUTOMATED COUNT: 15.4 % (ref 12.3–15.4)
GLOBULIN UR ELPH-MCNC: 1.8 GM/DL (ref 1.8–3.5)
GLUCOSE SERPL-MCNC: 218 MG/DL (ref 74–124)
HCT VFR BLD AUTO: 30.2 % (ref 34–46.6)
HGB BLD-MCNC: 9.4 G/DL (ref 12–15.9)
IMM GRANULOCYTES # BLD AUTO: 0.05 10*3/MM3 (ref 0–0.05)
IMM GRANULOCYTES NFR BLD AUTO: 1.6 % (ref 0–0.5)
LYMPHOCYTES # BLD AUTO: 0.61 10*3/MM3 (ref 0.7–3.1)
LYMPHOCYTES NFR BLD AUTO: 19.5 % (ref 19.6–45.3)
MCH RBC QN AUTO: 36.7 PG (ref 26.6–33)
MCHC RBC AUTO-ENTMCNC: 31.1 G/DL (ref 31.5–35.7)
MCV RBC AUTO: 118 FL (ref 79–97)
MONOCYTES # BLD AUTO: 0.34 10*3/MM3 (ref 0.1–0.9)
MONOCYTES NFR BLD AUTO: 10.9 % (ref 5–12)
NEUTROPHILS NFR BLD AUTO: 2.13 10*3/MM3 (ref 1.7–7)
NEUTROPHILS NFR BLD AUTO: 68 % (ref 42.7–76)
NRBC BLD AUTO-RTO: 0.6 /100 WBC (ref 0–0.2)
PLATELET # BLD AUTO: 48 10*3/MM3 (ref 140–450)
PMV BLD AUTO: 10 FL (ref 6–12)
POTASSIUM SERPL-SCNC: 4.4 MMOL/L (ref 3.5–4.7)
PROT SERPL-MCNC: 5.6 G/DL (ref 6.3–8)
RBC # BLD AUTO: 2.56 10*6/MM3 (ref 3.77–5.28)
SODIUM SERPL-SCNC: 142 MMOL/L (ref 134–145)
WBC NRBC COR # BLD: 3.13 10*3/MM3 (ref 3.4–10.8)

## 2023-02-22 PROCEDURE — 99215 OFFICE O/P EST HI 40 MIN: CPT | Performed by: INTERNAL MEDICINE

## 2023-02-22 PROCEDURE — 99214 OFFICE O/P EST MOD 30 MIN: CPT | Performed by: NURSE PRACTITIONER

## 2023-02-22 PROCEDURE — 80053 COMPREHEN METABOLIC PANEL: CPT

## 2023-02-22 PROCEDURE — 85025 COMPLETE CBC W/AUTO DIFF WBC: CPT

## 2023-02-22 PROCEDURE — 25010000002 CYANOCOBALAMIN PER 1000 MCG: Performed by: INTERNAL MEDICINE

## 2023-02-22 PROCEDURE — 36415 COLL VENOUS BLD VENIPUNCTURE: CPT

## 2023-02-22 PROCEDURE — 96372 THER/PROPH/DIAG INJ SC/IM: CPT

## 2023-02-22 RX ORDER — CYANOCOBALAMIN 1000 UG/ML
1000 INJECTION, SOLUTION INTRAMUSCULAR; SUBCUTANEOUS ONCE
Status: COMPLETED | OUTPATIENT
Start: 2023-02-22 | End: 2023-02-22

## 2023-02-22 RX ORDER — CYANOCOBALAMIN 1000 UG/ML
1000 INJECTION, SOLUTION INTRAMUSCULAR; SUBCUTANEOUS ONCE
OUTPATIENT
Start: 2023-02-22

## 2023-02-22 RX ADMIN — CYANOCOBALAMIN 1000 MCG: 1000 INJECTION, SOLUTION INTRAMUSCULAR at 14:19

## 2023-02-22 NOTE — PROGRESS NOTES
[facility]  In Person Session    Subjective  Patient ID: Christie Avendaño is a 57 y.o. female is seen face to face in the [facility].    CC: Affective flattening, fatigue, alongside MDS, various other cancer    HPI/ Interval History:   Pt continues in survivorship of various cancer, recent dx and continued treatment for GBM. She is seen in the presence of her . Pt conitnues with positive demeanor, even mood, confirmed by . Pt denies pain outside of headaches. Reports report sleeping well at night, although with significant fatigue during day. Finds she cannot focus or concentrate. 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 Pt has completed radiation, continuing on temodar. Generally feels she is tolerating this well. Does note dry skin and thinning of hair. Fortunately, has not noted any GI disruption. Appetite is in excess.     Pt reports well managed mood and anxiety sx on continued low dose SSRI. Denies current concerns at this time. Does consider reduced motivation, impactful apathy, low QOL with limited ability to engage in ADLs or desirable activities.      is present in conversation. Reports mood as being mellow, easy to be around. Endorses caregiver distress while working 64 hours a week. Appreciates assistance from pt fatherkelli finds he is needing assistance to allow for personal rest and health. Reports having FMLA in place although with significant pay cut. Identifies burden of medical bills. Has had various therapists who have come to the house; two have discharged her. Has considered benefit of in patient rehab and is working with speech therapy to explore realistic goals. At this time, pt is unable to perform any level of ADLs. Pt is not alone more than 1 hour at a time.    Both acrimaolwedesiree apt today with Dr. Collins. Plan to discuss current situation and  goals, qith questions regarding prognosis, any ability to achieve higher functional status, realistic expectations.  reports general hesitancy to have this conversation, although pt gives him permission and states she also desires to know the answers to his questions. Both deny QOL in current situation. Endorse significant provider burden with significant challenges attending frequent appointments, managing fatigue, even reporting recent significant falls with difficulty getting patient up.    Exam: As above    Recent Labs Reviewed:  Lab on 02/15/2023   Component Date Value   • Glucose 02/15/2023 299 (H)    • BUN 02/15/2023 27 (H)    • Creatinine 02/15/2023 0.50 (L)    • Sodium 02/15/2023 137    • Potassium 02/15/2023 4.6    • Chloride 02/15/2023 98    • CO2 02/15/2023 24.7    • Calcium 02/15/2023 9.1    • Total Protein 02/15/2023 5.7 (L)    • Albumin 02/15/2023 3.8    • ALT (SGPT) 02/15/2023 35 (H)    • AST (SGOT) 02/15/2023 13    • Alkaline Phosphatase 02/15/2023 97    • Total Bilirubin 02/15/2023 0.6    • Globulin 02/15/2023 1.9    • A/G Ratio 02/15/2023 2.0    • BUN/Creatinine Ratio 02/15/2023 54.0 (H)    • Anion Gap 02/15/2023 14.3    • eGFR 02/15/2023 108.9    • WBC 02/15/2023 2.61 (L)    • RBC 02/15/2023 2.47 (L)    • Hemoglobin 02/15/2023 9.4 (L)    • Hematocrit 02/15/2023 29.2 (L)    • MCV 02/15/2023 118.2 (H)    • MCH 02/15/2023 38.1 (H)    • MCHC 02/15/2023 32.2    • RDW 02/15/2023 15.7 (H)    • RDW-SD 02/15/2023 68.0 (H)    • MPV 02/15/2023 10.1    • Platelets 02/15/2023 36 (L)    • Neutrophil % 02/15/2023 70.5    • Lymphocyte % 02/15/2023 18.4 (L)    • Monocyte % 02/15/2023 8.8    • Eosinophil % 02/15/2023 0.0 (L)    • Basophil % 02/15/2023 0.4    • Immature Grans % 02/15/2023 1.9 (H)    • Neutrophils, Absolute 02/15/2023 1.84    • Lymphocytes, Absolute 02/15/2023 0.48 (L)    • Monocytes, Absolute 02/15/2023 0.23    • Eosinophils, Absolute 02/15/2023 0.00    • Basophils, Absolute 02/15/2023 0.01     • Immature Grans, Absolute 02/15/2023 0.05    • nRBC 02/15/2023 1.1 (H)    Hospital Outpatient Visit on 02/13/2023   Component Date Value   • Total Cholesterol 02/13/2023 209 (H)    • Triglycerides 02/13/2023 184 (H)    • HDL Cholesterol 02/13/2023 48    • LDL Cholesterol  02/13/2023 128 (H)    • VLDL Cholesterol 02/13/2023 33    • LDL/HDL Ratio 02/13/2023 2.59    • Hemoglobin A1C 02/13/2023 4.5    • Glucose 02/13/2023 195 (H)    • BUN 02/13/2023 21 (H)    • Creatinine 02/13/2023 0.49 (L)    • Sodium 02/13/2023 139    • Potassium 02/13/2023 4.5    • Chloride 02/13/2023 101    • CO2 02/13/2023 27.0    • Calcium 02/13/2023 8.9    • Total Protein 02/13/2023 5.6 (L)    • Albumin 02/13/2023 3.8    • ALT (SGPT) 02/13/2023 28    • AST (SGOT) 02/13/2023 9    • Alkaline Phosphatase 02/13/2023 88    • Total Bilirubin 02/13/2023 0.6    • Globulin 02/13/2023 1.8    • A/G Ratio 02/13/2023 2.1    • BUN/Creatinine Ratio 02/13/2023 42.9 (H)    • Anion Gap 02/13/2023 11.0    • eGFR 02/13/2023 109.4    • Microalbumin/Creatinine * 02/13/2023 22.7    • Creatinine, Urine 02/13/2023 114.3    • Microalbumin, Urine 02/13/2023 2.6    Office Visit on 02/08/2023   Component Date Value   • Ferritin 02/08/2023 547.80 (H)    • Iron 02/08/2023 102    • Iron Saturation 02/08/2023 32    • Transferrin 02/08/2023 229    • TIBC 02/08/2023 321    • Immature Reticulocyte Fr* 02/08/2023 24.4 (H)    • Reticulocyte % 02/08/2023 7.74 (H)    • Reticulocyte Absolute 02/08/2023 0.1711 (H)    • Reticulocyte Hgb 02/08/2023 36.5 (H)    • IPF 02/08/2023 3.50    Lab on 02/08/2023   Component Date Value   • Glucose 02/08/2023 271 (H)    • BUN 02/08/2023 18    • Creatinine 02/08/2023 0.57 (L)    • Sodium 02/08/2023 141    • Potassium 02/08/2023 4.3    • Chloride 02/08/2023 103    • CO2 02/08/2023 24.7    • Calcium 02/08/2023 8.7    • Total Protein 02/08/2023 5.4 (L)    • Albumin 02/08/2023 3.5    • ALT (SGPT) 02/08/2023 32    • AST (SGOT) 02/08/2023 12    •  Alkaline Phosphatase 02/08/2023 88    • Total Bilirubin 02/08/2023 0.5    • Globulin 02/08/2023 1.9    • A/G Ratio 02/08/2023 1.8    • BUN/Creatinine Ratio 02/08/2023 31.6 (H)    • Anion Gap 02/08/2023 13.3    • eGFR 02/08/2023 105.5    • WBC 02/08/2023 2.79 (L)    • RBC 02/08/2023 2.22 (L)    • Hemoglobin 02/08/2023 8.6 (L)    • Hematocrit 02/08/2023 26.7 (L)    • MCV 02/08/2023 120.3 (H)    • MCH 02/08/2023 38.7 (H)    • MCHC 02/08/2023 32.2    • RDW 02/08/2023 15.9 (H)    • RDW-SD 02/08/2023 70.9 (H)    • MPV 02/08/2023 9.7    • Platelets 02/08/2023 37 (L)    • Neutrophil % 02/08/2023 80.2 (H)    • Lymphocyte % 02/08/2023 11.5 (L)    • Monocyte % 02/08/2023 7.2    • Eosinophil % 02/08/2023 0.0 (L)    • Basophil % 02/08/2023 0.0    • Immature Grans % 02/08/2023 1.1 (H)    • Neutrophils, Absolute 02/08/2023 2.24    • Lymphocytes, Absolute 02/08/2023 0.32 (L)    • Monocytes, Absolute 02/08/2023 0.20    • Eosinophils, Absolute 02/08/2023 0.00    • Basophils, Absolute 02/08/2023 0.00    • Immature Grans, Absolute 02/08/2023 0.03    • nRBC 02/08/2023 0.0    • Folate 02/08/2023 10.1    • Vitamin B-12 02/08/2023 226 (L)    Hospital Outpatient Visit on 02/07/2023   Component Date Value   • Creatinine 02/07/2023 0.50 (L)    • eGFR 02/07/2023 108.9    Orders Only on 01/26/2023   Component Date Value   • Course ID 01/26/2023 C1 GBM    • Course Intent 01/26/2023 Curative    • Course Start Date 01/26/2023 12/6/2022  3:59 PM    • Course First Treatment D* 01/26/2023 12/12/2022 12:56 PM    • Course Last Treatment Da* 01/26/2023 1/25/2023 12:57 PM    • Course Elapsed Days 01/26/2023 44    • Reference Point ID 01/26/2023 Boost    • Reference Point Dosage G* 01/26/2023 14    • Reference Point Session * 01/26/2023 2    • Plan ID 01/26/2023 [id number]    • Plan Fractions Treated t* 01/26/2023 7    • Plan Total Fractions Pre* 01/26/2023 7    • Plan Prescribed Dose Per* 01/26/2023 2    • Plan Total Prescribed Do* 01/26/2023 1,400    • Plan  Primary Reference P* 01/26/2023 Boost    Treatment on 01/24/2023   Component Date Value   • Course ID 01/24/2023 C1 GBM    • Course Intent 01/24/2023 Curative    • Course Start Date 01/24/2023 12/6/2022  3:59 PM    • Course First Treatment D* 01/24/2023 12/12/2022 12:56 PM    • Course Last Treatment Da* 01/24/2023 1/24/2023  4:11 PM    • Course Elapsed Days 01/24/2023 43    • Reference Point ID 01/24/2023 Boost    • Reference Point Dosage G* 01/24/2023 12    • Reference Point Session * 01/24/2023 2    • Plan ID 01/24/2023 [id number]    • Plan Fractions Treated t* 01/24/2023 6    • Plan Total Fractions Pre* 01/24/2023 7    • Plan Prescribed Dose Per* 01/24/2023 2    • Plan Total Prescribed Do* 01/24/2023 1,400    • Plan Primary Reference P* 01/24/2023 Boost    Treatment on 01/23/2023   Component Date Value   • Course ID 01/23/2023 C1 GBM    • Course Intent 01/23/2023 Curative    • Course Start Date 01/23/2023 12/6/2022  3:59 PM    • Course First Treatment D* 01/23/2023 12/12/2022 12:56 PM    • Course Last Treatment Da* 01/23/2023 1/23/2023  9:43 AM    • Course Elapsed Days 01/23/2023 42    • Reference Point ID 01/23/2023 Boost    • Reference Point Dosage G* 01/23/2023 10    • Reference Point Session * 01/23/2023 2    • Plan ID 01/23/2023 [id number]    • Plan Fractions Treated t* 01/23/2023 5    • Plan Total Fractions Pre* 01/23/2023 7    • Plan Prescribed Dose Per* 01/23/2023 2    • Plan Total Prescribed Do* 01/23/2023 1,400    • Plan Primary Reference P* 01/23/2023 Boost    Treatment on 01/20/2023   Component Date Value   • Course ID 01/20/2023 C1 GBM    • Course Intent 01/20/2023 Curative    • Course Start Date 01/20/2023 12/6/2022  3:59 PM    • Course First Treatment D* 01/20/2023 12/12/2022 12:56 PM    • Course Last Treatment Da* 01/20/2023 1/20/2023  4:30 PM    • Course Elapsed Days 01/20/2023 39    • Reference Point ID 01/20/2023 Boost    • Reference Point Dosage G* 01/20/2023 8    • Reference Point Session *  01/20/2023 2    • Plan ID 01/20/2023 [id number]    • Plan Fractions Treated t* 01/20/2023 4    • Plan Total Fractions Pre* 01/20/2023 7    • Plan Prescribed Dose Per* 01/20/2023 2    • Plan Total Prescribed Do* 01/20/2023 1,400    • Plan Primary Reference P* 01/20/2023 Boost    Treatment on 01/19/2023   Component Date Value   • Course ID 01/19/2023 C1 GBM    • Course Intent 01/19/2023 Curative    • Course Start Date 01/19/2023 12/6/2022  3:59 PM    • Course First Treatment D* 01/19/2023 12/12/2022 12:56 PM    • Course Last Treatment Da* 01/19/2023 1/19/2023  4:43 PM    • Course Elapsed Days 01/19/2023 38    • Reference Point ID 01/19/2023 Boost    • Reference Point Dosage G* 01/19/2023 6    • Reference Point Session * 01/19/2023 2    • Plan ID 01/19/2023 [id number]    • Plan Fractions Treated t* 01/19/2023 3    • Plan Total Fractions Pre* 01/19/2023 7    • Plan Prescribed Dose Per* 01/19/2023 2    • Plan Total Prescribed Do* 01/19/2023 1,400    • Plan Primary Reference P* 01/19/2023 Boost    There may be more visits with results that are not included.       Current Psychotropic Medications:  lexapro 5 mg daily    Plan of Care/ Medical Decision Making  Continue lexapro as written.  Explored impact of apathy, fatigue.   Discussed importance of full conversation with med onc regarding questions, concerns, prognostic expectations, treatment options potentially inclusive of Hospice at some point. Reviewed potential ability to limit appointments to conserve energy.  Will continue with regular apts; pt and  [facility] sooner apt available if needed.    Diagnoses and all orders for this visit:    1. Grief (Primary)    2. Generalized anxiety disorder    3. Glioblastoma multiforme (HCC)    I spent 39 minutes caring for Christie on this date of service.

## 2023-02-23 ENCOUNTER — SPECIALTY PHARMACY (OUTPATIENT)
Dept: PHARMACY | Facility: HOSPITAL | Age: 58
End: 2023-02-23
Payer: COMMERCIAL

## 2023-02-23 ENCOUNTER — HOME CARE VISIT (OUTPATIENT)
Dept: HOME HEALTH SERVICES | Facility: HOME HEALTHCARE | Age: 58
End: 2023-02-23
Payer: COMMERCIAL

## 2023-02-23 VITALS
HEART RATE: 85 BPM | OXYGEN SATURATION: 95 % | RESPIRATION RATE: 18 BRPM | TEMPERATURE: 97.7 F | SYSTOLIC BLOOD PRESSURE: 107 MMHG | DIASTOLIC BLOOD PRESSURE: 75 MMHG

## 2023-02-23 VITALS
DIASTOLIC BLOOD PRESSURE: 81 MMHG | TEMPERATURE: 98.7 F | HEART RATE: 79 BPM | SYSTOLIC BLOOD PRESSURE: 127 MMHG | RESPIRATION RATE: 18 BRPM | OXYGEN SATURATION: 97 %

## 2023-02-23 PROCEDURE — G0153 HHCP-SVS OF S/L PATH,EA 15MN: HCPCS

## 2023-02-23 NOTE — HOME HEALTH
Patient   said  the doctor recommended hospic care for patient due  Patient  is not responding to the chemo and thy are going to stop it .SLP will continue with treatment until patient goes to hospic care  SLP to continue with therapy to increase memory excutive functions and  problem solving

## 2023-02-23 NOTE — PROGRESS NOTES
MTM Chart Review    MD dictation noted:   1. With continued decline in performance status, persistent pancytopenia, patient is not a candidate for further chemotherapy with single agent Temodar.  2. Recommendation to forego further active treatment for her malignancy and to proceed with palliative/supportive care with home hospice involvement.  Patient and family agree and we will send referral for hospice care at home.    Pharmacy will be notified, so patient is not contacted for refills.    Kamala Bush RPH  2/23/2023  11:50 EST

## 2023-02-23 NOTE — PROGRESS NOTES
I called and canceled the Temodar scripts with Lainey Merino at [facility] pharmacy. Pt is now on hospice.

## 2023-02-23 NOTE — HOME HEALTH
Patient with dx of Malignant neoplasm of frontal lobe Patient will continue with speech therapy to increase  cognitve linguistic skills for memory and activities of daily living and to improve patient's ability to do functional task with her memory reading comprehension and expressive communication, problem solving and safety to decrease her risk of falls.

## 2023-03-06 ENCOUNTER — TELEPHONE (OUTPATIENT)
Dept: PSYCHIATRY | Facility: HOSPITAL | Age: 58
End: 2023-03-06
Payer: COMMERCIAL

## 2023-03-06 VITALS
DIASTOLIC BLOOD PRESSURE: 54 MMHG | WEIGHT: 231 LBS | HEART RATE: 84 BPM | SYSTOLIC BLOOD PRESSURE: 102 MMHG | BODY MASS INDEX: 38.49 KG/M2 | HEIGHT: 65 IN

## 2023-03-08 ENCOUNTER — TELEPHONE (OUTPATIENT)
Dept: PSYCHIATRY | Facility: HOSPITAL | Age: 58
End: 2023-03-08
Payer: COMMERCIAL

## 2023-03-08 NOTE — TELEPHONE ENCOUNTER
[facility]    Return call attempted following pt call to office. Straight to SATURNINO FOLEY

## 2023-03-14 NOTE — PROGRESS NOTES
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  She is currently continuing on home hospice care which was initiated at her last visit here on 2/22/2023.  At that time, she had been experiencing continued decline in her performance status and functional status, was in a wheelchair with limited mobility.  She also had persistent pancytopenia, unclear whether this was related to residual effects from previous Temodar, B12 deficiency, or effects from prior dapsone treatment.  We discussed potential pursuit of full dose maintenance Temodar however this was not recommended given her declining overall status, pancytopenia, and MGMT unmethylated status making response to Temodar less likely.  We also discussed availability of additional treatment such as a Avastin which were not recommended again due to her worsening mobility issues and likely difficulty coming into clinic for treatment.  We discussed goals of care and at the time it appeared we were all in agreement to pursue palliative care with home hospice involvement.  She was continuing on Decadron at 2 mg twice daily at the time.    In the interval I did receive a message from supportive oncology that there was concern on the patient and her 's part that we were \"giving up\" on her and they are seeking another opinion for treatment at [facility] although have continued on hospice care at home.    In discussion today, the patient " has experienced continued fatigue, spending more than half of her day in bed.  Her mobility issues do wax and wane, some days she is able to ambulate short distances with a walker.  Her dexamethasone dose has been increased to 6 mg daily, usually takes 4 mg in the morning and 2 mg at midday.  She has no use of her left arm and very limited use of her left leg.      Objective   Vital Signs:   There were no vitals taken for this visit.    Physical Exam       Physical exam was not performed today as this was a telephone visit    Result Review : No records for review today       Assessment and Plan  1. Glioblastoma multiforme  · Patient presented with falls and confusion, left upper and lower extremity weakness  · MRI brain 11/22/2022 with 3.2 cm rim-enhancing right supratentorial mass in the right thalamus and internal capsule with mass-effect and leftward midline shift  · Initiated dexamethasone 4 mg p.o. every 8 hours seizure prophylaxis with Keppra 500 mg twice daily  · Patient was seen by neurosurgery and was not a candidate for resection due to location.  · CT chest abdomen pelvis 11/24/2022 showed no evidence of source for potential metastatic disease  · Stereotactic brain biopsy 11/28/2022 with glioblastoma, IDH1 R132H negative, grade 4, MGMT promoter unmethylated  · On 12/12/2022, patient initiated concurrent chemoradiation with low-dose Temodar 75 mg/m² (165 mg) daily  · Patient completed radiation therapy on 1/25/2023 and discontinued Temodar on 1/28/2023 (took for 3 additional days after radiation stopped).  · MRI brain 2/7/2023 with decrease in mass right thalamus and internal capsule from 3.0 x 2.8 x 3.2 cm down to 2.8 x 2.5 x 3.1 cm.  Slight decrease in mass effect and surrounding edema.  Evidence of hemorrhage within the mass.  Mass effect on right lateral ventricle with 4 mm right to left midline shift unchanged.  · Patient with gradual decline in overall status following completion of concurrent  chemoradiation.  Given the patient's compromised ECOG performance status, ongoing pancytopenia (see below), and unmethylated MGMT status, she was ultimately felt to be a poor candidate for maintenance Temodar as well as for potential subsequent therapy with Avastin.  Ultimately transitioned to palliative/supportive care with home hospice involvement on 2/22/2023.  · The patient is evaluated today via telephone visit.  By her 's assessment, she has continued with further gradual decline.  She has ECOG performance status of 2-3.  She has very limited mobility, intermittent ability to ambulate with a walker with strength and that waxes and wanes day by day.  She spends much of her time sleeping.  She continues on hospice care at home.  Despite our discussion at her last visit and the patient and her 's agreement that they wish to focus on quality of life issues and that she did not wish to come in for ongoing office visits nor pursue treatment that may adversely affect her quality of life with limited potential benefit, they have elected to pursue evaluation with [facility].  It sounds as if they are going to discuss things on the telephone at [facility] first and then consider possibly being seen there.  We discussed the difficulty and logistics in her traveling to [facility] in her current state and that this would further adversely affect her quality of life.  Given her current ECOG performance status I do not believe she would be a good candidate for clinical trial.  Given the limited efficacy and potential adverse effects in the setting of her compromised performance status regarding further full dose maintenance Temodar or Avastin, I have not recommended use of these standard therapies in her situation either.  Given her germline mutations with BRCA2 and GEORGES, there is some consideration of the use of olaparib however again I would be concerned regarding increased risk of side effects with her  current compromised performance status.  They did express understanding and would like to discuss things further once they contact MD Snell.  We will determine at that point timeframe for any further in person or telephone visits.  For now they do wish to continue on home hospice care.  2. BRCA2 mutation (c.[id number]) and GEORGES mutation (c.5073dup):  · Strong family history of malignancy with a mother who had thyroid cancer and also had breast cancer at age 73 with subsequent liver metastases.  She was found to be BRCA2 positive and prompted the patient's own testing.  The patient's maternal grandmother had ovarian cancer in her mid 80s, maternal grandfather and paternal grandfather both had leukemia of unknown type at an older age.  Her maternal aunt had colon cancer over the age of 50, maternal aunt had breast cancer in her 80s, and her father had cholangiocarcinoma.    · The patient underwent testing on 7/27/2016 showing positive BRCA2 mutation (c.[id number])   · The patient did undergo KAVYA/BSO in 1992 secondary to hemorrhage.  · The patient did undergo bilateral mastectomies 1/26/2017 with findings of bilateral DCIS (discussed below).  · Patient has undergone previous colonoscopy on 11/28/2017.  · Given the unusual nature of the patient's malignancies, referral to genetics clinic for panel testing performed 11/10/2020 with re-identification of BRCA2 mutation (c.[id number]) and new identification of GEORGES mutation (c.5073dup).  · Previous plan to continue surveillance with repeat colonoscopy at a 5-year interval that would have been due late 2022.  Plans subsequently deferred due to subsequent diagnosis of glioblastoma multiforme.  2. Stage I ([id number]) papillary thyroid cancer:  · Status post total thyroidectomy with Dr. Maher in [place] on 10/15/2010.  Pathology showed papillary thyroid cancer involving the left thyroid and isthmus, multifocal with 2 areas measuring 0.9 and 1.2 cm.  No evidence of capsular  invasion, no extrathyroidal extension, no lymphovascular invasion, negative margins.  · Postsurgical scan showed uptake in the thyroid bed and the patient underwent treatment with I-131.    · She has had no evidence of recurrent disease.    · Her subsequent hypothyroidism has been managed by endocrinology (Dr. Subhash Michael) in [place], currently receiving levothyroxine 150 mcg daily.  · The patient was following routinely with endocrinology, was seen last on 8/8/2022  3. Bilateral DCIS  · As above, patient has underlying BRCA2 mutation  · 8/5/2016 was found to have a right breast intraductal papilloma with fibrocystic change and microcalcifications with usual ductal hyperplasia and columnar cell change in 3 separate locations on biopsy.  · On 8/24/2016 she underwent excisional biopsy of an intraductal papilloma from the right breast.    · She subsequently underwent on 1/26/2017 bilateral mastectomy.  On the right there was a large intraductal papilloma with usual ductal hyperplasia, atypical hyperplasia, DCIS measuring 3 mm which was low-grade, ER positive (98%), RI positive (85%).  On the left there were multiple intraductal papillomas with atypical ductal hyperplasia.  There was DCIS measuring 6 mm, low-grade, ER positive (99%), RI positive (98%).  4. Stage I (oA6vEfE5) clear-cell renal cell carcinoma:  · Incidental finding on CT scan 1/15/2020 of enhancing left renal lesions x2, largest 2.4 cm  · Resection with Dr. Pool ([facility]) on 5/15/2020 with left partial nephrectomy.  Pathology showed clear cell renal cell carcinoma, Jeanne grade 2, 2 foci measuring 1.5 and 2.1 cm.  The renal parenchymal margin was focally positive.  · Patient reports that Dr. Pool felt that he required additional margin tissue in the area of focal positivity and did not recommend re-resection.  · CT 8/17/2022 showed no evidence of recurrent disease  · CT chest abdomen pelvis 11/25/2022 with stable chronic  findings.  · Patient had been undergoing every 6-month monitoring of CT scans for surveillance however no plans for further CT monitoring in light of diagnosis of glioblastoma and subsequent transition to palliative/supportive care with hospice.  5. 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    · CT-guided biopsy on 1/9/2018 revealed a left lower lobe carcinoid with spindle cell features, no necrosis, no mitoses.  · Notation of normal chromogranin A 1/23/2018 of 35  · Follow-up CT on 1/24/2018 showed multiple bilateral pulmonary nodules including a 2.1 cm left lower lobe nodule (previously 2.6), 7 mm left lower lobe nodule, 1 cm right lower lobe nodule, right adrenal 1.5 cm nodule consistent with adenoma, and hepatic cysts.    · On 2/28/2018, the patient underwent a left lower lobectomy.  Pathology revealed a 2.3 cm left upper lobe (hilar) carcinoid, typical with spindle cell features, Ki-67 of 3.68%.  There were 2 of 8 peribronchial lymph nodes involved with carcinoid with lymphatic invasion, stage IIB ([id number]).  There was also evidence of adenocarcinoma (see below).  6. Stage IA1 (rR9pR2Bj)  left lower lobe non-small cell lung cancer (adenocarcinoma with lipidic features):  · The patient is a never smoker  · PET scan 12/13/2017 with hypermetabolic 2.5 cm left lower lobe nodule with SUV 5.7.    · CT-guided biopsy on 1/9/2018 revealed a left lower lobe carcinoid with spindle cell features, no necrosis, no mitoses.    · Follow-up CT on 1/24/2018 showed multiple bilateral pulmonary nodules including a 2.1 cm left lower lobe nodule (previously 2.6), 7 mm left lower lobe nodule, 1 cm right lower lobe nodule, right adrenal 1.5 cm nodule consistent with adenoma, and hepatic cysts.    · On 2/28/2018, the patient underwent a left lower lobectomy.  Pathology revealed a 2.3 cm left upper lobe (hilar) carcinoid, typical with  spindle cell features, Ki-67 of 3.68%.  There were 2 of 8 peribronchial lymph nodes involved with carcinoid with lymphatic invasion, stage IIB ([id number]).  There was a 0.9 cm adenocarcinoma, well differentiated with lipidic features, no visceral pleural invasion, bronchoalveolar atypical adenomatous hyperplasia at the parenchymal margin and 1 microscopic focus (less than 1 mm).  0/8 lymph nodes involved with adenocarcinoma. PDL1 <1% TPS, positive EGF receptor mutation (exon 19 deletion, [id number]), negative ALK/ROS1 rearrangement, negative BRAF, ER negative (2%), RI 0, HER-2/laura 1+, Ki-67 3%. Stage IA1 ([id number]).  · Patient was placed on Femara following surgery by Dr. Juana Pelayo.  Femara subsequently discontinued in early August 2020.  · Subsequent follow-up scans with stable findings until scan on 1/15/2020 noted 2 enhancing left renal lesions, largest 2.4 cm and there was also an enlarging right lower lobe nodule up to 1.0 x 1.2 cm.  In the interval, patient did undergo resection of renal cell carcinoma (discussed above).    · CT scan on 7/1/2020 showed slow increase in right lower lobe nodule up to 1.2 cm.  Felt to represent new primary lung cancer (see below).   7. Stage IA2 ([id number]) right lower lobe non-small cell lung cancer (adenocarcinoma with lipidic growth pattern):   · CT scan on 7/1/2020 showed slow increase in right lower lobe nodule up to 1.2 cm.  · CT-guided biopsy of the right lower lobe nodule on 7/31/2020 with adenocarcinoma with bland lipidic growth pattern of pulmonary origin (TTF-1 and CK7 positive). PDL1 TPS <1%, EGFR mutated, exon 20 insertion (possibly indicating lack of response to TKI's). ALK/ROS1 rearrangement negative and BRAF V600 mutation negative.  · Staging MRI brain 8/12/2020 with no evidence of metastatic disease  · Staging PET scan 8/12/2020 with no significant activity in the biopsied lesion 1.2 cm right lower lobe (SUV 1.5), no evidence of hilar, mediastinal uptake  nor distant metastatic disease.  · Given the difference in EGFR mutation between the patient's 2 lung cancers, it is felt that she has 2 separate primary lesions.    · Right VATS with anterior basal segmentectomy on 10/9/2020 with pathology showing invasive well to moderately differentiated adenocarcinoma with acinar predominant growth measuring 1.4 cm, negative margins, no pleural or lymphovascular invasion, negative lymph nodes x5.  · Follow-up CT chest 3/8/2021 showed postoperative change, no evidence of disease recurrence.    · CT 8/17/2022 showed no evidence of recurrent disease.   · CT chest abdomen pelvis 11/25/2022 with stable chronic findings.  · Patient had been undergoing every 6-month monitoring of CT scans for surveillance.  We will not plan further routine CT monitoring in light of the diagnosis of glioblastoma.  8. Melanoma right heel stage IA (xN2xQtF0)  · Patient underwent excision of right heel melanoma 6/4/2021, 0.6 mm superficial spreading melanoma with margin positive for melanoma in situ.  · Wide local excision 6/30/2021 with no residual melanoma identified  · Patient has had difficulty with healing of the wound and has required 3 separate skin grafts, the most recent performed 4 weeks ago with ongoing slow healing.  · The patient's wound in the right heel did finally heal in late 2021.    · Patient had been continuing follow-up with dermatology every 3 months however this is likely not necessary in the setting of diagnosis of glioblastoma.  9. Incidental CT findings  · Notation of a number of incidental CT findings on scan from 8/17/2022 including 1.6 cm right adrenal nodule, C7 focal osteolysis, both unchanged compared to 3/8/2021 and apparent benign findings.    · Notation on CT scan 8/17/2022 of short segment narrowing of the proximal left internal carotid artery resulting in moderate stenosis.  Findings were not well evaluated.    · Carotid ultrasound 9/1/2022 was normal  · CT chest  abdomen pelvis with stable findings on 11/25/2022.  No plans for any further routine CT monitoring of these issues.  10. Anemia:  · Patient has undergone previous colonoscopy on 11/28/2017.  · Hemoglobin in high 10 to low 11 range with low normal MCV  · Anemia evaluation 8/20/2020 with iron 44, ferritin 119.3, iron saturation 13%, TIBC 351, folate 12.2, B12 392.  · Unclear whether patient may have anemia secondary to chronic disease.  · With low iron saturation, initiated oral iron daily on 9/15/2020.  Patient however did not begin oral iron until 11/11/2020.  · Labs on 11/11/2020 with hemoglobin 11.4, iron studies showing iron 54, ferritin 112, iron saturation 14%, TIBC 395.  · Labs on 1/7/2021 with hemoglobin 11.9, iron studies with iron 50, ferritin 109.5, iron saturation 12%, TIBC 420.  Patient with significant GI side effects from oral iron with diarrhea, iron discontinued.  · Labs on 9/14/2021 showed hemoglobin that declined to 11.4 of unclear etiology.  Additional labs showed iron 44, ferritin 137.6, iron saturation 13 %, TIBC 346, folate 11.3, B12 394.   · Labs on 8/24/2022 showed hemoglobin normal at 12.0.  Labs show iron 46, ferritin 106, iron saturation 13%, TIBC 351.    · Hemoglobin on 2/8/2023 declined abruptly from 11.4 on 11/18/2023 down to 8.6 today with MCV dramatically elevated at 120.  This was in the setting of pancytopenia with WBC 2.79 and platelet count of 37,000.  Additional labs on 2/8/2023 with iron 102, ferritin 547.8, iron saturation 32%, TIBC 321, folate 10.1, B12 226.    · Patient initiated B12 replacement with B12 injections 1000 mcg IM daily x5 with subsequent plans for weekly B12 x4 followed by monthly B12 injections.  · At last visit on 2/22/2023, hemoglobin was stable at 9.4.  As we are continuing on palliative/supportive care alone, no plans for further B12 replacement.  9. Acute pancytopenia  · Labs on 2/8/2023 with dramatic change, on 1/18/2023, WBC 9.72 with hemoglobin 11.4 and  platelet count 89,000.  Today, WBC 2.79 with hemoglobin 8.6, platelet count 37,000, .3.  · CBC was repeated and verified  · Etiology of the acute pancytopenia unclear, suspect either cumulative effects from low-dose Temodar (completed on 1/28/2023) or possibly hematologic effects from recent dapsone use (discontinued on 1/18/2023 due to methemoglobinemia).  · Additional labs on 2/8/2023 with iron 102, ferritin 547.8, iron saturation 32%, TIBC 321, folate 10.1, B12 226.    · Patient initiated B12 replacement with B12 injections 1000 mcg IM daily x5 with subsequent plans for weekly B12 x4 followed by monthly B12 injections.  · Labs at most recent visit on 2/22/2023 had improved slightly with WBC 3.13, ANC 2.13, hemoglobin 9.4, platelet count 48,000 with MCV that remained dramatically elevated at 118.  With persistent pancytopenia, patient was not a candidate for Temodar as noted above.  With transition to palliative/supportive care/hospice involvement, we will not be monitoring further routine laboratory studies.  10. Depression  · The patient was experiencing significant exacerbation of depressive symptoms in fall 2021 as she was coping with her mother's illness with metastatic breast cancer.  Patient's mother was living with her on hospice care for a period of time and did pass away in late 2021.  · Patient was referred back to supportive oncology clinic which had been very helpful for her  · Patient had been receiving Lexapro, dose decreased from 10 down to 5 mg daily which was better tolerated  · Patient has continued follow-up with supportive oncology.  11. PCP prophylaxis  · Patient previously receiving Bactrim DS 1 p.o. Monday Wednesday Friday  · With persistent elevation in LFTs, transition from Bactrim to dapsone on 1/3/2023  · Patient developed methemoglobinemia from dapsone, discontinued on 1/18/2023  · Patient completed her course of concurrent chemoradiation with Temodar.  Although patient had  ongoing lymphopenia, reluctant to add in anything else currently for PCP prophylaxis.    12. GI prophylaxis  · Continue Protonix 40 mg daily  13. Seizure prophylaxis  · Continue Keppra 500 mg twice daily  14. Elevated LFTs  · LFTs prior to hospitalization in November 2022 were normal  · Significant increase in transaminases noted on 12/8/2022 with ALT 1049, , normal total bilirubin 0.6  · Viral hepatitis panel negative 12/8/2022, negative GEMMA and smooth muscle antibody 12/9/2022  · Gradual improvement in LFT's.  · On 1/3/2023, ALT remained elevated and stable at 243, AST normal at 27, normal bilirubin 0.3.  Unclear if possibly related to Bactrim and therefore Bactrim discontinued and switched to dapsone  · On 2/22/2023, LFTs with minimal elevation in ALT at 38  15. 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  · Patient developed unexplained hypoxia and was seen in the emergency department on 1/18/2023.  After extensive evaluation, methemoglobin was found to be elevated at 10.6 and therefore patient felt to have dapsone induced methemoglobinemia, dapsone discontinued.  · Hypoxia resolved     Plan:  1. With compromised performance status (ECOG 2-3), recommendation for patient to continue a course of palliative care with home hospice involvement.  2. The patient and her  are seeking an opinion with MD Snell regarding subsequent treatment recommendations.  I cautioned them regarding her ability to travel there with her current physical limitations.  Again discussed goals of care regarding maintaining quality of life issues.  3. Continue Keppra 500 mg twice daily for seizure prophylaxis  4. Continue Protonix 40 mg daily for GI prophylaxis  5. Patient is continuing on dexamethasone 6 mg daily (4 mg in the morning, 2 mg midday)  6. The patient and her  will contact us with recommendations from MD Snell to discuss further.  We will determine  any further follow-up in the office after that conversation.      Patient did consent to telephone visit today    I did spend 15 minutes in total time with telephone visit today.  I spent a total of 30 minutes caring for the patient today, time spent in review of records, telephone conversation, coordination of care, completion of documentation.

## 2023-03-15 ENCOUNTER — OFFICE VISIT (OUTPATIENT)
Dept: ONCOLOGY | Facility: CLINIC | Age: 58
End: 2023-03-15
Payer: COMMERCIAL

## 2023-03-15 DIAGNOSIS — C71.9 GLIOBLASTOMA MULTIFORME: Primary | ICD-10-CM

## 2023-03-15 PROCEDURE — 1159F MED LIST DOCD IN RCRD: CPT | Performed by: INTERNAL MEDICINE

## 2023-03-15 PROCEDURE — 99442 PR PHYS/QHP TELEPHONE EVALUATION 11-20 MIN: CPT | Performed by: INTERNAL MEDICINE

## 2023-03-15 PROCEDURE — 1160F RVW MEDS BY RX/DR IN RCRD: CPT | Performed by: INTERNAL MEDICINE

## 2023-03-15 PROCEDURE — 1126F AMNT PAIN NOTED NONE PRSNT: CPT | Performed by: INTERNAL MEDICINE

## 2023-03-20 ENCOUNTER — TELEPHONE (OUTPATIENT)
Dept: PSYCHIATRY | Facility: HOSPITAL | Age: 58
End: 2023-03-20
Payer: COMMERCIAL

## 2023-03-20 NOTE — TELEPHONE ENCOUNTER
Supportive Oncology Services    Supportive call placed to pt regarding recent communication, concerns. LM with ability for sooner conversation if needed; otherwise will follow up later this week as scheduled.

## 2023-03-24 ENCOUNTER — TELEPHONE (OUTPATIENT)
Dept: PSYCHIATRY | Facility: HOSPITAL | Age: 58
End: 2023-03-24

## 2023-03-24 NOTE — TELEPHONE ENCOUNTER
Supportive Oncology Services    Supportive call placed to patient at time of appointment.  Recent inability to make contact.  Left message with request for a return call.

## 2023-03-28 LAB
DX PRELIMINARY: NORMAL
LAB AP CASE REPORT: NORMAL
LAB AP DIAGNOSIS COMMENT: NORMAL
LAB AP SYNOPTIC CHECKLIST: NORMAL
Lab: NORMAL
PATH REPORT.ADDENDUM SPEC: NORMAL
PATH REPORT.FINAL DX SPEC: NORMAL
PATH REPORT.GROSS SPEC: NORMAL

## 2023-04-05 RX ORDER — VALACYCLOVIR HYDROCHLORIDE 1 G/1
TABLET, FILM COATED ORAL
Qty: 360 TABLET | Refills: 3 | OUTPATIENT
Start: 2023-04-05

## 2023-04-08 NOTE — TELEPHONE ENCOUNTER
Problem: At Risk for Falls  Goal: # Patient does not fall  Outcome: Outcome Not Met, Continue to Monitor  Goal: # Takes action to control fall-related risks  Outcome: Outcome Not Met, Continue to Monitor  Goal: # Verbalizes understanding of fall risk/precautions  Description: Document education using the patient education activity  Outcome: Outcome Not Met, Continue to Monitor     Problem: At Risk for Injury Due to Fall  Goal: # Patient does not fall  Outcome: Outcome Not Met, Continue to Monitor  Goal: # Takes action to control condition specific risks  Outcome: Outcome Not Met, Continue to Monitor  Goal: # Verbalizes understanding of fall-related injury personal risks  Description: Document education using the patient education activity  Outcome: Outcome Not Met, Continue to Monitor      Supportive Oncology Services    Supportive call placed to patient regarding recent MyChart message. LM for patient with request for return call.

## 2023-05-08 LAB
DX PRELIMINARY: NORMAL
LAB AP CASE REPORT: NORMAL
LAB AP DIAGNOSIS COMMENT: NORMAL
LAB AP SPECIAL STAINS: NORMAL
Lab: NORMAL
PATH REPORT.ADDENDUM SPEC: NORMAL
PATH REPORT.FINAL DX SPEC: NORMAL
PATH REPORT.GROSS SPEC: NORMAL

## 2023-05-17 RX ORDER — VALACYCLOVIR HYDROCHLORIDE 1 G/1
TABLET, FILM COATED ORAL
Qty: 180 TABLET | Refills: 7 | Status: SHIPPED | OUTPATIENT
Start: 2023-05-17

## 2023-05-23 RX ORDER — DULAGLUTIDE 3 MG/.5ML
INJECTION, SOLUTION SUBCUTANEOUS
Qty: 6 ML | Refills: 3 | OUTPATIENT
Start: 2023-05-23

## 2023-08-10 NOTE — THERAPY TREATMENT NOTE
Inpatient Rehabilitation - Physical Therapy Treatment Note       [facility]     Patient Name: Christie Avendaño  : 1965  MRN: [id number]    Today's Date: 2022                    Admit Date: 2022      Visit Dx:     ICD-10-CM ICD-9-CM   1. Impaired functional mobility, balance, gait, and endurance  Z74.09 V49.89   2. 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)   • Non-small cell lung cancer, right (HCC)   • Renal mass, right   • SIRS (systemic inflammatory response syndrome) (HCC)   • Hyperlipidemia   • Malignant melanoma of right lower extremity including hip (HCC)   • High grade glioma    • 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)   • Glioma (HCC)       Past Medical History:   Diagnosis Date   • Arthritis    • Asthma    • BRCA2 positive    • Diabetes mellitus (HCC)    • H/O Liver masses 2017    x3   • H/O Lung masses 2017    1 in right lower lobe and 1 in the left infrahilar location   • H/O Ovarian cyst    • H/O Right adrenal mass (CMS/HCC) 2017   • Lung cancer (HCC)    • Melanoma (HCC)     right foot   • PONV (postoperative nausea and vomiting)    • Thyroid disease        Past Surgical History:   Procedure Laterality Date   • APPENDECTOMY     • BRAIN BIOPSY Right 2022    Procedure: Right frontal stereotactic needle brain biopsy;  Surgeon: Manuel Thompson MD;  Location: [facility] MAIN OR;  Service: Neurosurgery;  Laterality: Right;   • BREAST IMPLANT SURGERY Bilateral    • CHOLECYSTECTOMY     • HYSTERECTOMY     •  MASTECTOMY Bilateral    • OVARIAN CYST SURGERY     • THORACOSCOPY VIDEO ASSISTED WITH LOBECTOMY Right 10/9/2020    Procedure: BRONCHOSCOPY, THORACOSCOPY VIDEO ASSISTED WITH ANTERIOR BASAL SEGMENTECTOMY, INTERCOSTAL NERVE BLOCK;  Surgeon: Flaca Crisostomo MD;  Location: [facility] OR;  Service: Thoracic;  Laterality: Right;   • TONSILLECTOMY         PT ASSESSMENT (last 12 hours)     IRF PT Evaluation and Treatment     Row Name 12/13/22 0900          PT Time and Intention    Document Type daily treatment  -     Mode of Treatment physical therapy  -     Patient/Family/Caregiver Comments/Observations pt supine in bed no acute distress  -     Evaluation/Treatment Not Performed patient unavailable  attempted PT [id number] of [facility]  -     Row Name 12/13/22 0900          Pain Assessment    Pretreatment Pain Rating 0/10 - no pain  -     Posttreatment Pain Rating 0/10 - no pain  -     Row Name 12/13/22 0900          Cognition/Psychosocial    Affect/Mental Status (Cognition) flat/blunted affect  -     Orientation Status (Cognition) oriented x 4  -     Follows Commands (Cognition) follows one-step commands;over 90% accuracy;verbal cues/prompting required  -     Cognitive Function executive function deficit  -     Row Name 12/13/22 0900          Bed Mobility    Supine-Sit [place] (Bed Mobility) contact guard  -     Sit-Supine [place] (Bed Mobility) not tested  -     Row Name 12/13/22 0900          Bed-Chair Transfer    Bed-Chair [place] (Transfers) minimum assist (75% patient effort);verbal cues;nonverbal cues (demo/gesture)  -     Assistive Device (Bed-Chair Transfers) wheelchair  -     Row Name 12/13/22 0900          Sit-Stand Transfer    Sit-Stand [place] (Transfers) contact guard;verbal cues;nonverbal cues (demo/gesture)  -     Assistive Device (Sit-Stand Transfers) walker, front-wheeled  -     Row Name 12/13/22 0900          Stand-Sit Transfer    Stand-Sit  [place] (Transfers) contact guard;minimum assist (75% patient effort);verbal cues;nonverbal cues (demo/gesture)  -     Assistive Device (Stand-Sit Transfers) walker, front-wheeled;wheelchair  -     Row Name 12/13/22 0900          Gait/Stairs (Locomotion)    [place] Level (Gait) contact guard;minimum assist (75% patient effort);verbal cues;nonverbal cues (demo/gesture)  -     Assistive Device (Gait) walker, front-wheeled  -     Distance in Feet (Gait) 160  -     Pattern (Gait) step-through  -     Deviations/Abnormal Patterns (Gait) fabrizio decreased;left sided deviations;stride length decreased;base of support, narrow  -     Bilateral Gait Deviations forward flexed posture  -     Left Sided Gait Deviations leans left  -     Row Name 12/13/22 0900          Motor Skills    Therapeutic Exercise --  APs, MIP, knee flex/ext, hip abd/add x10 RTB  -[facility] Name 12/13/22 0900          Positioning and Restraints    Pre-Treatment Position in bed  -     Post Treatment Position wheelchair  -     In Wheelchair sitting;call light within reach;encouraged to call for assist;exit alarm on  -           User Key  (r) = Recorded By, (t) = Taken By, (c) = Cosigned By    Initials Name Provider Type     Radha Ramírez, PT Physical Therapist              Wound Right scalp Incision (Active)   Dressing Appearance open to air 12/12/22 1928   Closure Staples 12/13/22 0858   Base clean;dry;scab 12/13/22 0858   Periwound intact;dry 12/13/22 0858   Periwound Temperature warm 12/13/22 0858   Periwound Skin Turgor soft 12/13/22 0858   Drainage Amount none 12/12/22 1928     Physical Therapy Education     Title: PT OT SLP Therapies (In Progress)     Topic: Physical Therapy (In Progress)     Point: Mobility training (In Progress)     Learning Progress Summary           Patient Acceptance, E, NR by  at 12/13/2022 0923    Acceptance, E, VU by JACK at 12/12/2022 2327    Acceptance, E, VU,DU,NR by JK at 12/12/2022 1011     Acceptance, E, VU by WN at 12/12/2022 0155    Acceptance, E, VU,NR by  at 12/10/2022 1035    Acceptance, E,D, VU,DU,NR by JK at 12/9/2022 0853    Acceptance, E,TB,D, VU,DU,NR by KP at 12/8/2022 1420    Comment: Goals, POC    Acceptance, E, VU by CB at 12/8/2022 1204   Significant Other Acceptance, E,TB,D, VU,DU,NR by KP at 12/8/2022 1420    Comment: Goals, POC                   Point: Home exercise program (In Progress)     Learning Progress Summary           Patient Acceptance, E, NR by  at 12/13/2022 0923    Acceptance, E, VU by WN at 12/12/2022 2327    Acceptance, E, VU by WN at 12/12/2022 0155    Acceptance, E, VU,NR by  at 12/10/2022 1035    Acceptance, E,D, VU,DU,NR by MIKHAIL at 12/9/2022 0853    Acceptance, E,TB,D, VU,DU,NR by KP at 12/8/2022 1420    Comment: Goals, POC    Acceptance, E, VU by CB at 12/8/2022 1204   Significant Other Acceptance, E,TB,D, VU,DU,NR by KP at 12/8/2022 1420    Comment: Goals, POC                   Point: Body mechanics (In Progress)     Learning Progress Summary           Patient Acceptance, E, NR by  at 12/13/2022 0923    Acceptance, E, VU by WN at 12/12/2022 2327    Acceptance, E, VU by WN at 12/12/2022 0155    Acceptance, E, VU,NR by  at 12/10/2022 1035    Acceptance, E, VU by CB at 12/8/2022 1204                   Point: Precautions (In Progress)     Learning Progress Summary           Patient Acceptance, E, NR by  at 12/13/2022 0923    Acceptance, E, VU by WN at 12/12/2022 2327    Acceptance, E, VU by WN at 12/12/2022 0155    Acceptance, E, VU,NR by  at 12/10/2022 1035    Acceptance, E, VU by CB at 12/8/2022 1204                               User Key     Initials Effective Dates Name Provider Type Discipline     06/16/21 -  Radha Ramírez, PT Physical Therapist PT    JK 06/16/21 -  Samantha Thompson, PT Physical Therapist PT    KP 06/16/21 -  Anita Serrano, PT Physical Therapist PT    WN 08/23/22 -  Doc Park, RN Registered Nurse Nurse    CB 11/10/22 -   Mackenzie Hopkins, CCC-SLP Speech and Language Pathologist SLP                PT Recommendation and Plan                          Time Calculation:      PT Charges     Row Name 12/13/22 0921             Time Calculation    Start Time 0900  -      Stop Time 0930  -      Time Calculation (min) 30 min  -      PT Received On 12/13/22  -      PT - Next Appointment 12/14/22  -         Time Calculation- PT    Total Timed Code Minutes- PT 30 minute(s)  -            User Key  (r) = Recorded By, (t) = Taken By, (c) = Cosigned By    Initials Name Provider Type     Radha Ramírez PT Physical Therapist                Therapy Charges for Today     Code Description Service Date Service Provider Modifiers Qty    [id number]  PT THER PROC EA 15 MIN 12/13/2022 Radha Ramírez PT GP 1    [id number]  PT THERAPEUTIC ACT EA 15 MIN 12/13/2022 Radha Ramírez PT GP 1        .Patient was wearing a face mask during this therapy encounter. Therapist used appropriate personal protective equipment including eye protection, mask, and gloves.  Mask used was standard procedure mask. Appropriate PPE was worn during the entire therapy session. Hand hygiene was completed before and after therapy session. Patient is not in enhanced droplet precautions.                Radha Ramírez PT  12/13/2022     PAST MEDICAL HISTORY:  No pertinent past medical history

## 2023-08-28 RX ORDER — LISINOPRIL 20 MG/1
TABLET ORAL
Qty: 90 TABLET | Refills: 3 | OUTPATIENT
Start: 2023-08-28

## 2024-03-02 NOTE — PROGRESS NOTES
"Outpatient Oncology Nutrition Assessment      Patient Name:  Christie Avendaño  YOB: 1965  MRN: [id number]      Assessment Date:  11/10/2020    Comments:  [facility] reviewed.  She would like to lose weight, as DM and needs help with meal planning, grocery shopping, etc.   She is recovering from surgery. Interested in eating a more plant based diet.  She is without a kitchen at the moment and her  works 3rd shift. They are eating out more than usual.   I provided her with a DM meal plan and reviewed portion control, meal ideas.  She currently only eats breakfast and dinner and gets hungry after dinner. I encouraged her to eat a mid day meal instead. Discussed how to begin planning a more plant centered diet. Provided her with grocery list, sample menus and meal ideas. Gave her recipe sites that can assist her with meal prep. I recommended that she take a vitamin D supplement daily.  Will be available for any questions.     Reason for Assessment     Row Name 11/10/20 1200          Reason for Assessment    Reason For Assessment  physician consult     Diagnosis  cancer diagnosis/related complications R lower lobe adenocarcinoma, h/o breast ca, renal call ca     Identified At Risk by Screening Criteria  need for education         Nutrition/Diet History     Row Name 11/10/20 1200          Nutrition/Diet History    Typical Food/Fluid Intake  eats breakfast and dinner only usually         Anthropometrics     Row Name 11/10/20 1200          Anthropometrics    Height  165.1 cm (65\")     Weight  116 kg (256 lb)        Ideal Body Weight (IBW)    Ideal Body Weight (IBW) (kg)  57.29     % Ideal Body Weight  202.69        Body Mass Index (BMI)    BMI (kg/m2)  42.69     BMI Assessment  BMI 40 or greater: obesity grade III         Labs/Tests/Procedures/Meds     Row Name 11/10/20 1200          Labs/Procedures/Meds    Lab Results Reviewed  reviewed, pertinent     " "Lab Results Comments  HgAIC 7.3 last available 7/20        Diagnostic Tests/Procedures    Diagnostic Test/Procedures Comments  s/p bronch and VATS 10/20        Medications    Pertinent Medications Comments  omega 3 fatty acids, cinnamon, turmeric, metformin           Estimated/Assessed Needs     Row Name 11/10/20 1200          Calculation Measurements    Weight Used For Calculations  116 kg (256 lb)     Height  165.1 cm (65\")        Estimated/Assessed Needs    Additional Documentation  Fluid Requirements (Group);KCAL/KG (Group);Protein Requirements (Group)        KCAL/KG    KCAL/KG  15 Kcal/Kg (kcal) 1473-8591 calories per day for weight loss 2 lb per week     15 Kcal/Kg (kcal)  1741.815     18 Kcal/Kg (kcal)  --        Protein Requirements    Weight Used For Protein Calculations  116 kg (256 lb)     Est Protein Requirement Amount (gms/kg)  0.8 gm protein     Estimated Protein Requirements (gms/day)  92.9        Fluid Requirements    Fluid Requirements (mL/day)  2000     Estimated Fluid Requirement Method  RDA Method     RDA Method (mL)  2000                   Problem/Interventions:    Information deficit related to DM control and weight management          Intervention Goal     Row Name 11/10/20 1200          Intervention Goal    General  Maintain nutrition;Provide information regarding MNT for treatment/condition;Disease management/therapy     Weight  Appropriate weight loss         Nutrition Intervention     Row Name 11/10/20 1200          Nutrition Intervention    RD/Tech Action  Follow Tx progress           Education/Evaluation     Row Name 11/10/20 1200          Education    Education  Provided education regarding;Education topics;Advised regarding habits/behavior     Provided education regarding  Healthy eating for diabetes;Key food habit change;Preventative health     Education Topics  Basic nutrition;Gradual weight loss     Advised Regarding Habits/Behavior  Activity;Appropriate beverage;Appropriate " portions;Eating out;Eating pattern;Food choices;Food prep;Food shopping;Meal planning        Monitor/Evaluation    Education Follow-up  Reinforce PRN           Electronically signed by:  Rachel Jo RD, LD  11/10/20 12:12 EST   Wheelchair

## 2024-07-05 NOTE — TELEPHONE ENCOUNTER
"Returned call to  scheduling with the information as follows from Dr. Collins's clinic RN:  \"Not needed. Patient has glioblastoma and per Dr. Collins's progress note from last week, there are no plans to pursue routine CT monitoring of chest abdomen or pelvis\".  She v/u.    " hard copy, drawn during this pregnancy

## 2025-06-09 NOTE — PROGRESS NOTES
[facility]ive [facility]cology Services  In Person Session    Subjective  Patient ID: Christie Avendaño is a 56 y.o. female is seen face to face in the [facility].    CC: Improved experience of mood and anxiety symptoms, persistent grief     HPI:   Pt noted to be alert, oriented, and engaged in conversation. Full range of affect noted; mood reportedly significantly improved.   Interval history reviewed; pt reviews significant improvement in sx of depression with reduction in Lexapro to 5 mg daily and dedication to nonpharmacological behavioral activation strategies. PHQ 9 and TYESHA 7 significantly reduced. Pt reports adhering largely to wake up routine, which has been helpful.  Continues to identify challenges with  working third shift, and is working toward creative solutions. Reports increased sense of engagement, participating in necessary tasks, and not feeling as irritable. Has restarted crocheting and appreciates sense of enjoyment. Pt reports improvement in appetite with dedication to portion control. Celebrates 12 pounds of weight loss. Sugars have significantly improved over past month. Continues with goals of cooking more in home. Pt has been getting on treadmill more in the past weeks; continues to feel increased dedication to this would be helpful. Reports continued soreness in foot. Has apt with dermatologist on Friday with plans to discuss potential PT. Continues to identify significant grief with approaching holidays, first Beth without her mother.  Is looking forward to being with family.     Review of Systems   Psychiatric/Behavioral: Negative for agitation and dysphoric mood. The patient is not nervous/anxious.      Medications Reviewed:  Lexapro 5 mg daily    Diagnoses and all orders for this visit:    1. Major depressive disorder, recurrent episode, moderate (HCC) (Primary)    2. Generalized anxiety disorder    Plan of Care  Pt with significant improvement in mood and  anxiety sx with reduction in Lexapro, dedication to behavioral activation strategies. Continue goals as written with dedication to portion control and weight loss, sleep hygiene techniques, physical activity with potential PT, and home cooking.  Reviewed strategies for ongoing left 1 throughout holidays, and supported patient in current experience of grief.  Follow-up arranged in clinic in 8 weeks.    I spent 35 minutes caring for Christie on this date of service. This time includes time spent by me in the following activities: preparing for the visit, obtaining and/or reviewing a separately obtained history, performing a medically appropriate examination and/or evaluation, counseling and educating the patient/family/caregiver, ordering medications, tests, or procedures and documenting information in the medical record.        Dr. Amrit Tee

## (undated) DEVICE — CONTAINER,SPECIMEN,OR STERILE,4OZ: Brand: MEDLINE

## (undated) DEVICE — UNIVERSAL STAPLER: Brand: ENDO GIA ULTRA

## (undated) DEVICE — SPNG LAP CIGARETTE KITTNER 5MM STRL PK/5

## (undated) DEVICE — SOL IRR PHYSIOSOL BTL 1000ML

## (undated) DEVICE — DRP SLUSH WARMR MACH CIR 44X44IN

## (undated) DEVICE — TOTAL TRAY, 16FR 10ML SIL FOLEY, URN: Brand: MEDLINE

## (undated) DEVICE — SUT SILK 0 PSL 18IN 580H

## (undated) DEVICE — CATH IV INSYTE AUTOGARD 14G 1 1/2IN ORNG

## (undated) DEVICE — ADHS SKIN DERMABOND TOP ADVANCED

## (undated) DEVICE — Device

## (undated) DEVICE — CONN TBG Y 5 IN 1 LF STRL

## (undated) DEVICE — PENCL ES MEGADINE EZ/CLEAN BUTN W/HOLSTR 10FT

## (undated) DEVICE — COTTON BALLS, DOUBLE STRUNG: Brand: DEROYAL

## (undated) DEVICE — PERFORATOR CDM WO/ DURAGUARD 14/11

## (undated) DEVICE — SMOKE EVACUATION TUBING WITH 7/8 IN TO 1/4 IN REDUCER: Brand: BUFFALO FILTER

## (undated) DEVICE — GLV SURG BIOGEL LTX PF 8

## (undated) DEVICE — GLV SURG SIGNATURE ESSENTIAL PF LTX SZ7.5

## (undated) DEVICE — WOUND RETRACTOR AND PROTECTOR: Brand: ALEXIS WOUND PROTECTOR-RETRACTOR

## (undated) DEVICE — STPCK 3WY D201 DISCOFIX

## (undated) DEVICE — TOOL MR8-9AC75 MR8 9CM ACORN 7.5MM: Brand: MIDAS REX MR8

## (undated) DEVICE — DISPOSABLE BIPOLAR CABLE 12FT. (3.6M): Brand: KIRWAN

## (undated) DEVICE — PK NEURO SPINE 40

## (undated) DEVICE — STPLR SKIN VISISTAT WD 35CT

## (undated) DEVICE — 1010 S-DRAPE TOWEL DRAPE 10/BX: Brand: STERI-DRAPE™

## (undated) DEVICE — KT BIOP NDL INTELLECT CRANI NAV

## (undated) DEVICE — CRANIOTOMY DRAPE, STERILE: Brand: MEDLINE

## (undated) DEVICE — GLV SURG BIOGEL LTX PF 6

## (undated) DEVICE — 3M™ STERI-DRAPE™ INSTRUMENT POUCH 1018: Brand: STERI-DRAPE™

## (undated) DEVICE — ANTIBACTERIAL UNDYED BRAIDED (POLYGLACTIN 910), SYNTHETIC ABSORBABLE SUTURE: Brand: COATED VICRYL

## (undated) DEVICE — SPHR MARKR STEALTH STATION

## (undated) DEVICE — CONTN STRL 32OZ

## (undated) DEVICE — APPL CHLORAPREP HI/LITE 26ML ORNG

## (undated) DEVICE — ELECTRD BLD EZ CLN MOD XLNG 2.75IN

## (undated) DEVICE — LOU THORACIC: Brand: MEDLINE INDUSTRIES, INC.

## (undated) DEVICE — PATIENT RETURN ELECTRODE, SINGLE-USE, CONTACT QUALITY MONITORING, ADULT, WITH 9FT CORD, FOR PATIENTS WEIGING OVER 33LBS. (15KG): Brand: MEGADYNE

## (undated) DEVICE — MAYFIELD® DISPOSABLE ADULT SKULL PIN (PLASTIC BASE): Brand: MAYFIELD®

## (undated) DEVICE — SUT SILK 0 TIES 30IN A306H

## (undated) DEVICE — CODMAN® ISOCOOL® AHT® BIPOLAR FORCEPS TIPS 8 1/2" (22CM) TOTAL LENGTH 1MM TIP DIAMETER 3 1/2" (8.9CM) WORKING LENGTH: Brand: CODMAN® ISOCOOL® AHT®

## (undated) DEVICE — SKIN PREP TRAY W/CHG: Brand: MEDLINE INDUSTRIES, INC.

## (undated) DEVICE — VISUALIZATION SYSTEM: Brand: CLEARIFY

## (undated) DEVICE — GLV SURG BIOGEL LTX PF 7 1/2

## (undated) DEVICE — TOWEL,OR,DSP,ST,WHITE,DLX,4/PK,20PK/CS: Brand: MEDLINE

## (undated) DEVICE — UNIVERSAL PACK: Brand: CARDINAL HEALTH

## (undated) DEVICE — GLV SURG SENSICARE PI LF PF 7.5 GRN STRL